# Patient Record
Sex: FEMALE | Race: WHITE | NOT HISPANIC OR LATINO | Employment: UNEMPLOYED | URBAN - METROPOLITAN AREA
[De-identification: names, ages, dates, MRNs, and addresses within clinical notes are randomized per-mention and may not be internally consistent; named-entity substitution may affect disease eponyms.]

---

## 2019-02-05 ENCOUNTER — HOSPITAL ENCOUNTER (OUTPATIENT)
Dept: OTHER | Facility: HOSPITAL | Age: 59
Discharge: HOME | End: 2019-02-05
Attending: NURSE PRACTITIONER

## 2019-02-05 LAB
CREAT BLD-MCNC: 0.9 MG/DL (ref 0.6–1.4)
GFR SERPLBLD BASED ON 1.73 SQ M-ARVRAT: >60 ML/MIN/{1.73_M2}

## 2019-11-14 ENCOUNTER — HOSPITAL ENCOUNTER (OUTPATIENT)
Dept: OTHER | Facility: HOSPITAL | Age: 59
Discharge: HOME | End: 2019-11-14
Attending: NURSE PRACTITIONER

## 2020-02-24 ENCOUNTER — HOSPITAL ENCOUNTER (INPATIENT)
Facility: HOSPITAL | Age: 60
LOS: 12 days | Discharge: NURSING, REHAB OR LONG-TERM CARE | End: 2020-03-07
Attending: INTERNAL MEDICINE | Admitting: INTERNAL MEDICINE

## 2020-02-24 DIAGNOSIS — I21.11 STEMI INVOLVING RIGHT CORONARY ARTERY (HCC): Primary | ICD-10-CM

## 2020-02-24 DIAGNOSIS — R10.9 LEFT SIDED ABDOMINAL PAIN: ICD-10-CM

## 2020-02-24 DIAGNOSIS — R10.13 DYSPEPSIA: ICD-10-CM

## 2020-02-24 DIAGNOSIS — R19.5 HEME POSITIVE STOOL: ICD-10-CM

## 2020-02-24 PROBLEM — I21.3 ACUTE ST ELEVATION MYOCARDIAL INFARCTION (STEMI) DUE TO OCCLUSION OF RIGHT CORONARY ARTERY (HCC): Status: ACTIVE | Noted: 2020-02-24

## 2020-02-24 LAB
ACT BLD: 257 SECONDS (ref 82–152)
ACT BLD: 279 SECONDS (ref 82–152)
CHOLEST SERPL-MCNC: 202 MG/DL (ref 0–200)
GLUCOSE BLDC GLUCOMTR-MCNC: 100 MG/DL (ref 70–130)
HCT VFR BLD AUTO: 34.9 % (ref 34–46.6)
HDLC SERPL-MCNC: 53 MG/DL (ref 40–60)
HGB BLD-MCNC: 11.8 G/DL (ref 12–15.9)
LDLC SERPL CALC-MCNC: 136 MG/DL (ref 0–100)
LDLC/HDLC SERPL: 2.57 {RATIO}
TRIGL SERPL-MCNC: 65 MG/DL (ref 0–150)
TROPONIN T SERPL-MCNC: 1.45 NG/ML (ref 0–0.03)
VLDLC SERPL-MCNC: 13 MG/DL (ref 5–40)

## 2020-02-24 PROCEDURE — 4A023N7 MEASUREMENT OF CARDIAC SAMPLING AND PRESSURE, LEFT HEART, PERCUTANEOUS APPROACH: ICD-10-PCS | Performed by: INTERNAL MEDICINE

## 2020-02-24 PROCEDURE — 85347 COAGULATION TIME ACTIVATED: CPT

## 2020-02-24 PROCEDURE — 25010000002 FENTANYL CITRATE (PF) 100 MCG/2ML SOLUTION: Performed by: INTERNAL MEDICINE

## 2020-02-24 PROCEDURE — 25010000002 MIDAZOLAM PER 1 MG: Performed by: INTERNAL MEDICINE

## 2020-02-24 PROCEDURE — 92941 PRQ TRLML REVSC TOT OCCL AMI: CPT | Performed by: INTERNAL MEDICINE

## 2020-02-24 PROCEDURE — 82962 GLUCOSE BLOOD TEST: CPT

## 2020-02-24 PROCEDURE — 85018 HEMOGLOBIN: CPT | Performed by: INTERNAL MEDICINE

## 2020-02-24 PROCEDURE — C1769 GUIDE WIRE: HCPCS | Performed by: INTERNAL MEDICINE

## 2020-02-24 PROCEDURE — 25010000002 HEPARIN (PORCINE) PER 1000 UNITS: Performed by: INTERNAL MEDICINE

## 2020-02-24 PROCEDURE — 84484 ASSAY OF TROPONIN QUANT: CPT | Performed by: INTERNAL MEDICINE

## 2020-02-24 PROCEDURE — 027034Z DILATION OF CORONARY ARTERY, ONE ARTERY WITH DRUG-ELUTING INTRALUMINAL DEVICE, PERCUTANEOUS APPROACH: ICD-10-PCS | Performed by: INTERNAL MEDICINE

## 2020-02-24 PROCEDURE — 93458 L HRT ARTERY/VENTRICLE ANGIO: CPT | Performed by: INTERNAL MEDICINE

## 2020-02-24 PROCEDURE — C9606 PERC D-E COR REVASC W AMI S: HCPCS | Performed by: INTERNAL MEDICINE

## 2020-02-24 PROCEDURE — 99153 MOD SED SAME PHYS/QHP EA: CPT | Performed by: INTERNAL MEDICINE

## 2020-02-24 PROCEDURE — 93010 ELECTROCARDIOGRAM REPORT: CPT | Performed by: INTERNAL MEDICINE

## 2020-02-24 PROCEDURE — 02C03ZZ EXTIRPATION OF MATTER FROM CORONARY ARTERY, ONE ARTERY, PERCUTANEOUS APPROACH: ICD-10-PCS | Performed by: INTERNAL MEDICINE

## 2020-02-24 PROCEDURE — C1894 INTRO/SHEATH, NON-LASER: HCPCS | Performed by: INTERNAL MEDICINE

## 2020-02-24 PROCEDURE — 25010000002 ONDANSETRON PER 1 MG: Performed by: INTERNAL MEDICINE

## 2020-02-24 PROCEDURE — B2111ZZ FLUOROSCOPY OF MULTIPLE CORONARY ARTERIES USING LOW OSMOLAR CONTRAST: ICD-10-PCS | Performed by: INTERNAL MEDICINE

## 2020-02-24 PROCEDURE — 0 IOPAMIDOL PER 1 ML: Performed by: INTERNAL MEDICINE

## 2020-02-24 PROCEDURE — 99223 1ST HOSP IP/OBS HIGH 75: CPT | Performed by: INTERNAL MEDICINE

## 2020-02-24 PROCEDURE — 25010000002 BH (CUPID ONLY) ADENOSINE 6 MG/100ML MIXTURE: Performed by: INTERNAL MEDICINE

## 2020-02-24 PROCEDURE — C1887 CATHETER, GUIDING: HCPCS | Performed by: INTERNAL MEDICINE

## 2020-02-24 PROCEDURE — 80061 LIPID PANEL: CPT | Performed by: INTERNAL MEDICINE

## 2020-02-24 PROCEDURE — 25010000002 EPTIFIBATIDE PER 5 MG: Performed by: INTERNAL MEDICINE

## 2020-02-24 PROCEDURE — 85014 HEMATOCRIT: CPT | Performed by: INTERNAL MEDICINE

## 2020-02-24 PROCEDURE — B2151ZZ FLUOROSCOPY OF LEFT HEART USING LOW OSMOLAR CONTRAST: ICD-10-PCS | Performed by: INTERNAL MEDICINE

## 2020-02-24 PROCEDURE — C1757 CATH, THROMBECTOMY/EMBOLECT: HCPCS | Performed by: INTERNAL MEDICINE

## 2020-02-24 PROCEDURE — 93005 ELECTROCARDIOGRAM TRACING: CPT | Performed by: INTERNAL MEDICINE

## 2020-02-24 PROCEDURE — 25010000003 MEPERIDINE PER 100 MG: Performed by: INTERNAL MEDICINE

## 2020-02-24 PROCEDURE — C1874 STENT, COATED/COV W/DEL SYS: HCPCS | Performed by: INTERNAL MEDICINE

## 2020-02-24 PROCEDURE — 99152 MOD SED SAME PHYS/QHP 5/>YRS: CPT | Performed by: INTERNAL MEDICINE

## 2020-02-24 PROCEDURE — C1725 CATH, TRANSLUMIN NON-LASER: HCPCS | Performed by: INTERNAL MEDICINE

## 2020-02-24 DEVICE — XIENCE SIERRA™ EVEROLIMUS ELUTING CORONARY STENT SYSTEM 3.50 MM X 23 MM / RAPID-EXCHANGE
Type: IMPLANTABLE DEVICE | Status: FUNCTIONAL
Brand: XIENCE SIERRA™

## 2020-02-24 RX ORDER — CARVEDILOL 3.12 MG/1
3.12 TABLET ORAL EVERY 12 HOURS SCHEDULED
Status: DISCONTINUED | OUTPATIENT
Start: 2020-02-24 | End: 2020-03-02

## 2020-02-24 RX ORDER — OMEPRAZOLE 40 MG/1
40 CAPSULE, DELAYED RELEASE ORAL DAILY PRN
COMMUNITY

## 2020-02-24 RX ORDER — CLONAZEPAM 1 MG/1
1 TABLET ORAL 2 TIMES DAILY PRN
COMMUNITY

## 2020-02-24 RX ORDER — BISACODYL 5 MG/1
5 TABLET, DELAYED RELEASE ORAL DAILY PRN
Status: DISCONTINUED | OUTPATIENT
Start: 2020-02-24 | End: 2020-03-02

## 2020-02-24 RX ORDER — ASPIRIN 81 MG/1
81 TABLET ORAL DAILY
Status: DISCONTINUED | OUTPATIENT
Start: 2020-02-24 | End: 2020-03-07 | Stop reason: HOSPADM

## 2020-02-24 RX ORDER — ACETAMINOPHEN 325 MG/1
650 TABLET ORAL EVERY 4 HOURS PRN
Status: DISCONTINUED | OUTPATIENT
Start: 2020-02-24 | End: 2020-03-07 | Stop reason: HOSPADM

## 2020-02-24 RX ORDER — MODAFINIL 200 MG/1
200 TABLET ORAL 2 TIMES DAILY
COMMUNITY
End: 2022-08-14

## 2020-02-24 RX ORDER — FENTANYL CITRATE 50 UG/ML
INJECTION, SOLUTION INTRAMUSCULAR; INTRAVENOUS AS NEEDED
Status: DISCONTINUED | OUTPATIENT
Start: 2020-02-24 | End: 2020-02-24 | Stop reason: HOSPADM

## 2020-02-24 RX ORDER — MEPERIDINE HYDROCHLORIDE 25 MG/ML
25 INJECTION INTRAMUSCULAR; INTRAVENOUS; SUBCUTANEOUS ONCE
Status: COMPLETED | OUTPATIENT
Start: 2020-02-24 | End: 2020-02-24

## 2020-02-24 RX ORDER — ONDANSETRON 2 MG/ML
4 INJECTION INTRAMUSCULAR; INTRAVENOUS EVERY 6 HOURS PRN
Status: DISCONTINUED | OUTPATIENT
Start: 2020-02-24 | End: 2020-03-07 | Stop reason: HOSPADM

## 2020-02-24 RX ORDER — SODIUM CHLORIDE 9 MG/ML
INJECTION, SOLUTION INTRAVENOUS CONTINUOUS PRN
Status: COMPLETED | OUTPATIENT
Start: 2020-02-24 | End: 2020-02-24

## 2020-02-24 RX ORDER — ZOLPIDEM TARTRATE 5 MG/1
2.5 TABLET ORAL NIGHTLY PRN
Status: DISPENSED | OUTPATIENT
Start: 2020-02-24 | End: 2020-03-05

## 2020-02-24 RX ORDER — LISINOPRIL 5 MG/1
5 TABLET ORAL DAILY
Status: DISCONTINUED | OUTPATIENT
Start: 2020-02-24 | End: 2020-02-26

## 2020-02-24 RX ORDER — ONDANSETRON 4 MG/1
4 TABLET, FILM COATED ORAL EVERY 6 HOURS PRN
COMMUNITY
End: 2022-08-14

## 2020-02-24 RX ORDER — SENNA AND DOCUSATE SODIUM 50; 8.6 MG/1; MG/1
2 TABLET, FILM COATED ORAL 2 TIMES DAILY
Status: DISCONTINUED | OUTPATIENT
Start: 2020-02-24 | End: 2020-03-02

## 2020-02-24 RX ORDER — CLOPIDOGREL BISULFATE 75 MG/1
600 TABLET ORAL ONCE
Status: DISCONTINUED | OUTPATIENT
Start: 2020-02-24 | End: 2020-02-24 | Stop reason: SDUPTHER

## 2020-02-24 RX ORDER — EPTIFIBATIDE 20 MG/10ML
180 INJECTION INTRAVENOUS ONCE
Status: DISCONTINUED | OUTPATIENT
Start: 2020-02-24 | End: 2020-02-24 | Stop reason: SDUPTHER

## 2020-02-24 RX ORDER — MIDAZOLAM HYDROCHLORIDE 1 MG/ML
INJECTION INTRAMUSCULAR; INTRAVENOUS AS NEEDED
Status: DISCONTINUED | OUTPATIENT
Start: 2020-02-24 | End: 2020-02-24 | Stop reason: HOSPADM

## 2020-02-24 RX ORDER — FINGOLIMOD HYDROCHLORIDE 0.5 MG/1
0.5 CAPSULE ORAL DAILY
COMMUNITY
End: 2020-03-19

## 2020-02-24 RX ORDER — CLOPIDOGREL BISULFATE 75 MG/1
TABLET ORAL AS NEEDED
Status: DISCONTINUED | OUTPATIENT
Start: 2020-02-24 | End: 2020-02-24 | Stop reason: HOSPADM

## 2020-02-24 RX ORDER — CLOPIDOGREL BISULFATE 75 MG/1
75 TABLET ORAL DAILY
Status: DISCONTINUED | OUTPATIENT
Start: 2020-02-25 | End: 2020-03-07 | Stop reason: HOSPADM

## 2020-02-24 RX ORDER — ERGOCALCIFEROL 1.25 MG/1
50000 CAPSULE ORAL
COMMUNITY
End: 2020-07-29

## 2020-02-24 RX ORDER — GABAPENTIN 300 MG/1
300 CAPSULE ORAL 4 TIMES DAILY
COMMUNITY

## 2020-02-24 RX ORDER — HEPARIN SODIUM 1000 [USP'U]/ML
INJECTION, SOLUTION INTRAVENOUS; SUBCUTANEOUS AS NEEDED
Status: DISCONTINUED | OUTPATIENT
Start: 2020-02-24 | End: 2020-02-24 | Stop reason: HOSPADM

## 2020-02-24 RX ORDER — FLUOXETINE HYDROCHLORIDE 20 MG/1
40 CAPSULE ORAL 3 TIMES DAILY
COMMUNITY
End: 2022-08-14

## 2020-02-24 RX ORDER — SODIUM CHLORIDE 9 MG/ML
100 INJECTION, SOLUTION INTRAVENOUS CONTINUOUS
Status: ACTIVE | OUTPATIENT
Start: 2020-02-24 | End: 2020-02-24

## 2020-02-24 RX ORDER — BISACODYL 10 MG
10 SUPPOSITORY, RECTAL RECTAL DAILY PRN
Status: DISCONTINUED | OUTPATIENT
Start: 2020-02-24 | End: 2020-03-02

## 2020-02-24 RX ORDER — EPTIFIBATIDE 0.75 MG/ML
INJECTION, SOLUTION INTRAVENOUS CONTINUOUS PRN
Status: COMPLETED | OUTPATIENT
Start: 2020-02-24 | End: 2020-02-24

## 2020-02-24 RX ORDER — PROPRANOLOL HYDROCHLORIDE 40 MG/1
40 TABLET ORAL 2 TIMES DAILY
COMMUNITY
End: 2020-03-07 | Stop reason: HOSPADM

## 2020-02-24 RX ORDER — LIDOCAINE HYDROCHLORIDE 20 MG/ML
INJECTION, SOLUTION INFILTRATION; PERINEURAL AS NEEDED
Status: DISCONTINUED | OUTPATIENT
Start: 2020-02-24 | End: 2020-02-24 | Stop reason: HOSPADM

## 2020-02-24 RX ORDER — EPTIFIBATIDE 0.75 MG/ML
2 INJECTION, SOLUTION INTRAVENOUS CONTINUOUS
Status: DISPENSED | OUTPATIENT
Start: 2020-02-24 | End: 2020-02-25

## 2020-02-24 RX ADMIN — EPTIFIBATIDE 2 MCG/KG/MIN: 0.75 INJECTION INTRAVENOUS at 16:59

## 2020-02-24 RX ADMIN — SODIUM CHLORIDE 100 ML/HR: 9 INJECTION, SOLUTION INTRAVENOUS at 18:39

## 2020-02-24 RX ADMIN — ZOLPIDEM TARTRATE 2.5 MG: 5 TABLET ORAL at 23:50

## 2020-02-24 RX ADMIN — ONDANSETRON 4 MG: 2 INJECTION INTRAMUSCULAR; INTRAVENOUS at 20:01

## 2020-02-24 RX ADMIN — ASPIRIN 81 MG: 81 TABLET, COATED ORAL at 17:49

## 2020-02-24 RX ADMIN — MEPERIDINE HYDROCHLORIDE 25 MG: 25 INJECTION INTRAMUSCULAR; INTRAVENOUS; SUBCUTANEOUS at 20:57

## 2020-02-24 RX ADMIN — ACETAMINOPHEN 650 MG: 325 TABLET, FILM COATED ORAL at 18:39

## 2020-02-24 RX ADMIN — LISINOPRIL 5 MG: 5 TABLET ORAL at 17:49

## 2020-02-24 RX ADMIN — CARVEDILOL 3.12 MG: 3.12 TABLET, FILM COATED ORAL at 20:57

## 2020-02-24 RX ADMIN — EPTIFIBATIDE 2 MCG/KG/MIN: 0.75 INJECTION INTRAVENOUS at 22:53

## 2020-02-25 ENCOUNTER — APPOINTMENT (OUTPATIENT)
Dept: CARDIOLOGY | Facility: HOSPITAL | Age: 60
End: 2020-02-25

## 2020-02-25 ENCOUNTER — APPOINTMENT (OUTPATIENT)
Dept: CT IMAGING | Facility: HOSPITAL | Age: 60
End: 2020-02-25

## 2020-02-25 PROBLEM — G35 MULTIPLE SCLEROSIS: Status: ACTIVE | Noted: 2020-02-25

## 2020-02-25 LAB
ANION GAP SERPL CALCULATED.3IONS-SCNC: 12.1 MMOL/L (ref 5–15)
AORTIC DIMENSIONLESS INDEX: 0.9 (DI)
BH CV ECHO MEAS - ACS: 2 CM
BH CV ECHO MEAS - AO MAX PG (FULL): 1.4 MMHG
BH CV ECHO MEAS - AO MAX PG: 6.1 MMHG
BH CV ECHO MEAS - AO MEAN PG (FULL): 1 MMHG
BH CV ECHO MEAS - AO MEAN PG: 3 MMHG
BH CV ECHO MEAS - AO ROOT AREA (BSA CORRECTED): 1.9
BH CV ECHO MEAS - AO ROOT AREA: 8 CM^2
BH CV ECHO MEAS - AO ROOT DIAM: 3.2 CM
BH CV ECHO MEAS - AO V2 MAX: 123 CM/SEC
BH CV ECHO MEAS - AO V2 MEAN: 74.3 CM/SEC
BH CV ECHO MEAS - AO V2 VTI: 23 CM
BH CV ECHO MEAS - ASC AORTA: 3.3 CM
BH CV ECHO MEAS - AVA(I,A): 2.6 CM^2
BH CV ECHO MEAS - AVA(I,D): 2.6 CM^2
BH CV ECHO MEAS - AVA(V,A): 2.5 CM^2
BH CV ECHO MEAS - AVA(V,D): 2.5 CM^2
BH CV ECHO MEAS - BSA(HAYCOCK): 1.8 M^2
BH CV ECHO MEAS - BSA: 1.7 M^2
BH CV ECHO MEAS - BZI_BMI: 31.4 KILOGRAMS/M^2
BH CV ECHO MEAS - BZI_METRIC_HEIGHT: 152.4 CM
BH CV ECHO MEAS - BZI_METRIC_WEIGHT: 73 KG
BH CV ECHO MEAS - EDV(CUBED): 59.3 ML
BH CV ECHO MEAS - EDV(MOD-SP2): 117 ML
BH CV ECHO MEAS - EDV(MOD-SP4): 106 ML
BH CV ECHO MEAS - EDV(TEICH): 65.9 ML
BH CV ECHO MEAS - EF(CUBED): 39.4 %
BH CV ECHO MEAS - EF(MOD-BP): 50 %
BH CV ECHO MEAS - EF(MOD-SP2): 47.9 %
BH CV ECHO MEAS - EF(MOD-SP4): 49.1 %
BH CV ECHO MEAS - EF(TEICH): 33 %
BH CV ECHO MEAS - ESV(CUBED): 35.9 ML
BH CV ECHO MEAS - ESV(MOD-SP2): 61 ML
BH CV ECHO MEAS - ESV(MOD-SP4): 54 ML
BH CV ECHO MEAS - ESV(TEICH): 44.1 ML
BH CV ECHO MEAS - FS: 15.4 %
BH CV ECHO MEAS - IVS/LVPW: 1
BH CV ECHO MEAS - IVSD: 1 CM
BH CV ECHO MEAS - LAT PEAK E' VEL: 7 CM/SEC
BH CV ECHO MEAS - LV DIASTOLIC VOL/BSA (35-75): 62.3 ML/M^2
BH CV ECHO MEAS - LV MASS(C)D: 122.1 GRAMS
BH CV ECHO MEAS - LV MASS(C)DI: 71.7 GRAMS/M^2
BH CV ECHO MEAS - LV MAX PG: 4.7 MMHG
BH CV ECHO MEAS - LV MEAN PG: 2 MMHG
BH CV ECHO MEAS - LV SYSTOLIC VOL/BSA (12-30): 31.7 ML/M^2
BH CV ECHO MEAS - LV V1 MAX: 108 CM/SEC
BH CV ECHO MEAS - LV V1 MEAN: 68 CM/SEC
BH CV ECHO MEAS - LV V1 VTI: 21.4 CM
BH CV ECHO MEAS - LVIDD: 3.9 CM
BH CV ECHO MEAS - LVIDS: 3.3 CM
BH CV ECHO MEAS - LVLD AP2: 7.8 CM
BH CV ECHO MEAS - LVLD AP4: 8.3 CM
BH CV ECHO MEAS - LVLS AP2: 6.9 CM
BH CV ECHO MEAS - LVLS AP4: 7 CM
BH CV ECHO MEAS - LVOT AREA (M): 2.8 CM^2
BH CV ECHO MEAS - LVOT AREA: 2.8 CM^2
BH CV ECHO MEAS - LVOT DIAM: 1.9 CM
BH CV ECHO MEAS - LVPWD: 1 CM
BH CV ECHO MEAS - MED PEAK E' VEL: 7 CM/SEC
BH CV ECHO MEAS - MV A DUR: 0.11 SEC
BH CV ECHO MEAS - MV A MAX VEL: 65.5 CM/SEC
BH CV ECHO MEAS - MV DEC SLOPE: 314 CM/SEC^2
BH CV ECHO MEAS - MV DEC TIME: 201 SEC
BH CV ECHO MEAS - MV E MAX VEL: 90.7 CM/SEC
BH CV ECHO MEAS - MV E/A: 1.4
BH CV ECHO MEAS - MV MAX PG: 2.6 MMHG
BH CV ECHO MEAS - MV MEAN PG: 1 MMHG
BH CV ECHO MEAS - MV P1/2T MAX VEL: 86.9 CM/SEC
BH CV ECHO MEAS - MV P1/2T: 81.1 MSEC
BH CV ECHO MEAS - MV V2 MAX: 80.1 CM/SEC
BH CV ECHO MEAS - MV V2 MEAN: 42.3 CM/SEC
BH CV ECHO MEAS - MV V2 VTI: 22.9 CM
BH CV ECHO MEAS - MVA P1/2T LCG: 2.5 CM^2
BH CV ECHO MEAS - MVA(P1/2T): 2.7 CM^2
BH CV ECHO MEAS - MVA(VTI): 2.6 CM^2
BH CV ECHO MEAS - PA ACC TIME: 0.14 SEC
BH CV ECHO MEAS - PA MAX PG (FULL): 1.3 MMHG
BH CV ECHO MEAS - PA MAX PG: 2.6 MMHG
BH CV ECHO MEAS - PA PR(ACCEL): 18.3 MMHG
BH CV ECHO MEAS - PA V2 MAX: 80.8 CM/SEC
BH CV ECHO MEAS - PULM A REVS DUR: 0.12 SEC
BH CV ECHO MEAS - PULM A REVS VEL: 30.5 CM/SEC
BH CV ECHO MEAS - PULM DIAS VEL: 52.7 CM/SEC
BH CV ECHO MEAS - PVA(V,A): 1.8 CM^2
BH CV ECHO MEAS - PVA(V,D): 1.8 CM^2
BH CV ECHO MEAS - QP/QS: 0.41
BH CV ECHO MEAS - RAP SYSTOLE: 8 MMHG
BH CV ECHO MEAS - RV MAX PG: 1.3 MMHG
BH CV ECHO MEAS - RV MEAN PG: 1 MMHG
BH CV ECHO MEAS - RV V1 MAX: 56.2 CM/SEC
BH CV ECHO MEAS - RV V1 MEAN: 37.4 CM/SEC
BH CV ECHO MEAS - RV V1 VTI: 9.9 CM
BH CV ECHO MEAS - RVOT AREA: 2.5 CM^2
BH CV ECHO MEAS - RVOT DIAM: 1.8 CM
BH CV ECHO MEAS - RVSP: 34.6 MMHG
BH CV ECHO MEAS - SI(AO): 108.7 ML/M^2
BH CV ECHO MEAS - SI(CUBED): 13.7 ML/M^2
BH CV ECHO MEAS - SI(LVOT): 35.6 ML/M^2
BH CV ECHO MEAS - SI(MOD-SP2): 32.9 ML/M^2
BH CV ECHO MEAS - SI(MOD-SP4): 30.5 ML/M^2
BH CV ECHO MEAS - SI(TEICH): 12.8 ML/M^2
BH CV ECHO MEAS - SV(AO): 185 ML
BH CV ECHO MEAS - SV(CUBED): 23.4 ML
BH CV ECHO MEAS - SV(LVOT): 60.7 ML
BH CV ECHO MEAS - SV(MOD-SP2): 56 ML
BH CV ECHO MEAS - SV(MOD-SP4): 52 ML
BH CV ECHO MEAS - SV(RVOT): 25.1 ML
BH CV ECHO MEAS - SV(TEICH): 21.8 ML
BH CV ECHO MEAS - TAPSE (>1.6): 2.1 CM2
BH CV ECHO MEAS - TR MAX VEL: 258 CM/SEC
BH CV ECHO MEASUREMENTS AVERAGE E/E' RATIO: 12.96
BH CV VAS BP LEFT ARM: NORMAL MMHG
BH CV XLRA - RV BASE: 2.8 CM
BH CV XLRA - TDI S': 10 CM/SEC
BUN BLD-MCNC: 12 MG/DL (ref 8–23)
BUN/CREAT SERPL: 17.4 (ref 7–25)
CALCIUM SPEC-SCNC: 9.2 MG/DL (ref 8.6–10.5)
CHLORIDE SERPL-SCNC: 109 MMOL/L (ref 98–107)
CO2 SERPL-SCNC: 16.9 MMOL/L (ref 22–29)
CREAT BLD-MCNC: 0.69 MG/DL (ref 0.57–1)
GFR SERPL CREATININE-BSD FRML MDRD: 86 ML/MIN/1.73
GLUCOSE BLD-MCNC: 102 MG/DL (ref 65–99)
LEFT ATRIUM VOLUME INDEX: 26 ML/M2
LV EF 2D ECHO EST: 50 %
MAXIMAL PREDICTED HEART RATE: 159 BPM
POTASSIUM BLD-SCNC: 3.7 MMOL/L (ref 3.5–5.2)
SODIUM BLD-SCNC: 138 MMOL/L (ref 136–145)
STRESS TARGET HR: 135 BPM
TROPONIN T SERPL-MCNC: 7.71 NG/ML (ref 0–0.03)

## 2020-02-25 PROCEDURE — 99233 SBSQ HOSP IP/OBS HIGH 50: CPT | Performed by: INTERNAL MEDICINE

## 2020-02-25 PROCEDURE — 93306 TTE W/DOPPLER COMPLETE: CPT | Performed by: INTERNAL MEDICINE

## 2020-02-25 PROCEDURE — 93005 ELECTROCARDIOGRAM TRACING: CPT | Performed by: INTERNAL MEDICINE

## 2020-02-25 PROCEDURE — 25010000002 ONDANSETRON PER 1 MG: Performed by: INTERNAL MEDICINE

## 2020-02-25 PROCEDURE — 99254 IP/OBS CNSLTJ NEW/EST MOD 60: CPT | Performed by: INTERNAL MEDICINE

## 2020-02-25 PROCEDURE — 84484 ASSAY OF TROPONIN QUANT: CPT | Performed by: INTERNAL MEDICINE

## 2020-02-25 PROCEDURE — 93306 TTE W/DOPPLER COMPLETE: CPT

## 2020-02-25 PROCEDURE — 25010000002 EPTIFIBATIDE PER 5 MG: Performed by: INTERNAL MEDICINE

## 2020-02-25 PROCEDURE — 25010000003 MEPERIDINE PER 100 MG: Performed by: INTERNAL MEDICINE

## 2020-02-25 PROCEDURE — 80048 BASIC METABOLIC PNL TOTAL CA: CPT | Performed by: INTERNAL MEDICINE

## 2020-02-25 PROCEDURE — 0 DIATRIZOATE MEGLUMINE & SODIUM PER 1 ML: Performed by: INTERNAL MEDICINE

## 2020-02-25 PROCEDURE — 25010000002 IOPAMIDOL 61 % SOLUTION: Performed by: INTERNAL MEDICINE

## 2020-02-25 PROCEDURE — 74177 CT ABD & PELVIS W/CONTRAST: CPT

## 2020-02-25 PROCEDURE — 93010 ELECTROCARDIOGRAM REPORT: CPT | Performed by: INTERNAL MEDICINE

## 2020-02-25 PROCEDURE — 25010000002 PERFLUTREN (DEFINITY) 8.476 MG IN SODIUM CHLORIDE 0.9 % 10 ML INJECTION: Performed by: INTERNAL MEDICINE

## 2020-02-25 RX ORDER — MODAFINIL 100 MG/1
200 TABLET ORAL 2 TIMES DAILY
Status: DISCONTINUED | OUTPATIENT
Start: 2020-02-25 | End: 2020-03-07 | Stop reason: HOSPADM

## 2020-02-25 RX ORDER — CLONAZEPAM 0.5 MG/1
1 TABLET ORAL 2 TIMES DAILY PRN
Status: DISCONTINUED | OUTPATIENT
Start: 2020-02-25 | End: 2020-02-28

## 2020-02-25 RX ORDER — PANTOPRAZOLE SODIUM 40 MG/1
40 TABLET, DELAYED RELEASE ORAL
Status: DISCONTINUED | OUTPATIENT
Start: 2020-02-25 | End: 2020-03-07 | Stop reason: HOSPADM

## 2020-02-25 RX ORDER — ALUMINA, MAGNESIA, AND SIMETHICONE 2400; 2400; 240 MG/30ML; MG/30ML; MG/30ML
15 SUSPENSION ORAL 4 TIMES DAILY PRN
Status: DISCONTINUED | OUTPATIENT
Start: 2020-02-25 | End: 2020-03-07 | Stop reason: HOSPADM

## 2020-02-25 RX ORDER — ONDANSETRON 4 MG/1
4 TABLET, FILM COATED ORAL EVERY 6 HOURS PRN
Status: DISCONTINUED | OUTPATIENT
Start: 2020-02-25 | End: 2020-03-07 | Stop reason: HOSPADM

## 2020-02-25 RX ORDER — GABAPENTIN 300 MG/1
300 CAPSULE ORAL 4 TIMES DAILY
Status: DISCONTINUED | OUTPATIENT
Start: 2020-02-25 | End: 2020-03-07 | Stop reason: HOSPADM

## 2020-02-25 RX ORDER — MEPERIDINE HYDROCHLORIDE 25 MG/ML
25 INJECTION INTRAMUSCULAR; INTRAVENOUS; SUBCUTANEOUS EVERY 4 HOURS PRN
Status: DISCONTINUED | OUTPATIENT
Start: 2020-02-25 | End: 2020-02-25

## 2020-02-25 RX ORDER — FLUOXETINE HYDROCHLORIDE 20 MG/1
20 CAPSULE ORAL 3 TIMES DAILY
Status: DISCONTINUED | OUTPATIENT
Start: 2020-02-25 | End: 2020-03-07 | Stop reason: HOSPADM

## 2020-02-25 RX ORDER — ALUMINA, MAGNESIA, AND SIMETHICONE 2400; 2400; 240 MG/30ML; MG/30ML; MG/30ML
15 SUSPENSION ORAL ONCE
Status: DISCONTINUED | OUTPATIENT
Start: 2020-02-25 | End: 2020-02-25

## 2020-02-25 RX ORDER — MEPERIDINE HYDROCHLORIDE 25 MG/ML
50 INJECTION INTRAMUSCULAR; INTRAVENOUS; SUBCUTANEOUS EVERY 4 HOURS PRN
Status: DISCONTINUED | OUTPATIENT
Start: 2020-02-25 | End: 2020-02-27

## 2020-02-25 RX ORDER — ATORVASTATIN CALCIUM 20 MG/1
40 TABLET, FILM COATED ORAL NIGHTLY
Status: DISCONTINUED | OUTPATIENT
Start: 2020-02-25 | End: 2020-03-07 | Stop reason: HOSPADM

## 2020-02-25 RX ORDER — LIDOCAINE HYDROCHLORIDE 20 MG/ML
15 SOLUTION OROPHARYNGEAL 4 TIMES DAILY PRN
Status: DISCONTINUED | OUTPATIENT
Start: 2020-02-25 | End: 2020-03-07 | Stop reason: HOSPADM

## 2020-02-25 RX ORDER — NITROGLYCERIN 0.4 MG/1
TABLET SUBLINGUAL
Status: COMPLETED
Start: 2020-02-25 | End: 2020-02-25

## 2020-02-25 RX ORDER — NITROGLYCERIN 0.4 MG/1
0.4 TABLET SUBLINGUAL
Status: DISCONTINUED | OUTPATIENT
Start: 2020-02-25 | End: 2020-03-07 | Stop reason: HOSPADM

## 2020-02-25 RX ADMIN — NITROGLYCERIN 0.4 MG: 0.4 TABLET, ORALLY DISINTEGRATING SUBLINGUAL at 05:46

## 2020-02-25 RX ADMIN — LIDOCAINE HYDROCHLORIDE 15 ML: 20 SOLUTION ORAL; TOPICAL at 02:55

## 2020-02-25 RX ADMIN — GABAPENTIN 300 MG: 300 CAPSULE ORAL at 20:51

## 2020-02-25 RX ADMIN — ATORVASTATIN CALCIUM 40 MG: 20 TABLET, FILM COATED ORAL at 20:51

## 2020-02-25 RX ADMIN — MEPERIDINE HYDROCHLORIDE 25 MG: 25 INJECTION INTRAMUSCULAR; INTRAVENOUS; SUBCUTANEOUS at 16:11

## 2020-02-25 RX ADMIN — PERFLUTREN 1 ML: 6.52 INJECTION, SUSPENSION INTRAVENOUS at 11:10

## 2020-02-25 RX ADMIN — ONDANSETRON 4 MG: 2 INJECTION INTRAMUSCULAR; INTRAVENOUS at 03:01

## 2020-02-25 RX ADMIN — ACETAMINOPHEN 650 MG: 325 TABLET, FILM COATED ORAL at 16:11

## 2020-02-25 RX ADMIN — MEPERIDINE HYDROCHLORIDE 25 MG: 25 INJECTION INTRAMUSCULAR; INTRAVENOUS; SUBCUTANEOUS at 12:13

## 2020-02-25 RX ADMIN — CARVEDILOL 3.12 MG: 3.12 TABLET, FILM COATED ORAL at 09:07

## 2020-02-25 RX ADMIN — MEPERIDINE HYDROCHLORIDE 50 MG: 25 INJECTION INTRAMUSCULAR; INTRAVENOUS; SUBCUTANEOUS at 20:51

## 2020-02-25 RX ADMIN — NITROGLYCERIN 0.4 MG: 0.4 TABLET SUBLINGUAL at 05:26

## 2020-02-25 RX ADMIN — FLUOXETINE HYDROCHLORIDE 20 MG: 20 CAPSULE ORAL at 09:07

## 2020-02-25 RX ADMIN — ASPIRIN 81 MG: 81 TABLET, COATED ORAL at 09:07

## 2020-02-25 RX ADMIN — CLOPIDOGREL 75 MG: 75 TABLET, FILM COATED ORAL at 09:07

## 2020-02-25 RX ADMIN — GABAPENTIN 300 MG: 300 CAPSULE ORAL at 12:40

## 2020-02-25 RX ADMIN — DIATRIZOATE MEGLUMINE AND DIATRIZOATE SODIUM 30 ML: 600; 100 SOLUTION ORAL; RECTAL at 14:00

## 2020-02-25 RX ADMIN — NITROGLYCERIN 0.4 MG: 0.4 TABLET, ORALLY DISINTEGRATING SUBLINGUAL at 05:37

## 2020-02-25 RX ADMIN — ACETAMINOPHEN 650 MG: 325 TABLET, FILM COATED ORAL at 01:48

## 2020-02-25 RX ADMIN — MODAFINIL 200 MG: 100 TABLET ORAL at 20:51

## 2020-02-25 RX ADMIN — LISINOPRIL 5 MG: 5 TABLET ORAL at 09:07

## 2020-02-25 RX ADMIN — FLUOXETINE HYDROCHLORIDE 20 MG: 20 CAPSULE ORAL at 20:51

## 2020-02-25 RX ADMIN — EPTIFIBATIDE 2 MCG/KG/MIN: 0.75 INJECTION INTRAVENOUS at 05:25

## 2020-02-25 RX ADMIN — MODAFINIL 200 MG: 100 TABLET ORAL at 09:07

## 2020-02-25 RX ADMIN — ONDANSETRON 4 MG: 2 INJECTION INTRAMUSCULAR; INTRAVENOUS at 21:00

## 2020-02-25 RX ADMIN — PANTOPRAZOLE SODIUM 40 MG: 40 TABLET, DELAYED RELEASE ORAL at 09:07

## 2020-02-25 RX ADMIN — SENNOSIDES AND DOCUSATE SODIUM 2 TABLET: 8.6; 5 TABLET ORAL at 09:07

## 2020-02-25 RX ADMIN — FLUOXETINE HYDROCHLORIDE 20 MG: 20 CAPSULE ORAL at 16:11

## 2020-02-25 RX ADMIN — GABAPENTIN 300 MG: 300 CAPSULE ORAL at 09:07

## 2020-02-25 RX ADMIN — ALUMINUM HYDROXIDE, MAGNESIUM HYDROXIDE, AND DIMETHICONE 15 ML: 400; 400; 40 SUSPENSION ORAL at 02:52

## 2020-02-25 RX ADMIN — NITROGLYCERIN 0.4 MG: 0.4 TABLET, ORALLY DISINTEGRATING SUBLINGUAL at 05:26

## 2020-02-25 RX ADMIN — CLONAZEPAM 1 MG: 1 TABLET ORAL at 09:07

## 2020-02-25 RX ADMIN — ONDANSETRON 4 MG: 2 INJECTION INTRAMUSCULAR; INTRAVENOUS at 09:04

## 2020-02-25 RX ADMIN — ALUMINUM HYDROXIDE, MAGNESIUM HYDROXIDE, AND DIMETHICONE 15 ML: 400; 400; 40 SUSPENSION ORAL at 07:44

## 2020-02-25 RX ADMIN — LIDOCAINE HYDROCHLORIDE 15 ML: 20 SOLUTION ORAL; TOPICAL at 07:44

## 2020-02-25 RX ADMIN — IOPAMIDOL 85 ML: 612 INJECTION, SOLUTION INTRAVENOUS at 15:49

## 2020-02-25 RX ADMIN — GABAPENTIN 300 MG: 300 CAPSULE ORAL at 18:03

## 2020-02-26 ENCOUNTER — APPOINTMENT (OUTPATIENT)
Dept: GENERAL RADIOLOGY | Facility: HOSPITAL | Age: 60
End: 2020-02-26

## 2020-02-26 LAB
ALBUMIN SERPL-MCNC: 3.3 G/DL (ref 3.5–5.2)
ALP SERPL-CCNC: 72 U/L (ref 39–117)
ALT SERPL W P-5'-P-CCNC: 48 U/L (ref 1–33)
AST SERPL-CCNC: 192 U/L (ref 1–32)
BASOPHILS # BLD AUTO: 0.01 10*3/MM3 (ref 0–0.2)
BASOPHILS NFR BLD AUTO: 0.1 % (ref 0–1.5)
BILIRUB CONJ SERPL-MCNC: 0.2 MG/DL (ref 0.2–0.3)
BILIRUB INDIRECT SERPL-MCNC: 0.2 MG/DL
BILIRUB SERPL-MCNC: 0.4 MG/DL (ref 0.2–1.2)
BILIRUB UR QL STRIP: NEGATIVE
CLARITY UR: CLEAR
COLOR UR: YELLOW
D-LACTATE SERPL-SCNC: 0.8 MMOL/L (ref 0.5–2)
DEPRECATED RDW RBC AUTO: 40.9 FL (ref 37–54)
EOSINOPHIL # BLD AUTO: 0.01 10*3/MM3 (ref 0–0.4)
EOSINOPHIL NFR BLD AUTO: 0.1 % (ref 0.3–6.2)
ERYTHROCYTE [DISTWIDTH] IN BLOOD BY AUTOMATED COUNT: 12.6 % (ref 12.3–15.4)
FATTY ACIDS: NORMAL
GLUCOSE BLDC GLUCOMTR-MCNC: 106 MG/DL (ref 70–130)
GLUCOSE BLDC GLUCOMTR-MCNC: 109 MG/DL (ref 70–130)
GLUCOSE BLDC GLUCOMTR-MCNC: 94 MG/DL (ref 70–130)
GLUCOSE UR STRIP-MCNC: NEGATIVE MG/DL
HCT VFR BLD AUTO: 31.1 % (ref 34–46.6)
HEMOCCULT STL QL: POSITIVE
HGB BLD-MCNC: 10.8 G/DL (ref 12–15.9)
HGB UR QL STRIP.AUTO: NEGATIVE
IMM GRANULOCYTES # BLD AUTO: 0.05 10*3/MM3 (ref 0–0.05)
IMM GRANULOCYTES NFR BLD AUTO: 0.7 % (ref 0–0.5)
KETONES UR QL STRIP: NEGATIVE
LEUKOCYTE ESTERASE UR QL STRIP.AUTO: NEGATIVE
LIPASE SERPL-CCNC: 108 U/L (ref 13–60)
LYMPHOCYTES # BLD AUTO: 0.71 10*3/MM3 (ref 0.7–3.1)
LYMPHOCYTES NFR BLD AUTO: 9.5 % (ref 19.6–45.3)
MCH RBC QN AUTO: 30.9 PG (ref 26.6–33)
MCHC RBC AUTO-ENTMCNC: 34.7 G/DL (ref 31.5–35.7)
MCV RBC AUTO: 89.1 FL (ref 79–97)
MONOCYTES # BLD AUTO: 0.98 10*3/MM3 (ref 0.1–0.9)
MONOCYTES NFR BLD AUTO: 13.1 % (ref 5–12)
MRSA DNA SPEC QL NAA+PROBE: NORMAL
NEUTRAL FATS: NORMAL
NEUTROPHILS # BLD AUTO: 5.72 10*3/MM3 (ref 1.7–7)
NEUTROPHILS NFR BLD AUTO: 76.5 % (ref 42.7–76)
NITRITE UR QL STRIP: NEGATIVE
NRBC BLD AUTO-RTO: 0 /100 WBC (ref 0–0.2)
PH UR STRIP.AUTO: <=5 [PH] (ref 5–8)
PLATELET # BLD AUTO: 226 10*3/MM3 (ref 140–450)
PMV BLD AUTO: 11.3 FL (ref 6–12)
PROCALCITONIN SERPL-MCNC: 0.08 NG/ML (ref 0.1–0.25)
PROT SERPL-MCNC: 5.6 G/DL (ref 6–8.5)
PROT UR QL STRIP: NEGATIVE
RBC # BLD AUTO: 3.49 10*6/MM3 (ref 3.77–5.28)
SP GR UR STRIP: >=1.03 (ref 1–1.03)
UROBILINOGEN UR QL STRIP: NORMAL
WBC NRBC COR # BLD: 7.48 10*3/MM3 (ref 3.4–10.8)

## 2020-02-26 PROCEDURE — 80076 HEPATIC FUNCTION PANEL: CPT | Performed by: INTERNAL MEDICINE

## 2020-02-26 PROCEDURE — 25010000002 ENOXAPARIN PER 10 MG: Performed by: INTERNAL MEDICINE

## 2020-02-26 PROCEDURE — 82962 GLUCOSE BLOOD TEST: CPT

## 2020-02-26 PROCEDURE — 84145 PROCALCITONIN (PCT): CPT | Performed by: INTERNAL MEDICINE

## 2020-02-26 PROCEDURE — 25010000002 PIPERACILLIN SOD-TAZOBACTAM PER 1 G: Performed by: INTERNAL MEDICINE

## 2020-02-26 PROCEDURE — 99233 SBSQ HOSP IP/OBS HIGH 50: CPT | Performed by: INTERNAL MEDICINE

## 2020-02-26 PROCEDURE — 87040 BLOOD CULTURE FOR BACTERIA: CPT | Performed by: INTERNAL MEDICINE

## 2020-02-26 PROCEDURE — 0097U HC BIOFIRE FILMARRAY GI PANEL: CPT | Performed by: INTERNAL MEDICINE

## 2020-02-26 PROCEDURE — 89125 SPECIMEN FAT STAIN: CPT | Performed by: INTERNAL MEDICINE

## 2020-02-26 PROCEDURE — 87641 MR-STAPH DNA AMP PROBE: CPT | Performed by: INTERNAL MEDICINE

## 2020-02-26 PROCEDURE — 82272 OCCULT BLD FECES 1-3 TESTS: CPT | Performed by: INTERNAL MEDICINE

## 2020-02-26 PROCEDURE — 99232 SBSQ HOSP IP/OBS MODERATE 35: CPT | Performed by: INTERNAL MEDICINE

## 2020-02-26 PROCEDURE — 83605 ASSAY OF LACTIC ACID: CPT | Performed by: INTERNAL MEDICINE

## 2020-02-26 PROCEDURE — 71045 X-RAY EXAM CHEST 1 VIEW: CPT

## 2020-02-26 PROCEDURE — 83690 ASSAY OF LIPASE: CPT | Performed by: INTERNAL MEDICINE

## 2020-02-26 PROCEDURE — 25010000003 MEPERIDINE PER 100 MG: Performed by: INTERNAL MEDICINE

## 2020-02-26 PROCEDURE — 81003 URINALYSIS AUTO W/O SCOPE: CPT | Performed by: INTERNAL MEDICINE

## 2020-02-26 PROCEDURE — 85025 COMPLETE CBC W/AUTO DIFF WBC: CPT | Performed by: INTERNAL MEDICINE

## 2020-02-26 PROCEDURE — 83630 LACTOFERRIN FECAL (QUAL): CPT | Performed by: INTERNAL MEDICINE

## 2020-02-26 RX ORDER — SODIUM CHLORIDE, SODIUM LACTATE, POTASSIUM CHLORIDE, CALCIUM CHLORIDE 600; 310; 30; 20 MG/100ML; MG/100ML; MG/100ML; MG/100ML
100 INJECTION, SOLUTION INTRAVENOUS CONTINUOUS
Status: DISCONTINUED | OUTPATIENT
Start: 2020-02-26 | End: 2020-03-01

## 2020-02-26 RX ADMIN — ASPIRIN 81 MG: 81 TABLET, COATED ORAL at 08:41

## 2020-02-26 RX ADMIN — MEPERIDINE HYDROCHLORIDE 50 MG: 25 INJECTION INTRAMUSCULAR; INTRAVENOUS; SUBCUTANEOUS at 01:14

## 2020-02-26 RX ADMIN — FLUOXETINE HYDROCHLORIDE 20 MG: 20 CAPSULE ORAL at 17:06

## 2020-02-26 RX ADMIN — TAZOBACTAM SODIUM AND PIPERACILLIN SODIUM 3.38 G: 375; 3 INJECTION, SOLUTION INTRAVENOUS at 19:27

## 2020-02-26 RX ADMIN — FLUOXETINE HYDROCHLORIDE 20 MG: 20 CAPSULE ORAL at 20:58

## 2020-02-26 RX ADMIN — GABAPENTIN 300 MG: 300 CAPSULE ORAL at 08:41

## 2020-02-26 RX ADMIN — MEPERIDINE HYDROCHLORIDE 50 MG: 25 INJECTION INTRAMUSCULAR; INTRAVENOUS; SUBCUTANEOUS at 14:50

## 2020-02-26 RX ADMIN — LIDOCAINE HYDROCHLORIDE 15 ML: 20 SOLUTION ORAL; TOPICAL at 14:50

## 2020-02-26 RX ADMIN — MEPERIDINE HYDROCHLORIDE 50 MG: 25 INJECTION INTRAMUSCULAR; INTRAVENOUS; SUBCUTANEOUS at 21:18

## 2020-02-26 RX ADMIN — MEPERIDINE HYDROCHLORIDE 50 MG: 25 INJECTION INTRAMUSCULAR; INTRAVENOUS; SUBCUTANEOUS at 06:35

## 2020-02-26 RX ADMIN — PANTOPRAZOLE SODIUM 40 MG: 40 TABLET, DELAYED RELEASE ORAL at 06:35

## 2020-02-26 RX ADMIN — LIDOCAINE HYDROCHLORIDE 15 ML: 20 SOLUTION ORAL; TOPICAL at 01:25

## 2020-02-26 RX ADMIN — GABAPENTIN 300 MG: 300 CAPSULE ORAL at 20:58

## 2020-02-26 RX ADMIN — TAZOBACTAM SODIUM AND PIPERACILLIN SODIUM 3.38 G: 375; 3 INJECTION, SOLUTION INTRAVENOUS at 04:34

## 2020-02-26 RX ADMIN — SODIUM CHLORIDE 1000 ML: 9 INJECTION, SOLUTION INTRAVENOUS at 04:29

## 2020-02-26 RX ADMIN — MODAFINIL 200 MG: 100 TABLET ORAL at 20:58

## 2020-02-26 RX ADMIN — SODIUM CHLORIDE 500 ML: 9 INJECTION, SOLUTION INTRAVENOUS at 08:41

## 2020-02-26 RX ADMIN — ACETAMINOPHEN 650 MG: 325 TABLET, FILM COATED ORAL at 17:06

## 2020-02-26 RX ADMIN — CLOPIDOGREL 75 MG: 75 TABLET, FILM COATED ORAL at 08:41

## 2020-02-26 RX ADMIN — MODAFINIL 200 MG: 100 TABLET ORAL at 08:41

## 2020-02-26 RX ADMIN — ATORVASTATIN CALCIUM 40 MG: 20 TABLET, FILM COATED ORAL at 20:58

## 2020-02-26 RX ADMIN — MEPERIDINE HYDROCHLORIDE 50 MG: 25 INJECTION INTRAMUSCULAR; INTRAVENOUS; SUBCUTANEOUS at 10:30

## 2020-02-26 RX ADMIN — FLUOXETINE HYDROCHLORIDE 20 MG: 20 CAPSULE ORAL at 08:41

## 2020-02-26 RX ADMIN — SODIUM CHLORIDE, POTASSIUM CHLORIDE, SODIUM LACTATE AND CALCIUM CHLORIDE 100 ML/HR: 600; 310; 30; 20 INJECTION, SOLUTION INTRAVENOUS at 15:22

## 2020-02-26 RX ADMIN — ALUMINUM HYDROXIDE, MAGNESIUM HYDROXIDE, AND DIMETHICONE 15 ML: 400; 400; 40 SUSPENSION ORAL at 01:25

## 2020-02-26 RX ADMIN — ACETAMINOPHEN 650 MG: 325 TABLET, FILM COATED ORAL at 01:35

## 2020-02-26 RX ADMIN — ENOXAPARIN SODIUM 40 MG: 40 INJECTION SUBCUTANEOUS at 20:58

## 2020-02-26 RX ADMIN — GABAPENTIN 300 MG: 300 CAPSULE ORAL at 12:45

## 2020-02-26 RX ADMIN — CLONAZEPAM 1 MG: 1 TABLET ORAL at 01:24

## 2020-02-26 RX ADMIN — SODIUM CHLORIDE, POTASSIUM CHLORIDE, SODIUM LACTATE AND CALCIUM CHLORIDE 100 ML/HR: 600; 310; 30; 20 INJECTION, SOLUTION INTRAVENOUS at 04:34

## 2020-02-26 RX ADMIN — ALUMINUM HYDROXIDE, MAGNESIUM HYDROXIDE, AND DIMETHICONE 15 ML: 400; 400; 40 SUSPENSION ORAL at 14:50

## 2020-02-26 RX ADMIN — TAZOBACTAM SODIUM AND PIPERACILLIN SODIUM 3.38 G: 375; 3 INJECTION, SOLUTION INTRAVENOUS at 12:45

## 2020-02-26 RX ADMIN — GABAPENTIN 300 MG: 300 CAPSULE ORAL at 17:06

## 2020-02-27 ENCOUNTER — APPOINTMENT (OUTPATIENT)
Dept: CARDIOLOGY | Facility: HOSPITAL | Age: 60
End: 2020-02-27

## 2020-02-27 LAB
ADV 40+41 DNA STL QL NAA+NON-PROBE: NOT DETECTED
ALBUMIN SERPL-MCNC: 3 G/DL (ref 3.5–5.2)
ALBUMIN/GLOB SERPL: 1.2 G/DL
ALP SERPL-CCNC: 61 U/L (ref 39–117)
ALT SERPL W P-5'-P-CCNC: 31 U/L (ref 1–33)
ANION GAP SERPL CALCULATED.3IONS-SCNC: 11.2 MMOL/L (ref 5–15)
AST SERPL-CCNC: 92 U/L (ref 1–32)
ASTRO TYP 1-8 RNA STL QL NAA+NON-PROBE: NOT DETECTED
BH CV LOWER VASCULAR LEFT COMMON FEMORAL AUGMENT: NORMAL
BH CV LOWER VASCULAR LEFT COMMON FEMORAL COMPETENT: NORMAL
BH CV LOWER VASCULAR LEFT COMMON FEMORAL COMPRESS: NORMAL
BH CV LOWER VASCULAR LEFT COMMON FEMORAL PHASIC: NORMAL
BH CV LOWER VASCULAR LEFT COMMON FEMORAL SPONT: NORMAL
BH CV LOWER VASCULAR LEFT DISTAL FEMORAL COMPRESS: NORMAL
BH CV LOWER VASCULAR LEFT GASTRONEMIUS COMPRESS: NORMAL
BH CV LOWER VASCULAR LEFT GREATER SAPH AK COMPRESS: NORMAL
BH CV LOWER VASCULAR LEFT GREATER SAPH BK COMPRESS: NORMAL
BH CV LOWER VASCULAR LEFT MID FEMORAL AUGMENT: NORMAL
BH CV LOWER VASCULAR LEFT MID FEMORAL COMPETENT: NORMAL
BH CV LOWER VASCULAR LEFT MID FEMORAL COMPRESS: NORMAL
BH CV LOWER VASCULAR LEFT MID FEMORAL PHASIC: NORMAL
BH CV LOWER VASCULAR LEFT MID FEMORAL SPONT: NORMAL
BH CV LOWER VASCULAR LEFT PERONEAL COMPRESS: NORMAL
BH CV LOWER VASCULAR LEFT POPLITEAL AUGMENT: NORMAL
BH CV LOWER VASCULAR LEFT POPLITEAL COMPETENT: NORMAL
BH CV LOWER VASCULAR LEFT POPLITEAL COMPRESS: NORMAL
BH CV LOWER VASCULAR LEFT POPLITEAL PHASIC: NORMAL
BH CV LOWER VASCULAR LEFT POPLITEAL SPONT: NORMAL
BH CV LOWER VASCULAR LEFT POSTERIOR TIBIAL COMPRESS: NORMAL
BH CV LOWER VASCULAR LEFT PROFUNDA FEMORAL COMPRESS: NORMAL
BH CV LOWER VASCULAR LEFT PROXIMAL FEMORAL COMPRESS: NORMAL
BH CV LOWER VASCULAR LEFT SAPHENOFEMORAL JUNCTION COMPRESS: NORMAL
BH CV LOWER VASCULAR RIGHT COMMON FEMORAL AUGMENT: NORMAL
BH CV LOWER VASCULAR RIGHT COMMON FEMORAL COMPETENT: NORMAL
BH CV LOWER VASCULAR RIGHT COMMON FEMORAL COMPRESS: NORMAL
BH CV LOWER VASCULAR RIGHT COMMON FEMORAL PHASIC: NORMAL
BH CV LOWER VASCULAR RIGHT COMMON FEMORAL SPONT: NORMAL
BH CV LOWER VASCULAR RIGHT DISTAL FEMORAL COMPRESS: NORMAL
BH CV LOWER VASCULAR RIGHT GASTRONEMIUS COMPRESS: NORMAL
BH CV LOWER VASCULAR RIGHT GREATER SAPH AK COMPRESS: NORMAL
BH CV LOWER VASCULAR RIGHT GREATER SAPH BK COMPRESS: NORMAL
BH CV LOWER VASCULAR RIGHT MID FEMORAL AUGMENT: NORMAL
BH CV LOWER VASCULAR RIGHT MID FEMORAL COMPETENT: NORMAL
BH CV LOWER VASCULAR RIGHT MID FEMORAL COMPRESS: NORMAL
BH CV LOWER VASCULAR RIGHT MID FEMORAL PHASIC: NORMAL
BH CV LOWER VASCULAR RIGHT MID FEMORAL SPONT: NORMAL
BH CV LOWER VASCULAR RIGHT PERONEAL COMPRESS: NORMAL
BH CV LOWER VASCULAR RIGHT POPLITEAL AUGMENT: NORMAL
BH CV LOWER VASCULAR RIGHT POPLITEAL COMPETENT: NORMAL
BH CV LOWER VASCULAR RIGHT POPLITEAL COMPRESS: NORMAL
BH CV LOWER VASCULAR RIGHT POPLITEAL PHASIC: NORMAL
BH CV LOWER VASCULAR RIGHT POPLITEAL SPONT: NORMAL
BH CV LOWER VASCULAR RIGHT POSTERIOR TIBIAL COMPRESS: NORMAL
BH CV LOWER VASCULAR RIGHT PROFUNDA FEMORAL COMPRESS: NORMAL
BH CV LOWER VASCULAR RIGHT PROXIMAL FEMORAL COMPRESS: NORMAL
BH CV LOWER VASCULAR RIGHT SAPHENOFEMORAL JUNCTION COMPRESS: NORMAL
BILIRUB SERPL-MCNC: 0.5 MG/DL (ref 0.2–1.2)
BUN BLD-MCNC: 10 MG/DL (ref 8–23)
BUN/CREAT SERPL: 14.9 (ref 7–25)
C CAYETANENSIS DNA STL QL NAA+NON-PROBE: NOT DETECTED
CALCIUM SPEC-SCNC: 9.5 MG/DL (ref 8.6–10.5)
CAMPY SP DNA.DIARRHEA STL QL NAA+PROBE: NOT DETECTED
CHLORIDE SERPL-SCNC: 104 MMOL/L (ref 98–107)
CO2 SERPL-SCNC: 21.8 MMOL/L (ref 22–29)
CREAT BLD-MCNC: 0.67 MG/DL (ref 0.57–1)
CRYPTOSP STL CULT: NOT DETECTED
E COLI DNA SPEC QL NAA+PROBE: NOT DETECTED
E HISTOLYT AG STL-ACNC: NOT DETECTED
EAEC PAA PLAS AGGR+AATA ST NAA+NON-PRB: NOT DETECTED
EC STX1 + STX2 GENES STL NAA+PROBE: NOT DETECTED
EPEC EAE GENE STL QL NAA+NON-PROBE: NOT DETECTED
ETEC LTA+ST1A+ST1B TOX ST NAA+NON-PROBE: NOT DETECTED
G LAMBLIA DNA SPEC QL NAA+PROBE: NOT DETECTED
GFR SERPL CREATININE-BSD FRML MDRD: 89 ML/MIN/1.73
GLOBULIN UR ELPH-MCNC: 2.6 GM/DL
GLUCOSE BLD-MCNC: 88 MG/DL (ref 65–99)
LACTOFERRIN STL QL LA: POSITIVE
NOROVIRUS GI+II RNA STL QL NAA+NON-PROBE: NOT DETECTED
P SHIGELLOIDES DNA STL QL NAA+PROBE: NOT DETECTED
POTASSIUM BLD-SCNC: 3.7 MMOL/L (ref 3.5–5.2)
PROT SERPL-MCNC: 5.6 G/DL (ref 6–8.5)
RV RNA STL NAA+PROBE: NOT DETECTED
SALMONELLA DNA SPEC QL NAA+PROBE: NOT DETECTED
SAPO I+II+IV+V RNA STL QL NAA+NON-PROBE: NOT DETECTED
SHIGELLA SP+EIEC IPAH STL QL NAA+PROBE: NOT DETECTED
SODIUM BLD-SCNC: 137 MMOL/L (ref 136–145)
V CHOLERAE DNA SPEC QL NAA+PROBE: NOT DETECTED
VIBRIO DNA SPEC NAA+PROBE: NOT DETECTED
YERSINIA STL CULT: NOT DETECTED

## 2020-02-27 PROCEDURE — 80053 COMPREHEN METABOLIC PANEL: CPT | Performed by: INTERNAL MEDICINE

## 2020-02-27 PROCEDURE — 25010000003 MEPERIDINE PER 100 MG: Performed by: INTERNAL MEDICINE

## 2020-02-27 PROCEDURE — 25010000002 PIPERACILLIN SOD-TAZOBACTAM PER 1 G: Performed by: INTERNAL MEDICINE

## 2020-02-27 PROCEDURE — 99233 SBSQ HOSP IP/OBS HIGH 50: CPT | Performed by: INTERNAL MEDICINE

## 2020-02-27 PROCEDURE — 25010000002 ONDANSETRON PER 1 MG: Performed by: INTERNAL MEDICINE

## 2020-02-27 PROCEDURE — 93970 EXTREMITY STUDY: CPT

## 2020-02-27 PROCEDURE — 25010000002 ENOXAPARIN PER 10 MG: Performed by: INTERNAL MEDICINE

## 2020-02-27 PROCEDURE — 99232 SBSQ HOSP IP/OBS MODERATE 35: CPT | Performed by: INTERNAL MEDICINE

## 2020-02-27 RX ORDER — MEPERIDINE HYDROCHLORIDE 25 MG/ML
50 INJECTION INTRAMUSCULAR; INTRAVENOUS; SUBCUTANEOUS EVERY 4 HOURS PRN
Status: DISPENSED | OUTPATIENT
Start: 2020-02-27 | End: 2020-03-01

## 2020-02-27 RX ADMIN — LIDOCAINE HYDROCHLORIDE 15 ML: 20 SOLUTION ORAL; TOPICAL at 01:08

## 2020-02-27 RX ADMIN — SODIUM CHLORIDE, POTASSIUM CHLORIDE, SODIUM LACTATE AND CALCIUM CHLORIDE 100 ML/HR: 600; 310; 30; 20 INJECTION, SOLUTION INTRAVENOUS at 11:40

## 2020-02-27 RX ADMIN — ALUMINUM HYDROXIDE, MAGNESIUM HYDROXIDE, AND DIMETHICONE 15 ML: 400; 400; 40 SUSPENSION ORAL at 01:08

## 2020-02-27 RX ADMIN — MODAFINIL 200 MG: 100 TABLET ORAL at 21:42

## 2020-02-27 RX ADMIN — ATORVASTATIN CALCIUM 40 MG: 20 TABLET, FILM COATED ORAL at 21:43

## 2020-02-27 RX ADMIN — MEPERIDINE HYDROCHLORIDE 50 MG: 25 INJECTION INTRAMUSCULAR; INTRAVENOUS; SUBCUTANEOUS at 01:22

## 2020-02-27 RX ADMIN — GABAPENTIN 300 MG: 300 CAPSULE ORAL at 08:52

## 2020-02-27 RX ADMIN — MEPERIDINE HYDROCHLORIDE 50 MG: 25 INJECTION INTRAMUSCULAR; INTRAVENOUS; SUBCUTANEOUS at 09:32

## 2020-02-27 RX ADMIN — CLONAZEPAM 1 MG: 1 TABLET ORAL at 20:10

## 2020-02-27 RX ADMIN — SODIUM CHLORIDE, POTASSIUM CHLORIDE, SODIUM LACTATE AND CALCIUM CHLORIDE 100 ML/HR: 600; 310; 30; 20 INJECTION, SOLUTION INTRAVENOUS at 01:22

## 2020-02-27 RX ADMIN — MODAFINIL 200 MG: 100 TABLET ORAL at 08:52

## 2020-02-27 RX ADMIN — FLUOXETINE HYDROCHLORIDE 20 MG: 20 CAPSULE ORAL at 16:59

## 2020-02-27 RX ADMIN — MEPERIDINE HYDROCHLORIDE 50 MG: 25 INJECTION INTRAMUSCULAR; INTRAVENOUS; SUBCUTANEOUS at 20:14

## 2020-02-27 RX ADMIN — TAZOBACTAM SODIUM AND PIPERACILLIN SODIUM 3.38 G: 375; 3 INJECTION, SOLUTION INTRAVENOUS at 04:08

## 2020-02-27 RX ADMIN — ENOXAPARIN SODIUM 40 MG: 40 INJECTION SUBCUTANEOUS at 21:43

## 2020-02-27 RX ADMIN — GABAPENTIN 300 MG: 300 CAPSULE ORAL at 12:57

## 2020-02-27 RX ADMIN — FLUOXETINE HYDROCHLORIDE 20 MG: 20 CAPSULE ORAL at 08:52

## 2020-02-27 RX ADMIN — FLUOXETINE HYDROCHLORIDE 20 MG: 20 CAPSULE ORAL at 21:43

## 2020-02-27 RX ADMIN — ONDANSETRON 4 MG: 2 INJECTION INTRAMUSCULAR; INTRAVENOUS at 19:14

## 2020-02-27 RX ADMIN — ASPIRIN 81 MG: 81 TABLET, COATED ORAL at 08:52

## 2020-02-27 RX ADMIN — GABAPENTIN 300 MG: 300 CAPSULE ORAL at 21:42

## 2020-02-27 RX ADMIN — PANTOPRAZOLE SODIUM 40 MG: 40 TABLET, DELAYED RELEASE ORAL at 05:37

## 2020-02-27 RX ADMIN — TAZOBACTAM SODIUM AND PIPERACILLIN SODIUM 3.38 G: 375; 3 INJECTION, SOLUTION INTRAVENOUS at 11:40

## 2020-02-27 RX ADMIN — CARVEDILOL 3.12 MG: 3.12 TABLET, FILM COATED ORAL at 21:42

## 2020-02-27 RX ADMIN — MEPERIDINE HYDROCHLORIDE 50 MG: 25 INJECTION INTRAMUSCULAR; INTRAVENOUS; SUBCUTANEOUS at 14:12

## 2020-02-27 RX ADMIN — CARVEDILOL 3.12 MG: 3.12 TABLET, FILM COATED ORAL at 08:52

## 2020-02-27 RX ADMIN — MEPERIDINE HYDROCHLORIDE 50 MG: 25 INJECTION INTRAMUSCULAR; INTRAVENOUS; SUBCUTANEOUS at 05:37

## 2020-02-27 RX ADMIN — GABAPENTIN 300 MG: 300 CAPSULE ORAL at 18:49

## 2020-02-27 RX ADMIN — CLOPIDOGREL 75 MG: 75 TABLET, FILM COATED ORAL at 08:52

## 2020-02-28 PROBLEM — R19.5 HEME POSITIVE STOOL: Status: ACTIVE | Noted: 2020-02-28

## 2020-02-28 PROBLEM — R10.9 LEFT SIDED ABDOMINAL PAIN: Status: ACTIVE | Noted: 2020-02-28

## 2020-02-28 PROBLEM — R10.13 DYSPEPSIA: Status: ACTIVE | Noted: 2020-02-28

## 2020-02-28 LAB
BASOPHILS # BLD AUTO: 0.01 10*3/MM3 (ref 0–0.2)
BASOPHILS NFR BLD AUTO: 0.2 % (ref 0–1.5)
DEPRECATED RDW RBC AUTO: 39.9 FL (ref 37–54)
EOSINOPHIL # BLD AUTO: 0.02 10*3/MM3 (ref 0–0.4)
EOSINOPHIL NFR BLD AUTO: 0.3 % (ref 0.3–6.2)
ERYTHROCYTE [DISTWIDTH] IN BLOOD BY AUTOMATED COUNT: 12.3 % (ref 12.3–15.4)
HCT VFR BLD AUTO: 27.8 % (ref 34–46.6)
HGB BLD-MCNC: 9.6 G/DL (ref 12–15.9)
IMM GRANULOCYTES # BLD AUTO: 0.07 10*3/MM3 (ref 0–0.05)
IMM GRANULOCYTES NFR BLD AUTO: 1.1 % (ref 0–0.5)
LYMPHOCYTES # BLD AUTO: 0.54 10*3/MM3 (ref 0.7–3.1)
LYMPHOCYTES NFR BLD AUTO: 8.1 % (ref 19.6–45.3)
MCH RBC QN AUTO: 31.2 PG (ref 26.6–33)
MCHC RBC AUTO-ENTMCNC: 34.5 G/DL (ref 31.5–35.7)
MCV RBC AUTO: 90.3 FL (ref 79–97)
MONOCYTES # BLD AUTO: 0.8 10*3/MM3 (ref 0.1–0.9)
MONOCYTES NFR BLD AUTO: 12.1 % (ref 5–12)
NEUTROPHILS # BLD AUTO: 5.19 10*3/MM3 (ref 1.7–7)
NEUTROPHILS NFR BLD AUTO: 78.2 % (ref 42.7–76)
NRBC BLD AUTO-RTO: 0 /100 WBC (ref 0–0.2)
PLATELET # BLD AUTO: 175 10*3/MM3 (ref 140–450)
PMV BLD AUTO: 11.2 FL (ref 6–12)
PROCALCITONIN SERPL-MCNC: 0.05 NG/ML (ref 0.1–0.25)
RBC # BLD AUTO: 3.08 10*6/MM3 (ref 3.77–5.28)
WBC NRBC COR # BLD: 6.63 10*3/MM3 (ref 3.4–10.8)

## 2020-02-28 PROCEDURE — 85025 COMPLETE CBC W/AUTO DIFF WBC: CPT | Performed by: INTERNAL MEDICINE

## 2020-02-28 PROCEDURE — 84145 PROCALCITONIN (PCT): CPT | Performed by: INTERNAL MEDICINE

## 2020-02-28 PROCEDURE — 97163 PT EVAL HIGH COMPLEX 45 MIN: CPT

## 2020-02-28 PROCEDURE — 97110 THERAPEUTIC EXERCISES: CPT

## 2020-02-28 PROCEDURE — 93005 ELECTROCARDIOGRAM TRACING: CPT | Performed by: INTERNAL MEDICINE

## 2020-02-28 PROCEDURE — 99232 SBSQ HOSP IP/OBS MODERATE 35: CPT | Performed by: NURSE PRACTITIONER

## 2020-02-28 PROCEDURE — 25010000003 MEPERIDINE PER 100 MG: Performed by: INTERNAL MEDICINE

## 2020-02-28 PROCEDURE — 25010000002 ONDANSETRON PER 1 MG: Performed by: INTERNAL MEDICINE

## 2020-02-28 PROCEDURE — 99232 SBSQ HOSP IP/OBS MODERATE 35: CPT | Performed by: INTERNAL MEDICINE

## 2020-02-28 RX ORDER — SUCRALFATE ORAL 1 G/10ML
1 SUSPENSION ORAL
Status: DISCONTINUED | OUTPATIENT
Start: 2020-02-28 | End: 2020-03-07 | Stop reason: HOSPADM

## 2020-02-28 RX ORDER — PRAMIPEXOLE DIHYDROCHLORIDE 0.25 MG/1
0.12 TABLET ORAL NIGHTLY
Status: DISCONTINUED | OUTPATIENT
Start: 2020-02-28 | End: 2020-03-07 | Stop reason: HOSPADM

## 2020-02-28 RX ORDER — CLONAZEPAM 0.5 MG/1
1 TABLET ORAL EVERY 12 HOURS SCHEDULED
Status: DISCONTINUED | OUTPATIENT
Start: 2020-02-28 | End: 2020-03-07 | Stop reason: HOSPADM

## 2020-02-28 RX ORDER — CALCIUM CARBONATE 200(500)MG
1 TABLET,CHEWABLE ORAL 4 TIMES DAILY PRN
Status: DISCONTINUED | OUTPATIENT
Start: 2020-02-28 | End: 2020-03-07 | Stop reason: HOSPADM

## 2020-02-28 RX ORDER — TRAMADOL HYDROCHLORIDE 50 MG/1
50 TABLET ORAL EVERY 6 HOURS PRN
Status: DISCONTINUED | OUTPATIENT
Start: 2020-02-28 | End: 2020-03-07 | Stop reason: HOSPADM

## 2020-02-28 RX ORDER — POLYETHYLENE GLYCOL 3350, SODIUM CHLORIDE, POTASSIUM CHLORIDE, SODIUM BICARBONATE, AND SODIUM SULFATE 240; 5.84; 2.98; 6.72; 22.72 G/4L; G/4L; G/4L; G/4L; G/4L
2000 POWDER, FOR SOLUTION ORAL 2 TIMES DAILY
Status: DISPENSED | OUTPATIENT
Start: 2020-02-28 | End: 2020-02-29

## 2020-02-28 RX ADMIN — ATORVASTATIN CALCIUM 40 MG: 20 TABLET, FILM COATED ORAL at 21:33

## 2020-02-28 RX ADMIN — SODIUM CHLORIDE, POTASSIUM CHLORIDE, SODIUM LACTATE AND CALCIUM CHLORIDE 100 ML/HR: 600; 310; 30; 20 INJECTION, SOLUTION INTRAVENOUS at 08:47

## 2020-02-28 RX ADMIN — NITROGLYCERIN 0.4 MG: 0.4 TABLET, ORALLY DISINTEGRATING SUBLINGUAL at 12:08

## 2020-02-28 RX ADMIN — ASPIRIN 81 MG: 81 TABLET, COATED ORAL at 08:16

## 2020-02-28 RX ADMIN — MODAFINIL 200 MG: 100 TABLET ORAL at 08:15

## 2020-02-28 RX ADMIN — CARVEDILOL 3.12 MG: 3.12 TABLET, FILM COATED ORAL at 08:16

## 2020-02-28 RX ADMIN — MEPERIDINE HYDROCHLORIDE 50 MG: 25 INJECTION INTRAMUSCULAR; INTRAVENOUS; SUBCUTANEOUS at 03:29

## 2020-02-28 RX ADMIN — ANTACID TABLETS 1 TABLET: 500 TABLET, CHEWABLE ORAL at 12:28

## 2020-02-28 RX ADMIN — CARVEDILOL 3.12 MG: 3.12 TABLET, FILM COATED ORAL at 21:34

## 2020-02-28 RX ADMIN — FLUOXETINE HYDROCHLORIDE 20 MG: 20 CAPSULE ORAL at 21:34

## 2020-02-28 RX ADMIN — FLUOXETINE HYDROCHLORIDE 20 MG: 20 CAPSULE ORAL at 08:15

## 2020-02-28 RX ADMIN — GABAPENTIN 300 MG: 300 CAPSULE ORAL at 21:34

## 2020-02-28 RX ADMIN — PANTOPRAZOLE SODIUM 40 MG: 40 TABLET, DELAYED RELEASE ORAL at 06:29

## 2020-02-28 RX ADMIN — GABAPENTIN 300 MG: 300 CAPSULE ORAL at 12:01

## 2020-02-28 RX ADMIN — FLUOXETINE HYDROCHLORIDE 20 MG: 20 CAPSULE ORAL at 17:28

## 2020-02-28 RX ADMIN — MEPERIDINE HYDROCHLORIDE 50 MG: 25 INJECTION INTRAMUSCULAR; INTRAVENOUS; SUBCUTANEOUS at 17:28

## 2020-02-28 RX ADMIN — SUCRALFATE 1 G: 1 SUSPENSION ORAL at 17:28

## 2020-02-28 RX ADMIN — SUCRALFATE 1 G: 1 SUSPENSION ORAL at 21:34

## 2020-02-28 RX ADMIN — MEPERIDINE HYDROCHLORIDE 50 MG: 25 INJECTION INTRAMUSCULAR; INTRAVENOUS; SUBCUTANEOUS at 21:34

## 2020-02-28 RX ADMIN — MEPERIDINE HYDROCHLORIDE 50 MG: 25 INJECTION INTRAMUSCULAR; INTRAVENOUS; SUBCUTANEOUS at 08:13

## 2020-02-28 RX ADMIN — PRAMIPEXOLE DIHYDROCHLORIDE 0.12 MG: 0.25 TABLET ORAL at 17:26

## 2020-02-28 RX ADMIN — MODAFINIL 200 MG: 100 TABLET ORAL at 21:33

## 2020-02-28 RX ADMIN — GABAPENTIN 300 MG: 300 CAPSULE ORAL at 17:30

## 2020-02-28 RX ADMIN — ONDANSETRON 4 MG: 2 INJECTION INTRAMUSCULAR; INTRAVENOUS at 17:28

## 2020-02-28 RX ADMIN — SENNOSIDES AND DOCUSATE SODIUM 2 TABLET: 8.6; 5 TABLET ORAL at 21:33

## 2020-02-28 RX ADMIN — SODIUM CHLORIDE, POTASSIUM CHLORIDE, SODIUM LACTATE AND CALCIUM CHLORIDE 100 ML/HR: 600; 310; 30; 20 INJECTION, SOLUTION INTRAVENOUS at 18:41

## 2020-02-28 RX ADMIN — GABAPENTIN 300 MG: 300 CAPSULE ORAL at 08:15

## 2020-02-28 RX ADMIN — CLONAZEPAM 1 MG: 0.5 TABLET ORAL at 21:33

## 2020-02-28 RX ADMIN — CLOPIDOGREL 75 MG: 75 TABLET, FILM COATED ORAL at 08:15

## 2020-02-28 RX ADMIN — TRAMADOL HYDROCHLORIDE 50 MG: 50 TABLET, FILM COATED ORAL at 12:28

## 2020-02-29 ENCOUNTER — ANESTHESIA EVENT (OUTPATIENT)
Dept: GASTROENTEROLOGY | Facility: HOSPITAL | Age: 60
End: 2020-02-29

## 2020-02-29 ENCOUNTER — ANESTHESIA (OUTPATIENT)
Dept: GASTROENTEROLOGY | Facility: HOSPITAL | Age: 60
End: 2020-02-29

## 2020-02-29 LAB
ALBUMIN SERPL-MCNC: 3.1 G/DL (ref 3.5–5.2)
ALBUMIN/GLOB SERPL: 1.3 G/DL
ALP SERPL-CCNC: 60 U/L (ref 39–117)
ALT SERPL W P-5'-P-CCNC: 20 U/L (ref 1–33)
ANION GAP SERPL CALCULATED.3IONS-SCNC: 15.7 MMOL/L (ref 5–15)
AST SERPL-CCNC: 33 U/L (ref 1–32)
BASOPHILS # BLD AUTO: 0.01 10*3/MM3 (ref 0–0.2)
BASOPHILS NFR BLD AUTO: 0.1 % (ref 0–1.5)
BILIRUB SERPL-MCNC: 0.6 MG/DL (ref 0.2–1.2)
BUN BLD-MCNC: 7 MG/DL (ref 8–23)
BUN/CREAT SERPL: 13 (ref 7–25)
CALCIUM SPEC-SCNC: 9.1 MG/DL (ref 8.6–10.5)
CHLORIDE SERPL-SCNC: 100 MMOL/L (ref 98–107)
CO2 SERPL-SCNC: 22.3 MMOL/L (ref 22–29)
CREAT BLD-MCNC: 0.54 MG/DL (ref 0.57–1)
DEPRECATED RDW RBC AUTO: 40.2 FL (ref 37–54)
EOSINOPHIL # BLD AUTO: 0.03 10*3/MM3 (ref 0–0.4)
EOSINOPHIL NFR BLD AUTO: 0.4 % (ref 0.3–6.2)
ERYTHROCYTE [DISTWIDTH] IN BLOOD BY AUTOMATED COUNT: 11.9 % (ref 12.3–15.4)
GFR SERPL CREATININE-BSD FRML MDRD: 115 ML/MIN/1.73
GLOBULIN UR ELPH-MCNC: 2.4 GM/DL
GLUCOSE BLD-MCNC: 100 MG/DL (ref 65–99)
HCT VFR BLD AUTO: 28.4 % (ref 34–46.6)
HGB BLD-MCNC: 9.6 G/DL (ref 12–15.9)
IMM GRANULOCYTES # BLD AUTO: 0.05 10*3/MM3 (ref 0–0.05)
IMM GRANULOCYTES NFR BLD AUTO: 0.7 % (ref 0–0.5)
LIPASE SERPL-CCNC: 44 U/L (ref 13–60)
LYMPHOCYTES # BLD AUTO: 0.54 10*3/MM3 (ref 0.7–3.1)
LYMPHOCYTES NFR BLD AUTO: 7.6 % (ref 19.6–45.3)
MCH RBC QN AUTO: 31 PG (ref 26.6–33)
MCHC RBC AUTO-ENTMCNC: 33.8 G/DL (ref 31.5–35.7)
MCV RBC AUTO: 91.6 FL (ref 79–97)
MONOCYTES # BLD AUTO: 0.99 10*3/MM3 (ref 0.1–0.9)
MONOCYTES NFR BLD AUTO: 13.9 % (ref 5–12)
NEUTROPHILS # BLD AUTO: 5.5 10*3/MM3 (ref 1.7–7)
NEUTROPHILS NFR BLD AUTO: 77.3 % (ref 42.7–76)
NRBC BLD AUTO-RTO: 0.1 /100 WBC (ref 0–0.2)
PLATELET # BLD AUTO: 199 10*3/MM3 (ref 140–450)
PMV BLD AUTO: 11.3 FL (ref 6–12)
POTASSIUM BLD-SCNC: 3.7 MMOL/L (ref 3.5–5.2)
PROT SERPL-MCNC: 5.5 G/DL (ref 6–8.5)
RBC # BLD AUTO: 3.1 10*6/MM3 (ref 3.77–5.28)
SODIUM BLD-SCNC: 138 MMOL/L (ref 136–145)
TROPONIN T SERPL-MCNC: 4.68 NG/ML (ref 0–0.03)
WBC NRBC COR # BLD: 7.12 10*3/MM3 (ref 3.4–10.8)

## 2020-02-29 PROCEDURE — 25010000003 MEPERIDINE PER 100 MG: Performed by: INTERNAL MEDICINE

## 2020-02-29 PROCEDURE — 83690 ASSAY OF LIPASE: CPT | Performed by: INTERNAL MEDICINE

## 2020-02-29 PROCEDURE — 80053 COMPREHEN METABOLIC PANEL: CPT | Performed by: INTERNAL MEDICINE

## 2020-02-29 PROCEDURE — 0DB78ZX EXCISION OF STOMACH, PYLORUS, VIA NATURAL OR ARTIFICIAL OPENING ENDOSCOPIC, DIAGNOSTIC: ICD-10-PCS | Performed by: INTERNAL MEDICINE

## 2020-02-29 PROCEDURE — 0DJD8ZZ INSPECTION OF LOWER INTESTINAL TRACT, VIA NATURAL OR ARTIFICIAL OPENING ENDOSCOPIC: ICD-10-PCS | Performed by: INTERNAL MEDICINE

## 2020-02-29 PROCEDURE — 45378 DIAGNOSTIC COLONOSCOPY: CPT | Performed by: INTERNAL MEDICINE

## 2020-02-29 PROCEDURE — 84484 ASSAY OF TROPONIN QUANT: CPT | Performed by: INTERNAL MEDICINE

## 2020-02-29 PROCEDURE — 88305 TISSUE EXAM BY PATHOLOGIST: CPT | Performed by: INTERNAL MEDICINE

## 2020-02-29 PROCEDURE — 43239 EGD BIOPSY SINGLE/MULTIPLE: CPT | Performed by: INTERNAL MEDICINE

## 2020-02-29 PROCEDURE — 25010000002 ENOXAPARIN PER 10 MG: Performed by: INTERNAL MEDICINE

## 2020-02-29 PROCEDURE — 25010000002 PROPOFOL 10 MG/ML EMULSION: Performed by: ANESTHESIOLOGY

## 2020-02-29 PROCEDURE — 85025 COMPLETE CBC W/AUTO DIFF WBC: CPT | Performed by: INTERNAL MEDICINE

## 2020-02-29 PROCEDURE — 99251 PR INITL INPATIENT CONSULT NEW/ESTAB PT 20 MIN: CPT | Performed by: OBSTETRICS & GYNECOLOGY

## 2020-02-29 PROCEDURE — 99233 SBSQ HOSP IP/OBS HIGH 50: CPT | Performed by: INTERNAL MEDICINE

## 2020-02-29 PROCEDURE — 0DB98ZX EXCISION OF DUODENUM, VIA NATURAL OR ARTIFICIAL OPENING ENDOSCOPIC, DIAGNOSTIC: ICD-10-PCS | Performed by: INTERNAL MEDICINE

## 2020-02-29 RX ORDER — SODIUM CHLORIDE 9 MG/ML
30 INJECTION, SOLUTION INTRAVENOUS CONTINUOUS
Status: DISCONTINUED | OUTPATIENT
Start: 2020-02-29 | End: 2020-03-01

## 2020-02-29 RX ORDER — PROPOFOL 10 MG/ML
VIAL (ML) INTRAVENOUS CONTINUOUS PRN
Status: DISCONTINUED | OUTPATIENT
Start: 2020-02-29 | End: 2020-02-29 | Stop reason: SURG

## 2020-02-29 RX ORDER — LIDOCAINE HYDROCHLORIDE 20 MG/ML
INJECTION, SOLUTION INFILTRATION; PERINEURAL AS NEEDED
Status: DISCONTINUED | OUTPATIENT
Start: 2020-02-29 | End: 2020-02-29 | Stop reason: SURG

## 2020-02-29 RX ORDER — PROPOFOL 10 MG/ML
VIAL (ML) INTRAVENOUS AS NEEDED
Status: DISCONTINUED | OUTPATIENT
Start: 2020-02-29 | End: 2020-02-29 | Stop reason: SURG

## 2020-02-29 RX ORDER — LOSARTAN POTASSIUM 25 MG/1
25 TABLET ORAL
Status: DISCONTINUED | OUTPATIENT
Start: 2020-02-29 | End: 2020-03-07 | Stop reason: HOSPADM

## 2020-02-29 RX ADMIN — ATORVASTATIN CALCIUM 40 MG: 20 TABLET, FILM COATED ORAL at 20:28

## 2020-02-29 RX ADMIN — MEPERIDINE HYDROCHLORIDE 50 MG: 25 INJECTION INTRAMUSCULAR; INTRAVENOUS; SUBCUTANEOUS at 02:38

## 2020-02-29 RX ADMIN — SENNOSIDES AND DOCUSATE SODIUM 2 TABLET: 8.6; 5 TABLET ORAL at 20:28

## 2020-02-29 RX ADMIN — GABAPENTIN 300 MG: 300 CAPSULE ORAL at 20:27

## 2020-02-29 RX ADMIN — CLONAZEPAM 1 MG: 0.5 TABLET ORAL at 09:11

## 2020-02-29 RX ADMIN — ENOXAPARIN SODIUM 40 MG: 40 INJECTION SUBCUTANEOUS at 20:34

## 2020-02-29 RX ADMIN — MEPERIDINE HYDROCHLORIDE 50 MG: 25 INJECTION INTRAMUSCULAR; INTRAVENOUS; SUBCUTANEOUS at 15:26

## 2020-02-29 RX ADMIN — ASPIRIN 81 MG: 81 TABLET, COATED ORAL at 09:11

## 2020-02-29 RX ADMIN — MODAFINIL 200 MG: 100 TABLET ORAL at 09:11

## 2020-02-29 RX ADMIN — FLUOXETINE HYDROCHLORIDE 20 MG: 20 CAPSULE ORAL at 20:28

## 2020-02-29 RX ADMIN — SODIUM CHLORIDE, POTASSIUM CHLORIDE, SODIUM LACTATE AND CALCIUM CHLORIDE 100 ML/HR: 600; 310; 30; 20 INJECTION, SOLUTION INTRAVENOUS at 04:50

## 2020-02-29 RX ADMIN — FLUOXETINE HYDROCHLORIDE 20 MG: 20 CAPSULE ORAL at 09:11

## 2020-02-29 RX ADMIN — CARVEDILOL 3.12 MG: 3.12 TABLET, FILM COATED ORAL at 20:28

## 2020-02-29 RX ADMIN — PROPOFOL 80 MG: 10 INJECTION, EMULSION INTRAVENOUS at 18:40

## 2020-02-29 RX ADMIN — GABAPENTIN 300 MG: 300 CAPSULE ORAL at 09:11

## 2020-02-29 RX ADMIN — LIDOCAINE HYDROCHLORIDE 20 MG: 20 INJECTION, SOLUTION INFILTRATION; PERINEURAL at 18:40

## 2020-02-29 RX ADMIN — PROPOFOL 80 MCG/KG/MIN: 10 INJECTION, EMULSION INTRAVENOUS at 18:41

## 2020-02-29 RX ADMIN — SUCRALFATE 1 G: 1 SUSPENSION ORAL at 09:10

## 2020-02-29 RX ADMIN — CLONAZEPAM 1 MG: 0.5 TABLET ORAL at 20:27

## 2020-02-29 RX ADMIN — PANTOPRAZOLE SODIUM 40 MG: 40 TABLET, DELAYED RELEASE ORAL at 09:11

## 2020-02-29 RX ADMIN — SUCRALFATE 1 G: 1 SUSPENSION ORAL at 12:23

## 2020-02-29 RX ADMIN — GABAPENTIN 300 MG: 300 CAPSULE ORAL at 12:23

## 2020-02-29 RX ADMIN — MODAFINIL 200 MG: 100 TABLET ORAL at 20:27

## 2020-02-29 RX ADMIN — MEPERIDINE HYDROCHLORIDE 50 MG: 25 INJECTION INTRAMUSCULAR; INTRAVENOUS; SUBCUTANEOUS at 05:57

## 2020-02-29 RX ADMIN — SODIUM CHLORIDE, POTASSIUM CHLORIDE, SODIUM LACTATE AND CALCIUM CHLORIDE 100 ML/HR: 600; 310; 30; 20 INJECTION, SOLUTION INTRAVENOUS at 14:30

## 2020-02-29 RX ADMIN — SUCRALFATE 1 G: 1 SUSPENSION ORAL at 20:28

## 2020-02-29 RX ADMIN — MEPERIDINE HYDROCHLORIDE 50 MG: 25 INJECTION INTRAMUSCULAR; INTRAVENOUS; SUBCUTANEOUS at 11:09

## 2020-02-29 RX ADMIN — POLYETHYLENE GLYCOL 3350, SODIUM CHLORIDE, POTASSIUM CHLORIDE, SODIUM BICARBONATE, AND SODIUM SULFATE 2000 ML: 240; 5.84; 2.98; 6.72; 22.72 POWDER, FOR SOLUTION ORAL at 06:07

## 2020-02-29 RX ADMIN — MEPERIDINE HYDROCHLORIDE 50 MG: 25 INJECTION INTRAMUSCULAR; INTRAVENOUS; SUBCUTANEOUS at 20:28

## 2020-02-29 RX ADMIN — CLOPIDOGREL 75 MG: 75 TABLET, FILM COATED ORAL at 09:15

## 2020-02-29 RX ADMIN — CARVEDILOL 3.12 MG: 3.12 TABLET, FILM COATED ORAL at 09:11

## 2020-02-29 NOTE — ANESTHESIA PREPROCEDURE EVALUATION
Anesthesia Evaluation     no history of anesthetic complications:  NPO Solid Status: > 8 hours  NPO Liquid Status: > 8 hours           Airway   Dental      Pulmonary    Cardiovascular     (+) past MI  1-7 days, CAD, cardiac stents       Neuro/Psych  (+) seizures well controlled, neuromuscular disease,       ROS Comment: MS  GI/Hepatic/Renal/Endo      Musculoskeletal     Abdominal    Substance History      OB/GYN          Other                        Anesthesia Plan    ASA 4     MAC       Anesthetic plan, all risks, benefits, and alternatives have been provided, discussed and informed consent has been obtained with: patient.      
no

## 2020-03-01 ENCOUNTER — APPOINTMENT (OUTPATIENT)
Dept: GENERAL RADIOLOGY | Facility: HOSPITAL | Age: 60
End: 2020-03-01

## 2020-03-01 ENCOUNTER — APPOINTMENT (OUTPATIENT)
Dept: CT IMAGING | Facility: HOSPITAL | Age: 60
End: 2020-03-01

## 2020-03-01 LAB
ALBUMIN SERPL-MCNC: 3 G/DL (ref 3.5–5.2)
ALBUMIN/GLOB SERPL: 1 G/DL
ALP SERPL-CCNC: 62 U/L (ref 39–117)
ALT SERPL W P-5'-P-CCNC: 17 U/L (ref 1–33)
ANION GAP SERPL CALCULATED.3IONS-SCNC: 19.8 MMOL/L (ref 5–15)
AST SERPL-CCNC: 25 U/L (ref 1–32)
BASOPHILS # BLD AUTO: 0.02 10*3/MM3 (ref 0–0.2)
BASOPHILS NFR BLD AUTO: 0.2 % (ref 0–1.5)
BILIRUB SERPL-MCNC: 0.7 MG/DL (ref 0.2–1.2)
BUN BLD-MCNC: 8 MG/DL (ref 8–23)
BUN/CREAT SERPL: 14.5 (ref 7–25)
CALCIUM SPEC-SCNC: 9.5 MG/DL (ref 8.6–10.5)
CHLORIDE SERPL-SCNC: 97 MMOL/L (ref 98–107)
CO2 SERPL-SCNC: 18.2 MMOL/L (ref 22–29)
CREAT BLD-MCNC: 0.55 MG/DL (ref 0.57–1)
DEPRECATED RDW RBC AUTO: 39.8 FL (ref 37–54)
EOSINOPHIL # BLD AUTO: 0.01 10*3/MM3 (ref 0–0.4)
EOSINOPHIL NFR BLD AUTO: 0.1 % (ref 0.3–6.2)
ERYTHROCYTE [DISTWIDTH] IN BLOOD BY AUTOMATED COUNT: 12.1 % (ref 12.3–15.4)
GFR SERPL CREATININE-BSD FRML MDRD: 112 ML/MIN/1.73
GLOBULIN UR ELPH-MCNC: 3 GM/DL
GLUCOSE BLD-MCNC: 122 MG/DL (ref 65–99)
HCT VFR BLD AUTO: 29.2 % (ref 34–46.6)
HGB BLD-MCNC: 9.6 G/DL (ref 12–15.9)
IMM GRANULOCYTES # BLD AUTO: 0.06 10*3/MM3 (ref 0–0.05)
IMM GRANULOCYTES NFR BLD AUTO: 0.6 % (ref 0–0.5)
LYMPHOCYTES # BLD AUTO: 0.51 10*3/MM3 (ref 0.7–3.1)
LYMPHOCYTES NFR BLD AUTO: 5 % (ref 19.6–45.3)
MCH RBC QN AUTO: 29.9 PG (ref 26.6–33)
MCHC RBC AUTO-ENTMCNC: 32.9 G/DL (ref 31.5–35.7)
MCV RBC AUTO: 91 FL (ref 79–97)
MONOCYTES # BLD AUTO: 1 10*3/MM3 (ref 0.1–0.9)
MONOCYTES NFR BLD AUTO: 9.7 % (ref 5–12)
NEUTROPHILS # BLD AUTO: 8.69 10*3/MM3 (ref 1.7–7)
NEUTROPHILS NFR BLD AUTO: 84.4 % (ref 42.7–76)
NRBC BLD AUTO-RTO: 0 /100 WBC (ref 0–0.2)
PLATELET # BLD AUTO: 285 10*3/MM3 (ref 140–450)
PMV BLD AUTO: 10.8 FL (ref 6–12)
POTASSIUM BLD-SCNC: 3.7 MMOL/L (ref 3.5–5.2)
PROT SERPL-MCNC: 6 G/DL (ref 6–8.5)
RBC # BLD AUTO: 3.21 10*6/MM3 (ref 3.77–5.28)
SODIUM BLD-SCNC: 135 MMOL/L (ref 136–145)
TROPONIN T SERPL-MCNC: 1.84 NG/ML (ref 0–0.03)
WBC NRBC COR # BLD: 10.29 10*3/MM3 (ref 3.4–10.8)

## 2020-03-01 PROCEDURE — 71045 X-RAY EXAM CHEST 1 VIEW: CPT

## 2020-03-01 PROCEDURE — 99232 SBSQ HOSP IP/OBS MODERATE 35: CPT | Performed by: INTERNAL MEDICINE

## 2020-03-01 PROCEDURE — 0 DIATRIZOATE MEGLUMINE & SODIUM PER 1 ML: Performed by: INTERNAL MEDICINE

## 2020-03-01 PROCEDURE — 25010000002 FUROSEMIDE PER 20 MG: Performed by: INTERNAL MEDICINE

## 2020-03-01 PROCEDURE — 99233 SBSQ HOSP IP/OBS HIGH 50: CPT | Performed by: NURSE PRACTITIONER

## 2020-03-01 PROCEDURE — 84484 ASSAY OF TROPONIN QUANT: CPT | Performed by: NURSE PRACTITIONER

## 2020-03-01 PROCEDURE — 93010 ELECTROCARDIOGRAM REPORT: CPT | Performed by: INTERNAL MEDICINE

## 2020-03-01 PROCEDURE — 25010000002 ONDANSETRON PER 1 MG: Performed by: INTERNAL MEDICINE

## 2020-03-01 PROCEDURE — 93005 ELECTROCARDIOGRAM TRACING: CPT | Performed by: NURSE PRACTITIONER

## 2020-03-01 PROCEDURE — 25010000003 MEPERIDINE PER 100 MG: Performed by: INTERNAL MEDICINE

## 2020-03-01 PROCEDURE — 71275 CT ANGIOGRAPHY CHEST: CPT

## 2020-03-01 PROCEDURE — 85025 COMPLETE CBC W/AUTO DIFF WBC: CPT | Performed by: INTERNAL MEDICINE

## 2020-03-01 PROCEDURE — 0 IOPAMIDOL PER 1 ML: Performed by: INTERNAL MEDICINE

## 2020-03-01 PROCEDURE — 74177 CT ABD & PELVIS W/CONTRAST: CPT

## 2020-03-01 PROCEDURE — 25010000002 FUROSEMIDE PER 20 MG: Performed by: NURSE PRACTITIONER

## 2020-03-01 PROCEDURE — 80053 COMPREHEN METABOLIC PANEL: CPT | Performed by: INTERNAL MEDICINE

## 2020-03-01 PROCEDURE — 25010000002 ENOXAPARIN PER 10 MG: Performed by: INTERNAL MEDICINE

## 2020-03-01 RX ORDER — FUROSEMIDE 10 MG/ML
40 INJECTION INTRAMUSCULAR; INTRAVENOUS ONCE
Status: DISCONTINUED | OUTPATIENT
Start: 2020-03-01 | End: 2020-03-01

## 2020-03-01 RX ORDER — SIMETHICONE 80 MG
80 TABLET,CHEWABLE ORAL 3 TIMES DAILY
Status: DISCONTINUED | OUTPATIENT
Start: 2020-03-01 | End: 2020-03-07 | Stop reason: HOSPADM

## 2020-03-01 RX ORDER — FUROSEMIDE 10 MG/ML
40 INJECTION INTRAMUSCULAR; INTRAVENOUS ONCE
Status: COMPLETED | OUTPATIENT
Start: 2020-03-01 | End: 2020-03-01

## 2020-03-01 RX ORDER — FUROSEMIDE 10 MG/ML
40 INJECTION INTRAMUSCULAR; INTRAVENOUS EVERY 8 HOURS SCHEDULED
Status: COMPLETED | OUTPATIENT
Start: 2020-03-01 | End: 2020-03-01

## 2020-03-01 RX ADMIN — FLUOXETINE HYDROCHLORIDE 20 MG: 20 CAPSULE ORAL at 21:43

## 2020-03-01 RX ADMIN — SODIUM CHLORIDE, POTASSIUM CHLORIDE, SODIUM LACTATE AND CALCIUM CHLORIDE 100 ML/HR: 600; 310; 30; 20 INJECTION, SOLUTION INTRAVENOUS at 06:01

## 2020-03-01 RX ADMIN — MODAFINIL 200 MG: 100 TABLET ORAL at 21:43

## 2020-03-01 RX ADMIN — CLOPIDOGREL 75 MG: 75 TABLET, FILM COATED ORAL at 08:37

## 2020-03-01 RX ADMIN — ASPIRIN 81 MG: 81 TABLET, COATED ORAL at 08:37

## 2020-03-01 RX ADMIN — GABAPENTIN 300 MG: 300 CAPSULE ORAL at 21:51

## 2020-03-01 RX ADMIN — SUCRALFATE 1 G: 1 SUSPENSION ORAL at 06:31

## 2020-03-01 RX ADMIN — PRAMIPEXOLE DIHYDROCHLORIDE 0.12 MG: 0.25 TABLET ORAL at 21:44

## 2020-03-01 RX ADMIN — MEPERIDINE HYDROCHLORIDE 50 MG: 25 INJECTION INTRAMUSCULAR; INTRAVENOUS; SUBCUTANEOUS at 08:50

## 2020-03-01 RX ADMIN — CLONAZEPAM 1 MG: 0.5 TABLET ORAL at 08:37

## 2020-03-01 RX ADMIN — ONDANSETRON 4 MG: 2 INJECTION INTRAMUSCULAR; INTRAVENOUS at 04:36

## 2020-03-01 RX ADMIN — GABAPENTIN 300 MG: 300 CAPSULE ORAL at 08:37

## 2020-03-01 RX ADMIN — ENOXAPARIN SODIUM 40 MG: 40 INJECTION SUBCUTANEOUS at 21:49

## 2020-03-01 RX ADMIN — SUCRALFATE 1 G: 1 SUSPENSION ORAL at 12:33

## 2020-03-01 RX ADMIN — FUROSEMIDE 40 MG: 10 INJECTION, SOLUTION INTRAMUSCULAR; INTRAVENOUS at 09:34

## 2020-03-01 RX ADMIN — CARVEDILOL 3.12 MG: 3.12 TABLET, FILM COATED ORAL at 21:43

## 2020-03-01 RX ADMIN — SIMETHICONE CHEW TAB 80 MG 80 MG: 80 TABLET ORAL at 15:48

## 2020-03-01 RX ADMIN — PANTOPRAZOLE SODIUM 40 MG: 40 TABLET, DELAYED RELEASE ORAL at 06:31

## 2020-03-01 RX ADMIN — MEPERIDINE HYDROCHLORIDE 50 MG: 25 INJECTION INTRAMUSCULAR; INTRAVENOUS; SUBCUTANEOUS at 00:15

## 2020-03-01 RX ADMIN — GABAPENTIN 300 MG: 300 CAPSULE ORAL at 12:33

## 2020-03-01 RX ADMIN — SUCRALFATE 1 G: 1 SUSPENSION ORAL at 17:03

## 2020-03-01 RX ADMIN — MEPERIDINE HYDROCHLORIDE 50 MG: 25 INJECTION INTRAMUSCULAR; INTRAVENOUS; SUBCUTANEOUS at 04:36

## 2020-03-01 RX ADMIN — MODAFINIL 200 MG: 100 TABLET ORAL at 08:37

## 2020-03-01 RX ADMIN — FUROSEMIDE 40 MG: 10 INJECTION, SOLUTION INTRAMUSCULAR; INTRAVENOUS at 21:45

## 2020-03-01 RX ADMIN — CLONAZEPAM 1 MG: 0.5 TABLET ORAL at 21:43

## 2020-03-01 RX ADMIN — TRAMADOL HYDROCHLORIDE 50 MG: 50 TABLET, FILM COATED ORAL at 02:00

## 2020-03-01 RX ADMIN — TRAMADOL HYDROCHLORIDE 50 MG: 50 TABLET, FILM COATED ORAL at 08:45

## 2020-03-01 RX ADMIN — ALUMINUM HYDROXIDE, MAGNESIUM HYDROXIDE, AND DIMETHICONE 15 ML: 400; 400; 40 SUSPENSION ORAL at 03:47

## 2020-03-01 RX ADMIN — SIMETHICONE CHEW TAB 80 MG 80 MG: 80 TABLET ORAL at 21:44

## 2020-03-01 RX ADMIN — FUROSEMIDE 40 MG: 10 INJECTION, SOLUTION INTRAMUSCULAR; INTRAVENOUS at 15:47

## 2020-03-01 RX ADMIN — GABAPENTIN 300 MG: 300 CAPSULE ORAL at 17:03

## 2020-03-01 RX ADMIN — IOPAMIDOL 95 ML: 755 INJECTION, SOLUTION INTRAVENOUS at 20:15

## 2020-03-01 RX ADMIN — MEPERIDINE HYDROCHLORIDE 50 MG: 25 INJECTION INTRAMUSCULAR; INTRAVENOUS; SUBCUTANEOUS at 17:03

## 2020-03-01 RX ADMIN — SENNOSIDES AND DOCUSATE SODIUM 2 TABLET: 8.6; 5 TABLET ORAL at 21:44

## 2020-03-01 RX ADMIN — FLUOXETINE HYDROCHLORIDE 20 MG: 20 CAPSULE ORAL at 15:47

## 2020-03-01 RX ADMIN — ATORVASTATIN CALCIUM 40 MG: 20 TABLET, FILM COATED ORAL at 21:43

## 2020-03-01 RX ADMIN — TRAMADOL HYDROCHLORIDE 50 MG: 50 TABLET, FILM COATED ORAL at 21:48

## 2020-03-01 RX ADMIN — FLUOXETINE HYDROCHLORIDE 20 MG: 20 CAPSULE ORAL at 08:37

## 2020-03-01 RX ADMIN — CARVEDILOL 3.12 MG: 3.12 TABLET, FILM COATED ORAL at 08:37

## 2020-03-01 RX ADMIN — SENNOSIDES AND DOCUSATE SODIUM 2 TABLET: 8.6; 5 TABLET ORAL at 08:37

## 2020-03-01 RX ADMIN — MEPERIDINE HYDROCHLORIDE 50 MG: 25 INJECTION INTRAMUSCULAR; INTRAVENOUS; SUBCUTANEOUS at 12:33

## 2020-03-01 RX ADMIN — SIMETHICONE CHEW TAB 80 MG 80 MG: 80 TABLET ORAL at 12:34

## 2020-03-01 RX ADMIN — LOSARTAN POTASSIUM 25 MG: 25 TABLET, FILM COATED ORAL at 08:37

## 2020-03-01 RX ADMIN — DIATRIZOATE MEGLUMINE AND DIATRIZOATE SODIUM 30 ML: 600; 100 SOLUTION ORAL; RECTAL at 16:45

## 2020-03-01 RX ADMIN — PRAMIPEXOLE DIHYDROCHLORIDE 0.12 MG: 0.25 TABLET ORAL at 01:59

## 2020-03-01 NOTE — PLAN OF CARE
Problem: Patient Care Overview  Goal: Plan of Care Review  Outcome: Ongoing (interventions implemented as appropriate)  Flowsheets (Taken 3/1/2020 9160)  Progress: no change  Plan of Care Reviewed With: patient  Outcome Summary: Patient returned from endoscopy last night, with complaints of abdominal pain, treated with medication untill aprox 0500 when patient finaly relaxed and apeared to be asleep.

## 2020-03-01 NOTE — NURSING NOTE
Notified and spoke with Dr. Sarabia and made her aware the pt is having uncontrolled abdomen pain that is epigastric to LLQ that is being unrelieved from prn medication. MD stated she will be at the bedside to assess pt shortly.

## 2020-03-01 NOTE — PLAN OF CARE
Problem: Patient Care Overview  Goal: Plan of Care Review  Outcome: Ongoing (interventions implemented as appropriate)  Flowsheets (Taken 3/1/2020 1817)  Progress: improving  Outcome Summary: This am pt had unrelieved abdominal pain, prn pain medication given, chest xray completed, iv lasix started with diuresis, gas x started, has had multiple large loose and liquid bms, transport in route for pt to have ct scan of abdomen/pelvis and chest, vss will continue to monitor

## 2020-03-01 NOTE — PROGRESS NOTES
"    Patient Name: Falguni Minaya  :1960  60 y.o.      Patient Care Team:  Provider, No Known as PCP - General    Chief Complaint: f/u CAD s/p STEMI    Interval History:  No high fevers. Abdominal pain somewhat better controlled today per RN and patient. Mildly elevated HR and elevated diastolic blood pressures. Got IV lasix earlier and diuresed about 1500 cc perRN. Labored breathing and audibly wheezing with crackles. Still appears volume overloaded. Reported she was having radiating pain behind her shoulder blades earlier that is not currently present.       Objective   Vital Signs  Temp:  [97.8 °F (36.6 °C)-99.7 °F (37.6 °C)] 97.8 °F (36.6 °C)  Heart Rate:  [] 89  Resp:  [20-32] 28  BP: (115-169)/() 115/94    Intake/Output Summary (Last 24 hours) at 3/1/2020 1400  Last data filed at 3/1/2020 1100  Gross per 24 hour   Intake 3274 ml   Output 900 ml   Net 2374 ml     Flowsheet Rows      First Filed Value   Admission Height  152.4 cm (60\") Documented at 2020 1442   Admission Weight  81.6 kg (180 lb) Documented at 2020 1442          Physical Exam:   General Appearance:    Alert, cooperative, in no acute distress   Lungs:     Upper airway wheezing, crackles bilaterally bases. Labored and mildly tachypneic. O2 sats ok on 3 L n/c.      Heart:    Regular rhythm and normal to tachycardic rate rate, normal S1 and S2, no murmurs, gallops or rubs.     Chest Wall:    No abnormalities observed   Abdomen:     Soft, tenderness to palpation generalized, positive bowel sounds.     Extremities:  minimal  BLE edema.  Moves all extremities palpable radial/DP pulses bilaterally. Right arm ecchymosis and mild tenderness/edema (Reportedly improved per patient)     Results Review:    Results from last 7 days   Lab Units 20  0745   SODIUM mmol/L 135*   POTASSIUM mmol/L 3.7   CHLORIDE mmol/L 97*   CO2 mmol/L 18.2*   BUN mg/dL 8   CREATININE mg/dL 0.55*   GLUCOSE mg/dL 122*   CALCIUM mg/dL 9.5     Results " from last 7 days   Lab Units 02/29/20  0630 02/25/20  0313 02/24/20  1749   TROPONIN T ng/mL 4.680* 7.710* 1.450*     Results from last 7 days   Lab Units 03/01/20  0745   WBC 10*3/mm3 10.29   HEMOGLOBIN g/dL 9.6*   HEMATOCRIT % 29.2*   PLATELETS 10*3/mm3 285             Results from last 7 days   Lab Units 02/24/20  1749   CHOLESTEROL mg/dL 202*   TRIGLYCERIDES mg/dL 65   HDL CHOL mg/dL 53   LDL CHOL mg/dL 136*               Medication Review:     aspirin 81 mg Oral Daily   atorvastatin 40 mg Oral Nightly   carvedilol 3.125 mg Oral Q12H   clonazePAM 1 mg Oral Q12H   clopidogrel 75 mg Oral Daily   enoxaparin 40 mg Subcutaneous Nightly   FLUoxetine 20 mg Oral TID   gabapentin 300 mg Oral 4x Daily   losartan 25 mg Oral Q24H   modafinil 200 mg Oral BID   pantoprazole 40 mg Oral Q AM   pramipexole 0.125 mg Oral Nightly   senna-docusate sodium 2 tablet Oral BID   simethicone 80 mg Oral TID   sucralfate 1 g Oral 4x Daily AC & at Bedtime             Assessment/Plan     1. Acute inferior ST elevation MI s/p PTCA/LATRICIA to right coronary artery 2/24/2020. Continue ASA/plavix/statin. She is tolerating low dose beta blocker . Low normal LV systolic function EF 50% with mild TR. Lisinopril was initially stopped stopped due to hypotension but losartan now added.No current chest pain or anterior chest pain but intermittent radiating pain between shoulder blades in addition to abdominal pain.  2. Abdominal discomfort: Initial CT scan pretty unrevealing but had repeat ordered today per GI.  Had EGD and colonoscopy 2/29/2020 on ASA/Plavix.  GI has recommended a gynecology evaluation as well which was performed yesterday and reportedly gyn exam was unremarkable.  3. Fever :no infectious process identified, procal not elevated  4. MS  5. Heme positive stool: Hgb/Hct stable again today. No bleeding on scopes 2/29/2020 mild   6. HTN: Adjust meds, losartan added    She reportedly improved from earlier but still does not look great.  IV  lasix 40 mg q 8hrs x 2 doses for volume overal. Discussed with Dr. Lane and patient, pts. Family and RN. Will add CT of chest with contrast in addition to CT abd/pelvis with contrast today as she has increase in dyspnea/o2 demands earlier and intermittent radiating intrascapular pain. Repeat EKG.    Cardiology MD to see tomorrow.     Thank you for allowing me to participate in this patient's care. Please call me with any questions/concerns.        WESTLEY Mclean  [facility]  03/01/20  2:00 PM

## 2020-03-01 NOTE — PROGRESS NOTES
[facility]  Inpatient Progress Note    Reason for Follow Up:  Anemia, abdominal pain, heme positive stool    Subjective     Interval History:   EGD yesterday with mild gastritis, biopsied.  Colon with fair prep, no significant abnormalities.  Today complains of worsening pain.  Previously was left lower quadrant now is right-sided.  Feels gas moving through but no BMs, no flatus.  Nurse feels symptoms are worse compared to yesterday.    Current Facility-Administered Medications:   •  acetaminophen (TYLENOL) tablet 650 mg, 650 mg, Oral, Q4H PRN, Krystal Sarabia MD, 650 mg at 02/26/20 1706  •  aluminum-magnesium hydroxide-simethicone (MAALOX MAX) 400-400-40 MG/5ML suspension 15 mL, 15 mL, Oral, 4x Daily PRN, Krystal Sarabia MD, 15 mL at 03/01/20 0347  •  aspirin EC tablet 81 mg, 81 mg, Oral, Daily, Krystal Sarabia MD, 81 mg at 02/29/20 0911  •  atorvastatin (LIPITOR) tablet 40 mg, 40 mg, Oral, Nightly, Krystal Sarabia MD, 40 mg at 02/29/20 2028  •  senna-docusate sodium (SENOKOT-S) 8.6-50 MG tablet 2 tablet, 2 tablet, Oral, BID, 2 tablet at 02/29/20 2028 **AND** polyethylene glycol 3350 powder (packet), 17 g, Oral, Daily PRN **AND** bisacodyl (DULCOLAX) EC tablet 5 mg, 5 mg, Oral, Daily PRN **AND** bisacodyl (DULCOLAX) suppository 10 mg, 10 mg, Rectal, Daily PRN, Krystal Sraabia MD  •  calcium carbonate (TUMS) chewable tablet 500 mg (200 mg elemental), 1 tablet, Oral, 4x Daily PRN, Krystal Sarabia MD, 1 tablet at 02/28/20 1228  •  carvedilol (COREG) tablet 3.125 mg, 3.125 mg, Oral, Q12H, Krystal Sarabia MD, 3.125 mg at 02/29/20 2028  •  clonazePAM (KlonoPIN) tablet 1 mg, 1 mg, Oral, Q12H, Krystal Sarabia MD, 1 mg at 02/29/20 2027  •  [DISCONTINUED] clopidogrel (PLAVIX) tablet 600 mg, 600 mg, Oral, Once **AND** clopidogrel (PLAVIX) tablet 75 mg, 75 mg, Oral, Daily, Krystal Sarabia MD, 75 mg at 02/29/20 0915  •  enoxaparin (LOVENOX) syringe 40 mg, 40 mg, Subcutaneous, Nightly, Cornell  Krystal TRINH MD, 40 mg at 02/29/20 2034  •  FLUoxetine (PROzac) capsule 20 mg, 20 mg, Oral, TID, Krystal Sarabia MD, 20 mg at 02/29/20 2028  •  gabapentin (NEURONTIN) capsule 300 mg, 300 mg, Oral, 4x Daily, Krystal Sarabia MD, 300 mg at 02/29/20 2027  •  lactated ringers infusion, 100 mL/hr, Intravenous, Continuous, Krystal Sarabia MD, Last Rate: 100 mL/hr at 03/01/20 0601, 100 mL/hr at 03/01/20 0601  •  Lidocaine Viscous HCl (XYLOCAINE) 2 % mouth solution 15 mL, 15 mL, Mouth/Throat, 4x Daily PRN, Krystal Sarabia MD, 15 mL at 02/27/20 0108  •  losartan (COZAAR) tablet 25 mg, 25 mg, Oral, Q24H, Krystal Sarabia MD  •  meperidine (DEMEROL) injection 50 mg, 50 mg, Intravenous, Q4H PRN, Katy Armando MD, 50 mg at 03/01/20 0436  •  modafinil (PROVIGIL) tablet 200 mg, 200 mg, Oral, BID, Krystal Sarabia MD, 200 mg at 02/29/20 2027  •  nitroglycerin (NITROSTAT) SL tablet 0.4 mg, 0.4 mg, Sublingual, Q5 Min PRN, Krystal Sarabia MD, 0.4 mg at 02/28/20 1208  •  ondansetron (ZOFRAN) injection 4 mg, 4 mg, Intravenous, Q6H PRN, Krystal Sarabia MD, 4 mg at 03/01/20 0436  •  ondansetron (ZOFRAN) tablet 4 mg, 4 mg, Oral, Q6H PRN, Krystal Sarabia MD  •  pantoprazole (PROTONIX) EC tablet 40 mg, 40 mg, Oral, Q AM, Krystal Sarabia MD, 40 mg at 03/01/20 0631  •  pramipexole (MIRAPEX) tablet 0.125 mg, 0.125 mg, Oral, Nightly, Krystal Sarabia MD, 0.125 mg at 03/01/20 0159  •  sodium chloride 0.9 % infusion, 30 mL/hr, Intravenous, Continuous, Krystal Sarabia MD  •  sucralfate (CARAFATE) 1 GM/10ML suspension 1 g, 1 g, Oral, 4x Daily AC & at Bedtime, Krystal Sarabia MD, 1 g at 03/01/20 0631  •  traMADol (ULTRAM) tablet 50 mg, 50 mg, Oral, Q6H PRN, Krystal Sarabia MD, 50 mg at 03/01/20 0200  •  zolpidem (AMBIEN) tablet 2.5 mg, 2.5 mg, Oral, Nightly PRN, Krystal Sarabia MD, 2.5 mg at 02/24/20 2350  Review of Systems:   All systems reviewed and negative except for: GI: Abdominal pain    Objective     Vital Signs  Temp:  [97.9 °F (36.6  °C)-99.7 °F (37.6 °C)] 99.7 °F (37.6 °C)  Heart Rate:  [] 102  Resp:  [18-32] 32  BP: (136-153)/() 136/99  Body mass index is 34.7 kg/m².    Intake/Output Summary (Last 24 hours) at 3/1/2020 0757  Last data filed at 3/1/2020 0601  Gross per 24 hour   Intake 2624 ml   Output --   Net 2624 ml     No intake/output data recorded.     Physical Exam:   General: patient awake, alert and cooperative, comfortable, appears older than stated age   Eyes: Normal lids and lashes, no scleral icterus   Neck: supple, normal ROM   Skin: warm and dry, not jaundiced   Cardiovascular: regular rhythm and rate, no murmurs auscultated   Pulm: clear to auscultation bilaterally, regular and unlabored   Abdomen: soft, tender, nondistended; normal bowel sounds   Rectal: deferred   Extremities: no rash or edema   Psychiatric: Normal mood and behavior; memory intact     Results Review:     I reviewed the patient's new clinical results.    Results from last 7 days   Lab Units 02/29/20  0630 02/28/20  0508 02/26/20  0357   WBC 10*3/mm3 7.12 6.63 7.48   HEMOGLOBIN g/dL 9.6* 9.6* 10.8*   HEMATOCRIT % 28.4* 27.8* 31.1*   PLATELETS 10*3/mm3 199 175 226     Results from last 7 days   Lab Units 02/29/20  0630 02/27/20  0412 02/26/20  0357 02/25/20  0313   SODIUM mmol/L 138 137  --  138   POTASSIUM mmol/L 3.7 3.7  --  3.7   CHLORIDE mmol/L 100 104  --  109*   CO2 mmol/L 22.3 21.8*  --  16.9*   BUN mg/dL 7* 10  --  12   CREATININE mg/dL 0.54* 0.67  --  0.69   CALCIUM mg/dL 9.1 9.5  --  9.2   BILIRUBIN mg/dL 0.6 0.5 0.4  --    ALK PHOS U/L 60 61 72  --    ALT (SGPT) U/L 20 31 48*  --    AST (SGOT) U/L 33* 92* 192*  --    GLUCOSE mg/dL 100* 88  --  102*         Lab Results   Lab Value Date/Time    LIPASE 44 02/29/2020 0630    LIPASE 108 (H) 02/26/2020 0357       Radiology:  XR Chest 1 View   Final Result   Under aeration with very mild left lung base atelectasis and   small effusions.           This report was finalized on 2/26/2020 5:52 AM by  Soham Schmidt M.D.          CT Abdomen Pelvis With Contrast   Final Result           1. No acute inflammatory process of bowel is identified, follow up as   indications persist.   2. No obstructive uropathy. Indeterminate periurethral density, as   described above, follow-up evaluation suggested.    3. Minimal pleural effusions. Septal thickening at the lung bases   suggest mild edema, although nonspecific, follow-up recommended. Trace   perisplenic ascites.       This report was finalized on 2/25/2020 5:55 PM by Dr. Veto Khoury M.D.          XR Chest 1 View    (Results Pending)       Assessment/Plan     Patient Active Problem List   Diagnosis   • STEMI involving right coronary artery (CMS/HCC)   • Acute ST elevation myocardial infarction (STEMI) due to occlusion of right coronary artery (CMS/HCC)   • Multiple sclerosis (CMS/HCC)   • Left sided abdominal pain   • Dyspepsia   • Heme positive stool       Impression  1.  Abdominal pain: Was previously left lower quadrant and epigastric with burning/heartburn, now right-sided.  Nurse feels that is worsened compared to her complaints yesterday    2.  Anemia with heme positive stool: Unrevealing EGD and colonoscopy with no bleeding source identified    3.  STEMI: That is post PCI this admission, on aspirin and Plavix    4.  Heartburn: Mild gastritis on EGD yesterday    Plan  CT of the abdomen and pelvis today given changed and worsened pain complaints following endoscopy  Add in Gas-X  Diet as tolerated  Continue Carafate and PPI  Follow-up EGD biopsies    I discussed the patients findings and my recommendations with patient and nursing staff.    Krystal Sarabia MD

## 2020-03-01 NOTE — PROGRESS NOTES
Patient in endoscopy unable for my exam.  Please call my team if needed at any time we are in hospital available.  Will attempt to see patient again tomorrow.    Mark Lane MD  [facility]  02/29/20  7:56 PM

## 2020-03-02 LAB
BACTERIA SPEC AEROBE CULT: NORMAL
BACTERIA SPEC AEROBE CULT: NORMAL

## 2020-03-02 PROCEDURE — 99233 SBSQ HOSP IP/OBS HIGH 50: CPT | Performed by: INTERNAL MEDICINE

## 2020-03-02 PROCEDURE — 99232 SBSQ HOSP IP/OBS MODERATE 35: CPT | Performed by: INTERNAL MEDICINE

## 2020-03-02 PROCEDURE — 97110 THERAPEUTIC EXERCISES: CPT

## 2020-03-02 PROCEDURE — 25010000002 ENOXAPARIN PER 10 MG: Performed by: INTERNAL MEDICINE

## 2020-03-02 RX ORDER — IBUPROFEN 800 MG/1
800 TABLET ORAL 3 TIMES DAILY
Status: DISCONTINUED | OUTPATIENT
Start: 2020-03-02 | End: 2020-03-07 | Stop reason: HOSPADM

## 2020-03-02 RX ORDER — BISACODYL 10 MG
10 SUPPOSITORY, RECTAL RECTAL DAILY PRN
Status: DISCONTINUED | OUTPATIENT
Start: 2020-03-02 | End: 2020-03-07 | Stop reason: HOSPADM

## 2020-03-02 RX ORDER — FUROSEMIDE 40 MG/1
40 TABLET ORAL DAILY
Status: DISCONTINUED | OUTPATIENT
Start: 2020-03-02 | End: 2020-03-06

## 2020-03-02 RX ORDER — BISACODYL 5 MG/1
5 TABLET, DELAYED RELEASE ORAL DAILY PRN
Status: DISCONTINUED | OUTPATIENT
Start: 2020-03-02 | End: 2020-03-07 | Stop reason: HOSPADM

## 2020-03-02 RX ORDER — SENNA AND DOCUSATE SODIUM 50; 8.6 MG/1; MG/1
2 TABLET, FILM COATED ORAL 2 TIMES DAILY PRN
Status: DISCONTINUED | OUTPATIENT
Start: 2020-03-02 | End: 2020-03-07 | Stop reason: HOSPADM

## 2020-03-02 RX ORDER — CARVEDILOL 6.25 MG/1
6.25 TABLET ORAL EVERY 12 HOURS SCHEDULED
Status: DISCONTINUED | OUTPATIENT
Start: 2020-03-02 | End: 2020-03-04

## 2020-03-02 RX ORDER — AMOXICILLIN AND CLAVULANATE POTASSIUM 875; 125 MG/1; MG/1
1 TABLET, FILM COATED ORAL EVERY 12 HOURS SCHEDULED
Status: DISCONTINUED | OUTPATIENT
Start: 2020-03-02 | End: 2020-03-07 | Stop reason: HOSPADM

## 2020-03-02 RX ADMIN — PANTOPRAZOLE SODIUM 40 MG: 40 TABLET, DELAYED RELEASE ORAL at 06:34

## 2020-03-02 RX ADMIN — CARVEDILOL 6.25 MG: 6.25 TABLET, FILM COATED ORAL at 20:20

## 2020-03-02 RX ADMIN — LOSARTAN POTASSIUM 25 MG: 25 TABLET, FILM COATED ORAL at 09:06

## 2020-03-02 RX ADMIN — SIMETHICONE CHEW TAB 80 MG 80 MG: 80 TABLET ORAL at 16:55

## 2020-03-02 RX ADMIN — FLUOXETINE HYDROCHLORIDE 20 MG: 20 CAPSULE ORAL at 09:06

## 2020-03-02 RX ADMIN — ATORVASTATIN CALCIUM 40 MG: 20 TABLET, FILM COATED ORAL at 20:20

## 2020-03-02 RX ADMIN — AMOXICILLIN AND CLAVULANATE POTASSIUM 1 TABLET: 875; 125 TABLET, FILM COATED ORAL at 20:19

## 2020-03-02 RX ADMIN — ASPIRIN 81 MG: 81 TABLET, COATED ORAL at 09:06

## 2020-03-02 RX ADMIN — MODAFINIL 200 MG: 100 TABLET ORAL at 20:20

## 2020-03-02 RX ADMIN — CLONAZEPAM 1 MG: 0.5 TABLET ORAL at 20:19

## 2020-03-02 RX ADMIN — CLOPIDOGREL 75 MG: 75 TABLET, FILM COATED ORAL at 09:06

## 2020-03-02 RX ADMIN — CARVEDILOL 3.12 MG: 3.12 TABLET, FILM COATED ORAL at 09:06

## 2020-03-02 RX ADMIN — FUROSEMIDE 40 MG: 40 TABLET ORAL at 12:55

## 2020-03-02 RX ADMIN — FLUOXETINE HYDROCHLORIDE 20 MG: 20 CAPSULE ORAL at 20:20

## 2020-03-02 RX ADMIN — SUCRALFATE 1 G: 1 SUSPENSION ORAL at 19:00

## 2020-03-02 RX ADMIN — SIMETHICONE CHEW TAB 80 MG 80 MG: 80 TABLET ORAL at 20:19

## 2020-03-02 RX ADMIN — GABAPENTIN 300 MG: 300 CAPSULE ORAL at 19:00

## 2020-03-02 RX ADMIN — ACETAMINOPHEN 650 MG: 325 TABLET, FILM COATED ORAL at 16:58

## 2020-03-02 RX ADMIN — SUCRALFATE 1 G: 1 SUSPENSION ORAL at 20:20

## 2020-03-02 RX ADMIN — ENOXAPARIN SODIUM 40 MG: 40 INJECTION SUBCUTANEOUS at 20:20

## 2020-03-02 RX ADMIN — SUCRALFATE 1 G: 1 SUSPENSION ORAL at 00:21

## 2020-03-02 RX ADMIN — TRAMADOL HYDROCHLORIDE 50 MG: 50 TABLET, FILM COATED ORAL at 15:15

## 2020-03-02 RX ADMIN — GABAPENTIN 300 MG: 300 CAPSULE ORAL at 12:55

## 2020-03-02 RX ADMIN — MODAFINIL 200 MG: 100 TABLET ORAL at 09:05

## 2020-03-02 RX ADMIN — SUCRALFATE 1 G: 1 SUSPENSION ORAL at 06:34

## 2020-03-02 RX ADMIN — CLONAZEPAM 1 MG: 0.5 TABLET ORAL at 09:05

## 2020-03-02 RX ADMIN — TRAMADOL HYDROCHLORIDE 50 MG: 50 TABLET, FILM COATED ORAL at 03:54

## 2020-03-02 RX ADMIN — IBUPROFEN 800 MG: 800 TABLET, FILM COATED ORAL at 20:19

## 2020-03-02 RX ADMIN — SUCRALFATE 1 G: 1 SUSPENSION ORAL at 12:55

## 2020-03-02 RX ADMIN — GABAPENTIN 300 MG: 300 CAPSULE ORAL at 09:06

## 2020-03-02 RX ADMIN — PRAMIPEXOLE DIHYDROCHLORIDE 0.12 MG: 0.25 TABLET ORAL at 20:19

## 2020-03-02 RX ADMIN — FLUOXETINE HYDROCHLORIDE 20 MG: 20 CAPSULE ORAL at 16:55

## 2020-03-02 RX ADMIN — GABAPENTIN 300 MG: 300 CAPSULE ORAL at 20:19

## 2020-03-02 NOTE — PROGRESS NOTES
[facility]  Inpatient Progress Note    Reason for Follow Up: Anemia, abdominal pain, heme positive stool    Subjective     Interval History:   Reviewed CT findings with patient and family at bedside.  CT of the abdomen negative for any obvious source to explain her abdominal pain.  She reports the pain has improved.  CT angios did not show any pulmonary emboli but did show pleural effusions and possible pneumonic process.  Would defer that to the hospital service.    Current Facility-Administered Medications:   •  acetaminophen (TYLENOL) tablet 650 mg, 650 mg, Oral, Q4H PRN, Krystal Sarabia MD, 650 mg at 02/26/20 1706  •  aluminum-magnesium hydroxide-simethicone (MAALOX MAX) 400-400-40 MG/5ML suspension 15 mL, 15 mL, Oral, 4x Daily PRN, Krystal Sarabia MD, 15 mL at 03/01/20 0347  •  aspirin EC tablet 81 mg, 81 mg, Oral, Daily, Krystal Sarabia MD, 81 mg at 03/02/20 0906  •  atorvastatin (LIPITOR) tablet 40 mg, 40 mg, Oral, Nightly, Krystal Sarabia MD, 40 mg at 03/01/20 2143  •  senna-docusate sodium (SENOKOT-S) 8.6-50 MG tablet 2 tablet, 2 tablet, Oral, BID PRN **AND** polyethylene glycol 3350 powder (packet), 17 g, Oral, Daily PRN **AND** bisacodyl (DULCOLAX) EC tablet 5 mg, 5 mg, Oral, Daily PRN **AND** bisacodyl (DULCOLAX) suppository 10 mg, 10 mg, Rectal, Daily PRN, Marlon Zamudio MD  •  calcium carbonate (TUMS) chewable tablet 500 mg (200 mg elemental), 1 tablet, Oral, 4x Daily PRN, Krystal Sarabia MD, 1 tablet at 02/28/20 1228  •  carvedilol (COREG) tablet 6.25 mg, 6.25 mg, Oral, Q12H, Marlon Zamudio MD  •  clonazePAM (KlonoPIN) tablet 1 mg, 1 mg, Oral, Q12H, Krystal Sarabia MD, 1 mg at 03/02/20 0905  •  [DISCONTINUED] clopidogrel (PLAVIX) tablet 600 mg, 600 mg, Oral, Once **AND** clopidogrel (PLAVIX) tablet 75 mg, 75 mg, Oral, Daily, Krystal Sarabia MD, 75 mg at 03/02/20 0906  •  enoxaparin (LOVENOX) syringe 40 mg, 40 mg, Subcutaneous, Nightly, Krystal Sarabia MD,  40 mg at 03/01/20 2149  •  FLUoxetine (PROzac) capsule 20 mg, 20 mg, Oral, TID, Krystal Sarabia MD, 20 mg at 03/02/20 0906  •  furosemide (LASIX) tablet 40 mg, 40 mg, Oral, Daily, Marlon Zamudio MD, 40 mg at 03/02/20 1255  •  gabapentin (NEURONTIN) capsule 300 mg, 300 mg, Oral, 4x Daily, Krystal Sarabia MD, 300 mg at 03/02/20 1255  •  Lidocaine Viscous HCl (XYLOCAINE) 2 % mouth solution 15 mL, 15 mL, Mouth/Throat, 4x Daily PRN, Krystal Sarabia MD, 15 mL at 02/27/20 0108  •  losartan (COZAAR) tablet 25 mg, 25 mg, Oral, Q24H, Krystal Sarabia MD, 25 mg at 03/02/20 0906  •  modafinil (PROVIGIL) tablet 200 mg, 200 mg, Oral, BID, Krystal Sarabia MD, 200 mg at 03/02/20 0905  •  nitroglycerin (NITROSTAT) SL tablet 0.4 mg, 0.4 mg, Sublingual, Q5 Min PRN, Krystal Sarabia MD, 0.4 mg at 02/28/20 1208  •  ondansetron (ZOFRAN) injection 4 mg, 4 mg, Intravenous, Q6H PRN, Krystal Sarabia MD, 4 mg at 03/01/20 0436  •  ondansetron (ZOFRAN) tablet 4 mg, 4 mg, Oral, Q6H PRN, Krystal Sarabia MD  •  pantoprazole (PROTONIX) EC tablet 40 mg, 40 mg, Oral, Q AM, Krystal Sarabia MD, 40 mg at 03/02/20 0634  •  pramipexole (MIRAPEX) tablet 0.125 mg, 0.125 mg, Oral, Nightly, Krystal Sarabia MD, 0.125 mg at 03/01/20 2144  •  simethicone (MYLICON) chewable tablet 80 mg, 80 mg, Oral, TID, Krystal Sarabia MD, 80 mg at 03/01/20 2144  •  sucralfate (CARAFATE) 1 GM/10ML suspension 1 g, 1 g, Oral, 4x Daily AC & at Bedtime, Krystal Sarabia MD, 1 g at 03/02/20 1255  •  traMADol (ULTRAM) tablet 50 mg, 50 mg, Oral, Q6H PRN, Krystal Sarabia MD, 50 mg at 03/02/20 0354  •  zolpidem (AMBIEN) tablet 2.5 mg, 2.5 mg, Oral, Nightly PRN, Krystal Sarabia MD, 2.5 mg at 02/24/20 0060  Review of Systems:    All systems were reviewed and negative except for:  Gastrointestinal: positive for  See HPI    Objective     Vital Signs  Temp:  [99 °F (37.2 °C)-99.5 °F (37.5 °C)] 99 °F (37.2 °C)  Heart Rate:  [] 89  Resp:  [20-22] 21  BP:  (108-142)/(67-98) 136/98  Body mass index is 35.15 kg/m².    Intake/Output Summary (Last 24 hours) at 3/2/2020 1353  Last data filed at 3/1/2020 1645  Gross per 24 hour   Intake 960 ml   Output 350 ml   Net 610 ml     No intake/output data recorded.     Physical Exam:   General: patient awake, alert and cooperative   Eyes: Normal lids and lashes, no scleral icterus   Neck: supple, normal ROM   Skin: warm and dry, not jaundiced   Cardiovascular: regular rhythm and rate, no murmurs auscultated   Pulm: clear to auscultation bilaterally, regular and unlabored   Abdomen: soft, nontender, nondistended; normal bowel sounds   Extremities: no rash or edema   Psychiatric: Normal mood and behavior; memory intact     Results Review:     I reviewed the patient's new clinical results.    Results from last 7 days   Lab Units 03/01/20  0745 02/29/20  0630 02/28/20  0508   WBC 10*3/mm3 10.29 7.12 6.63   HEMOGLOBIN g/dL 9.6* 9.6* 9.6*   HEMATOCRIT % 29.2* 28.4* 27.8*   PLATELETS 10*3/mm3 285 199 175     Results from last 7 days   Lab Units 03/01/20  0745 02/29/20  0630 02/27/20  0412   SODIUM mmol/L 135* 138 137   POTASSIUM mmol/L 3.7 3.7 3.7   CHLORIDE mmol/L 97* 100 104   CO2 mmol/L 18.2* 22.3 21.8*   BUN mg/dL 8 7* 10   CREATININE mg/dL 0.55* 0.54* 0.67   CALCIUM mg/dL 9.5 9.1 9.5   BILIRUBIN mg/dL 0.7 0.6 0.5   ALK PHOS U/L 62 60 61   ALT (SGPT) U/L 17 20 31   AST (SGOT) U/L 25 33* 92*   GLUCOSE mg/dL 122* 100* 88         Lab Results   Lab Value Date/Time    LIPASE 44 02/29/2020 0630    LIPASE 108 (H) 02/26/2020 0357       Radiology:  CT Abdomen Pelvis With Contrast   Final Result   Small amount of nonspecific pelvic free fluid without   loculated collection or acute inflammatory process identified.                           This report was finalized on 3/1/2020 11:33 PM by Soham Schmidt M.D.          CT Angiogram Chest With & Without Contrast   Final Result   1. Upper lobe predominant interstitial and ground glass opacities    concerning for pneumonia, no pulmonary embolism.   2. Large bilateral pleural effusions, small pericardial effusion.       This report was finalized on 3/1/2020 11:33 PM by Soham Schmidt M.D.          XR Chest 1 View   Final Result   Adverse interval change. Diffuse bilateral infiltrates,   follow-up recommended. Borderline heart size with pulmonary vascular   congestion.       This report was finalized on 3/1/2020 8:13 AM by Dr. Veto Khoury M.D.          XR Chest 1 View   Final Result   Under aeration with very mild left lung base atelectasis and   small effusions.           This report was finalized on 2/26/2020 5:52 AM by Soham Schmidt M.D.          CT Abdomen Pelvis With Contrast   Final Result           1. No acute inflammatory process of bowel is identified, follow up as   indications persist.   2. No obstructive uropathy. Indeterminate periurethral density, as   described above, follow-up evaluation suggested.    3. Minimal pleural effusions. Septal thickening at the lung bases   suggest mild edema, although nonspecific, follow-up recommended. Trace   perisplenic ascites.       This report was finalized on 2/25/2020 5:55 PM by Dr. Veto Khoury M.D.              Assessment/Plan     Patient Active Problem List   Diagnosis   • STEMI involving right coronary artery (CMS/HCC)   • Acute ST elevation myocardial infarction (STEMI) due to occlusion of right coronary artery (CMS/HCC)   • Multiple sclerosis (CMS/HCC)   • Left sided abdominal pain   • Dyspepsia   • Heme positive stool       Assessment:  1. Abdominal pain: Improving  2. Anemia with heme positive stool: Negative endoscopic findings  3. ST JOSE  4. GERD      Plan:  · Continue current medical regimen  · Await biopsy results  I discussed the patients findings and my recommendations with patient and family.    Drew Pratt MD

## 2020-03-02 NOTE — PROGRESS NOTES
"  Daily Progress Note.   [address]  3/2/2020    Patient:  Name:  Falguni Minaya  MRN:  [id number]  1960  60 y.o.  female         Chief Complaint: Sore throat, restlessness, chest discomfort     Hospital course: Came in with lower lobe infiltrate, questionable sepsis, with hypoxemia had a STEMI status post mid RCA stent on 2/24/2020.  Has some epigastric pain with nausea and vomiting was started on Zosyn but her white count has been normal and her fever is only low-grade, and Zosyn was discontinued and she seems to be holding on without the need for the antibiotic.  She is restless, she has restless leg syndrome on gabapentin, she used to be on clonazepam which has been on hold.  She will be further evaluated by GI, she has some medication adjustment with Carafate and should be scoped soon.     Interval History:   Doing much better since yesterday.  She is awake alert.  No respiratory distress breathing is calm.  Oxygen requirements have come down.  No high fevers overnight.  No chills.  She is awake alert    ROS: No fever, no diarrhea, no chest pain  PMFSSH: no change    Physical Exam:  /97 (BP Location: Left arm, Patient Position: Lying)   Pulse 93   Temp 99 °F (37.2 °C) (Oral)   Resp 20   Ht 152.4 cm (60\")   Wt 81.6 kg (180 lb)   SpO2 90%   BMI 35.15 kg/m²   Body mass index is 35.15 kg/m².  No intake or output data in the 24 hours ending 03/02/20 64757 L nasal cannula  General appearance:non Toxic , conversant   Eyes: anicteric sclerae, moist conjunctivae; no lid-lag; PERRLA  HENT: Atraumatic; oropharynx clear with moist mucous membranes and no mucosal ulcerations; normal hard and soft palate  Neck: Trachea midline; FROM, supple, no thyromegaly or lymphadenopathy  Lungs: Crackles at bases, with normal respiratory effort and no intercostal retractions  CV: RRR, no MRGs   Abdomen: Soft, non-tender; no masses or HSM  Extremities: No peripheral edema or extremity " lymphadenopathy  Skin: Normal temperature, turgor and texture; no rash, ulcers or subcutaneous nodules  Psych: Calm affect, alert and oriented to person, place and time    Data Review:  Notable Labs:  Results from last 7 days   Lab Units 03/01/20  0745 02/29/20  0630 02/28/20  0508 02/26/20  0357 02/24/20  2256   WBC 10*3/mm3 10.29 7.12 6.63 7.48  --    HEMOGLOBIN g/dL 9.6* 9.6* 9.6* 10.8* 11.8*   PLATELETS 10*3/mm3 285 199 175 226  --      Results from last 7 days   Lab Units 03/01/20  0745 02/29/20  0630 02/27/20  0412 02/25/20  0313   SODIUM mmol/L 135* 138 137 138   POTASSIUM mmol/L 3.7 3.7 3.7 3.7   CHLORIDE mmol/L 97* 100 104 109*   CO2 mmol/L 18.2* 22.3 21.8* 16.9*   BUN mg/dL 8 7* 10 12   CREATININE mg/dL 0.55* 0.54* 0.67 0.69   GLUCOSE mg/dL 122* 100* 88 102*   CALCIUM mg/dL 9.5 9.1 9.5 9.2   Estimated Creatinine Clearance: 101.6 mL/min (A) (by C-G formula based on SCr of 0.55 mg/dL (L)).    Imaging:  Reviewed chest images personally from past 3 days    Scheduled meds:      aspirin 81 mg Oral Daily   atorvastatin 40 mg Oral Nightly   carvedilol 6.25 mg Oral Q12H   clonazePAM 1 mg Oral Q12H   clopidogrel 75 mg Oral Daily   enoxaparin 40 mg Subcutaneous Nightly   FLUoxetine 20 mg Oral TID   furosemide 40 mg Oral Daily   gabapentin 300 mg Oral 4x Daily   losartan 25 mg Oral Q24H   modafinil 200 mg Oral BID   pantoprazole 40 mg Oral Q AM   pramipexole 0.125 mg Oral Nightly   simethicone 80 mg Oral TID   sucralfate 1 g Oral 4x Daily AC & at Bedtime       ASSESSMENT  /  PLAN:  1. Suspected sepsis, the fever is low-grade however, patient is on Zosyn at this point  2. Bilateral lower lobe consolidation with atelectasis unsure about pneumonia but is being covered for such  3. Acute hypoxemia, doing better currently down to room air  4. Trace bilateral pleural effusion  5. Anion gap metabolic acidosis with diarrhea  6. ST elevation MI status post mid RCA stent on 2/24/2020  7. Epigastric pain, chronic  8. Nausea and  vomiting, managed by GI     Bilateral pulm edema improving wth diuretics  Cont abx  Reviewed ct chest    Continue to monitor closely.  Suspect she got fluid with anesthesia yesterday.  May need another dose of Lasix this afternoon       Mark Lane MD  [facility]  03/02/20  929am

## 2020-03-02 NOTE — PLAN OF CARE
Problem: Patient Care Overview  Goal: Plan of Care Review  Outcome: Ongoing (interventions implemented as appropriate)  Flowsheets (Taken 3/2/2020 9776)  Progress: improving  Plan of Care Reviewed With: patient  Outcome Summary: Pt tolerated treatment with no complaints. Pt is progressing well with mobility. Pt is MinAX2 for bed mobility with verbal cueing and Monica for STS transfers. Pt ambulated 20ft with rwx, MinAX2. Pt demos tremors with increased mobility and pt has decreased stride length / gait speed. Pt's ambulation distance limited due to dizziness. Pt will continue to benefit from skilled PT to improve strength/endurance and overall functional mobility. Will continue to progress pt as able.

## 2020-03-02 NOTE — PLAN OF CARE
Problem: Patient Care Overview  Goal: Plan of Care Review  Outcome: Ongoing (interventions implemented as appropriate)   Patient seems very sleepy she is on several medications that cause this though. Although she is easily awakened. She has slept most of the shift.     Pain control seems to be okay with tramadol. No n/v although po intake is less than adequate.     Plan for d/c to rehab possibly [facility] in a day or so.

## 2020-03-02 NOTE — PLAN OF CARE
Problem: Patient Care Overview  Goal: Plan of Care Review  Outcome: Ongoing (interventions implemented as appropriate)  Flowsheets (Taken 3/2/2020 0526)  Progress: no change  Plan of Care Reviewed With: patient  Outcome Summary: Pt went for CT abd/pelvis and chest at start of shift. Able to take all nighttime meds. Tremors noted. c/o pain and Tramadol given per prn order with good results. Purewick in place. VSS. Will continue to monitor.

## 2020-03-02 NOTE — PROGRESS NOTES
Patient Name: Falguni Minaya  Patient : 1960        Date of Service:20  Provider of Service: Marlon Zamudio MD  Place of Service: [facility]  Referral Provider: Marlon Zamudio MD          Follow Up: Acute MI    Interval Hx: Patient resting today.  CT scans reviewed.        OBJECTIVE  Temp:  [97.8 °F (36.6 °C)-99.5 °F (37.5 °C)] 99 °F (37.2 °C)  Heart Rate:  [] 89  Resp:  [20-28] 21  BP: (108-142)/(67-98) 136/98     Intake/Output Summary (Last 24 hours) at 3/2/2020 1058  Last data filed at 3/1/2020 1645  Gross per 24 hour   Intake 960 ml   Output 1750 ml   Net -790 ml     Body mass index is 35.15 kg/m².      20  0500 20  0656 20  0500   Weight: 80.2 kg (176 lb 12.9 oz) 80.6 kg (177 lb 11.1 oz) 81.6 kg (180 lb)         Physical Exam:   Physical Exam   Constitutional: She is oriented to person, place, and time. She appears well-developed and well-nourished.   HENT:   Head: Normocephalic.   Eyes: Pupils are equal, round, and reactive to light.   Neck: Normal range of motion. No JVD present. Carotid bruit is not present. No thyromegaly present.   Cardiovascular: Normal rate, regular rhythm, S1 normal, S2 normal, normal heart sounds and intact distal pulses. Exam reveals no gallop and no friction rub.   No murmur heard.  Pulmonary/Chest: Effort normal. She has wheezes (Minimal).   Abdominal: Soft. Bowel sounds are normal.   Musculoskeletal: She exhibits no edema.   Neurological: She is alert and oriented to person, place, and time.   Skin: Skin is warm, dry and intact. No erythema.   Psychiatric: She has a normal mood and affect.   Vitals reviewed.        CURRENT MEDS    Scheduled Meds:  aspirin 81 mg Oral Daily   atorvastatin 40 mg Oral Nightly   carvedilol 3.125 mg Oral Q12H   clonazePAM 1 mg Oral Q12H   clopidogrel 75 mg Oral Daily   enoxaparin 40 mg Subcutaneous Nightly   FLUoxetine 20 mg Oral TID   gabapentin 300 mg Oral 4x Daily   losartan 25 mg  Oral Q24H   modafinil 200 mg Oral BID   pantoprazole 40 mg Oral Q AM   pramipexole 0.125 mg Oral Nightly   simethicone 80 mg Oral TID   sucralfate 1 g Oral 4x Daily AC & at Bedtime     Continuous Infusions:       Lab Review:   Results from last 7 days   Lab Units 03/01/20  0745 02/29/20  0630   SODIUM mmol/L 135* 138   POTASSIUM mmol/L 3.7 3.7   CHLORIDE mmol/L 97* 100   CO2 mmol/L 18.2* 22.3   BUN mg/dL 8 7*   CREATININE mg/dL 0.55* 0.54*   GLUCOSE mg/dL 122* 100*   CALCIUM mg/dL 9.5 9.1   AST (SGOT) U/L 25 33*   ALT (SGPT) U/L 17 20     Results from last 7 days   Lab Units 03/01/20  1619 02/29/20  0630 02/25/20  0313 02/24/20  1749   TROPONIN T ng/mL 1.840* 4.680* 7.710* 1.450*     Results from last 7 days   Lab Units 03/01/20  0745 02/29/20  0630   WBC 10*3/mm3 10.29 7.12   HEMOGLOBIN g/dL 9.6* 9.6*   HEMATOCRIT % 29.2* 28.4*   PLATELETS 10*3/mm3 285 199             Results from last 7 days   Lab Units 02/24/20  1749   CHOLESTEROL mg/dL 202*   TRIGLYCERIDES mg/dL 65   HDL CHOL mg/dL 53   LDL CHOL mg/dL 136*               I personally reviewed the patient's ECG and telemetry data    ASSESSMENT & PLAN    Acute ST elevation myocardial infarction (STEMI) due to occlusion of right coronary artery (CMS/HCC)    Multiple sclerosis (CMS/HCC)    Left sided abdominal pain    Dyspepsia    Heme positive stool      1.  Acute ST JOSE: Status post RCA intervention.  Normal left ventricular systolic function  2.  Abdominal discomfort: Improved  3.  Congestive heart failure: Acute diastolic.  Continue diuretic therapy  4.  Anemia: Hemoglobin stable  5.  Hypertension: Continue to adjust medication.    Marlon Zamudio MD  03/02/20

## 2020-03-02 NOTE — THERAPY TREATMENT NOTE
Patient Name: Falguni Minaya  : 1960    MRN: [id number]                              Today's Date: 3/2/2020       Admit Date: 2020    Visit Dx:     ICD-10-CM ICD-9-CM   1. STEMI involving right coronary artery (CMS/HCC) I21.11 410.31   2. Left sided abdominal pain R10.9 789.09   3. Dyspepsia R10.13 536.8   4. Heme positive stool R19.5 792.1     Patient Active Problem List   Diagnosis   • STEMI involving right coronary artery (CMS/HCC)   • Acute ST elevation myocardial infarction (STEMI) due to occlusion of right coronary artery (CMS/HCC)   • Multiple sclerosis (CMS/HCC)   • Left sided abdominal pain   • Dyspepsia   • Heme positive stool     Past Medical History:   Diagnosis Date   • Abdominal hernia    • Coronary artery disease    • GERD (gastroesophageal reflux disease)    • Multiple sclerosis (CMS/HCC)    • Seizures (CMS/HCC)     last seizure      Past Surgical History:   Procedure Laterality Date   • APPENDECTOMY     • CARDIAC CATHETERIZATION     • CARDIAC CATHETERIZATION N/A 2020    Procedure: Left Heart Cath;  Surgeon: Marlon Zamudio MD;  Location: [facility] CATH INVASIVE LOCATION;  Service: Cardiovascular;  Laterality: N/A;   • CARDIAC CATHETERIZATION N/A 2020    Procedure: Stent LATRICIA coronary;  Surgeon: Marlon Zamudio MD;  Location: [facility] CATH INVASIVE LOCATION;  Service: Cardiovascular;  Laterality: N/A;   • CARDIAC CATHETERIZATION  2020    Procedure: Percutaneous Manual Thrombectomy;  Surgeon: Marlon Zamudio MD;  Location: [facility] CATH INVASIVE LOCATION;  Service: Cardiovascular;;   • CARDIAC CATHETERIZATION N/A 2020    Procedure: Coronary angiography;  Surgeon: Marlon Zamudio MD;  Location: [facility] CATH INVASIVE LOCATION;  Service: Cardiovascular;  Laterality: N/A;   • CARDIAC CATHETERIZATION N/A 2020    Procedure: Left ventriculography;  Surgeon: Marlon Zamudio MD;  Location: [facility] CATH INVASIVE LOCATION;  Service: Cardiovascular;   Laterality: N/A;   • CHOLECYSTECTOMY     • COLONOSCOPY N/A 2/29/2020    Procedure: COLONOSCOPY to  cecum:;  Surgeon: Krystal Sarabia MD;  Location: [facility] ENDOSCOPY;  Service: Gastroenterology;  Laterality: N/A;  pre:  anemia and abd pain  post:  hemorrhoids, tortuous colon   • ENDOSCOPY N/A 2/29/2020    Procedure: ESOPHAGOGASTRODUODENOSCOPY  with biopsies;  Surgeon: Krystal Sarabia MD;  Location: [facility] ENDOSCOPY;  Service: Gastroenterology;  Laterality: N/A;  pre:  anemia and abd pain  post:  mild gastritis,    • HYSTERECTOMY     • TONSILLECTOMY       General Information     Row Name 03/02/20 1451          PT Evaluation Time/Intention    Document Type  therapy note (daily note)  -     Mode of Treatment  physical therapy;individual therapy  -[facility] Name 03/02/20 1451          General Information    Existing Precautions/Restrictions  fall  -[facility] Name 03/02/20 1451          Cognitive Assessment/Intervention- PT/OT    Orientation Status (Cognition)  oriented x 4  -[facility] Name 03/02/20 1451          Safety Issues, Functional Mobility    Impairments Affecting Function (Mobility)  balance;coordination;endurance/activity tolerance;strength  -       User Key  (r) = Recorded By, (t) = Taken By, (c) = Cosigned By    Initials Name Provider Type     Jeanna Lara PTA Physical Therapy Assistant        Mobility     Row Name 03/02/20 1451          Bed Mobility Assessment/Treatment    Supine-Sit [place] (Bed Mobility)  minimum assist (75% patient effort);2 person assist;verbal cues  -     Sit-Supine [place] (Bed Mobility)  minimum assist (75% patient effort);2 person assist;verbal cues  -     Assistive Device (Bed Mobility)  bed rails;head of bed elevated  -[facility] Name 03/02/20 1451          Sit-Stand Transfer    Sit-Stand [place] (Transfers)  minimum assist (75% patient effort)  -     Assistive Device (Sit-Stand Transfers)  walker, front-wheeled  -[facility] Name 03/02/20 1451           Gait/Stairs Assessment/Training    [place] Level (Gait)  minimum assist (75% patient effort);2 person assist  -     Assistive Device (Gait)  walker, front-wheeled  -     Distance in Feet (Gait)  20  -EH     Pattern (Gait)  step-to  -     Deviations/Abnormal Patterns (Gait)  stride length decreased;gait speed decreased  -     Bilateral Gait Deviations  forward flexed posture;heel strike decreased  -     Comment (Gait/Stairs)  pt with tremoring with increased mobility. Limited ambulation distance due to dizziness  -       User Key  (r) = Recorded By, (t) = Taken By, (c) = Cosigned By    Initials Name Provider Type     Jeanna Lara PTA Physical Therapy Assistant        Obj/Interventions     Row Name 03/02/20 1453          Therapeutic Exercise    Lower Extremity (Therapeutic Exercise)  LAQ (long arc quad), bilateral;marching while seated  -     Lower Extremity Range of Motion (Therapeutic Exercise)  ankle dorsiflexion/plantar flexion, bilateral  -     Exercise Type (Therapeutic Exercise)  AROM (active range of motion)  -     Position (Therapeutic Exercise)  seated  -     Sets/Reps (Therapeutic Exercise)  X10  -EH     Row Name 03/02/20 1453          Static Sitting Balance    Level of [place] (Unsupported Sitting, Static Balance)  contact guard assist  -     Sitting Position (Unsupported Sitting, Static Balance)  sitting on edge of bed  -     Time Able to Maintain Position (Unsupported Sitting, Static Balance)  2 to 3 minutes  -       User Key  (r) = Recorded By, (t) = Taken By, (c) = Cosigned By    Initials Name Provider Type     Jeanna Lara PTA Physical Therapy Assistant        Goals/Plan    No documentation.       Clinical Impression     Row Name 03/02/20 1453          Plan of Care Review    Plan of Care Reviewed With  patient  -     Progress  improving  -     Outcome Summary  Pt tolerated treatment with no complaints. Pt is progressing well with mobility. Pt is  MinAX2 for bed mobility with verbal cueing and Monica for STS transfers. Pt ambulated 20ft with rwx, MinAX2. Pt demos tremors with increased mobility and pt has decreased stride length / gait speed. Pt's ambulation distance limited due to dizziness. Pt will continue to benefit from skilled PT to improve strength/endurance and overall functional mobility. Will continue to progress pt as able.   -     Row Name 03/02/20 145          Positioning and Restraints    Pre-Treatment Position  in bed  -     Post Treatment Position  bed  -EH     In Bed  supine;call light within reach;encouraged to call for assist;exit alarm on  -       User Key  (r) = Recorded By, (t) = Taken By, (c) = Cosigned By    Initials Name Provider Type    Jeanna Haq PTA Physical Therapy Assistant        Outcome Measures     Row Name 03/02/20 8283          How much help from another person do you currently need...    Turning from your back to your side while in flat bed without using bedrails?  3  -EH     Moving from lying on back to sitting on the side of a flat bed without bedrails?  2  -EH     Moving to and from a bed to a chair (including a wheelchair)?  3  -EH     Standing up from a chair using your arms (e.g., wheelchair, bedside chair)?  3  -EH     Climbing 3-5 steps with a railing?  1  -EH     To walk in hospital room?  2  -EH     AM-PAC 6 Clicks Score (PT)  14  -       User Key  (r) = Recorded By, (t) = Taken By, (c) = Cosigned By    Initials Name Provider Type    Jeanna Haq PTA Physical Therapy Assistant          PT Recommendation and Plan     Plan of Care Reviewed With: patient  Progress: improving  Outcome Summary: Pt tolerated treatment with no complaints. Pt is progressing well with mobility. Pt is MinAX2 for bed mobility with verbal cueing and Monica for STS transfers. Pt ambulated 20ft with rwx, MinAX2. Pt demos tremors with increased mobility and pt has decreased stride length / gait speed. Pt's ambulation distance  limited due to dizziness. Pt will continue to benefit from skilled PT to improve strength/endurance and overall functional mobility. Will continue to progress pt as able.      Time Calculation:   PT Charges     Row Name 03/02/20 1451             Time Calculation    Start Time  1422  -      Stop Time  1440  -      Time Calculation (min)  18 min  -      PT Received On  03/02/20  -      PT - Next Appointment  03/03/20  -         Time Calculation- PT    Total Timed Code Minutes- PT  18 minute(s)  -        User Key  (r) = Recorded By, (t) = Taken By, (c) = Cosigned By    Initials Name Provider Type     Jeanna Lara PTA Physical Therapy Assistant        Therapy Charges for Today     Code Description Service Date Service Provider Modifiers Qty    69057678101 HC PT THER PROC EA 15 MIN 3/2/2020 Jeanna Lara PTA GP 1    65153611625 HC PT THER SUPP EA 15 MIN 3/2/2020 Jeanna Lara PTA GP 1          PT G-Codes  Outcome Measure Options: AM-PAC 6 Clicks Basic Mobility (PT)  AM-PAC 6 Clicks Score (PT): 14    Jeanna Lara PTA  3/2/2020

## 2020-03-03 ENCOUNTER — APPOINTMENT (OUTPATIENT)
Dept: GENERAL RADIOLOGY | Facility: HOSPITAL | Age: 60
End: 2020-03-03

## 2020-03-03 LAB
ANION GAP SERPL CALCULATED.3IONS-SCNC: 13.9 MMOL/L (ref 5–15)
BUN BLD-MCNC: 6 MG/DL (ref 8–23)
BUN/CREAT SERPL: 11.8 (ref 7–25)
CALCIUM SPEC-SCNC: 9.2 MG/DL (ref 8.6–10.5)
CHLORIDE SERPL-SCNC: 97 MMOL/L (ref 98–107)
CO2 SERPL-SCNC: 28.1 MMOL/L (ref 22–29)
CREAT BLD-MCNC: 0.51 MG/DL (ref 0.57–1)
CYTO UR: NORMAL
DEPRECATED RDW RBC AUTO: 39.5 FL (ref 37–54)
ERYTHROCYTE [DISTWIDTH] IN BLOOD BY AUTOMATED COUNT: 12.1 % (ref 12.3–15.4)
GFR SERPL CREATININE-BSD FRML MDRD: 123 ML/MIN/1.73
GLUCOSE BLD-MCNC: 106 MG/DL (ref 65–99)
HCT VFR BLD AUTO: 28.3 % (ref 34–46.6)
HGB BLD-MCNC: 9.6 G/DL (ref 12–15.9)
LAB AP CASE REPORT: NORMAL
MCH RBC QN AUTO: 30.7 PG (ref 26.6–33)
MCHC RBC AUTO-ENTMCNC: 33.9 G/DL (ref 31.5–35.7)
MCV RBC AUTO: 90.4 FL (ref 79–97)
PATH REPORT.FINAL DX SPEC: NORMAL
PATH REPORT.GROSS SPEC: NORMAL
PLATELET # BLD AUTO: 340 10*3/MM3 (ref 140–450)
PMV BLD AUTO: 10.6 FL (ref 6–12)
POTASSIUM BLD-SCNC: 2.7 MMOL/L (ref 3.5–5.2)
RBC # BLD AUTO: 3.13 10*6/MM3 (ref 3.77–5.28)
SODIUM BLD-SCNC: 139 MMOL/L (ref 136–145)
WBC NRBC COR # BLD: 7.84 10*3/MM3 (ref 3.4–10.8)

## 2020-03-03 PROCEDURE — 71046 X-RAY EXAM CHEST 2 VIEWS: CPT

## 2020-03-03 PROCEDURE — 80048 BASIC METABOLIC PNL TOTAL CA: CPT | Performed by: INTERNAL MEDICINE

## 2020-03-03 PROCEDURE — 97166 OT EVAL MOD COMPLEX 45 MIN: CPT

## 2020-03-03 PROCEDURE — 97535 SELF CARE MNGMENT TRAINING: CPT

## 2020-03-03 PROCEDURE — 25010000002 ENOXAPARIN PER 10 MG: Performed by: INTERNAL MEDICINE

## 2020-03-03 PROCEDURE — 97530 THERAPEUTIC ACTIVITIES: CPT

## 2020-03-03 PROCEDURE — 97116 GAIT TRAINING THERAPY: CPT

## 2020-03-03 PROCEDURE — 99232 SBSQ HOSP IP/OBS MODERATE 35: CPT | Performed by: INTERNAL MEDICINE

## 2020-03-03 PROCEDURE — 85027 COMPLETE CBC AUTOMATED: CPT | Performed by: INTERNAL MEDICINE

## 2020-03-03 PROCEDURE — 99233 SBSQ HOSP IP/OBS HIGH 50: CPT | Performed by: INTERNAL MEDICINE

## 2020-03-03 RX ORDER — POTASSIUM CHLORIDE 750 MG/1
40 CAPSULE, EXTENDED RELEASE ORAL AS NEEDED
Status: DISCONTINUED | OUTPATIENT
Start: 2020-03-03 | End: 2020-03-07 | Stop reason: HOSPADM

## 2020-03-03 RX ORDER — POTASSIUM CHLORIDE 1.5 G/1.77G
40 POWDER, FOR SOLUTION ORAL AS NEEDED
Status: DISCONTINUED | OUTPATIENT
Start: 2020-03-03 | End: 2020-03-07 | Stop reason: HOSPADM

## 2020-03-03 RX ADMIN — LOSARTAN POTASSIUM 25 MG: 25 TABLET, FILM COATED ORAL at 08:18

## 2020-03-03 RX ADMIN — IBUPROFEN 800 MG: 800 TABLET, FILM COATED ORAL at 21:51

## 2020-03-03 RX ADMIN — CLOPIDOGREL 75 MG: 75 TABLET, FILM COATED ORAL at 08:18

## 2020-03-03 RX ADMIN — ATORVASTATIN CALCIUM 40 MG: 20 TABLET, FILM COATED ORAL at 21:50

## 2020-03-03 RX ADMIN — GABAPENTIN 300 MG: 300 CAPSULE ORAL at 11:09

## 2020-03-03 RX ADMIN — FLUOXETINE HYDROCHLORIDE 20 MG: 20 CAPSULE ORAL at 21:51

## 2020-03-03 RX ADMIN — MODAFINIL 200 MG: 100 TABLET ORAL at 08:17

## 2020-03-03 RX ADMIN — GABAPENTIN 300 MG: 300 CAPSULE ORAL at 18:06

## 2020-03-03 RX ADMIN — FLUOXETINE HYDROCHLORIDE 20 MG: 20 CAPSULE ORAL at 08:18

## 2020-03-03 RX ADMIN — AMOXICILLIN AND CLAVULANATE POTASSIUM 1 TABLET: 875; 125 TABLET, FILM COATED ORAL at 08:18

## 2020-03-03 RX ADMIN — SIMETHICONE CHEW TAB 80 MG 80 MG: 80 TABLET ORAL at 21:51

## 2020-03-03 RX ADMIN — CLONAZEPAM 1 MG: 0.5 TABLET ORAL at 21:51

## 2020-03-03 RX ADMIN — ACETAMINOPHEN 650 MG: 325 TABLET, FILM COATED ORAL at 15:25

## 2020-03-03 RX ADMIN — IBUPROFEN 800 MG: 800 TABLET, FILM COATED ORAL at 16:20

## 2020-03-03 RX ADMIN — GABAPENTIN 300 MG: 300 CAPSULE ORAL at 21:49

## 2020-03-03 RX ADMIN — SUCRALFATE 1 G: 1 SUSPENSION ORAL at 21:50

## 2020-03-03 RX ADMIN — SUCRALFATE 1 G: 1 SUSPENSION ORAL at 18:06

## 2020-03-03 RX ADMIN — SIMETHICONE CHEW TAB 80 MG 80 MG: 80 TABLET ORAL at 16:20

## 2020-03-03 RX ADMIN — IBUPROFEN 800 MG: 800 TABLET, FILM COATED ORAL at 08:18

## 2020-03-03 RX ADMIN — ALUMINUM HYDROXIDE, MAGNESIUM HYDROXIDE, AND DIMETHICONE 15 ML: 400; 400; 40 SUSPENSION ORAL at 11:13

## 2020-03-03 RX ADMIN — PRAMIPEXOLE DIHYDROCHLORIDE 0.12 MG: 0.25 TABLET ORAL at 21:50

## 2020-03-03 RX ADMIN — PANTOPRAZOLE SODIUM 40 MG: 40 TABLET, DELAYED RELEASE ORAL at 06:39

## 2020-03-03 RX ADMIN — CLONAZEPAM 1 MG: 0.5 TABLET ORAL at 08:18

## 2020-03-03 RX ADMIN — CARVEDILOL 6.25 MG: 6.25 TABLET, FILM COATED ORAL at 21:51

## 2020-03-03 RX ADMIN — FUROSEMIDE 40 MG: 40 TABLET ORAL at 08:18

## 2020-03-03 RX ADMIN — SIMETHICONE CHEW TAB 80 MG 80 MG: 80 TABLET ORAL at 08:18

## 2020-03-03 RX ADMIN — ENOXAPARIN SODIUM 40 MG: 40 INJECTION SUBCUTANEOUS at 21:49

## 2020-03-03 RX ADMIN — FLUOXETINE HYDROCHLORIDE 20 MG: 20 CAPSULE ORAL at 16:20

## 2020-03-03 RX ADMIN — POTASSIUM CHLORIDE 40 MEQ: 750 CAPSULE, EXTENDED RELEASE ORAL at 16:20

## 2020-03-03 RX ADMIN — SUCRALFATE 1 G: 1 SUSPENSION ORAL at 06:39

## 2020-03-03 RX ADMIN — MODAFINIL 200 MG: 100 TABLET ORAL at 21:53

## 2020-03-03 RX ADMIN — AMOXICILLIN AND CLAVULANATE POTASSIUM 1 TABLET: 875; 125 TABLET, FILM COATED ORAL at 21:50

## 2020-03-03 RX ADMIN — SUCRALFATE 1 G: 1 SUSPENSION ORAL at 11:09

## 2020-03-03 RX ADMIN — CARVEDILOL 6.25 MG: 6.25 TABLET, FILM COATED ORAL at 08:18

## 2020-03-03 RX ADMIN — ASPIRIN 81 MG: 81 TABLET, COATED ORAL at 08:17

## 2020-03-03 RX ADMIN — GABAPENTIN 300 MG: 300 CAPSULE ORAL at 08:17

## 2020-03-03 NOTE — PROGRESS NOTES
"  Daily Progress Note.   [address]  3/3/2020    Patient:  Name:  Falguni Minaya  MRN:  [id number]  1960  60 y.o.  female         Chief Complaint: Sore throat, restlessness, chest discomfort     Hospital course: Came in with lower lobe infiltrate, questionable sepsis, with hypoxemia had a STEMI status post mid RCA stent on 2/24/2020.  Has some epigastric pain with nausea and vomiting was started on Zosyn but her white count has been normal and her fever is only low-grade, and Zosyn was discontinued and she seems to be holding on without the need for the antibiotic.  She is restless, she has restless leg syndrome on gabapentin, she used to be on clonazepam which has been on hold.  She will be further evaluated by GI, she has some medication adjustment with Carafate and should be scoped soon.     Interval History:   No fever  Some cough  No hemoptysis  No chest pain  Patient is awake alert responsive feels much better than prior days.  ROS: No fever, no diarrhea, no chest pain  PMFSSH: no change    Physical Exam:  /77 (BP Location: Right arm, Patient Position: Lying)   Pulse 92   Temp 97.6 °F (36.4 °C) (Oral)   Resp 20   Ht 152.4 cm (60\")   Wt 78.3 kg (172 lb 9.9 oz)   SpO2 97%   BMI 33.71 kg/m²   Body mass index is 33.71 kg/m².    Intake/Output Summary (Last 24 hours) at 3/3/2020 1650  Last data filed at 3/3/2020 1307  Gross per 24 hour   Intake 240 ml   Output 450 ml   Net -210 ml   3 L L nasal cannula  General appearance:non Toxic , conversant   Eyes: anicteric sclerae, moist conjunctivae; no lid-lag; PERRLA  HENT: Atraumatic; oropharynx clear with moist mucous membranes and no mucosal ulcerations; normal hard and soft palate  Neck: Trachea midline; FROM, supple, no thyromegaly or lymphadenopathy  Lungs: Faint crackles at bases, with normal respiratory effort and no intercostal retractions  CV: RRR, no MRGs   Abdomen: Soft, non-tender; no masses or HSM  Extremities: No " peripheral edema or extremity lymphadenopathy  Skin: Normal temperature, turgor and texture; no rash, ulcers or subcutaneous nodules  Psych: Calm affect, alert and oriented to person, place and time    Data Review:  Notable Labs:  Results from last 7 days   Lab Units 03/03/20  0802 03/01/20  0745 02/29/20  0630 02/28/20  0508 02/26/20  0357   WBC 10*3/mm3 7.84 10.29 7.12 6.63 7.48   HEMOGLOBIN g/dL 9.6* 9.6* 9.6* 9.6* 10.8*   PLATELETS 10*3/mm3 340 285 199 175 226     Results from last 7 days   Lab Units 03/03/20  0802 03/01/20  0745 02/29/20  0630 02/27/20  0412   SODIUM mmol/L 139 135* 138 137   POTASSIUM mmol/L 2.7* 3.7 3.7 3.7   CHLORIDE mmol/L 97* 97* 100 104   CO2 mmol/L 28.1 18.2* 22.3 21.8*   BUN mg/dL 6* 8 7* 10   CREATININE mg/dL 0.51* 0.55* 0.54* 0.67   GLUCOSE mg/dL 106* 122* 100* 88   CALCIUM mg/dL 9.2 9.5 9.1 9.5   Estimated Creatinine Clearance: 107.2 mL/min (A) (by C-G formula based on SCr of 0.51 mg/dL (L)).    Imaging:  Reviewed chest images personally from past 3 days    Scheduled meds:      amoxicillin-clavulanate 1 tablet Oral Q12H   aspirin 81 mg Oral Daily   atorvastatin 40 mg Oral Nightly   carvedilol 6.25 mg Oral Q12H   clonazePAM 1 mg Oral Q12H   clopidogrel 75 mg Oral Daily   enoxaparin 40 mg Subcutaneous Nightly   FLUoxetine 20 mg Oral TID   furosemide 40 mg Oral Daily   gabapentin 300 mg Oral 4x Daily   ibuprofen 800 mg Oral TID   losartan 25 mg Oral Q24H   modafinil 200 mg Oral BID   pantoprazole 40 mg Oral Q AM   pramipexole 0.125 mg Oral Nightly   simethicone 80 mg Oral TID   sucralfate 1 g Oral 4x Daily AC & at Bedtime       ASSESSMENT  /  PLAN:  1. Suspected sepsis improved  2. Bilateral pulmonary edema improved  3. Acute hypoxemia, doing better currently down to room air  4. Trace bilateral pleural effusion  5. Anion gap metabolic acidosis with diarrhea  6. ST elevation MI status post mid RCA stent on 2/24/2020  7. Epigastric pain, chronic  8. Nausea and vomiting, managed by  GI  9. Aspiration pneumonia on Augmentin     Bilateral pulm edema improving wth diuretics  Cont abx augmentin 10 days  Suggest follow up imaging by pcp in 6-8 weeks    Mark Lane MD  [facility]  03/03/20  450

## 2020-03-03 NOTE — PROGRESS NOTES
Patient Name: Falguni Minaya  Patient : 1960        Date of Service:20  Provider of Service: Marlon Zamudio MD  Place of Service: [facility]  Referral Provider: Marlon Zamudio MD          Follow Up: Acute MI.  Interval Hx: Consultants notes reviewed.  The patient remains afebrile.  Blood pressure and heart rate stable.    The patient states she is feeling much better.  No major complaints of shortness of breath or abdominal discomfort.  Weight is down from yesterday.      OBJECTIVE  Temp:  [97.8 °F (36.6 °C)-99 °F (37.2 °C)] 97.8 °F (36.6 °C)  Heart Rate:  [82-93] 82  Resp:  [20-22] 22  BP: (122-141)/(89-98) 122/89   No intake or output data in the 24 hours ending 20 0707  Body mass index is 33.71 kg/m².      20  0656 20  0500 20  0500   Weight: 80.6 kg (177 lb 11.1 oz) 81.6 kg (180 lb) 78.3 kg (172 lb 9.9 oz)         Physical Exam:   Physical Exam   Constitutional: She is oriented to person, place, and time. She appears well-developed and well-nourished.   HENT:   Head: Normocephalic.   Eyes: Pupils are equal, round, and reactive to light.   Neck: Normal range of motion. No JVD present. Carotid bruit is not present. No thyromegaly present.   Cardiovascular: Normal rate, regular rhythm, S1 normal, S2 normal, normal heart sounds and intact distal pulses. Exam reveals no gallop and no friction rub.   No murmur heard.  Pulmonary/Chest: Effort normal and breath sounds normal.   Abdominal: Soft. Bowel sounds are normal.   Musculoskeletal: She exhibits no edema.   Neurological: She is alert and oriented to person, place, and time.   Skin: Skin is warm, dry and intact. No erythema.   Psychiatric: She has a normal mood and affect.   Vitals reviewed.        CURRENT MEDS    Scheduled Meds:  amoxicillin-clavulanate 1 tablet Oral Q12H   aspirin 81 mg Oral Daily   atorvastatin 40 mg Oral Nightly   carvedilol 6.25 mg Oral Q12H   clonazePAM 1 mg Oral Q12H    clopidogrel 75 mg Oral Daily   enoxaparin 40 mg Subcutaneous Nightly   FLUoxetine 20 mg Oral TID   furosemide 40 mg Oral Daily   gabapentin 300 mg Oral 4x Daily   ibuprofen 800 mg Oral TID   losartan 25 mg Oral Q24H   modafinil 200 mg Oral BID   pantoprazole 40 mg Oral Q AM   pramipexole 0.125 mg Oral Nightly   simethicone 80 mg Oral TID   sucralfate 1 g Oral 4x Daily AC & at Bedtime     Continuous Infusions:       Lab Review:   Results from last 7 days   Lab Units 03/01/20  0745 02/29/20  0630   SODIUM mmol/L 135* 138   POTASSIUM mmol/L 3.7 3.7   CHLORIDE mmol/L 97* 100   CO2 mmol/L 18.2* 22.3   BUN mg/dL 8 7*   CREATININE mg/dL 0.55* 0.54*   GLUCOSE mg/dL 122* 100*   CALCIUM mg/dL 9.5 9.1   AST (SGOT) U/L 25 33*   ALT (SGPT) U/L 17 20     Results from last 7 days   Lab Units 03/01/20  1619 02/29/20  0630   TROPONIN T ng/mL 1.840* 4.680*     Results from last 7 days   Lab Units 03/01/20  0745 02/29/20  0630   WBC 10*3/mm3 10.29 7.12   HEMOGLOBIN g/dL 9.6* 9.6*   HEMATOCRIT % 29.2* 28.4*   PLATELETS 10*3/mm3 285 199                           I personally reviewed the patient's ECG and telemetry data    ASSESSMENT & PLAN    Acute ST elevation myocardial infarction (STEMI) due to occlusion of right coronary artery (CMS/HCC)    Multiple sclerosis (CMS/HCC)    Left sided abdominal pain    Dyspepsia    Heme positive stool    1.  Acute inferior ST JOSE: Status post PTCA LATRICIA RCA.  She continues on aspirin and Plavix.  No further angina pectoris.  Discrepancy between cath and echo left ventricular ejection fraction.  Inferior wall is hypokinetic.  No angina pectoris.  2.  Congestive heart failure: Acute systolic.  Elevated proBNP.  Bilateral pleural effusions.  Now on oral diuretics.  3.  Anemia: Heme positive stool.  Negative scope.  Biopsy results pending.  Abdominal discomfort improving per GI  4.  Hypertension: Stable  5.  Temperature elevation: Patient remains afebrile.  She is on oral antibiotics.  No definitive sign  of pneumonia but being covered for such  6.  Hypoxemia: Per pulmonary.  Improved  7.  Multiple sclerosis    We will have discharge services assessed the patient's needs today.  We will plan on discharging tomorrow.        Marlon Zamudio MD  03/03/20

## 2020-03-03 NOTE — PROGRESS NOTES
[facility]  Inpatient Progress Note    Reason for Follow Up: Anemia, abdominal pain, heme positive stool    Subjective     Interval History: Biopsy results from previous upper endoscopy revealed mild gastritis with no evidence of Helicobacter pylori.  H&H stable.  Discussed with patient and family.  Tolerating p.o. and no current GI complaints.  Advise she can follow-up in outpatient setting for further assessment to establish source of bleeding with a capsule enteroscopy.      Current Facility-Administered Medications:   •  acetaminophen (TYLENOL) tablet 650 mg, 650 mg, Oral, Q4H PRN, Krystal Sarabia MD, 650 mg at 03/02/20 1658  •  aluminum-magnesium hydroxide-simethicone (MAALOX MAX) 400-400-40 MG/5ML suspension 15 mL, 15 mL, Oral, 4x Daily PRN, Krystal Sarabia MD, 15 mL at 03/03/20 1113  •  amoxicillin-clavulanate (AUGMENTIN) 875-125 MG per tablet 1 tablet, 1 tablet, Oral, Q12H, Mark Lane MD, 1 tablet at 03/03/20 0818  •  aspirin EC tablet 81 mg, 81 mg, Oral, Daily, Krystal Sarabia MD, 81 mg at 03/03/20 0817  •  atorvastatin (LIPITOR) tablet 40 mg, 40 mg, Oral, Nightly, Krystal Sarabia MD, 40 mg at 03/02/20 2020  •  senna-docusate sodium (SENOKOT-S) 8.6-50 MG tablet 2 tablet, 2 tablet, Oral, BID PRN **AND** polyethylene glycol 3350 powder (packet), 17 g, Oral, Daily PRN **AND** bisacodyl (DULCOLAX) EC tablet 5 mg, 5 mg, Oral, Daily PRN **AND** bisacodyl (DULCOLAX) suppository 10 mg, 10 mg, Rectal, Daily PRN, Marlon Zamudio MD  •  calcium carbonate (TUMS) chewable tablet 500 mg (200 mg elemental), 1 tablet, Oral, 4x Daily PRN, Krystal Sarabia MD, 1 tablet at 02/28/20 1228  •  carvedilol (COREG) tablet 6.25 mg, 6.25 mg, Oral, Q12H, [place], [place] F, MD, 6.25 mg at 03/03/20 0818  •  clonazePAM (KlonoPIN) tablet 1 mg, 1 mg, Oral, Q12H, Krystal Sarabia MD, 1 mg at 03/03/20 0818  •  [DISCONTINUED] clopidogrel (PLAVIX) tablet 600 mg, 600 mg, Oral, Once **AND**  clopidogrel (PLAVIX) tablet 75 mg, 75 mg, Oral, Daily, Krystal Sarabia MD, 75 mg at 03/03/20 0818  •  enoxaparin (LOVENOX) syringe 40 mg, 40 mg, Subcutaneous, Nightly, Krystal Sarabia MD, 40 mg at 03/02/20 2020  •  FLUoxetine (PROzac) capsule 20 mg, 20 mg, Oral, TID, Krystal Sarabia MD, 20 mg at 03/03/20 0818  •  furosemide (LASIX) tablet 40 mg, 40 mg, Oral, Daily, Marlon Zamudio MD, 40 mg at 03/03/20 0818  •  gabapentin (NEURONTIN) capsule 300 mg, 300 mg, Oral, 4x Daily, Krystal Sarabia MD, 300 mg at 03/03/20 1109  •  ibuprofen (ADVIL,MOTRIN) tablet 800 mg, 800 mg, Oral, TID, Katherine Solis, APRN, 800 mg at 03/03/20 0818  •  Lidocaine Viscous HCl (XYLOCAINE) 2 % mouth solution 15 mL, 15 mL, Mouth/Throat, 4x Daily PRN, Krystal Sarabia MD, 15 mL at 02/27/20 0108  •  losartan (COZAAR) tablet 25 mg, 25 mg, Oral, Q24H, Krystal Sarabia MD, 25 mg at 03/03/20 0818  •  modafinil (PROVIGIL) tablet 200 mg, 200 mg, Oral, BID, Krystal Sarabia MD, 200 mg at 03/03/20 0817  •  nitroglycerin (NITROSTAT) SL tablet 0.4 mg, 0.4 mg, Sublingual, Q5 Min PRN, Krystal Sarabia MD, 0.4 mg at 02/28/20 1208  •  ondansetron (ZOFRAN) injection 4 mg, 4 mg, Intravenous, Q6H PRN, Krystal Sarabia MD, 4 mg at 03/01/20 0436  •  ondansetron (ZOFRAN) tablet 4 mg, 4 mg, Oral, Q6H PRN, Krystal Sarabia MD  •  pantoprazole (PROTONIX) EC tablet 40 mg, 40 mg, Oral, Q AM, Krystal Sarabia MD, 40 mg at 03/03/20 0639  •  pramipexole (MIRAPEX) tablet 0.125 mg, 0.125 mg, Oral, Nightly, Krystal Sarabia MD, 0.125 mg at 03/02/20 2019  •  simethicone (MYLICON) chewable tablet 80 mg, 80 mg, Oral, TID, Krystal Sarabia MD, 80 mg at 03/03/20 0818  •  sucralfate (CARAFATE) 1 GM/10ML suspension 1 g, 1 g, Oral, 4x Daily AC & at Bedtime, Krystal Sarabia MD, 1 g at 03/03/20 1109  •  traMADol (ULTRAM) tablet 50 mg, 50 mg, Oral, Q6H PRN, Krystal Sarabia MD, 50 mg at 03/02/20 1515  •  zolpidem (AMBIEN) tablet 2.5 mg, 2.5 mg, Oral, Nightly PRN, Krystal Sarabia  MD, 2.5 mg at 02/24/20 1727  Review of Systems:    All systems were reviewed and negative except for:  Gastrointestinal: positive for  See HPI    Objective     Vital Signs  Temp:  [97.8 °F (36.6 °C)-99 °F (37.2 °C)] 97.8 °F (36.6 °C)  Heart Rate:  [82-93] 92  Resp:  [20-22] 20  BP: (122-146)/() 122/77  Body mass index is 33.71 kg/m².    Intake/Output Summary (Last 24 hours) at 3/3/2020 1233  Last data filed at 3/3/2020 0746  Gross per 24 hour   Intake --   Output 450 ml   Net -450 ml     I/O this shift:  In: -   Out: 450 [Urine:450]     Physical Exam:   General: patient awake, alert and cooperative   Eyes: Normal lids and lashes, no scleral icterus   Neck: supple, normal ROM   Skin: warm and dry, not jaundiced   Cardiovascular: regular rhythm and rate, no murmurs auscultated   Pulm: clear to auscultation bilaterally, regular and unlabored   Abdomen: soft, nontender, nondistended; normal bowel sounds   Extremities: no rash or edema   Psychiatric: Normal mood and behavior; memory intact     Results Review:     I reviewed the patient's new clinical results.    Results from last 7 days   Lab Units 03/03/20  0802 03/01/20  0745 02/29/20  0630   WBC 10*3/mm3 7.84 10.29 7.12   HEMOGLOBIN g/dL 9.6* 9.6* 9.6*   HEMATOCRIT % 28.3* 29.2* 28.4*   PLATELETS 10*3/mm3 340 285 199     Results from last 7 days   Lab Units 03/03/20  0802 03/01/20  0745 02/29/20  0630 02/27/20  0412   SODIUM mmol/L 139 135* 138 137   POTASSIUM mmol/L 2.7* 3.7 3.7 3.7   CHLORIDE mmol/L 97* 97* 100 104   CO2 mmol/L 28.1 18.2* 22.3 21.8*   BUN mg/dL 6* 8 7* 10   CREATININE mg/dL 0.51* 0.55* 0.54* 0.67   CALCIUM mg/dL 9.2 9.5 9.1 9.5   BILIRUBIN mg/dL  --  0.7 0.6 0.5   ALK PHOS U/L  --  62 60 61   ALT (SGPT) U/L  --  17 20 31   AST (SGOT) U/L  --  25 33* 92*   GLUCOSE mg/dL 106* 122* 100* 88         Lab Results   Lab Value Date/Time    LIPASE 44 02/29/2020 0630    LIPASE 108 (H) 02/26/2020 0357       Radiology:  XR Chest PA & Lateral   Final Result       CT Abdomen Pelvis With Contrast   Final Result   Small amount of nonspecific pelvic free fluid without   loculated collection or acute inflammatory process identified.                           This report was finalized on 3/1/2020 11:33 PM by Soham Schmidt M.D.          CT Angiogram Chest With & Without Contrast   Final Result   1. Upper lobe predominant interstitial and ground glass opacities   concerning for pneumonia, no pulmonary embolism.   2. Large bilateral pleural effusions, small pericardial effusion.       This report was finalized on 3/1/2020 11:33 PM by Soham Schmidt M.D.          XR Chest 1 View   Final Result   Adverse interval change. Diffuse bilateral infiltrates,   follow-up recommended. Borderline heart size with pulmonary vascular   congestion.       This report was finalized on 3/1/2020 8:13 AM by Dr. Veto Khoury M.D.          XR Chest 1 View   Final Result   Under aeration with very mild left lung base atelectasis and   small effusions.           This report was finalized on 2/26/2020 5:52 AM by Soham Schmidt M.D.          CT Abdomen Pelvis With Contrast   Final Result           1. No acute inflammatory process of bowel is identified, follow up as   indications persist.   2. No obstructive uropathy. Indeterminate periurethral density, as   described above, follow-up evaluation suggested.    3. Minimal pleural effusions. Septal thickening at the lung bases   suggest mild edema, although nonspecific, follow-up recommended. Trace   perisplenic ascites.       This report was finalized on 2/25/2020 5:55 PM by Dr. Veto Khoury M.D.              Assessment/Plan     Patient Active Problem List   Diagnosis   • STEMI involving right coronary artery (CMS/HCC)   • Acute ST elevation myocardial infarction (STEMI) due to occlusion of right coronary artery (CMS/HCC)   • Multiple sclerosis (CMS/HCC)   • Left sided abdominal pain   • Dyspepsia   • Heme positive stool        Assessment:  1. Abdominal pain: Resolving  2. Anemia with heme positive stool: Work-up negative to date  3. STEMI  4. GERD      Plan:  · Continue current medical regimen  · Anticipate discharge in the future  I discussed the patients findings and my recommendations with patient and family.    Drew Pratt MD

## 2020-03-03 NOTE — PLAN OF CARE
Problem: Patient Care Overview  Goal: Plan of Care Review  Flowsheets (Taken 3/3/2020 1523)  Plan of Care Reviewed With: patient  Outcome Summary: Pt presents to OT with decreased UE coordination due to tremors, decreased sensation hands and decreased endurance which limits independence and safety for adls. Pt will continue to benefit from OT for increasing safety and independence for adls

## 2020-03-03 NOTE — THERAPY EVALUATION
Acute Care - Occupational Therapy Initial Evaluation  [facility]     Patient Name: Falguni Minaya  : 1960  MRN: [id number]  Today's Date: 3/3/2020             Admit Date: 2020       ICD-10-CM ICD-9-CM   1. STEMI involving right coronary artery (CMS/HCC) I21.11 410.31   2. Left sided abdominal pain R10.9 789.09   3. Dyspepsia R10.13 536.8   4. Heme positive stool R19.5 792.1     Patient Active Problem List   Diagnosis   • STEMI involving right coronary artery (CMS/HCC)   • Acute ST elevation myocardial infarction (STEMI) due to occlusion of right coronary artery (CMS/HCC)   • Multiple sclerosis (CMS/HCC)   • Left sided abdominal pain   • Dyspepsia   • Heme positive stool     Past Medical History:   Diagnosis Date   • Abdominal hernia    • Coronary artery disease    • GERD (gastroesophageal reflux disease)    • Multiple sclerosis (CMS/HCC)    • Seizures (CMS/HCC)     last seizure      Past Surgical History:   Procedure Laterality Date   • APPENDECTOMY     • CARDIAC CATHETERIZATION     • CARDIAC CATHETERIZATION N/A 2020    Procedure: Left Heart Cath;  Surgeon: Marlon Zamudio MD;  Location: [facility] CATH INVASIVE LOCATION;  Service: Cardiovascular;  Laterality: N/A;   • CARDIAC CATHETERIZATION N/A 2020    Procedure: Stent LATRICIA coronary;  Surgeon: Marlon Zamudio MD;  Location[facility] CATH [facility]VASIVE LOC[facility];  Service: Cardiovascular;  Laterality: N/A;   • CARDIAC CATHETERIZATION  2020    Procedure: Percutaneous Manual Thrombectomy;  Surgeon: Marlon Zamudio MD;  Location: [facility] CATH INVASIVE LOCATION;  Service: Cardiovascular;;   • CARDIAC CATHETERIZATION N/A 2020    Procedure: Coronary angiography;  Surgeon: Marlon Zamudio MD;  [facility];  Service: Cardiovascular;  Laterality: N/A;   • CARDIAC CATHETERIZATION N/A 2020    Procedure: Left ventriculography;  Surgeon: Marlon Zamudio MD;  Location: [facility] CATH INVASIVE  LOCATION;  Service: Cardiovascular;  Laterality: N/A;   • CHOLECYSTECTOMY     • COLONOSCOPY N/A 2/29/2020    Procedure: COLONOSCOPY to  cecum:;  Surgeon: Krystal Sarabia MD;  Location: [facility] ENDOSCOPY;  Service: Gastroenterology;  Laterality: N/A;  pre:  anemia and abd pain  post:  hemorrhoids, tortuous colon   • ENDOSCOPY N/A 2/29/2020    Procedure: ESOPHAGOGASTRODUODENOSCOPY  with biopsies;  Surgeon: Krystal Sarabia MD;  Location: [facility] ENDOSCOPY;  Service: Gastroenterology;  Laterality: N/A;  pre:  anemia and abd pain  post:  mild gastritis,    • HYSTERECTOMY     • TONSILLECTOMY            OT ASSESSMENT FLOWSHEET (last 12 hours)      Occupational Therapy Evaluation     Row Name 03/03/20 1512                   OT Evaluation Time/Intention    Document Type  evaluation  -SG        Mode of Treatment  individual therapy;occupational therapy  -SG        Patient Effort  good  -SG           General Information    Patient Profile Reviewed?  yes  -SG        Patient Observations  alert;cooperative;agree to therapy  -SG        Prior Level of Function  independent:  -SG        Existing Precautions/Restrictions  fall  -SG           Cognitive Assessment/Intervention- PT/OT    Orientation Status (Cognition)  oriented x 4  -SG        Follows Commands (Cognition)  WFL  -SG           ADL Assessment/Intervention    BADL Assessment/Intervention  upper body dressing;lower body dressing;grooming;feeding  -           Upper Body Dressing Assessment/Training    Upper Body Dressing [place] Level  upper body dressing skills;supervision  -        Upper Body Dressing Position  sitting up in bed  -           Lower Body Dressing Assessment/Training    Lower Body Dressing [place] Level  lower body dressing skills;doff;don;dependent (less than 25% patient effort)  -        Lower Body Dressing Position  sitting up in bed  -           Grooming Assessment/Training    [place] Level (Grooming)  grooming skills;minimum  assist (75% patient effort)  -SG        Grooming Position  sitting up in bed  -SG        Comment (Grooming)  tremors BUE's and decreased sensation noted   -SG           Self-Feeding Assessment/Training    [place] Level (Feeding)  feeding skills;minimum assist (75% patient effort);scoop food and bring to mouth simulated task  -SG        Assistive Devices (Feeding)  built-up handle utensils  -SG        Position (Self-Feeding)  sitting up in bed  -SG        Comment (Feeding)  Pt issued built up foam for utensils to assist with hand to mouth task   -SG           BADL Safety/Performance    Impairments, BADL Safety/Performance  endurance/activity tolerance;coordination  -SG           General ROM    GENERAL ROM COMMENTS  BUE's WFL AROM  -SG           Positioning and Restraints    Pre-Treatment Position  in bed  -SG        Post Treatment Position  bed  -SG        In Bed  call light within reach;encouraged to call for assist;exit alarm on  -SG           Pain Assessment    Additional Documentation  Pain Scale: Word Pre/Post-Treatment (Group)  -SG           Pain Scale: Word Pre/Post-Treatment    Pain: Word Scale, Pretreatment  2 - mild pain  -SG        Pain: Word Scale, Post-Treatment  2 - mild pain  -SG           Plan of Care Review    Plan of Care Reviewed With  patient  -SG           Clinical Impression (OT)    OT Diagnosis  need for assist with personal care  -SG        Criteria for Skilled Therapeutic Interventions Met (OT Eval)  yes;treatment indicated  -SG        Therapy Frequency (OT Eval)  5 times/wk  -SG        Care Plan Review (OT)  evaluation/treatment results reviewed  -SG           Planned OT Interventions    Planned Therapy Interventions (OT Eval)  activity tolerance training;BADL retraining;transfer/mobility retraining  -SG           OT Goals    Transfer Goal Selection (OT)  transfer, OT goal 1  -SG        Dressing Goal Selection (OT)  dressing, OT goal 1  -SG        Toileting Goal Selection (OT)   toileting, OT goal 1  -SG        Additional Documentation  Self-Feeding Goal Selection (OT) (Row);Coordination Goal Selection (OT) (Row)  -SG           Transfer Goal 1 (OT)    Activity/Assistive Device (Transfer Goal 1, OT)  toilet  -SG        [place] Level/Cues Needed (Transfer Goal 1, OT)  minimum assist (75% or more patient effort)  -SG        Time Frame (Transfer Goal 1, OT)  2 weeks  -SG        Progress/Outcome (Transfer Goal 1, OT)  goal ongoing  -SG           Dressing Goal 1 (OT)    Activity/Assistive Device (Dressing Goal 1, OT)  dressing skills, all  -SG        [place]/Cues Needed (Dressing Goal 1, OT)  minimum assist (75% or more patient effort)  -SG        Time Frame (Dressing Goal 1, OT)  2 weeks  -SG        Progress/Outcome (Dressing Goal 1, OT)  goal ongoing  -SG           Toileting Goal 1 (OT)    Activity/Device (Toileting Goal 1, OT)  toileting skills, all  -SG        [place] Level/Cues Needed (Toileting Goal 1, OT)  minimum assist (75% or more patient effort)  -SG        Time Frame (Toileting Goal 1, OT)  2 weeks  -SG        Progress/Outcome (Toileting Goal 1, OT)  goal ongoing  -SG          User Key  (r) = Recorded By, (t) = Taken By, (c) = Cosigned By    Initials Name Effective Dates    Ally Steven, OTR 06/08/18 -                OT Recommendation and Plan  Planned Therapy Interventions (OT Eval): activity tolerance training, BADL retraining, transfer/mobility retraining  Therapy Frequency (OT Eval): 5 times/wk  Plan of Care Review  Plan of Care Reviewed With: patient  Plan of Care Reviewed With: patient  Outcome Summary: Pt presents to OT with decreased UE coordination due to tremors, decreased sensation hands and decreased endurance which limits independence and safety for adls. Pt will continue to benefit from OT for increasing safety and independence for adls    Outcome Measures     Row Name 03/03/20 0268             How much help from another is currently needed...     Putting on and taking off regular lower body clothing?  2  -SG      Bathing (including washing, rinsing, and drying)  2  -SG      Toileting (which includes using toilet bed pan or urinal)  2  -SG      Putting on and taking off regular upper body clothing  3  -SG      Taking care of personal grooming (such as brushing teeth)  3  -SG      Eating meals  3  -SG      AM-PAC 6 Clicks Score (OT)  15  -SG         Functional Assessment    Outcome Measure Options  AM-PAC 6 Clicks Daily Activity (OT)  -SG        User Key  (r) = Recorded By, (t) = Taken By, (c) = Cosigned By    Initials Name Provider Type     Ally Pratt OTR Occupational Therapist          Time Calculation:   Time Calculation- OT     Row Name 03/03/20 1525             Time Calculation- OT    OT Start Time  1344  -      OT Stop Time  1413  -      OT Time Calculation (min)  29 min  -      Total Timed Code Minutes- OT  15 minute(s)  -      OT Received On  03/03/20  -      OT Goal Re-Cert Due Date  03/17/20  -        User Key  (r) = Recorded By, (t) = Taken By, (c) = Cosigned By    Initials Name Provider Type     Ally Pratt OTR Occupational Therapist        Therapy Charges for Today     Code Description Service Date Service Provider Modifiers Qty    [id number]  OT SELF CARE/MGMT/TRAIN EA 15 MIN 3/3/2020 Ally Pratt OTR GO 1    [id number]  OT EVAL MOD COMPLEXITY 2 3/3/2020 Ally Pratt OTR GO 1               GYPSY Leos  3/3/2020

## 2020-03-03 NOTE — PLAN OF CARE
Problem: Patient Care Overview  Goal: Plan of Care Review  Outcome: Ongoing (interventions implemented as appropriate)  Note:   Pt complained of abdomen pain 7-8/10 pain with all mobility and rest. Pt is CGA with bed mobility with cueing for sequencing and use of bed rails. Pt is Jodie for transfers with FWW. Pt ambulated 40 ft with min/modA and FWW. Pt appears to have moments of instability with R ankle, has poor quad control evident by occasional buckling and/or hyyperextension of B knees during gait. Last 10 ft of gait pt required modA due to fatigue and worsening action/pain tremors. Skilled PT needed to address above impairments, DC recs include [facility].

## 2020-03-03 NOTE — PROGRESS NOTES
Continued Stay Note  [facility]     Patient Name: Falguni Minaya  MRN: [id number]  Today's Date: 3/3/2020    Admit Date: 2/24/2020    Discharge Plan     Row Name 03/03/20 1714       Plan    Plan  Referral has been made to [facility] acute rehab, waiting for decision if pt will be appropriate for acute    Plan Comments  CCP spoke with [facility] acute in [facility] on 3/2, they were waiting on OT to eval.  CCP attempting to get answer on referral status.  Rosa Landin RN        Discharge Codes    No documentation.             Rosa Landin RN

## 2020-03-03 NOTE — THERAPY TREATMENT NOTE
Patient Name: Falguni Minaya  : 1960    MRN: [id number]                              Today's Date: 3/3/2020       Admit Date: 2020    Visit Dx:     ICD-10-CM ICD-9-CM   1. STEMI involving right coronary artery (CMS/HCC) I21.11 410.31   2. Left sided abdominal pain R10.9 789.09   3. Dyspepsia R10.13 536.8   4. Heme positive stool R19.5 792.1     Patient Active Problem List   Diagnosis   • STEMI involving right coronary artery (CMS/HCC)   • Acute ST elevation myocardial infarction (STEMI) due to occlusion of right coronary artery (CMS/HCC)   • Multiple sclerosis (CMS/HCC)   • Left sided abdominal pain   • Dyspepsia   • Heme positive stool     Past Medical History:   Diagnosis Date   • Abdominal hernia    • Coronary artery disease    • GERD (gastroesophageal reflux disease)    • Multiple sclerosis (CMS/HCC)    • Seizures (CMS/HCC)     last seizure      Past Surgical History:   Procedure Laterality Date   • APPENDECTOMY     • CARDIAC CATHETERIZATION     • CARDIAC CATHETERIZATION N/A 2020    Procedure: Left Heart Cath;  Surgeon: Marlon Zamudio MD;  Location: [facility] CATH INVASIVE LOCATION;  Service: Cardiovascular;  Laterality: N/A;   • CARDIAC CATHETERIZATION N/A 2020    Procedure: Stent LATRICIA coronary;  Surgeon: Marlon Zamudio MD;  Location: [facility] CATH INVASIVE LOCATION;  Service: Cardiovascular;  Laterality: N/A;   • CARDIAC CATHETERIZATION  2020    Procedure: Percutaneous Manual Thrombectomy;  Surgeon: Marlon Zamudio MD;  Location: [facility] CATH INVASIVE LOCATION;  Service: Cardiovascular;;   • CARDIAC CATHETERIZATION N/A 2020    Procedure: Coronary angiography;  Surgeon: Marlon Zamudio MD;  Location: [facility] CATH INVASIVE LOCATION;  Service: Cardiovascular;  Laterality: N/A;   • CARDIAC CATHETERIZATION N/A 2020    Procedure: Left ventriculography;  Surgeon: Marlon Zamudio MD;  Location: [facility] CATH INVASIVE LOCATION;  Service: Cardiovascular;   Laterality: N/A;   • CHOLECYSTECTOMY     • COLONOSCOPY N/A 2/29/2020    Procedure: COLONOSCOPY to  cecum:;  Surgeon: Krystal Sarabia MD;  Location: [facility] ENDOSCOPY;  Service: Gastroenterology;  Laterality: N/A;  pre:  anemia and abd pain  post:  hemorrhoids, tortuous colon   • ENDOSCOPY N/A 2/29/2020    Procedure: ESOPHAGOGASTRODUODENOSCOPY  with biopsies;  Surgeon: Krystal Sarabia MD;  Location: [facility] ENDOSCOPY;  Service: Gastroenterology;  Laterality: N/A;  pre:  anemia and abd pain  post:  mild gastritis,    • HYSTERECTOMY     • TONSILLECTOMY       General Information     Row Name 03/03/20 1641          PT Evaluation Time/Intention    Document Type  therapy note (daily note)  -AE     Mode of Treatment  physical therapy  -AE     Row Name 03/03/20 1641          General Information    Patient Profile Reviewed?  yes  -AE       User Key  (r) = Recorded By, (t) = Taken By, (c) = Cosigned By    Initials Name Provider Type    AE Ute Torre, PT Physical Therapist        Mobility     Row Name 03/03/20 1641          Bed Mobility Assessment/Treatment    Bed Mobility Assessment/Treatment  bed mobility (all) activities  -AE     Supine-Sit Justice (Bed Mobility)  contact guard  -AE     Assistive Device (Bed Mobility)  bed rails  -AE     Row Name 03/03/20 1641          Transfer Assessment/Treatment    Comment (Transfers)  Jodie for transfers with FWW  -AE     Row Name 03/03/20 1641          Bed-Chair Transfer    Bed-Chair Justice (Transfers)  minimum assist (75% patient effort);1 person assist  -AE     Assistive Device (Bed-Chair Transfers)  walker, front-wheeled  -AE     Row Name 03/03/20 1641          Sit-Stand Transfer    Sit-Stand Justice (Transfers)  minimum assist (75% patient effort);1 person assist  -AE     Assistive Device (Sit-Stand Transfers)  walker, front-wheeled  -AE     Row Name 03/03/20 1641          Gait/Stairs Assessment/Training    Gait/Stairs Assessment/Training  gait/ambulation  independence;gait/ambulation assistive device  -AE     [place] Level (Gait)  minimum assist (75% patient effort);moderate assist (50% patient effort)  -AE     Assistive Device (Gait)  walker, front-wheeled  -AE     Distance in Feet (Gait)  40 ft (modA last 10 ft)  -AE       User Key  (r) = Recorded By, (t) = Taken By, (c) = Cosigned By    Initials Name Provider Type    AE Ute Torre, GEOVANY Physical Therapist        Obj/Interventions     Row Name 03/03/20 1642          Therapeutic Exercise    Sets/Reps (Therapeutic Exercise)  sitting marches and LAQs x 10  -AE       User Key  (r) = Recorded By, (t) = Taken By, (c) = Cosigned By    Initials Name Provider Type    AE Ute Torre, GEOVANY Physical Therapist        Goals/Plan    No documentation.       Clinical Impression     Row Name 03/03/20 1643          Pain Assessment    Additional Documentation  Pain Scale: Numbers Pre/Post-Treatment (Group)  -AE     Row Name 03/03/20 1643          Pain Scale: Numbers Pre/Post-Treatment    Pain Scale: Numbers, Pretreatment  7/10  -AE     Pain Scale: Numbers, Post-Treatment  8/10  -AE     Pain Location  abdomen  -AE     Row Name 03/03/20 1643          Vital Signs    Pre SpO2 (%)  95  -AE     O2 Delivery Pre Treatment  supplemental O2  -AE     Intra SpO2 (%)  85  -AE     O2 Delivery Intra Treatment  room air  -AE     Post SpO2 (%)  90  -AE     O2 Delivery Post Treatment  room air  -AE     Row Name 03/03/20 1643          Positioning and Restraints    Pre-Treatment Position  in bed  -AE     Post Treatment Position  chair  -AE     In Chair  reclined;call light within reach;encouraged to call for assist  -AE       User Key  (r) = Recorded By, (t) = Taken By, (c) = Cosigned By    Initials Name Provider Type    AE Ute Torre, GEOVANY Physical Therapist        Outcome Measures    No documentation.         PT Recommendation and Plan           Time Calculation:   PT Charges     Row Name 03/03/20 1641             Time Calculation     Start Time  1620  -AE      Stop Time  1646  -AE      Time Calculation (min)  26 min  -AE      PT Received On  03/03/20  -AE      PT - Next Appointment  03/04/20  -AE         Time Calculation- PT    Total Timed Code Minutes- PT  26 minute(s)  -AE        User Key  (r) = Recorded By, (t) = Taken By, (c) = Cosigned By    Initials Name Provider Type    AE Ute Torre, GEOVANY Physical Therapist        Therapy Charges for Today     Code Description Service Date Service Provider Modifiers Qty    20377958139 HC GAIT TRAINING EA 15 MIN 3/3/2020 Ute Torre, PT GP 1    33852122025 HC PT THERAPEUTIC ACT EA 15 MIN 3/3/2020 Ute Torre, PT GP 1    68606251188 HC PT THER SUPP EA 15 MIN 3/3/2020 Ute Torre PT GP 2          PT G-Codes  Outcome Measure Options: AM-PAC 6 Clicks Daily Activity (OT)  AM-PAC 6 Clicks Score (PT): 14  AM-PAC 6 Clicks Score (OT): 15    Ute Torre PT  3/3/2020

## 2020-03-03 NOTE — PLAN OF CARE
Problem: Patient Care Overview  Goal: Plan of Care Review  Outcome: Ongoing (interventions implemented as appropriate)  Flowsheets (Taken 3/3/2020 9427)  Progress: no change  Plan of Care Reviewed With: patient  Outcome Summary: Pt given Motrin 800mg for pain with good results. O2Sat decreased to 88% on 1.5LNC, so, O2 increased to 4LNC with O2Sat remaining above 90%.  NAD. VSS. Will continue to monitor.

## 2020-03-04 LAB — POTASSIUM BLD-SCNC: 2.7 MMOL/L (ref 3.5–5.2)

## 2020-03-04 PROCEDURE — 99233 SBSQ HOSP IP/OBS HIGH 50: CPT | Performed by: INTERNAL MEDICINE

## 2020-03-04 PROCEDURE — 84132 ASSAY OF SERUM POTASSIUM: CPT | Performed by: INTERNAL MEDICINE

## 2020-03-04 PROCEDURE — 99231 SBSQ HOSP IP/OBS SF/LOW 25: CPT | Performed by: INTERNAL MEDICINE

## 2020-03-04 PROCEDURE — 25010000002 ENOXAPARIN PER 10 MG: Performed by: INTERNAL MEDICINE

## 2020-03-04 PROCEDURE — 97110 THERAPEUTIC EXERCISES: CPT

## 2020-03-04 RX ORDER — POTASSIUM CHLORIDE 750 MG/1
20 CAPSULE, EXTENDED RELEASE ORAL DAILY
Status: DISCONTINUED | OUTPATIENT
Start: 2020-03-04 | End: 2020-03-06

## 2020-03-04 RX ORDER — CARVEDILOL 12.5 MG/1
12.5 TABLET ORAL EVERY 12 HOURS SCHEDULED
Status: DISCONTINUED | OUTPATIENT
Start: 2020-03-04 | End: 2020-03-07 | Stop reason: HOSPADM

## 2020-03-04 RX ADMIN — CARVEDILOL 12.5 MG: 12.5 TABLET, FILM COATED ORAL at 20:32

## 2020-03-04 RX ADMIN — GABAPENTIN 300 MG: 300 CAPSULE ORAL at 13:52

## 2020-03-04 RX ADMIN — GABAPENTIN 300 MG: 300 CAPSULE ORAL at 20:32

## 2020-03-04 RX ADMIN — SIMETHICONE CHEW TAB 80 MG 80 MG: 80 TABLET ORAL at 17:07

## 2020-03-04 RX ADMIN — SUCRALFATE 1 G: 1 SUSPENSION ORAL at 13:54

## 2020-03-04 RX ADMIN — ENOXAPARIN SODIUM 40 MG: 40 INJECTION SUBCUTANEOUS at 20:31

## 2020-03-04 RX ADMIN — CLOPIDOGREL 75 MG: 75 TABLET, FILM COATED ORAL at 08:17

## 2020-03-04 RX ADMIN — SUCRALFATE 1 G: 1 SUSPENSION ORAL at 06:39

## 2020-03-04 RX ADMIN — MODAFINIL 200 MG: 100 TABLET ORAL at 20:31

## 2020-03-04 RX ADMIN — GABAPENTIN 300 MG: 300 CAPSULE ORAL at 08:17

## 2020-03-04 RX ADMIN — POTASSIUM CHLORIDE 40 MEQ: 750 CAPSULE, EXTENDED RELEASE ORAL at 17:34

## 2020-03-04 RX ADMIN — FUROSEMIDE 40 MG: 40 TABLET ORAL at 08:17

## 2020-03-04 RX ADMIN — PRAMIPEXOLE DIHYDROCHLORIDE 0.12 MG: 0.25 TABLET ORAL at 20:32

## 2020-03-04 RX ADMIN — IBUPROFEN 800 MG: 800 TABLET, FILM COATED ORAL at 17:08

## 2020-03-04 RX ADMIN — FLUOXETINE HYDROCHLORIDE 20 MG: 20 CAPSULE ORAL at 20:31

## 2020-03-04 RX ADMIN — ATORVASTATIN CALCIUM 40 MG: 20 TABLET, FILM COATED ORAL at 20:31

## 2020-03-04 RX ADMIN — IBUPROFEN 800 MG: 800 TABLET, FILM COATED ORAL at 08:16

## 2020-03-04 RX ADMIN — FLUOXETINE HYDROCHLORIDE 20 MG: 20 CAPSULE ORAL at 17:08

## 2020-03-04 RX ADMIN — AMOXICILLIN AND CLAVULANATE POTASSIUM 1 TABLET: 875; 125 TABLET, FILM COATED ORAL at 20:31

## 2020-03-04 RX ADMIN — CLONAZEPAM 1 MG: 0.5 TABLET ORAL at 20:31

## 2020-03-04 RX ADMIN — ALUMINUM HYDROXIDE, MAGNESIUM HYDROXIDE, AND DIMETHICONE 15 ML: 400; 400; 40 SUSPENSION ORAL at 03:01

## 2020-03-04 RX ADMIN — FLUOXETINE HYDROCHLORIDE 20 MG: 20 CAPSULE ORAL at 08:17

## 2020-03-04 RX ADMIN — POTASSIUM CHLORIDE 40 MEQ: 750 CAPSULE, EXTENDED RELEASE ORAL at 20:38

## 2020-03-04 RX ADMIN — SUCRALFATE 1 G: 1 SUSPENSION ORAL at 20:32

## 2020-03-04 RX ADMIN — ASPIRIN 81 MG: 81 TABLET, COATED ORAL at 08:16

## 2020-03-04 RX ADMIN — CARVEDILOL 12.5 MG: 12.5 TABLET, FILM COATED ORAL at 09:26

## 2020-03-04 RX ADMIN — POTASSIUM CHLORIDE 40 MEQ: 750 CAPSULE, EXTENDED RELEASE ORAL at 13:53

## 2020-03-04 RX ADMIN — SIMETHICONE CHEW TAB 80 MG 80 MG: 80 TABLET ORAL at 08:15

## 2020-03-04 RX ADMIN — MODAFINIL 200 MG: 100 TABLET ORAL at 13:52

## 2020-03-04 RX ADMIN — AMOXICILLIN AND CLAVULANATE POTASSIUM 1 TABLET: 875; 125 TABLET, FILM COATED ORAL at 08:15

## 2020-03-04 RX ADMIN — SUCRALFATE 1 G: 1 SUSPENSION ORAL at 17:07

## 2020-03-04 RX ADMIN — GABAPENTIN 300 MG: 300 CAPSULE ORAL at 17:08

## 2020-03-04 RX ADMIN — LOSARTAN POTASSIUM 25 MG: 25 TABLET, FILM COATED ORAL at 08:16

## 2020-03-04 RX ADMIN — TRAMADOL HYDROCHLORIDE 50 MG: 50 TABLET, FILM COATED ORAL at 09:27

## 2020-03-04 RX ADMIN — CLONAZEPAM 1 MG: 0.5 TABLET ORAL at 08:15

## 2020-03-04 RX ADMIN — PANTOPRAZOLE SODIUM 40 MG: 40 TABLET, DELAYED RELEASE ORAL at 06:38

## 2020-03-04 RX ADMIN — IBUPROFEN 800 MG: 800 TABLET, FILM COATED ORAL at 20:31

## 2020-03-04 RX ADMIN — POTASSIUM CHLORIDE 20 MEQ: 10 CAPSULE, COATED, EXTENDED RELEASE ORAL at 08:24

## 2020-03-04 NOTE — PROGRESS NOTES
[facility]  Inpatient Progress Note    Reason for Follow Up: Anemia, abdominal pain, heme-positive stool    Subjective     Interval History: Patient had episode of dizziness: Currently in bed and improved.  No GI related complaints.  H&H is stable.  Advised to follow-up in outpatient setting to obtain capsule enteroscopy.    Current Facility-Administered Medications:   •  acetaminophen (TYLENOL) tablet 650 mg, 650 mg, Oral, Q4H PRN, Krystal Sarabia MD, 650 mg at 03/03/20 1525  •  aluminum-magnesium hydroxide-simethicone (MAALOX MAX) 400-400-40 MG/5ML suspension 15 mL, 15 mL, Oral, 4x Daily PRN, Krystal Sarabia MD, 15 mL at 03/04/20 0301  •  amoxicillin-clavulanate (AUGMENTIN) 875-125 MG per tablet 1 tablet, 1 tablet, Oral, Q12H, Mark Lane MD, 1 tablet at 03/04/20 0815  •  aspirin EC tablet 81 mg, 81 mg, Oral, Daily, Krystal Sarabia MD, 81 mg at 03/04/20 0816  •  atorvastatin (LIPITOR) tablet 40 mg, 40 mg, Oral, Nightly, Krystal Sarabia MD, 40 mg at 03/03/20 2150  •  senna-docusate sodium (SENOKOT-S) 8.6-50 MG tablet 2 tablet, 2 tablet, Oral, BID PRN **AND** polyethylene glycol 3350 powder (packet), 17 g, Oral, Daily PRN **AND** bisacodyl (DULCOLAX) EC tablet 5 mg, 5 mg, Oral, Daily PRN **AND** bisacodyl (DULCOLAX) suppository 10 mg, 10 mg, Rectal, Daily PRN, Marlon Zamudio MD  •  calcium carbonate (TUMS) chewable tablet 500 mg (200 mg elemental), 1 tablet, Oral, 4x Daily PRN, Krystal Sarabia MD, 1 tablet at 02/28/20 1228  •  carvedilol (COREG) tablet 12.5 mg, 12.5 mg, Oral, Q12H, Marlon Zamudio MD, 12.5 mg at 03/04/20 0926  •  clonazePAM (KlonoPIN) tablet 1 mg, 1 mg, Oral, Q12H, Krystal Sarabia MD, 1 mg at 03/04/20 0815  •  [DISCONTINUED] clopidogrel (PLAVIX) tablet 600 mg, 600 mg, Oral, Once **AND** clopidogrel (PLAVIX) tablet 75 mg, 75 mg, Oral, Daily, Krystal Sarabia MD, 75 mg at 03/04/20 0817  •  enoxaparin (LOVENOX) syringe 40 mg, 40 mg, Subcutaneous,  Nightly, Krystal Sarabia MD, 40 mg at 03/03/20 2149  •  FLUoxetine (PROzac) capsule 20 mg, 20 mg, Oral, TID, Krystal Sarabia MD, 20 mg at 03/04/20 0817  •  furosemide (LASIX) tablet 40 mg, 40 mg, Oral, Daily, Marlon Zamudio MD, 40 mg at 03/04/20 0817  •  gabapentin (NEURONTIN) capsule 300 mg, 300 mg, Oral, 4x Daily, Krystal Sarabia MD, 300 mg at 03/04/20 0817  •  ibuprofen (ADVIL,MOTRIN) tablet 800 mg, 800 mg, Oral, TID, Katherine Solis APRN, 800 mg at 03/04/20 0816  •  Lidocaine Viscous HCl (XYLOCAINE) 2 % mouth solution 15 mL, 15 mL, Mouth/Throat, 4x Daily PRN, Krystal Sarabia MD, 15 mL at 02/27/20 0108  •  losartan (COZAAR) tablet 25 mg, 25 mg, Oral, Q24H, Krystal Sarabia MD, 25 mg at 03/04/20 0816  •  modafinil (PROVIGIL) tablet 200 mg, 200 mg, Oral, BID, Krystal Sarabia MD, 200 mg at 03/03/20 2153  •  nitroglycerin (NITROSTAT) SL tablet 0.4 mg, 0.4 mg, Sublingual, Q5 Min PRN, Krystal Sarabia MD, 0.4 mg at 02/28/20 1208  •  ondansetron (ZOFRAN) injection 4 mg, 4 mg, Intravenous, Q6H PRN, Krystal Sarabia MD, 4 mg at 03/01/20 0436  •  ondansetron (ZOFRAN) tablet 4 mg, 4 mg, Oral, Q6H PRN, Krystal Sarabia MD  •  pantoprazole (PROTONIX) EC tablet 40 mg, 40 mg, Oral, Q AM, Krystal Sarabia MD, 40 mg at 03/04/20 0638  •  potassium chloride (KLOR-CON) packet 40 mEq, 40 mEq, Oral, PRN, Marlon Zamudio MD  •  potassium chloride (MICRO-K) CR capsule 20 mEq, 20 mEq, Oral, Daily, Marlon Zamudio MD, 20 mEq at 03/04/20 0824  •  potassium chloride (MICRO-K) CR capsule 40 mEq, 40 mEq, Oral, PRN, Marlon Zamudio MD, 40 mEq at 03/03/20 1620  •  pramipexole (MIRAPEX) tablet 0.125 mg, 0.125 mg, Oral, Nightly, Krystal Sarabia MD, 0.125 mg at 03/03/20 2150  •  simethicone (MYLICON) chewable tablet 80 mg, 80 mg, Oral, TID, Krystal Sarabia MD, 80 mg at 03/04/20 0815  •  sucralfate (CARAFATE) 1 GM/10ML suspension 1 g, 1 g, Oral, 4x Daily AC & at Bedtime, Krystal Sarabia MD, 1 g at 03/04/20 0639  •   traMADol (ULTRAM) tablet 50 mg, 50 mg, Oral, Q6H PRN, Krystal Sarabia MD, 50 mg at 03/04/20 0927  •  zolpidem (AMBIEN) tablet 2.5 mg, 2.5 mg, Oral, Nightly PRN, Krystal Sarabia MD, 2.5 mg at 02/24/20 2350  Review of Systems:    All systems were reviewed and negative except for:  Gastrointestinal: positive for  See HPI    Objective     Vital Signs  Temp:  [97.6 °F (36.4 °C)-98.2 °F (36.8 °C)] 98.2 °F (36.8 °C)  Heart Rate:  [85-97] 97  Resp:  [20] 20  BP: (119-150)/() 150/98  Body mass index is 33.2 kg/m².    Intake/Output Summary (Last 24 hours) at 3/4/2020 1007  Last data filed at 3/3/2020 1807  Gross per 24 hour   Intake 480 ml   Output --   Net 480 ml     No intake/output data recorded.     Physical Exam:   General: patient awake, alert and cooperative   Eyes: Normal lids and lashes, no scleral icterus   Neck: supple, normal ROM   Skin: warm and dry, not jaundiced   Cardiovascular: regular rhythm and rate, no murmurs auscultated   Pulm: clear to auscultation bilaterally, regular and unlabored   Abdomen: soft, nontender, nondistended; normal bowel sounds   Extremities: no rash or edema   Psychiatric: Normal mood and behavior; memory intact     Results Review:     I reviewed the patient's new clinical results.    Results from last 7 days   Lab Units 03/03/20  0802 03/01/20  0745 02/29/20  0630   WBC 10*3/mm3 7.84 10.29 7.12   HEMOGLOBIN g/dL 9.6* 9.6* 9.6*   HEMATOCRIT % 28.3* 29.2* 28.4*   PLATELETS 10*3/mm3 340 285 199     Results from last 7 days   Lab Units 03/03/20  0802 03/01/20  0745 02/29/20  0630 02/27/20  0412   SODIUM mmol/L 139 135* 138 137   POTASSIUM mmol/L 2.7* 3.7 3.7 3.7   CHLORIDE mmol/L 97* 97* 100 104   CO2 mmol/L 28.1 18.2* 22.3 21.8*   BUN mg/dL 6* 8 7* 10   CREATININE mg/dL 0.51* 0.55* 0.54* 0.67   CALCIUM mg/dL 9.2 9.5 9.1 9.5   BILIRUBIN mg/dL  --  0.7 0.6 0.5   ALK PHOS U/L  --  62 60 61   ALT (SGPT) U/L  --  17 20 31   AST (SGOT) U/L  --  25 33* 92*   GLUCOSE mg/dL 106* 122* 100*  88         Lab Results   Lab Value Date/Time    LIPASE 44 02/29/2020 0630    LIPASE 108 (H) 02/26/2020 0357       Radiology:  XR Chest PA & Lateral   Final Result      CT Abdomen Pelvis With Contrast   Final Result   Small amount of nonspecific pelvic free fluid without   loculated collection or acute inflammatory process identified.                           This report was finalized on 3/1/2020 11:33 PM by Soham Schmidt M.D.          CT Angiogram Chest With & Without Contrast   Final Result   1. Upper lobe predominant interstitial and ground glass opacities   concerning for pneumonia, no pulmonary embolism.   2. Large bilateral pleural effusions, small pericardial effusion.       This report was finalized on 3/1/2020 11:33 PM by Soham Schmidt M.D.          XR Chest 1 View   Final Result   Adverse interval change. Diffuse bilateral infiltrates,   follow-up recommended. Borderline heart size with pulmonary vascular   congestion.       This report was finalized on 3/1/2020 8:13 AM by Dr. Veto Khoury M.D.          XR Chest 1 View   Final Result   Under aeration with very mild left lung base atelectasis and   small effusions.           This report was finalized on 2/26/2020 5:52 AM by Soham Schmidt M.D.          CT Abdomen Pelvis With Contrast   Final Result           1. No acute inflammatory process of bowel is identified, follow up as   indications persist.   2. No obstructive uropathy. Indeterminate periurethral density, as   described above, follow-up evaluation suggested.    3. Minimal pleural effusions. Septal thickening at the lung bases   suggest mild edema, although nonspecific, follow-up recommended. Trace   perisplenic ascites.       This report was finalized on 2/25/2020 5:55 PM by Dr. Veto Khoury M.D.              Assessment/Plan     Patient Active Problem List   Diagnosis   • STEMI involving right coronary artery (CMS/HCC)   • Acute ST elevation myocardial infarction (STEMI) due to  occlusion of right coronary artery (CMS/HCC)   • Multiple sclerosis (CMS/HCC)   • Left sided abdominal pain   • Dyspepsia   • Heme positive stool       Assessment:  1. Abdominal pain: Resolving  2. Anemia: Negative upper and lower endoscopies  3. STEMI  4. GERD      Plan:  · Continue current medical regimen  · Capsule enteroscopy in outpatient setting  · Will sign off but available as needed  I discussed the patients findings and my recommendations with patient.    Drew Pratt MD

## 2020-03-04 NOTE — PROGRESS NOTES
"  Daily Progress Note.   [address]  3/4/2020    Patient:  Name:  Falguni Minaya  MRN:  [id number]  1960  60 y.o.  female         Chief Complaint: Sore throat, restlessness, chest discomfort     Hospital course: Came in with lower lobe infiltrate, questionable sepsis, with hypoxemia had a STEMI status post mid RCA stent on 2/24/2020.  Has some epigastric pain with nausea and vomiting was started on Zosyn but her white count has been normal and her fever is only low-grade, and Zosyn was discontinued and she seems to be holding on without the need for the antibiotic.  She is restless, she has restless leg syndrome on gabapentin, she used to be on clonazepam which has been on hold.  She will be further evaluated by GI, she has some medication adjustment with Carafate and should be scoped soon.     Interval History:   Doing much better less short of breath.  She is weaned down to 1 L.  No hemoptysis no chest pressure no chest pain.  No emesis.  Not big fan of food.  But eating.  And tolerating diet.  ROS: No fever, no diarrhea, no chest pain  PMFSSH: no change    Physical Exam:  /99 (BP Location: Left arm, Patient Position: Lying)   Pulse 93   Temp 98.2 °F (36.8 °C)   Resp 20   Ht 152.4 cm (60\")   Wt 77.1 kg (169 lb 15.6 oz)   SpO2 (!) 86%   BMI 33.20 kg/m²   Body mass index is 33.2 kg/m².    Intake/Output Summary (Last 24 hours) at 3/4/2020 1458  Last data filed at 3/3/2020 1807  Gross per 24 hour   Intake 240 ml   Output --   Net 240 ml   1 L L nasal cannula  General appearance:non Toxic , conversant   Eyes: anicteric sclerae, moist conjunctivae; no lid-lag; PERRLA  HENT: Atraumatic; oropharynx clear with moist mucous membranes and no mucosal ulcerations; normal hard and soft palate  Neck: Trachea midline; FROM, supple, no thyromegaly or lymphadenopathy  Lungs: no sig crackles at bases, with normal respiratory effort and no intercostal retractions  CV: RRR, no MRGs   Abdomen: " Soft, non-tender; no masses or HSM  Extremities: No peripheral edema or extremity lymphadenopathy  Skin: Normal temperature, turgor and texture; no rash, ulcers or subcutaneous nodules  Psych: Calm affect, alert and oriented to person, place and time    Data Review:  Notable Labs:  Results from last 7 days   Lab Units 03/03/20  0802 03/01/20  0745 02/29/20  0630 02/28/20  0508   WBC 10*3/mm3 7.84 10.29 7.12 6.63   HEMOGLOBIN g/dL 9.6* 9.6* 9.6* 9.6*   PLATELETS 10*3/mm3 340 285 199 175     Results from last 7 days   Lab Units 03/04/20  1102 03/03/20  0802 03/01/20  0745 02/29/20  0630 02/27/20  0412   SODIUM mmol/L  --  139 135* 138 137   POTASSIUM mmol/L 2.7* 2.7* 3.7 3.7 3.7   CHLORIDE mmol/L  --  97* 97* 100 104   CO2 mmol/L  --  28.1 18.2* 22.3 21.8*   BUN mg/dL  --  6* 8 7* 10   CREATININE mg/dL  --  0.51* 0.55* 0.54* 0.67   GLUCOSE mg/dL  --  106* 122* 100* 88   CALCIUM mg/dL  --  9.2 9.5 9.1 9.5   Estimated Creatinine Clearance: 106.2 mL/min (A) (by C-G formula based on SCr of 0.51 mg/dL (L)).    Imaging:  Reviewed chest images personally from past 3 days    Scheduled meds:      amoxicillin-clavulanate 1 tablet Oral Q12H   aspirin 81 mg Oral Daily   atorvastatin 40 mg Oral Nightly   carvedilol 12.5 mg Oral Q12H   clonazePAM 1 mg Oral Q12H   clopidogrel 75 mg Oral Daily   enoxaparin 40 mg Subcutaneous Nightly   FLUoxetine 20 mg Oral TID   furosemide 40 mg Oral Daily   gabapentin 300 mg Oral 4x Daily   ibuprofen 800 mg Oral TID   losartan 25 mg Oral Q24H   modafinil 200 mg Oral BID   pantoprazole 40 mg Oral Q AM   potassium chloride 20 mEq Oral Daily   pramipexole 0.125 mg Oral Nightly   simethicone 80 mg Oral TID   sucralfate 1 g Oral 4x Daily AC & at Bedtime       ASSESSMENT  /  PLAN:  1. Suspected sepsis improved  2. Bilateral pulmonary edema improved  3. Acute hypoxemia, doing better currently down to room air  4. Trace bilateral pleural effusion  5. Anion gap metabolic acidosis with diarrhea  6. ST  elevation MI status post mid RCA stent on 2/24/2020  7. Epigastric pain, chronic  8. Nausea and vomiting, managed by GI  9. Aspiration pneumonia on Augmentin     Bilateral pulm edema improving wth diuretics  Cont abx augmentin 10 days  Suggest follow up imaging by pcp in 6-8 weeks  Weaned oxygen to 1lnc    Mark Lane MD  [facility]  03/04/20  657p

## 2020-03-04 NOTE — PLAN OF CARE
Problem: Patient Care Overview  Goal: Plan of Care Review  Outcome: Ongoing (interventions implemented as appropriate)  Flowsheets (Taken 3/4/2020 1802)  Plan of Care Reviewed With: patient  Outcome Summary: pt agreeable to amb w/slight incr in dist, but c/o fatigues quickly-had to encourage to sit in chair, plans [place]

## 2020-03-04 NOTE — PLAN OF CARE
A&O x 4, 2L NC, up to chair today, ambulated tio PT, potassium replacement , probable discharge tomorrow.

## 2020-03-04 NOTE — THERAPY TREATMENT NOTE
Acute Care - Physical Therapy Treatment Note  [facility]     Patient Name: Falguni Minaya  : 1960  MRN: [id number]  Today's Date: 3/4/2020             Admit Date: 2020    Visit Dx:    ICD-10-CM ICD-9-CM   1. STEMI involving right coronary artery (CMS/HCC) I21.11 410.31   2. Left sided abdominal pain R10.9 789.09   3. Dyspepsia R10.13 536.8   4. Heme positive stool R19.5 792.1     Patient Active Problem List   Diagnosis   • STEMI involving right coronary artery (CMS/HCC)   • Acute ST elevation myocardial infarction (STEMI) due to occlusion of right coronary artery (CMS/HCC)   • Multiple sclerosis (CMS/HCC)   • Left sided abdominal pain   • Dyspepsia   • Heme positive stool       Therapy Treatment    Rehabilitation Treatment Summary     Row Name 20 629             Treatment Time/Intention    Discipline  physical therapy assistant  -      Document Type  therapy note (daily note)  -      Subjective Information  complains of;weakness;fatigue  -      Mode of Treatment  individual therapy;physical therapy  -      Care Plan Review  patient/other agree to care plan  -2      Comment  needs extra time  -2      Existing Precautions/Restrictions  fall;oxygen therapy device and L/min  -3      Treatment Considerations/Comments  still noted tremors, but less than yesterday  -2      Recorded by [] Goldie Arita, LINDA 20 1431  [JM2] Goldie Arita, LINDA 20 1802  [JM3] Goldie Arita, LINDA 20 1757      Row Name 20 133             Bed Mobility Assessment/Treatment    Supine-Sit [place] (Bed Mobility)  verbal cues;contact guard  -      Bed Mobility, Safety Issues  decreased use of arms for pushing/pulling  -      Assistive Device (Bed Mobility)  bed rails  -      Recorded by [] Goldie Arita PTA 20 1802      Row Name 20             Sit-Stand Transfer    Sit-Stand [place] (Transfers)  2 person assist;minimum assist (75% patient  effort);verbal cues  -      Assistive Device (Sit-Stand Transfers)  walker, front-wheeled  -      Recorded by [] Goldie Arita PTA 03/04/20 1802      Row Name 03/04/20 1339             Gait/Stairs Assessment/Training    [place] Level (Gait)  2 person assist;contact guard;verbal cues  -      Assistive Device (Gait)  walker, front-wheeled  -      Distance in Feet (Gait)  65 last 10 ft , A of 1-req min /mod assist as diff wt shift  -2      Deviations/Abnormal Patterns (Gait)  justin decreased;stride length decreased  -      Bilateral Gait Deviations  forward flexed posture;weight shift ability decreased  -      Comment (Gait/Stairs)  improved step length w/cues  -      Recorded by [] Goldie Arita PTA 03/04/20 1802  [2] Goldie Arita PTA 03/04/20 1804      Row Name 03/04/20 1339             Therapeutic Exercise    Lower Extremity (Therapeutic Exercise)  LAQ (long arc quad), bilateral;marching while seated  -      Lower Extremity Range of Motion (Therapeutic Exercise)  ankle dorsiflexion/plantar flexion, bilateral  -      Sets/Reps (Therapeutic Exercise)  10 reps  -      Recorded by [] Goldie Arita PTA 03/04/20 1802      Row Name 03/04/20 1339             Positioning and Restraints    Pre-Treatment Position  in bed  -      In Chair  reclined;call light within reach;encouraged to call for assist;notified nsg no alarm box, strip under pt-nsg aware 2nd day  -      Recorded by [] Goldie Arita PTA 03/04/20 1802      Row Name 03/04/20 1339             Pain Scale: Numbers Pre/Post-Treatment    Pain Scale: Numbers, Pretreatment  0/10 - no pain  -      Recorded by [] Goldie Arita PTA 03/04/20 1802        User Key  (r) = Recorded By, (t) = Taken By, (c) = Cosigned By    Initials Name Effective Dates Discipline    Goldie Arambula PTA 03/07/18 -  PT                       PT Recommendation and Plan     Plan of Care Reviewed With: patient  Outcome Summary:  pt agreeable to amb w/slight incr in dist, but c/o fatigues quickly-had to encourage to sit in chair, plans SN[facility] at [place]  Outcome Measures     Row Name 03/04/20 1600 03/03/20 1525          How much help from another person do you currently need...    Turning from your back to your side while in flat bed without using bedrails?  3  -JM  --     Moving from lying on back to sitting on the side of a flat bed without bedrails?  3  -JM  --     Moving to and from a bed to a chair (including a wheelchair)?  3  -JM  --     Standing up from a chair using your arms (e.g., wheelchair, bedside chair)?  3  -JM  --     Climbing 3-5 steps with a railing?  1  -JM  --     To walk in hospital room?  3  -JM  --     AM-PAC 6 Clicks Score (PT)  16  -JM  --        How much help from another is currently needed...    Putting on and taking off regular lower body clothing?  --  2  -SG     Bathing (including washing, rinsing, and drying)  --  2  -SG     Toileting (which includes using toilet bed pan or urinal)  --  2  -SG     Putting on and taking off regular upper body clothing  --  3  -SG     Taking care of personal grooming (such as brushing teeth)  --  3  -SG     Eating meals  --  3  -SG     AM-PAC 6 Clicks Score (OT)  --  15  -SG        Functional Assessment    Outcome Measure Options  --  AM-PAC 6 Clicks Daily Activity (OT)  -       User Key  (r) = Recorded By, (t) = Taken By, (c) = Cosigned By    Initials Name Provider Type    Ally Steven OTR Occupational Therapist    Goldie Arambula PTA Physical Therapy Assistant         Time Calculation:   PT Charges     Row Name 03/04/20 1431             Time Calculation    Start Time  1335  -      Stop Time  1401  -DORA      Time Calculation (min)  26 min  -DORA      PT Received On  03/04/20  -DORA      PT - Next Appointment  03/05/20  -DORA        User Key  (r) = Recorded By, (t) = Taken By, (c) = Cosigned By    Initials Name Provider Type    Goldie Arambula PTA Physical Therapy  Assistant        Therapy Charges for Today     Code Description Service Date Service Provider Modifiers Qty    97747411126 HC PT THER PROC EA 15 MIN 3/4/2020 Goldie Arita, LINDA GP 2    35706970858 HC PT THER SUPP EA 15 MIN 3/4/2020 Goldie Arita PTA GP 2          PT G-Codes  Outcome Measure Options: AM-PAC 6 Clicks Daily Activity (OT)  AM-PAC 6 Clicks Score (PT): 16  AM-PAC 6 Clicks Score (OT): 15    Goldie Arita PTA  3/4/2020

## 2020-03-04 NOTE — PROGRESS NOTES
Continued Stay Note  Psychiatric     Patient Name: Falguni Minaya  MRN: [id number]  Today's Date: 3/4/2020    Admit Date: 2/24/2020    Discharge Plan     Row Name 03/04/20 0820       Plan    Plan  Plan is [facility] PENDING [place] precert.    Plan Comments  CCP spoke with Goldie/ [facility] who states that precert has been initiated with [place] which is showing as primary in their system.  [facility] confirmed w/ [facility] cashiers office that Medicare A is primary.  Will discuss again with [facility].  Await acceptance at this point.         Discharge Codes    No documentation.             Ally Mckeon RN

## 2020-03-05 ENCOUNTER — APPOINTMENT (OUTPATIENT)
Dept: GENERAL RADIOLOGY | Facility: HOSPITAL | Age: 60
End: 2020-03-05

## 2020-03-05 LAB — POTASSIUM BLD-SCNC: 4 MMOL/L (ref 3.5–5.2)

## 2020-03-05 PROCEDURE — 84132 ASSAY OF SERUM POTASSIUM: CPT | Performed by: INTERNAL MEDICINE

## 2020-03-05 PROCEDURE — 97110 THERAPEUTIC EXERCISES: CPT

## 2020-03-05 PROCEDURE — 25010000002 FUROSEMIDE PER 20 MG: Performed by: INTERNAL MEDICINE

## 2020-03-05 PROCEDURE — 25010000002 ENOXAPARIN PER 10 MG: Performed by: INTERNAL MEDICINE

## 2020-03-05 PROCEDURE — 71045 X-RAY EXAM CHEST 1 VIEW: CPT

## 2020-03-05 PROCEDURE — 99233 SBSQ HOSP IP/OBS HIGH 50: CPT | Performed by: INTERNAL MEDICINE

## 2020-03-05 RX ORDER — FUROSEMIDE 10 MG/ML
40 INJECTION INTRAMUSCULAR; INTRAVENOUS EVERY 12 HOURS
Status: DISCONTINUED | OUTPATIENT
Start: 2020-03-05 | End: 2020-03-05

## 2020-03-05 RX ORDER — FUROSEMIDE 10 MG/ML
40 INJECTION INTRAMUSCULAR; INTRAVENOUS
Status: DISCONTINUED | OUTPATIENT
Start: 2020-03-05 | End: 2020-03-06

## 2020-03-05 RX ADMIN — IBUPROFEN 800 MG: 800 TABLET, FILM COATED ORAL at 08:13

## 2020-03-05 RX ADMIN — PANTOPRAZOLE SODIUM 40 MG: 40 TABLET, DELAYED RELEASE ORAL at 06:30

## 2020-03-05 RX ADMIN — IBUPROFEN 800 MG: 800 TABLET, FILM COATED ORAL at 17:06

## 2020-03-05 RX ADMIN — FUROSEMIDE 40 MG: 10 INJECTION, SOLUTION INTRAMUSCULAR; INTRAVENOUS at 11:42

## 2020-03-05 RX ADMIN — ENOXAPARIN SODIUM 40 MG: 40 INJECTION SUBCUTANEOUS at 20:15

## 2020-03-05 RX ADMIN — SUCRALFATE 1 G: 1 SUSPENSION ORAL at 06:30

## 2020-03-05 RX ADMIN — GABAPENTIN 300 MG: 300 CAPSULE ORAL at 17:07

## 2020-03-05 RX ADMIN — CARVEDILOL 12.5 MG: 12.5 TABLET, FILM COATED ORAL at 08:13

## 2020-03-05 RX ADMIN — FUROSEMIDE 40 MG: 40 TABLET ORAL at 08:13

## 2020-03-05 RX ADMIN — SUCRALFATE 1 G: 1 SUSPENSION ORAL at 17:06

## 2020-03-05 RX ADMIN — AMOXICILLIN AND CLAVULANATE POTASSIUM 1 TABLET: 875; 125 TABLET, FILM COATED ORAL at 11:41

## 2020-03-05 RX ADMIN — POTASSIUM CHLORIDE 20 MEQ: 10 CAPSULE, COATED, EXTENDED RELEASE ORAL at 08:12

## 2020-03-05 RX ADMIN — ATORVASTATIN CALCIUM 40 MG: 20 TABLET, FILM COATED ORAL at 20:17

## 2020-03-05 RX ADMIN — GABAPENTIN 300 MG: 300 CAPSULE ORAL at 11:42

## 2020-03-05 RX ADMIN — LOSARTAN POTASSIUM 25 MG: 25 TABLET, FILM COATED ORAL at 08:13

## 2020-03-05 RX ADMIN — FUROSEMIDE 40 MG: 10 INJECTION, SOLUTION INTRAMUSCULAR; INTRAVENOUS at 17:07

## 2020-03-05 RX ADMIN — SIMETHICONE CHEW TAB 80 MG 80 MG: 80 TABLET ORAL at 08:12

## 2020-03-05 RX ADMIN — CARVEDILOL 12.5 MG: 12.5 TABLET, FILM COATED ORAL at 20:18

## 2020-03-05 RX ADMIN — IBUPROFEN 800 MG: 800 TABLET, FILM COATED ORAL at 20:16

## 2020-03-05 RX ADMIN — SIMETHICONE CHEW TAB 80 MG 80 MG: 80 TABLET ORAL at 17:07

## 2020-03-05 RX ADMIN — CLONAZEPAM 1 MG: 0.5 TABLET ORAL at 20:16

## 2020-03-05 RX ADMIN — FLUOXETINE HYDROCHLORIDE 20 MG: 20 CAPSULE ORAL at 17:06

## 2020-03-05 RX ADMIN — FLUOXETINE HYDROCHLORIDE 20 MG: 20 CAPSULE ORAL at 20:16

## 2020-03-05 RX ADMIN — CLOPIDOGREL 75 MG: 75 TABLET, FILM COATED ORAL at 08:14

## 2020-03-05 RX ADMIN — SUCRALFATE 1 G: 1 SUSPENSION ORAL at 20:15

## 2020-03-05 RX ADMIN — CLONAZEPAM 1 MG: 0.5 TABLET ORAL at 08:12

## 2020-03-05 RX ADMIN — MODAFINIL 200 MG: 100 TABLET ORAL at 08:12

## 2020-03-05 RX ADMIN — GABAPENTIN 300 MG: 300 CAPSULE ORAL at 20:16

## 2020-03-05 RX ADMIN — TRAMADOL HYDROCHLORIDE 50 MG: 50 TABLET, FILM COATED ORAL at 08:26

## 2020-03-05 RX ADMIN — GABAPENTIN 300 MG: 300 CAPSULE ORAL at 08:12

## 2020-03-05 RX ADMIN — ASPIRIN 81 MG: 81 TABLET, COATED ORAL at 08:13

## 2020-03-05 RX ADMIN — PRAMIPEXOLE DIHYDROCHLORIDE 0.12 MG: 0.25 TABLET ORAL at 20:17

## 2020-03-05 RX ADMIN — AMOXICILLIN AND CLAVULANATE POTASSIUM 1 TABLET: 875; 125 TABLET, FILM COATED ORAL at 20:16

## 2020-03-05 RX ADMIN — MODAFINIL 200 MG: 100 TABLET ORAL at 20:15

## 2020-03-05 RX ADMIN — FLUOXETINE HYDROCHLORIDE 20 MG: 20 CAPSULE ORAL at 08:13

## 2020-03-05 RX ADMIN — SUCRALFATE 1 G: 1 SUSPENSION ORAL at 11:42

## 2020-03-05 NOTE — PROGRESS NOTES
"  Daily Progress Note.   [address]  3/5/2020    Patient:  Name:  Falguni Minaya  MRN:  [id number]  1960  60 y.o.  female         Chief Complaint: Sore throat, restlessness, chest discomfort     Hospital course: Came in with lower lobe infiltrate, questionable sepsis, with hypoxemia had a STEMI status post mid RCA stent on 2/24/2020.  Has some epigastric pain with nausea and vomiting was started on Zosyn but her white count has been normal and her fever is only low-grade, and Zosyn was discontinued and she seems to be holding on without the need for the antibiotic.  She is restless, she has restless leg syndrome on gabapentin, she used to be on clonazepam which has been on hold.  She will be further evaluated by GI, she has some medication adjustment with Carafate and should be scoped soon.     Interval History:   Patient is little more short of breath this morning.  She has increased work of breathing.  She is able to carry on conversation and does have conversational dyspnea.  Increase oxygen to 2 to 3 L to keep saturation above 89%.  This is an increase from last night.  She does says she has creased abdominal pain.        ROS: No fever, no diarrhea, no chest pain  PMFSSH: no change    Physical Exam:  /96 (BP Location: Left arm, Patient Position: Lying)   Pulse 99   Temp 97.8 °F (36.6 °C) (Oral)   Resp 18   Ht 152.4 cm (60\")   Wt 77.1 kg (169 lb 15.6 oz)   SpO2 91%   BMI 33.20 kg/m²   Body mass index is 33.2 kg/m².  No intake or output data in the 24 hours ending 03/05/20 34710 L L nasal cannula  General appearance:non Toxic , conversant   Eyes: anicteric sclerae, moist conjunctivae; no lid-lag; PERRLA  HENT: Atraumatic; oropharynx clear with moist mucous membranes and no mucosal ulcerations; normal hard and soft palate  Neck: Trachea midline; FROM, supple, no thyromegaly or lymphadenopathy  Lungs: no wheeze +crackles with increasedrespiratory effort and intercostal " retractions  CV: RRR, no MRGs   Abdomen: Soft, non-tender; no masses or HSM  Extremities: No peripheral edema or extremity lymphadenopathy  Skin: Normal temperature, turgor and texture; no rash, ulcers or subcutaneous nodules  Psych: Calm affect, alert and oriented to person, place and time    Data Review:  Notable Labs:  Results from last 7 days   Lab Units 03/03/20  0802 03/01/20  0745 02/29/20  0630 02/28/20  0508   WBC 10*3/mm3 7.84 10.29 7.12 6.63   HEMOGLOBIN g/dL 9.6* 9.6* 9.6* 9.6*   PLATELETS 10*3/mm3 340 285 199 175     Results from last 7 days   Lab Units 03/05/20  0457 03/04/20  1102 03/03/20  0802 03/01/20  0745 02/29/20  0630   SODIUM mmol/L  --   --  139 135* 138   POTASSIUM mmol/L 4.0 2.7* 2.7* 3.7 3.7   CHLORIDE mmol/L  --   --  97* 97* 100   CO2 mmol/L  --   --  28.1 18.2* 22.3   BUN mg/dL  --   --  6* 8 7*   CREATININE mg/dL  --   --  0.51* 0.55* 0.54*   GLUCOSE mg/dL  --   --  106* 122* 100*   CALCIUM mg/dL  --   --  9.2 9.5 9.1   Estimated Creatinine Clearance: 106.2 mL/min (A) (by C-G formula based on SCr of 0.51 mg/dL (L)).    Imaging:  Reviewed chest images personally from past 3 days    Scheduled meds:      amoxicillin-clavulanate 1 tablet Oral Q12H   aspirin 81 mg Oral Daily   atorvastatin 40 mg Oral Nightly   carvedilol 12.5 mg Oral Q12H   clonazePAM 1 mg Oral Q12H   clopidogrel 75 mg Oral Daily   enoxaparin 40 mg Subcutaneous Nightly   FLUoxetine 20 mg Oral TID   furosemide 40 mg Oral Daily   gabapentin 300 mg Oral 4x Daily   ibuprofen 800 mg Oral TID   losartan 25 mg Oral Q24H   modafinil 200 mg Oral BID   pantoprazole 40 mg Oral Q AM   potassium chloride 20 mEq Oral Daily   pramipexole 0.125 mg Oral Nightly   simethicone 80 mg Oral TID   sucralfate 1 g Oral 4x Daily AC & at Bedtime       ASSESSMENT  /  PLAN:  1. Suspected sepsis improved  2. Bilateral pulmonary edema improved  3. Acute hypoxemia, doing better currently down to room air  4. Trace bilateral pleural effusion  5. Anion gap  metabolic acidosis with diarrhea  6. ST elevation MI status post mid RCA stent on 2/24/2020  7. Epigastric pain, chronic  8. Nausea and vomiting, managed by GI  9. Aspiration pneumonia on Augmentin     She looks little worse today.  We can order a chest x-ray this morning.  She may need an additional dose of Lasix.  Her oxygen requirements have gone up since last night she will extend a little bit more respiratory distress this morning as well.  She is in some pain and and I have discussed with the nurse.  She does have pain medication that she can take as needed we will go ahead and give dose for her this morning.      Cont abx augmentin 10 days      Mark Lane MD  [facility]  03/05/20  831a      Reviewed cxr worsening bilateral infiltrate.  Lasix 40mg iv bid ordered      Mark Lane MD  [facility]  03/05/20  11:03 AM

## 2020-03-05 NOTE — THERAPY TREATMENT NOTE
Patient Name: Falguni Minaya  : 1960    MRN: [id number]                              Today's Date: 3/5/2020       Admit Date: 2020    Visit Dx:     ICD-10-CM ICD-9-CM   1. STEMI involving right coronary artery (CMS/HCC) I21.11 410.31   2. Left sided abdominal pain R10.9 789.09   3. Dyspepsia R10.13 536.8   4. Heme positive stool R19.5 792.1     Patient Active Problem List   Diagnosis   • STEMI involving right coronary artery (CMS/HCC)   • Acute ST elevation myocardial infarction (STEMI) due to occlusion of right coronary artery (CMS/HCC)   • Multiple sclerosis (CMS/HCC)   • Left sided abdominal pain   • Dyspepsia   • Heme positive stool     Past Medical History:   Diagnosis Date   • Abdominal hernia    • Coronary artery disease    • GERD (gastroesophageal reflux disease)    • Multiple sclerosis (CMS/HCC)    • Seizures (CMS/HCC)     last seizure      Past Surgical History:   Procedure Laterality Date   • APPENDECTOMY     • CARDIAC CATHETERIZATION     • CARDIAC CATHETERIZATION N/A 2020    Procedure: Left Heart Cath;  Surgeon: Marlon Zamudio MD;  Location: [facility] CATH INVASIVE LOCATION;  Service: Cardiovascular;  Laterality: N/A;   • CARDIAC CATHETERIZATION N/A 2020    Procedure: Stent LATRICIA coronary;  Surgeon: Marlon Zamudio MD;  Location: [facility] CATH INVASIVE LOCATION;  Service: Cardiovascular;  Laterality: N/A;   • CARDIAC CATHETERIZATION  2020    Procedure: Percutaneous Manual Thrombectomy;  Surgeon: Marlon Zamudio MD;  Location: [facility] CATH INVASIVE LOCATION;  Service: Cardiovascular;;   • CARDIAC CATHETERIZATION N/A 2020    Procedure: Coronary angiography;  Surgeon: Marlon Zamudio MD;  Location: [facility] CATH INVASIVE LOCATION;  Service: Cardiovascular;  Laterality: N/A;   • CARDIAC CATHETERIZATION N/A 2020    Procedure: Left ventriculography;  Surgeon: Marlon Zamudio MD;  Location: [facility] CATH INVASIVE LOCATION;  Service: Cardiovascular;   "Laterality: N/A;   • CHOLECYSTECTOMY     • COLONOSCOPY N/A 2/29/2020    Procedure: COLONOSCOPY to  cecum:;  Surgeon: Krystal Sarabia MD;  Location: [facility] ENDOSCOPY;  Service: Gastroenterology;  Laterality: N/A;  pre:  anemia and abd pain  post:  hemorrhoids, tortuous colon   • ENDOSCOPY N/A 2/29/2020    Procedure: ESOPHAGOGASTRODUODENOSCOPY  with biopsies;  Surgeon: Krystal Sarabia MD;  Location: [facility] ENDOSCOPY;  Service: Gastroenterology;  Laterality: N/A;  pre:  anemia and abd pain  post:  mild gastritis,    • HYSTERECTOMY     • TONSILLECTOMY       General Information     Row Name 03/05/20 1605          PT Evaluation Time/Intention    Document Type  therapy note (daily note) Pt. reports feeling \"fine\" this date and is agreeable to work with P.T.   -MS     Mode of Treatment  physical therapy;individual therapy  -MS     Row Name 03/05/20 1605          General Information    Existing Precautions/Restrictions  (S) fall Exit alarm   -MS     Row Name 03/05/20 1605          Cognitive Assessment/Intervention- PT/OT    Orientation Status (Cognition)  oriented to;person;place  -MS     Row Name 03/05/20 1605          Safety Issues, Functional Mobility    Comment, Safety Issues/Impairments (Mobility)  Gait belt used for safety.   -MS       User Key  (r) = Recorded By, (t) = Taken By, (c) = Cosigned By    Initials Name Provider Type    MS Soham Figueroa RENETTA, PT Physical Therapist        Mobility     Row Name 03/05/20 1606          Bed Mobility Assessment/Treatment    Supine-Sit [place] (Bed Mobility)  contact guard  -MS     Sit-Supine [place] (Bed Mobility)  contact guard  -MS     Assistive Device (Bed Mobility)  bed rails  -MS     Row Name 03/05/20 1606          Sit-Stand Transfer    Sit-Stand [place] (Transfers)  contact guard  -MS     Assistive Device (Sit-Stand Transfers)  walker, front-wheeled  -MS     Row Name 03/05/20 1606          Gait/Stairs Assessment/Training    [place] Level (Gait)  contact " guard  -MS     Assistive Device (Gait)  walker, front-wheeled  -MS     Distance in Feet (Gait)  100 feet  -MS     Pattern (Gait)  step-through  -MS     Deviations/Abnormal Patterns (Gait)  justin decreased  -MS     Bilateral Gait Deviations  forward flexed posture  -MS     Comment (Gait/Stairs)  Verbal/tactile cues for posture correction.   -MS       User Key  (r) = Recorded By, (t) = Taken By, (c) = Cosigned By    Initials Name Provider Type    Soham Rivers, PT Physical Therapist        Obj/Interventions     Row Name 03/05/20 1607          Therapeutic Exercise    Comment (Therapeutic Exercise)  BLE ther. ex. program x 10 reps completed (ankle pumps, hip flexion, LAQ's)  -MS       User Key  (r) = Recorded By, (t) = Taken By, (c) = Cosigned By    Initials Name Provider Type    Soham Rivers, PT Physical Therapist        Goals/Plan    No documentation.       Clinical Impression     Row Name 03/05/20 1607          Pain Scale: Numbers Pre/Post-Treatment    Pain Scale: Numbers, Pretreatment  0/10 - no pain  -MS     Pain Scale: Numbers, Post-Treatment  0/10 - no pain  -MS     Row Name 03/05/20 1607          Positioning and Restraints    Pre-Treatment Position  in bed  -MS     Post Treatment Position  bed  -MS     In Bed  notified nsg;supine;call light within reach;encouraged to call for assist;exit alarm on;with family/caregiver All lines intact.   -MS       User Key  (r) = Recorded By, (t) = Taken By, (c) = Cosigned By    Initials Name Provider Type    Soham Rivers, PT Physical Therapist        Outcome Measures     Row Name 03/05/20 1608          How much help from another person do you currently need...    Turning from your back to your side while in flat bed without using bedrails?  3  -MS     Moving from lying on back to sitting on the side of a flat bed without bedrails?  3  -MS     Moving to and from a bed to a chair (including a wheelchair)?  3  -MS     Standing up from a chair using your  arms (e.g., wheelchair, bedside chair)?  3  -MS     Climbing 3-5 steps with a railing?  3  -MS     To walk in hospital room?  3  -MS     AM-PAC 6 Clicks Score (PT)  18  -MS     Row Name 03/05/20 1608          Functional Assessment    Outcome Measure Options  AM-PAC 6 Clicks Basic Mobility (PT)  -MS       User Key  (r) = Recorded By, (t) = Taken By, (c) = Cosigned By    Initials Name Provider Type    Soham Rivers, PT Physical Therapist          PT Recommendation and Plan     Plan of Care Reviewed With: patient  Outcome Summary: Improved tolerance to functional activity this date with an increase in gait distance. Pt. able to ambulate 100 feet, CGA x 1, with use of Rwx.  Pt. requires CGA x 1 for bed mobility and CGA x 1 for sit <-> stand transfers.  BLE ther. ex. program x 10 reps completed for general strengthening and verbal/tactile cues given for posture correction throughout upright mobility.     Time Calculation:   PT Charges     Row Name 03/05/20 1610             Time Calculation    Start Time  1512  -MS      Stop Time  1525  -MS      Time Calculation (min)  13 min  -MS      PT Received On  03/05/20  -MS      PT - Next Appointment  03/06/20  -MS         Time Calculation- PT    Total Timed Code Minutes- PT  12 minute(s)  -MS        User Key  (r) = Recorded By, (t) = Taken By, (c) = Cosigned By    Initials Name Provider Type    Soham Rivers PT Physical Therapist        Therapy Charges for Today     Code Description Service Date Service Provider Modifiers Qty    53302203076 HC PT THER PROC EA 15 MIN 3/5/2020 Soham Figueroa, PT GP 1          PT G-Codes  Outcome Measure Options: AM-PAC 6 Clicks Basic Mobility (PT)  AM-PAC 6 Clicks Score (PT): 18  AM-PAC 6 Clicks Score (OT): 15    Soham Figueroa PT  3/5/2020

## 2020-03-05 NOTE — PROGRESS NOTES
Adult Nutrition  Assessment/PES    Patient Name:  Falguni Minaya  YOB: 1960  MRN: [id number]  Admit Date:  2/24/2020    Assessment Date:  3/5/2020    Comments:  Nutrition follow up.  Little more SOB this am with increased O2 requirements per chart review.  Edema improving with diuretics.  Possible d/c soon.  Eating 50-75% at meals per chart PO data.  RD to continue to follow.    Reason for Assessment     Row Name 03/05/20 1517          Reason for Assessment    Reason For Assessment  follow-up protocol         Anthropometrics     Row Name 03/05/20 1517          Admit Weight    Admit Weight  -- 169# 3/4        Body Mass Index (BMI)    BMI Assessment  BMI 30-34.9: obesity grade I         Labs/Tests/Procedures/Meds     Row Name 03/05/20 1517          Labs/Procedures/Meds    Lab Results Reviewed  reviewed, pertinent     Lab Results Comments  K, Gluc, Creat, BUN, Hgb, Hct        Diagnostic Tests/Procedures    Diagnostic Test/Procedure Reviewed  reviewed, pertinent        Medications    Pertinent Medications Reviewed  reviewed, pertinent     Pertinent Medications Comments  augmentin, lasix, protonix, KCl, carafate         Physical Findings     Row Name 03/05/20 1518          Physical Findings    Overall Physical Appearance  obese     Skin  edema B=20, bruised         Evaluation of Received Nutrient/Fluid Intake     Row Name 03/05/20 1519          PO Evaluation    Number of Meals  2     % PO Intake  50-75         Problem/Interventions:    Intervention Goal     Row Name 03/05/20 1519          Intervention Goal    General  Maintain nutrition;Reduce/improve symptoms;Disease management/therapy     PO  Maintain intake     Weight  Appropriate weight loss edema improving with diuretics         Nutrition Intervention     Row Name 03/05/20 1520          Nutrition Intervention    RD/Tech Action  Follow Tx progress;Care plan reviewd         Education/Evaluation     Row Name 03/05/20 1521          Education    Education   Will Instruct as appropriate        Monitor/Evaluation    Monitor  Per protocol;PO intake;Pertinent labs;Weight;Skin status;Symptoms           Electronically signed by:  Alma Lucero RD  03/05/20 3:22 PM

## 2020-03-05 NOTE — PLAN OF CARE
Lungs course with expiratory wheeze this am, looking distressed, up to chair with incentive spirometer, lasix iv given, lungs better this pm, potassium within normal limits, possibly discharging tomorrow, home meds locked in pharmacy.

## 2020-03-05 NOTE — PLAN OF CARE
Problem: Patient Care Overview  Goal: Plan of Care Review  Outcome: Ongoing (interventions implemented as appropriate)  Flowsheets  Taken 3/5/2020 0520  Progress: improving  Taken 3/5/2020 0037  Plan of Care Reviewed With: patient    Aox4, VSS w/ O2 via nasal cannula in place. Patient ambulated a few times overnight with standby assistance and walker. No c/o pain. Labs and possible discharge pending this AM. No s/s distress observed. Will cont to monitor.

## 2020-03-05 NOTE — PROGRESS NOTES
Patient Name: Falguni Minaya  Patient : 1960        Date of Service:20  Provider of Service: Marlon Zamudio MD  Place of Service: [facility]  Referral Provider: Marlon Zamudio MD          Follow Up: Coronary disease, congestive    Interval Hx: Patient did not look as well this morning according to other physicians.  She received IV diuretics.  This afternoon she is feeling much better.  She does not appear to be in acute distress.      OBJECTIVE  Temp:  [97.6 °F (36.4 °C)-98.5 °F (36.9 °C)] 97.6 °F (36.4 °C)  Heart Rate:  [85-99] 85  Resp:  [18-20] 18  BP: (133-147)/(92-96) 133/92   No intake or output data in the 24 hours ending 20 1512  Body mass index is 33.2 kg/m².      20  0500 20  0500 20  0500   Weight: 81.6 kg (180 lb) 78.3 kg (172 lb 9.9 oz) 77.1 kg (169 lb 15.6 oz)         Physical Exam:   Physical Exam   Constitutional: She is oriented to person, place, and time. She appears well-developed and well-nourished.   HENT:   Head: Normocephalic.   Eyes: Pupils are equal, round, and reactive to light.   Neck: Normal range of motion. No JVD present. Carotid bruit is not present. No thyromegaly present.   Cardiovascular: Normal rate, regular rhythm, S1 normal, S2 normal, normal heart sounds and intact distal pulses. Exam reveals no gallop and no friction rub.   No murmur heard.  Pulmonary/Chest: Effort normal and breath sounds normal.   Abdominal: Soft. Bowel sounds are normal.   Musculoskeletal: She exhibits no edema.   Neurological: She is alert and oriented to person, place, and time.   Skin: Skin is warm, dry and intact. No erythema.   Psychiatric: She has a normal mood and affect.   Vitals reviewed.        CURRENT MEDS    Scheduled Meds:  amoxicillin-clavulanate 1 tablet Oral Q12H   aspirin 81 mg Oral Daily   atorvastatin 40 mg Oral Nightly   carvedilol 12.5 mg Oral Q12H   clonazePAM 1 mg Oral Q12H   clopidogrel 75 mg Oral Daily    enoxaparin 40 mg Subcutaneous Nightly   FLUoxetine 20 mg Oral TID   furosemide 40 mg Intravenous BID   furosemide 40 mg Oral Daily   gabapentin 300 mg Oral 4x Daily   ibuprofen 800 mg Oral TID   losartan 25 mg Oral Q24H   modafinil 200 mg Oral BID   pantoprazole 40 mg Oral Q AM   potassium chloride 20 mEq Oral Daily   pramipexole 0.125 mg Oral Nightly   simethicone 80 mg Oral TID   sucralfate 1 g Oral 4x Daily AC & at Bedtime     Continuous Infusions:       Lab Review:   Results from last 7 days   Lab Units 03/05/20  0457 03/04/20  1102 03/03/20  0802 03/01/20  0745 02/29/20  0630   SODIUM mmol/L  --   --  139 135* 138   POTASSIUM mmol/L 4.0 2.7* 2.7* 3.7 3.7   CHLORIDE mmol/L  --   --  97* 97* 100   CO2 mmol/L  --   --  28.1 18.2* 22.3   BUN mg/dL  --   --  6* 8 7*   CREATININE mg/dL  --   --  0.51* 0.55* 0.54*   GLUCOSE mg/dL  --   --  106* 122* 100*   CALCIUM mg/dL  --   --  9.2 9.5 9.1   AST (SGOT) U/L  --   --   --  25 33*   ALT (SGPT) U/L  --   --   --  17 20     Results from last 7 days   Lab Units 03/01/20  1619 02/29/20  0630   TROPONIN T ng/mL 1.840* 4.680*     Results from last 7 days   Lab Units 03/03/20  0802 03/01/20  0745   WBC 10*3/mm3 7.84 10.29   HEMOGLOBIN g/dL 9.6* 9.6*   HEMATOCRIT % 28.3* 29.2*   PLATELETS 10*3/mm3 340 285                           I personally reviewed the patient's ECG and telemetry data    ASSESSMENT & PLAN    Acute ST elevation myocardial infarction (STEMI) due to occlusion of right coronary artery (CMS/HCC)    Multiple sclerosis (CMS/HCC)    Left sided abdominal pain    Dyspepsia    Heme positive stool    1.  Acute inferior ST JOSE: Status post PTCA LATRICIA RCA.  She continues on aspirin and Plavix.  No further angina pectoris.  Discrepancy between cath and echo left ventricular ejection fraction.  Inferior wall is hypokinetic.  No angina pectoris.  2.  Co ngestive heart failure: Acute systolic.  Elevated proBNP.  Bilateral pleural effusions.  Patient also receiving some  intravenous diuretics.  We will hope that we can transition back to oral.  3.  Anemia: Heme positive stool.  Negative scope.  Biopsy results pending.  Abdominal discomfort improving per GI  4.  Hypertension: Stable  5.  Temperature elevation: Patient remains afebrile.  She is on oral antibiotics.  No definitive sign of pneumonia but being covered for such  6.  Hypoxemia: Per pulmonary.  Improved  7.  Multiple sclerosis        Marlon Zamudio MD  03/05/20

## 2020-03-05 NOTE — PROGRESS NOTES
Continued Stay Note  [facility]     Patient Name: Falguni Minaya  MRN: [id number]  Today's Date: 3/5/2020    Admit Date: 2/24/2020    Discharge Plan     Row Name 03/05/20 1025       Plan    Plan  Plan is [facility] upon DC.  Precert received today.      Plan Comments  Spoke with Goldie/ [facility] who confirms precert has been received.  Pt, her  Jarett, and MD notified.          Discharge Codes    No documentation.             Ally Mckeon RN

## 2020-03-05 NOTE — PROGRESS NOTES
Patient Name: Falguni Minaya  Patient : 1960        Date of Service:20  Provider of Service: Marlon Zamudio MD  Place of Service: [facility]  Referral Provider: Marlon Zamudio MD          Follow Up: Coronary disease, congestive failure    Interval Hx: Patient breathing much better.  She is being switched over to oral diuretics.  Awaiting bed nursing facility        OBJECTIVE  Temp:  [97.6 °F (36.4 °C)-98.5 °F (36.9 °C)] 97.6 °F (36.4 °C)  Heart Rate:  [85-99] 85  Resp:  [18-20] 18  BP: (133-147)/(92-96) 133/92   No intake or output data in the 24 hours ending 20 1515  Body mass index is 33.2 kg/m².      20  0500 20  0500 20  0500   Weight: 81.6 kg (180 lb) 78.3 kg (172 lb 9.9 oz) 77.1 kg (169 lb 15.6 oz)         Physical Exam:   Physical Exam   Constitutional: She is oriented to person, place, and time. She appears well-developed and well-nourished.   HENT:   Head: Normocephalic.   Eyes: Pupils are equal, round, and reactive to light.   Neck: Normal range of motion. No JVD present. Carotid bruit is not present. No thyromegaly present.   Cardiovascular: Normal rate, regular rhythm, S1 normal, S2 normal, normal heart sounds and intact distal pulses. Exam reveals no gallop and no friction rub.   No murmur heard.  Pulmonary/Chest: Effort normal and breath sounds normal.   Abdominal: Soft. Bowel sounds are normal.   Musculoskeletal: She exhibits no edema.   Neurological: She is alert and oriented to person, place, and time.   Skin: Skin is warm, dry and intact. No erythema.   Psychiatric: She has a normal mood and affect.   Vitals reviewed.        CURRENT MEDS    Scheduled Meds:  amoxicillin-clavulanate 1 tablet Oral Q12H   aspirin 81 mg Oral Daily   atorvastatin 40 mg Oral Nightly   carvedilol 12.5 mg Oral Q12H   clonazePAM 1 mg Oral Q12H   clopidogrel 75 mg Oral Daily   enoxaparin 40 mg Subcutaneous Nightly   FLUoxetine 20 mg Oral TID   furosemide  40 mg Intravenous BID   furosemide 40 mg Oral Daily   gabapentin 300 mg Oral 4x Daily   ibuprofen 800 mg Oral TID   losartan 25 mg Oral Q24H   modafinil 200 mg Oral BID   pantoprazole 40 mg Oral Q AM   potassium chloride 20 mEq Oral Daily   pramipexole 0.125 mg Oral Nightly   simethicone 80 mg Oral TID   sucralfate 1 g Oral 4x Daily AC & at Bedtime     Continuous Infusions:       Lab Review:   Results from last 7 days   Lab Units 03/05/20  0457 03/04/20  1102 03/03/20  0802 03/01/20  0745 02/29/20  0630   SODIUM mmol/L  --   --  139 135* 138   POTASSIUM mmol/L 4.0 2.7* 2.7* 3.7 3.7   CHLORIDE mmol/L  --   --  97* 97* 100   CO2 mmol/L  --   --  28.1 18.2* 22.3   BUN mg/dL  --   --  6* 8 7*   CREATININE mg/dL  --   --  0.51* 0.55* 0.54*   GLUCOSE mg/dL  --   --  106* 122* 100*   CALCIUM mg/dL  --   --  9.2 9.5 9.1   AST (SGOT) U/L  --   --   --  25 33*   ALT (SGPT) U/L  --   --   --  17 20     Results from last 7 days   Lab Units 03/01/20  1619 02/29/20  0630   TROPONIN T ng/mL 1.840* 4.680*     Results from last 7 days   Lab Units 03/03/20  0802 03/01/20  0745   WBC 10*3/mm3 7.84 10.29   HEMOGLOBIN g/dL 9.6* 9.6*   HEMATOCRIT % 28.3* 29.2*   PLATELETS 10*3/mm3 340 285                           I personally reviewed the patient's ECG and telemetry data    ASSESSMENT & PLAN    Acute ST elevation myocardial infarction (STEMI) due to occlusion of right coronary artery (CMS/HCC)    Multiple sclerosis (CMS/HCC)    Left sided abdominal pain    Dyspepsia    Heme positive stool  1.  Acute inferior ST JOSE: Status post PTCA LATRICIA RCA.  She continues on aspirin and Plavix.  No further angina pectoris.  Discrepancy between cath and echo left ventricular ejection fraction.  Inferior wall is hypokinetic.  No angina pectoris.  2.  Congestive heart failure: Acute systolic.  Elevated proBNP.  Bilateral pleural effusions.  Now on oral diuretics.  3.  Anemia: Heme positive stool.  Negative scope.  Biopsy results pending.  Abdominal  discomfort improving per GI  4.  Hypertension: Stable  5.  Temperature elevation: Patient remains afebrile.  She is on oral antibiotics.  No definitive sign of pneumonia but being covered for such  6.  Hypoxemia: Per pulmonary.  Improved  7.  Multiple sclerosis    Note: This note is entered on 3/5/2020.  I did see her on 3/4/2020 but delayed doing a note until we are sure she was going to be transferred.  That did not happen.  This note note entered  reflects my hospital visit that was personally performed by me on 3/4/2020.      Marlon Zamudio MD  03/05/20

## 2020-03-05 NOTE — PLAN OF CARE
Problem: Patient Care Overview  Goal: Plan of Care Review  Flowsheets (Taken 3/5/2020 5977)  Plan of Care Reviewed With: patient  Outcome Summary: Improved tolerance to functional activity this date with an increase in gait distance. Pt. able to ambulate 100 feet, CGA x 1, with use of Rwx.  Pt. requires CGA x 1 for bed mobility and CGA x 1 for sit <-> stand transfers.  BLE ther. ex. program x 10 reps completed for general strengthening and verbal/tactile cues given for posture correction throughout upright mobility.

## 2020-03-06 LAB — PROCALCITONIN SERPL-MCNC: 0.08 NG/ML (ref 0.1–0.25)

## 2020-03-06 PROCEDURE — 97110 THERAPEUTIC EXERCISES: CPT

## 2020-03-06 PROCEDURE — 94799 UNLISTED PULMONARY SVC/PX: CPT

## 2020-03-06 PROCEDURE — 25010000002 ENOXAPARIN PER 10 MG: Performed by: INTERNAL MEDICINE

## 2020-03-06 PROCEDURE — 84145 PROCALCITONIN (PCT): CPT | Performed by: INTERNAL MEDICINE

## 2020-03-06 PROCEDURE — 99232 SBSQ HOSP IP/OBS MODERATE 35: CPT | Performed by: INTERNAL MEDICINE

## 2020-03-06 PROCEDURE — 97116 GAIT TRAINING THERAPY: CPT

## 2020-03-06 RX ORDER — POTASSIUM CHLORIDE 750 MG/1
20 CAPSULE, EXTENDED RELEASE ORAL 2 TIMES DAILY WITH MEALS
Status: DISCONTINUED | OUTPATIENT
Start: 2020-03-06 | End: 2020-03-07 | Stop reason: HOSPADM

## 2020-03-06 RX ORDER — POTASSIUM CHLORIDE 750 MG/1
20 CAPSULE, EXTENDED RELEASE ORAL 2 TIMES DAILY WITH MEALS
Qty: 60 CAPSULE | Refills: 6 | Status: CANCELLED | OUTPATIENT
Start: 2020-03-06

## 2020-03-06 RX ORDER — FUROSEMIDE 40 MG/1
40 TABLET ORAL
Status: DISCONTINUED | OUTPATIENT
Start: 2020-03-06 | End: 2020-03-07 | Stop reason: HOSPADM

## 2020-03-06 RX ORDER — FUROSEMIDE 40 MG/1
40 TABLET ORAL 2 TIMES DAILY
Qty: 60 TABLET | Refills: 6 | Status: CANCELLED | OUTPATIENT
Start: 2020-03-06

## 2020-03-06 RX ADMIN — FLUOXETINE HYDROCHLORIDE 20 MG: 20 CAPSULE ORAL at 08:48

## 2020-03-06 RX ADMIN — CARVEDILOL 12.5 MG: 12.5 TABLET, FILM COATED ORAL at 08:48

## 2020-03-06 RX ADMIN — ASPIRIN 81 MG: 81 TABLET, COATED ORAL at 08:48

## 2020-03-06 RX ADMIN — GABAPENTIN 300 MG: 300 CAPSULE ORAL at 21:36

## 2020-03-06 RX ADMIN — POTASSIUM CHLORIDE 20 MEQ: 10 CAPSULE, COATED, EXTENDED RELEASE ORAL at 08:48

## 2020-03-06 RX ADMIN — AMOXICILLIN AND CLAVULANATE POTASSIUM 1 TABLET: 875; 125 TABLET, FILM COATED ORAL at 08:47

## 2020-03-06 RX ADMIN — CLONAZEPAM 1 MG: 0.5 TABLET ORAL at 21:36

## 2020-03-06 RX ADMIN — SUCRALFATE 1 G: 1 SUSPENSION ORAL at 12:38

## 2020-03-06 RX ADMIN — PANTOPRAZOLE SODIUM 40 MG: 40 TABLET, DELAYED RELEASE ORAL at 06:39

## 2020-03-06 RX ADMIN — SUCRALFATE 1 G: 1 SUSPENSION ORAL at 06:39

## 2020-03-06 RX ADMIN — GABAPENTIN 300 MG: 300 CAPSULE ORAL at 12:38

## 2020-03-06 RX ADMIN — PRAMIPEXOLE DIHYDROCHLORIDE 0.12 MG: 0.25 TABLET ORAL at 21:36

## 2020-03-06 RX ADMIN — FUROSEMIDE 40 MG: 40 TABLET ORAL at 08:48

## 2020-03-06 RX ADMIN — ACETAMINOPHEN 650 MG: 325 TABLET, FILM COATED ORAL at 06:46

## 2020-03-06 RX ADMIN — GABAPENTIN 300 MG: 300 CAPSULE ORAL at 18:41

## 2020-03-06 RX ADMIN — IBUPROFEN 800 MG: 800 TABLET, FILM COATED ORAL at 08:48

## 2020-03-06 RX ADMIN — FLUOXETINE HYDROCHLORIDE 20 MG: 20 CAPSULE ORAL at 21:37

## 2020-03-06 RX ADMIN — SIMETHICONE CHEW TAB 80 MG 80 MG: 80 TABLET ORAL at 21:37

## 2020-03-06 RX ADMIN — ATORVASTATIN CALCIUM 40 MG: 20 TABLET, FILM COATED ORAL at 21:37

## 2020-03-06 RX ADMIN — FLUOXETINE HYDROCHLORIDE 20 MG: 20 CAPSULE ORAL at 15:36

## 2020-03-06 RX ADMIN — CARVEDILOL 12.5 MG: 12.5 TABLET, FILM COATED ORAL at 21:36

## 2020-03-06 RX ADMIN — FUROSEMIDE 40 MG: 40 TABLET ORAL at 18:41

## 2020-03-06 RX ADMIN — IBUPROFEN 800 MG: 800 TABLET, FILM COATED ORAL at 21:36

## 2020-03-06 RX ADMIN — CLONAZEPAM 1 MG: 0.5 TABLET ORAL at 08:47

## 2020-03-06 RX ADMIN — MODAFINIL 200 MG: 100 TABLET ORAL at 08:47

## 2020-03-06 RX ADMIN — CLOPIDOGREL 75 MG: 75 TABLET, FILM COATED ORAL at 08:48

## 2020-03-06 RX ADMIN — ENOXAPARIN SODIUM 40 MG: 40 INJECTION SUBCUTANEOUS at 21:36

## 2020-03-06 RX ADMIN — AMOXICILLIN AND CLAVULANATE POTASSIUM 1 TABLET: 875; 125 TABLET, FILM COATED ORAL at 21:36

## 2020-03-06 RX ADMIN — LOSARTAN POTASSIUM 25 MG: 25 TABLET, FILM COATED ORAL at 08:47

## 2020-03-06 RX ADMIN — SIMETHICONE CHEW TAB 80 MG 80 MG: 80 TABLET ORAL at 08:47

## 2020-03-06 RX ADMIN — IBUPROFEN 800 MG: 800 TABLET, FILM COATED ORAL at 15:35

## 2020-03-06 RX ADMIN — SUCRALFATE 1 G: 1 SUSPENSION ORAL at 18:42

## 2020-03-06 RX ADMIN — GABAPENTIN 300 MG: 300 CAPSULE ORAL at 08:47

## 2020-03-06 RX ADMIN — SUCRALFATE 1 G: 1 SUSPENSION ORAL at 21:37

## 2020-03-06 RX ADMIN — SIMETHICONE CHEW TAB 80 MG 80 MG: 80 TABLET ORAL at 15:36

## 2020-03-06 NOTE — PROGRESS NOTES
Continued Stay Note  [facility]     Patient Name: Falguni Minaya  MRN: [id number]  Today's Date: 3/6/2020    Admit Date: 2/24/2020    Discharge Plan     Row Name 03/06/20 1734       Plan    Plan  Plan is acute rehab at [facility] in [place] tomorrow.  [facility] arranged for tomorrow 3/7/2020 @ 1300.    Plan Comments  Spoke with Goldie/ [facility] who states bed is not available today, but will have a bed tomorrow.  Regla/ Cardiology notified and will notify MD.  CCP spoke with pt and her dtr Krystal at bedside.  Pt might still need O2.  [facility] w/c transport arranged for tomorrow at 1300.        Discharge Codes    No documentation.       Expected Discharge Date and Time     Expected Discharge Date Expected Discharge Time    Mar 6, 2020             Ally Mckeon RN

## 2020-03-06 NOTE — PROGRESS NOTES
"  Daily Progress Note.   [address]  3/6/2020    Patient:  Name:  Falguni Minaya  MRN:  [id number]  1960  60 y.o.  female         Chief Complaint: Sore throat, restlessness, chest discomfort     Hospital course: Came in with lower lobe infiltrate, questionable sepsis, with hypoxemia had a STEMI status post mid RCA stent on 2/24/2020.  Has some epigastric pain with nausea and vomiting was started on Zosyn but her white count has been normal and her fever is only low-grade, and Zosyn was discontinued and she seems to be holding on without the need for the antibiotic.  She is restless, she has restless leg syndrome on gabapentin, she used to be on clonazepam which has been on hold.  She will be further evaluated by GI, she has some medication adjustment with Carafate and should be scoped soon.     Interval History:   Patient is again less short of breath after day of IV diuretics  On room air however ox saturation monitors between 85 and 92% she says she feels better after diuretics yesterday.  No chest pain no hemoptysis no productive cough      ROS: No fever, no diarrhea, no chest pain  PMFSSH: no change    Physical Exam:  /74 (BP Location: Right arm, Patient Position: Sitting)   Pulse 80   Temp 98 °F (36.7 °C) (Oral)   Resp 18   Ht 152.4 cm (60\")   Wt 79.1 kg (174 lb 6.1 oz)   SpO2 93%   BMI 34.06 kg/m²   Body mass index is 34.06 kg/m².    Intake/Output Summary (Last 24 hours) at 3/6/2020 1632  Last data filed at 3/6/2020 1315  Gross per 24 hour   Intake 930 ml   Output --   Net 930 ml     General appearance:non Toxic , conversant   Eyes: anicteric sclerae, moist conjunctivae; no lid-lag; PERRLA  HENT: Atraumatic; oropharynx clear with moist mucous membranes and no mucosal ulcerations; normal hard and soft palate  Neck: Trachea midline; FROM, supple, no thyromegaly or lymphadenopathy  Lungs: no wheeze much less crackles with increasedrespiratory effort and intercostal " retractions  CV: RRR, no MRGs   Abdomen: Soft, non-tender; no masses or HSM  Extremities: No peripheral edema or extremity lymphadenopathy  Skin: Normal temperature, turgor and texture; no rash, ulcers or subcutaneous nodules  Psych: Calm affect, alert and oriented to person, place and time    Data Review:  Notable Labs:  Results from last 7 days   Lab Units 03/03/20  0802 03/01/20  0745 02/29/20  0630   WBC 10*3/mm3 7.84 10.29 7.12   HEMOGLOBIN g/dL 9.6* 9.6* 9.6*   PLATELETS 10*3/mm3 340 285 199     Results from last 7 days   Lab Units 03/05/20  0457 03/04/20  1102 03/03/20  0802 03/01/20  0745 02/29/20  0630   SODIUM mmol/L  --   --  139 135* 138   POTASSIUM mmol/L 4.0 2.7* 2.7* 3.7 3.7   CHLORIDE mmol/L  --   --  97* 97* 100   CO2 mmol/L  --   --  28.1 18.2* 22.3   BUN mg/dL  --   --  6* 8 7*   CREATININE mg/dL  --   --  0.51* 0.55* 0.54*   GLUCOSE mg/dL  --   --  106* 122* 100*   CALCIUM mg/dL  --   --  9.2 9.5 9.1   Estimated Creatinine Clearance: 107.7 mL/min (A) (by C-G formula based on SCr of 0.51 mg/dL (L)).    Imaging:  Reviewed chest images personally from past 3 days    Scheduled meds:      amoxicillin-clavulanate 1 tablet Oral Q12H   aspirin 81 mg Oral Daily   atorvastatin 40 mg Oral Nightly   carvedilol 12.5 mg Oral Q12H   clonazePAM 1 mg Oral Q12H   clopidogrel 75 mg Oral Daily   enoxaparin 40 mg Subcutaneous Nightly   FLUoxetine 20 mg Oral TID   furosemide 40 mg Oral BID   gabapentin 300 mg Oral 4x Daily   ibuprofen 800 mg Oral TID   losartan 25 mg Oral Q24H   modafinil 200 mg Oral BID   pantoprazole 40 mg Oral Q AM   potassium chloride 20 mEq Oral BID With Meals   pramipexole 0.125 mg Oral Nightly   simethicone 80 mg Oral TID   sucralfate 1 g Oral 4x Daily AC & at Bedtime       ASSESSMENT  /  PLAN:  1. Suspected sepsis improved  2. Bilateral pulmonary edema improved  3. Acute hypoxemia, doing better currently down to room air  4. Trace bilateral pleural effusion  5. Anion gap metabolic acidosis  with diarrhea  6. ST elevation MI status post mid RCA stent on 2/24/2020  7. Epigastric pain, chronic  8. Nausea and vomiting, managed by GI  9. Aspiration pneumonia on Augmentin     Looks better after IV diuretics yesterday  Seems she has a very fragile fluid state  Lasix adjusted by cardiology will defer continued to need to them.  Has required IV diuretics couple times with rapid improvement in her breathing      Cont abx augmentin 10 days      Mark Lane MD  [facility]  03/06/20  433 p.m.

## 2020-03-06 NOTE — PLAN OF CARE
Problem: Patient Care Overview  Goal: Plan of Care Review  Outcome: Ongoing (interventions implemented as appropriate)  Flowsheets (Taken 3/6/2020 3627)  Progress: improving  Plan of Care Reviewed With: patient  Note:   Alert and oriented times four. Vitals stable this shift. Denies pain. Lung sounds diminished. Denies shortness of breath. Has had no complaints throughout night. Does need encouragement to use incentive spirometry and to reposition. Has been in NSR all shift. Refused SCDs all shift. Continuing to monitor.

## 2020-03-06 NOTE — THERAPY TREATMENT NOTE
Patient Name: Falguni Minaya  : 1960    MRN: [id number]                              Today's Date: 3/6/2020       Admit Date: 2020    Visit Dx:     ICD-10-CM ICD-9-CM   1. STEMI involving right coronary artery (CMS/HCC) I21.11 410.31   2. Left sided abdominal pain R10.9 789.09   3. Dyspepsia R10.13 536.8   4. Heme positive stool R19.5 792.1     Patient Active Problem List   Diagnosis   • STEMI involving right coronary artery (CMS/HCC)   • Acute ST elevation myocardial infarction (STEMI) due to occlusion of right coronary artery (CMS/HCC)   • Multiple sclerosis (CMS/HCC)   • Left sided abdominal pain   • Dyspepsia   • Heme positive stool     Past Medical History:   Diagnosis Date   • Abdominal hernia    • Coronary artery disease    • GERD (gastroesophageal reflux disease)    • Multiple sclerosis (CMS/HCC)    • Seizures (CMS/HCC)     last seizure      Past Surgical History:   Procedure Laterality Date   • APPENDECTOMY     • CARDIAC CATHETERIZATION     • CARDIAC CATHETERIZATION N/A 2020    Procedure: Left Heart Cath;  Surgeon: Marlon Zamudio MD;  Location: [facility] CATH INVASIVE LOCATION;  Service: Cardiovascular;  Laterality: N/A;   • CARDIAC CATHETERIZATION N/A 2020    Procedure: Stent LATRICIA coronary;  Surgeon: Marlon Zamudio MD;  Location: [facility] CATH INVASIVE LOCATION;  Service: Cardiovascular;  Laterality: N/A;   • CARDIAC CATHETERIZATION  2020    Procedure: Percutaneous Manual Thrombectomy;  Surgeon: Marlon Zamudio MD;  Location: [facility] CATH INVASIVE LOCATION;  Service: Cardiovascular;;   • CARDIAC CATHETERIZATION N/A 2020    Procedure: Coronary angiography;  Surgeon: Marlon Zamudio MD;  Location: [facility] CATH INVASIVE LOCATION;  Service: Cardiovascular;  Laterality: N/A;   • CARDIAC CATHETERIZATION N/A 2020    Procedure: Left ventriculography;  Surgeon: Marlon Zamudio MD;  Location: [facility] CATH INVASIVE LOCATION;  Service: Cardiovascular;   Laterality: N/A;   • CHOLECYSTECTOMY     • COLONOSCOPY N/A 2/29/2020    Procedure: COLONOSCOPY to  cecum:;  Surgeon: Krystal Sarabia MD;  Location: [facility] ENDOSCOPY;  Service: Gastroenterology;  Laterality: N/A;  pre:  anemia and abd pain  post:  hemorrhoids, tortuous colon   • ENDOSCOPY N/A 2/29/2020    Procedure: ESOPHAGOGASTRODUODENOSCOPY  with biopsies;  Surgeon: Krystal Sarabia MD;  Location: [facility] ENDOSCOPY;  Service: Gastroenterology;  Laterality: N/A;  pre:  anemia and abd pain  post:  mild gastritis,    • HYSTERECTOMY     • TONSILLECTOMY       General Information     Row Name 03/06/20 1046          PT Evaluation Time/Intention    Document Type  therapy note (daily note)  -     Mode of Treatment  physical therapy  -     Row Name 03/06/20 1046          General Information    Patient Profile Reviewed?  yes  -     Existing Precautions/Restrictions  fall  -     Row Name 03/06/20 1046          Cognitive Assessment/Intervention- PT/OT    Orientation Status (Cognition)  oriented x 3  -     Cognitive Assessment/Intervention Comment  Pt agreeable to PT. Daughter present  -       User Key  (r) = Recorded By, (t) = Taken By, (c) = Cosigned By    Initials Name Provider Type     Halley Mason, PT Physical Therapist        Mobility     Row Name 03/06/20 1047          Bed Mobility Assessment/Treatment    Bed Mobility Assessment/Treatment  bed mobility (all) activities  -     [place] Level (Bed Mobility)  standby assist  -     Assistive Device (Bed Mobility)  bed rails;head of bed elevated  -     Row Name 03/06/20 1047          Transfer Assessment/Treatment    Comment (Transfers)  CGA for transfers with FWW  -     Row Name 03/06/20 1047          Sit-Stand Transfer    Sit-Stand [place] (Transfers)  contact guard  -     Assistive Device (Sit-Stand Transfers)  walker, front-wheeled  -     Row Name 03/06/20 1047          Gait/Stairs Assessment/Training    Gait/Stairs Assessment/Training   gait/ambulation independence;gait/ambulation assistive device  -     [place] Level (Gait)  contact guard  -     Assistive Device (Gait)  walker, front-wheeled  -     Distance in Feet (Gait)  100'  -LC     Pattern (Gait)  step-through  -LC     Deviations/Abnormal Patterns (Gait)  justin decreased  -LC     Bilateral Gait Deviations  forward flexed posture  -     Comment (Gait/Stairs)  verbal cues for posture. Two people present due to occasional knee buckling and tremors during gait, although much better today.  -       User Key  (r) = Recorded By, (t) = Taken By, (c) = Cosigned By    Initials Name Provider Type    Halley Duncan, PT Physical Therapist        Obj/Interventions     Row Name 03/06/20 1049          Therapeutic Exercise    Position (Therapeutic Exercise)  standing  -     Comment (Therapeutic Exercise)  BLE ther ex x 10 reps each: heel raises, hip flexion (march), hip abduction, sit<>stand  -       User Key  (r) = Recorded By, (t) = Taken By, (c) = Cosigned By    Initials Name Provider Type    Halley Duncan, PT Physical Therapist        Goals/Plan     Row Name 03/06/20 1052          Bed Mobility Goal 1 (PT)    Progress/Outcomes (Bed Mobility Goal 1, PT)  goal met  -     Row Name 03/06/20 1052          Transfer Goal 1 (PT)    Progress/Outcome (Transfer Goal 1, PT)  goal met  -     Row Name 03/06/20 1052          Gait Training Goal 1 (PT)    Activity/Assistive Device (Gait Training Goal 1, PT)  walker, rolling  -     [place] Level (Gait Training Goal 1, PT)  standby assist  -     Distance (Gait Goal 1, PT)  100'  -LC     Time Frame (Gait Training Goal 1, PT)  by discharge  -     Progress/Outcome (Gait Training Goal 1, PT)  goal revised this date  -     Row Name 03/06/20 1052          Patient Education Goal (PT)    Activity (Patient Education Goal, PT)  HEP  -     [place]/Cues/Accuracy (Memory Goal 2, PT)  demonstrates adequately  -     Time Frame (Patient  Education Goal, PT)  by discharge  -     Progress/Outcome (Patient Education Goal, PT)  continuing progress toward goal  -       User Key  (r) = Recorded By, (t) = Taken By, (c) = Cosigned By    Initials Name Provider Type    Halley Duncan, PT Physical Therapist        Clinical Impression     Row Name 03/06/20 1050          Pain Assessment    Additional Documentation  Pain Scale: Numbers Pre/Post-Treatment (Group)  -     Row Name 03/06/20 1050          Pain Scale: Numbers Pre/Post-Treatment    Pain Scale: Numbers, Pretreatment  0/10 - no pain  -     Pain Scale: Numbers, Post-Treatment  0/10 - no pain  -     Row Name 03/06/20 1050          Plan of Care Review    Plan of Care Reviewed With  patient;daughter  -     Progress  improving  -     Outcome Summary  Pt ambulated the same distance as yesterday, but had less tremors and less knee buckling. She also was able to perform standin ther ex today until she needed to have a BM and was taken to the commode. Pt left with RN aide aware of pt status. She will continue to benefit from PT.  -     Row Name 03/06/20 1050          Physical Therapy Clinical Impression    Criteria for Skilled Interventions Met (PT Clinical Impression)  yes;treatment indicated  -     Rehab Potential (PT Clinical Summary)  good, to achieve stated therapy goals  -     Row Name 03/06/20 1050          Vital Signs    O2 Delivery Pre Treatment  supplemental O2  -     O2 Delivery Intra Treatment  supplemental O2  -     O2 Delivery Post Treatment  supplemental O2  -     Row Name 03/06/20 1050          Positioning and Restraints    Pre-Treatment Position  in bed  -     Post Treatment Position  bathroom  -     Bathroom  notified nsg;sitting;call light within reach  -       User Key  (r) = Recorded By, (t) = Taken By, (c) = Cosigned By    Initials Name Provider Type    Halley Duncan, PT Physical Therapist        Outcome Measures     Row Name 03/06/20 1054          How much  help from another person do you currently need...    Turning from your back to your side while in flat bed without using bedrails?  3  -LC     Moving from lying on back to sitting on the side of a flat bed without bedrails?  3  -LC     Moving to and from a bed to a chair (including a wheelchair)?  3  -LC     Standing up from a chair using your arms (e.g., wheelchair, bedside chair)?  3  -LC     Climbing 3-5 steps with a railing?  3  -LC     To walk in hospital room?  3  -LC     AM-PAC 6 Clicks Score (PT)  18  -LC       User Key  (r) = Recorded By, (t) = Taken By, (c) = Cosigned By    Initials Name Provider Type    Halley Duncan PT Physical Therapist          PT Recommendation and Plan     Outcome Summary/Treatment Plan (PT)  Anticipated Discharge Disposition (PT): skilled nursing facility  Plan of Care Reviewed With: patient, daughter  Progress: improving  Outcome Summary: Pt ambulated the same distance as yesterday, but had less tremors and less knee buckling. She also was able to perform standin ther ex today until she needed to have a BM and was taken to the commode. Pt left with RN aide aware of pt status. She will continue to benefit from PT.     Time Calculation:   PT Charges     Row Name 03/06/20 1055             Time Calculation    Start Time  1018  -      Stop Time  1041  -      Time Calculation (min)  23 min  -      PT Received On  03/06/20  -      PT - Next Appointment  03/07/20  -        User Key  (r) = Recorded By, (t) = Taken By, (c) = Cosigned By    Initials Name Provider Type    Halley Duncan PT Physical Therapist        Therapy Charges for Today     Code Description Service Date Service Provider Modifiers Qty    40731440481 HC PT THER PROC EA 15 MIN 3/6/2020 Halley Mason, PT GP 1    32272403320 HC GAIT TRAINING EA 15 MIN 3/6/2020 Halley Mason, PT GP 1          PT G-Codes  Outcome Measure Options: AM-PAC 6 Clicks Basic Mobility (PT)  AM-PAC 6 Clicks Score (PT): 18  AM-PAC 6 Clicks Score  (OT): 15    Halley Mason, PT  3/6/2020

## 2020-03-06 NOTE — PROGRESS NOTES
Patient Name: Falguni Minaya  Patient : 1960        Date of Service:20  Provider of Service: Marlon Zamudio MD  Place of Service: [facility]  Referral Provider: Marlon Zamudio MD          Follow Up: Acute MI, congestive heart failure    Interval Hx: Patient feeling much better.  Breathing easier.  Ambulating with physical therapy        OBJECTIVE  Temp:  [97 °F (36.1 °C)-98 °F (36.7 °C)] 98 °F (36.7 °C)  Heart Rate:  [80-93] 80  Resp:  [18] 18  BP: (109-154)/() 118/74     Intake/Output Summary (Last 24 hours) at 3/6/2020 1332  Last data filed at 3/6/2020 0903  Gross per 24 hour   Intake 360 ml   Output --   Net 360 ml     Body mass index is 34.06 kg/m².      20  0500 20  0500 20  0500   Weight: 78.3 kg (172 lb 9.9 oz) 77.1 kg (169 lb 15.6 oz) 79.1 kg (174 lb 6.1 oz)         Physical Exam:   Physical Exam   Constitutional: She is oriented to person, place, and time. She appears well-developed and well-nourished.   HENT:   Head: Normocephalic.   Eyes: Pupils are equal, round, and reactive to light.   Neck: Normal range of motion. No JVD present. Carotid bruit is not present. No thyromegaly present.   Cardiovascular: Normal rate, regular rhythm, S1 normal, S2 normal, normal heart sounds and intact distal pulses. Exam reveals no gallop and no friction rub.   No murmur heard.  Pulmonary/Chest: Effort normal and breath sounds normal.   Abdominal: Soft. Bowel sounds are normal.   Musculoskeletal: She exhibits no edema.   Neurological: She is alert and oriented to person, place, and time.   Skin: Skin is warm, dry and intact. No erythema.   Psychiatric: She has a normal mood and affect.   Vitals reviewed.        CURRENT MEDS    Scheduled Meds:  amoxicillin-clavulanate 1 tablet Oral Q12H   aspirin 81 mg Oral Daily   atorvastatin 40 mg Oral Nightly   carvedilol 12.5 mg Oral Q12H   clonazePAM 1 mg Oral Q12H   clopidogrel 75 mg Oral Daily   enoxaparin 40 mg  Subcutaneous Nightly   FLUoxetine 20 mg Oral TID   furosemide 40 mg Oral BID   gabapentin 300 mg Oral 4x Daily   ibuprofen 800 mg Oral TID   losartan 25 mg Oral Q24H   modafinil 200 mg Oral BID   pantoprazole 40 mg Oral Q AM   potassium chloride 20 mEq Oral BID With Meals   pramipexole 0.125 mg Oral Nightly   simethicone 80 mg Oral TID   sucralfate 1 g Oral 4x Daily AC & at Bedtime     Continuous Infusions:       Lab Review:   Results from last 7 days   Lab Units 03/05/20  0457 03/04/20  1102 03/03/20  0802 03/01/20  0745 02/29/20  0630   SODIUM mmol/L  --   --  139 135* 138   POTASSIUM mmol/L 4.0 2.7* 2.7* 3.7 3.7   CHLORIDE mmol/L  --   --  97* 97* 100   CO2 mmol/L  --   --  28.1 18.2* 22.3   BUN mg/dL  --   --  6* 8 7*   CREATININE mg/dL  --   --  0.51* 0.55* 0.54*   GLUCOSE mg/dL  --   --  106* 122* 100*   CALCIUM mg/dL  --   --  9.2 9.5 9.1   AST (SGOT) U/L  --   --   --  25 33*   ALT (SGPT) U/L  --   --   --  17 20     Results from last 7 days   Lab Units 03/01/20  1619 02/29/20  0630   TROPONIN T ng/mL 1.840* 4.680*     Results from last 7 days   Lab Units 03/03/20  0802 03/01/20  0745   WBC 10*3/mm3 7.84 10.29   HEMOGLOBIN g/dL 9.6* 9.6*   HEMATOCRIT % 28.3* 29.2*   PLATELETS 10*3/mm3 340 285                           I personally reviewed the patient's ECG and telemetry data    ASSESSMENT & PLAN    Acute ST elevation myocardial infarction (STEMI) due to occlusion of right coronary artery (CMS/HCC)    Multiple sclerosis (CMS/HCC)    Left sided abdominal pain    Dyspepsia    Heme positive stool    1.  Acute inferior ST JOSE: Status post PTCA LATRICIA RCA.  She continues on aspirin and Plavix.  No further angina pectoris.  Discrepancy between cath and echo left ventricular ejection fraction.  Inferior wall is hypokinetic.  No angina pectoris.  2.  Congestive heart failure: Acute systolic.  Elevated proBNP.  Bilateral pleural effusions.  Now on oral diuretics.  3.  Anemia: Heme positive stool.  Negative scope.   Biopsy results pending.  Abdominal discomfort improving per GI  4.  Hypertension: Stable  5.  Temperature elevation: Patient remains afebrile.  She is on oral antibiotics.  No definitive sign of pneumonia but being covered for such  6.  Hypoxemia: Per pulmonary.  Improved  7.  Multiple sclerosis    Patient overall improved.  Planning on discharging tomorrow.  Bed will be available then.    Marlon Zamudio MD  03/06/20

## 2020-03-06 NOTE — PLAN OF CARE
Problem: Patient Care Overview  Goal: Plan of Care Review  Outcome: Ongoing (interventions implemented as appropriate)  Flowsheets (Taken 3/6/2020 1050)  Progress: improving  Plan of Care Reviewed With: patient;daughter  Outcome Summary: Pt ambulated the same distance as yesterday, but had less tremors and less knee buckling. She also was able to perform standin ther ex today until she needed to have a BM and was taken to the commode. Pt left with RN aide aware of pt status. She will continue to benefit from PT and SNF upon DC.

## 2020-03-06 NOTE — DISCHARGE SUMMARY
Date of Discharge:  3/7/2020  Date of Admit: 2/24/2020    Discharge Diagnosis:  Acute ST elevation myocardial infarction (STEMI) due to occlusion of right coronary artery (CMS/HCC)    Multiple sclerosis (CMS/HCC)    Left sided abdominal pain    Dyspepsia    Heme positive stool      Hospital Course:     Ms Minaya was admitted on 02/24/2020 after her  found her lethargic at home and EMS was called.  She was taken to [facility] where she was found to have ST elevation in the inferior leads.  She was transferred here and underwent urgent cardiac catheterization.  Her RCA was occluded and LATRICIA was placed to RCA.  Her troponin peak was > 7. ECHO was done post infarct which showed EF 50%.     Following the procedure she continued to complain of abdominal discomfort in LLQ, loose stools, emesis,and was mildly hypotensive. She was started on antibiotics for suspected sepsis and fevers of 101. GI was consulted. CT of abdomen showed some concern with the vaginal area.  GYN was consulted but they felt it was not suspicious for GYN etiology.          She was anemic and her stool was positive for occult blood.  Due to her requiring dual anti-platelet therapy both EGD and colonoscopy were done. EGD showed gastritis with normal duodenum. Colonoscopy showed non-bleeding internal hemorrhoids. Her hemoglobin remained stable.  Her abdominal pain persisted through much of the stay. She was stated on Carafate in addition to her Prilosec.       On 03/01 she was more dyspneic with pain in her shoulder blades and CT of chest was performed. This showed large bilateral pleural effusions with possible pneumonia.  She was treated with IV diuretics and oral antibiotics and improved.     She is felt to be stable for discharge to rehab.    She will continue her Augmentin for total of 10 days .  Pulmonary recommends repeat imaging in 6-8 weeks with her PCP.     She will be discharged on oral lasix 40 mg BID with KCl.      GI  recommends capsule enteroscopy as an outpatient.    She will follow up in our office with Saniya Caputo in 7-10 days and myself in 6 weeks.       Procedures Performed  Procedure(s):  ESOPHAGOGASTRODUODENOSCOPY  with biopsies  COLONOSCOPY to  Cecum:    EGD results    Colonoscopy results      Cardiac Catheterization 02/24/2020  Hemodynamics:  1.  Left ventricle: 105/20  2.  Aorta: 105/60, mean 73     Cineangiography:  1.  RCA: The right coronary artery is completely occluded in the distal segment.  After reperfusion the posterior descending branch was large and normal.  The posterior lateral branches were medium to large caliber and normal.  2.  Left main: The left main coronary artery appeared to be normal.  3.  LAD: The left anterior descending coronary artery has mild plaquing in the proximal segment but no measurable stenosis.  The artery is narrowed 20 to 30% in the midsegment.  The distal segment is normal.  The first diagonal branch is small the second diagonal branch is medium caliber with a 20 to 30% proximal stenosis.  4.  Ramus intermediate disease: The ramus branch is narrowed approximately 80 to 90% in its proximal portion.  The distal portion is a medium to small caliber.  5.  LCx: The left circumflex coronary artery is normal in the proximal segment.  The artery is narrowed 90% in the midsegment after the origin of the large obtuse marginal branch.  The artery terminates as a small obtuse marginal branch which is normal.  The major obtuse marginal branch arising from the proximal to mid segment is branching and normal.     Left ventriculography: The overall size of the ventricle is normal.  The global contractility of the ventricle is abnormal with inferior hypokinesia.  The estimated ejection fraction is 30 to 35%.     Intervention:  1.  Distal RCA: Initial DARYN flow 0.  Residual flow 3.  Initial stenosis 100%.  Residual narrowing 0%.  Lesion type C.     Assessment:  1.  Ischemic heart  disease:  Etiology: Coronary atherosclerosis  Anatomy: Three-vessel coronary disease, critical distal RCA occlusion  Physiology: Acute inferior ST JOSE  Functional status: Moderately compromised  Recommendations: Dual antiplatelet therapy, standard post infarct care, reevaluation of residual narrowings after recovery.         Consults     Date and Time Order Name Status Description    2/29/2020 1053 Inpatient Gynecology Consult Completed     2/26/2020 0310 Inpatient Pulmonology Consult Completed     2/25/2020 1050 Inpatient Gastroenterology Consult Completed             .      Discharge Physical Exam:    General Appearance No acute distress   Neck No adenopathy, supple, trachea midline, no thyromegaly, no carotid bruit, no JVD   Lungs Clear to auscultation,respirations regular, even and unlabored   Heart Regular rhythm and normal rate, normal S1 and S2, no murmur, no gallop, no rub, no click   Chest wall No abnormalities observed   Abdomen Normal bowel sounds, no masses, no hepatomegaly, soft   Extremities Moves all extremities well, no edema, no cyanosis, no redness   Neurological Alert and oriented x 3     Discharge Medications     Discharge Medications      New Medications      Instructions Start Date   amoxicillin-clavulanate 875-125 MG per tablet  Commonly known as:  AUGMENTIN   1 tablet, Oral, Every 12 Hours Scheduled      aspirin 81 MG EC tablet   81 mg, Oral, Daily      atorvastatin 40 MG tablet  Commonly known as:  LIPITOR   40 mg, Oral, Nightly      calcium carbonate 500 MG chewable tablet  Commonly known as:  TUMS   1 tablet, Oral, 4 Times Daily PRN      carvedilol 12.5 MG tablet  Commonly known as:  COREG   12.5 mg, Oral, Every 12 Hours Scheduled      clopidogrel 75 MG tablet  Commonly known as:  PLAVIX   75 mg, Oral, Daily      ibuprofen 800 MG tablet  Commonly known as:  ADVIL,MOTRIN   800 mg, Oral, 3 Times Daily      losartan 25 MG tablet  Commonly known as:  COZAAR   25 mg, Oral, Every 24 Hours  Scheduled      pramipexole 0.125 MG tablet  Commonly known as:  MIRAPEX   0.125 mg, Oral, Nightly      simethicone 80 MG chewable tablet  Commonly known as:  MYLICON   80 mg, Oral, 3 Times Daily      sucralfate 1 GM/10ML suspension  Commonly known as:  CARAFATE   1 g, Oral, 4 Times Daily Before Meals & Nightly         Continue These Medications      Instructions Start Date   clonazePAM 1 MG tablet  Commonly known as:  KlonoPIN   1 mg, Oral, 2 Times Daily PRN      fingolimod 0.5 MG capsule  Commonly known as:  GILENYA   0.5 mg, Oral, Daily      FLUoxetine 20 MG capsule  Commonly known as:  PROzac   20 mg, Oral, 3 Times Daily      gabapentin 300 MG capsule  Commonly known as:  NEURONTIN   300 mg, Oral, 4 Times Daily      modafinil 200 MG tablet  Commonly known as:  PROVIGIL   200 mg, Oral, 2 Times Daily      omeprazole 40 MG capsule  Commonly known as:  priLOSEC   40 mg, Oral, Daily      ondansetron 4 MG tablet  Commonly known as:  ZOFRAN   4 mg, Oral, Every 6 Hours PRN      vitamin D 1.25 MG (83117 UT) capsule capsule  Commonly known as:  ERGOCALCIFEROL   50,000 Units, Oral, Every 7 Days, On Thursdays         Stop These Medications    propranolol 40 MG tablet  Commonly known as:  INDERAL            Discharge Diet: Healthy Heart     Activity at Discharge: As toelrated    Discharge disposition: [facility]      Condition on Discharge: Stable    Follow-up Appointments  No future appointments.  Additional Instructions for the Follow-ups that You Need to Schedule     Ambulatory Referral to Cardiac Rehab   As directed      Discharge Follow-up with Specified Provider: Saniya CHRISTY; 2 Weeks   As directed      To:  Saniya CHRISTY    Follow Up:  2 Weeks         Discharge Follow-up with Specified Provider: Dr. Zamudio; 6 Weeks   As directed      To:  Dr. Zamudio    Follow Up:  6 Weeks             Summary entered by Regla Guan RN has been reviewed and validated by myself.     Marlon Zamudio,  MD  03/07/20  11:17 AM

## 2020-03-06 NOTE — PLAN OF CARE
Pt stable today; pt has been up with PT and sitting in chair today.   PT off O2 without problems while awake 94-96 but while asleep pt drops to 87-90.  Pt will be going to acute care rehab in [facility] tomorrow. Will continue to monitor  Problem: Patient Care Overview  Goal: Individualization and Mutuality  Outcome: Ongoing (interventions implemented as appropriate)  Flowsheets (Taken 3/6/2020 1747)  Patient Specific Goals (Include Timeframe): Ready for discharge tomorrow with family taking her  Goal: Discharge Needs Assessment  Outcome: Ongoing (interventions implemented as appropriate)  Flowsheets  Taken 2/26/2020 0952 by Samantha Morgan, RN  Equipment Currently Used at Home: cane, straight;walker, rolling  Transportation Anticipated: family or friend will provide  Taken 3/6/2020 1747 by Anali Pagan, RN  Transportation Concerns: car, none  Concerns to be Addressed: discharge planning;adjustment to diagnosis/illness  Readmission Within the Last 30 Days: no previous admission in last 30 days  Patient/Family Anticipated Services at Transition: rehabilitation services  Patient/Family Anticipates Transition to: inpatient rehabilitation facility  Goal: Interprofessional Rounds/Family Conf  Outcome: Ongoing (interventions implemented as appropriate)  Flowsheets (Taken 3/6/2020 1747)  Participants: ; nursing; physician     Problem: Fall Risk (Adult)  Goal: Absence of Fall  Outcome: Ongoing (interventions implemented as appropriate)  Flowsheets (Taken 3/6/2020 1747)  Absence of Fall: making progress toward outcome     Problem: Skin Injury Risk (Adult)  Goal: Skin Health and Integrity  Outcome: Ongoing (interventions implemented as appropriate)  Flowsheets (Taken 3/6/2020 1747)  Skin Health and Integrity: making progress toward outcome     Problem: Pain, Chronic (Adult)  Goal: Acceptable Pain/Comfort Level and Functional Ability  Outcome: Ongoing (interventions implemented as appropriate)  Flowsheets (Taken  3/6/2020 1747)  Acceptable Pain/Comfort Level and Functional Ability: making progress toward outcome     Problem: Infection, Risk/Actual (Adult)  Goal: Infection Prevention/Resolution  Outcome: Ongoing (interventions implemented as appropriate)  Flowsheets (Taken 3/6/2020 1747)  Infection Prevention/Resolution: making progress toward outcome     Problem: Pain, Acute (Adult)  Goal: Acceptable Pain Control/Comfort Level  Outcome: Ongoing (interventions implemented as appropriate)  Flowsheets (Taken 3/6/2020 1747)  Acceptable Pain Control/Comfort Level: making progress toward outcome     Problem: Diarrhea (Adult)  Goal: Improved/Reduced Symptoms  Outcome: Ongoing (interventions implemented as appropriate)  Flowsheets (Taken 3/6/2020 1747)  Improved/Reduced Symptoms: making progress toward outcome     Problem: Cardiac: ACS (Acute Coronary Syndrome) (Adult)  Goal: Signs and Symptoms of Listed Potential Problems Will be Absent, Minimized or Managed (Cardiac: ACS)  Outcome: Ongoing (interventions implemented as appropriate)  Flowsheets (Taken 3/6/2020 1747)  Problems Assessed (Acute Coronary Syndrome): all  Problems Present (Acute Coronary Syn): situational response     Problem: Multiple Sclerosis (Adult,Obstetrics,Pediatric)  Goal: Signs and Symptoms of Listed Potential Problems Will be Absent, Minimized or Managed (Multiple Sclerosis)  Outcome: Ongoing (interventions implemented as appropriate)  Flowsheets (Taken 3/5/2020 1616 by Rosa Hollins RN)  Problems Assessed (Multiple Sclerosis): chronic pain;fatigue;functional decline/self-care deficit;tremor/ataxia  Problems Present (Multiple Sclerosis): fatigue;tremor/ataxia

## 2020-03-07 VITALS
OXYGEN SATURATION: 94 % | HEART RATE: 88 BPM | WEIGHT: 168.21 LBS | DIASTOLIC BLOOD PRESSURE: 87 MMHG | RESPIRATION RATE: 16 BRPM | TEMPERATURE: 97.8 F | BODY MASS INDEX: 33.02 KG/M2 | HEIGHT: 60 IN | SYSTOLIC BLOOD PRESSURE: 153 MMHG

## 2020-03-07 PROCEDURE — 97110 THERAPEUTIC EXERCISES: CPT

## 2020-03-07 PROCEDURE — 99238 HOSP IP/OBS DSCHRG MGMT 30/<: CPT | Performed by: INTERNAL MEDICINE

## 2020-03-07 RX ORDER — PRAMIPEXOLE DIHYDROCHLORIDE 0.12 MG/1
0.12 TABLET ORAL NIGHTLY
Qty: 30 TABLET | Refills: 1 | Status: SHIPPED | OUTPATIENT
Start: 2020-03-07 | End: 2020-05-29

## 2020-03-07 RX ORDER — FUROSEMIDE 40 MG/1
40 TABLET ORAL DAILY
Qty: 30 TABLET | Refills: 11 | Status: SHIPPED | OUTPATIENT
Start: 2020-03-07 | End: 2020-03-20 | Stop reason: SDUPTHER

## 2020-03-07 RX ORDER — CALCIUM CARBONATE 200(500)MG
1 TABLET,CHEWABLE ORAL 4 TIMES DAILY PRN
Qty: 30 TABLET | Refills: 0 | Status: SHIPPED | OUTPATIENT
Start: 2020-03-07 | End: 2020-07-29

## 2020-03-07 RX ORDER — SUCRALFATE ORAL 1 G/10ML
1 SUSPENSION ORAL
Qty: 120 EACH | Refills: 1 | Status: SHIPPED | OUTPATIENT
Start: 2020-03-07 | End: 2020-03-19 | Stop reason: ALTCHOICE

## 2020-03-07 RX ORDER — IBUPROFEN 800 MG/1
800 TABLET ORAL 3 TIMES DAILY
Qty: 90 TABLET | Refills: 1 | Status: SHIPPED | OUTPATIENT
Start: 2020-03-07 | End: 2020-03-19 | Stop reason: SINTOL

## 2020-03-07 RX ORDER — LOSARTAN POTASSIUM 25 MG/1
25 TABLET ORAL
Qty: 30 TABLET | Refills: 6 | Status: SHIPPED | OUTPATIENT
Start: 2020-03-07 | End: 2020-03-19

## 2020-03-07 RX ORDER — AMOXICILLIN AND CLAVULANATE POTASSIUM 875; 125 MG/1; MG/1
1 TABLET, FILM COATED ORAL EVERY 12 HOURS SCHEDULED
Qty: 6 TABLET | Refills: 0 | Status: SHIPPED | OUTPATIENT
Start: 2020-03-07 | End: 2020-03-10

## 2020-03-07 RX ORDER — CARVEDILOL 12.5 MG/1
12.5 TABLET ORAL EVERY 12 HOURS SCHEDULED
Qty: 60 TABLET | Refills: 6 | Status: SHIPPED | OUTPATIENT
Start: 2020-03-07 | End: 2020-03-19

## 2020-03-07 RX ORDER — ATORVASTATIN CALCIUM 40 MG/1
40 TABLET, FILM COATED ORAL NIGHTLY
Qty: 30 TABLET | Refills: 11 | Status: SHIPPED | OUTPATIENT
Start: 2020-03-07 | End: 2021-03-16

## 2020-03-07 RX ORDER — ASPIRIN 81 MG/1
81 TABLET ORAL DAILY
Qty: 30 TABLET | Refills: 11
Start: 2020-03-07

## 2020-03-07 RX ORDER — CLOPIDOGREL BISULFATE 75 MG/1
75 TABLET ORAL DAILY
Qty: 30 TABLET | Refills: 11 | Status: SHIPPED | OUTPATIENT
Start: 2020-03-07 | End: 2021-03-16

## 2020-03-07 RX ORDER — POTASSIUM CHLORIDE 750 MG/1
20 CAPSULE, EXTENDED RELEASE ORAL 2 TIMES DAILY WITH MEALS
Qty: 30 CAPSULE | Refills: 11 | Status: SHIPPED | OUTPATIENT
Start: 2020-03-07 | End: 2020-07-29

## 2020-03-07 RX ORDER — SIMETHICONE 80 MG
80 TABLET,CHEWABLE ORAL 3 TIMES DAILY
Qty: 90 TABLET | Refills: 0 | Status: SHIPPED | OUTPATIENT
Start: 2020-03-07 | End: 2020-07-29

## 2020-03-07 RX ADMIN — SUCRALFATE 1 G: 1 SUSPENSION ORAL at 12:39

## 2020-03-07 RX ADMIN — SUCRALFATE 1 G: 1 SUSPENSION ORAL at 09:15

## 2020-03-07 RX ADMIN — PANTOPRAZOLE SODIUM 40 MG: 40 TABLET, DELAYED RELEASE ORAL at 09:16

## 2020-03-07 RX ADMIN — FUROSEMIDE 40 MG: 40 TABLET ORAL at 09:17

## 2020-03-07 RX ADMIN — GABAPENTIN 300 MG: 300 CAPSULE ORAL at 09:16

## 2020-03-07 RX ADMIN — LOSARTAN POTASSIUM 25 MG: 25 TABLET, FILM COATED ORAL at 09:17

## 2020-03-07 RX ADMIN — MODAFINIL 200 MG: 100 TABLET ORAL at 09:16

## 2020-03-07 RX ADMIN — IBUPROFEN 800 MG: 800 TABLET, FILM COATED ORAL at 09:17

## 2020-03-07 RX ADMIN — SIMETHICONE CHEW TAB 80 MG 80 MG: 80 TABLET ORAL at 09:15

## 2020-03-07 RX ADMIN — CLOPIDOGREL 75 MG: 75 TABLET, FILM COATED ORAL at 09:17

## 2020-03-07 RX ADMIN — TRAMADOL HYDROCHLORIDE 50 MG: 50 TABLET, FILM COATED ORAL at 09:16

## 2020-03-07 RX ADMIN — CLONAZEPAM 1 MG: 0.5 TABLET ORAL at 09:16

## 2020-03-07 RX ADMIN — AMOXICILLIN AND CLAVULANATE POTASSIUM 1 TABLET: 875; 125 TABLET, FILM COATED ORAL at 09:17

## 2020-03-07 RX ADMIN — FLUOXETINE HYDROCHLORIDE 20 MG: 20 CAPSULE ORAL at 09:17

## 2020-03-07 RX ADMIN — CARVEDILOL 12.5 MG: 12.5 TABLET, FILM COATED ORAL at 09:17

## 2020-03-07 RX ADMIN — POTASSIUM CHLORIDE 20 MEQ: 750 CAPSULE, EXTENDED RELEASE ORAL at 09:17

## 2020-03-07 RX ADMIN — GABAPENTIN 300 MG: 300 CAPSULE ORAL at 12:39

## 2020-03-07 RX ADMIN — ASPIRIN 81 MG: 81 TABLET, COATED ORAL at 09:17

## 2020-03-07 NOTE — PLAN OF CARE
Problem: Patient Care Overview  Goal: Plan of Care Review  Outcome: Ongoing (interventions implemented as appropriate)  Flowsheets (Taken 3/7/2020 1031)  Progress: improving  Plan of Care Reviewed With: patient  Outcome Summary: Pt performed all functional mobility with CGA x2 and ambulated out in buckley with rwx. She was able to ambulate with no evidence of knee buckling while up on feet. PT concluded with pt transferring to chair to remain up and sitting until after lunch. She will continue to benefit from skilled PT to address functional mobility deficits and reach functional goals.

## 2020-03-07 NOTE — THERAPY TREATMENT NOTE
Patient Name: Falguni Minaya  : 1960    MRN: [id number]                              Today's Date: 3/7/2020       Admit Date: 2020    Visit Dx:     ICD-10-CM ICD-9-CM   1. STEMI involving right coronary artery (CMS/HCC) I21.11 410.31   2. Left sided abdominal pain R10.9 789.09   3. Dyspepsia R10.13 536.8   4. Heme positive stool R19.5 792.1     Patient Active Problem List   Diagnosis   • STEMI involving right coronary artery (CMS/HCC)   • Acute ST elevation myocardial infarction (STEMI) due to occlusion of right coronary artery (CMS/HCC)   • Multiple sclerosis (CMS/HCC)   • Left sided abdominal pain   • Dyspepsia   • Heme positive stool     Past Medical History:   Diagnosis Date   • Abdominal hernia    • Coronary artery disease    • GERD (gastroesophageal reflux disease)    • Multiple sclerosis (CMS/HCC)    • Seizures (CMS/HCC)     last seizure      Past Surgical History:   Procedure Laterality Date   • APPENDECTOMY     • CARDIAC CATHETERIZATION     • CARDIAC CATHETERIZATION N/A 2020    Procedure: Left Heart Cath;  Surgeon: Marlon Zamudio MD;  Location: [facility] CATH INVASIVE LOCATION;  Service: Cardiovascular;  Laterality: N/A;   • CARDIAC CATHETERIZATION N/A 2020    Procedure: Stent LATRICIA coronary;  Surgeon: Marlon Zamudio MD;  Location: [facility] CATH INVASIVE LOCATION;  Service: Cardiovascular;  Laterality: N/A;   • CARDIAC CATHETERIZATION  2020    Procedure: Percutaneous Manual Thrombectomy;  Surgeon: Marlon Zamudio MD;  Location: [facility] CATH INVASIVE LOCATION;  Service: Cardiovascular;;   • CARDIAC CATHETERIZATION N/A 2020    Procedure: Coronary angiography;  Surgeon: Marlon Zamudio MD;  Location: [facility] CATH INVASIVE LOCATION;  Service: Cardiovascular;  Laterality: N/A;   • CARDIAC CATHETERIZATION N/A 2020    Procedure: Left ventriculography;  Surgeon: Marlon Zamudio MD;  Location: [facility] CATH INVASIVE LOCATION;  Service: Cardiovascular;   Laterality: N/A;   • CHOLECYSTECTOMY     • COLONOSCOPY N/A 2/29/2020    Procedure: COLONOSCOPY to  cecum:;  Surgeon: Krystal Sarabia MD;  Location: [facility] ENDOSCOPY;  Service: Gastroenterology;  Laterality: N/A;  pre:  anemia and abd pain  post:  hemorrhoids, tortuous colon   • ENDOSCOPY N/A 2/29/2020    Procedure: ESOPHAGOGASTRODUODENOSCOPY  with biopsies;  Surgeon: Krystal Sarabia MD;  Location: [facility] ENDOSCOPY;  Service: Gastroenterology;  Laterality: N/A;  pre:  anemia and abd pain  post:  mild gastritis,    • HYSTERECTOMY     • TONSILLECTOMY       General Information     Row Name 03/07/20 1029          PT Evaluation Time/Intention    Document Type  therapy note (daily note)  -     Mode of Treatment  physical therapy  -     Row Name 03/07/20 1029          General Information    Patient Profile Reviewed?  yes  -     Existing Precautions/Restrictions  fall  -     Row Name 03/07/20 1029          Cognitive Assessment/Intervention- PT/OT    Orientation Status (Cognition)  oriented x 3  -     Row Name 03/07/20 1029          Safety Issues, Functional Mobility    Impairments Affecting Function (Mobility)  balance;coordination;endurance/activity tolerance;strength  -       User Key  (r) = Recorded By, (t) = Taken By, (c) = Cosigned By    Initials Name Provider Type     Kristopher Onofre, PT Physical Therapist        Mobility     Row Name 03/07/20 1030          Bed Mobility Assessment/Treatment    Bed Mobility Assessment/Treatment  bed mobility (all) activities  -     Supine-Sit [place] (Bed Mobility)  contact guard  -     Sit-Supine [place] (Bed Mobility)  contact guard  -     Assistive Device (Bed Mobility)  bed rails;head of bed elevated  -     Row Name 03/07/20 1030          Bed-Chair Transfer    Bed-Chair [place] (Transfers)  contact guard;2 person assist  -     Assistive Device (Bed-Chair Transfers)  walker, front-wheeled  -     Row Name 03/07/20 1030          Sit-Stand  Transfer    Sit-Stand [place] (Transfers)  contact guard  -     Assistive Device (Sit-Stand Transfers)  walker, front-wheeled  -     Row Name 03/07/20 1030          Gait/Stairs Assessment/Training    Gait/Stairs Assessment/Training  gait/ambulation independence;gait/ambulation assistive device  -     [place] Level (Gait)  contact guard  -     Assistive Device (Gait)  walker, front-wheeled  -     Distance in Feet (Gait)  100  -CH     Pattern (Gait)  step-through  -     Deviations/Abnormal Patterns (Gait)  justin decreased  -     Bilateral Gait Deviations  forward flexed posture  -       User Key  (r) = Recorded By, (t) = Taken By, (c) = Cosigned By    Initials Name Provider Type     Kristopher Onofre, GEOVANY Physical Therapist        Obj/Interventions     Row Name 03/07/20 1031          Therapeutic Exercise    Lower Extremity (Therapeutic Exercise)  LAQ (long arc quad), bilateral;marching while seated  -     Lower Extremity Range of Motion (Therapeutic Exercise)  ankle dorsiflexion/plantar flexion, bilateral  -     Exercise Type (Therapeutic Exercise)  AROM (active range of motion)  -     Position (Therapeutic Exercise)  seated  -     Sets/Reps (Therapeutic Exercise)  1/10  -       User Key  (r) = Recorded By, (t) = Taken By, (c) = Cosigned By    Initials Name Provider Type     Kristopher Onofre, GEOVANY Physical Therapist        Goals/Plan    No documentation.       Clinical Impression     Row Name 03/07/20 1031          Pain Scale: Numbers Pre/Post-Treatment    Pain Scale: Numbers, Pretreatment  0/10 - no pain  -     Pain Scale: Numbers, Post-Treatment  0/10 - no pain  -     Row Name 03/07/20 1031          Plan of Care Review    Plan of Care Reviewed With  patient  -     Progress  improving  -     Outcome Summary  Pt performed all functional mobility with CGA x2 and ambulated out in buckley with rwx. She was able to ambulate with no evidence of knee buckling while up on feet. PT  concluded with pt transferring to chair to remain up and sitting until after lunch. She will continue to benefit from skilled PT to address functional mobility deficits and reach functional goals.  -     Row Name 03/07/20 1031          Positioning and Restraints    Pre-Treatment Position  in bed  -     Post Treatment Position  chair  -CH     In Chair  reclined;with family/caregiver;call light within reach;encouraged to call for assist;exit alarm on  -       User Key  (r) = Recorded By, (t) = Taken By, (c) = Cosigned By    Initials Name Provider Type    Kristopher Paredes PT Physical Therapist        Outcome Measures     Row Name 03/07/20 1035          How much help from another person do you currently need...    Turning from your back to your side while in flat bed without using bedrails?  4  -CH     Moving from lying on back to sitting on the side of a flat bed without bedrails?  3  -CH     Moving to and from a bed to a chair (including a wheelchair)?  3  -CH     Standing up from a chair using your arms (e.g., wheelchair, bedside chair)?  3  -CH     Climbing 3-5 steps with a railing?  2  -CH     To walk in hospital room?  3  -CH     AM-PAC 6 Clicks Score (PT)  18  -     Row Name 03/07/20 1035          Functional Assessment    Outcome Measure Options  AM-PAC 6 Clicks Basic Mobility (PT)  -       User Key  (r) = Recorded By, (t) = Taken By, (c) = Cosigned By    Initials Name Provider Type    Kristopher Paredes PT Physical Therapist          PT Recommendation and Plan     Plan of Care Reviewed With: patient  Progress: improving  Outcome Summary: Pt performed all functional mobility with CGA x2 and ambulated out in buckley with rwx. She was able to ambulate with no evidence of knee buckling while up on feet. PT concluded with pt transferring to chair to remain up and sitting until after lunch. She will continue to benefit from skilled PT to address functional mobility deficits and reach functional goals.      Time Calculation:   PT Charges     Row Name 03/07/20 1036             Time Calculation    Start Time  1016  -      Stop Time  1027  -      Time Calculation (min)  11 min  -      PT Received On  03/07/20  -      PT - Next Appointment  03/08/20  -        User Key  (r) = Recorded By, (t) = Taken By, (c) = Cosigned By    Initials Name Provider Type     Kristopher Onofre, PT Physical Therapist        Therapy Charges for Today     Code Description Service Date Service Provider Modifiers Qty    98104779951  PT THER PROC EA 15 MIN 3/7/2020 Kristopher Onofre, PT GP 1    [id number] HC PT THER SUPP EA 15 MIN 3/7/2020 Kristopher Onofre, PT GP 1          PT G-Codes  Outcome Measure Options: AM-PAC 6 Clicks Basic Mobility (PT)  AM-PAC 6 Clicks Score (PT): 18  AM-PAC 6 Clicks Score (OT): 15    Kristopherpadmini Onofre PT  3/7/2020

## 2020-03-08 ENCOUNTER — READMISSION MANAGEMENT (OUTPATIENT)
Dept: CALL CENTER | Facility: HOSPITAL | Age: 60
End: 2020-03-08

## 2020-03-08 ENCOUNTER — HOSPITAL ENCOUNTER (OUTPATIENT)
Dept: OTHER | Facility: HOSPITAL | Age: 60
Discharge: HOME | End: 2020-03-08

## 2020-03-08 NOTE — OUTREACH NOTE
Prep Survey      Responses   Restoration facility patient discharged from?  [facility]   Is LACE score < 7 ?  No   Eligibility  Not Eligible   What are the reasons patient is not eligible?  [facility] [[facility] rehab]   Does the patient have one of the following disease processes/diagnoses(primary or secondary)?  Acute MI (STEMI,NSTEMI)   Prep survey completed?  Yes          Emy Rome RN

## 2020-03-09 NOTE — PROGRESS NOTES
Case Management Discharge Note      Final Note: Confirmed with Rosa/ Mrae that pt was DC'd to acute rehab at [facility]    Provided Post Acute Provider List?: Yes  Post Acute Provider List: Dialysis  Provided Post Acute Provider Quality & Resource List?: Yes  Post Acute Provider Quality and Resource List: Home Health  Delivered To: Support Person  Support Person: spouse, Jarett   Method of Delivery: In person    Destination - Selection Complete      Service Provider Request Status Selected Services Address Phone Number Fax Number    [facility] RE[facility] - [facility] Selected Inpatient Rehabilitation [address] [place] [address] [phone]      Durable Medical Equipment      No service has been selected for the patient.      Dialysis/Infusion      No service has been selected for the patient.      Home Medical Care      No service has been selected for the patient.      Therapy      No service has been selected for the patient.      Community Resources      No service has been selected for the patient.             Final Discharge Disposition Code: 62 - inpatient rehab facility

## 2020-03-10 ENCOUNTER — HOSPITAL ENCOUNTER (OUTPATIENT)
Dept: OTHER | Facility: HOSPITAL | Age: 60
Discharge: HOME | End: 2020-03-10

## 2020-03-10 LAB
ANION GAP SERPL CALC-SCNC: 19 MMOL/L (ref 8–19)
BUN SERPL-MCNC: 10 MG/DL (ref 5–25)
BUN/CREAT SERPL: 11 {RATIO} (ref 6–20)
CALCIUM SERPL-MCNC: 9.8 MG/DL (ref 8.7–10.4)
CHLORIDE SERPL-SCNC: 107 MMOL/L (ref 99–111)
CONV CO2: 22 MMOL/L (ref 22–32)
CREAT UR-MCNC: 0.89 MG/DL (ref 0.5–0.9)
GFR SERPLBLD BASED ON 1.73 SQ M-ARVRAT: >60 ML/MIN/{1.73_M2}
GLUCOSE SERPL-MCNC: 106 MG/DL (ref 65–99)
MAGNESIUM SERPL-MCNC: 1.44 MG/DL (ref 1.6–2.3)
OSMOLALITY SERPL CALC.SUM OF ELEC: 295 MOSM/KG (ref 273–304)
POTASSIUM SERPL-SCNC: 4.9 MMOL/L (ref 3.5–5.3)
SODIUM SERPL-SCNC: 143 MMOL/L (ref 135–147)

## 2020-03-15 ENCOUNTER — HOSPITAL ENCOUNTER (OUTPATIENT)
Dept: OTHER | Facility: HOSPITAL | Age: 60
Discharge: HOME | End: 2020-03-15

## 2020-03-15 LAB
25(OH)D3 SERPL-MCNC: 23.7 NG/ML (ref 30–100)
ALBUMIN SERPL-MCNC: 3.5 G/DL (ref 3.5–5)
ALBUMIN/GLOB SERPL: 0.9 {RATIO} (ref 1.4–2.6)
ALP SERPL-CCNC: 91 U/L (ref 43–160)
ALT SERPL-CCNC: 9 U/L (ref 10–40)
ANION GAP SERPL CALC-SCNC: 17 MMOL/L (ref 8–19)
AST SERPL-CCNC: 22 U/L (ref 15–50)
BASOPHILS # BLD AUTO: 0.07 10*3/UL (ref 0–0.2)
BASOPHILS NFR BLD AUTO: 1 % (ref 0–3)
BILIRUB SERPL-MCNC: 0.34 MG/DL (ref 0.2–1.3)
BUN SERPL-MCNC: 9 MG/DL (ref 5–25)
BUN/CREAT SERPL: 12 {RATIO} (ref 6–20)
CALCIUM SERPL-MCNC: 10.7 MG/DL (ref 8.7–10.4)
CHLORIDE SERPL-SCNC: 98 MMOL/L (ref 99–111)
CONV ABS IMM GRAN: 0.09 10*3/UL (ref 0–0.2)
CONV CO2: 24 MMOL/L (ref 22–32)
CONV IMMATURE GRAN: 1.3 % (ref 0–1.8)
CONV TOTAL PROTEIN: 7.2 G/DL (ref 6.3–8.2)
CREAT UR-MCNC: 0.75 MG/DL (ref 0.5–0.9)
DEPRECATED RDW RBC AUTO: 44.5 FL (ref 36.4–46.3)
EOSINOPHIL # BLD AUTO: 0.12 10*3/UL (ref 0–0.7)
EOSINOPHIL # BLD AUTO: 1.8 % (ref 0–7)
ERYTHROCYTE [DISTWIDTH] IN BLOOD BY AUTOMATED COUNT: 12.7 % (ref 11.7–14.4)
GFR SERPLBLD BASED ON 1.73 SQ M-ARVRAT: >60 ML/MIN/{1.73_M2}
GLOBULIN UR ELPH-MCNC: 3.7 G/DL (ref 2–3.5)
GLUCOSE SERPL-MCNC: 91 MG/DL (ref 65–99)
HCT VFR BLD AUTO: 34.3 % (ref 37–47)
HGB BLD-MCNC: 10.5 G/DL (ref 12–16)
LYMPHOCYTES # BLD AUTO: 1.87 10*3/UL (ref 1–5)
LYMPHOCYTES NFR BLD AUTO: 27.7 % (ref 20–45)
MCH RBC QN AUTO: 29.2 PG (ref 27–31)
MCHC RBC AUTO-ENTMCNC: 30.6 G/DL (ref 33–37)
MCV RBC AUTO: 95.3 FL (ref 81–99)
MONOCYTES # BLD AUTO: 0.72 10*3/UL (ref 0.2–1.2)
MONOCYTES NFR BLD AUTO: 10.7 % (ref 3–10)
NEUTROPHILS # BLD AUTO: 3.87 10*3/UL (ref 2–8)
NEUTROPHILS NFR BLD AUTO: 57.5 % (ref 30–85)
NRBC CBCN: 0 % (ref 0–0.7)
OSMOLALITY SERPL CALC.SUM OF ELEC: 276 MOSM/KG (ref 273–304)
PLATELET # BLD AUTO: 528 10*3/UL (ref 130–400)
PMV BLD AUTO: 10.7 FL (ref 9.4–12.3)
POTASSIUM SERPL-SCNC: 4.6 MMOL/L (ref 3.5–5.3)
RBC # BLD AUTO: 3.6 10*6/UL (ref 4.2–5.4)
SODIUM SERPL-SCNC: 134 MMOL/L (ref 135–147)
WBC # BLD AUTO: 6.74 10*3/UL (ref 4.8–10.8)

## 2020-03-19 ENCOUNTER — TELEPHONE (OUTPATIENT)
Dept: CARDIOLOGY | Facility: CLINIC | Age: 60
End: 2020-03-19

## 2020-03-19 RX ORDER — LOSARTAN POTASSIUM 50 MG/1
50 TABLET ORAL
Start: 2020-03-19 | End: 2020-10-13

## 2020-03-19 RX ORDER — SPIRONOLACTONE 25 MG/1
25 TABLET ORAL DAILY
COMMUNITY
End: 2020-07-29

## 2020-03-19 RX ORDER — SUCRALFATE 1 G/1
1 TABLET ORAL 4 TIMES DAILY
COMMUNITY
End: 2020-07-29

## 2020-03-19 RX ORDER — CARVEDILOL 12.5 MG/1
12.5 TABLET ORAL EVERY 12 HOURS SCHEDULED
Qty: 60 TABLET | Refills: 6 | Status: SHIPPED | OUTPATIENT
Start: 2020-03-19 | End: 2021-05-18 | Stop reason: SDUPTHER

## 2020-03-19 NOTE — TELEPHONE ENCOUNTER
I called and spoke with the patient's  regarding postponing her hospital follow-up.  She had a recent prolonged hospital stay and just came home from rehab Tuesday.  I spoke with the patient's  and I updated her med list as some med changes were made and I will obtain the records and labs that she had while she was at rehab as well as with her discharge medication list.  He reported that she is doing better and just has some fatigue.  He denies that she has had any chest pain or significant dyspnea, dizziness, syncope, weight gain or swelling.  It appears that may be her Spironolactone was switched from Lasix.  Also unfortunately she has been out of Plavix for 2 days as the pharmacy would not fill until today d/t insurance.  I educated the patient's  that this was the medication that she should not run out of her missed any doses and to call the pharmacy immediately today to pick that up and give it to her.  I did personally speak with the pharmacy and they said it was not filled as her insurance would not cover it until today but they are going to work on the prescription.  I asked that the patient's  call the insurance company or office if they have any difficulty with picking it up today as this is essential for her to be taking.  The patient's  also prefers that her appointment with me tomorrow be rescheduled due to COVID19.  I advised him of signs and symptoms that he will call us sooner to address He is going to get  a way to measure blood pressure and HR at home between now and her next appointment 4/16/2020 scheduled appointment with Dr. Zamudio.  I did inform him that based on the COVID 19 situation this could be postponed also and he verbalized understanding.        Mara please call and get patient's labs, records  and discharge med list from [facility] in [place] TODAY. Thanks!

## 2020-03-20 RX ORDER — FUROSEMIDE 40 MG/1
40 TABLET ORAL DAILY
Qty: 30 TABLET | Refills: 2 | Status: SHIPPED | OUTPATIENT
Start: 2020-03-20 | End: 2021-09-29

## 2020-03-20 NOTE — TELEPHONE ENCOUNTER
I spoke with the patient's  after I got her lab results.  Her labs are stable.  Her discharge med rec she should be taking furosemide.  He states he did she did not get any so I sent some in for her.  He was educated on the possibility of a telephone or video visit with me between now and her appointment with Dr. Zamudio.  He does not think it is necessary at this time but will call our office to have this arranged if he needs our services between now and her April appointment with Dr. Zamudio.

## 2020-03-23 ENCOUNTER — TELEPHONE (OUTPATIENT)
Dept: CARDIOLOGY | Facility: CLINIC | Age: 60
End: 2020-03-23

## 2020-03-23 NOTE — TELEPHONE ENCOUNTER
[phone]  3:28 pm    Pt called asking about what she can take for a headache with her current meds as listed?  Pt does not currently have a PCP as she has retired.  Can you advise?    [facility] ALEX[facility]ER MAY is out on vacation this week.  JOSELYN is out for the rest of the day.  I did notice you saw this pt at the hospital.  Can you help?

## 2020-04-01 ENCOUNTER — TELEPHONE (OUTPATIENT)
Dept: CARDIOLOGY | Facility: HOSPITAL | Age: 60
End: 2020-04-01

## 2020-04-01 NOTE — TELEPHONE ENCOUNTER
Her discharge summary from [facility] indicates she is on Aldactone.  Have her stop it

## 2020-04-01 NOTE — TELEPHONE ENCOUNTER
8738 I contacted Conchis with [facility] and asked her to have patient to stop her Aldactone.  She verbalized understanding.  AGUSTINA Sams RN

## 2020-04-06 ENCOUNTER — TELEPHONE (OUTPATIENT)
Dept: CARDIOLOGY | Facility: CLINIC | Age: 60
End: 2020-04-06

## 2020-04-06 NOTE — TELEPHONE ENCOUNTER
Ok to draw CBC and BMP tomorrow ( I do not have a way to fax order to her). I recommend she also hold losartan right now for her dizziness and low BP since she is still having dizziness with stopping the aldactone. We will see if this helps.  Please also have [facility] record her heart rates as well since she is on coreg.

## 2020-04-06 NOTE — TELEPHONE ENCOUNTER
Conchis KIM called again stating the pt I still c/o dizziness accompanied with low bp.  Conchis would like to know if she can draw a CBC at tomorrows visit?  Please advise.  The order will then need to be faxed to [phone]    Thanks KYAW MUNOZ

## 2020-04-07 ENCOUNTER — HOSPITAL ENCOUNTER (OUTPATIENT)
Dept: OTHER | Facility: HOSPITAL | Age: 60
Discharge: HOME | End: 2020-04-07
Attending: INTERNAL MEDICINE

## 2020-04-07 ENCOUNTER — TELEPHONE (OUTPATIENT)
Dept: CARDIOLOGY | Facility: CLINIC | Age: 60
End: 2020-04-07

## 2020-04-07 DIAGNOSIS — R42 DIZZINESS: Primary | ICD-10-CM

## 2020-04-07 DIAGNOSIS — D64.9 ANEMIA, UNSPECIFIED TYPE: ICD-10-CM

## 2020-04-07 LAB
ANION GAP SERPL CALC-SCNC: 17 MMOL/L (ref 8–19)
BASOPHILS # BLD AUTO: 0.07 10*3/UL (ref 0–0.2)
BASOPHILS NFR BLD AUTO: 0.8 % (ref 0–3)
BUN SERPL-MCNC: 24 MG/DL (ref 5–25)
BUN/CREAT SERPL: 18 {RATIO} (ref 6–20)
CALCIUM SERPL-MCNC: 10.3 MG/DL (ref 8.7–10.4)
CHLORIDE SERPL-SCNC: 103 MMOL/L (ref 99–111)
CONV ABS IMM GRAN: 0.03 10*3/UL (ref 0–0.2)
CONV CO2: 22 MMOL/L (ref 22–32)
CONV IMMATURE GRAN: 0.4 % (ref 0–1.8)
CREAT UR-MCNC: 1.31 MG/DL (ref 0.5–0.9)
DEPRECATED RDW RBC AUTO: 46.5 FL (ref 36.4–46.3)
EOSINOPHIL # BLD AUTO: 0.15 10*3/UL (ref 0–0.7)
EOSINOPHIL # BLD AUTO: 1.8 % (ref 0–7)
ERYTHROCYTE [DISTWIDTH] IN BLOOD BY AUTOMATED COUNT: 13 % (ref 11.7–14.4)
GFR SERPLBLD BASED ON 1.73 SQ M-ARVRAT: 44 ML/MIN/{1.73_M2}
GLUCOSE SERPL-MCNC: 132 MG/DL (ref 65–99)
HCT VFR BLD AUTO: 40.6 % (ref 37–47)
HGB BLD-MCNC: 12.2 G/DL (ref 12–16)
LYMPHOCYTES # BLD AUTO: 2.09 10*3/UL (ref 1–5)
LYMPHOCYTES NFR BLD AUTO: 24.5 % (ref 20–45)
MCH RBC QN AUTO: 29.2 PG (ref 27–31)
MCHC RBC AUTO-ENTMCNC: 30 G/DL (ref 33–37)
MCV RBC AUTO: 97.1 FL (ref 81–99)
MONOCYTES # BLD AUTO: 0.52 10*3/UL (ref 0.2–1.2)
MONOCYTES NFR BLD AUTO: 6.1 % (ref 3–10)
NEUTROPHILS # BLD AUTO: 5.66 10*3/UL (ref 2–8)
NEUTROPHILS NFR BLD AUTO: 66.4 % (ref 30–85)
NRBC CBCN: 0 % (ref 0–0.7)
OSMOLALITY SERPL CALC.SUM OF ELEC: 292 MOSM/KG (ref 273–304)
PLATELET # BLD AUTO: 371 10*3/UL (ref 130–400)
PMV BLD AUTO: 11.5 FL (ref 9.4–12.3)
POTASSIUM SERPL-SCNC: 4 MMOL/L (ref 3.5–5.3)
RBC # BLD AUTO: 4.18 10*6/UL (ref 4.2–5.4)
SODIUM SERPL-SCNC: 138 MMOL/L (ref 135–147)
WBC # BLD AUTO: 8.52 10*3/UL (ref 4.8–10.8)

## 2020-04-07 NOTE — TELEPHONE ENCOUNTER
----- Message from Johana Osullivan MA sent at 4/7/2020  8:24 AM EDT -----  Good Morning Saniya. I'm in house this week. Will you please place your CBC and BMP orders in Epic so I may get them faxed to the [facility]??    ALEXANDER Vaughn

## 2020-04-07 NOTE — TELEPHONE ENCOUNTER
Johana can you please fax these labs to the fax number [phone]    Thanks [facility] ALEXANDER HOOVER for Conchis with [facility] with info below.

## 2020-04-07 NOTE — TELEPHONE ENCOUNTER
Done. Lab Orders w/ Demographics faxed to JULIO @ [phone]. Confirmation Received.    ALEXANDER Vaughn

## 2020-04-09 ENCOUNTER — TELEPHONE (OUTPATIENT)
Dept: CARDIOLOGY | Facility: CLINIC | Age: 60
End: 2020-04-09

## 2020-04-09 PROBLEM — N17.9 AKI (ACUTE KIDNEY INJURY) (HCC): Status: ACTIVE | Noted: 2020-04-09

## 2020-04-09 PROBLEM — I95.9 HYPOTENSION: Status: ACTIVE | Noted: 2020-04-09

## 2020-04-09 NOTE — TELEPHONE ENCOUNTER
Reviewed labs from 4/7/2020 drawn by Car[facility] Beauty. CBC unremarkable she is not anemic and white blood cell and platelet counts are normal.  BMP showed a bump in her creatinine to 1.31 (0.90, electrolytes were normal, glucose mildly elevated 132), Her last creatinine was 0.75 on 3/15/2020 at [facility].  I called and left a message for Conchis to call me back to see if her dizziness and hypotension was improved.  I was later able to get a hold of Conchis who was going to call me back after her follow-up visit with her.  She will let me know if she has had weight gain edema or significant shortness of breath and if her blood pressure and dizziness have improved with med changes.  If her heart failure symptoms are stable with her MACY I anticipate stopping furosemide for at least a couple days if able which will be determined when Conchis calls me back.

## 2020-04-10 PROBLEM — M54.50 PAIN IN LOWER BACK: Status: ACTIVE | Noted: 2017-03-21

## 2020-04-10 PROBLEM — R29.898 WEAKNESS OF RIGHT LEG: Status: ACTIVE | Noted: 2017-03-21

## 2020-04-10 NOTE — TELEPHONE ENCOUNTER
Conchis with [facility] had called back and left a message I did not yet receive.  I contacted Conchis as reminder who reported her BP was still running low 80s/50s.  Heart rate in the 60s to 70s.  Her dizziness was somewhat better but still present.  She was not taking spironolactone or losartan as directed.  She is still taking the carvedilol and furosemide.  She denied that she was having edema or felt like she was gaining weight though her scale was broken she was getting a new scale that day.  She denied worsening shortness of breath.  For now we will have her hold the furosemide over the weekend and call with an update on blood pressures Monday.  I encouraged Conchis to have the patient call me if she had any further questions.  They will keep 4/16/2020 telehealth visit with Dr. Zamudio.  I recommend that they discussed with him rechecking her kidney function with home health again at that time to make sure her creatinine was trending in the right direction.  She will demonstrate understanding of the plan of care.

## 2020-04-14 ENCOUNTER — TELEPHONE (OUTPATIENT)
Dept: CARDIOLOGY | Facility: CLINIC | Age: 60
End: 2020-04-14

## 2020-04-14 NOTE — TELEPHONE ENCOUNTER
[phone]  9:19am    Pt called stating she received a vm this morning and was told she was to be doing therapy-from [facility].      Pt states she has home care therapists and is doing it at home.    I informed pt if they called back just to inform them she has home therapists.      [facility]

## 2020-04-14 NOTE — TELEPHONE ENCOUNTER
[phone]  10:36am    Goldie at [facility] reached out to the pt to schedule cardiac rehab and states the pt declined. VEDA    [facility]

## 2020-04-14 NOTE — TELEPHONE ENCOUNTER
Ok. I believe this was ordered after she was discharged from [facility]. I do not believe we were involved in her home care. She needs to establish with a PCP.

## 2020-04-15 ENCOUNTER — HOSPITAL ENCOUNTER (OUTPATIENT)
Dept: OTHER | Facility: HOSPITAL | Age: 60
Discharge: HOME | End: 2020-04-15
Attending: PHYSICAL MEDICINE & REHABILITATION

## 2020-04-15 LAB
ALBUMIN SERPL-MCNC: 4 G/DL (ref 3.5–5)
ALBUMIN/GLOB SERPL: 1.1 {RATIO} (ref 1.4–2.6)
ALP SERPL-CCNC: 137 U/L (ref 43–160)
ALT SERPL-CCNC: 15 U/L (ref 10–40)
ANION GAP SERPL CALC-SCNC: 17 MMOL/L (ref 8–19)
AST SERPL-CCNC: 17 U/L (ref 15–50)
BASOPHILS # BLD AUTO: 0.06 10*3/UL (ref 0–0.2)
BASOPHILS NFR BLD AUTO: 0.6 % (ref 0–3)
BILIRUB SERPL-MCNC: 0.25 MG/DL (ref 0.2–1.3)
BUN SERPL-MCNC: 21 MG/DL (ref 5–25)
BUN/CREAT SERPL: 20 {RATIO} (ref 6–20)
CALCIUM SERPL-MCNC: 10.7 MG/DL (ref 8.7–10.4)
CHLORIDE SERPL-SCNC: 105 MMOL/L (ref 99–111)
CHOLEST SERPL-MCNC: 156 MG/DL (ref 107–200)
CHOLEST/HDLC SERPL: 3.5 {RATIO} (ref 3–6)
CONV ABS IMM GRAN: 0.06 10*3/UL (ref 0–0.2)
CONV CO2: 23 MMOL/L (ref 22–32)
CONV IMMATURE GRAN: 0.6 % (ref 0–1.8)
CONV TOTAL PROTEIN: 7.8 G/DL (ref 6.3–8.2)
CREAT UR-MCNC: 1.06 MG/DL (ref 0.5–0.9)
DEPRECATED RDW RBC AUTO: 46.4 FL (ref 36.4–46.3)
EOSINOPHIL # BLD AUTO: 0.21 10*3/UL (ref 0–0.7)
EOSINOPHIL # BLD AUTO: 2.2 % (ref 0–7)
ERYTHROCYTE [DISTWIDTH] IN BLOOD BY AUTOMATED COUNT: 13.1 % (ref 11.7–14.4)
GFR SERPLBLD BASED ON 1.73 SQ M-ARVRAT: 57 ML/MIN/{1.73_M2}
GLOBULIN UR ELPH-MCNC: 3.8 G/DL (ref 2–3.5)
GLUCOSE SERPL-MCNC: 106 MG/DL (ref 65–99)
HCT VFR BLD AUTO: 39.4 % (ref 37–47)
HDLC SERPL-MCNC: 45 MG/DL (ref 40–60)
HGB BLD-MCNC: 12.1 G/DL (ref 12–16)
LDLC SERPL CALC-MCNC: 79 MG/DL (ref 70–100)
LYMPHOCYTES # BLD AUTO: 1.96 10*3/UL (ref 1–5)
LYMPHOCYTES NFR BLD AUTO: 20.1 % (ref 20–45)
MCH RBC QN AUTO: 29.4 PG (ref 27–31)
MCHC RBC AUTO-ENTMCNC: 30.7 G/DL (ref 33–37)
MCV RBC AUTO: 95.9 FL (ref 81–99)
MONOCYTES # BLD AUTO: 0.64 10*3/UL (ref 0.2–1.2)
MONOCYTES NFR BLD AUTO: 6.6 % (ref 3–10)
NEUTROPHILS # BLD AUTO: 6.82 10*3/UL (ref 2–8)
NEUTROPHILS NFR BLD AUTO: 69.9 % (ref 30–85)
NRBC CBCN: 0 % (ref 0–0.7)
OSMOLALITY SERPL CALC.SUM OF ELEC: 295 MOSM/KG (ref 273–304)
PLATELET # BLD AUTO: 414 10*3/UL (ref 130–400)
PMV BLD AUTO: 10.9 FL (ref 9.4–12.3)
POTASSIUM SERPL-SCNC: 4.4 MMOL/L (ref 3.5–5.3)
RBC # BLD AUTO: 4.11 10*6/UL (ref 4.2–5.4)
SODIUM SERPL-SCNC: 141 MMOL/L (ref 135–147)
TRIGL SERPL-MCNC: 161 MG/DL (ref 40–150)
VLDLC SERPL-MCNC: 32 MG/DL (ref 5–37)
WBC # BLD AUTO: 9.75 10*3/UL (ref 4.8–10.8)

## 2020-04-16 ENCOUNTER — TELEMEDICINE (OUTPATIENT)
Dept: CARDIOLOGY | Facility: CLINIC | Age: 60
End: 2020-04-16

## 2020-04-16 VITALS
HEART RATE: 90 BPM | BODY MASS INDEX: 27.68 KG/M2 | HEIGHT: 60 IN | WEIGHT: 141 LBS | SYSTOLIC BLOOD PRESSURE: 108 MMHG | DIASTOLIC BLOOD PRESSURE: 73 MMHG

## 2020-04-16 DIAGNOSIS — N17.9 AKI (ACUTE KIDNEY INJURY) (HCC): ICD-10-CM

## 2020-04-16 DIAGNOSIS — I21.3 ACUTE ST ELEVATION MYOCARDIAL INFARCTION (STEMI) DUE TO OCCLUSION OF RIGHT CORONARY ARTERY (HCC): Primary | ICD-10-CM

## 2020-04-16 PROCEDURE — 99202 OFFICE O/P NEW SF 15 MIN: CPT | Performed by: INTERNAL MEDICINE

## 2020-04-16 NOTE — PROGRESS NOTES
Date of Office Visit: 2020    Patient Name: Falguni Minaya  : 1960    Encounter Provider: Marlon Zamudio MD  Referring Provider: No ref. provider found  Place of Service: [facility] CARDIOLOGY  Patient Care Team:  Provider, No Known as PCP - General    The patient consented to a telehealth visit.This was an audio and video enabled telemedicine encounter.    Chief Complaint   Patient presents with   • Coronary Artery Disease     History of Present Illness    The patient is a 60-year-old white female who presented to [facility] in late 2020.  She was diagnosed with an acute inferior wall myocardial infarction in evolution and transferred to [facility] where she underwent urgent cardiac catheterization demonstrating complete occlusion of the right coronary artery that was treated with stenting.  Her left ventricular function at the time of the catheterization was reduced to approximately 35%.  However an echocardiogram done post infarct indicated that the ejection fraction had improved to approximately 50%.  During the course of her hospitalization she was noted to have pleural effusions.  She was treated with oral antibiotics with the thought that part this may be pneumonia.  She was also treated with intravenous diuretics.  Additional issues such as abdominal discomfort were addressed.  Eventually she recovered well enough to be discharged to rehabilitation.    She was subsequently admitted to [facility] with congestive heart failure.  I do not have any information from that particular hospitalization.  I did speak with 1 of the cardiologist there at the time.  They basically adjusted her diuretic therapy for heart failure and she was discharged to home.  She has been home since that time.    The patient states that she feels well.  She does not complain of any chest discomfort or shortness of breath.  She  denies lightheadedness nor dizziness.  She does not complain of any lower extremity edema.  She does not use oxygen therapy.  She is not on dialysis.  She did experience acute kidney injury at the time of her hospitalization in [place].  Past Medical History:   Diagnosis Date   • Abdominal hernia    • Acute ST elevation myocardial infarction (STEMI) (CMS/[facility])     due to occlusion of right coronary artery   • Coronary artery disease    • Dyspepsia    • GERD (gastroesophageal reflux disease)    • Multiple sclerosis (CMS/[facility])    • Seizures (CMS/[facility])     last seizure 2018         Past Surgical History:   Procedure Laterality Date   • APPENDECTOMY     • CARDIAC CATHETERIZATION     • CARDIAC CATHETERIZATION N/A 2/24/2020    Procedure: Left Heart Cath;  Surgeon: Marlon Zamudio MD;  Location:  [facility];  Service: Cardiovascular;  Laterality: N/A;   • CARDIAC CATHETERIZATION N/A 2/24/2020    Procedure: Stent LATRICIA coronary;  Surgeon: Marlon Zamudio MD;  Location:  [facility];  Service: Cardiovascular;  Laterality: N/A;   • CARDIAC CATHETERIZATION  2/24/2020    Procedure: Percutaneous Manual Thrombectomy;  Surgeon: Marlon Zamudio MD;  Location:  [facility];  Service: Cardiovascular;;   • CARDIAC CATHETERIZATION N/A 2/24/2020    Procedure: Coronary angiography;  Surgeon: Marlon Zamudio MD;  [facility];  Service: Cardiovascular;  Laterality: N/A;   • CARDIAC CATHETERIZATION N/A 2/24/2020    Procedure: Left ventriculography;  Surgeon: Marlon Zamudio MD;  Location:  [facility];  Service: Cardiovascular;  Laterality: N/A;   • CHOLECYSTECTOMY     • COLONOSCOPY N/A 2/29/2020    Procedure: COLONOSCOPY to  cecum:;  Surgeon: Krystal Sarabia MD;  Location: [facility] ENDOSCOPY;  Service: Gastroenterology;  Laterality: N/A;  pre:  anemia and abd pain  post:  hemorrhoids, tortuous colon   • ENDOSCOPY N/A  2/29/2020    Procedure: ESOPHAGOGASTRODUODENOSCOPY  with biopsies;  Surgeon: Krystal Sarabia MD;  Location[facility] ENDOSCOPY;  Service: Gastroenterology;  Laterality: N/A;  pre:  anemia and abd pain  post:  mild gastritis,    • HYSTERECTOMY     • TONSILLECTOMY             Current Outpatient Medications:   •  aspirin 81 MG EC tablet, Take 1 tablet by mouth Daily., Disp: 30 tablet, Rfl: 11  •  atorvastatin (LIPITOR) 40 MG tablet, Take 1 tablet by mouth Every Night., Disp: 30 tablet, Rfl: 11  •  calcium carbonate (TUMS) 500 MG chewable tablet, Chew 500 mg 4 (Four) Times a Day As Needed for Indigestion or Heartburn (for heartburn)., Disp: 30 tablet, Rfl: 0  •  carvedilol (COREG) 12.5 MG tablet, Take 1 tablet by mouth Every 12 (Twelve) Hours., Disp: 60 tablet, Rfl: 6  •  clonazePAM (KlonoPIN) 1 MG tablet, Take 1 mg by mouth 2 (Two) Times a Day As Needed for Anxiety., Disp: , Rfl:   •  clopidogrel (PLAVIX) 75 MG tablet, Take 1 tablet by mouth Daily., Disp: 30 tablet, Rfl: 11  •  FLUoxetine (PROzac) 20 MG capsule, Take 20 mg by mouth 3 (Three) Times a Day., Disp: , Rfl:   •  gabapentin (NEURONTIN) 300 MG capsule, Take 300 mg by mouth 4 (Four) Times a Day., Disp: , Rfl:   •  losartan (COZAAR) 50 MG tablet, Take 1 tablet by mouth Daily., Disp: , Rfl:   •  modafinil (PROVIGIL) 200 MG tablet, Take 200 mg by mouth 2 (Two) Times a Day., Disp: , Rfl:   •  omeprazole (priLOSEC) 40 MG capsule, Take 40 mg by mouth Daily As Needed., Disp: , Rfl:   •  ondansetron (ZOFRAN) 4 MG tablet, Take 4 mg by mouth Every 6 (Six) Hours As Needed for Nausea or Vomiting., Disp: , Rfl:   •  pramipexole (MIRAPEX) 0.125 MG tablet, Take 1 tablet by mouth Every Night., Disp: 30 tablet, Rfl: 1  •  sucralfate (CARAFATE) 1 g tablet, Take 1 g by mouth 4 (Four) Times a Day., Disp: , Rfl:   •  vitamin D (ERGOCALCIFEROL) 1.25 MG (65032 UT) capsule capsule, Take 50,000 Units by mouth Every 7 (Seven) Days. On Thursdays, Disp: , Rfl:   •  furosemide (LASIX) 40 MG  "tablet, Take 1 tablet by mouth Daily., Disp: 30 tablet, Rfl: 2  •  potassium chloride (MICRO-K) 10 MEQ CR capsule, Take 2 capsules by mouth 2 (Two) Times a Day With Meals., Disp: 30 capsule, Rfl: 11  •  simethicone (MYLICON) 80 MG chewable tablet, Chew 1 tablet 3 (Three) Times a Day., Disp: 90 tablet, Rfl: 0  •  spironolactone (ALDACTONE) 25 MG tablet, Take 25 mg by mouth Daily., Disp: , Rfl:       Social History     Socioeconomic History   • Marital status:      Spouse name: Not on file   • Number of children: Not on file   • Years of education: Not on file   • Highest education level: Not on file   Tobacco Use   • Smoking status: Former Smoker     Packs/day: 0.50     Years: 20.00     Pack years: 10.00     Last attempt to quit: 2010     Years since quitting: 10.2   • Smokeless tobacco: Never Used   • Tobacco comment: caffeine use   Substance and Sexual Activity   • Alcohol use: Not Currently   • Drug use: Never   • Sexual activity: Defer         Review of Systems   Constitution: Negative.   HENT: Negative.    Eyes: Negative.    Cardiovascular: Negative.    Respiratory: Negative.    Endocrine: Negative.    Skin: Negative.    Musculoskeletal: Negative.    Gastrointestinal: Negative.    Neurological: Negative.    Psychiatric/Behavioral: Negative.        Procedures    Procedures        Objective:    /73   Pulse 90   Ht 152.4 cm (60\")   Wt 64 kg (141 lb) Comment: 27lbs weight loss since feb.  BMI 27.54 kg/m²         Physical Exam  No direct physical examination was done.  The patient was comfortable and appeared to be quite relaxed at home.  She did not appear to be in acute distress.  She did not exhibit signs of shortness of breath.  Assessment:       Diagnosis Plan   1. Acute ST elevation myocardial infarction (STEMI) due to occlusion of right coronary artery (CMS/[facility])     2. MACY (acute kidney injury) (CMS/[facility])       1.  Coronary artery disease: Acute inferior ST JOSE.  LATRICIA RCA February 2020  2.  " Acute kidney injury: Resolved.  We will need to obtain blood work from [facility]  3.  Congestive heart failure: Her initial ejection fraction was 35% but appeared to improve by echocardiogram.  We will need to obtain follow-up echo from [facility]  4.  Multiple sclerosis       Plan:       1.  Follow-up in the office in 3 months  2.  Obtain records from [facility]  3.  Continue present medication  4.  Reviewed pandemic precautions.    Total time of theincluding direct patient contact was 20 minutes.

## 2020-04-21 ENCOUNTER — HOSPITAL ENCOUNTER (OUTPATIENT)
Dept: OTHER | Facility: HOSPITAL | Age: 60
Discharge: HOME | End: 2020-04-21
Attending: PHYSICAL MEDICINE & REHABILITATION

## 2020-04-21 LAB
APPEARANCE UR: ABNORMAL
BILIRUB UR QL: NEGATIVE
COLOR UR: YELLOW
CONV BACTERIA: ABNORMAL
CONV COLLECTION SOURCE (UA): ABNORMAL
CONV UROBILINOGEN IN URINE BY AUTOMATED TEST STRIP: 1 {EHRLICHU}/DL (ref 0.1–1)
GLUCOSE UR QL: NEGATIVE MG/DL
HGB UR QL STRIP: NEGATIVE
KETONES UR QL STRIP: NEGATIVE MG/DL
LEUKOCYTE ESTERASE UR QL STRIP: ABNORMAL
NITRITE UR QL STRIP: POSITIVE
PH UR STRIP.AUTO: 5 [PH] (ref 5–8)
PROT UR QL: 30 MG/DL
RBC #/AREA URNS HPF: ABNORMAL /[HPF]
SP GR UR: 1.01 (ref 1–1.03)
SQUAMOUS SPT QL MICRO: ABNORMAL /[HPF]
WBC #/AREA URNS HPF: ABNORMAL /[HPF]

## 2020-04-23 LAB
AMOXICILLIN+CLAV SUSC ISLT: 4
AMPICILLIN SUSC ISLT: 4
AMPICILLIN+SULBAC SUSC ISLT: <=2
BACTERIA UR CULT: ABNORMAL
CEFAZOLIN SUSC ISLT: <=4
CEFEPIME SUSC ISLT: <=1
CEFTAZIDIME SUSC ISLT: <=1
CEFTRIAXONE SUSC ISLT: <=1
CEFUROXIME ORAL SUSC ISLT: 4
CEFUROXIME PARENTER SUSC ISLT: 4
CIPROFLOXACIN SUSC ISLT: <=0.25
ERTAPENEM SUSC ISLT: <=0.5
GENTAMICIN SUSC ISLT: <=1
LEVOFLOXACIN SUSC ISLT: <=0.12
NITROFURANTOIN SUSC ISLT: <=16
TETRACYCLINE SUSC ISLT: <=1
TMP SMX SUSC ISLT: <=20
TOBRAMYCIN SUSC ISLT: <=1

## 2020-05-29 RX ORDER — PRAMIPEXOLE DIHYDROCHLORIDE 0.12 MG/1
TABLET ORAL
Qty: 30 TABLET | Refills: 1 | Status: SHIPPED | OUTPATIENT
Start: 2020-05-29 | End: 2020-07-20

## 2020-07-20 RX ORDER — PRAMIPEXOLE DIHYDROCHLORIDE 0.12 MG/1
TABLET ORAL
Qty: 30 TABLET | Refills: 5 | Status: SHIPPED | OUTPATIENT
Start: 2020-07-20 | End: 2021-01-22

## 2020-07-29 ENCOUNTER — OFFICE VISIT (OUTPATIENT)
Dept: CARDIOLOGY | Facility: CLINIC | Age: 60
End: 2020-07-29

## 2020-07-29 ENCOUNTER — LAB (OUTPATIENT)
Dept: LAB | Facility: HOSPITAL | Age: 60
End: 2020-07-29

## 2020-07-29 VITALS
OXYGEN SATURATION: 98 % | HEIGHT: 60 IN | WEIGHT: 160 LBS | SYSTOLIC BLOOD PRESSURE: 92 MMHG | BODY MASS INDEX: 31.41 KG/M2 | DIASTOLIC BLOOD PRESSURE: 64 MMHG | RESPIRATION RATE: 18 BRPM | HEART RATE: 93 BPM

## 2020-07-29 DIAGNOSIS — I95.2 HYPOTENSION DUE TO DRUGS: ICD-10-CM

## 2020-07-29 DIAGNOSIS — I21.3 ACUTE ST ELEVATION MYOCARDIAL INFARCTION (STEMI) DUE TO OCCLUSION OF RIGHT CORONARY ARTERY (HCC): Primary | ICD-10-CM

## 2020-07-29 DIAGNOSIS — I25.5 ISCHEMIC CARDIOMYOPATHY: ICD-10-CM

## 2020-07-29 DIAGNOSIS — I25.10 CORONARY ARTERY DISEASE INVOLVING NATIVE CORONARY ARTERY OF NATIVE HEART, ANGINA PRESENCE UNSPECIFIED: ICD-10-CM

## 2020-07-29 DIAGNOSIS — I50.9 PLEURAL EFFUSION DUE TO CHF (CONGESTIVE HEART FAILURE) (HCC): ICD-10-CM

## 2020-07-29 DIAGNOSIS — I21.3 ACUTE ST ELEVATION MYOCARDIAL INFARCTION (STEMI) DUE TO OCCLUSION OF RIGHT CORONARY ARTERY (HCC): ICD-10-CM

## 2020-07-29 DIAGNOSIS — N17.9 AKI (ACUTE KIDNEY INJURY) (HCC): ICD-10-CM

## 2020-07-29 DIAGNOSIS — Z95.5 S/P DRUG ELUTING CORONARY STENT PLACEMENT: ICD-10-CM

## 2020-07-29 DIAGNOSIS — R10.13 DYSPEPSIA: ICD-10-CM

## 2020-07-29 LAB
ANION GAP SERPL CALCULATED.3IONS-SCNC: 7.9 MMOL/L (ref 5–15)
BUN SERPL-MCNC: 14 MG/DL (ref 8–23)
BUN/CREAT SERPL: 14.9 (ref 7–25)
CALCIUM SPEC-SCNC: 10.2 MG/DL (ref 8.6–10.5)
CHLORIDE SERPL-SCNC: 102 MMOL/L (ref 98–107)
CO2 SERPL-SCNC: 25.1 MMOL/L (ref 22–29)
CREAT SERPL-MCNC: 0.94 MG/DL (ref 0.57–1)
DEPRECATED RDW RBC AUTO: 38.3 FL (ref 37–54)
ERYTHROCYTE [DISTWIDTH] IN BLOOD BY AUTOMATED COUNT: 11.9 % (ref 12.3–15.4)
GFR SERPL CREATININE-BSD FRML MDRD: 61 ML/MIN/1.73
GLUCOSE SERPL-MCNC: 118 MG/DL (ref 65–99)
HCT VFR BLD AUTO: 34.4 % (ref 34–46.6)
HGB BLD-MCNC: 11.5 G/DL (ref 12–15.9)
MCH RBC QN AUTO: 29.9 PG (ref 26.6–33)
MCHC RBC AUTO-ENTMCNC: 33.4 G/DL (ref 31.5–35.7)
MCV RBC AUTO: 89.6 FL (ref 79–97)
PLATELET # BLD AUTO: 348 10*3/MM3 (ref 140–450)
PMV BLD AUTO: 10.7 FL (ref 6–12)
POTASSIUM SERPL-SCNC: 4.6 MMOL/L (ref 3.5–5.2)
RBC # BLD AUTO: 3.84 10*6/MM3 (ref 3.77–5.28)
SODIUM SERPL-SCNC: 135 MMOL/L (ref 136–145)
WBC # BLD AUTO: 6.5 10*3/MM3 (ref 3.4–10.8)

## 2020-07-29 PROCEDURE — 80048 BASIC METABOLIC PNL TOTAL CA: CPT

## 2020-07-29 PROCEDURE — 99214 OFFICE O/P EST MOD 30 MIN: CPT | Performed by: NURSE PRACTITIONER

## 2020-07-29 PROCEDURE — 93000 ELECTROCARDIOGRAM COMPLETE: CPT | Performed by: NURSE PRACTITIONER

## 2020-07-29 PROCEDURE — 85027 COMPLETE CBC AUTOMATED: CPT

## 2020-07-29 PROCEDURE — 36415 COLL VENOUS BLD VENIPUNCTURE: CPT

## 2020-07-29 RX ORDER — DIMETHYL FUMARATE 240 MG/1
240 CAPSULE ORAL 2 TIMES DAILY
COMMUNITY
End: 2022-10-06 | Stop reason: ALTCHOICE

## 2020-07-29 NOTE — PROGRESS NOTES
Date of Office Visit: 2020  Encounter Provider: WESTLEY Mclean  Primary Cardiologist: Dr. Zamudio  Place of Service: [facility] CARDIOLOGY  Patient Name: Falguni Minaya  :1960      Subjective:     Chief Complaint:  3-month CAD heart failure follow-up    History of Present Illness:  Falguni Minaya is a pleasant 60 y.o. female who I have seen during hospitalization only..  Outside records have been requested and reviewed by me if available.  She has a history of GERD, abdominal hernia, multiple sclerosis and seizure disorder.  She has newly diagnosed coronary artery disease as well as ischemic cardiomyopathy/HF.    2020 patient was found lethargic at home EMS was called.  She was found to have Inferior ST elevation was transferred to [facility] and underwent cardiac catheterization with a occluded distal RCA treated with thrombectomy PTCA and drug-eluting stent placement.  She also had 90% mid segment circumflex disease and 8 to 90% proximal ramus disease she had 20 to 30% mid LAD disease with 20 to 30% proximal D2 stenosis. Initial EF was 30-35% on LV gram. Echo done post infarct showed improvement in EF to 50% with normal wall motion and no significant valvular disease.  Plan was for evaluation of residual narrowings after recovery.  While inpatient she had normal bilateral lower extremity venous duplex.  She had a somewhat complicated hospital course.  She had issues with abdominal discomfort, loose stools, emesis, hypotension and fevers.  She was started on antibiotics for suspected sepsis.  She had a CT of the abdomen that showed some concern the vaginal area so she was evaluated by gynecology but they did not feel it was suspicious for gynecologic etiology.  She also became anemic and stool was positive for occult blood.  She had an EGD and colonoscopy that showed gastritis with normal duodenum and colonoscopy showed nonbleeding internal  hemorrhoids.  She started on Carafate in addition to Prilosec but abdominal pain persisted through much of this day.  3/1/2020 patient was significantly more dyspneic with pain in her shoulder blades CT of chest was performed that showed large bilateral pleural effusions with possible pneumonia.  She was treated with IV diuretics and oral antibiotics.  At discharge I recommended a capsule enteroscopy as an outpatient.  She was discharged 3/7/2020 to rehab.    Apparently some changes were made in rehab and there was some issue with getting her Plavix for a few days in March.  She was admitted to [facility] for heart failure and at some point in time she was started on spironolactone. Apparently she had a repeat echo that showed her EF was around 30%.  But I do not have this echo report.  Patient later started having some hypotension and dizziness.  Her spironolactone was stopped.  Because of her persistent dizziness CBC was checked she was not anemic and had normal white blood cell and platelet counts, BMP showed a bump in her creatinine to 1.31 up from 0.90, normal electrolytes and mildly elevated glucose 132.  I recommended stopping her furosemide for a few days.  And continue to hold the spironolactone and losartan.    4/16/2020 patient did a telehealth visit with Dr. Zamudio.  They declined doing a telehealth visit with me in March.  She was overall feeling well without chest discomfort shortness of breath, lightheadedness or dizziness.  Blood pressure seems a little bit better.  She is not having any lower extremity edema.  Dr. Zamudio wanted to follow-up with an echocardiogram at [facility] and repeat labs.    She is presenting today for 3-month office follow-up.  The patient is present today with her .  She is overall been doing okay.  She denies any further chest pain or discomfort.  She does have some dyspnea on exertion and occasional nocturnal dyspnea but no orthopnea, no lower extremity  edema.  She denies cough, fevers, chills.  In the last several months she is gained 10 pounds as she is pretty sedentary and does not watch her diet but states her  does not let her have salty foods.  Her  denies that she is witnessed apnea or snoring but occasionally talks in her sleep.  She did complete physical therapy and Occupational Therapy.  At home her blood pressure runs typically in the low 100s/50s.  She does still have occasional blood pressure readings in the 80s/50s associated with some dizziness.  She denies syncope, near syncope or falls associated with dizziness.  She does have fatigue.  She states is difficult to determine if her shortness of breath is new or worsening as she is somewhat limited by her multiple sclerosis.  She is not having any new neurologic symptoms today and does follow regularly with neurology.  She still has some right upper quadrant and epigastric abdominal pain that she attributes to GERD as well as some nausea and fluctuating bowel habits this is not new for her.  She has not yet followed up with GI due to the pandemic but reports she will contact them to make an appointment as their plan was to do a capsule endoscopy post discharge.  She does not recall having a repeat echocardiogram when she was hospitalized for heart failure at [facility].  She does check her oxygen level at home and it runs about 98%.  She has been compliant with her aspirin, Plavix and other medical therapy and denies any abnormal bleeding issues.  She denies any palpitations or rapid irregular heartbeat.      Past Medical History:   Diagnosis Date   • Abdominal hernia    • Acute ST elevation myocardial infarction (STEMI) (CMS/[facility])     due to occlusion of right coronary artery   • Coronary artery disease    • Dyspepsia    • GERD (gastroesophageal reflux disease)    • Multiple sclerosis (CMS/[facility])    • Seizures (CMS/[facility])     last seizure 2018     Past Surgical History:   Procedure  Laterality Date   • APPENDECTOMY     • CARDIAC CATHETERIZATION     • CARDIAC CATHETERIZATION N/A 2/24/2020    Procedure: Left Heart Cath;  Surgeon: Marlon Zamudio MD;  Location[facility] C[facility]ASIVE LOC[facility];  Service: Cardiovascular;  Laterality: N/A;   • CARDIAC CATHETERIZATION N/A 2/24/2020    Procedure: Stent LATRICIA coronary;  Surgeon: Marlon Zamudio MD;  Location[facility] C[facility];  Service: Cardiovascular;  Laterality: N/A;   • CARDIAC CATHETERIZATION  2/24/2020    Procedure: Percutaneous Manual Thrombectomy;  Surgeon: Marlon Zamudio MD;  Location[facility] C[facility];  Service: Cardiovascular;;   • CARDIAC CATHETERIZATION N/A 2/24/2020    Procedure: Coronary angiography;  Surgeon: Marlon Zamudio MD;  Location: [facility] C[facility] LOC[facility];  Service: Cardiovascular;  Laterality: N/A;   • CARDIAC CATHETERIZATION N/A 2/24/2020    Procedure: Left ventriculography;  Surgeon: Marlon Zamudio MD;  Location: [facility] CATH INVASIVE LOCATION;  Service: Cardiovascular;  Laterality: N/A;   • CHOLECYSTECTOMY     • COLONOSCOPY N/A 2/29/2020    Procedure: COLONOSCOPY to  cecum:;  Surgeon: Krystal Sarabia MD;  Location: [facility] ENDOSCOPY;  Service: Gastroenterology;  Laterality: N/A;  pre:  anemia and abd pain  post:  hemorrhoids, tortuous colon   • ENDOSCOPY N/A 2/29/2020    Procedure: ESOPHAGOGASTRODUODENOSCOPY  with biopsies;  Surgeon: Krystal Sarabia MD;  Location: [facility] ENDOSCOPY;  Service: Gastroenterology;  Laterality: N/A;  pre:  anemia and abd pain  post:  mild gastritis,    • HYSTERECTOMY     • TONSILLECTOMY       Outpatient Medications Prior to Visit   Medication Sig Dispense Refill   • aspirin 81 MG EC tablet Take 1 tablet by mouth Daily. 30 tablet 11   • atorvastatin (LIPITOR) 40 MG tablet Take 1 tablet by mouth Every Night. 30 tablet 11   • carvedilol (COREG) 12.5 MG tablet Take 1 tablet by mouth Every 12 (Twelve) Hours. 60 tablet 6   • clonazePAM (KlonoPIN) 1  MG tablet Take 1 mg by mouth 2 (Two) Times a Day As Needed for Anxiety.     • clopidogrel (PLAVIX) 75 MG tablet Take 1 tablet by mouth Daily. 30 tablet 11   • Dimethyl Fumarate 240 MG capsule delayed-release Take 240 mg by mouth 2 (two) times a day.     • FLUoxetine (PROzac) 20 MG capsule Take 20 mg by mouth 3 (Three) Times a Day.     • furosemide (LASIX) 40 MG tablet Take 1 tablet by mouth Daily. 30 tablet 2   • gabapentin (NEURONTIN) 300 MG capsule Take 300 mg by mouth 4 (Four) Times a Day.     • losartan (COZAAR) 50 MG tablet Take 1 tablet by mouth Daily. (Patient taking differently: Take 25 mg by mouth Daily.)     • modafinil (PROVIGIL) 200 MG tablet Take 200 mg by mouth 2 (Two) Times a Day.     • omeprazole (priLOSEC) 40 MG capsule Take 40 mg by mouth Daily As Needed.     • ondansetron (ZOFRAN) 4 MG tablet Take 4 mg by mouth Every 6 (Six) Hours As Needed for Nausea or Vomiting.     • pramipexole (MIRAPEX) 0.125 MG tablet TAKE 1 TABLET BY MOUTH EVERY NIGHT AT BEDTIME 30 tablet 5   • calcium carbonate (TUMS) 500 MG chewable tablet Chew 500 mg 4 (Four) Times a Day As Needed for Indigestion or Heartburn (for heartburn). 30 tablet 0   • potassium chloride (MICRO-K) 10 MEQ CR capsule Take 2 capsules by mouth 2 (Two) Times a Day With Meals. 30 capsule 11   • simethicone (MYLICON) 80 MG chewable tablet Chew 1 tablet 3 (Three) Times a Day. 90 tablet 0   • spironolactone (ALDACTONE) 25 MG tablet Take 25 mg by mouth Daily.     • sucralfate (CARAFATE) 1 g tablet Take 1 g by mouth 4 (Four) Times a Day.     • vitamin D (ERGOCALCIFEROL) 1.25 MG (34048 UT) capsule capsule Take 50,000 Units by mouth Every 7 (Seven) Days. On Thursdays       No facility-administered medications prior to visit.        Allergies as of 07/29/2020 - Reviewed 07/29/2020   Allergen Reaction Noted   • Codeine Hives 02/24/2020   • Morphine Hives 02/24/2020     Social History     Socioeconomic History   • Marital status:      Spouse name: Not on  file   • Number of children: Not on file   • Years of education: Not on file   • Highest education level: Not on file   Tobacco Use   • Smoking status: Former Smoker     Packs/day: 0.50     Years: 20.00     Pack years: 10.00     Last attempt to quit: 2010     Years since quitting: 10.5   • Smokeless tobacco: Never Used   • Tobacco comment: caffeine use   Substance and Sexual Activity   • Alcohol use: Not Currently   • Drug use: Never   • Sexual activity: Defer     Family History   Problem Relation Age of Onset   • Thyroid disease Mother    • Hypertension Sister    • Coronary artery disease Brother      Review of Systems   Constitution: Positive for malaise/fatigue and weight gain (10 LBS). Negative for chills and fever.   HENT: Negative for ear pain, hearing loss, nosebleeds and sore throat.    Eyes: Positive for blurred vision and double vision. Negative for pain, vision loss in left eye and vision loss in right eye.   Cardiovascular: Positive for dyspnea on exertion and paroxysmal nocturnal dyspnea. Negative for chest pain, claudication, irregular heartbeat, leg swelling, near-syncope, orthopnea, palpitations and syncope.   Respiratory: Positive for shortness of breath. Negative for cough, snoring and wheezing.    Endocrine: Positive for heat intolerance. Negative for cold intolerance.   Hematologic/Lymphatic: Negative for bleeding problem.   Skin: Negative for color change, itching, rash and unusual hair distribution.   Musculoskeletal: Positive for myalgias. Negative for falls, joint pain and joint swelling.   Gastrointestinal: Negative for abdominal pain, diarrhea, hematochezia, melena, nausea and vomiting.   Genitourinary: Positive for frequency. Negative for decreased libido, hematuria, hesitancy and incomplete emptying.   Neurological: Positive for dizziness, headaches, light-headedness, numbness and paresthesias. Negative for excessive daytime sleepiness, loss of balance and seizures.  "  Psychiatric/Behavioral: Positive for depression.          Objective:     Vitals:    07/29/20 1250   BP: 92/64   BP Location: Left arm   Patient Position: Sitting   Pulse: 93   Resp: 18   SpO2: 98%   Weight: 72.6 kg (160 lb)   Height: 152.4 cm (60\")     Body mass index is 31.25 kg/m².    PHYSICAL EXAM:  Physical Exam   Constitutional: She is oriented to person, place, and time. She appears well-developed and well-nourished. No distress.   HENT:   Head: Normocephalic and atraumatic.   Eyes: Pupils are equal, round, and reactive to light. Conjunctivae and EOM are normal.   Neck: No JVD present. Carotid bruit is not present.   Cardiovascular: Normal rate, regular rhythm, normal heart sounds and intact distal pulses.   No murmur heard.  Pulses:       Radial pulses are 2+ on the right side, and 2+ on the left side.        Dorsalis pedis pulses are 2+ on the right side, and 2+ on the left side.        Posterior tibial pulses are 2+ on the left side.   No pitting lower extremity edema   Pulmonary/Chest: Effort normal and breath sounds normal. No accessory muscle usage. No tachypnea. No respiratory distress. She has no decreased breath sounds. She has no wheezes. She has no rhonchi. She has no rales. She exhibits no tenderness.   Abdominal: Soft. Bowel sounds are normal. She exhibits no distension. There is tenderness in the right upper quadrant and epigastric area. There is no rebound and no guarding.   Obese abdomen   Neurological: She is alert and oriented to person, place, and time. She displays tremor.   Skin: Skin is warm, dry and intact. She is not diaphoretic. No erythema.   Psychiatric: She has a normal mood and affect. Her speech is normal and behavior is normal. Judgment and thought content normal. Cognition and memory are normal.   Nursing note and vitals reviewed.        ECG 12 Lead  Date/Time: 7/29/2020 1:05 PM  Performed by: Saniya Caputo APRN  Authorized by: Saniya Caputo APRN   Comparison: compared " with previous ECG from 3/1/2020  Rhythm: sinus rhythm  Rate: normal  BPM: 80  Q waves: III, II and aVF    T inversion: II, III, aVF, V4, V5 and V6  QRS axis: borderline left.    Clinical impression: abnormal EKG  Comments: T wave inversion V4 to V6 may be a little more pronounced than previous ECGs though previous ECGs had a lot of artifact  Indication: Inferior STEMI s/p LATRICIA              Most recent lab review: None since last visit.     Assessment:       Diagnosis Plan   1. Acute ST elevation myocardial infarction (STEMI) due to occlusion of right coronary artery (CMS/[facility])  Basic Metabolic Panel    ECG 12 Lead    CBC (No Diff)   2. S/P drug eluting coronary stent placement  Basic Metabolic Panel    ECG 12 Lead    CBC (No Diff)   3. Coronary artery disease involving native coronary artery of native heart, angina presence unspecified  Basic Metabolic Panel    ECG 12 Lead    CBC (No Diff)   4. Ischemic cardiomyopathy  Basic Metabolic Panel    ECG 12 Lead    CBC (No Diff)   5. Pleural effusion due to CHF (congestive heart failure) (CMS/[facility])  Basic Metabolic Panel    ECG 12 Lead    CBC (No Diff)   6. Hypotension due to drugs  Basic Metabolic Panel    ECG 12 Lead    CBC (No Diff)   7. MACY (acute kidney injury) (CMS/[facility])  Basic Metabolic Panel    ECG 12 Lead    CBC (No Diff)   8. Dyspepsia         Plan:     1. Coronary artery disease: Status post inferior STEMI February 2020 treated with thrombectomy and drug-eluting stent to the distal RCA.  She had residual severe disease plan was to reevaluate after she had fully recovered.  She is on dual antiplatelet therapy and statin therapy.  She has T wave inversion V4 to V6.  2. Ischemic cardiomyopathy/heart failure: Initial EF was 30 to 35% on LV gram but improved on echo post infarct of 50% with normal wall motion..  She had acute heart failure and pleural effusions while inpatient March 2020 and later was hospitalized after discharge at [facility] for heart failure and  apparently EF was 30% at [facility] though I do not have her echo report from that and she does not recall having an echocardiogram..  She is on GDMT with carvedilol, losartan and on diuretic therapy with furosemide.    3.  Hypotension: Patient had some issues with hypotension so meds were held.  BP is low normal today.  She is not dizzy today.  4. MACY: Last labs creatinine bumped to 1.31 4/7/2020. Her diuretics were held temporarily.  Recheck today showed improvement.  5. Anemia/GI bleed/abdominal: Occurred while inpatient.  She saw GI and had an EGD and colonoscopy with recommendation for capsule endoscopy.  She just had some bleeding hemorrhoids mild gastritis as part of that work-up. GYN also saw her.  Anemia was improved on follow-up labs April 2020.  She still has some GI symptoms which are ongoing and chronic.  She will make a follow-up appointment with GI.  Hemoglobin mildly low but stable 11.5 today.  6. Multiple sclerosis: Defer to neurology  7.  Elevated LFTs: While inpatient.  This improved by discharge.  We will monitor on her atorvastatin.  8. Hyperlipidemia: February 2020 , total cholesterol 202, HDL 53, triglycerides 65.  She is now on atorvastatin.  Recommend repeat labs in a few months in a fasting state with home health NP with LFTs.  9.  Hyponatremia: Mild.  Sodium 135.  We will continue to monitor in the future.  Continue diuretics as is at this time.    Patient does not appear to be volume overloaded on exam.  She has a class II symptoms.  Recheck a CBC and BMP todayshowed mild stable anemia Hemoglobin 11.5, normal white blood cell count and platelet count, BMP showed mild hyponatremia sodium 135 but creatinine normalized 0.94 with normal potassium, BUN and other electrolytes, glucose mildly elevated 118 .  Will discuss her case further with Dr. Zamudio including repeat echocardiogram and plan of care for her residual CAD.      I advised on the importance of medication compliance,  good blood pressure, blood sugar and cholesterol control, as well as heart healthy diet, regular exercise and avoidance of tobacco for cardiovascular disease risk factor modification.         Follow-up with Dr. Zamudio to be determined once I discuss the case further.   I advised the patient to contact our office with any questions or concerns.      It has been a pleasure to participate in this patient's care. Please feel free to contact me with any questions or concerns.     WESTLEY Mclean  07/29/2020             Your medication list           Accurate as of July 29, 2020  1:44 PM. If you have any questions, ask your nurse or doctor.               CHANGE how you take these medications      Instructions Last Dose Given Next Dose Due   losartan 50 MG tablet  Commonly known as:  COZAAR  What changed:  how much to take      Take 1 tablet by mouth Daily.          CONTINUE taking these medications      Instructions Last Dose Given Next Dose Due   aspirin 81 MG EC tablet      Take 1 tablet by mouth Daily.       atorvastatin 40 MG tablet  Commonly known as:  LIPITOR      Take 1 tablet by mouth Every Night.       carvedilol 12.5 MG tablet  Commonly known as:  COREG      Take 1 tablet by mouth Every 12 (Twelve) Hours.       clonazePAM 1 MG tablet  Commonly known as:  KlonoPIN      Take 1 mg by mouth 2 (Two) Times a Day As Needed for Anxiety.       clopidogrel 75 MG tablet  Commonly known as:  PLAVIX      Take 1 tablet by mouth Daily.       Dimethyl Fumarate 240 MG capsule delayed-release      Take 240 mg by mouth 2 (two) times a day.       FLUoxetine 20 MG capsule  Commonly known as:  PROzac      Take 20 mg by mouth 3 (Three) Times a Day.       furosemide 40 MG tablet  Commonly known as:  LASIX      Take 1 tablet by mouth Daily.       gabapentin 300 MG capsule  Commonly known as:  NEURONTIN      Take 300 mg by mouth 4 (Four) Times a Day.       modafinil 200 MG tablet  Commonly known as:  PROVIGIL      Take 200 mg by  mouth 2 (Two) Times a Day.       omeprazole 40 MG capsule  Commonly known as:  priLOSEC      Take 40 mg by mouth Daily As Needed.       ondansetron 4 MG tablet  Commonly known as:  ZOFRAN      Take 4 mg by mouth Every 6 (Six) Hours As Needed for Nausea or Vomiting.       pramipexole 0.125 MG tablet  Commonly known as:  MIRAPEX      TAKE 1 TABLET BY MOUTH EVERY NIGHT AT BEDTIME          STOP taking these medications    calcium carbonate 500 MG chewable tablet  Commonly known as:  TUMS  Stopped by:  WESTLEY Mclean        potassium chloride 10 MEQ CR capsule  Commonly known as:  MICRO-K  Stopped by:  WESTLEY Mclean        simethicone 80 MG chewable tablet  Commonly known as:  MYLICON  Stopped by:  WESTLEY Mclean        spironolactone 25 MG tablet  Commonly known as:  ALDACTONE  Stopped by:  WESTLEY Mclean        sucralfate 1 g tablet  Commonly known as:  CARAFATE  Stopped by:  WESTLEY Mclean        vitamin D 1.25 MG (55197 UT) capsule capsule  Commonly known as:  ERGOCALCIFEROL  Stopped by:  WESTLEY Mclean               The above medication changes may not have been made by this provider.  Medication list was updated to reflect medications patient is currently taking including medication changes and discontinuations made by other healthcare providers or the patients themselves.     Dictated utilizing Dragon Dictation System.

## 2020-07-30 ENCOUNTER — TELEPHONE (OUTPATIENT)
Dept: CARDIOLOGY | Facility: CLINIC | Age: 60
End: 2020-07-30

## 2020-07-30 DIAGNOSIS — I50.9 PLEURAL EFFUSION DUE TO CHF (CONGESTIVE HEART FAILURE) (HCC): ICD-10-CM

## 2020-07-30 DIAGNOSIS — I25.5 ISCHEMIC CARDIOMYOPATHY: Primary | ICD-10-CM

## 2020-07-30 NOTE — TELEPHONE ENCOUNTER
Spoke with patient after discussing with Dr. Zamudio.  Her anemia stable hemoglobin mildly low 11.5 unchanged from 2 months ago.BMP with normal creatinine BUN GFR potassium, sodium mildly low 135  which we will monitor down the road.  Glucose 118.  She is not fasting.  We will continue current medical regimen.  We will repeat an echocardiogram and determine further plan for her additional coronary artery disease and follow-up with Dr. Zamudio post echocardiogram.  Patient was educated to call our office if she has not heard about scheduling the echo within 1 week.  Patient verbalized understanding of the plan of care.

## 2020-08-17 ENCOUNTER — HOSPITAL ENCOUNTER (OUTPATIENT)
Dept: CARDIOLOGY | Facility: HOSPITAL | Age: 60
Discharge: HOME | End: 2020-08-17
Admitting: NURSE PRACTITIONER

## 2020-08-17 ENCOUNTER — OFFICE VISIT (OUTPATIENT)
Dept: GASTROENTEROLOGY | Facility: CLINIC | Age: 60
End: 2020-08-17

## 2020-08-17 VITALS — TEMPERATURE: 98.2 F | HEIGHT: 60 IN | BODY MASS INDEX: 31.25 KG/M2 | WEIGHT: 159.2 LBS

## 2020-08-17 VITALS
SYSTOLIC BLOOD PRESSURE: 110 MMHG | BODY MASS INDEX: 31.41 KG/M2 | WEIGHT: 160 LBS | HEIGHT: 60 IN | DIASTOLIC BLOOD PRESSURE: 60 MMHG | HEART RATE: 87 BPM

## 2020-08-17 DIAGNOSIS — R10.10 PAIN OF UPPER ABDOMEN: Primary | ICD-10-CM

## 2020-08-17 DIAGNOSIS — I50.9 PLEURAL EFFUSION DUE TO CHF (CONGESTIVE HEART FAILURE) (HCC): ICD-10-CM

## 2020-08-17 DIAGNOSIS — D50.8 OTHER IRON DEFICIENCY ANEMIA: ICD-10-CM

## 2020-08-17 DIAGNOSIS — I25.5 ISCHEMIC CARDIOMYOPATHY: ICD-10-CM

## 2020-08-17 PROCEDURE — 93306 TTE W/DOPPLER COMPLETE: CPT

## 2020-08-17 PROCEDURE — 93306 TTE W/DOPPLER COMPLETE: CPT | Performed by: INTERNAL MEDICINE

## 2020-08-17 PROCEDURE — 99213 OFFICE O/P EST LOW 20 MIN: CPT | Performed by: INTERNAL MEDICINE

## 2020-08-17 NOTE — PROGRESS NOTES
Chief Complaint   Patient presents with   • Abdominal Pain   • Heartburn       Falguni Minaya is a  60 y.o. female here for a follow up visit for left upper quadrant pain heartburn and anemia    The patient continues with right upper quadrant pain that does elevate into the left upper quadrant.  Worse after meals better with rest.  Her gallbladder has been removed  There is no vomiting, tea colored urine jaundice or acholic stools  Liver function test have been normal  CAT scan of the abdomen was unrevealing  EGD and colonoscopy normal  She is here to be evaluated for the right upper quadrant pain and also for anemia, iron deficiency      Past Medical History:   Diagnosis Date   • Abdominal hernia    • Acute ST elevation myocardial infarction (STEMI) (CMS/HCC)     due to occlusion of right coronary artery   • Coronary artery disease    • Dyspepsia    • GERD (gastroesophageal reflux disease)    • Multiple sclerosis (CMS/HCC)    • Seizures (CMS/HCC)     last seizure 2018       Past Surgical History:   Procedure Laterality Date   • APPENDECTOMY     • CARDIAC CATHETERIZATION     • CARDIAC CATHETERIZATION N/A 2/24/2020    Procedure: Left Heart Cath;  Surgeon: Marlon Zamudio MD;  Location: [facility] CATH INVASIVE LOCATION;  Service: Cardiovascular;  Laterality: N/A;   • CARDIAC CATHETERIZATION N/A 2/24/2020    Procedure: Stent LATRICIA coronary;  Surgeon: Marlon Zamudio MD;  Location: [facility] CATH INVASIVE LOCATION;  Service: Cardiovascular;  Laterality: N/A;   • CARDIAC CATHETERIZATION  2/24/2020    Procedure: Percutaneous Manual Thrombectomy;  Surgeon: Marlon Zamudio MD;  Location: [facility] CATH INVASIVE LOCATION;  Service: Cardiovascular;;   • CARDIAC CATHETERIZATION N/A 2/24/2020    Procedure: Coronary angiography;  Surgeon: Marlon Zamudio MD;  Location: [facility] CATH INVASIVE LOCATION;  Service: Cardiovascular;  Laterality: N/A;   • CARDIAC CATHETERIZATION N/A 2/24/2020    Procedure: Left ventriculography;   Surgeon: Marlon Zamudio MD;  Location: [facility] CATH INVASIVE LOCATION;  Service: Cardiovascular;  Laterality: N/A;   • CHOLECYSTECTOMY     • COLONOSCOPY N/A 2/29/2020    Procedure: COLONOSCOPY to  cecum:;  Surgeon: Krystal Sarabia MD;  Location: [facility] ENDOSCOPY;  Service: Gastroenterology;  Laterality: N/A;  pre:  anemia and abd pain  post:  hemorrhoids, tortuous colon   • ENDOSCOPY N/A 2/29/2020    Procedure: ESOPHAGOGASTRODUODENOSCOPY  with biopsies;  Surgeon: Krystal Sarabia MD;  Location: [facility] ENDOSCOPY;  Service: Gastroenterology;  Laterality: N/A;  pre:  anemia and abd pain  post:  mild gastritis,    • HYSTERECTOMY     • TONSILLECTOMY         Scheduled Meds:    Continuous Infusions:  No current facility-administered medications for this visit.     PRN Meds:.    Allergies   Allergen Reactions   • Codeine Hives   • Morphine Hives       Social History     Socioeconomic History   • Marital status:      Spouse name: Not on file   • Number of children: Not on file   • Years of education: Not on file   • Highest education level: Not on file   Tobacco Use   • Smoking status: Former Smoker     Packs/day: 0.50     Years: 20.00     Pack years: 10.00     Last attempt to quit: 2010     Years since quitting: 10.6   • Smokeless tobacco: Never Used   • Tobacco comment: caffeine use   Substance and Sexual Activity   • Alcohol use: Not Currently   • Drug use: Never   • Sexual activity: Defer       Family History   Problem Relation Age of Onset   • Thyroid disease Mother    • Hypertension Sister    • Coronary artery disease Brother        Review of Systems   Gastrointestinal: Positive for abdominal pain.   All other systems reviewed and are negative.      There were no vitals filed for this visit.    Physical Exam   Constitutional: She is oriented to person, place, and time. She appears well-developed and well-nourished.   HENT:   Head: Normocephalic and atraumatic.   Eyes: Pupils are equal, round, and reactive  to light.   Cardiovascular: Normal rate, regular rhythm and normal heart sounds.   Pulmonary/Chest: Effort normal and breath sounds normal.   Abdominal: Soft. Bowel sounds are normal. She exhibits no shifting dullness, no distension, no pulsatile liver, no fluid wave, no abdominal bruit, no ascites, no pulsatile midline mass and no mass. There is no hepatosplenomegaly. There is tenderness. There is no rigidity and no guarding. No hernia.   Musculoskeletal: Normal range of motion.   Neurological: She is alert and oriented to person, place, and time.   Skin: Skin is warm and dry.   Psychiatric: She has a normal mood and affect. Her behavior is normal. Thought content normal.   Nursing note and vitals reviewed.      Problem list    Right upper quadrant pain  Status post cholecystectomy  CT scan normal.  LFTs normal  Iron deficiency anemia with normal EGD and colonoscopy      Assessment/Plan      Schedule capsule endoscopy for iron deficiency anemia heme positive stools and normal bidirectional endoscopy  Schedule MRCP with and without contrast for right upper quadrant pain status post cholecystectomy (they would like to do the MRI and Obed on the same day they do her MRI for her multiple sclerosis, we will arrange this when I know the date of the MRI and Josephine for her MS)

## 2020-08-18 LAB
AORTIC ARCH: 2.6 CM
ASCENDING AORTA: 3 CM
BH CV ECHO MEAS - ACS: 1.8 CM
BH CV ECHO MEAS - AO MAX PG (FULL): 3.3 MMHG
BH CV ECHO MEAS - AO MAX PG: 5.8 MMHG
BH CV ECHO MEAS - AO MEAN PG (FULL): 1.5 MMHG
BH CV ECHO MEAS - AO MEAN PG: 3 MMHG
BH CV ECHO MEAS - AO ROOT AREA (BSA CORRECTED): 1.9
BH CV ECHO MEAS - AO ROOT AREA: 8 CM^2
BH CV ECHO MEAS - AO ROOT DIAM: 3.2 CM
BH CV ECHO MEAS - AO V2 MAX: 120.8 CM/SEC
BH CV ECHO MEAS - AO V2 MEAN: 81.2 CM/SEC
BH CV ECHO MEAS - AO V2 VTI: 22.5 CM
BH CV ECHO MEAS - ASC AORTA: 3 CM
BH CV ECHO MEAS - AVA(I,A): 1.6 CM^2
BH CV ECHO MEAS - AVA(I,D): 1.6 CM^2
BH CV ECHO MEAS - AVA(V,A): 1.8 CM^2
BH CV ECHO MEAS - AVA(V,D): 1.8 CM^2
BH CV ECHO MEAS - BSA(HAYCOCK): 1.8 M^2
BH CV ECHO MEAS - BSA: 1.7 M^2
BH CV ECHO MEAS - BZI_BMI: 31.2 KILOGRAMS/M^2
BH CV ECHO MEAS - BZI_METRIC_HEIGHT: 152.4 CM
BH CV ECHO MEAS - BZI_METRIC_WEIGHT: 72.6 KG
BH CV ECHO MEAS - EDV(MOD-SP2): 46 ML
BH CV ECHO MEAS - EDV(MOD-SP4): 60 ML
BH CV ECHO MEAS - EDV(TEICH): 62.7 ML
BH CV ECHO MEAS - EF(CUBED): 58.7 %
BH CV ECHO MEAS - EF(MOD-BP): 53 %
BH CV ECHO MEAS - EF(MOD-SP2): 52.2 %
BH CV ECHO MEAS - EF(MOD-SP4): 53.3 %
BH CV ECHO MEAS - EF(TEICH): 51 %
BH CV ECHO MEAS - ESV(MOD-SP2): 22 ML
BH CV ECHO MEAS - ESV(MOD-SP4): 28 ML
BH CV ECHO MEAS - ESV(TEICH): 30.7 ML
BH CV ECHO MEAS - FS: 25.5 %
BH CV ECHO MEAS - IVS/LVPW: 1.1
BH CV ECHO MEAS - IVSD: 1.1 CM
BH CV ECHO MEAS - LAT PEAK E' VEL: 7.7 CM/SEC
BH CV ECHO MEAS - LV DIASTOLIC VOL/BSA (35-75): 35.3 ML/M^2
BH CV ECHO MEAS - LV MASS(C)D: 124.4 GRAMS
BH CV ECHO MEAS - LV MASS(C)DI: 73.3 GRAMS/M^2
BH CV ECHO MEAS - LV MAX PG: 2.6 MMHG
BH CV ECHO MEAS - LV MEAN PG: 1.5 MMHG
BH CV ECHO MEAS - LV SYSTOLIC VOL/BSA (12-30): 16.5 ML/M^2
BH CV ECHO MEAS - LV V1 MAX: 80.1 CM/SEC
BH CV ECHO MEAS - LV V1 MEAN: 59.5 CM/SEC
BH CV ECHO MEAS - LV V1 VTI: 13.7 CM
BH CV ECHO MEAS - LVIDD: 3.8 CM
BH CV ECHO MEAS - LVIDS: 2.8 CM
BH CV ECHO MEAS - LVLD AP2: 6.9 CM
BH CV ECHO MEAS - LVLD AP4: 7.2 CM
BH CV ECHO MEAS - LVLS AP2: 6 CM
BH CV ECHO MEAS - LVLS AP4: 6 CM
BH CV ECHO MEAS - LVOT AREA (M): 2.8 CM^2
BH CV ECHO MEAS - LVOT AREA: 2.7 CM^2
BH CV ECHO MEAS - LVOT DIAM: 1.9 CM
BH CV ECHO MEAS - LVPWD: 0.99 CM
BH CV ECHO MEAS - MED PEAK E' VEL: 5.7 CM/SEC
BH CV ECHO MEAS - MV A DUR: 0.12 SEC
BH CV ECHO MEAS - MV A MAX VEL: 88.3 CM/SEC
BH CV ECHO MEAS - MV DEC SLOPE: 200.1 CM/SEC^2
BH CV ECHO MEAS - MV DEC TIME: 0.27 SEC
BH CV ECHO MEAS - MV E MAX VEL: 51.8 CM/SEC
BH CV ECHO MEAS - MV E/A: 0.59
BH CV ECHO MEAS - MV MAX PG: 3.7 MMHG
BH CV ECHO MEAS - MV MEAN PG: 1.2 MMHG
BH CV ECHO MEAS - MV P1/2T MAX VEL: 63.8 CM/SEC
BH CV ECHO MEAS - MV P1/2T: 93.4 MSEC
BH CV ECHO MEAS - MV V2 MAX: 96.4 CM/SEC
BH CV ECHO MEAS - MV V2 MEAN: 51.7 CM/SEC
BH CV ECHO MEAS - MV V2 VTI: 23.1 CM
BH CV ECHO MEAS - MVA P1/2T LCG: 3.4 CM^2
BH CV ECHO MEAS - MVA(P1/2T): 2.4 CM^2
BH CV ECHO MEAS - MVA(VTI): 1.6 CM^2
BH CV ECHO MEAS - PA ACC TIME: 0.1 SEC
BH CV ECHO MEAS - PA MAX PG (FULL): 1.7 MMHG
BH CV ECHO MEAS - PA MAX PG: 3.5 MMHG
BH CV ECHO MEAS - PA PR(ACCEL): 36.2 MMHG
BH CV ECHO MEAS - PA V2 MAX: 93.7 CM/SEC
BH CV ECHO MEAS - PULM A REVS DUR: 0.11 SEC
BH CV ECHO MEAS - PULM A REVS VEL: 36 CM/SEC
BH CV ECHO MEAS - PULM DIAS VEL: 35.1 CM/SEC
BH CV ECHO MEAS - PULM S/D: 1.4
BH CV ECHO MEAS - PULM SYS VEL: 49.7 CM/SEC
BH CV ECHO MEAS - PVA(V,A): 1.9 CM^2
BH CV ECHO MEAS - PVA(V,D): 1.9 CM^2
BH CV ECHO MEAS - QP/QS: 1
BH CV ECHO MEAS - RAP SYSTOLE: 3 MMHG
BH CV ECHO MEAS - RV MAX PG: 1.8 MMHG
BH CV ECHO MEAS - RV MEAN PG: 1.1 MMHG
BH CV ECHO MEAS - RV V1 MAX: 66.4 CM/SEC
BH CV ECHO MEAS - RV V1 MEAN: 51.5 CM/SEC
BH CV ECHO MEAS - RV V1 VTI: 14 CM
BH CV ECHO MEAS - RVOT AREA: 2.7 CM^2
BH CV ECHO MEAS - RVOT DIAM: 1.9 CM
BH CV ECHO MEAS - RVSP: 30.5 MMHG
BH CV ECHO MEAS - SI(AO): 106.2 ML/M^2
BH CV ECHO MEAS - SI(CUBED): 19.3 ML/M^2
BH CV ECHO MEAS - SI(LVOT): 21.8 ML/M^2
BH CV ECHO MEAS - SI(MOD-SP2): 14.1 ML/M^2
BH CV ECHO MEAS - SI(MOD-SP4): 18.8 ML/M^2
BH CV ECHO MEAS - SI(TEICH): 18.8 ML/M^2
BH CV ECHO MEAS - SUP REN AO DIAM: 1.8 CM
BH CV ECHO MEAS - SV(AO): 180.3 ML
BH CV ECHO MEAS - SV(CUBED): 32.7 ML
BH CV ECHO MEAS - SV(LVOT): 37 ML
BH CV ECHO MEAS - SV(MOD-SP2): 24 ML
BH CV ECHO MEAS - SV(MOD-SP4): 32 ML
BH CV ECHO MEAS - SV(RVOT): 38.2 ML
BH CV ECHO MEAS - SV(TEICH): 32 ML
BH CV ECHO MEAS - TAPSE (>1.6): 1.9 CM
BH CV ECHO MEAS - TR MAX VEL: 262 CM/SEC
BH CV ECHO MEASUREMENTS AVERAGE E/E' RATIO: 7.73
BH CV XLRA - RV BASE: 2.6 CM
BH CV XLRA - RV LENGTH: 7.2 CM
BH CV XLRA - RV MID: 2.7 CM
BH CV XLRA - TDI S': 10.8 CM/SEC
LEFT ATRIUM VOLUME INDEX: 19 ML/M2
MAXIMAL PREDICTED HEART RATE: 159 BPM
SINUS: 2.7 CM
STJ: 2.8 CM
STRESS TARGET HR: 135 BPM

## 2020-08-19 ENCOUNTER — TELEPHONE (OUTPATIENT)
Dept: CARDIOLOGY | Facility: CLINIC | Age: 60
End: 2020-08-19

## 2020-08-19 NOTE — TELEPHONE ENCOUNTER
Called and spoke with the patient.  Echo showed improvement of EF to normal she still had basal inferior akinesis, grade 1 diastolic dysfunction, trace MR but the remainder of echo essentially normal.  Informed her I discussed her case with Dr. Zamudio and no further invasive work-up at this point in time for residual coronary disease.  She will call sooner with chest pain or worsening dyspnea or recurrence of heart failure symptoms.  Otherwise we will follow-up with her again in 3 months unless again otherwise needed sooner based on symptoms or problems.  She was transferred to [facility] to get a follow-up scheduled with Dr. Zamudio in 3 months.

## 2020-08-19 NOTE — TELEPHONE ENCOUNTER
----- Message from Marlon Zamudio MD sent at 8/18/2020  3:17 PM EDT -----  Regarding: RE: Follow up/further testing  No invasive workup at this point.  Is she going to follow up in [facility]?  If not I should see her in next few months      ----- Message -----  From: Saniya Caputo APRN  Sent: 8/18/2020   8:37 AM EDT  To: Marlon Zamudio MD  Subject: Follow up/further testing                        Patient of yours MI back in February with thrombectomy and stenting to distal RCA.she had residual severe disease in the ramus and circumflex and mild LAD disease.  She had a complicated stay with heart failure and several GI issues and GI bleeding.  Had somewhat difficulty on guideline directed medical therapy due to hypotension.  She was readmitted to [facility] with heart failure in March.  She had not had any chest pain she does have dyspnea on exertion nocturnal dyspnea at times but no edema.  She is pretty limited in her activity due to her multiple sclerosis.    You had wanted to reevaluate her residual coronary diseaseWe repeated an echo and her LV function has improved back to normal 53% with akinetic basal inferior wall to determine next steps and when you wanted to see her again.  Just wanted make sure based on this test you do not want to do any further invasive treatment with her right now. Please let me know when you want to see her again.     Also of note patient recently saw Dr. Yael myers for ongoing upper abdominal pain.  Looks like his plans are for repeat scopes and MRCP. We have not yet been called about it but I am sure may regarding her antiplatelet therapy.

## 2020-08-19 NOTE — PROGRESS NOTES
Called and spoke with the patient.  Echo showed improvement of EF to normal she still had basal inferior akinesis, grade 1 diastolic dysfunction, trace MR but the remainder of echo essentially normal.  Informed her I discussed her case with Dr. Zamudio and no further invasive work-up at this point in time for residual coronary disease.  She will call sooner with chest pain or worsening dyspnea or recurrence of heart failure symptoms.  Otherwise we will follow-up with her again in 3 months unless again otherwise needed sooner based on symptoms or problems.  She was transferred to [facility] to get a follow-up scheduled with Dr. Zamudio in 3 months.

## 2020-09-08 ENCOUNTER — TELEPHONE (OUTPATIENT)
Dept: GASTROENTEROLOGY | Facility: CLINIC | Age: 60
End: 2020-09-08

## 2020-09-08 DIAGNOSIS — R10.10 UPPER ABDOMINAL PAIN: Primary | ICD-10-CM

## 2020-09-08 DIAGNOSIS — D50.9 IRON DEFICIENCY ANEMIA, UNSPECIFIED IRON DEFICIENCY ANEMIA TYPE: ICD-10-CM

## 2020-09-08 NOTE — TELEPHONE ENCOUNTER
Returned patient's phone call. She states she wants to have her MRCP done at [facility]. Advised patient once Dr. Fischer has signed off on her order, will fax it to [phone].She verb understanding.

## 2020-09-08 NOTE — TELEPHONE ENCOUNTER
----- Message from Todd Alberto sent at 9/8/2020  1:20 PM EDT -----  Regarding: MRI  Contact: [phone]  Pt. Suppose to have order for MRI but is not in system. Wants to have both stomach and brain at same time.

## 2020-09-18 NOTE — TELEPHONE ENCOUNTER
Yeni Yarbrough [address]   Phone Number: [phone]             Patient is requesting a call back from RN.

## 2020-09-18 NOTE — TELEPHONE ENCOUNTER
Called pt back. She states she has not heard anything reg scheduling her MRI or capsule study advised will refax the order for the MRI to [facility] and the capsule machine is not working currently. We are waiting for a new machine to be delivered. Advised will call once we have the new machine. Pt verb understanding.

## 2020-10-13 RX ORDER — LOSARTAN POTASSIUM 25 MG/1
25 TABLET ORAL
Qty: 30 TABLET | Refills: 6 | Status: SHIPPED | OUTPATIENT
Start: 2020-10-13 | End: 2021-05-17

## 2020-10-19 ENCOUNTER — TELEPHONE (OUTPATIENT)
Dept: GASTROENTEROLOGY | Facility: CLINIC | Age: 60
End: 2020-10-19

## 2020-10-19 NOTE — TELEPHONE ENCOUNTER
----- Message from Todd Loo sent at 10/19/2020 10:06 AM EDT -----  Regarding: mri  Contact: [phone]  Armida from [facility] Authorization wants to make sure the MRI is suppose to be at [facility] and not [place]. She said if she does not answer just leave a detail message letting her know.

## 2020-10-20 ENCOUNTER — HOSPITAL ENCOUNTER (OUTPATIENT)
Dept: MRI IMAGING | Facility: HOSPITAL | Age: 60
Discharge: HOME | End: 2020-10-20
Admitting: INTERNAL MEDICINE

## 2020-10-20 DIAGNOSIS — D50.9 IRON DEFICIENCY ANEMIA, UNSPECIFIED IRON DEFICIENCY ANEMIA TYPE: ICD-10-CM

## 2020-10-20 DIAGNOSIS — R10.10 UPPER ABDOMINAL PAIN: ICD-10-CM

## 2020-10-20 PROCEDURE — 82565 ASSAY OF CREATININE: CPT

## 2020-10-20 PROCEDURE — 74183 MRI ABD W/O CNTR FLWD CNTR: CPT

## 2020-10-20 PROCEDURE — A9577 INJ MULTIHANCE: HCPCS | Performed by: INTERNAL MEDICINE

## 2020-10-20 PROCEDURE — 0 GADOBENATE DIMEGLUMINE 529 MG/ML SOLUTION: Performed by: INTERNAL MEDICINE

## 2020-10-20 RX ADMIN — GADOBENATE DIMEGLUMINE 14 ML: 529 INJECTION, SOLUTION INTRAVENOUS at 18:03

## 2020-10-21 LAB — CREAT BLDA-MCNC: 1 MG/DL (ref 0.6–1.3)

## 2020-11-04 ENCOUNTER — TELEPHONE (OUTPATIENT)
Dept: GASTROENTEROLOGY | Facility: CLINIC | Age: 60
End: 2020-11-04

## 2020-11-19 ENCOUNTER — OFFICE VISIT (OUTPATIENT)
Dept: CARDIOLOGY | Facility: CLINIC | Age: 60
End: 2020-11-19

## 2020-11-19 VITALS
OXYGEN SATURATION: 98 % | HEIGHT: 60 IN | BODY MASS INDEX: 32 KG/M2 | SYSTOLIC BLOOD PRESSURE: 130 MMHG | HEART RATE: 80 BPM | WEIGHT: 163 LBS | DIASTOLIC BLOOD PRESSURE: 80 MMHG

## 2020-11-19 DIAGNOSIS — I50.22 CHRONIC SYSTOLIC CONGESTIVE HEART FAILURE (HCC): ICD-10-CM

## 2020-11-19 DIAGNOSIS — I25.10 CORONARY ARTERY DISEASE INVOLVING NATIVE CORONARY ARTERY OF NATIVE HEART, ANGINA PRESENCE UNSPECIFIED: Primary | ICD-10-CM

## 2020-11-19 PROCEDURE — 99214 OFFICE O/P EST MOD 30 MIN: CPT | Performed by: INTERNAL MEDICINE

## 2020-11-19 PROCEDURE — 93000 ELECTROCARDIOGRAM COMPLETE: CPT | Performed by: INTERNAL MEDICINE

## 2020-11-19 NOTE — PROGRESS NOTES
Date of Office Visit: 2020    Patient Name: Falguni Minaya  : 1960    Encounter Provider: Marlon Zamudio MD  Referring Provider: No ref. provider found  Place of Service: [facility] CARDIOLOGY  Patient Care Team:  Provider, No Known as PCP - General      Coronary artery disease      History of Present Illness    The patient is a 60-year-old white female with a history of coronary artery disease who returns to the office today for follow-up.    In 2020 she was noted to have an inferior ST elevation myocardial infarction.  She underwent a catheterization with the findings of an occluded distal right coronary artery that was treated by thrombectomy and drug-eluting stent placement.  She also had significant disease of the circumflex as well as mild disease of the left anterior descending.  Her initial ejection fraction was approximately 30 to 35% but showed improvement on a post procedure echocardiogram with an ejection fraction of 50%.  Following her infarct she was treated for congestive heart failure and associated pleural effusion.  An echocardiogram at that time showed that her ejection fraction had been reduced again to about 30%.  She returns today for follow-up.  Her major complaints are that of shortness of breath and intermittent lightheadedness.  She does not really complain of angina pectoris, palpitations nor is she particularly fatigued.  She does not have any lower extremity edema that she is aware of.  She denies orthopnea.    Past Medical History:   Diagnosis Date   • Abdominal hernia    • Acute ST elevation myocardial infarction (STEMI) (CMS/HCC)     due to occlusion of right coronary artery   • Coronary artery disease    • Dyspepsia    • GERD (gastroesophageal reflux disease)    • Multiple sclerosis (CMS/HCC)    • Seizures (CMS/HCC)     last seizure          Past Surgical History:   Procedure Laterality Date   • APPENDECTOMY     •  CARDIAC CATHETERIZATION     • CARDIAC CATHETERIZATION N/A 2/24/2020    Procedure: Left Heart Cath;  Surgeon: Marlon Zamudio MD;  [facility];  Service: Cardiovascular;  Laterality: N/A;   • CARDIAC CATHETERIZATION N/A 2/24/2020    Procedure: Stent LATRICIA coronary;  Surgeon: Marlon Zamudio MD;  Location:  [facility] CATH INVASIVE LOCATION;  Service: Cardiovascular;  Laterality: N/A;   • CARDIAC CATHETERIZATION  2/24/2020    Procedure: Percutaneous Manual Thrombectomy;  Surgeon: Marlon Zamudio MD;  [facility];  Service: Cardiovascular;;   • CARDIAC CATHETERIZATION N/A 2/24/2020    Procedure: Coronary angiography;  Surgeon: Marlon Zamudio MD;  [facility];  Service: Cardiovascular;  Laterality: N/A;   • CARDIAC CATHETERIZATION N/A 2/24/2020    Procedure: Left ventriculography;  Surgeon: Marlon Zamudio MD;  [facility];  Service: Cardiovascular;  Laterality: N/A;   • CHOLECYSTECTOMY     • COLONOSCOPY N/A 2/29/2020    Procedure: COLONOSCOPY to  cecum:;  Surgeon: Krystal Sarabia MD;  Location: [facility] ENDOSCOPY;  Service: Gastroenterology;  Laterality: N/A;  pre:  anemia and abd pain  post:  hemorrhoids, tortuous colon   • ENDOSCOPY N/A 2/29/2020    Procedure: ESOPHAGOGASTRODUODENOSCOPY  with biopsies;  Surgeon: Krystal Sarabia MD;  Location: [facility] ENDOSCOPY;  Service: Gastroenterology;  Laterality: N/A;  pre:  anemia and abd pain  post:  mild gastritis,    • HYSTERECTOMY     • TONSILLECTOMY             Current Outpatient Medications:   •  aspirin 81 MG EC tablet, Take 1 tablet by mouth Daily., Disp: 30 tablet, Rfl: 11  •  atorvastatin (LIPITOR) 40 MG tablet, Take 1 tablet by mouth Every Night., Disp: 30 tablet, Rfl: 11  •  carvedilol (COREG) 12.5 MG tablet, Take 1 tablet by mouth Every 12 (Twelve) Hours., Disp: 60 tablet, Rfl: 6  •  clonazePAM (KlonoPIN) 1 MG tablet, Take 1 mg by mouth  2 (Two) Times a Day As Needed for Anxiety., Disp: , Rfl:   •  clopidogrel (PLAVIX) 75 MG tablet, Take 1 tablet by mouth Daily., Disp: 30 tablet, Rfl: 11  •  Dimethyl Fumarate 240 MG capsule delayed-release, Take 240 mg by mouth 2 (two) times a day., Disp: , Rfl:   •  FLUoxetine (PROzac) 20 MG capsule, Take 20 mg by mouth 3 (Three) Times a Day., Disp: , Rfl:   •  furosemide (LASIX) 40 MG tablet, Take 1 tablet by mouth Daily., Disp: 30 tablet, Rfl: 2  •  gabapentin (NEURONTIN) 300 MG capsule, Take 300 mg by mouth 4 (Four) Times a Day., Disp: , Rfl:   •  losartan (COZAAR) 25 MG tablet, TAKE 1 TABLET BY MOUTH DAILY, Disp: 30 tablet, Rfl: 6  •  modafinil (PROVIGIL) 200 MG tablet, Take 200 mg by mouth 2 (Two) Times a Day., Disp: , Rfl:   •  omeprazole (priLOSEC) 40 MG capsule, Take 40 mg by mouth Daily As Needed., Disp: , Rfl:   •  ondansetron (ZOFRAN) 4 MG tablet, Take 4 mg by mouth Every 6 (Six) Hours As Needed for Nausea or Vomiting., Disp: , Rfl:   •  pramipexole (MIRAPEX) 0.125 MG tablet, TAKE 1 TABLET BY MOUTH EVERY NIGHT AT BEDTIME, Disp: 30 tablet, Rfl: 5      Social History     Socioeconomic History   • Marital status:      Spouse name: Not on file   • Number of children: Not on file   • Years of education: Not on file   • Highest education level: Not on file   Tobacco Use   • Smoking status: Former Smoker     Packs/day: 0.50     Years: 20.00     Pack years: 10.00     Quit date: 2010     Years since quitting: 10.8   • Smokeless tobacco: Never Used   • Tobacco comment: caffeine use   Substance and Sexual Activity   • Alcohol use: Not Currently   • Drug use: Never   • Sexual activity: Defer         Review of Systems   Constitution: Negative.   HENT: Negative.    Eyes: Negative.    Cardiovascular: Positive for dyspnea on exertion.   Respiratory: Negative.    Endocrine: Negative.    Skin: Negative.    Musculoskeletal: Negative.    Gastrointestinal: Negative.    Neurological: Positive for light-headedness.  "  Psychiatric/Behavioral: Negative.        Procedures      ECG 12 Lead    Date/Time: 11/19/2020 3:00 PM  Performed by: Marlon Zamudio MD  Authorized by: Marlon Zamudio MD   Comparison: compared with previous ECG from 7/29/2020  Rhythm: sinus rhythm  Rate: normal  Q waves: II, III and aVF    QRS axis: left                  Objective:    /80 (BP Location: Left arm, Patient Position: Sitting, Cuff Size: Adult)   Pulse 80   Ht 152.4 cm (60\")   Wt 73.9 kg (163 lb)   SpO2 98%   BMI 31.83 kg/m²         Vitals signs reviewed.   Constitutional:       Appearance: Well-developed.   Eyes:      Pupils: Pupils are equal, round, and reactive to light.   HENT:      Head: Normocephalic.   Neck:      Musculoskeletal: Normal range of motion.      Thyroid: No thyromegaly.      Vascular: No carotid bruit or JVD.   Pulmonary:      Effort: Pulmonary effort is normal.      Breath sounds: Normal breath sounds.   Cardiovascular:      Normal rate. Regular rhythm.      No gallop.   Pulses:     Intact distal pulses.   Edema:     Peripheral edema absent.   Abdominal:      General: Bowel sounds are normal.      Palpations: Abdomen is soft.   Skin:     General: Skin is warm and dry.      Findings: No erythema.   Neurological:      Mental Status: Alert and oriented to person, place, and time.             Assessment:       Diagnosis Plan   1. Coronary artery disease involving native coronary artery of native heart, angina presence unspecified  Stress Test With Myocardial Perfusion One Day   2. Chronic systolic congestive heart failure (CMS/HCC)       1.  Coronary artery disease: Status post previous inferior myocardial infarction with intervention.  No angina pectoris.  Plan to repeat stress Cardiolite study when she returns in March.  2.  Congestive heart failure: Left ventricular ejection fraction reduced to 30% based on most recent echocardiogram.  Appears to be well compensated on physical exam  3.  Hyperlipidemia: On " statin therapy.         Plan:          NSC[facility]

## 2020-12-07 RX ORDER — SIMETHICONE 80 MG/1
TABLET, CHEWABLE ORAL
Qty: 90 TABLET | Refills: 0 | OUTPATIENT
Start: 2020-12-07

## 2020-12-10 ENCOUNTER — HOSPITAL ENCOUNTER (OUTPATIENT)
Dept: OTHER | Facility: HOSPITAL | Age: 60
Discharge: HOME | End: 2020-12-10
Attending: NURSE PRACTITIONER

## 2020-12-10 LAB
CREAT BLD-MCNC: 1 MG/DL (ref 0.6–1.4)
GFR SERPLBLD BASED ON 1.73 SQ M-ARVRAT: >60 ML/MIN/{1.73_M2}

## 2021-01-11 ENCOUNTER — OFFICE VISIT (OUTPATIENT)
Dept: GASTROENTEROLOGY | Facility: CLINIC | Age: 61
End: 2021-01-11

## 2021-01-11 VITALS — HEIGHT: 60 IN | BODY MASS INDEX: 33.41 KG/M2 | WEIGHT: 170.2 LBS | TEMPERATURE: 98.6 F

## 2021-01-11 DIAGNOSIS — D50.9 IRON DEFICIENCY ANEMIA, UNSPECIFIED IRON DEFICIENCY ANEMIA TYPE: ICD-10-CM

## 2021-01-11 DIAGNOSIS — R10.11 RIGHT UPPER QUADRANT ABDOMINAL PAIN: ICD-10-CM

## 2021-01-11 DIAGNOSIS — K59.00 CONSTIPATION, UNSPECIFIED CONSTIPATION TYPE: Primary | ICD-10-CM

## 2021-01-11 DIAGNOSIS — R11.0 NAUSEA: ICD-10-CM

## 2021-01-11 DIAGNOSIS — K21.9 GASTROESOPHAGEAL REFLUX DISEASE, UNSPECIFIED WHETHER ESOPHAGITIS PRESENT: ICD-10-CM

## 2021-01-11 DIAGNOSIS — R68.81 EARLY SATIETY: ICD-10-CM

## 2021-01-11 PROCEDURE — 99214 OFFICE O/P EST MOD 30 MIN: CPT | Performed by: NURSE PRACTITIONER

## 2021-01-11 RX ORDER — TIZANIDINE 2 MG/1
2 TABLET ORAL EVERY 8 HOURS PRN
COMMUNITY
Start: 2020-12-21 | End: 2022-07-06

## 2021-01-11 RX ORDER — LEVETIRACETAM 500 MG/1
500 TABLET ORAL 2 TIMES DAILY
COMMUNITY
Start: 2020-12-29 | End: 2022-10-06 | Stop reason: ALTCHOICE

## 2021-01-11 RX ORDER — ERGOCALCIFEROL 1.25 MG/1
CAPSULE ORAL
COMMUNITY
Start: 2021-01-08 | End: 2022-07-06

## 2021-01-11 NOTE — PROGRESS NOTES
Chief Complaint   Patient presents with   • Follow-up   • Abdominal Pain   • Nausea     HPI    Falguni Minaya is a  60 y.o. female here for a follow up visit for abdominal pain and nausea.  This patient follows with Dr. Fischer, new to me.  Last seen by Dr. Fischer in August 2020 for symptoms of abdominal pain and anemia.  She was scheduled for a capsule endoscopy for iron deficiency anemia, heme positive stools, and normal bidirectional endoscopic evaluation.  She also had an MRCP due to right upper quadrant abdominal pain.    Subsequent capsule endoscopy performed in November 2020 was normal as well as MRI.    Currently she is mainly struggling with constipation, continued right upper quadrant abdominal pain, and nausea.  There is some mild early satiety.  She feels like GERD is well controlled on once daily Prilosec.  She takes Zofran as needed.  She has been struggling with the symptoms for approximately 1 year.  She tells me she had a gastric emptying study performed in [place] several years ago but is unclear if she has gastroparesis or not.  She denies vomiting.  Her appetite is good.  Her weight is stable.  Her bowels are moving approximately every 3 to 4 days with small-volume piecemeal like stools.  She denies rectal bleeding.    Her last hemogram was July with H/H 11.5/34.4.    Past Medical History:   Diagnosis Date   • Abdominal hernia    • Acute ST elevation myocardial infarction (STEMI) (CMS/HCC)     due to occlusion of right coronary artery   • Coronary artery disease    • Dyspepsia    • GERD (gastroesophageal reflux disease)    • Multiple sclerosis (CMS/HCC)    • Seizures (CMS/HCC)     last seizure 2018       Past Surgical History:   Procedure Laterality Date   • APPENDECTOMY     • CARDIAC CATHETERIZATION     • CARDIAC CATHETERIZATION N/A 2/24/2020    Procedure: Left Heart Cath;  Surgeon: Marlon Zamudio MD;  Location: [facility] CATH INVASIVE LOCATION;  Service: Cardiovascular;  Laterality: N/A;    • CARDIAC CATHETERIZATION N/A 2/24/2020    Procedure: Stent LATRICIA coronary;  Surgeon: Marlon Zamudio MD;  Location:  [facility] CATH INVASIVE LOCATION;  Service: Cardiovascular;  Laterality: N/A;   • CARDIAC CATHETERIZATION  2/24/2020    Procedure: Percutaneous Manual Thrombectomy;  Surgeon: Marlon Zamudio MD;  Location:  [facility] CATH INVASIVE LOCATION;  Service: Cardiovascular;;   • CARDIAC CATHETERIZATION N/A 2/24/2020    Procedure: Coronary angiography;  Surgeon: Marlon Zamudio MD;  Location:  [facility] CATH INVASIVE LOCATION;  Service: Cardiovascular;  Laterality: N/A;   • CARDIAC CATHETERIZATION N/A 2/24/2020    Procedure: Left ventriculography;  Surgeon: Marlon Zamudio MD;  Location:  [facility] CATH INVASIVE LOCATION;  Service: Cardiovascular;  Laterality: N/A;   • CHOLECYSTECTOMY     • COLONOSCOPY N/A 2/29/2020    Procedure: COLONOSCOPY to  cecum:;  Surgeon: Krystal Sarabia MD;  [facility] ENDOSCOPY;  Service: Gastroenterology;  Laterality: N/A;  pre:  anemia and abd pain  post:  hemorrhoids, tortuous colon   • ENDOSCOPY N/A 2/29/2020    Procedure: ESOPHAGOGASTRODUODENOSCOPY  with biopsies;  Surgeon: Krystal Sarabia MD;  [facility] ENDOSCOPY;  Service: Gastroenterology;  Laterality: N/A;  pre:  anemia and abd pain  post:  mild gastritis,    • HYSTERECTOMY     • TONSILLECTOMY         Scheduled Meds:  Outpatient Encounter Medications as of 1/11/2021   Medication Sig Dispense Refill   • aspirin 81 MG EC tablet Take 1 tablet by mouth Daily. 30 tablet 11   • atorvastatin (LIPITOR) 40 MG tablet Take 1 tablet by mouth Every Night. 30 tablet 11   • carvedilol (COREG) 12.5 MG tablet Take 1 tablet by mouth Every 12 (Twelve) Hours. 60 tablet 6   • clonazePAM (KlonoPIN) 1 MG tablet Take 1 mg by mouth 2 (Two) Times a Day As Needed for Anxiety.     • clopidogrel (PLAVIX) 75 MG tablet Take 1 tablet by mouth Daily. 30 tablet 11   • Dimethyl Fumarate 240 MG capsule delayed-release Take 240 mg by mouth 2  (two) times a day.     • FLUoxetine (PROzac) 20 MG capsule Take 20 mg by mouth 3 (Three) Times a Day.     • furosemide (LASIX) 40 MG tablet Take 1 tablet by mouth Daily. 30 tablet 2   • gabapentin (NEURONTIN) 300 MG capsule Take 300 mg by mouth 4 (Four) Times a Day.     • losartan (COZAAR) 25 MG tablet TAKE 1 TABLET BY MOUTH DAILY 30 tablet 6   • omeprazole (priLOSEC) 40 MG capsule Take 40 mg by mouth Daily As Needed.     • ondansetron (ZOFRAN) 4 MG tablet Take 4 mg by mouth Every 6 (Six) Hours As Needed for Nausea or Vomiting.     • pramipexole (MIRAPEX) 0.125 MG tablet TAKE 1 TABLET BY MOUTH EVERY NIGHT AT BEDTIME 30 tablet 5   • tiZANidine (ZANAFLEX) 2 MG tablet Take 2 mg by mouth Every 8 (Eight) Hours As Needed.     • vitamin D (ERGOCALCIFEROL) 1.25 MG (60624 UT) capsule capsule      • levETIRAcetam (KEPPRA) 500 MG tablet Take 500 mg by mouth 2 (Two) Times a Day.     • modafinil (PROVIGIL) 200 MG tablet Take 200 mg by mouth 2 (Two) Times a Day.       No facility-administered encounter medications on file as of 2021.        Continuous Infusions:No current facility-administered medications for this visit.       PRN Meds:.    Allergies   Allergen Reactions   • Codeine Hives   • Morphine Hives       Social History     Socioeconomic History   • Marital status:      Spouse name: Not on file   • Number of children: Not on file   • Years of education: Not on file   • Highest education level: Not on file   Tobacco Use   • Smoking status: Former Smoker     Packs/day: 0.50     Years: 20.00     Pack years: 10.00     Quit date:      Years since quittin.0   • Smokeless tobacco: Never Used   • Tobacco comment: caffeine use   Substance and Sexual Activity   • Alcohol use: Not Currently   • Drug use: Never   • Sexual activity: Defer       Family History   Problem Relation Age of Onset   • Thyroid disease Mother    • Hypertension Sister    • Coronary artery disease Brother        Review of Systems    Constitutional: Negative for activity change, appetite change, fatigue, fever and unexpected weight change.   HENT: Negative for trouble swallowing.    Respiratory: Negative for apnea, cough, choking, chest tightness, shortness of breath and wheezing.    Cardiovascular: Negative for chest pain, palpitations and leg swelling.   Gastrointestinal: Positive for abdominal pain, constipation and nausea. Negative for abdominal distention, anal bleeding, blood in stool, diarrhea, rectal pain and vomiting.       Vitals:    01/11/21 1416   Temp: 98.6 °F (37 °C)       Physical Exam  Constitutional:       Appearance: She is well-developed.   Cardiovascular:      Rate and Rhythm: Normal rate and regular rhythm.      Heart sounds: Normal heart sounds.   Pulmonary:      Effort: Pulmonary effort is normal. No respiratory distress.      Breath sounds: Normal breath sounds. No wheezing.   Abdominal:      General: Bowel sounds are normal. There is no distension.      Palpations: Abdomen is soft. There is no mass.      Tenderness: There is no abdominal tenderness. There is no guarding.      Hernia: No hernia is present.   Neurological:      Mental Status: She is alert and oriented to person, place, and time.   Psychiatric:         Behavior: Behavior normal.       Assessment    Constipation  Right upper quadrant abdominal pain  Nausea  Early satiety  GERD  Anemia    Plan    Pleasant 60-year-old female seen today in follow-up.  Her primary issue seems to be constipation.  She still struggle right upper quadrant abdominal pain, nausea, and mild early satiety.  I also wonder if she has an element of gastroparesis.  We discussed updating her gastric emptying study but she is hesitant because she is going through a lot of testing for multiple sclerosis.  We did review gastroparesis type diet which I want her to try.  I also feel like if her constipation was better managed she may have improvement in upper GI symptoms.  I gave her Trulance  3 mg once daily to trial.  If she does not feel better with Trulance and continued PPI therapy consider updating her gastric emptying study.    Labs today to reassess mild anemia seen over the summer.    Follow-up 4 weeks.

## 2021-01-12 ENCOUNTER — TELEPHONE (OUTPATIENT)
Dept: GASTROENTEROLOGY | Facility: CLINIC | Age: 61
End: 2021-01-12

## 2021-01-12 LAB
BASOPHILS # BLD AUTO: 0.05 10*3/MM3 (ref 0–0.2)
BASOPHILS NFR BLD AUTO: 0.6 % (ref 0–1.5)
EOSINOPHIL # BLD AUTO: 0.11 10*3/MM3 (ref 0–0.4)
EOSINOPHIL NFR BLD AUTO: 1.4 % (ref 0.3–6.2)
ERYTHROCYTE [DISTWIDTH] IN BLOOD BY AUTOMATED COUNT: 13.2 % (ref 12.3–15.4)
FOLATE SERPL-MCNC: 7.79 NG/ML (ref 4.78–24.2)
HCT VFR BLD AUTO: 35.8 % (ref 34–46.6)
HGB BLD-MCNC: 12 G/DL (ref 12–15.9)
IMM GRANULOCYTES # BLD AUTO: 0.04 10*3/MM3 (ref 0–0.05)
IMM GRANULOCYTES NFR BLD AUTO: 0.5 % (ref 0–0.5)
IRON SATN MFR SERPL: 14 % (ref 20–50)
IRON SERPL-MCNC: 66 MCG/DL (ref 37–145)
LYMPHOCYTES # BLD AUTO: 2.32 10*3/MM3 (ref 0.7–3.1)
LYMPHOCYTES NFR BLD AUTO: 29.5 % (ref 19.6–45.3)
MCH RBC QN AUTO: 29.9 PG (ref 26.6–33)
MCHC RBC AUTO-ENTMCNC: 33.5 G/DL (ref 31.5–35.7)
MCV RBC AUTO: 89.1 FL (ref 79–97)
MONOCYTES # BLD AUTO: 0.62 10*3/MM3 (ref 0.1–0.9)
MONOCYTES NFR BLD AUTO: 7.9 % (ref 5–12)
NEUTROPHILS # BLD AUTO: 4.72 10*3/MM3 (ref 1.7–7)
NEUTROPHILS NFR BLD AUTO: 60.1 % (ref 42.7–76)
NRBC BLD AUTO-RTO: 0 /100 WBC (ref 0–0.2)
PLATELET # BLD AUTO: 342 10*3/MM3 (ref 140–450)
RBC # BLD AUTO: 4.02 10*6/MM3 (ref 3.77–5.28)
TIBC SERPL-MCNC: 456 MCG/DL
UIBC SERPL-MCNC: 390 MCG/DL (ref 112–346)
VIT B12 SERPL-MCNC: 921 PG/ML (ref 211–946)
WBC # BLD AUTO: 7.86 10*3/MM3 (ref 3.4–10.8)

## 2021-01-12 NOTE — PROGRESS NOTES
Let the patient know that her blood counts are normal.  No anemia.  No vitamin deficiencies.  Keep follow-up.

## 2021-01-12 NOTE — TELEPHONE ENCOUNTER
----- Message from WESTLEY Kenney sent at 1/12/2021 10:22 AM EST -----  Let the patient know that her blood counts are normal.  No anemia.  No vitamin deficiencies.  Keep follow-up.

## 2021-01-22 RX ORDER — PRAMIPEXOLE DIHYDROCHLORIDE 0.12 MG/1
TABLET ORAL
Qty: 30 TABLET | Refills: 5 | Status: SHIPPED | OUTPATIENT
Start: 2021-01-22 | End: 2022-08-14

## 2021-01-25 ENCOUNTER — HOSPITAL ENCOUNTER (OUTPATIENT)
Dept: OTHER | Facility: HOSPITAL | Age: 61
Discharge: HOME | End: 2021-01-25
Attending: INTERNAL MEDICINE

## 2021-01-25 LAB
ALBUMIN SERPL-MCNC: 4.2 G/DL (ref 3.5–5)
ALBUMIN/GLOB SERPL: 1.2 {RATIO} (ref 1.4–2.6)
ALP SERPL-CCNC: 128 U/L (ref 43–160)
ALT SERPL-CCNC: 23 U/L (ref 10–40)
ANION GAP SERPL CALC-SCNC: 17 MMOL/L (ref 8–19)
APPEARANCE UR: ABNORMAL
AST SERPL-CCNC: 28 U/L (ref 15–50)
BASOPHILS # BLD AUTO: 0.08 10*3/UL (ref 0–0.2)
BASOPHILS NFR BLD AUTO: 1.2 % (ref 0–3)
BILIRUB SERPL-MCNC: 0.36 MG/DL (ref 0.2–1.3)
BILIRUB UR QL: NEGATIVE
BUN SERPL-MCNC: 16 MG/DL (ref 5–25)
BUN/CREAT SERPL: 15 {RATIO} (ref 6–20)
CALCIUM SERPL-MCNC: 10.8 MG/DL (ref 8.7–10.4)
CHLORIDE SERPL-SCNC: 103 MMOL/L (ref 99–111)
COLOR UR: YELLOW
CONV ABS IMM GRAN: 0.03 10*3/UL (ref 0–0.2)
CONV BACTERIA: NEGATIVE
CONV CO2: 22 MMOL/L (ref 22–32)
CONV COLLECTION SOURCE (UA): ABNORMAL
CONV CRYSTALS: ABNORMAL /[HPF]
CONV HYALINE CASTS IN URINE MICRO: ABNORMAL /[LPF]
CONV IMMATURE GRAN: 0.5 % (ref 0–1.8)
CONV TOTAL PROTEIN: 7.7 G/DL (ref 6.3–8.2)
CONV UROBILINOGEN IN URINE BY AUTOMATED TEST STRIP: 0.2 {EHRLICHU}/DL (ref 0.1–1)
CREAT UR-MCNC: 1.08 MG/DL (ref 0.5–0.9)
DEPRECATED RDW RBC AUTO: 46.5 FL (ref 36.4–46.3)
EOSINOPHIL # BLD AUTO: 0.09 10*3/UL (ref 0–0.7)
EOSINOPHIL # BLD AUTO: 1.4 % (ref 0–7)
ERYTHROCYTE [DISTWIDTH] IN BLOOD BY AUTOMATED COUNT: 13.1 % (ref 11.7–14.4)
GFR SERPLBLD BASED ON 1.73 SQ M-ARVRAT: 55 ML/MIN/{1.73_M2}
GLOBULIN UR ELPH-MCNC: 3.5 G/DL (ref 2–3.5)
GLUCOSE SERPL-MCNC: 69 MG/DL (ref 65–99)
GLUCOSE UR QL: NEGATIVE MG/DL
HCT VFR BLD AUTO: 39.4 % (ref 37–47)
HGB BLD-MCNC: 12.1 G/DL (ref 12–16)
HGB UR QL STRIP: NEGATIVE
KETONES UR QL STRIP: NEGATIVE MG/DL
LEUKOCYTE ESTERASE UR QL STRIP: ABNORMAL
LYMPHOCYTES # BLD AUTO: 3.1 10*3/UL (ref 1–5)
LYMPHOCYTES NFR BLD AUTO: 46.7 % (ref 20–45)
MCH RBC QN AUTO: 29.7 PG (ref 27–31)
MCHC RBC AUTO-ENTMCNC: 30.7 G/DL (ref 33–37)
MCV RBC AUTO: 96.6 FL (ref 81–99)
MONOCYTES # BLD AUTO: 0.53 10*3/UL (ref 0.2–1.2)
MONOCYTES NFR BLD AUTO: 8 % (ref 3–10)
NEUTROPHILS # BLD AUTO: 2.81 10*3/UL (ref 2–8)
NEUTROPHILS NFR BLD AUTO: 42.2 % (ref 30–85)
NITRITE UR QL STRIP: NEGATIVE
NRBC CBCN: 0 % (ref 0–0.7)
OSMOLALITY SERPL CALC.SUM OF ELEC: 286 MOSM/KG (ref 273–304)
PH UR STRIP.AUTO: 5 [PH] (ref 5–8)
PLATELET # BLD AUTO: 332 10*3/UL (ref 130–400)
PMV BLD AUTO: 11.2 FL (ref 9.4–12.3)
POTASSIUM SERPL-SCNC: 4.2 MMOL/L (ref 3.5–5.3)
PROT UR QL: NEGATIVE MG/DL
RBC # BLD AUTO: 4.08 10*6/UL (ref 4.2–5.4)
RBC #/AREA URNS HPF: ABNORMAL /[HPF]
SODIUM SERPL-SCNC: 138 MMOL/L (ref 135–147)
SP GR UR: 1.02 (ref 1–1.03)
WBC # BLD AUTO: 6.64 10*3/UL (ref 4.8–10.8)
WBC #/AREA URNS HPF: ABNORMAL /[HPF]

## 2021-01-26 LAB
CONV CREATININE URINE, RANDOM: 122.2 MG/DL (ref 10–300)
CONV MICROALBUM.,U,RANDOM: 12.1 MG/L (ref 0–20)
CONV PROTEIN TO CREATININE RATIO (RANDOM URINE): 0.09 {RATIO} (ref 0–0.1)
MICROALBUMIN/CREAT UR: 9.9 MG/G{CRE} (ref 0–35)
PROT UR-MCNC: 10.6 MG/DL

## 2021-03-12 ENCOUNTER — TRANSCRIBE ORDERS (OUTPATIENT)
Dept: SLEEP MEDICINE | Facility: HOSPITAL | Age: 61
End: 2021-03-12

## 2021-03-12 DIAGNOSIS — Z01.818 OTHER SPECIFIED PRE-OPERATIVE EXAMINATION: Primary | ICD-10-CM

## 2021-03-16 RX ORDER — CLOPIDOGREL BISULFATE 75 MG/1
TABLET ORAL
Qty: 30 TABLET | Refills: 11 | Status: SHIPPED | OUTPATIENT
Start: 2021-03-16 | End: 2022-07-06 | Stop reason: SDUPTHER

## 2021-03-16 RX ORDER — ATORVASTATIN CALCIUM 40 MG/1
TABLET, FILM COATED ORAL
Qty: 30 TABLET | Refills: 11 | Status: SHIPPED | OUTPATIENT
Start: 2021-03-16 | End: 2022-07-06 | Stop reason: SDUPTHER

## 2021-03-20 ENCOUNTER — LAB (OUTPATIENT)
Dept: LAB | Facility: HOSPITAL | Age: 61
End: 2021-03-20

## 2021-03-20 DIAGNOSIS — Z01.818 OTHER SPECIFIED PRE-OPERATIVE EXAMINATION: ICD-10-CM

## 2021-03-20 PROCEDURE — C9803 HOPD COVID-19 SPEC COLLECT: HCPCS

## 2021-03-20 PROCEDURE — U0004 COV-19 TEST NON-CDC HGH THRU: HCPCS

## 2021-03-22 LAB — SARS-COV-2 RNA RESP QL NAA+PROBE: NOT DETECTED

## 2021-03-23 ENCOUNTER — HOSPITAL ENCOUNTER (OUTPATIENT)
Dept: GENERAL RADIOLOGY | Facility: HOSPITAL | Age: 61
Discharge: HOME | End: 2021-03-23

## 2021-03-23 ENCOUNTER — LAB (OUTPATIENT)
Dept: LAB | Facility: HOSPITAL | Age: 61
End: 2021-03-23

## 2021-03-23 ENCOUNTER — TELEPHONE (OUTPATIENT)
Dept: CARDIOLOGY | Facility: CLINIC | Age: 61
End: 2021-03-23

## 2021-03-23 ENCOUNTER — HOSPITAL ENCOUNTER (OUTPATIENT)
Dept: CARDIOLOGY | Facility: HOSPITAL | Age: 61
Discharge: HOME | End: 2021-03-23

## 2021-03-23 ENCOUNTER — OFFICE VISIT (OUTPATIENT)
Dept: CARDIOLOGY | Facility: CLINIC | Age: 61
End: 2021-03-23

## 2021-03-23 VITALS
HEIGHT: 60 IN | SYSTOLIC BLOOD PRESSURE: 130 MMHG | OXYGEN SATURATION: 98 % | DIASTOLIC BLOOD PRESSURE: 78 MMHG | RESPIRATION RATE: 18 BRPM | HEART RATE: 74 BPM | BODY MASS INDEX: 34.55 KG/M2 | WEIGHT: 176 LBS

## 2021-03-23 VITALS — BODY MASS INDEX: 33.38 KG/M2 | HEIGHT: 60 IN | WEIGHT: 170 LBS

## 2021-03-23 DIAGNOSIS — I21.11 STEMI INVOLVING RIGHT CORONARY ARTERY (HCC): ICD-10-CM

## 2021-03-23 DIAGNOSIS — R06.02 SHORTNESS OF BREATH: ICD-10-CM

## 2021-03-23 DIAGNOSIS — I25.10 CORONARY ARTERY DISEASE INVOLVING NATIVE CORONARY ARTERY OF NATIVE HEART, ANGINA PRESENCE UNSPECIFIED: ICD-10-CM

## 2021-03-23 DIAGNOSIS — I25.10 CORONARY ARTERY DISEASE INVOLVING NATIVE CORONARY ARTERY OF NATIVE HEART, ANGINA PRESENCE UNSPECIFIED: Primary | ICD-10-CM

## 2021-03-23 DIAGNOSIS — I25.5 ISCHEMIC CARDIOMYOPATHY: ICD-10-CM

## 2021-03-23 LAB
ALBUMIN SERPL-MCNC: 4.3 G/DL (ref 3.5–5.2)
ALBUMIN/GLOB SERPL: 1.2 G/DL
ALP SERPL-CCNC: 120 U/L (ref 39–117)
ALT SERPL W P-5'-P-CCNC: 16 U/L (ref 1–33)
ANION GAP SERPL CALCULATED.3IONS-SCNC: 9.8 MMOL/L (ref 5–15)
AST SERPL-CCNC: 16 U/L (ref 1–32)
BH CV NUCLEAR PRIOR STUDY: 3
BH CV REST NUCLEAR ISOTOPE DOSE: 10.9 MCI
BH CV STRESS BP STAGE 1: NORMAL
BH CV STRESS DURATION MIN STAGE 1: 4
BH CV STRESS DURATION SEC STAGE 1: 30
BH CV STRESS GRADE STAGE 1: 10
BH CV STRESS HR STAGE 1: 135
BH CV STRESS METS STAGE 1: 5
BH CV STRESS NUCLEAR ISOTOPE DOSE: 33.7 MCI
BH CV STRESS PROTOCOL 1: NORMAL
BH CV STRESS RECOVERY BP: NORMAL MMHG
BH CV STRESS RECOVERY HR: 88 BPM
BH CV STRESS SPEED STAGE 1: 1.7
BH CV STRESS STAGE 1: 1
BILIRUB SERPL-MCNC: 0.3 MG/DL (ref 0–1.2)
BUN SERPL-MCNC: 18 MG/DL (ref 8–23)
BUN/CREAT SERPL: 17.5 (ref 7–25)
CALCIUM SPEC-SCNC: 10.5 MG/DL (ref 8.6–10.5)
CHLORIDE SERPL-SCNC: 105 MMOL/L (ref 98–107)
CO2 SERPL-SCNC: 26.2 MMOL/L (ref 22–29)
CREAT SERPL-MCNC: 1.03 MG/DL (ref 0.57–1)
DEPRECATED RDW RBC AUTO: 40.5 FL (ref 37–54)
ERYTHROCYTE [DISTWIDTH] IN BLOOD BY AUTOMATED COUNT: 12.1 % (ref 12.3–15.4)
GFR SERPL CREATININE-BSD FRML MDRD: 54 ML/MIN/1.73
GLOBULIN UR ELPH-MCNC: 3.5 GM/DL
GLUCOSE SERPL-MCNC: 109 MG/DL (ref 65–99)
HCT VFR BLD AUTO: 35.8 % (ref 34–46.6)
HGB BLD-MCNC: 12.2 G/DL (ref 12–15.9)
LV EF NUC BP: 54 %
MAXIMAL PREDICTED HEART RATE: 158 BPM
MCH RBC QN AUTO: 31.2 PG (ref 26.6–33)
MCHC RBC AUTO-ENTMCNC: 34.1 G/DL (ref 31.5–35.7)
MCV RBC AUTO: 91.6 FL (ref 79–97)
NT-PROBNP SERPL-MCNC: 358.8 PG/ML (ref 0–900)
PERCENT MAX PREDICTED HR: 85.44 %
PLATELET # BLD AUTO: 314 10*3/MM3 (ref 140–450)
PMV BLD AUTO: 10.7 FL (ref 6–12)
POTASSIUM SERPL-SCNC: 4.6 MMOL/L (ref 3.5–5.2)
PROT SERPL-MCNC: 7.8 G/DL (ref 6–8.5)
RBC # BLD AUTO: 3.91 10*6/MM3 (ref 3.77–5.28)
SODIUM SERPL-SCNC: 141 MMOL/L (ref 136–145)
STRESS BASELINE BP: NORMAL MMHG
STRESS BASELINE HR: 80 BPM
STRESS PERCENT HR: 101 %
STRESS POST ESTIMATED WORKLOAD: 5 METS
STRESS POST EXERCISE DUR MIN: 4 MIN
STRESS POST EXERCISE DUR SEC: 30 SEC
STRESS POST PEAK BP: NORMAL MMHG
STRESS POST PEAK HR: 135 BPM
STRESS TARGET HR: 134 BPM
WBC # BLD AUTO: 7.93 10*3/MM3 (ref 3.4–10.8)

## 2021-03-23 PROCEDURE — 93016 CV STRESS TEST SUPVJ ONLY: CPT | Performed by: INTERNAL MEDICINE

## 2021-03-23 PROCEDURE — 85027 COMPLETE CBC AUTOMATED: CPT

## 2021-03-23 PROCEDURE — A9502 TC99M TETROFOSMIN: HCPCS | Performed by: INTERNAL MEDICINE

## 2021-03-23 PROCEDURE — 83880 ASSAY OF NATRIURETIC PEPTIDE: CPT

## 2021-03-23 PROCEDURE — 78452 HT MUSCLE IMAGE SPECT MULT: CPT | Performed by: INTERNAL MEDICINE

## 2021-03-23 PROCEDURE — 99214 OFFICE O/P EST MOD 30 MIN: CPT | Performed by: NURSE PRACTITIONER

## 2021-03-23 PROCEDURE — 93000 ELECTROCARDIOGRAM COMPLETE: CPT | Performed by: NURSE PRACTITIONER

## 2021-03-23 PROCEDURE — 78452 HT MUSCLE IMAGE SPECT MULT: CPT

## 2021-03-23 PROCEDURE — 93018 CV STRESS TEST I&R ONLY: CPT | Performed by: INTERNAL MEDICINE

## 2021-03-23 PROCEDURE — 0 TECHNETIUM TETROFOSMIN KIT: Performed by: INTERNAL MEDICINE

## 2021-03-23 PROCEDURE — 93017 CV STRESS TEST TRACING ONLY: CPT

## 2021-03-23 PROCEDURE — 36415 COLL VENOUS BLD VENIPUNCTURE: CPT

## 2021-03-23 PROCEDURE — 71046 X-RAY EXAM CHEST 2 VIEWS: CPT

## 2021-03-23 PROCEDURE — 80053 COMPREHEN METABOLIC PANEL: CPT

## 2021-03-23 RX ADMIN — TETROFOSMIN 1 DOSE: 1.38 INJECTION, POWDER, LYOPHILIZED, FOR SOLUTION INTRAVENOUS at 07:35

## 2021-03-23 RX ADMIN — TETROFOSMIN 1 DOSE: 1.38 INJECTION, POWDER, LYOPHILIZED, FOR SOLUTION INTRAVENOUS at 08:22

## 2021-03-23 NOTE — TELEPHONE ENCOUNTER
I left her a voicemail to call me regarding her stress test today.  Findings consistent with prior infarct with no ischemia.  Her symptoms are likely not cardiac.

## 2021-03-23 NOTE — PROGRESS NOTES
"  Date of Office Visit: 2021  Encounter Provider: WESTLEY Campa  Place of Service: [facility] CARDIOLOGY  Patient Name: Falguni Minaya  :1960    Chief Complaint   Patient presents with   • Coronary Artery Disease     4 MTH FOLLOW UP   :     HPI: Falguni Minaya is a 61 y.o. female who presents today for follow-up.  Old records have been obtained and reviewed by me.  She is a patient of Dr. Zamudio's with a past cardiac history significant for coronary artery disease.  In 2020, she had an inferior STEMI and underwent thrombectomy and drug-eluting stent placement of the distal RCA.  At that time, she also had significant disease of the circumflex and mild disease of the LAD.  Initial EF was approximately 30 to 35%.  However, this procedure echocardiogram revealed normal LV systolic function with an EF of 50%.  She was treated for CHF and associated pleural effusion.  A repeat echocardiogram in 2020 revealed an EF in the range of 56 to 60%.   She was last seen in the office by Dr. Zamudio on 2020 at which time she reported shortness of breath and intermittent lightheadedness.  Dr. Zamudio recommended repeating a stress test when she returns for her appointment in March.  She is here today for follow-up.   Since she saw Dr. Zamudio, her symptoms of shortness of breath are the same.  She reports shortness of breath with exertion but denies any PND or orthopnea.  She says her shortness of breath varies from a day-to-day basis.  She has also had 2 episodes where she feels like her blood pressure drops.  She states that she will \"go down\" and by that she means she will lower herself to the ground.  She denies any loss of consciousness.  These episodes are associated with lightheadedness and diaphoresis.  Her last episode was on .  She reports a longstanding history of \"stomach problems\" including heartburn and gastroparesis.  She will " occasionally go 4 to 5 days without a bowel movement.  She denies any chest pain, palpitations, edema, or syncope.  She denies any bleeding difficulties or melena.  She also tells me that she has multiple sclerosis.    Past Medical History:   Diagnosis Date   • Abdominal hernia    • Acute ST elevation myocardial infarction (STEMI) (CMS/[facility])     due to occlusion of right coronary artery   • Coronary artery disease    • Dyspepsia    • GERD (gastroesophageal reflux disease)    • Multiple sclerosis (CMS/[facility])    • Seizures (CMS/[facility])     last seizure 2018       Past Surgical History:   Procedure Laterality Date   • APPENDECTOMY     • CARDIAC CATHETERIZATION     • CARDIAC CATHETERIZATION N/A 2/24/2020    Procedure: Left Heart Cath;  Surgeon: Marlon Zamudio MD;  Location:  [facility] CATH INVASIVE LOCATION;  Service: Cardiovascular;  Laterality: N/A;   • CARDIAC CATHETERIZATION N/A 2/24/2020    Procedure: Stent LATRICIA coronary;  Surgeon: Marlon Zamudio MD;  Location:  [facility] CATH INVASIVE LOCATION;  Service: Cardiovascular;  Laterality: N/A;   • CARDIAC CATHETERIZATION  2/24/2020    Procedure: Percutaneous Manual Thrombectomy;  Surgeon: Marlon Zamudio MD;  Location: [facility] CATH INVASIVE LOCATION;  Service: Cardiovascular;;   • CARDIAC CATHETERIZATION N/A 2/24/2020    Procedure: Coronary angiography;  Surgeon: Marlon Zamudio MD;  Location: [facility] CATH INVASIVE LOCATION;  Service: Cardiovascular;  Laterality: N/A;   • CARDIAC CATHETERIZATION N/A 2/24/2020    Procedure: Left ventriculography;  Surgeon: Marlon Zamudio MD;  Location:  [facility] CATH INVASIVE LOCATION;  Service: Cardiovascular;  Laterality: N/A;   • CHOLECYSTECTOMY     • COLONOSCOPY N/A 2/29/2020    Procedure: COLONOSCOPY to  cecum:;  Surgeon: Krystal Sarabia MD;  Location: [facility] ENDOSCOPY;  Service: Gastroenterology;  Laterality: N/A;  pre:  anemia and abd pain  post:  hemorrhoids, tortuous colon   • ENDOSCOPY N/A 2/29/2020    Procedure:  ESOPHAGOGASTRODUODENOSCOPY  with biopsies;  Surgeon: Krystal Sarabia MD;  Location: [facility] ENDOSCOPY;  Service: Gastroenterology;  Laterality: N/A;  pre:  anemia and abd pain  post:  mild gastritis,    • HYSTERECTOMY     • TONSILLECTOMY         Social History     Socioeconomic History   • Marital status:      Spouse name: Not on file   • Number of children: Not on file   • Years of education: Not on file   • Highest education level: Not on file   Tobacco Use   • Smoking status: Former Smoker     Packs/day: 0.50     Years: 20.00     Pack years: 10.00     Quit date:      Years since quittin.2   • Smokeless tobacco: Never Used   • Tobacco comment: CAFFEINE USE: 2 CUPS COFFEE DAILY   Vaping Use   • Vaping Use: Never used   Substance and Sexual Activity   • Alcohol use: Not Currently   • Drug use: Never   • Sexual activity: Defer       Family History   Problem Relation Age of Onset   • Thyroid disease Mother    • Hypertension Sister    • Coronary artery disease Brother        Review of Systems   Constitutional: Negative.   Cardiovascular: Positive for dyspnea on exertion and near-syncope. Negative for chest pain, leg swelling, orthopnea, paroxysmal nocturnal dyspnea and syncope.   Respiratory: Negative.    Hematologic/Lymphatic: Negative for bleeding problem.   Musculoskeletal: Negative for falls.   Gastrointestinal: Positive for heartburn. Negative for melena.   Neurological: Positive for light-headedness. Negative for dizziness.       Allergies   Allergen Reactions   • Codeine Hives   • Morphine Hives         Current Outpatient Medications:   •  aspirin 81 MG EC tablet, Take 1 tablet by mouth Daily., Disp: 30 tablet, Rfl: 11  •  atorvastatin (LIPITOR) 40 MG tablet, TAKE 1 TABLET BY MOUTH EVERY NIGHT AT BEDTIME, Disp: 30 tablet, Rfl: 11  •  carvedilol (COREG) 12.5 MG tablet, Take 1 tablet by mouth Every 12 (Twelve) Hours., Disp: 60 tablet, Rfl: 6  •  clonazePAM (KlonoPIN) 1 MG tablet, Take 1 mg by mouth  "2 (Two) Times a Day As Needed for Anxiety., Disp: , Rfl:   •  clopidogrel (PLAVIX) 75 MG tablet, TAKE 1 TABLET BY MOUTH ONCE DAILY, Disp: 30 tablet, Rfl: 11  •  Dimethyl Fumarate 240 MG capsule delayed-release, Take 240 mg by mouth 2 (two) times a day., Disp: , Rfl:   •  FLUoxetine (PROzac) 20 MG capsule, Take 20 mg by mouth 3 (Three) Times a Day., Disp: , Rfl:   •  furosemide (LASIX) 40 MG tablet, Take 1 tablet by mouth Daily., Disp: 30 tablet, Rfl: 2  •  gabapentin (NEURONTIN) 300 MG capsule, Take 300 mg by mouth 4 (Four) Times a Day., Disp: , Rfl:   •  levETIRAcetam (KEPPRA) 500 MG tablet, Take 500 mg by mouth 2 (Two) Times a Day., Disp: , Rfl:   •  losartan (COZAAR) 25 MG tablet, TAKE 1 TABLET BY MOUTH DAILY, Disp: 30 tablet, Rfl: 6  •  modafinil (PROVIGIL) 200 MG tablet, Take 200 mg by mouth 2 (Two) Times a Day., Disp: , Rfl:   •  omeprazole (priLOSEC) 40 MG capsule, Take 40 mg by mouth Daily As Needed., Disp: , Rfl:   •  ondansetron (ZOFRAN) 4 MG tablet, Take 4 mg by mouth Every 6 (Six) Hours As Needed for Nausea or Vomiting., Disp: , Rfl:   •  pramipexole (MIRAPEX) 0.125 MG tablet, TAKE 1 TABLET BY MOUTH EVERY NIGHT AT BEDTIME, Disp: 30 tablet, Rfl: 5  •  tiZANidine (ZANAFLEX) 2 MG tablet, Take 2 mg by mouth Every 8 (Eight) Hours As Needed., Disp: , Rfl:   •  vitamin D (ERGOCALCIFEROL) 1.25 MG (05687 UT) capsule capsule, , Disp: , Rfl:   No current facility-administered medications for this visit.      Objective:     Vitals:    03/23/21 1046 03/23/21 1052   BP: 130/78 130/78   BP Location: Right arm Left arm   Patient Position: Sitting Sitting   Pulse: 92 74   Resp: 18    SpO2: 98%    Weight: 79.8 kg (176 lb)    Height: 152.4 cm (60\")      Body mass index is 34.37 kg/m².    PHYSICAL EXAM:    Neck:      Vascular: No JVD.   Pulmonary:      Effort: Pulmonary effort is normal.      Breath sounds: Normal breath sounds.   Cardiovascular:      Normal rate. Regular rhythm.      Murmurs: There is no murmur.      No " gallop. No click. No rub.   Pulses:     Intact distal pulses.           ECG 12 Lead    Date/Time: 3/23/2021 10:59 AM  Performed by: Cony Figueroa APRN  Authorized by: Cony Figueroa APRN   Comparison: compared with previous ECG from 11/19/2020  Similar to previous ECG  Rhythm: sinus rhythm  Rate: normal  BPM: 74  Q waves: III, aVF and II    T inversion: II, III, aVF, V4, V5 and V6    Clinical impression: abnormal EKG  Comments: Indication: CAD              Assessment:       Diagnosis Plan   1. Coronary artery disease involving native coronary artery of native heart, angina presence unspecified  ECG 12 Lead   2. STEMI involving right coronary artery (CMS/HCC)     3. Ischemic cardiomyopathy     4. Shortness of breath  XR Chest 2 View    CBC (No Diff)    Comprehensive Metabolic Panel    proBNP     Orders Placed This Encounter   Procedures   • XR Chest 2 View     Standing Status:   Future     Number of Occurrences:   1     Standing Expiration Date:   3/23/2022     Order Specific Question:   Reason for Exam:     Answer:   shortness of breath   • CBC (No Diff)     Standing Status:   Future     Number of Occurrences:   1     Standing Expiration Date:   3/23/2022   • Comprehensive Metabolic Panel     Standing Status:   Future     Number of Occurrences:   1     Standing Expiration Date:   3/23/2022   • proBNP     Standing Status:   Future     Number of Occurrences:   1     Standing Expiration Date:   3/23/2022   • ECG 12 Lead     This order was created via procedure documentation          Plan:       1.  Coronary artery disease.  Status post inferior STEMI in February 2020 with subsequent stenting of the distal RCA.  At that time, she was also noted to have an 80 to 90% narrowing of the proximal ramus as well as a 90% narrowing in the mid left circumflex.  She is on dual antiplatelet therapy with aspirin and Plavix as well as high intensity statin therapy with atorvastatin 40 mg.  She is reporting shortness of  breath with exertion along with near syncopal episodes and heartburn.  Stress test today revealed a medium to large sized infarct in the inferior wall and lateral wall with no significant ischemia.  Her exercise capacity was moderately impaired.  I discussed these results with Dr. Talamantes.  Findings are consistent with prior infarct.  I do not think her symptoms are anginal.  I will also discuss with Dr. Zamudio when he returns to the office next week.      2.  Ischemic cardiomyopathy/shortness of breath.  Her last echocardiogram of August 2020 revealed normalization of her EF.  She appears euvolemic today.  For thoroughness sake, I checked labs and a chest x-ray.  He is not in heart failure.  She is not anemic.      Further recommendations will be made pending the results of my discussions with Dr. Zamudio.      As always, it has been a pleasure to participate in your patient's care.      Sincerely,         WESTLEY Wang

## 2021-03-23 NOTE — PROGRESS NOTES
Cony, I see that you saw this patient in office today.  Would you like to call and review her stress testing with her?

## 2021-03-24 NOTE — TELEPHONE ENCOUNTER
I spoke with her.  I think her symptoms are due to deconditioning and being overweight.  I recommended cardiac therapy; however, she declines.  She will follow-up with Dr. Zamudio in 6 months.      Please schedule a follow-up with Dr. Zamudio in 6 months.

## 2021-04-08 ENCOUNTER — HOSPITAL ENCOUNTER (OUTPATIENT)
Dept: OTHER | Facility: HOSPITAL | Age: 61
Discharge: HOME | End: 2021-04-08
Attending: INTERNAL MEDICINE

## 2021-04-08 LAB
ALBUMIN SERPL-MCNC: 3.8 G/DL (ref 3.5–5)
ALBUMIN/GLOB SERPL: 1 {RATIO} (ref 1.4–2.6)
ALP SERPL-CCNC: 123 U/L (ref 43–160)
ALT SERPL-CCNC: 17 U/L (ref 10–40)
ANION GAP SERPL CALC-SCNC: 14 MMOL/L (ref 8–19)
AST SERPL-CCNC: 18 U/L (ref 15–50)
BASOPHILS # BLD AUTO: 0.06 10*3/UL (ref 0–0.2)
BASOPHILS NFR BLD AUTO: 1.2 % (ref 0–3)
BILIRUB SERPL-MCNC: 0.21 MG/DL (ref 0.2–1.3)
BUN SERPL-MCNC: 17 MG/DL (ref 5–25)
BUN/CREAT SERPL: 18 {RATIO} (ref 6–20)
CALCIUM SERPL-MCNC: 10.7 MG/DL (ref 8.7–10.4)
CHLORIDE SERPL-SCNC: 106 MMOL/L (ref 99–111)
CONV ABS IMM GRAN: 0.04 10*3/UL (ref 0–0.2)
CONV CO2: 24 MMOL/L (ref 22–32)
CONV IMMATURE GRAN: 0.8 % (ref 0–1.8)
CONV TOTAL PROTEIN: 7.5 G/DL (ref 6.3–8.2)
CREAT UR-MCNC: 0.93 MG/DL (ref 0.5–0.9)
DEPRECATED RDW RBC AUTO: 43.1 FL (ref 36.4–46.3)
EOSINOPHIL # BLD AUTO: 0.13 10*3/UL (ref 0–0.7)
EOSINOPHIL # BLD AUTO: 2.6 % (ref 0–7)
ERYTHROCYTE [DISTWIDTH] IN BLOOD BY AUTOMATED COUNT: 12.3 % (ref 11.7–14.4)
GFR SERPLBLD BASED ON 1.73 SQ M-ARVRAT: >60 ML/MIN/{1.73_M2}
GLOBULIN UR ELPH-MCNC: 3.7 G/DL (ref 2–3.5)
GLUCOSE SERPL-MCNC: 110 MG/DL (ref 65–99)
HCT VFR BLD AUTO: 38 % (ref 37–47)
HGB BLD-MCNC: 11.8 G/DL (ref 12–16)
LYMPHOCYTES # BLD AUTO: 1.92 10*3/UL (ref 1–5)
LYMPHOCYTES NFR BLD AUTO: 38.4 % (ref 20–45)
MCH RBC QN AUTO: 29.6 PG (ref 27–31)
MCHC RBC AUTO-ENTMCNC: 31.1 G/DL (ref 33–37)
MCV RBC AUTO: 95.5 FL (ref 81–99)
MONOCYTES # BLD AUTO: 0.38 10*3/UL (ref 0.2–1.2)
MONOCYTES NFR BLD AUTO: 7.6 % (ref 3–10)
NEUTROPHILS # BLD AUTO: 2.47 10*3/UL (ref 2–8)
NEUTROPHILS NFR BLD AUTO: 49.4 % (ref 30–85)
NRBC CBCN: 0 % (ref 0–0.7)
OSMOLALITY SERPL CALC.SUM OF ELEC: 292 MOSM/KG (ref 273–304)
PLATELET # BLD AUTO: 343 10*3/UL (ref 130–400)
PMV BLD AUTO: 10.9 FL (ref 9.4–12.3)
POTASSIUM SERPL-SCNC: 4.4 MMOL/L (ref 3.5–5.3)
RBC # BLD AUTO: 3.98 10*6/UL (ref 4.2–5.4)
SODIUM SERPL-SCNC: 140 MMOL/L (ref 135–147)
WBC # BLD AUTO: 5 10*3/UL (ref 4.8–10.8)

## 2021-05-14 ENCOUNTER — HOSPITAL ENCOUNTER (OUTPATIENT)
Dept: OTHER | Facility: HOSPITAL | Age: 61
Discharge: HOME | End: 2021-05-14
Attending: INTERNAL MEDICINE

## 2021-05-14 LAB
25(OH)D3 SERPL-MCNC: 18.9 NG/ML (ref 30–100)
ALBUMIN SERPL-MCNC: 4 G/DL (ref 3.5–5)
ALBUMIN/GLOB SERPL: 1.1 {RATIO} (ref 1.4–2.6)
ALP SERPL-CCNC: 105 U/L (ref 43–160)
ALT SERPL-CCNC: 14 U/L (ref 10–40)
ANION GAP SERPL CALC-SCNC: 17 MMOL/L (ref 8–19)
AST SERPL-CCNC: 19 U/L (ref 15–50)
BASOPHILS # BLD AUTO: 0.08 10*3/UL (ref 0–0.2)
BASOPHILS NFR BLD AUTO: 1.4 % (ref 0–3)
BILIRUB SERPL-MCNC: 0.32 MG/DL (ref 0.2–1.3)
BUN SERPL-MCNC: 19 MG/DL (ref 5–25)
BUN/CREAT SERPL: 19 {RATIO} (ref 6–20)
CALCIUM SERPL-MCNC: 10.1 MG/DL (ref 8.7–10.4)
CHLORIDE SERPL-SCNC: 107 MMOL/L (ref 99–111)
CONV ABS IMM GRAN: 0.03 10*3/UL (ref 0–0.2)
CONV CO2: 22 MMOL/L (ref 22–32)
CONV IMMATURE GRAN: 0.5 % (ref 0–1.8)
CONV TOTAL PROTEIN: 7.5 G/DL (ref 6.3–8.2)
CREAT UR-MCNC: 1.02 MG/DL (ref 0.5–0.9)
DEPRECATED RDW RBC AUTO: 44.1 FL (ref 36.4–46.3)
EOSINOPHIL # BLD AUTO: 0.1 10*3/UL (ref 0–0.7)
EOSINOPHIL # BLD AUTO: 1.7 % (ref 0–7)
ERYTHROCYTE [DISTWIDTH] IN BLOOD BY AUTOMATED COUNT: 12.7 % (ref 11.7–14.4)
GFR SERPLBLD BASED ON 1.73 SQ M-ARVRAT: 59 ML/MIN/{1.73_M2}
GLOBULIN UR ELPH-MCNC: 3.5 G/DL (ref 2–3.5)
GLUCOSE SERPL-MCNC: 107 MG/DL (ref 65–99)
HCT VFR BLD AUTO: 36.5 % (ref 37–47)
HGB BLD-MCNC: 11.4 G/DL (ref 12–16)
LYMPHOCYTES # BLD AUTO: 2.08 10*3/UL (ref 1–5)
LYMPHOCYTES NFR BLD AUTO: 35.3 % (ref 20–45)
MCH RBC QN AUTO: 29.9 PG (ref 27–31)
MCHC RBC AUTO-ENTMCNC: 31.2 G/DL (ref 33–37)
MCV RBC AUTO: 95.8 FL (ref 81–99)
MONOCYTES # BLD AUTO: 0.6 10*3/UL (ref 0.2–1.2)
MONOCYTES NFR BLD AUTO: 10.2 % (ref 3–10)
NEUTROPHILS # BLD AUTO: 3 10*3/UL (ref 2–8)
NEUTROPHILS NFR BLD AUTO: 50.9 % (ref 30–85)
NRBC CBCN: 0 % (ref 0–0.7)
OSMOLALITY SERPL CALC.SUM OF ELEC: 297 MOSM/KG (ref 273–304)
PLATELET # BLD AUTO: 309 10*3/UL (ref 130–400)
PMV BLD AUTO: 11.4 FL (ref 9.4–12.3)
POTASSIUM SERPL-SCNC: 4.4 MMOL/L (ref 3.5–5.3)
RBC # BLD AUTO: 3.81 10*6/UL (ref 4.2–5.4)
SODIUM SERPL-SCNC: 142 MMOL/L (ref 135–147)
WBC # BLD AUTO: 5.89 10*3/UL (ref 4.8–10.8)

## 2021-05-17 ENCOUNTER — HOSPITAL ENCOUNTER (OUTPATIENT)
Dept: OTHER | Facility: HOSPITAL | Age: 61
Discharge: HOME | End: 2021-05-17
Attending: INTERNAL MEDICINE

## 2021-05-17 LAB
APPEARANCE UR: CLEAR
BILIRUB UR QL: NEGATIVE
COLOR UR: YELLOW
CONV BACTERIA: NEGATIVE
CONV COLLECTION SOURCE (UA): ABNORMAL
CONV UROBILINOGEN IN URINE BY AUTOMATED TEST STRIP: 1 {EHRLICHU}/DL (ref 0.1–1)
GLUCOSE UR QL: NEGATIVE MG/DL
HGB UR QL STRIP: NEGATIVE
KETONES UR QL STRIP: NEGATIVE MG/DL
LEUKOCYTE ESTERASE UR QL STRIP: ABNORMAL
NITRITE UR QL STRIP: NEGATIVE
PH UR STRIP.AUTO: 6 [PH] (ref 5–8)
PROT UR QL: NEGATIVE MG/DL
PTH-INTACT SERPL-MCNC: 118.6 PG/ML (ref 11.1–79.5)
RBC #/AREA URNS HPF: ABNORMAL /[HPF]
RENAL EPI CELLS #/AREA URNS HPF: ABNORMAL /[HPF]
SP GR UR: 1.02 (ref 1–1.03)
SQUAMOUS SPT QL MICRO: ABNORMAL /[HPF]
WBC #/AREA URNS HPF: ABNORMAL /[HPF]

## 2021-05-17 RX ORDER — LOSARTAN POTASSIUM 25 MG/1
25 TABLET ORAL
Qty: 30 TABLET | Refills: 6 | Status: SHIPPED | OUTPATIENT
Start: 2021-05-17 | End: 2022-07-06 | Stop reason: SDUPTHER

## 2021-05-18 RX ORDER — CARVEDILOL 12.5 MG/1
12.5 TABLET ORAL EVERY 12 HOURS SCHEDULED
Qty: 60 TABLET | Refills: 6 | Status: SHIPPED | OUTPATIENT
Start: 2021-05-18 | End: 2021-11-29

## 2021-05-18 NOTE — TELEPHONE ENCOUNTER
Pharmacy requesting a refill on her Carvedilol 12.5 MG Tablets    LOV   -   3/23/2021    With Cony Boucher   -   9/23/2021    with Dr Lizz Mujica labs 3/23/2021    Please advise      Fay LEWIS

## 2021-07-29 ENCOUNTER — LAB (OUTPATIENT)
Dept: LAB | Facility: HOSPITAL | Age: 61
End: 2021-07-29

## 2021-07-29 ENCOUNTER — TRANSCRIBE ORDERS (OUTPATIENT)
Dept: LAB | Facility: HOSPITAL | Age: 61
End: 2021-07-29

## 2021-07-29 DIAGNOSIS — I13.10 MALIGNANT HYPERTENSIVE HEART AND CHRONIC KIDNEY DISEASE STAGE III (HCC): ICD-10-CM

## 2021-07-29 DIAGNOSIS — I13.10 MALIGNANT HYPERTENSIVE HEART AND CHRONIC KIDNEY DISEASE STAGE III (HCC): Primary | ICD-10-CM

## 2021-07-29 DIAGNOSIS — N18.30 MALIGNANT HYPERTENSIVE HEART AND CHRONIC KIDNEY DISEASE STAGE III (HCC): Primary | ICD-10-CM

## 2021-07-29 DIAGNOSIS — N18.30 MALIGNANT HYPERTENSIVE HEART AND CHRONIC KIDNEY DISEASE STAGE III (HCC): ICD-10-CM

## 2021-07-29 LAB
25(OH)D3 SERPL-MCNC: 35 NG/ML
ALBUMIN SERPL-MCNC: 4.4 G/DL (ref 3.5–5.2)
ALBUMIN/GLOB SERPL: 1.3 G/DL
ALP SERPL-CCNC: 117 U/L (ref 39–117)
ALT SERPL W P-5'-P-CCNC: 22 U/L (ref 1–33)
ANION GAP SERPL CALCULATED.3IONS-SCNC: 10.9 MMOL/L (ref 5–15)
AST SERPL-CCNC: 25 U/L (ref 1–32)
BACTERIA UR QL AUTO: ABNORMAL /HPF
BASOPHILS # BLD AUTO: 0.08 10*3/MM3 (ref 0–0.2)
BASOPHILS NFR BLD AUTO: 0.9 % (ref 0–1.5)
BILIRUB SERPL-MCNC: 0.3 MG/DL (ref 0–1.2)
BILIRUB UR QL STRIP: NEGATIVE
BUN SERPL-MCNC: 15 MG/DL (ref 8–23)
BUN/CREAT SERPL: 13.8 (ref 7–25)
CALCIUM SPEC-SCNC: 10.7 MG/DL (ref 8.6–10.5)
CHLORIDE SERPL-SCNC: 103 MMOL/L (ref 98–107)
CLARITY UR: CLEAR
CO2 SERPL-SCNC: 26.1 MMOL/L (ref 22–29)
COLOR UR: YELLOW
CREAT SERPL-MCNC: 1.09 MG/DL (ref 0.57–1)
DEPRECATED RDW RBC AUTO: 41 FL (ref 37–54)
EOSINOPHIL # BLD AUTO: 0.21 10*3/MM3 (ref 0–0.4)
EOSINOPHIL NFR BLD AUTO: 2.5 % (ref 0.3–6.2)
ERYTHROCYTE [DISTWIDTH] IN BLOOD BY AUTOMATED COUNT: 12.1 % (ref 12.3–15.4)
GFR SERPL CREATININE-BSD FRML MDRD: 51 ML/MIN/1.73
GLOBULIN UR ELPH-MCNC: 3.5 GM/DL
GLUCOSE SERPL-MCNC: 74 MG/DL (ref 65–99)
GLUCOSE UR STRIP-MCNC: NEGATIVE MG/DL
HCT VFR BLD AUTO: 39.1 % (ref 34–46.6)
HGB BLD-MCNC: 12.8 G/DL (ref 12–15.9)
HGB UR QL STRIP.AUTO: NEGATIVE
HYALINE CASTS UR QL AUTO: ABNORMAL /LPF
IMM GRANULOCYTES # BLD AUTO: 0.04 10*3/MM3 (ref 0–0.05)
IMM GRANULOCYTES NFR BLD AUTO: 0.5 % (ref 0–0.5)
KETONES UR QL STRIP: NEGATIVE
LEUKOCYTE ESTERASE UR QL STRIP.AUTO: ABNORMAL
LYMPHOCYTES # BLD AUTO: 2.65 10*3/MM3 (ref 0.7–3.1)
LYMPHOCYTES NFR BLD AUTO: 31.1 % (ref 19.6–45.3)
MCH RBC QN AUTO: 30.3 PG (ref 26.6–33)
MCHC RBC AUTO-ENTMCNC: 32.7 G/DL (ref 31.5–35.7)
MCV RBC AUTO: 92.7 FL (ref 79–97)
MONOCYTES # BLD AUTO: 0.8 10*3/MM3 (ref 0.1–0.9)
MONOCYTES NFR BLD AUTO: 9.4 % (ref 5–12)
NEUTROPHILS NFR BLD AUTO: 4.75 10*3/MM3 (ref 1.7–7)
NEUTROPHILS NFR BLD AUTO: 55.6 % (ref 42.7–76)
NITRITE UR QL STRIP: NEGATIVE
NRBC BLD AUTO-RTO: 0 /100 WBC (ref 0–0.2)
PH UR STRIP.AUTO: 6.5 [PH] (ref 5–8)
PLATELET # BLD AUTO: 358 10*3/MM3 (ref 140–450)
PMV BLD AUTO: 11.5 FL (ref 6–12)
POTASSIUM SERPL-SCNC: 4.3 MMOL/L (ref 3.5–5.2)
PROT SERPL-MCNC: 7.9 G/DL (ref 6–8.5)
PROT UR QL STRIP: NEGATIVE
PTH-INTACT SERPL-MCNC: 160 PG/ML (ref 15–65)
RBC # BLD AUTO: 4.22 10*6/MM3 (ref 3.77–5.28)
RBC # UR: ABNORMAL /HPF
REF LAB TEST METHOD: ABNORMAL
SODIUM SERPL-SCNC: 140 MMOL/L (ref 136–145)
SP GR UR STRIP: 1.01 (ref 1–1.03)
SQUAMOUS #/AREA URNS HPF: ABNORMAL /HPF
UROBILINOGEN UR QL STRIP: ABNORMAL
WBC # BLD AUTO: 8.53 10*3/MM3 (ref 3.4–10.8)
WBC UR QL AUTO: ABNORMAL /HPF

## 2021-07-29 PROCEDURE — 85025 COMPLETE CBC W/AUTO DIFF WBC: CPT

## 2021-07-29 PROCEDURE — 82306 VITAMIN D 25 HYDROXY: CPT

## 2021-07-29 PROCEDURE — 36415 COLL VENOUS BLD VENIPUNCTURE: CPT

## 2021-07-29 PROCEDURE — 83970 ASSAY OF PARATHORMONE: CPT

## 2021-07-29 PROCEDURE — 80053 COMPREHEN METABOLIC PANEL: CPT

## 2021-07-29 PROCEDURE — 81001 URINALYSIS AUTO W/SCOPE: CPT

## 2021-09-25 ENCOUNTER — LAB (OUTPATIENT)
Dept: LAB | Facility: HOSPITAL | Age: 61
End: 2021-09-25

## 2021-09-25 ENCOUNTER — TRANSCRIBE ORDERS (OUTPATIENT)
Dept: ADMINISTRATIVE | Facility: HOSPITAL | Age: 61
End: 2021-09-25

## 2021-09-25 DIAGNOSIS — I12.9 MALIGNANT HYPERTENSION WITH CHRONIC KIDNEY DISEASE STAGE III (HCC): Primary | ICD-10-CM

## 2021-09-25 DIAGNOSIS — N18.30 MALIGNANT HYPERTENSION WITH CHRONIC KIDNEY DISEASE STAGE III (HCC): Primary | ICD-10-CM

## 2021-09-25 DIAGNOSIS — I12.9 MALIGNANT HYPERTENSION WITH CHRONIC KIDNEY DISEASE STAGE III (HCC): ICD-10-CM

## 2021-09-25 DIAGNOSIS — N18.30 MALIGNANT HYPERTENSION WITH CHRONIC KIDNEY DISEASE STAGE III (HCC): ICD-10-CM

## 2021-09-25 DIAGNOSIS — E83.52 HYPERCALCEMIA: ICD-10-CM

## 2021-09-25 LAB
25(OH)D3 SERPL-MCNC: 37.5 NG/ML (ref 30–100)
ALBUMIN SERPL-MCNC: 4.2 G/DL (ref 3.5–5.2)
ALBUMIN/GLOB SERPL: 1.3 G/DL
ALP SERPL-CCNC: 104 U/L (ref 39–117)
ALT SERPL W P-5'-P-CCNC: 19 U/L (ref 1–33)
ANION GAP SERPL CALCULATED.3IONS-SCNC: 10.2 MMOL/L (ref 5–15)
AST SERPL-CCNC: 24 U/L (ref 1–32)
BILIRUB SERPL-MCNC: 0.6 MG/DL (ref 0–1.2)
BUN SERPL-MCNC: 18 MG/DL (ref 8–23)
BUN/CREAT SERPL: 18 (ref 7–25)
CALCIUM SPEC-SCNC: 10 MG/DL (ref 8.6–10.5)
CHLORIDE SERPL-SCNC: 107 MMOL/L (ref 98–107)
CO2 SERPL-SCNC: 24.8 MMOL/L (ref 22–29)
CREAT SERPL-MCNC: 1 MG/DL (ref 0.57–1)
GFR SERPL CREATININE-BSD FRML MDRD: 56 ML/MIN/1.73
GLOBULIN UR ELPH-MCNC: 3.2 GM/DL
GLUCOSE SERPL-MCNC: 106 MG/DL (ref 65–99)
POTASSIUM SERPL-SCNC: 4.3 MMOL/L (ref 3.5–5.2)
PROT SERPL-MCNC: 7.4 G/DL (ref 6–8.5)
PTH-INTACT SERPL-MCNC: 183 PG/ML (ref 15–65)
SODIUM SERPL-SCNC: 142 MMOL/L (ref 136–145)

## 2021-09-25 PROCEDURE — 36415 COLL VENOUS BLD VENIPUNCTURE: CPT

## 2021-09-25 PROCEDURE — 80053 COMPREHEN METABOLIC PANEL: CPT

## 2021-09-25 PROCEDURE — 82306 VITAMIN D 25 HYDROXY: CPT

## 2021-09-25 PROCEDURE — 83970 ASSAY OF PARATHORMONE: CPT

## 2021-09-29 RX ORDER — FUROSEMIDE 40 MG/1
40 TABLET ORAL DAILY
Qty: 30 TABLET | Refills: 2 | Status: SHIPPED | OUTPATIENT
Start: 2021-09-29 | End: 2022-07-06 | Stop reason: SDUPTHER

## 2021-10-04 ENCOUNTER — APPOINTMENT (OUTPATIENT)
Dept: GENERAL RADIOLOGY | Facility: HOSPITAL | Age: 61
End: 2021-10-04

## 2021-10-04 ENCOUNTER — HOSPITAL ENCOUNTER (EMERGENCY)
Facility: HOSPITAL | Age: 61
Discharge: HOME | End: 2021-10-04
Attending: EMERGENCY MEDICINE | Admitting: EMERGENCY MEDICINE

## 2021-10-04 ENCOUNTER — APPOINTMENT (OUTPATIENT)
Dept: CT IMAGING | Facility: HOSPITAL | Age: 61
End: 2021-10-04

## 2021-10-04 VITALS
RESPIRATION RATE: 26 BRPM | DIASTOLIC BLOOD PRESSURE: 84 MMHG | SYSTOLIC BLOOD PRESSURE: 122 MMHG | HEIGHT: 60 IN | OXYGEN SATURATION: 96 % | BODY MASS INDEX: 35.58 KG/M2 | TEMPERATURE: 98.1 F | HEART RATE: 84 BPM | WEIGHT: 181.22 LBS

## 2021-10-04 DIAGNOSIS — N39.0 ACUTE URINARY TRACT INFECTION: Primary | ICD-10-CM

## 2021-10-04 DIAGNOSIS — F13.930 BENZODIAZEPINE WITHDRAWAL WITHOUT COMPLICATION (HCC): ICD-10-CM

## 2021-10-04 LAB
ALBUMIN SERPL-MCNC: 4.5 G/DL (ref 3.5–5.2)
ALBUMIN/GLOB SERPL: 1.4 G/DL
ALP SERPL-CCNC: 115 U/L (ref 39–117)
ALT SERPL W P-5'-P-CCNC: 19 U/L (ref 1–33)
AMMONIA BLD-SCNC: 36 UMOL/L (ref 11–51)
ANION GAP SERPL CALCULATED.3IONS-SCNC: 13.9 MMOL/L (ref 5–15)
AST SERPL-CCNC: 19 U/L (ref 1–32)
BACTERIA UR QL AUTO: ABNORMAL /HPF
BASOPHILS # BLD AUTO: 0.05 10*3/MM3 (ref 0–0.2)
BASOPHILS NFR BLD AUTO: 0.7 % (ref 0–1.5)
BILIRUB SERPL-MCNC: 0.5 MG/DL (ref 0–1.2)
BILIRUB UR QL STRIP: NEGATIVE
BUN SERPL-MCNC: 20 MG/DL (ref 8–23)
BUN/CREAT SERPL: 18.2 (ref 7–25)
CALCIUM SPEC-SCNC: 10.6 MG/DL (ref 8.6–10.5)
CHLORIDE SERPL-SCNC: 105 MMOL/L (ref 98–107)
CK SERPL-CCNC: 75 U/L (ref 20–180)
CLARITY UR: ABNORMAL
CO2 SERPL-SCNC: 20.1 MMOL/L (ref 22–29)
COLOR UR: YELLOW
CREAT SERPL-MCNC: 1.1 MG/DL (ref 0.57–1)
D-LACTATE SERPL-SCNC: 1.3 MMOL/L (ref 0.5–2)
DEPRECATED RDW RBC AUTO: 46.3 FL (ref 37–54)
EOSINOPHIL # BLD AUTO: 0.07 10*3/MM3 (ref 0–0.4)
EOSINOPHIL NFR BLD AUTO: 1 % (ref 0.3–6.2)
ERYTHROCYTE [DISTWIDTH] IN BLOOD BY AUTOMATED COUNT: 13.2 % (ref 12.3–15.4)
GFR SERPL CREATININE-BSD FRML MDRD: 50 ML/MIN/1.73
GLOBULIN UR ELPH-MCNC: 3.3 GM/DL
GLUCOSE SERPL-MCNC: 131 MG/DL (ref 65–99)
GLUCOSE UR STRIP-MCNC: NEGATIVE MG/DL
HCT VFR BLD AUTO: 40.7 % (ref 34–46.6)
HGB BLD-MCNC: 12.7 G/DL (ref 12–15.9)
HGB UR QL STRIP.AUTO: NEGATIVE
HOLD SPECIMEN: NORMAL
HOLD SPECIMEN: NORMAL
HYALINE CASTS UR QL AUTO: ABNORMAL /LPF
IMM GRANULOCYTES # BLD AUTO: 0.03 10*3/MM3 (ref 0–0.05)
IMM GRANULOCYTES NFR BLD AUTO: 0.4 % (ref 0–0.5)
KETONES UR QL STRIP: ABNORMAL
LEUKOCYTE ESTERASE UR QL STRIP.AUTO: ABNORMAL
LYMPHOCYTES # BLD AUTO: 1.87 10*3/MM3 (ref 0.7–3.1)
LYMPHOCYTES NFR BLD AUTO: 25.8 % (ref 19.6–45.3)
MAGNESIUM SERPL-MCNC: 1.8 MG/DL (ref 1.6–2.4)
MCH RBC QN AUTO: 30.1 PG (ref 26.6–33)
MCHC RBC AUTO-ENTMCNC: 31.2 G/DL (ref 31.5–35.7)
MCV RBC AUTO: 96.4 FL (ref 79–97)
MONOCYTES # BLD AUTO: 0.5 10*3/MM3 (ref 0.1–0.9)
MONOCYTES NFR BLD AUTO: 6.9 % (ref 5–12)
NEUTROPHILS NFR BLD AUTO: 4.72 10*3/MM3 (ref 1.7–7)
NEUTROPHILS NFR BLD AUTO: 65.2 % (ref 42.7–76)
NITRITE UR QL STRIP: NEGATIVE
NRBC BLD AUTO-RTO: 0 /100 WBC (ref 0–0.2)
PH UR STRIP.AUTO: 5.5 [PH] (ref 5–8)
PLATELET # BLD AUTO: 354 10*3/MM3 (ref 140–450)
PMV BLD AUTO: 11.4 FL (ref 6–12)
POTASSIUM SERPL-SCNC: 4.1 MMOL/L (ref 3.5–5.2)
PROT SERPL-MCNC: 7.8 G/DL (ref 6–8.5)
PROT UR QL STRIP: NEGATIVE
RBC # BLD AUTO: 4.22 10*6/MM3 (ref 3.77–5.28)
RBC # UR: ABNORMAL /HPF
REF LAB TEST METHOD: ABNORMAL
SODIUM SERPL-SCNC: 139 MMOL/L (ref 136–145)
SP GR UR STRIP: 1.02 (ref 1–1.03)
SQUAMOUS #/AREA URNS HPF: ABNORMAL /HPF
TROPONIN T SERPL-MCNC: <0.01 NG/ML (ref 0–0.03)
UROBILINOGEN UR QL STRIP: ABNORMAL
WBC # BLD AUTO: 7.24 10*3/MM3 (ref 3.4–10.8)
WBC UR QL AUTO: ABNORMAL /HPF
WHOLE BLOOD HOLD SPECIMEN: NORMAL
WHOLE BLOOD HOLD SPECIMEN: NORMAL

## 2021-10-04 PROCEDURE — 25010000002 LORAZEPAM PER 2 MG: Performed by: EMERGENCY MEDICINE

## 2021-10-04 PROCEDURE — 82140 ASSAY OF AMMONIA: CPT | Performed by: NURSE PRACTITIONER

## 2021-10-04 PROCEDURE — 96365 THER/PROPH/DIAG IV INF INIT: CPT

## 2021-10-04 PROCEDURE — 71045 X-RAY EXAM CHEST 1 VIEW: CPT

## 2021-10-04 PROCEDURE — 85025 COMPLETE CBC W/AUTO DIFF WBC: CPT | Performed by: NURSE PRACTITIONER

## 2021-10-04 PROCEDURE — 87040 BLOOD CULTURE FOR BACTERIA: CPT | Performed by: NURSE PRACTITIONER

## 2021-10-04 PROCEDURE — 81001 URINALYSIS AUTO W/SCOPE: CPT | Performed by: NURSE PRACTITIONER

## 2021-10-04 PROCEDURE — 99283 EMERGENCY DEPT VISIT LOW MDM: CPT

## 2021-10-04 PROCEDURE — 70450 CT HEAD/BRAIN W/O DYE: CPT

## 2021-10-04 PROCEDURE — 25010000002 ONDANSETRON PER 1 MG: Performed by: EMERGENCY MEDICINE

## 2021-10-04 PROCEDURE — 36415 COLL VENOUS BLD VENIPUNCTURE: CPT | Performed by: EMERGENCY MEDICINE

## 2021-10-04 PROCEDURE — 96375 TX/PRO/DX INJ NEW DRUG ADDON: CPT

## 2021-10-04 PROCEDURE — 93005 ELECTROCARDIOGRAM TRACING: CPT | Performed by: NURSE PRACTITIONER

## 2021-10-04 PROCEDURE — 82550 ASSAY OF CK (CPK): CPT | Performed by: NURSE PRACTITIONER

## 2021-10-04 PROCEDURE — 25010000002 CEFTRIAXONE PER 250 MG: Performed by: NURSE PRACTITIONER

## 2021-10-04 PROCEDURE — 25010000002 METHYLPREDNISOLONE PER 125 MG: Performed by: NURSE PRACTITIONER

## 2021-10-04 PROCEDURE — 80053 COMPREHEN METABOLIC PANEL: CPT | Performed by: NURSE PRACTITIONER

## 2021-10-04 PROCEDURE — 83735 ASSAY OF MAGNESIUM: CPT | Performed by: NURSE PRACTITIONER

## 2021-10-04 PROCEDURE — 25010000002 HALOPERIDOL LACTATE PER 5 MG: Performed by: NURSE PRACTITIONER

## 2021-10-04 PROCEDURE — 84484 ASSAY OF TROPONIN QUANT: CPT | Performed by: NURSE PRACTITIONER

## 2021-10-04 PROCEDURE — 83605 ASSAY OF LACTIC ACID: CPT | Performed by: NURSE PRACTITIONER

## 2021-10-04 RX ORDER — ONDANSETRON 2 MG/ML
4 INJECTION INTRAMUSCULAR; INTRAVENOUS ONCE
Status: COMPLETED | OUTPATIENT
Start: 2021-10-04 | End: 2021-10-04

## 2021-10-04 RX ORDER — SODIUM CHLORIDE 0.9 % (FLUSH) 0.9 %
10 SYRINGE (ML) INJECTION AS NEEDED
Status: DISCONTINUED | OUTPATIENT
Start: 2021-10-04 | End: 2021-10-04 | Stop reason: HOSPADM

## 2021-10-04 RX ORDER — LORAZEPAM 2 MG/ML
1 INJECTION INTRAMUSCULAR ONCE
Status: COMPLETED | OUTPATIENT
Start: 2021-10-04 | End: 2021-10-04

## 2021-10-04 RX ORDER — PREDNISONE 50 MG/1
50 TABLET ORAL DAILY
Qty: 5 TABLET | Refills: 0 | Status: SHIPPED | OUTPATIENT
Start: 2021-10-04 | End: 2022-07-06

## 2021-10-04 RX ORDER — DIROXIMEL FUMARATE 231 MG/1
CAPSULE ORAL
COMMUNITY

## 2021-10-04 RX ORDER — METHYLPREDNISOLONE SODIUM SUCCINATE 125 MG/2ML
125 INJECTION, POWDER, LYOPHILIZED, FOR SOLUTION INTRAMUSCULAR; INTRAVENOUS ONCE
Status: COMPLETED | OUTPATIENT
Start: 2021-10-04 | End: 2021-10-04

## 2021-10-04 RX ORDER — CEPHALEXIN 500 MG/1
500 CAPSULE ORAL 4 TIMES DAILY
Qty: 28 CAPSULE | Refills: 0 | Status: SHIPPED | OUTPATIENT
Start: 2021-10-04 | End: 2021-10-11

## 2021-10-04 RX ORDER — CEFTRIAXONE SODIUM 1 G/50ML
1 INJECTION, SOLUTION INTRAVENOUS ONCE
Status: COMPLETED | OUTPATIENT
Start: 2021-10-04 | End: 2021-10-04

## 2021-10-04 RX ORDER — OLANZAPINE 2.5 MG/1
2.5 TABLET ORAL NIGHTLY
COMMUNITY

## 2021-10-04 RX ORDER — HALOPERIDOL 5 MG/ML
5 INJECTION INTRAMUSCULAR ONCE
Status: COMPLETED | OUTPATIENT
Start: 2021-10-04 | End: 2021-10-04

## 2021-10-04 RX ADMIN — SODIUM CHLORIDE 1000 ML: 9 INJECTION, SOLUTION INTRAVENOUS at 08:48

## 2021-10-04 RX ADMIN — CEFTRIAXONE SODIUM 1 G: 1 INJECTION, SOLUTION INTRAVENOUS at 11:27

## 2021-10-04 RX ADMIN — LORAZEPAM 1 MG: 2 INJECTION INTRAMUSCULAR; INTRAVENOUS at 08:56

## 2021-10-04 RX ADMIN — HALOPERIDOL LACTATE 5 MG: 5 INJECTION, SOLUTION INTRAMUSCULAR at 08:56

## 2021-10-04 RX ADMIN — ONDANSETRON 4 MG: 2 INJECTION INTRAMUSCULAR; INTRAVENOUS at 08:49

## 2021-10-04 RX ADMIN — METHYLPREDNISOLONE SODIUM SUCCINATE 125 MG: 125 INJECTION, POWDER, FOR SOLUTION INTRAMUSCULAR; INTRAVENOUS at 08:51

## 2021-10-04 NOTE — ED PROVIDER NOTES
Patient is 61 y.o. year old female that presents to the ED for evaluation of generalized body weakness. Patient states that she's been out of clonzapam for a while now and believes that is the cause of her sx.    Physical Exam  Vitals and nursing note reviewed.   Constitutional:       General: She is not in acute distress.     Appearance: Normal appearance. She is not toxic-appearing.   HENT:      Head: Normocephalic and atraumatic.      Jaw: There is normal jaw occlusion.   Eyes:      General: Lids are normal.      Extraocular Movements: Extraocular movements intact.      Conjunctiva/sclera: Conjunctivae normal.      Pupils: Pupils are equal, round, and reactive to light.   Cardiovascular:      Rate and Rhythm: Normal rate and regular rhythm.      Pulses: Normal pulses.      Heart sounds: Normal heart sounds.   Pulmonary:      Effort: Pulmonary effort is normal. No respiratory distress.      Breath sounds: Normal breath sounds. No wheezing or rhonchi.   Abdominal:      General: Abdomen is flat.      Palpations: Abdomen is soft.      Tenderness: There is no abdominal tenderness. There is no guarding or rebound.   Musculoskeletal:         General: Normal range of motion.      Cervical back: Normal range of motion and neck supple.      Right lower leg: No edema.      Left lower leg: No edema.   Skin:     General: Skin is warm and dry.   Neurological:      Mental Status: She is alert and oriented to person, place, and time. Mental status is at baseline.   Psychiatric:         Mood and Affect: Mood normal.         ED Course:    /86 (BP Location: Right arm, Patient Position: Sitting)   Pulse 85   Temp 98.1 °F (36.7 °C) (Oral)   Resp 26   SpO2 99%   Results for orders placed or performed in visit on 09/25/21   PTH, Intact    Specimen: Blood   Result Value Ref Range    PTH, Intact 183.0 (H) 15.0 - 65.0 pg/mL   Comprehensive Metabolic Panel    Specimen: Blood   Result Value Ref Range    Glucose 106 (H) 65 - 99  mg/dL    BUN 18 8 - 23 mg/dL    Creatinine 1.00 0.57 - 1.00 mg/dL    Sodium 142 136 - 145 mmol/L    Potassium 4.3 3.5 - 5.2 mmol/L    Chloride 107 98 - 107 mmol/L    CO2 24.8 22.0 - 29.0 mmol/L    Calcium 10.0 8.6 - 10.5 mg/dL    Total Protein 7.4 6.0 - 8.5 g/dL    Albumin 4.20 3.50 - 5.20 g/dL    ALT (SGPT) 19 1 - 33 U/L    AST (SGOT) 24 1 - 32 U/L    Alkaline Phosphatase 104 39 - 117 U/L    Total Bilirubin 0.6 0.0 - 1.2 mg/dL    eGFR Non African Amer 56 (L) >60 mL/min/1.73    Globulin 3.2 gm/dL    A/G Ratio 1.3 g/dL    BUN/Creatinine Ratio 18.0 7.0 - 25.0    Anion Gap 10.2 5.0 - 15.0 mmol/L   Vitamin D 25 Hydroxy    Specimen: Blood   Result Value Ref Range    25 Hydroxy, Vitamin D 37.5 30.0 - 100.0 ng/ml     Medications   sodium chloride 0.9 % flush 10 mL (has no administration in time range)   sodium chloride 0.9 % bolus 1,000 mL (has no administration in time range)   LORazepam (ATIVAN) injection 1 mg (has no administration in time range)   ondansetron (ZOFRAN) injection 4 mg (has no administration in time range)     No results found.    MDM:      I have seen and evaluated this patient and agree with the nurse practitioner or physician assistant´s documentation and assessment. All charts, labs, and imaging studies were reviewed. Documentation of one or more elements of my assessment included in the medical record. I agree with findings, exam, and plan.    Disposition:   ED Disposition     None            Clincal Impression: Weakness    Nona Rojo  08:05 EDT  10/04/21         Nona Rojo  10/04/21 0808       Elpidio Platt MD  10/04/21 2950

## 2021-10-04 NOTE — ED NOTES
Discussed discharge with pt and . Verbalizes understanding. Aware of prescriptions. Wheelchair provided. Needs met. IV removed.      Ashely Kowalski RN  10/04/21 [id number]

## 2021-10-04 NOTE — ED PROVIDER NOTES
Subjective    states that wife has been getting progressively weak for about the last week.  states that patient does have a history of MS furthermore, has been off her clonazepam for over a week. Spouse states that there are typical [facility] that they use has not been staffed so they were unable to get the medication.  also states that patient has been having nausea and diarrhea for about 1 week.      History provided by:  Patient   used: No    Weakness - Generalized  Severity:  Moderate  Onset quality:  Gradual  Duration: For about a week.  Timing:  Constant  Progression:  Worsening  Chronicity:  New  Context comment:  Family states patient ran out of her Klonopin.  Relieved by:  Nothing  Worsened by:  Nothing  Associated symptoms: diarrhea    Associated symptoms: no abdominal pain, no chest pain, no cough, no numbness in extremities, no fever, no headaches, no hematochezia, no nausea, no seizures, no sensory-motor deficit, no shortness of breath, no stroke symptoms, no syncope and no vomiting        Review of Systems   Constitutional: Negative for chills and fever.   HENT: Negative for congestion, ear pain and sore throat.    Eyes: Negative for pain.   Respiratory: Negative for cough, chest tightness and shortness of breath.    Cardiovascular: Negative for chest pain and syncope.   Gastrointestinal: Positive for diarrhea. Negative for abdominal pain, hematochezia, nausea and vomiting.   Genitourinary: Negative for flank pain and hematuria.   Musculoskeletal: Negative for joint swelling.        Tremors noted in upper extremities.  Generalized weakness requiring 2 people to  a patient to bed.   Skin: Negative for pallor.   Neurological: Negative for seizures and headaches.   Psychiatric/Behavioral: Negative for suicidal ideas. The patient is nervous/anxious.         Tremors noted to upper extremities.   All other systems reviewed and are negative.      Past Medical  History:   Diagnosis Date   • Abdominal hernia    • Acute ST elevation myocardial infarction (STEMI) ([facility])     due to occlusion of right coronary artery   • Coronary artery disease    • Dyspepsia    • GERD (gastroesophageal reflux disease)    • Multiple sclerosis (HCC)    • Seizures (HCC)     last seizure 2018       Allergies   Allergen Reactions   • Codeine Hives     Hyperactivity, nausea   • Morphine Hives     Hyperactivity, nausea       Past Surgical History:   Procedure Laterality Date   • APPENDECTOMY     • CARDIAC CATHETERIZATION     • CARDIAC CATHETERIZATION N/A 2/24/2020    Procedure: Left Heart Cath;  Surgeon: Marlon Zamudio MD;  Location: [facility] CATH INVASIVE LOCATION;  Service: Cardiovascular;  Laterality: N/A;   • CARDIAC CATHETERIZATION N/A 2/24/2020    Procedure: Stent LATRICIA coronary;  Surgeon: Marlon Zamudio MD;  [facility] CATH INVASIVE LOCATION;  Service: Cardiovascular;  Laterality: N/A;   • CARDIAC CATHETERIZATION  2/24/2020    Procedure: Percutaneous Manual Thrombectomy;  Surgeon: Marlon Zamudio MD;  Location: [facility] CATH INVASIVE LOCATION;  Service: Cardiovascular;;   • CARDIAC CATHETERIZATION N/A 2/24/2020    Procedure: Coronary angiography;  Surgeon: Marlon Zamudio MD;  Location: [facility] CATH INVASIVE LOCATION;  Service: Cardiovascular;  Laterality: N/A;   • CARDIAC CATHETERIZATION N/A 2/24/2020    Procedure: Left ventriculography;  Surgeon: Marlon Zamudio MD;  Location: [facility] CATH INVASIVE LOCATION;  Service: Cardiovascular;  Laterality: N/A;   • CHOLECYSTECTOMY     • COLONOSCOPY N/A 2/29/2020    Procedure: COLONOSCOPY to  cecum:;  Surgeon: Krystal Sarabia MD;  Location: [facility] ENDOSCOPY;  Service: Gastroenterology;  Laterality: N/A;  pre:  anemia and abd pain  post:  hemorrhoids, tortuous colon   • ENDOSCOPY N/A 2/29/2020    Procedure: ESOPHAGOGASTRODUODENOSCOPY  with biopsies;  Surgeon: Krystal Sarabia MD;  Location: [facility] ENDOSCOPY;  Service:  Gastroenterology;  Laterality: N/A;  pre:  anemia and abd pain  post:  mild gastritis,    • HYSTERECTOMY     • TONSILLECTOMY         Family History   Problem Relation Age of Onset   • Thyroid disease Mother    • Hypertension Sister    • Coronary artery disease Brother        Social History     Socioeconomic History   • Marital status:      Spouse name: Not on file   • Number of children: Not on file   • Years of education: Not on file   • Highest education level: Not on file   Tobacco Use   • Smoking status: Former Smoker     Packs/day: 0.50     Years: 20.00     Pack years: 10.00     Quit date:      Years since quittin.7   • Smokeless tobacco: Never Used   • Tobacco comment: CAFFEINE USE: 2 CUPS COFFEE DAILY   Vaping Use   • Vaping Use: Never used   Substance and Sexual Activity   • Alcohol use: Not Currently   • Drug use: Never   • Sexual activity: Defer           Objective   Physical Exam  Vitals and nursing note reviewed.   Constitutional:       General: She is not in acute distress.     Appearance: Normal appearance. She is not toxic-appearing.   HENT:      Head: Normocephalic and atraumatic.      Mouth/Throat:      Mouth: Mucous membranes are moist.   Eyes:      Extraocular Movements: Extraocular movements intact.      Pupils: Pupils are equal, round, and reactive to light.   Cardiovascular:      Rate and Rhythm: Normal rate and regular rhythm.      Pulses: Normal pulses.      Heart sounds: Normal heart sounds.   Pulmonary:      Effort: Pulmonary effort is normal. No respiratory distress.      Breath sounds: Normal breath sounds.   Abdominal:      General: Abdomen is flat.      Palpations: Abdomen is soft.      Tenderness: There is no abdominal tenderness.   Musculoskeletal:         General: Normal range of motion.      Cervical back: Normal range of motion and neck supple.   Skin:     General: Skin is warm and dry.   Neurological:      General: No focal deficit present.      Mental Status: She  is alert and oriented to person, place, and time. Mental status is at baseline.      Motor: Tremor present.      Gait: Gait abnormal.      Comments: Patient speech is somewhat stuttered and slow to respond.    Tremors noted in patient's upper extremities.   Psychiatric:         Mood and Affect: Mood normal.         Thought Content: Thought content normal.         Judgment: Judgment normal.         Procedures           ED Course    Patient states that she feels much better and is wanting to go home at this time.  Speech is much improved and patient no longer has tremors noted to bilateral upper extremities.  Family at bedside agree with patient going home.  Also, family states that they have transferred her prescriptions from [facility] to [facility].  Family states will have all access to her meds to include Klonopin.                                       MDM  Number of Diagnoses or Management Options  Diagnosis management comments: I have spoken with Ms. Minaya. I have explained the patient´s condition, diagnoses and treatment plan based on the information available to me at this time. I have answered the patient's questions and addressed any concerns. The patient has a good  understanding of the patient´s diagnosis, condition, and treatment plan as can be expected at this point. The vital signs have been stable. The patient´s condition is stable and appropriate for discharge from the emergency department.      The patient will pursue further outpatient evaluation with the primary care physician or other designated or consulting physician as outlined in the discharge instructions. They are agreeable to this plan of care and follow-up instructions have been explained in detail. The patient has received these instructions in written format and have expressed an understanding of the discharge instructions. The patient is aware that any significant change in condition or worsening of symptoms should prompt  an immediate return to this or the closest emergency department or call to 1.       Amount and/or Complexity of Data Reviewed  Clinical lab tests: reviewed  Tests in the radiology section of CPT®: reviewed  Tests in the medicine section of CPT®: reviewed    Risk of Complications, Morbidity, and/or Mortality  Presenting problems: moderate  Diagnostic procedures: low  Management options: low    Patient Progress  Patient progress: stable      Final diagnoses:   Acute urinary tract infection   Benzodiazepine withdrawal without complication (HCC)       ED Disposition  ED Disposition     ED Disposition Condition Comment    Discharge Stable           Lolita Terry, APRN  [address] [facility] [address]  [place] [address]  [phone]    In 3 days           Medication List      New Prescriptions    cephalexin 500 MG capsule  Commonly known as: KEFLEX  Take 1 capsule by mouth 4 (Four) Times a Day for 7 days.     predniSONE 50 MG tablet  Commonly known as: DELTASONE  Take 1 tablet by mouth Daily.           Where to Get Your Medications      These medications were sent to [facility], [address] [phone]  - [phone] [address] [address]    Phone: [phone]   · cephalexin 500 MG capsule  · predniSONE 50 MG tablet          Chris Coley, APRN  10/04/21 [id number]

## 2021-10-09 LAB
BACTERIA SPEC AEROBE CULT: NORMAL
BACTERIA SPEC AEROBE CULT: NORMAL

## 2021-10-21 ENCOUNTER — OFFICE VISIT (OUTPATIENT)
Dept: CARDIOLOGY | Facility: CLINIC | Age: 61
End: 2021-10-21

## 2021-10-21 VITALS
DIASTOLIC BLOOD PRESSURE: 86 MMHG | HEART RATE: 76 BPM | WEIGHT: 189 LBS | BODY MASS INDEX: 37.11 KG/M2 | OXYGEN SATURATION: 99 % | HEIGHT: 60 IN | SYSTOLIC BLOOD PRESSURE: 142 MMHG

## 2021-10-21 DIAGNOSIS — Z95.5 S/P DRUG ELUTING CORONARY STENT PLACEMENT: ICD-10-CM

## 2021-10-21 DIAGNOSIS — I25.10 CORONARY ARTERY DISEASE INVOLVING NATIVE CORONARY ARTERY OF NATIVE HEART WITHOUT ANGINA PECTORIS: ICD-10-CM

## 2021-10-21 DIAGNOSIS — I25.5 ISCHEMIC CARDIOMYOPATHY: Primary | ICD-10-CM

## 2021-10-21 LAB — QT INTERVAL: 395 MS

## 2021-10-21 PROCEDURE — 99214 OFFICE O/P EST MOD 30 MIN: CPT | Performed by: NURSE PRACTITIONER

## 2021-10-21 RX ORDER — PANTOPRAZOLE SODIUM 40 MG/1
40 TABLET, DELAYED RELEASE ORAL DAILY
COMMUNITY
Start: 2021-09-22 | End: 2022-07-06

## 2021-10-21 NOTE — PROGRESS NOTES
"    CARDIOLOGY        Patient Name: Falguni Minaya  :1960  Age: 61 y.o.  Primary Cardiologist: Marlon Zamudio MD  Encounter Provider:  WESTLEY Durán    Date of Service: 10/21/21          CHIEF COMPLAINT / REASON FOR OFFICE VISIT     Coronary Artery Disease ([facility] ED f/u )      HISTORY OF PRESENT ILLNESS       HPI  Falguni Minaya is a 61 y.o. female who presents today for ED reevaluation.     Pt has a  history significant for history of STEMI, CAD, cardiomyopathy, multiple sclerosis, seizure disorder.    Patient recently evaluated in the [facility] emergency department on 10/4/2021 with complaints of body tremors and generalized weakness.  It is reported the patient has history of multiple sclerosis and has been off of her clonazepam for 1 week.  [facility] stated that they had been unable to obtain her medication.  It was thought that patient was having benzodiazepine withdrawal.  Urinalysis also revealed acute UTI and patient was placed on antibiotics.    Now that patient is back on her Klonopin she is feeling much better.  She continues to have chronic but stable fatigue as well as positional lightheadedness.  She did not have any cardiac symptoms at her emergency department visit.  She currently denies chest discomfort, shortness of breath at rest or with exertion, heart palpitations.      The following portions of the patient's history were reviewed and updated as appropriate: allergies, current medications, past family history, past medical history, past social history, past surgical history and problem list.      VITAL SIGNS     Visit Vitals  /86 (BP Location: Left arm, Patient Position: Sitting, Cuff Size: Adult)   Pulse 76   Ht 152.4 cm (60\")   Wt 85.7 kg (189 lb)   SpO2 99%   BMI 36.91 kg/m²         Wt Readings from Last 3 Encounters:   10/21/21 85.7 kg (189 lb)   10/04/21 82.2 kg (181 lb 3.5 oz)   21 77.1 kg (170 lb)     Body mass index is 36.91 kg/m².      REVIEW OF " SYSTEMS   Review of Systems   Constitutional: Positive for malaise/fatigue. Negative for chills, fever, weight gain and weight loss.   Cardiovascular: Negative for leg swelling.   Respiratory: Negative for cough, snoring and wheezing.    Hematologic/Lymphatic: Negative for bleeding problem. Does not bruise/bleed easily.   Skin: Negative for color change.   Musculoskeletal: Negative for falls, joint pain and myalgias.   Gastrointestinal: Negative for melena.   Genitourinary: Negative for hematuria.   Neurological: Positive for light-headedness. Negative for excessive daytime sleepiness.   Psychiatric/Behavioral: Negative for depression. The patient is not nervous/anxious.            PHYSICAL EXAMINATION     Vitals and nursing note reviewed.   Constitutional:       Appearance: Normal appearance. Well-developed.   Eyes:      Conjunctiva/sclera: Conjunctivae normal.   Neck:      Vascular: No carotid bruit.   Pulmonary:      Breath sounds: Normal breath sounds.   Cardiovascular:      Normal rate. Regular rhythm. Normal S1 with normal intensity. Normal S2 with normal intensity.      Murmurs: There is no murmur.      No gallop. No click. No rub.   Edema:     Peripheral edema absent.   Musculoskeletal: Normal range of motion. Skin:     General: Skin is warm and dry.   Neurological:      Mental Status: Alert and oriented to person, place, and time.      GCS: GCS eye subscore is 4. GCS verbal subscore is 5. GCS motor subscore is 6.   Psychiatric:         Speech: Speech normal.         Behavior: Behavior normal.         Thought Content: Thought content normal.         Judgment: Judgment normal.           REVIEWED DATA     Procedures    Cardiac Procedures:  1. Echocardiogram 8/17/2020.  LVEF 53%.  LV wall weakness consistent with LVH.  Akinesis of the basal inferior wall.  2. Myocardial perfusion stress imaging and 3/23/2021.  Findings consistent with indeterminate ECG stress test.  Myocardial perfusion imaging indicates medium  to large sized infarct in the inferior wall and lateral wall with no significant ischemia.          ASSESSMENT & PLAN      Diagnosis Plan   1. Ischemic cardiomyopathy     2. Coronary artery disease involving native coronary artery of native heart without angina pectoris     3. S/P drug eluting coronary stent placement           SUMMARY/DISCUSSION  1. Ischemic cardiomyopathy.  Patient with history of ischemic cardiomyopathy who was recently seen in the ED for benzodiazepine withdrawal and acute UTI.  She denies shortness of breath at rest or with exertion, orthopnea.  She is euvolemic on exam.  Last echocardiogram revealed preserved LVEF.  She will continue GDMT with carvedilol, furosemide, losartan.  2. CAD with prior stenting.  Denies angina or dyspnea.  Continue GDMT with aspirin, atorvastatin, carvedilol, Plavix, losartan  3. Follow-up for routine assessment in 6 months.  Sooner for problems or complications.        MEDICATIONS         Discharge Medications          Accurate as of October 21, 2021  3:53 PM. If you have any questions, ask your nurse or doctor.            Continue These Medications      Instructions Start Date   aspirin 81 MG EC tablet   81 mg, Oral, Daily      atorvastatin 40 MG tablet  Commonly known as: LIPITOR   TAKE 1 TABLET BY MOUTH EVERY NIGHT AT BEDTIME      carvedilol 12.5 MG tablet  Commonly known as: COREG   12.5 mg, Oral, Every 12 Hours Scheduled      clonazePAM 1 MG tablet  Commonly known as: KlonoPIN   1 mg, Oral, 2 Times Daily PRN      clopidogrel 75 MG tablet  Commonly known as: PLAVIX   TAKE 1 TABLET BY MOUTH ONCE DAILY      Dimethyl Fumarate capsule delayed-release  Commonly known as: TECFIDERA   240 mg, Oral, 2 times daily      FLUoxetine 20 MG capsule  Commonly known as: PROzac   40 mg, Oral, 3 Times Daily      furosemide 40 MG tablet  Commonly known as: LASIX   40 mg, Oral, Daily      gabapentin 300 MG capsule  Commonly known as: NEURONTIN   300 mg, Oral, 4 Times Daily       levETIRAcetam 500 MG tablet  Commonly known as: KEPPRA   500 mg, Oral, 2 Times Daily      losartan 25 MG tablet  Commonly known as: COZAAR   25 mg, Oral, Every 24 Hours Scheduled      modafinil 200 MG tablet  Commonly known as: PROVIGIL   200 mg, Oral, 2 Times Daily      OLANZapine 2.5 MG tablet  Commonly known as: zyPREXA   2.5 mg, Oral, Nightly      omeprazole 40 MG capsule  Commonly known as: priLOSEC   40 mg, Oral, Daily PRN      ondansetron 4 MG tablet  Commonly known as: ZOFRAN   4 mg, Oral, Every 6 Hours PRN      pantoprazole 40 MG EC tablet  Commonly known as: PROTONIX   40 mg, Oral, Daily      pramipexole 0.125 MG tablet  Commonly known as: MIRAPEX   TAKE 1 TABLET BY MOUTH EVERY NIGHT AT BEDTIME      predniSONE 50 MG tablet  Commonly known as: DELTASONE   50 mg, Oral, Daily      tiZANidine 2 MG tablet  Commonly known as: ZANAFLEX   2 mg, Oral, Every 8 Hours PRN      vitamin D 1.25 MG (68552 UT) capsule capsule  Commonly known as: ERGOCALCIFEROL   No dose, route, or frequency recorded.      Vumerity (Starter) 231 MG capsule delayed-release  Generic drug: Diroximel Fumarate (Starter)   Oral                 **Dragon Disclaimer:   Much of this encounter note is an electronic transcription/translation of spoken language to printed text. The electronic translation of spoken language may permit erroneous, or at times, nonsensical words or phrases to be inadvertently transcribed. Although I have reviewed the note for such errors, some may still exist.

## 2021-11-10 ENCOUNTER — TRANSCRIBE ORDERS (OUTPATIENT)
Dept: LAB | Facility: HOSPITAL | Age: 61
End: 2021-11-10

## 2021-11-10 DIAGNOSIS — N18.30 STAGE 3 CHRONIC KIDNEY DISEASE, UNSPECIFIED WHETHER STAGE 3A OR 3B CKD (HCC): Primary | ICD-10-CM

## 2021-11-29 RX ORDER — CARVEDILOL 12.5 MG/1
TABLET ORAL
Qty: 180 TABLET | Refills: 1 | Status: SHIPPED | OUTPATIENT
Start: 2021-11-29 | End: 2022-05-25

## 2022-01-19 ENCOUNTER — TRANSCRIBE ORDERS (OUTPATIENT)
Dept: ADMINISTRATIVE | Facility: HOSPITAL | Age: 62
End: 2022-01-19

## 2022-01-19 ENCOUNTER — LAB (OUTPATIENT)
Dept: LAB | Facility: HOSPITAL | Age: 62
End: 2022-01-19

## 2022-01-19 DIAGNOSIS — I10 ESSENTIAL HYPERTENSION: ICD-10-CM

## 2022-01-19 DIAGNOSIS — E55.9 VITAMIN D DEFICIENCY: ICD-10-CM

## 2022-01-19 DIAGNOSIS — E21.3 HYPERPARATHYROIDISM: ICD-10-CM

## 2022-01-19 DIAGNOSIS — N18.30 STAGE 3 CHRONIC KIDNEY DISEASE, UNSPECIFIED WHETHER STAGE 3A OR 3B CKD: Primary | ICD-10-CM

## 2022-01-19 DIAGNOSIS — N18.30 STAGE 3 CHRONIC KIDNEY DISEASE, UNSPECIFIED WHETHER STAGE 3A OR 3B CKD: ICD-10-CM

## 2022-01-19 LAB
25(OH)D3 SERPL-MCNC: 27.7 NG/ML
ALBUMIN SERPL-MCNC: 4.4 G/DL (ref 3.5–5.2)
ALBUMIN/GLOB SERPL: 1.2 G/DL
ALP SERPL-CCNC: 100 U/L (ref 39–117)
ALT SERPL W P-5'-P-CCNC: 20 U/L (ref 1–33)
ANION GAP SERPL CALCULATED.3IONS-SCNC: 11 MMOL/L (ref 5–15)
AST SERPL-CCNC: 22 U/L (ref 1–32)
BACTERIA UR QL AUTO: ABNORMAL /HPF
BASOPHILS # BLD AUTO: 0.07 10*3/MM3 (ref 0–0.2)
BASOPHILS NFR BLD AUTO: 0.8 % (ref 0–1.5)
BILIRUB SERPL-MCNC: 0.3 MG/DL (ref 0–1.2)
BILIRUB UR QL STRIP: NEGATIVE
BUN SERPL-MCNC: 20 MG/DL (ref 8–23)
BUN/CREAT SERPL: 15.9 (ref 7–25)
CALCIUM SPEC-SCNC: 10.6 MG/DL (ref 8.6–10.5)
CHLORIDE SERPL-SCNC: 104 MMOL/L (ref 98–107)
CLARITY UR: CLEAR
CO2 SERPL-SCNC: 25 MMOL/L (ref 22–29)
COLOR UR: YELLOW
CREAT SERPL-MCNC: 1.26 MG/DL (ref 0.57–1)
DEPRECATED RDW RBC AUTO: 41.5 FL (ref 37–54)
EOSINOPHIL # BLD AUTO: 0.13 10*3/MM3 (ref 0–0.4)
EOSINOPHIL NFR BLD AUTO: 1.4 % (ref 0.3–6.2)
ERYTHROCYTE [DISTWIDTH] IN BLOOD BY AUTOMATED COUNT: 12.9 % (ref 12.3–15.4)
GFR SERPL CREATININE-BSD FRML MDRD: 43 ML/MIN/1.73
GLOBULIN UR ELPH-MCNC: 3.8 GM/DL
GLUCOSE SERPL-MCNC: 128 MG/DL (ref 65–99)
GLUCOSE UR STRIP-MCNC: NEGATIVE MG/DL
HCT VFR BLD AUTO: 37.7 % (ref 34–46.6)
HGB BLD-MCNC: 12.1 G/DL (ref 12–15.9)
HGB UR QL STRIP.AUTO: NEGATIVE
HYALINE CASTS UR QL AUTO: ABNORMAL /LPF
IMM GRANULOCYTES # BLD AUTO: 0.05 10*3/MM3 (ref 0–0.05)
IMM GRANULOCYTES NFR BLD AUTO: 0.5 % (ref 0–0.5)
KETONES UR QL STRIP: NEGATIVE
LEUKOCYTE ESTERASE UR QL STRIP.AUTO: ABNORMAL
LYMPHOCYTES # BLD AUTO: 2.42 10*3/MM3 (ref 0.7–3.1)
LYMPHOCYTES NFR BLD AUTO: 26.6 % (ref 19.6–45.3)
MCH RBC QN AUTO: 28.5 PG (ref 26.6–33)
MCHC RBC AUTO-ENTMCNC: 32.1 G/DL (ref 31.5–35.7)
MCV RBC AUTO: 88.9 FL (ref 79–97)
MONOCYTES # BLD AUTO: 0.59 10*3/MM3 (ref 0.1–0.9)
MONOCYTES NFR BLD AUTO: 6.5 % (ref 5–12)
NEUTROPHILS NFR BLD AUTO: 5.85 10*3/MM3 (ref 1.7–7)
NEUTROPHILS NFR BLD AUTO: 64.2 % (ref 42.7–76)
NITRITE UR QL STRIP: POSITIVE
NRBC BLD AUTO-RTO: 0 /100 WBC (ref 0–0.2)
PH UR STRIP.AUTO: 5.5 [PH] (ref 5–8)
PHOSPHATE SERPL-MCNC: 2.7 MG/DL (ref 2.5–4.5)
PLATELET # BLD AUTO: 360 10*3/MM3 (ref 140–450)
PMV BLD AUTO: 11.8 FL (ref 6–12)
POTASSIUM SERPL-SCNC: 4.8 MMOL/L (ref 3.5–5.2)
PROT SERPL-MCNC: 8.2 G/DL (ref 6–8.5)
PROT UR QL STRIP: ABNORMAL
PTH-INTACT SERPL-MCNC: 274 PG/ML (ref 15–65)
RBC # BLD AUTO: 4.24 10*6/MM3 (ref 3.77–5.28)
RBC # UR STRIP: ABNORMAL /HPF
REF LAB TEST METHOD: ABNORMAL
SODIUM SERPL-SCNC: 140 MMOL/L (ref 136–145)
SP GR UR STRIP: 1.02 (ref 1–1.03)
SQUAMOUS #/AREA URNS HPF: ABNORMAL /HPF
UROBILINOGEN UR QL STRIP: ABNORMAL
WBC # UR STRIP: ABNORMAL /HPF
WBC NRBC COR # BLD: 9.11 10*3/MM3 (ref 3.4–10.8)

## 2022-01-19 PROCEDURE — 85025 COMPLETE CBC W/AUTO DIFF WBC: CPT

## 2022-01-19 PROCEDURE — 83970 ASSAY OF PARATHORMONE: CPT

## 2022-01-19 PROCEDURE — 80053 COMPREHEN METABOLIC PANEL: CPT

## 2022-01-19 PROCEDURE — 36415 COLL VENOUS BLD VENIPUNCTURE: CPT

## 2022-01-19 PROCEDURE — 82306 VITAMIN D 25 HYDROXY: CPT

## 2022-01-19 PROCEDURE — 84100 ASSAY OF PHOSPHORUS: CPT

## 2022-01-19 PROCEDURE — 81001 URINALYSIS AUTO W/SCOPE: CPT

## 2022-03-08 ENCOUNTER — TRANSCRIBE ORDERS (OUTPATIENT)
Dept: ADMINISTRATIVE | Facility: HOSPITAL | Age: 62
End: 2022-03-08

## 2022-03-08 DIAGNOSIS — G35 MULTIPLE SCLEROSIS: Primary | ICD-10-CM

## 2022-03-31 ENCOUNTER — HOSPITAL ENCOUNTER (OUTPATIENT)
Dept: MRI IMAGING | Facility: HOSPITAL | Age: 62
Discharge: HOME | End: 2022-03-31
Admitting: PSYCHIATRY & NEUROLOGY

## 2022-03-31 DIAGNOSIS — G35 MULTIPLE SCLEROSIS: ICD-10-CM

## 2022-03-31 LAB
CREAT BLDA-MCNC: 1.2 MG/DL
EGFRCR SERPLBLD CKD-EPI 2021: 51 ML/MIN/1.73

## 2022-03-31 PROCEDURE — 0 GADOBENATE DIMEGLUMINE 529 MG/ML SOLUTION: Performed by: PSYCHIATRY & NEUROLOGY

## 2022-03-31 PROCEDURE — A9577 INJ MULTIHANCE: HCPCS | Performed by: PSYCHIATRY & NEUROLOGY

## 2022-03-31 PROCEDURE — 70553 MRI BRAIN STEM W/O & W/DYE: CPT

## 2022-03-31 PROCEDURE — 82565 ASSAY OF CREATININE: CPT

## 2022-03-31 RX ADMIN — GADOBENATE DIMEGLUMINE 15 ML: 529 INJECTION, SOLUTION INTRAVENOUS at 17:46

## 2022-04-19 ENCOUNTER — TRANSCRIBE ORDERS (OUTPATIENT)
Dept: ADMINISTRATIVE | Facility: HOSPITAL | Age: 62
End: 2022-04-19

## 2022-04-19 DIAGNOSIS — G35 MULTIPLE SCLEROSIS: Primary | ICD-10-CM

## 2022-04-20 ENCOUNTER — LAB (OUTPATIENT)
Dept: LAB | Facility: HOSPITAL | Age: 62
End: 2022-04-20

## 2022-04-20 ENCOUNTER — TRANSCRIBE ORDERS (OUTPATIENT)
Dept: ADMINISTRATIVE | Facility: HOSPITAL | Age: 62
End: 2022-04-20

## 2022-04-20 DIAGNOSIS — N39.0 URINARY TRACT INFECTION WITHOUT HEMATURIA, SITE UNSPECIFIED: ICD-10-CM

## 2022-04-20 DIAGNOSIS — N18.30 STAGE 3 CHRONIC KIDNEY DISEASE, UNSPECIFIED WHETHER STAGE 3A OR 3B CKD: Primary | ICD-10-CM

## 2022-04-20 DIAGNOSIS — N18.30 STAGE 3 CHRONIC KIDNEY DISEASE, UNSPECIFIED WHETHER STAGE 3A OR 3B CKD: ICD-10-CM

## 2022-04-20 DIAGNOSIS — E21.3 HYPERPARATHYROIDISM: ICD-10-CM

## 2022-04-20 DIAGNOSIS — E55.9 VITAMIN D DEFICIENCY: ICD-10-CM

## 2022-04-20 LAB
25(OH)D3 SERPL-MCNC: 22.6 NG/ML (ref 30–100)
ALBUMIN SERPL-MCNC: 4.5 G/DL (ref 3.5–5.2)
ALBUMIN UR-MCNC: 1.2 MG/DL
ALBUMIN/GLOB SERPL: 1.2 G/DL
ALP SERPL-CCNC: 124 U/L (ref 39–117)
ALT SERPL W P-5'-P-CCNC: 17 U/L (ref 1–33)
ANION GAP SERPL CALCULATED.3IONS-SCNC: 9.3 MMOL/L (ref 5–15)
AST SERPL-CCNC: 20 U/L (ref 1–32)
BACTERIA UR QL AUTO: ABNORMAL /HPF
BASOPHILS # BLD AUTO: 0.09 10*3/MM3 (ref 0–0.2)
BASOPHILS NFR BLD AUTO: 1.2 % (ref 0–1.5)
BILIRUB SERPL-MCNC: 0.2 MG/DL (ref 0–1.2)
BILIRUB UR QL STRIP: NEGATIVE
BUN SERPL-MCNC: 20 MG/DL (ref 8–23)
BUN/CREAT SERPL: 15.4 (ref 7–25)
CALCIUM SPEC-SCNC: 10.5 MG/DL (ref 8.6–10.5)
CHLORIDE SERPL-SCNC: 104 MMOL/L (ref 98–107)
CLARITY UR: CLEAR
CO2 SERPL-SCNC: 24.7 MMOL/L (ref 22–29)
COLOR UR: YELLOW
CREAT SERPL-MCNC: 1.3 MG/DL (ref 0.57–1)
CREAT UR-MCNC: 125.9 MG/DL
CREAT UR-MCNC: 135.9 MG/DL
DEPRECATED RDW RBC AUTO: 42 FL (ref 37–54)
EGFRCR SERPLBLD CKD-EPI 2021: 46.3 ML/MIN/1.73
EOSINOPHIL # BLD AUTO: 0.12 10*3/MM3 (ref 0–0.4)
EOSINOPHIL NFR BLD AUTO: 1.6 % (ref 0.3–6.2)
ERYTHROCYTE [DISTWIDTH] IN BLOOD BY AUTOMATED COUNT: 12.9 % (ref 12.3–15.4)
GLOBULIN UR ELPH-MCNC: 3.7 GM/DL
GLUCOSE SERPL-MCNC: 120 MG/DL (ref 65–99)
GLUCOSE UR STRIP-MCNC: NEGATIVE MG/DL
HCT VFR BLD AUTO: 36 % (ref 34–46.6)
HGB BLD-MCNC: 11.4 G/DL (ref 12–15.9)
HGB UR QL STRIP.AUTO: NEGATIVE
HYALINE CASTS UR QL AUTO: ABNORMAL /LPF
IMM GRANULOCYTES # BLD AUTO: 0.03 10*3/MM3 (ref 0–0.05)
IMM GRANULOCYTES NFR BLD AUTO: 0.4 % (ref 0–0.5)
KETONES UR QL STRIP: NEGATIVE
LEUKOCYTE ESTERASE UR QL STRIP.AUTO: ABNORMAL
LYMPHOCYTES # BLD AUTO: 3.05 10*3/MM3 (ref 0.7–3.1)
LYMPHOCYTES NFR BLD AUTO: 41.7 % (ref 19.6–45.3)
MCH RBC QN AUTO: 28.3 PG (ref 26.6–33)
MCHC RBC AUTO-ENTMCNC: 31.7 G/DL (ref 31.5–35.7)
MCV RBC AUTO: 89.3 FL (ref 79–97)
MICROALBUMIN/CREAT UR: 9.5 MG/G
MONOCYTES # BLD AUTO: 0.54 10*3/MM3 (ref 0.1–0.9)
MONOCYTES NFR BLD AUTO: 7.4 % (ref 5–12)
NEUTROPHILS NFR BLD AUTO: 3.48 10*3/MM3 (ref 1.7–7)
NEUTROPHILS NFR BLD AUTO: 47.7 % (ref 42.7–76)
NITRITE UR QL STRIP: NEGATIVE
NRBC BLD AUTO-RTO: 0 /100 WBC (ref 0–0.2)
PH UR STRIP.AUTO: 5.5 [PH] (ref 5–8)
PLATELET # BLD AUTO: 398 10*3/MM3 (ref 140–450)
PMV BLD AUTO: 11.4 FL (ref 6–12)
POTASSIUM SERPL-SCNC: 4.8 MMOL/L (ref 3.5–5.2)
PROT ?TM UR-MCNC: 15.7 MG/DL
PROT SERPL-MCNC: 8.2 G/DL (ref 6–8.5)
PROT UR QL STRIP: NEGATIVE
PROT/CREAT UR: 0.12 MG/G{CREAT}
PTH-INTACT SERPL-MCNC: 204 PG/ML (ref 15–65)
RBC # BLD AUTO: 4.03 10*6/MM3 (ref 3.77–5.28)
RBC # UR STRIP: ABNORMAL /HPF
REF LAB TEST METHOD: ABNORMAL
RENAL EPI CELLS #/AREA URNS HPF: ABNORMAL /HPF
SODIUM SERPL-SCNC: 138 MMOL/L (ref 136–145)
SP GR UR STRIP: 1.02 (ref 1–1.03)
SQUAMOUS #/AREA URNS HPF: ABNORMAL /HPF
UROBILINOGEN UR QL STRIP: ABNORMAL
WBC # UR STRIP: ABNORMAL /HPF
WBC NRBC COR # BLD: 7.31 10*3/MM3 (ref 3.4–10.8)

## 2022-04-20 PROCEDURE — 82306 VITAMIN D 25 HYDROXY: CPT

## 2022-04-20 PROCEDURE — 83970 ASSAY OF PARATHORMONE: CPT

## 2022-04-20 PROCEDURE — 36415 COLL VENOUS BLD VENIPUNCTURE: CPT

## 2022-04-20 PROCEDURE — 84156 ASSAY OF PROTEIN URINE: CPT

## 2022-04-20 PROCEDURE — 80053 COMPREHEN METABOLIC PANEL: CPT

## 2022-04-20 PROCEDURE — 81001 URINALYSIS AUTO W/SCOPE: CPT

## 2022-04-20 PROCEDURE — 82570 ASSAY OF URINE CREATININE: CPT

## 2022-04-20 PROCEDURE — 87086 URINE CULTURE/COLONY COUNT: CPT

## 2022-04-20 PROCEDURE — 85025 COMPLETE CBC W/AUTO DIFF WBC: CPT

## 2022-04-20 PROCEDURE — 82043 UR ALBUMIN QUANTITATIVE: CPT

## 2022-04-21 LAB — BACTERIA SPEC AEROBE CULT: NO GROWTH

## 2022-05-25 RX ORDER — CARVEDILOL 12.5 MG/1
TABLET ORAL
Qty: 180 TABLET | Refills: 1 | Status: SHIPPED | OUTPATIENT
Start: 2022-05-25 | End: 2022-07-06 | Stop reason: SDUPTHER

## 2022-05-25 NOTE — TELEPHONE ENCOUNTER
Per LOV note from 10/2021 pt is due to f/u in 6 months however the appt was not scheduled. Please call pt and schedule this for further refills. Refills will be sent. // HG

## 2022-07-06 ENCOUNTER — OFFICE VISIT (OUTPATIENT)
Dept: CARDIOLOGY | Facility: CLINIC | Age: 62
End: 2022-07-06

## 2022-07-06 VITALS
OXYGEN SATURATION: 98 % | BODY MASS INDEX: 39.74 KG/M2 | DIASTOLIC BLOOD PRESSURE: 84 MMHG | HEART RATE: 102 BPM | HEIGHT: 60 IN | WEIGHT: 202.4 LBS | SYSTOLIC BLOOD PRESSURE: 118 MMHG

## 2022-07-06 DIAGNOSIS — E78.5 HYPERLIPIDEMIA LDL GOAL <70: ICD-10-CM

## 2022-07-06 DIAGNOSIS — Z95.5 S/P DRUG ELUTING CORONARY STENT PLACEMENT: ICD-10-CM

## 2022-07-06 DIAGNOSIS — R06.02 SHORTNESS OF BREATH: ICD-10-CM

## 2022-07-06 DIAGNOSIS — I25.5 ISCHEMIC CARDIOMYOPATHY: ICD-10-CM

## 2022-07-06 DIAGNOSIS — R06.01 ORTHOPNEA: ICD-10-CM

## 2022-07-06 DIAGNOSIS — R06.09 DYSPNEA ON EXERTION: ICD-10-CM

## 2022-07-06 DIAGNOSIS — I25.10 CORONARY ARTERY DISEASE INVOLVING NATIVE CORONARY ARTERY OF NATIVE HEART, UNSPECIFIED WHETHER ANGINA PRESENT: Primary | ICD-10-CM

## 2022-07-06 PROCEDURE — 93000 ELECTROCARDIOGRAM COMPLETE: CPT | Performed by: NURSE PRACTITIONER

## 2022-07-06 PROCEDURE — 99214 OFFICE O/P EST MOD 30 MIN: CPT | Performed by: NURSE PRACTITIONER

## 2022-07-06 RX ORDER — CLOPIDOGREL BISULFATE 75 MG/1
75 TABLET ORAL DAILY
Qty: 90 TABLET | Refills: 3 | Status: SHIPPED | OUTPATIENT
Start: 2022-07-06

## 2022-07-06 RX ORDER — LOSARTAN POTASSIUM 25 MG/1
25 TABLET ORAL
Qty: 90 TABLET | Refills: 3 | Status: SHIPPED | OUTPATIENT
Start: 2022-07-06

## 2022-07-06 RX ORDER — ATORVASTATIN CALCIUM 40 MG/1
40 TABLET, FILM COATED ORAL
Qty: 90 TABLET | Refills: 3 | Status: SHIPPED | OUTPATIENT
Start: 2022-07-06

## 2022-07-06 RX ORDER — CARVEDILOL 12.5 MG/1
12.5 TABLET ORAL EVERY 12 HOURS
Qty: 180 TABLET | Refills: 3 | Status: SHIPPED | OUTPATIENT
Start: 2022-07-06 | End: 2022-07-11 | Stop reason: SDUPTHER

## 2022-07-06 RX ORDER — FUROSEMIDE 40 MG/1
40 TABLET ORAL DAILY
Qty: 90 TABLET | Refills: 3 | Status: SHIPPED | OUTPATIENT
Start: 2022-07-06

## 2022-07-06 NOTE — PROGRESS NOTES
Date of Office Visit: 2022  Encounter Provider: WESTLEY Rojas  Place of Service: [facility] CARDIOLOGY  Patient Name: Falguni Minaya  :1960    Chief Complaint   Patient presents with   • Cardiomyopathy          :     HPI: Falguni Minaya is a 62 y.o. female who is a patient of Dr. Zamudio.  She is new to me today and presents for 6-month follow-up.  He has a history of coronary artery disease, multiple sclerosis and seizure disorder.  In 2020 she was noted to have inferior ST elevation myocardial infarction.  She underwent a catheterization with findings of an occluded distal right coronary artery that was treated by thrombectomy and drug-eluting stent placement.  She also has significant disease in the circumflex as well as mild disease of the left anterior descending.  Her initial ejection fraction was approximately 30 to 35% but showed improvement on postprocedure echocardiogram with an ejection fraction of 50%.    Following her MI, she was treated for congestive heart failure and associated pleural effusion.  Repeat 2D echocardiogram showed her ejection fraction had been reduced again to 30%.  5 months later, a follow-up 2D echocardiogram showed her LVEF was back to normal at 53%.  She underwent a Cardiolite stress test on 3/23/2021 which showed medium to large size infarct located in inferior wall and lateral wall with no significant ischemia noted.  LVEF 54% with some mild LV wall hypokinesis.    Ms. Minaya has a history of chronic fatigue and positional hypotension.  Today she presents with dyspnea. This has been worsening over the last few months.  She thinks it may be the heat or even her weight gain.  She has gained approximately 50 pounds since her MI in 2020.  She notes that her diet is not the best and she is pretty inactive.  Physical therapy comes to her home weekly as she has some hip issues that have been ongoing.      She states  "that she has an indigestion feeling sometimes when she lies down which does not happen very often.  She endorses orthopnea and sleeps at an incline.  She denies palpitations.  Her blood pressure is well controlled.  She has approximately 2-3 times a week that she gets a little bit lightheaded which has been normal for her.  On physical exam she appears euvolemic.  Breath sounds are clear.  She notes that she has a nonproductive cough at times that feels \"more from the throat not the lungs\".  She notes that her legs become swollen over the course of the day which relieves for the most part with elevation.  She takes Lasix daily and has great urine output.     Previous testing and notes have been reviewed by me.   Past Medical History:   Diagnosis Date   • Abdominal hernia    • Acute ST elevation myocardial infarction (STEMI) ([facility])     due to occlusion of right coronary artery   • Coronary artery disease    • Dyspepsia    • GERD (gastroesophageal reflux disease)    • Multiple sclerosis ([facility])    • Seizures ([facility])     last seizure 2018       Past Surgical History:   Procedure Laterality Date   • APPENDECTOMY     • CARDIAC CATHETERIZATION     • CARDIAC CATHETERIZATION N/A 2/24/2020    Procedure: Left Heart Cath;  Surgeon: Marlon Zamudio MD;  Location: [facility] INVASIVE LOCATION;  Service: Cardiovascular;  Laterality: N/A;   • CARDIAC CATHETERIZATION N/A 2/24/2020    Procedure: Stent LATRICIA coronary;  Surgeon: Marlon Zamudio MD;  Location: [facility] CATH INVASIVE LOCATION;  Service: Cardiovascular;  Laterality: N/A;   • CARDIAC CATHETERIZATION  2/24/2020    Procedure: Percutaneous Manual Thrombectomy;  Surgeon: Marlon Zamudio MD;  Location: [facility] CATH INVASIVE LOCATION;  Service: Cardiovascular;;   • CARDIAC CATHETERIZATION N/A 2/24/2020    Procedure: Coronary angiography;  Surgeon: Marlon Zamudio MD;  Location: [facility] CATH INVASIVE LOCATION;  Service: Cardiovascular;  Laterality: N/A;   • CARDIAC " CATHETERIZATION N/A 2020    Procedure: Left ventriculography;  Surgeon: Marlon Zamudio MD;  Location: [facility] CATH INVASIVE LOCATION;  Service: Cardiovascular;  Laterality: N/A;   • CHOLECYSTECTOMY     • COLONOSCOPY N/A 2020    Procedure: COLONOSCOPY to  cecum:;  Surgeon: Krystal Sarabia MD;  Location: [facility] ENDOSCOPY;  Service: Gastroenterology;  Laterality: N/A;  pre:  anemia and abd pain  post:  hemorrhoids, tortuous colon   • ENDOSCOPY N/A 2020    Procedure: ESOPHAGOGASTRODUODENOSCOPY  with biopsies;  Surgeon: Krystal Sarabia MD;  Location: [facility] ENDOSCOPY;  Service: Gastroenterology;  Laterality: N/A;  pre:  anemia and abd pain  post:  mild gastritis,    • HYSTERECTOMY     • TONSILLECTOMY         Social History     Socioeconomic History   • Marital status:    Tobacco Use   • Smoking status: Former Smoker     Packs/day: 0.50     Years: 20.00     Pack years: 10.00     Quit date:      Years since quittin.5   • Smokeless tobacco: Never Used   • Tobacco comment: CAFFEINE USE: 2 CUPS COFFEE DAILY   Vaping Use   • Vaping Use: Never used   Substance and Sexual Activity   • Alcohol use: Not Currently   • Drug use: Never   • Sexual activity: Defer       Family History   Problem Relation Age of Onset   • Thyroid disease Mother    • Hypertension Sister    • Coronary artery disease Brother        Review of Systems   Constitutional: Positive for weight gain.   HENT: Negative.    Eyes: Negative.    Cardiovascular: Positive for dyspnea on exertion and orthopnea.   Respiratory: Positive for shortness of breath.    Endocrine: Negative.    Hematologic/Lymphatic: Negative.         Aspirin and Plavix   Skin: Negative.    Musculoskeletal: Positive for muscle weakness.   Gastrointestinal: Negative.    Genitourinary: Negative.    Neurological: Negative.    Psychiatric/Behavioral: Negative.    Allergic/Immunologic: Negative.        Allergies   Allergen Reactions   • Codeine Hives      Hyperactivity, nausea   • Morphine Hives     Hyperactivity, nausea         Current Outpatient Medications:   •  aspirin 81 MG EC tablet, Take 1 tablet by mouth Daily., Disp: 30 tablet, Rfl: 11  •  atorvastatin (LIPITOR) 40 MG tablet, Take 1 tablet by mouth every night at bedtime., Disp: 90 tablet, Rfl: 3  •  carvedilol (COREG) 12.5 MG tablet, Take 1 tablet by mouth Every 12 (Twelve) Hours., Disp: 180 tablet, Rfl: 3  •  clonazePAM (KlonoPIN) 1 MG tablet, Take 1 mg by mouth 2 (Two) Times a Day As Needed for Anxiety., Disp: , Rfl:   •  clopidogrel (PLAVIX) 75 MG tablet, Take 1 tablet by mouth Daily., Disp: 90 tablet, Rfl: 3  •  Dimethyl Fumarate 240 MG capsule delayed-release, Take 240 mg by mouth 2 (two) times a day., Disp: , Rfl:   •  Diroximel Fumarate, Starter, (Vumerity, Starter,) 231 MG capsule delayed-release, Take  by mouth., Disp: , Rfl:   •  FLUoxetine (PROzac) 20 MG capsule, Take 40 mg by mouth 3 (Three) Times a Day., Disp: , Rfl:   •  furosemide (LASIX) 40 MG tablet, Take 1 tablet by mouth Daily., Disp: 90 tablet, Rfl: 3  •  gabapentin (NEURONTIN) 300 MG capsule, Take 300 mg by mouth 4 (Four) Times a Day., Disp: , Rfl:   •  levETIRAcetam (KEPPRA) 500 MG tablet, Take 500 mg by mouth 2 (Two) Times a Day., Disp: , Rfl:   •  losartan (COZAAR) 25 MG tablet, Take 1 tablet by mouth Daily., Disp: 90 tablet, Rfl: 3  •  modafinil (PROVIGIL) 200 MG tablet, Take 200 mg by mouth 2 (Two) Times a Day., Disp: , Rfl:   •  OLANZapine (zyPREXA) 2.5 MG tablet, Take 2.5 mg by mouth Every Night., Disp: , Rfl:   •  omeprazole (priLOSEC) 40 MG capsule, Take 40 mg by mouth Daily As Needed., Disp: , Rfl:   •  ondansetron (ZOFRAN) 4 MG tablet, Take 4 mg by mouth Every 6 (Six) Hours As Needed for Nausea or Vomiting., Disp: , Rfl:   •  pramipexole (MIRAPEX) 0.125 MG tablet, TAKE 1 TABLET BY MOUTH EVERY NIGHT AT BEDTIME, Disp: 30 tablet, Rfl: 5      Objective:     Vitals:    07/06/22 1309   BP: 118/84   BP Location: Right arm   Patient  "Position: Sitting   Cuff Size: Adult   Pulse: 102   SpO2: 98%   Weight: 91.8 kg (202 lb 6.4 oz)   Height: 152.4 cm (60\")     Body mass index is 39.53 kg/m².     Cardiac studies:  2D Echocardiogram 8/17/2020:  LVEF 53%, grade 1 diastolic dysfunction, basal inferior wall akinetic  Normal RV size and systolic function  Normal LA and RA size  All valves with normal structure and function    2D Echocardiogram 3/10/2020: ([facility])  Technically difficult images  Moderately reduced EF 30% with focal wall motion abnormalities involving distal anteroapical, inferior and septal  Moderate LA enlargement fibrocalcific disease of AV  Trace pericardial effusion    2D Echocardiogram 2/25/2020:  LVEF 50%, grade 1 diastolic dysfunction  Normal RV size and systolic function  All valves with normal structure and function    Left heart cath 2/24/2020:  1.  RCA: The right coronary artery is completely occluded in the distal segment.  After reperfusion the posterior descending branch was large and normal.  The posterior lateral branches were medium to large caliber and normal.  2.  Left main: The left main coronary artery appeared to be normal.  3.  LAD: The left anterior descending coronary artery has mild plaquing in the proximal segment but no measurable stenosis.  The artery is narrowed 20 to 30% in the midsegment.  The distal segment is normal.  The first diagonal branch is small the second diagonal branch is medium caliber with a 20 to 30% proximal stenosis.  4.  Ramus intermediate disease: The ramus branch is narrowed approximately 80 to 90% in its proximal portion.  The distal portion is a medium to small caliber.  5.  LCx: The left circumflex coronary artery is normal in the proximal segment.  The artery is narrowed 90% in the midsegment after the origin of the large obtuse marginal branch.  The artery terminates as a small obtuse marginal branch which is normal.  The major obtuse marginal branch arising from the " proximal to mid segment is branching and normal.     Left ventriculography: The overall size of the ventricle is normal.  The global contractility of the ventricle is abnormal with inferior hypokinesia.  The estimated ejection fraction is 30 to 35%.      PHYSICAL EXAM:    Constitutional:       Appearance: Not in distress. Chronically ill-appearing.   Neck:      Vascular: No JVR. JVD normal.   Pulmonary:      Effort: Increased respiratory effort.      Breath sounds: Normal breath sounds. No wheezing. No rhonchi. No rales.   Chest:      Chest wall: Not tender to palpatation.   Cardiovascular:      PMI at left midclavicular line. Normal rate. Regular rhythm. Normal S1. Normal S2.      Murmurs: There is no murmur.      No gallop. No click. No rub.   Pulses:     Intact distal pulses.   Edema:     Thigh: bilateral trace edema of the thigh.     Pretibial: bilateral trace edema of the pretibial area.     Ankle: bilateral trace edema of the ankle.     Feet: bilateral trace edema of the feet.  Abdominal:      General: Bowel sounds are normal.      Palpations: Abdomen is soft.      Tenderness: There is no abdominal tenderness.   Musculoskeletal: Normal range of motion.         General: No tenderness. Skin:     General: Skin is warm and dry.   Neurological:      General: No focal deficit present.      Mental Status: Alert and oriented to person, place and time.           ECG 12 Lead    Date/Time: 7/6/2022 2:05 PM  Performed by: Shaila Urban APRN  Authorized by: Shaila Urban APRN   Comparison: compared with previous ECG from 3/23/2021  Similar to previous ECG  Rhythm: sinus rhythm  Rate: normal  BPM: 73  T inversion: III and aVF  T flattening: V3             Assessment:       Diagnosis Plan   1. Coronary artery disease involving native coronary artery of native heart, unspecified whether angina present     2. S/P drug eluting coronary stent placement     3. Ischemic cardiomyopathy     4. Dyspnea on exertion  Adult  Transthoracic Echo Complete W/ Cont if Necessary Per Protocol   5. Hyperlipidemia LDL goal <70  Lipid Panel   6. Shortness of breath     7. Orthopnea       Orders Placed This Encounter   Procedures   • Lipid Panel     Standing Status:   Future     Standing Expiration Date:   7/6/2023     Order Specific Question:   Release to patient     Answer:   Immediate   • Adult Transthoracic Echo Complete W/ Cont if Necessary Per Protocol     Standing Status:   Future     Standing Expiration Date:   7/6/2023     Scheduling Instructions:      arelis perform test at [facility]     Order Specific Question:   Reason for exam?     Answer:   Dyspnea          Plan:       1.  Coronary artery disease: Status post previous NSTEMI with PCI/LATRICIA to RCA.  Known nonobstructive disease in left circumflex.  No angina.  2.  Chronic diastolic heart failure: Latest LVEF 54% on stress test in 2021.  Dyspneic.  2D echocardiogram  3.  Hyperlipidemia: Lipid panel 4/2020 shows triglycerides 161, total cholesterol 156, HDL 45 and LDL 79.  Lipid panel to be drawn prior to next visit  4.  Ischemic cardiomyopathy: LV EF 30%->54%.  Dyspnea, orthopnea.  2D echocardiogram ordered  5.  Dyspnea: 2D echo  6.  Orthopnea    Ms. Minaya will follow up with Dr. Aj in 3 months.  She will have a 2D echocardiogram prior to her next visit.  She will call sooner for any questions or concerns.       Your medication list          Accurate as of July 6, 2022  2:03 PM. If you have any questions, ask your nurse or doctor.            CONTINUE taking these medications      Instructions Last Dose Given Next Dose Due   aspirin 81 MG EC tablet      Take 1 tablet by mouth Daily.       atorvastatin 40 MG tablet  Commonly known as: LIPITOR      Take 1 tablet by mouth every night at bedtime.       carvedilol 12.5 MG tablet  Commonly known as: COREG      Take 1 tablet by mouth Every 12 (Twelve) Hours.       clonazePAM 1 MG tablet  Commonly known as: KlonoPIN      Take 1 mg by  mouth 2 (Two) Times a Day As Needed for Anxiety.       clopidogrel 75 MG tablet  Commonly known as: PLAVIX      Take 1 tablet by mouth Daily.       Dimethyl Fumarate capsule delayed-release  Commonly known as: TECFIDERA      Take 240 mg by mouth 2 (two) times a day.       FLUoxetine 20 MG capsule  Commonly known as: PROzac      Take 40 mg by mouth 3 (Three) Times a Day.       furosemide 40 MG tablet  Commonly known as: LASIX      Take 1 tablet by mouth Daily.       gabapentin 300 MG capsule  Commonly known as: NEURONTIN      Take 300 mg by mouth 4 (Four) Times a Day.       levETIRAcetam 500 MG tablet  Commonly known as: KEPPRA      Take 500 mg by mouth 2 (Two) Times a Day.       losartan 25 MG tablet  Commonly known as: COZAAR      Take 1 tablet by mouth Daily.       modafinil 200 MG tablet  Commonly known as: PROVIGIL      Take 200 mg by mouth 2 (Two) Times a Day.       OLANZapine 2.5 MG tablet  Commonly known as: zyPREXA      Take 2.5 mg by mouth Every Night.       omeprazole 40 MG capsule  Commonly known as: priLOSEC      Take 40 mg by mouth Daily As Needed.       ondansetron 4 MG tablet  Commonly known as: ZOFRAN      Take 4 mg by mouth Every 6 (Six) Hours As Needed for Nausea or Vomiting.       pramipexole 0.125 MG tablet  Commonly known as: MIRAPEX      TAKE 1 TABLET BY MOUTH EVERY NIGHT AT BEDTIME       Vumerity (Starter) 231 MG capsule delayed-release  Generic drug: Diroximel Fumarate (Starter)      Take  by mouth.          STOP taking these medications    pantoprazole 40 MG EC tablet  Commonly known as: PROTONIX  Stopped by: WESTLEY Rojas        predniSONE 50 MG tablet  Commonly known as: DELTASONE  Stopped by: WESTLEY Rojas        tiZANidine 2 MG tablet  Commonly known as: ZANAFLEX  Stopped by: WESTLEY Rojas        vitamin D 1.25 MG (91760 UT) capsule capsule  Commonly known as: ERGOCALCIFEROL  Stopped by: WESTLEY Rojas              Where to Get Your Medications       These medications were sent to [facility]  ([facility]) - [place], [place] - [address] - [phone]  - [phone] FX  [address], [facility] [address]    Phone: [phone]   · atorvastatin 40 MG tablet  · carvedilol 12.5 MG tablet  · clopidogrel 75 MG tablet  · furosemide 40 MG tablet  · losartan 25 MG tablet           As always, it has been a pleasure to participate in your patient's care.      Sincerely,       WESTLEY Kumar

## 2022-07-11 RX ORDER — CARVEDILOL 12.5 MG/1
12.5 TABLET ORAL EVERY 12 HOURS
Qty: 180 TABLET | Refills: 3 | Status: SHIPPED | OUTPATIENT
Start: 2022-07-11

## 2022-07-25 ENCOUNTER — HOSPITAL ENCOUNTER (OUTPATIENT)
Dept: CARDIOLOGY | Facility: HOSPITAL | Age: 62
Discharge: HOME | End: 2022-07-25

## 2022-07-25 ENCOUNTER — TRANSCRIBE ORDERS (OUTPATIENT)
Dept: LAB | Facility: HOSPITAL | Age: 62
End: 2022-07-25

## 2022-07-25 ENCOUNTER — LAB (OUTPATIENT)
Dept: LAB | Facility: HOSPITAL | Age: 62
End: 2022-07-25

## 2022-07-25 DIAGNOSIS — E55.9 VITAMIN D DEFICIENCY, UNSPECIFIED: ICD-10-CM

## 2022-07-25 DIAGNOSIS — N25.81 SECONDARY HYPERPARATHYROIDISM OF RENAL ORIGIN: ICD-10-CM

## 2022-07-25 DIAGNOSIS — N18.31 STAGE 3A CHRONIC KIDNEY DISEASE: Primary | ICD-10-CM

## 2022-07-25 DIAGNOSIS — I10 ESSENTIAL HYPERTENSION, MALIGNANT: ICD-10-CM

## 2022-07-25 DIAGNOSIS — R79.89 HYPOURICEMIA: ICD-10-CM

## 2022-07-25 DIAGNOSIS — N18.31 STAGE 3A CHRONIC KIDNEY DISEASE: ICD-10-CM

## 2022-07-25 DIAGNOSIS — R06.09 DYSPNEA ON EXERTION: ICD-10-CM

## 2022-07-25 LAB
25(OH)D3 SERPL-MCNC: 24.1 NG/ML (ref 30–100)
ALBUMIN SERPL-MCNC: 4.2 G/DL (ref 3.5–5.2)
ALBUMIN UR-MCNC: <1.2 MG/DL
ALBUMIN/GLOB SERPL: 1.3 G/DL
ALP SERPL-CCNC: 104 U/L (ref 39–117)
ALT SERPL W P-5'-P-CCNC: 17 U/L (ref 1–33)
ANION GAP SERPL CALCULATED.3IONS-SCNC: 13 MMOL/L (ref 5–15)
AST SERPL-CCNC: 18 U/L (ref 1–32)
BACTERIA UR QL AUTO: ABNORMAL /HPF
BASOPHILS # BLD AUTO: 0.07 10*3/MM3 (ref 0–0.2)
BASOPHILS NFR BLD AUTO: 0.9 % (ref 0–1.5)
BILIRUB SERPL-MCNC: 0.2 MG/DL (ref 0–1.2)
BILIRUB UR QL STRIP: NEGATIVE
BUN SERPL-MCNC: 22 MG/DL (ref 8–23)
BUN/CREAT SERPL: 17.9 (ref 7–25)
CALCIUM SPEC-SCNC: 10.2 MG/DL (ref 8.6–10.5)
CHLORIDE SERPL-SCNC: 105 MMOL/L (ref 98–107)
CLARITY UR: CLEAR
CO2 SERPL-SCNC: 24 MMOL/L (ref 22–29)
COLOR UR: YELLOW
CREAT SERPL-MCNC: 1.23 MG/DL (ref 0.57–1)
CREAT UR-MCNC: 116.4 MG/DL
CREAT UR-MCNC: 96.1 MG/DL
DEPRECATED RDW RBC AUTO: 41.6 FL (ref 37–54)
EGFRCR SERPLBLD CKD-EPI 2021: 49.5 ML/MIN/1.73
EOSINOPHIL # BLD AUTO: 0.14 10*3/MM3 (ref 0–0.4)
EOSINOPHIL NFR BLD AUTO: 1.9 % (ref 0.3–6.2)
ERYTHROCYTE [DISTWIDTH] IN BLOOD BY AUTOMATED COUNT: 13.5 % (ref 12.3–15.4)
GLOBULIN UR ELPH-MCNC: 3.3 GM/DL
GLUCOSE SERPL-MCNC: 96 MG/DL (ref 65–99)
GLUCOSE UR STRIP-MCNC: NEGATIVE MG/DL
HCT VFR BLD AUTO: 33.9 % (ref 34–46.6)
HGB BLD-MCNC: 11.2 G/DL (ref 12–15.9)
HGB UR QL STRIP.AUTO: NEGATIVE
HYALINE CASTS UR QL AUTO: ABNORMAL /LPF
IMM GRANULOCYTES # BLD AUTO: 0.03 10*3/MM3 (ref 0–0.05)
IMM GRANULOCYTES NFR BLD AUTO: 0.4 % (ref 0–0.5)
KETONES UR QL STRIP: NEGATIVE
LEUKOCYTE ESTERASE UR QL STRIP.AUTO: ABNORMAL
LYMPHOCYTES # BLD AUTO: 2.88 10*3/MM3 (ref 0.7–3.1)
LYMPHOCYTES NFR BLD AUTO: 38.3 % (ref 19.6–45.3)
MCH RBC QN AUTO: 28.4 PG (ref 26.6–33)
MCHC RBC AUTO-ENTMCNC: 33 G/DL (ref 31.5–35.7)
MCV RBC AUTO: 86 FL (ref 79–97)
MICROALBUMIN/CREAT UR: NORMAL MG/G{CREAT}
MONOCYTES # BLD AUTO: 0.63 10*3/MM3 (ref 0.1–0.9)
MONOCYTES NFR BLD AUTO: 8.4 % (ref 5–12)
NEUTROPHILS NFR BLD AUTO: 3.76 10*3/MM3 (ref 1.7–7)
NEUTROPHILS NFR BLD AUTO: 50.1 % (ref 42.7–76)
NITRITE UR QL STRIP: NEGATIVE
NRBC BLD AUTO-RTO: 0 /100 WBC (ref 0–0.2)
PH UR STRIP.AUTO: 6.5 [PH] (ref 5–8)
PHOSPHATE SERPL-MCNC: 2.9 MG/DL (ref 2.5–4.5)
PLATELET # BLD AUTO: 326 10*3/MM3 (ref 140–450)
PMV BLD AUTO: 11.7 FL (ref 6–12)
POTASSIUM SERPL-SCNC: 4.4 MMOL/L (ref 3.5–5.2)
PROT ?TM UR-MCNC: 13.1 MG/DL
PROT SERPL-MCNC: 7.5 G/DL (ref 6–8.5)
PROT UR QL STRIP: NEGATIVE
PROT/CREAT UR: 0.11 MG/G{CREAT}
RBC # BLD AUTO: 3.94 10*6/MM3 (ref 3.77–5.28)
RBC # UR STRIP: ABNORMAL /HPF
REF LAB TEST METHOD: ABNORMAL
SODIUM SERPL-SCNC: 142 MMOL/L (ref 136–145)
SP GR UR STRIP: 1.02 (ref 1–1.03)
SQUAMOUS #/AREA URNS HPF: ABNORMAL /HPF
UROBILINOGEN UR QL STRIP: ABNORMAL
WBC # UR STRIP: ABNORMAL /HPF
WBC NRBC COR # BLD: 7.51 10*3/MM3 (ref 3.4–10.8)

## 2022-07-25 PROCEDURE — 80053 COMPREHEN METABOLIC PANEL: CPT

## 2022-07-25 PROCEDURE — 36415 COLL VENOUS BLD VENIPUNCTURE: CPT

## 2022-07-25 PROCEDURE — 82570 ASSAY OF URINE CREATININE: CPT

## 2022-07-25 PROCEDURE — 84100 ASSAY OF PHOSPHORUS: CPT

## 2022-07-25 PROCEDURE — 84156 ASSAY OF PROTEIN URINE: CPT

## 2022-07-25 PROCEDURE — 93306 TTE W/DOPPLER COMPLETE: CPT

## 2022-07-25 PROCEDURE — 85025 COMPLETE CBC W/AUTO DIFF WBC: CPT

## 2022-07-25 PROCEDURE — 82306 VITAMIN D 25 HYDROXY: CPT

## 2022-07-25 PROCEDURE — 83970 ASSAY OF PARATHORMONE: CPT

## 2022-07-25 PROCEDURE — 93306 TTE W/DOPPLER COMPLETE: CPT | Performed by: INTERNAL MEDICINE

## 2022-07-25 PROCEDURE — 82043 UR ALBUMIN QUANTITATIVE: CPT

## 2022-07-25 PROCEDURE — 81001 URINALYSIS AUTO W/SCOPE: CPT

## 2022-07-26 LAB — PTH-INTACT SERPL-MCNC: 170 PG/ML (ref 15–65)

## 2022-07-28 LAB
BH CV ECHO MEAS - AO ROOT DIAM: 2.8 CM
BH CV ECHO MEAS - EF(MOD-BP): 62.1 %
BH CV ECHO MEAS - IVSD: 1.4 CM
BH CV ECHO MEAS - LA DIMENSION: 3.7 CM
BH CV ECHO MEAS - LAT PEAK E' VEL: 11 CM/SEC
BH CV ECHO MEAS - LVIDD: 4.6 CM
BH CV ECHO MEAS - LVIDS: 2.9 CM
BH CV ECHO MEAS - LVOT DIAM: 2.1 CM
BH CV ECHO MEAS - LVPWD: 1.3 CM
BH CV ECHO MEAS - MED PEAK E' VEL: 6.09 CM/SEC
BH CV ECHO MEAS - MV A MAX VEL: 110 CM/SEC
BH CV ECHO MEAS - MV DEC TIME: 243 MSEC
BH CV ECHO MEAS - MV E MAX VEL: 89.7 CM/SEC
BH CV ECHO MEAS - MV E/A: 0.8
BH CV ECHO MEAS - RVDD: 2.5 CM
BH CV ECHO MEAS - TR MAX PG: 25 MMHG
BH CV ECHO MEAS - TR MAX VEL: 251 CM/SEC
BH CV ECHO MEASUREMENTS AVERAGE E/E' RATIO: 10.5
IVRT: 87 MSEC
LEFT ATRIUM VOLUME INDEX: 25.3 ML/M2
MAXIMAL PREDICTED HEART RATE: 157 BPM
STRESS TARGET HR: 133 BPM

## 2022-08-01 ENCOUNTER — TELEPHONE (OUTPATIENT)
Dept: CARDIOLOGY | Facility: CLINIC | Age: 62
End: 2022-08-01

## 2022-08-14 PROCEDURE — U0004 COV-19 TEST NON-CDC HGH THRU: HCPCS | Performed by: EMERGENCY MEDICINE

## 2022-08-30 ENCOUNTER — TRANSCRIBE ORDERS (OUTPATIENT)
Dept: ADMINISTRATIVE | Facility: HOSPITAL | Age: 62
End: 2022-08-30

## 2022-08-30 DIAGNOSIS — M54.50 LOW BACK PAIN, UNSPECIFIED BACK PAIN LATERALITY, UNSPECIFIED CHRONICITY, UNSPECIFIED WHETHER SCIATICA PRESENT: Primary | ICD-10-CM

## 2022-09-20 ENCOUNTER — HOSPITAL ENCOUNTER (OUTPATIENT)
Dept: MRI IMAGING | Facility: HOSPITAL | Age: 62
Discharge: HOME | End: 2022-09-20
Admitting: NEUROLOGICAL SURGERY

## 2022-09-20 DIAGNOSIS — M54.50 LOW BACK PAIN, UNSPECIFIED BACK PAIN LATERALITY, UNSPECIFIED CHRONICITY, UNSPECIFIED WHETHER SCIATICA PRESENT: ICD-10-CM

## 2022-09-20 PROCEDURE — 72148 MRI LUMBAR SPINE W/O DYE: CPT

## 2022-10-06 ENCOUNTER — OFFICE VISIT (OUTPATIENT)
Dept: CARDIOLOGY | Facility: CLINIC | Age: 62
End: 2022-10-06

## 2022-10-06 VITALS
WEIGHT: 201.2 LBS | HEIGHT: 60 IN | BODY MASS INDEX: 39.5 KG/M2 | DIASTOLIC BLOOD PRESSURE: 78 MMHG | HEART RATE: 67 BPM | SYSTOLIC BLOOD PRESSURE: 92 MMHG

## 2022-10-06 DIAGNOSIS — E78.5 HYPERLIPIDEMIA LDL GOAL <70: ICD-10-CM

## 2022-10-06 DIAGNOSIS — I25.10 CORONARY ARTERY DISEASE INVOLVING NATIVE CORONARY ARTERY OF NATIVE HEART WITHOUT ANGINA PECTORIS: Primary | ICD-10-CM

## 2022-10-06 DIAGNOSIS — I25.5 ISCHEMIC CARDIOMYOPATHY: ICD-10-CM

## 2022-10-06 PROBLEM — I21.3: Status: RESOLVED | Noted: 2020-02-24 | Resolved: 2022-10-06

## 2022-10-06 PROCEDURE — 93000 ELECTROCARDIOGRAM COMPLETE: CPT | Performed by: INTERNAL MEDICINE

## 2022-10-06 PROCEDURE — 99214 OFFICE O/P EST MOD 30 MIN: CPT | Performed by: INTERNAL MEDICINE

## 2022-10-06 NOTE — PROGRESS NOTES
[facility] Follow Up Office Note     Encounter Date:10/06/22  Patient:Falguni Minaya  :1960  MRN:[id number]      Chief Complaint:   Chief Complaint   Patient presents with   • Coronary Artery Disease     3 month f/u         History of Presenting Illness:      Ms. Minaya is a 62 y.o. woman with past medical history notable for coronary artery disease status post percutaneous intervention, chronic systolic congestive heart failure, mixed hyperlipidemia, multiple sclerosis, and seizure disorder who presents the office for scheduled follow-up.  Overall patient is actually doing reasonably well.  She does have chronic shortness of breath recently got over upper respiratory infection requiring a steroid Dosepak but is getting better.  Still has a little mild residual cough.  She was seen by one of her nurse practitioners 3 months ago and at that time again because some of her persistent shortness of breath she did have a repeat echocardiogram done which showed recovery of her left ventricular function.  Overall she is doing reasonably well.  She is transitioning her care from her former partner over to myself for her first visit with me today.      Review of Systems:  Review of Systems   Constitutional: Negative.   HENT: Negative.    Eyes: Negative.    Cardiovascular: Negative.    Respiratory: Positive for cough and shortness of breath.    Endocrine: Negative.    Hematologic/Lymphatic: Negative.    Skin: Negative.    Musculoskeletal: Negative.    Gastrointestinal: Negative.    Genitourinary: Negative.    Neurological: Negative.    Psychiatric/Behavioral: Negative.    Allergic/Immunologic: Negative.        Current Outpatient Medications on File Prior to Visit   Medication Sig Dispense Refill   • aspirin 81 MG EC tablet Take 1 tablet by mouth Daily. 30 tablet 11   • atorvastatin (LIPITOR) 40 MG tablet Take 1 tablet by mouth every night at bedtime. 90 tablet 3   • carvedilol (COREG) 12.5 MG tablet Take 1  tablet by mouth Every 12 (Twelve) Hours. 180 tablet 3   • clonazePAM (KlonoPIN) 1 MG tablet Take 1 mg by mouth 2 (Two) Times a Day As Needed for Anxiety.     • clopidogrel (PLAVIX) 75 MG tablet Take 1 tablet by mouth Daily. 90 tablet 3   • Diroximel Fumarate, Starter, (Vumerity, Starter,) 231 MG capsule delayed-release Take  by mouth.     • FLUoxetine (PROzac) 40 MG capsule Take 80 mg by mouth Daily.     • furosemide (LASIX) 40 MG tablet Take 1 tablet by mouth Daily. 90 tablet 3   • gabapentin (NEURONTIN) 300 MG capsule Take 300 mg by mouth 4 (Four) Times a Day.     • losartan (COZAAR) 25 MG tablet Take 1 tablet by mouth Daily. 90 tablet 3   • meloxicam (MOBIC) 15 MG tablet Take 15 mg by mouth Daily.     • OLANZapine (zyPREXA) 2.5 MG tablet Take 2.5 mg by mouth Every Night.     • omeprazole (priLOSEC) 40 MG capsule Take 40 mg by mouth Daily As Needed.     • ondansetron (ZOFRAN) 4 MG tablet Take 1 tablet by mouth Every 8 (Eight) Hours As Needed.     • pantoprazole (PROTONIX) 40 MG EC tablet Take 40 mg by mouth Daily.     • pramipexole (MIRAPEX) 0.25 MG tablet Take 0.25 mg by mouth every night at bedtime.     • tiZANidine (ZANAFLEX) 2 MG tablet Take 2 mg by mouth 3 (Three) Times a Day As Needed. for muscle spams     • [DISCONTINUED] benzonatate (TESSALON) 200 MG capsule Take 1 capsule by mouth 3 (Three) Times a Day As Needed for Cough. 40 capsule 0   • [DISCONTINUED] brompheniramine-pseudoephedrine-DM 30-2-10 MG/5ML syrup Take 5 mL by mouth 4 (Four) Times a Day As Needed for Cough or Allergies. 118 mL 0   • [DISCONTINUED] cefdinir (OMNICEF) 300 MG capsule Take 1 capsule by mouth 2 (Two) Times a Day. 20 capsule 0   • [DISCONTINUED] Dimethyl Fumarate 240 MG capsule delayed-release Take 240 mg by mouth 2 (two) times a day.     • [DISCONTINUED] levETIRAcetam (KEPPRA) 500 MG tablet Take 500 mg by mouth 2 (Two) Times a Day.     • [DISCONTINUED] predniSONE (DELTASONE) 20 MG tablet Take 3 tab po qd x 2 days then take 2 tab po  qd x 3 days then take 1 tab po qd x 3 days 15 tablet 0     No current facility-administered medications on file prior to visit.       Allergies   Allergen Reactions   • Codeine Hives     Hyperactivity, nausea   • Morphine Hives     Hyperactivity, nausea       Past Medical History:   Diagnosis Date   • Abdominal hernia    • Acute ST elevation myocardial infarction (STEMI) ([facility])     due to occlusion of right coronary artery   • Acute ST elevation myocardial infarction (STEMI) due to occlusion of right coronary artery ([facility]) 2/24/2020   • Coronary artery disease    • Dyspepsia    • GERD (gastroesophageal reflux disease)    • Multiple sclerosis ([facility])    • Seizures ([facility])     last seizure 2018       Past Surgical History:   Procedure Laterality Date   • APPENDECTOMY     • CARDIAC CATHETERIZATION     • CARDIAC CATHETERIZATION N/A 2/24/2020    Procedure: Left Heart Cath;  Surgeon: Marlon Zamudio MD;  Location: [facility] CATH INVASIVE LOCATION;  Service: Cardiovascular;  Laterality: N/A;   • CARDIAC CATHETERIZATION N/A 2/24/2020    Procedure: Stent LATRICIA coronary;  Surgeon: Marlon Zamudio MD;  Location: [facility] CATH INVASIVE LOCATION;  Service: Cardiovascular;  Laterality: N/A;   • CARDIAC CATHETERIZATION  2/24/2020    Procedure: Percutaneous Manual Thrombectomy;  Surgeon: Marlon Zamudio MD;  Location[facility];  Service: Cardiovascular;;   • CARDIAC CATHETERIZATION N/A 2/24/2020    Procedure: Coronary angiography;  Surgeon: Mralon Zamudio MD;  Location: [facility] CATH INVASIVE LOCATION;  Service: Cardiovascular;  Laterality: N/A;   • CARDIAC CATHETERIZATION N/A 2/24/2020    Procedure: Left ventriculography;  Surgeon: Marlon Zamudio MD;  [facility];  Service: Cardiovascular;  Laterality: N/A;   • CHOLECYSTECTOMY     • COLONOSCOPY N/A 2/29/2020    Procedure: COLONOSCOPY to  cecum:;  Surgeon: Krystal Sarabia MD;  Location: [facility] ENDOSCOPY;  Service:  "Gastroenterology;  Laterality: N/A;  pre:  anemia and abd pain  post:  hemorrhoids, tortuous colon   • ENDOSCOPY N/A 2020    Procedure: ESOPHAGOGASTRODUODENOSCOPY  with biopsies;  Surgeon: Krystal Sarabia MD;  Location: [facility] ENDOSCOPY;  Service: Gastroenterology;  Laterality: N/A;  pre:  anemia and abd pain  post:  mild gastritis,    • HYSTERECTOMY     • TONSILLECTOMY         Social History     Socioeconomic History   • Marital status:    Tobacco Use   • Smoking status: Former Smoker     Packs/day: 0.50     Years: 20.00     Pack years: 10.00     Quit date:      Years since quittin.7   • Smokeless tobacco: Never Used   • Tobacco comment: CAFFEINE USE: 2 CUPS COFFEE DAILY   Vaping Use   • Vaping Use: Never used   Substance and Sexual Activity   • Alcohol use: Not Currently   • Drug use: Never   • Sexual activity: Defer       Family History   Problem Relation Age of Onset   • Thyroid disease Mother    • Hypertension Sister    • Coronary artery disease Brother        The following portions of the patient's history were reviewed and updated as appropriate: allergies, current medications, past family history, past medical history, past social history, past surgical history and problem list.       Objective:       Vitals:    10/06/22 1224   BP: 92/78   BP Location: Right arm   Patient Position: Sitting   Pulse: 67   Weight: 91.3 kg (201 lb 3.2 oz)   Height: 152.4 cm (60\")     Body mass index is 39.29 kg/m².     Physical Exam:  Constitutional: Well appearing, well developed, no acute distress   HENT: Oropharynx clear and membrane moist  Eyes: Normal conjunctiva, no sclera icterus.  Neck: Supple, no carotid bruit bilaterally.  Cardiovascular: Regular rate and rhythm, No Murmur, No bilateral lower extremity edema.  Pulmonary: Normal respiratory effort , normal lung sounds, no wheezing.  Abdominal: Soft, nontender, no hepatosplenomegaly, liver is non-pulsatile.  Neurological: Alert and orient x 3. "   Skin: Warm, dry, no ecchymosis, no rash.  Psych: Appropriate mood and affect. Normal judgment and insight.         Lab Results   Component Value Date     07/25/2022     04/20/2022    K 4.4 07/25/2022    K 4.8 04/20/2022     07/25/2022     04/20/2022    CO2 24.0 07/25/2022    CO2 24.7 04/20/2022    BUN 22 07/25/2022    BUN 20 04/20/2022    CREATININE 1.23 (H) 07/25/2022    CREATININE 1.30 (H) 04/20/2022    EGFRIFNONA 43 (L) 01/19/2022    EGFRIFNONA 50 (L) 10/04/2021    GLUCOSE 96 07/25/2022    GLUCOSE 120 (H) 04/20/2022    CALCIUM 10.2 07/25/2022    CALCIUM 10.5 04/20/2022    ALBUMIN 4.20 07/25/2022    ALBUMIN 4.50 04/20/2022    BILITOT 0.2 07/25/2022    BILITOT 0.2 04/20/2022    AST 18 07/25/2022    AST 20 04/20/2022    ALT 17 07/25/2022    ALT 17 04/20/2022     Lab Results   Component Value Date    WBC 7.51 07/25/2022    WBC 7.31 04/20/2022    HGB 11.2 (L) 07/25/2022    HGB 11.4 (L) 04/20/2022    HCT 33.9 (L) 07/25/2022    HCT 36.0 04/20/2022    MCV 86.0 07/25/2022    MCV 89.3 04/20/2022     07/25/2022     04/20/2022     Lab Results   Component Value Date    CHOL 202 (H) 02/24/2020    TRIG 161 (H) 04/15/2020    TRIG 122 03/10/2020    HDL 45 04/15/2020    HDL 43 03/10/2020    LDL 79 04/15/2020    LDL 61 (L) 03/10/2020     Lab Results   Component Value Date    PROBNP 358.8 03/23/2021    BNP 41115 (H) 03/10/2020     Lab Results   Component Value Date    CKTOTAL 75 10/04/2021    CKMB 2.2 03/10/2020    TROPONINT <0.010 10/04/2021     No results found for: TSH        ECG 12 Lead    Date/Time: 10/6/2022 1:16 PM  Performed by: Keith Riley MD  Authorized by: Keith Riley MD   Comparison: compared with previous ECG from 7/6/2022  Similar to previous ECG  Rhythm: sinus rhythm  Q waves: III, aVF and II    T inversion: II, III and aVF          Echocardiogram 7/28/2022:  · Technically difficult images  · Calculated left ventricular EF = 62.1% Estimated left ventricular EF was  in agreement with the calculated left ventricular EF.  · Left ventricular wall thickness is consistent with mild concentric hypertrophy.    Stress MPI 3/23/2021:  · Overall, the patient's exercise capacity was moderately impaired.  · Equivocal ECG evidence of myocardial ischemia.Negative clinical evidence of myocardial ischemia. Findings consistent with an indeterminate ECG stress test.  · Myocardial perfusion imaging indicates a medium-to-large-sized infarct located in the inferior wall and lateral wall with no significant ischemia noted.  · Left ventricular ejection fraction is normal. (Calculated EF = 54%). Abnormal LV wall motion consistent with mild hypokinesis.    Cardiac catheterization 2/24/2020:  · RCA: The right coronary artery is completely occluded in the distal segment.  After reperfusion the posterior descending branch was large and normal.  The posterior lateral branches were medium to large caliber and normal.  · Left main: The left main coronary artery appeared to be normal.  · LAD: The left anterior descending coronary artery has mild plaquing in the proximal segment but no measurable stenosis.  The artery is narrowed 20 to 30% in the midsegment.  The distal segment is normal.  The first diagonal branch is small the second diagonal branch is medium caliber with a 20 to 30% proximal stenosis.  · Ramus intermediate disease: The ramus branch is narrowed approximately 80 to 90% in its proximal portion.  The distal portion is a medium to small caliber.  · LCx: The left circumflex coronary artery is normal in the proximal segment.  The artery is narrowed 90% in the midsegment after the origin of the large obtuse marginal branch.  The artery terminates as a small obtuse marginal branch which is normal.  The major obtuse marginal branch arising from the proximal to mid segment is branching and normal.  · Successful revascularization to RCA with placement of 3.5 x 23 mm Xience drug-eluting stent        Assessment:          Diagnosis Plan   1. Coronary artery disease involving native coronary artery of native heart without angina pectoris  ECG 12 Lead   2. Ischemic cardiomyopathy     3. Hyperlipidemia LDL goal <70            Plan:       Ms. Minaya is a 62 y.o. woman with past medical history notable for coronary artery disease status post percutaneous intervention, chronic systolic congestive heart failure, mixed hyperlipidemia, multiple sclerosis, and seizure disorder who presents the office for scheduled follow-up.  Overall she is doing well.  No changes are needed time.  We will plan on seeing her back in 6 months.    Coronary artery disease without angina:  · Continue aspirin  · Continue beta-blocker  · Continue high potency statin    Ischemic cardiomyopathy:  · Patient has recovered left ventricular function on most recent echocardiogram 7/2022  · Continue beta-blocker  · Continue losartan  · Continue current dose of Lasix    Mixed hyperlipidemia:  · Continue statin therapy      Follow-up:  6 months      Keith Riley MD  [facility]  10/06/22  13:17 EDT

## 2022-12-07 ENCOUNTER — TRANSCRIBE ORDERS (OUTPATIENT)
Dept: ADMINISTRATIVE | Facility: HOSPITAL | Age: 62
End: 2022-12-07

## 2022-12-07 ENCOUNTER — LAB (OUTPATIENT)
Dept: LAB | Facility: HOSPITAL | Age: 62
End: 2022-12-07

## 2022-12-07 DIAGNOSIS — E21.3 HYPERPARATHYROIDISM: ICD-10-CM

## 2022-12-07 DIAGNOSIS — E21.3 HYPERPARATHYROIDISM: Primary | ICD-10-CM

## 2022-12-07 DIAGNOSIS — E78.5 HYPERLIPIDEMIA LDL GOAL <70: ICD-10-CM

## 2022-12-07 DIAGNOSIS — E55.9 AVITAMINOSIS D: ICD-10-CM

## 2022-12-07 DIAGNOSIS — N18.30 STAGE 3 CHRONIC KIDNEY DISEASE, UNSPECIFIED WHETHER STAGE 3A OR 3B CKD: ICD-10-CM

## 2022-12-07 LAB
25(OH)D3 SERPL-MCNC: 30.3 NG/ML (ref 30–100)
ALBUMIN SERPL-MCNC: 4.1 G/DL (ref 3.5–5.2)
ALBUMIN UR-MCNC: <1.2 MG/DL
ALBUMIN/GLOB SERPL: 1.3 G/DL
ALP SERPL-CCNC: 111 U/L (ref 39–117)
ALT SERPL W P-5'-P-CCNC: 11 U/L (ref 1–33)
ANION GAP SERPL CALCULATED.3IONS-SCNC: 9 MMOL/L (ref 5–15)
AST SERPL-CCNC: 13 U/L (ref 1–32)
BACTERIA UR QL AUTO: NORMAL /HPF
BASOPHILS # BLD AUTO: 0.06 10*3/MM3 (ref 0–0.2)
BASOPHILS NFR BLD AUTO: 0.6 % (ref 0–1.5)
BILIRUB SERPL-MCNC: 0.3 MG/DL (ref 0–1.2)
BILIRUB UR QL STRIP: NEGATIVE
BUN SERPL-MCNC: 21 MG/DL (ref 8–23)
BUN/CREAT SERPL: 19.4 (ref 7–25)
CALCIUM SPEC-SCNC: 10.6 MG/DL (ref 8.6–10.5)
CHLORIDE SERPL-SCNC: 108 MMOL/L (ref 98–107)
CHOLEST SERPL-MCNC: 161 MG/DL (ref 0–200)
CLARITY UR: CLEAR
CO2 SERPL-SCNC: 22 MMOL/L (ref 22–29)
COLOR UR: YELLOW
CREAT SERPL-MCNC: 1.08 MG/DL (ref 0.57–1)
CREAT UR-MCNC: 36.9 MG/DL
DEPRECATED RDW RBC AUTO: 44.8 FL (ref 37–54)
EGFRCR SERPLBLD CKD-EPI 2021: 57.8 ML/MIN/1.73
EOSINOPHIL # BLD AUTO: 0.17 10*3/MM3 (ref 0–0.4)
EOSINOPHIL NFR BLD AUTO: 1.7 % (ref 0.3–6.2)
ERYTHROCYTE [DISTWIDTH] IN BLOOD BY AUTOMATED COUNT: 14.3 % (ref 12.3–15.4)
GLOBULIN UR ELPH-MCNC: 3.2 GM/DL
GLUCOSE SERPL-MCNC: 85 MG/DL (ref 65–99)
GLUCOSE UR STRIP-MCNC: NEGATIVE MG/DL
HCT VFR BLD AUTO: 31.4 % (ref 34–46.6)
HDLC SERPL-MCNC: 52 MG/DL (ref 40–60)
HGB BLD-MCNC: 9.9 G/DL (ref 12–15.9)
HGB UR QL STRIP.AUTO: NEGATIVE
HYALINE CASTS UR QL AUTO: NORMAL /LPF
IMM GRANULOCYTES # BLD AUTO: 0.16 10*3/MM3 (ref 0–0.05)
IMM GRANULOCYTES NFR BLD AUTO: 1.6 % (ref 0–0.5)
KETONES UR QL STRIP: NEGATIVE
LDLC SERPL CALC-MCNC: 86 MG/DL (ref 0–100)
LDLC/HDLC SERPL: 1.59 {RATIO}
LEUKOCYTE ESTERASE UR QL STRIP.AUTO: ABNORMAL
LYMPHOCYTES # BLD AUTO: 2.59 10*3/MM3 (ref 0.7–3.1)
LYMPHOCYTES NFR BLD AUTO: 25.2 % (ref 19.6–45.3)
MCH RBC QN AUTO: 27.6 PG (ref 26.6–33)
MCHC RBC AUTO-ENTMCNC: 31.5 G/DL (ref 31.5–35.7)
MCV RBC AUTO: 87.5 FL (ref 79–97)
MONOCYTES # BLD AUTO: 0.9 10*3/MM3 (ref 0.1–0.9)
MONOCYTES NFR BLD AUTO: 8.8 % (ref 5–12)
NEUTROPHILS NFR BLD AUTO: 6.4 10*3/MM3 (ref 1.7–7)
NEUTROPHILS NFR BLD AUTO: 62.1 % (ref 42.7–76)
NITRITE UR QL STRIP: NEGATIVE
NRBC BLD AUTO-RTO: 0.1 /100 WBC (ref 0–0.2)
PH UR STRIP.AUTO: 5.5 [PH] (ref 5–8)
PHOSPHATE SERPL-MCNC: 2.5 MG/DL (ref 2.5–4.5)
PLATELET # BLD AUTO: 505 10*3/MM3 (ref 140–450)
PMV BLD AUTO: 10.8 FL (ref 6–12)
POTASSIUM SERPL-SCNC: 4.5 MMOL/L (ref 3.5–5.2)
PROT ?TM UR-MCNC: <4 MG/DL
PROT SERPL-MCNC: 7.3 G/DL (ref 6–8.5)
PROT UR QL STRIP: NEGATIVE
PROT/CREAT UR: NORMAL MG/G{CREAT}
PTH-INTACT SERPL-MCNC: 259 PG/ML (ref 15–65)
RBC # BLD AUTO: 3.59 10*6/MM3 (ref 3.77–5.28)
RBC # UR STRIP: NORMAL /HPF
REF LAB TEST METHOD: NORMAL
SODIUM SERPL-SCNC: 139 MMOL/L (ref 136–145)
SP GR UR STRIP: 1.01 (ref 1–1.03)
SQUAMOUS #/AREA URNS HPF: NORMAL /HPF
TRIGL SERPL-MCNC: 132 MG/DL (ref 0–150)
UROBILINOGEN UR QL STRIP: ABNORMAL
VLDLC SERPL-MCNC: 23 MG/DL (ref 5–40)
WBC # UR STRIP: NORMAL /HPF
WBC NRBC COR # BLD: 10.28 10*3/MM3 (ref 3.4–10.8)

## 2022-12-07 PROCEDURE — 82306 VITAMIN D 25 HYDROXY: CPT

## 2022-12-07 PROCEDURE — 82043 UR ALBUMIN QUANTITATIVE: CPT

## 2022-12-07 PROCEDURE — 80061 LIPID PANEL: CPT

## 2022-12-07 PROCEDURE — 81001 URINALYSIS AUTO W/SCOPE: CPT

## 2022-12-07 PROCEDURE — 82570 ASSAY OF URINE CREATININE: CPT

## 2022-12-07 PROCEDURE — 85025 COMPLETE CBC W/AUTO DIFF WBC: CPT

## 2022-12-07 PROCEDURE — 84100 ASSAY OF PHOSPHORUS: CPT

## 2022-12-07 PROCEDURE — 83970 ASSAY OF PARATHORMONE: CPT

## 2022-12-07 PROCEDURE — 80053 COMPREHEN METABOLIC PANEL: CPT

## 2022-12-07 PROCEDURE — 36415 COLL VENOUS BLD VENIPUNCTURE: CPT

## 2022-12-07 PROCEDURE — 84156 ASSAY OF PROTEIN URINE: CPT

## 2022-12-15 ENCOUNTER — TRANSCRIBE ORDERS (OUTPATIENT)
Dept: ADMINISTRATIVE | Facility: HOSPITAL | Age: 62
End: 2022-12-15

## 2022-12-15 DIAGNOSIS — G35 MULTIPLE SCLEROSIS: Primary | ICD-10-CM

## 2022-12-22 ENCOUNTER — TRANSCRIBE ORDERS (OUTPATIENT)
Dept: ADMINISTRATIVE | Facility: HOSPITAL | Age: 62
End: 2022-12-22

## 2022-12-22 DIAGNOSIS — E21.3 HYPERPARATHYROIDISM: ICD-10-CM

## 2022-12-22 DIAGNOSIS — L81.8 OSTEOPOROSIS AND OCULOCUTANEOUS HYPOPIGMENTATION SYNDROME: Primary | ICD-10-CM

## 2022-12-22 DIAGNOSIS — Q87.89 OSTEOPOROSIS AND OCULOCUTANEOUS HYPOPIGMENTATION SYNDROME: Primary | ICD-10-CM

## 2022-12-22 DIAGNOSIS — M81.8 OSTEOPOROSIS AND OCULOCUTANEOUS HYPOPIGMENTATION SYNDROME: Primary | ICD-10-CM

## 2023-01-04 ENCOUNTER — HOSPITAL ENCOUNTER (OUTPATIENT)
Dept: BONE DENSITY | Facility: HOSPITAL | Age: 63
Discharge: HOME | End: 2023-01-04
Payer: MEDICARE

## 2023-01-04 ENCOUNTER — LAB (OUTPATIENT)
Dept: LAB | Facility: HOSPITAL | Age: 63
End: 2023-01-04
Payer: MEDICARE

## 2023-01-04 ENCOUNTER — TRANSCRIBE ORDERS (OUTPATIENT)
Dept: LAB | Facility: HOSPITAL | Age: 63
End: 2023-01-04
Payer: MEDICARE

## 2023-01-04 DIAGNOSIS — E05.90 HYPERTHYROIDISM: ICD-10-CM

## 2023-01-04 DIAGNOSIS — E83.52 HYPERCALCEMIA: ICD-10-CM

## 2023-01-04 DIAGNOSIS — L81.8 OSTEOPOROSIS AND OCULOCUTANEOUS HYPOPIGMENTATION SYNDROME: ICD-10-CM

## 2023-01-04 DIAGNOSIS — Q87.89 OSTEOPOROSIS AND OCULOCUTANEOUS HYPOPIGMENTATION SYNDROME: ICD-10-CM

## 2023-01-04 DIAGNOSIS — N18.30 STAGE 3 CHRONIC KIDNEY DISEASE, UNSPECIFIED WHETHER STAGE 3A OR 3B CKD: ICD-10-CM

## 2023-01-04 DIAGNOSIS — M81.8 OSTEOPOROSIS AND OCULOCUTANEOUS HYPOPIGMENTATION SYNDROME: ICD-10-CM

## 2023-01-04 DIAGNOSIS — E21.3 HYPERPARATHYROIDISM: ICD-10-CM

## 2023-01-04 DIAGNOSIS — D64.9 ANEMIA, UNSPECIFIED TYPE: Primary | ICD-10-CM

## 2023-01-04 DIAGNOSIS — D64.9 ANEMIA, UNSPECIFIED TYPE: ICD-10-CM

## 2023-01-04 LAB
BASOPHILS # BLD AUTO: 0.07 10*3/MM3 (ref 0–0.2)
BASOPHILS NFR BLD AUTO: 1 % (ref 0–1.5)
DEPRECATED RDW RBC AUTO: 43.7 FL (ref 37–54)
EOSINOPHIL # BLD AUTO: 0.1 10*3/MM3 (ref 0–0.4)
EOSINOPHIL NFR BLD AUTO: 1.4 % (ref 0.3–6.2)
ERYTHROCYTE [DISTWIDTH] IN BLOOD BY AUTOMATED COUNT: 14 % (ref 12.3–15.4)
FOLATE SERPL-MCNC: 8.22 NG/ML (ref 4.78–24.2)
HCT VFR BLD AUTO: 31 % (ref 34–46.6)
HGB BLD-MCNC: 10 G/DL (ref 12–15.9)
IMM GRANULOCYTES # BLD AUTO: 0.03 10*3/MM3 (ref 0–0.05)
IMM GRANULOCYTES NFR BLD AUTO: 0.4 % (ref 0–0.5)
IRON 24H UR-MRATE: 34 MCG/DL (ref 37–145)
IRON SATN MFR SERPL: 6 % (ref 20–50)
LYMPHOCYTES # BLD AUTO: 2.53 10*3/MM3 (ref 0.7–3.1)
LYMPHOCYTES NFR BLD AUTO: 34.8 % (ref 19.6–45.3)
MCH RBC QN AUTO: 27.9 PG (ref 26.6–33)
MCHC RBC AUTO-ENTMCNC: 32.3 G/DL (ref 31.5–35.7)
MCV RBC AUTO: 86.4 FL (ref 79–97)
MONOCYTES # BLD AUTO: 0.7 10*3/MM3 (ref 0.1–0.9)
MONOCYTES NFR BLD AUTO: 9.6 % (ref 5–12)
NEUTROPHILS NFR BLD AUTO: 3.85 10*3/MM3 (ref 1.7–7)
NEUTROPHILS NFR BLD AUTO: 52.8 % (ref 42.7–76)
NRBC BLD AUTO-RTO: 0.1 /100 WBC (ref 0–0.2)
PLATELET # BLD AUTO: 355 10*3/MM3 (ref 140–450)
PMV BLD AUTO: 11.4 FL (ref 6–12)
RBC # BLD AUTO: 3.59 10*6/MM3 (ref 3.77–5.28)
TIBC SERPL-MCNC: 538 MCG/DL (ref 298–536)
TRANSFERRIN SERPL-MCNC: 361 MG/DL (ref 200–360)
VIT B12 BLD-MCNC: 790 PG/ML (ref 211–946)
WBC NRBC COR # BLD: 7.28 10*3/MM3 (ref 3.4–10.8)

## 2023-01-04 PROCEDURE — 86334 IMMUNOFIX E-PHORESIS SERUM: CPT

## 2023-01-04 PROCEDURE — 82607 VITAMIN B-12: CPT

## 2023-01-04 PROCEDURE — 82784 ASSAY IGA/IGD/IGG/IGM EACH: CPT

## 2023-01-04 PROCEDURE — 83521 IG LIGHT CHAINS FREE EACH: CPT

## 2023-01-04 PROCEDURE — 82746 ASSAY OF FOLIC ACID SERUM: CPT

## 2023-01-04 PROCEDURE — 84165 PROTEIN E-PHORESIS SERUM: CPT

## 2023-01-04 PROCEDURE — 77080 DXA BONE DENSITY AXIAL: CPT

## 2023-01-04 PROCEDURE — 84155 ASSAY OF PROTEIN SERUM: CPT

## 2023-01-04 PROCEDURE — 84466 ASSAY OF TRANSFERRIN: CPT

## 2023-01-04 PROCEDURE — 36415 COLL VENOUS BLD VENIPUNCTURE: CPT

## 2023-01-04 PROCEDURE — 85025 COMPLETE CBC W/AUTO DIFF WBC: CPT

## 2023-01-04 PROCEDURE — 83540 ASSAY OF IRON: CPT

## 2023-01-05 LAB
ALBUMIN SERPL ELPH-MCNC: 3.5 G/DL (ref 2.9–4.4)
ALBUMIN/GLOB SERPL: 1 {RATIO} (ref 0.7–1.7)
ALPHA1 GLOB SERPL ELPH-MCNC: 0.3 G/DL (ref 0–0.4)
ALPHA2 GLOB SERPL ELPH-MCNC: 1.1 G/DL (ref 0.4–1)
B-GLOBULIN SERPL ELPH-MCNC: 1.2 G/DL (ref 0.7–1.3)
GAMMA GLOB SERPL ELPH-MCNC: 0.9 G/DL (ref 0.4–1.8)
GLOBULIN SER CALC-MCNC: 3.5 G/DL (ref 2.2–3.9)
IGA SERPL-MCNC: 184 MG/DL (ref 87–352)
IGG SERPL-MCNC: 1057 MG/DL (ref 586–1602)
IGM SERPL-MCNC: 119 MG/DL (ref 26–217)
LABORATORY COMMENT REPORT: ABNORMAL
M PROTEIN SERPL ELPH-MCNC: ABNORMAL G/DL
PROT PATTERN SERPL ELPH-IMP: ABNORMAL
PROT PATTERN SERPL IFE-IMP: NORMAL
PROT SERPL-MCNC: 7 G/DL (ref 6–8.5)

## 2023-01-06 LAB
KAPPA LC FREE SER-MCNC: 36.2 MG/L (ref 3.3–19.4)
KAPPA LC FREE/LAMBDA FREE SER: 1.23 {RATIO} (ref 0.26–1.65)
LAMBDA LC FREE SERPL-MCNC: 29.4 MG/L (ref 5.7–26.3)

## 2023-01-11 PROBLEM — D63.1 ANEMIA DUE TO STAGE 3 CHRONIC KIDNEY DISEASE: Status: ACTIVE | Noted: 2023-01-11

## 2023-01-11 PROBLEM — N18.30 ANEMIA DUE TO STAGE 3 CHRONIC KIDNEY DISEASE: Status: ACTIVE | Noted: 2023-01-11

## 2023-01-11 PROBLEM — E61.1 IRON DEFICIENCY: Status: ACTIVE | Noted: 2023-01-11

## 2023-01-19 ENCOUNTER — HOSPITAL ENCOUNTER (OUTPATIENT)
Dept: INFUSION THERAPY | Facility: HOSPITAL | Age: 63
Setting detail: INFUSION SERIES
Discharge: HOME | End: 2023-01-19
Payer: MEDICARE

## 2023-01-19 VITALS
SYSTOLIC BLOOD PRESSURE: 127 MMHG | TEMPERATURE: 98.2 F | HEART RATE: 90 BPM | RESPIRATION RATE: 20 BRPM | BODY MASS INDEX: 41.25 KG/M2 | WEIGHT: 210.1 LBS | DIASTOLIC BLOOD PRESSURE: 82 MMHG | OXYGEN SATURATION: 96 % | HEIGHT: 60 IN

## 2023-01-19 DIAGNOSIS — N18.30 ANEMIA DUE TO STAGE 3 CHRONIC KIDNEY DISEASE, UNSPECIFIED WHETHER STAGE 3A OR 3B CKD: Primary | ICD-10-CM

## 2023-01-19 DIAGNOSIS — E61.1 IRON DEFICIENCY: ICD-10-CM

## 2023-01-19 DIAGNOSIS — D63.1 ANEMIA DUE TO STAGE 3 CHRONIC KIDNEY DISEASE, UNSPECIFIED WHETHER STAGE 3A OR 3B CKD: Primary | ICD-10-CM

## 2023-01-19 PROCEDURE — 96365 THER/PROPH/DIAG IV INF INIT: CPT

## 2023-01-19 PROCEDURE — 25010000002 IRON SUCROSE PER 1 MG: Performed by: NURSE PRACTITIONER

## 2023-01-19 PROCEDURE — 96366 THER/PROPH/DIAG IV INF ADDON: CPT

## 2023-01-19 RX ADMIN — IRON SUCROSE 500 MG: 20 INJECTION, SOLUTION INTRAVENOUS at 13:58

## 2023-01-26 ENCOUNTER — HOSPITAL ENCOUNTER (OUTPATIENT)
Dept: INFUSION THERAPY | Facility: HOSPITAL | Age: 63
Setting detail: INFUSION SERIES
Discharge: HOME | End: 2023-01-26
Payer: MEDICARE

## 2023-01-26 VITALS — DIASTOLIC BLOOD PRESSURE: 66 MMHG | SYSTOLIC BLOOD PRESSURE: 140 MMHG | HEART RATE: 83 BPM

## 2023-01-26 DIAGNOSIS — N18.30 ANEMIA DUE TO STAGE 3 CHRONIC KIDNEY DISEASE, UNSPECIFIED WHETHER STAGE 3A OR 3B CKD: Primary | ICD-10-CM

## 2023-01-26 DIAGNOSIS — D63.1 ANEMIA DUE TO STAGE 3 CHRONIC KIDNEY DISEASE, UNSPECIFIED WHETHER STAGE 3A OR 3B CKD: Primary | ICD-10-CM

## 2023-01-26 DIAGNOSIS — E61.1 IRON DEFICIENCY: ICD-10-CM

## 2023-01-26 PROCEDURE — 96366 THER/PROPH/DIAG IV INF ADDON: CPT

## 2023-01-26 PROCEDURE — 96365 THER/PROPH/DIAG IV INF INIT: CPT

## 2023-01-26 PROCEDURE — 25010000002 IRON SUCROSE PER 1 MG: Performed by: NURSE PRACTITIONER

## 2023-01-26 RX ADMIN — IRON SUCROSE 500 MG: 20 INJECTION, SOLUTION INTRAVENOUS at 13:27

## 2023-03-02 ENCOUNTER — LAB (OUTPATIENT)
Dept: LAB | Facility: HOSPITAL | Age: 63
End: 2023-03-02
Payer: MEDICARE

## 2023-03-02 ENCOUNTER — TRANSCRIBE ORDERS (OUTPATIENT)
Dept: LAB | Facility: HOSPITAL | Age: 63
End: 2023-03-02
Payer: MEDICARE

## 2023-03-02 DIAGNOSIS — E61.1 IRON DEFICIENCY: ICD-10-CM

## 2023-03-02 DIAGNOSIS — N18.30 STAGE 3 CHRONIC KIDNEY DISEASE, UNSPECIFIED WHETHER STAGE 3A OR 3B CKD: ICD-10-CM

## 2023-03-02 DIAGNOSIS — D64.9 ANEMIA, UNSPECIFIED TYPE: Primary | ICD-10-CM

## 2023-03-02 DIAGNOSIS — D64.9 ANEMIA, UNSPECIFIED TYPE: ICD-10-CM

## 2023-03-02 LAB
ALBUMIN SERPL-MCNC: 4.3 G/DL (ref 3.5–5.2)
ALBUMIN/GLOB SERPL: 1.3 G/DL
ALP SERPL-CCNC: 117 U/L (ref 39–117)
ALT SERPL W P-5'-P-CCNC: 18 U/L (ref 1–33)
ANION GAP SERPL CALCULATED.3IONS-SCNC: 13 MMOL/L (ref 5–15)
AST SERPL-CCNC: 22 U/L (ref 1–32)
BACTERIA UR QL AUTO: ABNORMAL /HPF
BASOPHILS # BLD AUTO: 0.07 10*3/MM3 (ref 0–0.2)
BASOPHILS NFR BLD AUTO: 1 % (ref 0–1.5)
BILIRUB SERPL-MCNC: 0.3 MG/DL (ref 0–1.2)
BILIRUB UR QL STRIP: NEGATIVE
BUN SERPL-MCNC: 37 MG/DL (ref 8–23)
BUN/CREAT SERPL: 22.6 (ref 7–25)
CALCIUM SPEC-SCNC: 10.8 MG/DL (ref 8.6–10.5)
CHLORIDE SERPL-SCNC: 107 MMOL/L (ref 98–107)
CLARITY UR: ABNORMAL
CO2 SERPL-SCNC: 20 MMOL/L (ref 22–29)
COLOR UR: YELLOW
CREAT SERPL-MCNC: 1.64 MG/DL (ref 0.57–1)
DEPRECATED RDW RBC AUTO: 46.7 FL (ref 37–54)
EGFRCR SERPLBLD CKD-EPI 2021: 34.8 ML/MIN/1.73
EOSINOPHIL # BLD AUTO: 0.18 10*3/MM3 (ref 0–0.4)
EOSINOPHIL NFR BLD AUTO: 2.7 % (ref 0.3–6.2)
ERYTHROCYTE [DISTWIDTH] IN BLOOD BY AUTOMATED COUNT: 14.5 % (ref 12.3–15.4)
GLOBULIN UR ELPH-MCNC: 3.2 GM/DL
GLUCOSE SERPL-MCNC: 132 MG/DL (ref 65–99)
GLUCOSE UR STRIP-MCNC: NEGATIVE MG/DL
HCT VFR BLD AUTO: 35.6 % (ref 34–46.6)
HGB BLD-MCNC: 11.7 G/DL (ref 12–15.9)
HGB UR QL STRIP.AUTO: NEGATIVE
HYALINE CASTS UR QL AUTO: ABNORMAL /LPF
IMM GRANULOCYTES # BLD AUTO: 0.03 10*3/MM3 (ref 0–0.05)
IMM GRANULOCYTES NFR BLD AUTO: 0.4 % (ref 0–0.5)
IRON 24H UR-MRATE: 61 MCG/DL (ref 37–145)
IRON SATN MFR SERPL: 14 % (ref 20–50)
KETONES UR QL STRIP: NEGATIVE
LEUKOCYTE ESTERASE UR QL STRIP.AUTO: ABNORMAL
LYMPHOCYTES # BLD AUTO: 2.6 10*3/MM3 (ref 0.7–3.1)
LYMPHOCYTES NFR BLD AUTO: 38.5 % (ref 19.6–45.3)
MCH RBC QN AUTO: 29.1 PG (ref 26.6–33)
MCHC RBC AUTO-ENTMCNC: 32.9 G/DL (ref 31.5–35.7)
MCV RBC AUTO: 88.6 FL (ref 79–97)
MONOCYTES # BLD AUTO: 0.66 10*3/MM3 (ref 0.1–0.9)
MONOCYTES NFR BLD AUTO: 9.8 % (ref 5–12)
NEUTROPHILS NFR BLD AUTO: 3.22 10*3/MM3 (ref 1.7–7)
NEUTROPHILS NFR BLD AUTO: 47.6 % (ref 42.7–76)
NITRITE UR QL STRIP: NEGATIVE
NRBC BLD AUTO-RTO: 0 /100 WBC (ref 0–0.2)
PH UR STRIP.AUTO: 5.5 [PH] (ref 5–8)
PLATELET # BLD AUTO: 325 10*3/MM3 (ref 140–450)
PMV BLD AUTO: 11.4 FL (ref 6–12)
POTASSIUM SERPL-SCNC: 4.7 MMOL/L (ref 3.5–5.2)
PROT SERPL-MCNC: 7.5 G/DL (ref 6–8.5)
PROT UR QL STRIP: NEGATIVE
RBC # BLD AUTO: 4.02 10*6/MM3 (ref 3.77–5.28)
RBC # UR STRIP: ABNORMAL /HPF
REF LAB TEST METHOD: ABNORMAL
SODIUM SERPL-SCNC: 140 MMOL/L (ref 136–145)
SP GR UR STRIP: 1.02 (ref 1–1.03)
SQUAMOUS #/AREA URNS HPF: ABNORMAL /HPF
TIBC SERPL-MCNC: 425 MCG/DL (ref 298–536)
TRANSFERRIN SERPL-MCNC: 285 MG/DL (ref 200–360)
UROBILINOGEN UR QL STRIP: ABNORMAL
WBC # UR STRIP: ABNORMAL /HPF
WBC NRBC COR # BLD: 6.76 10*3/MM3 (ref 3.4–10.8)

## 2023-03-02 PROCEDURE — 80053 COMPREHEN METABOLIC PANEL: CPT

## 2023-03-02 PROCEDURE — 85025 COMPLETE CBC W/AUTO DIFF WBC: CPT

## 2023-03-02 PROCEDURE — 83540 ASSAY OF IRON: CPT

## 2023-03-02 PROCEDURE — 81001 URINALYSIS AUTO W/SCOPE: CPT

## 2023-03-02 PROCEDURE — 84466 ASSAY OF TRANSFERRIN: CPT

## 2023-03-02 PROCEDURE — 36415 COLL VENOUS BLD VENIPUNCTURE: CPT

## 2023-03-11 ENCOUNTER — DOCUMENTATION (OUTPATIENT)
Dept: TELEMETRY | Facility: HOSPITAL | Age: 63
End: 2023-03-11
Payer: MEDICARE

## 2023-03-11 DIAGNOSIS — N17.9 AKI (ACUTE KIDNEY INJURY): Primary | ICD-10-CM

## 2023-03-11 DIAGNOSIS — N18.31 STAGE 3A CHRONIC KIDNEY DISEASE: ICD-10-CM

## 2023-03-13 ENCOUNTER — LAB (OUTPATIENT)
Dept: LAB | Facility: HOSPITAL | Age: 63
End: 2023-03-13
Payer: MEDICARE

## 2023-03-13 DIAGNOSIS — N18.31 STAGE 3A CHRONIC KIDNEY DISEASE: ICD-10-CM

## 2023-03-13 DIAGNOSIS — N17.9 AKI (ACUTE KIDNEY INJURY): ICD-10-CM

## 2023-03-13 LAB
ANION GAP SERPL CALCULATED.3IONS-SCNC: 10.9 MMOL/L (ref 5–15)
BUN SERPL-MCNC: 14 MG/DL (ref 8–23)
BUN/CREAT SERPL: 13.9 (ref 7–25)
CALCIUM SPEC-SCNC: 11.4 MG/DL (ref 8.6–10.5)
CHLORIDE SERPL-SCNC: 108 MMOL/L (ref 98–107)
CO2 SERPL-SCNC: 24.1 MMOL/L (ref 22–29)
CREAT SERPL-MCNC: 1.01 MG/DL (ref 0.57–1)
EGFRCR SERPLBLD CKD-EPI 2021: 62.3 ML/MIN/1.73
GLUCOSE SERPL-MCNC: 110 MG/DL (ref 65–99)
POTASSIUM SERPL-SCNC: 4.6 MMOL/L (ref 3.5–5.2)
SODIUM SERPL-SCNC: 143 MMOL/L (ref 136–145)

## 2023-03-13 PROCEDURE — 80048 BASIC METABOLIC PNL TOTAL CA: CPT

## 2023-03-13 PROCEDURE — 36415 COLL VENOUS BLD VENIPUNCTURE: CPT

## 2023-03-27 ENCOUNTER — APPOINTMENT (OUTPATIENT)
Dept: LAB | Facility: HOSPITAL | Age: 63
End: 2023-03-27
Payer: MEDICARE

## 2023-03-27 ENCOUNTER — TRANSCRIBE ORDERS (OUTPATIENT)
Dept: ADMINISTRATIVE | Facility: HOSPITAL | Age: 63
End: 2023-03-27
Payer: MEDICARE

## 2023-03-27 ENCOUNTER — LAB (OUTPATIENT)
Dept: LAB | Facility: HOSPITAL | Age: 63
End: 2023-03-27
Payer: MEDICARE

## 2023-03-27 DIAGNOSIS — I10 PRIMARY HYPERTENSION: ICD-10-CM

## 2023-03-27 DIAGNOSIS — N18.30 STAGE 3 CHRONIC KIDNEY DISEASE, UNSPECIFIED WHETHER STAGE 3A OR 3B CKD: Primary | ICD-10-CM

## 2023-03-27 LAB
ALBUMIN SERPL-MCNC: 4.6 G/DL (ref 3.5–5.2)
ALBUMIN/GLOB SERPL: 1.4 G/DL
ALP SERPL-CCNC: 110 U/L (ref 39–117)
ALT SERPL W P-5'-P-CCNC: 17 U/L (ref 1–33)
ANION GAP SERPL CALCULATED.3IONS-SCNC: 11.7 MMOL/L (ref 5–15)
AST SERPL-CCNC: 14 U/L (ref 1–32)
BASOPHILS # BLD AUTO: 0.03 10*3/MM3 (ref 0–0.2)
BASOPHILS NFR BLD AUTO: 0.3 % (ref 0–1.5)
BILIRUB SERPL-MCNC: 0.4 MG/DL (ref 0–1.2)
BUN SERPL-MCNC: 30 MG/DL (ref 8–23)
BUN/CREAT SERPL: 23.8 (ref 7–25)
CALCIUM SPEC-SCNC: 11.5 MG/DL (ref 8.6–10.5)
CHLORIDE SERPL-SCNC: 99 MMOL/L (ref 98–107)
CO2 SERPL-SCNC: 27.3 MMOL/L (ref 22–29)
COLLECT DURATION TIME UR: 24 HRS
CREAT CL 24H UR+SERPL-VRATE: 52.9 ML/MIN (ref 88–128)
CREAT CL 24H UR+SERPL-VRATE: 76.2 L/24 HR (ref 126.7–184.3)
CREAT SERPL-MCNC: 1.26 MG/DL (ref 0.57–1)
CREAT UR-MCNC: 53 MG/DL
CREATINE 24H UR-MRATE: 1.06 G/24 HR (ref 0.7–1.6)
DEPRECATED RDW RBC AUTO: 45.4 FL (ref 37–54)
EGFRCR SERPLBLD CKD-EPI 2021: 47.8 ML/MIN/1.73
EOSINOPHIL # BLD AUTO: 0.07 10*3/MM3 (ref 0–0.4)
EOSINOPHIL NFR BLD AUTO: 0.7 % (ref 0.3–6.2)
ERYTHROCYTE [DISTWIDTH] IN BLOOD BY AUTOMATED COUNT: 14.2 % (ref 12.3–15.4)
GLOBULIN UR ELPH-MCNC: 3.4 GM/DL
GLUCOSE SERPL-MCNC: 91 MG/DL (ref 65–99)
HCT VFR BLD AUTO: 39.6 % (ref 34–46.6)
HGB BLD-MCNC: 12.9 G/DL (ref 12–15.9)
IMM GRANULOCYTES # BLD AUTO: 0.08 10*3/MM3 (ref 0–0.05)
IMM GRANULOCYTES NFR BLD AUTO: 0.8 % (ref 0–0.5)
LYMPHOCYTES # BLD AUTO: 3.17 10*3/MM3 (ref 0.7–3.1)
LYMPHOCYTES NFR BLD AUTO: 31 % (ref 19.6–45.3)
MCH RBC QN AUTO: 29 PG (ref 26.6–33)
MCHC RBC AUTO-ENTMCNC: 32.6 G/DL (ref 31.5–35.7)
MCV RBC AUTO: 89 FL (ref 79–97)
MONOCYTES # BLD AUTO: 0.96 10*3/MM3 (ref 0.1–0.9)
MONOCYTES NFR BLD AUTO: 9.4 % (ref 5–12)
NEUTROPHILS NFR BLD AUTO: 5.9 10*3/MM3 (ref 1.7–7)
NEUTROPHILS NFR BLD AUTO: 57.8 % (ref 42.7–76)
NRBC BLD AUTO-RTO: 0 /100 WBC (ref 0–0.2)
PLATELET # BLD AUTO: 381 10*3/MM3 (ref 140–450)
PMV BLD AUTO: 11.1 FL (ref 6–12)
POTASSIUM SERPL-SCNC: 4 MMOL/L (ref 3.5–5.2)
PROT SERPL-MCNC: 8 G/DL (ref 6–8.5)
RBC # BLD AUTO: 4.45 10*6/MM3 (ref 3.77–5.28)
SODIUM SERPL-SCNC: 138 MMOL/L (ref 136–145)
SPECIMEN VOL 24H UR: 2000 ML
WBC NRBC COR # BLD: 10.21 10*3/MM3 (ref 3.4–10.8)

## 2023-03-27 PROCEDURE — 82436 ASSAY OF URINE CHLORIDE: CPT

## 2023-03-27 PROCEDURE — 36415 COLL VENOUS BLD VENIPUNCTURE: CPT

## 2023-03-27 PROCEDURE — 82340 ASSAY OF CALCIUM IN URINE: CPT

## 2023-03-27 PROCEDURE — 84133 ASSAY OF URINE POTASSIUM: CPT

## 2023-03-27 PROCEDURE — 83735 ASSAY OF MAGNESIUM: CPT

## 2023-03-27 PROCEDURE — 83935 ASSAY OF URINE OSMOLALITY: CPT

## 2023-03-27 PROCEDURE — 82131 AMINO ACIDS SINGLE QUANT: CPT

## 2023-03-27 PROCEDURE — 83945 ASSAY OF OXALATE: CPT

## 2023-03-27 PROCEDURE — 80053 COMPREHEN METABOLIC PANEL: CPT

## 2023-03-27 PROCEDURE — 82575 CREATININE CLEARANCE TEST: CPT

## 2023-03-27 PROCEDURE — 84392 ASSAY OF URINE SULFATE: CPT

## 2023-03-27 PROCEDURE — 85025 COMPLETE CBC W/AUTO DIFF WBC: CPT

## 2023-03-27 PROCEDURE — 81003 URINALYSIS AUTO W/O SCOPE: CPT

## 2023-03-27 PROCEDURE — 82570 ASSAY OF URINE CREATININE: CPT

## 2023-03-27 PROCEDURE — 86335 IMMUNFIX E-PHORSIS/URINE/CSF: CPT

## 2023-03-27 PROCEDURE — 84560 ASSAY OF URINE/URIC ACID: CPT

## 2023-03-27 PROCEDURE — 84105 ASSAY OF URINE PHOSPHORUS: CPT

## 2023-03-27 PROCEDURE — 84165 PROTEIN E-PHORESIS SERUM: CPT

## 2023-03-27 PROCEDURE — 84300 ASSAY OF URINE SODIUM: CPT

## 2023-03-27 PROCEDURE — 82140 ASSAY OF AMMONIA: CPT

## 2023-03-27 PROCEDURE — 82507 ASSAY OF CITRATE: CPT

## 2023-03-28 LAB
ALBUMIN SERPL ELPH-MCNC: 3.9 G/DL (ref 2.9–4.4)
ALBUMIN/GLOB SERPL: 1 {RATIO} (ref 0.7–1.7)
ALPHA1 GLOB SERPL ELPH-MCNC: 0.3 G/DL (ref 0–0.4)
ALPHA2 GLOB SERPL ELPH-MCNC: 1 G/DL (ref 0.4–1)
B-GLOBULIN SERPL ELPH-MCNC: 1.2 G/DL (ref 0.7–1.3)
GAMMA GLOB SERPL ELPH-MCNC: 1.3 G/DL (ref 0.4–1.8)
GLOBULIN SER CALC-MCNC: 3.8 G/DL (ref 2.2–3.9)
LABORATORY COMMENT REPORT: NORMAL
M PROTEIN SERPL ELPH-MCNC: NORMAL G/DL
PROT SERPL-MCNC: 7.7 G/DL (ref 6–8.5)

## 2023-03-29 LAB — INTERPRETATION UR IFE-IMP: NORMAL

## 2023-03-31 ENCOUNTER — HOSPITAL ENCOUNTER (OUTPATIENT)
Dept: MRI IMAGING | Facility: HOSPITAL | Age: 63
Discharge: HOME | End: 2023-03-31
Payer: MEDICARE

## 2023-03-31 DIAGNOSIS — G35 MULTIPLE SCLEROSIS: ICD-10-CM

## 2023-03-31 PROCEDURE — 0 GADOBENATE DIMEGLUMINE 529 MG/ML SOLUTION: Performed by: PSYCHIATRY & NEUROLOGY

## 2023-03-31 PROCEDURE — 72157 MRI CHEST SPINE W/O & W/DYE: CPT

## 2023-03-31 PROCEDURE — 70553 MRI BRAIN STEM W/O & W/DYE: CPT

## 2023-03-31 PROCEDURE — A9577 INJ MULTIHANCE: HCPCS | Performed by: PSYCHIATRY & NEUROLOGY

## 2023-03-31 RX ADMIN — GADOBENATE DIMEGLUMINE 18 ML: 529 INJECTION, SOLUTION INTRAVENOUS at 12:06

## 2023-04-04 ENCOUNTER — TRANSCRIBE ORDERS (OUTPATIENT)
Dept: LAB | Facility: HOSPITAL | Age: 63
End: 2023-04-04
Payer: MEDICARE

## 2023-04-04 LAB
CALCIUM 24H UR-MCNC: 9.1 MG/DL
CALCIUM 24H UR-MRATE: 182 MG/24 HR (ref 100–300)
COLLECT DURATION TIME UR: 24 HRS
SPECIMEN VOL 24H UR: 2000 ML

## 2023-04-10 ENCOUNTER — OFFICE VISIT (OUTPATIENT)
Dept: CARDIOLOGY | Facility: CLINIC | Age: 63
End: 2023-04-10
Payer: MEDICARE

## 2023-04-10 VITALS
DIASTOLIC BLOOD PRESSURE: 80 MMHG | OXYGEN SATURATION: 99 % | SYSTOLIC BLOOD PRESSURE: 121 MMHG | HEIGHT: 60 IN | WEIGHT: 207 LBS | HEART RATE: 64 BPM | BODY MASS INDEX: 40.64 KG/M2

## 2023-04-10 DIAGNOSIS — I25.5 ISCHEMIC CARDIOMYOPATHY: ICD-10-CM

## 2023-04-10 DIAGNOSIS — Z95.5 S/P DRUG ELUTING CORONARY STENT PLACEMENT: ICD-10-CM

## 2023-04-10 DIAGNOSIS — E78.5 HYPERLIPIDEMIA LDL GOAL <70: ICD-10-CM

## 2023-04-10 DIAGNOSIS — I25.10 CORONARY ARTERY DISEASE INVOLVING NATIVE CORONARY ARTERY OF NATIVE HEART WITHOUT ANGINA PECTORIS: Primary | ICD-10-CM

## 2023-04-10 DIAGNOSIS — R06.02 SHORTNESS OF BREATH: ICD-10-CM

## 2023-04-10 PROCEDURE — 1159F MED LIST DOCD IN RCRD: CPT | Performed by: NURSE PRACTITIONER

## 2023-04-10 PROCEDURE — 93000 ELECTROCARDIOGRAM COMPLETE: CPT | Performed by: NURSE PRACTITIONER

## 2023-04-10 PROCEDURE — 99214 OFFICE O/P EST MOD 30 MIN: CPT | Performed by: NURSE PRACTITIONER

## 2023-04-10 PROCEDURE — 1160F RVW MEDS BY RX/DR IN RCRD: CPT | Performed by: NURSE PRACTITIONER

## 2023-04-10 RX ORDER — MODAFINIL 100 MG/1
TABLET ORAL EVERY 24 HOURS
COMMUNITY

## 2023-04-10 RX ORDER — NITROFURANTOIN 25; 75 MG/1; MG/1
CAPSULE ORAL
COMMUNITY

## 2023-04-10 NOTE — PROGRESS NOTES
Date of Office Visit: 04/10/2023  Encounter Provider: WESTLEY Conroy  Place of Service: [facility] CARDIOLOGY  Patient Name: Falguni Minaya  :1960    Chief Complaint   Patient presents with   • Follow-up   • Coronary Artery Disease   :     HPI: Falguni Minaya is a 63 y.o. female who is a patient of  Dr. Riley and is new to me today.  She has a history of coronary disease with percutaneous intervention, chronic systolic heart failure, mixed hyperlipidemia, multiple sclerosis and seizure disorder.  She was last in the office 6 months ago for follow-up.  She was doing reasonably well.  She has some chronic shortness of breath and it was stable.  Her repeat echocardiogram previously showed improved LV function.    Overall she is the same. She has some dyspnea when she does more than walking around her house. Sometimes she is unsure whether its from his MS or weight gain and stamina. Exercise can be hard for her because of her physical limitations. She denies chest pain.  Previous testing and notes have been reviewed by me.   Past Medical History:   Diagnosis Date   • Abdominal hernia    • Acute ST elevation myocardial infarction (STEMI)     due to occlusion of right coronary artery   • Acute ST elevation myocardial infarction (STEMI) due to occlusion of right coronary artery 2020   • Coronary artery disease    • Dyspepsia    • GERD (gastroesophageal reflux disease)    • Multiple sclerosis    • Seizures     last seizure        Past Surgical History:   Procedure Laterality Date   • APPENDECTOMY     • CARDIAC CATHETERIZATION     • CARDIAC CATHETERIZATION N/A 2020    Procedure: Left Heart Cath;  Surgeon: Marlon Zamudio MD;  Location: [facility] CATH INVASIVE LOCATION;  Service: Cardiovascular;  Laterality: N/A;   • CARDIAC CATHETERIZATION N/A 2020    Procedure: Stent LATRICIA coronary;  Surgeon: Marlon Zamudio MD;  Location: [facility] CATH INVASIVE LOCATION;   Service: Cardiovascular;  Laterality: N/A;   • CARDIAC CATHETERIZATION  2020    Procedure: Percutaneous Manual Thrombectomy;  Surgeon: Marlon Zamudio MD;  [facility];  Service: Cardiovascular;;   • CARDIAC CATHETERIZATION N/A 2020    Procedure: Coronary angiography;  Surgeon: Marlon Zamudio MD;  [facility];  Service: Cardiovascular;  Laterality: N/A;   • CARDIAC CATHETERIZATION N/A 2020    Procedure: Left ventriculography;  Surgeon: Marlon Zamudio MD;  [facility];  Service: Cardiovascular;  Laterality: N/A;   • CHOLECYSTECTOMY     • COLONOSCOPY N/A 2020    Procedure: COLONOSCOPY to  cecum:;  Surgeon: Krystal Sarabia MD;  [facility];  Service: Gastroenterology;  Laterality: N/A;  pre:  anemia and abd pain  post:  hemorrhoids, tortuous colon   • ENDOSCOPY N/A 2020    Procedure: ESOPHAGOGASTRODUODENOSCOPY  with biopsies;  Surgeon: Krystal Sarabia MD;  Location: [facility] ENDOSCOPY;  Service: Gastroenterology;  Laterality: N/A;  pre:  anemia and abd pain  post:  mild gastritis,    • HYSTERECTOMY     • TONSILLECTOMY         Social History     Socioeconomic History   • Marital status:    Tobacco Use   • Smoking status: Former     Packs/day: 0.50     Years: 20.00     Pack years: 10.00     Types: Cigarettes     Quit date:      Years since quittin.2   • Smokeless tobacco: Never   • Tobacco comments:     CAFFEINE USE: 2 CUPS COFFEE DAILY   Vaping Use   • Vaping Use: Never used   Substance and Sexual Activity   • Alcohol use: Not Currently   • Drug use: Never   • Sexual activity: Defer       Family History   Problem Relation Age of Onset   • Thyroid disease Mother    • Hypertension Sister    • Coronary artery disease Brother        Review of Systems   Constitutional: Negative for diaphoresis and malaise/fatigue.   Cardiovascular: Negative for chest pain, claudication,  dyspnea on exertion, irregular heartbeat, leg swelling, near-syncope, orthopnea, palpitations, paroxysmal nocturnal dyspnea and syncope.   Respiratory: Negative for cough, shortness of breath and sleep disturbances due to breathing.    Musculoskeletal: Negative for falls.   Neurological: Negative for dizziness and weakness.   Psychiatric/Behavioral: Negative for altered mental status and substance abuse.       Allergies   Allergen Reactions   • Codeine Hives     Hyperactivity, nausea   • Morphine Hives     Hyperactivity, nausea         Current Outpatient Medications:   •  aspirin 81 MG EC tablet, Take 1 tablet by mouth Daily., Disp: 30 tablet, Rfl: 11  •  atorvastatin (LIPITOR) 40 MG tablet, Take 1 tablet by mouth every night at bedtime., Disp: 90 tablet, Rfl: 3  •  carvedilol (COREG) 12.5 MG tablet, Take 1 tablet by mouth Every 12 (Twelve) Hours., Disp: 180 tablet, Rfl: 3  •  clonazePAM (KlonoPIN) 1 MG tablet, Take 1 tablet by mouth 2 (Two) Times a Day As Needed for Anxiety., Disp: , Rfl:   •  clopidogrel (PLAVIX) 75 MG tablet, Take 1 tablet by mouth Daily., Disp: 90 tablet, Rfl: 3  •  Diroximel Fumarate, Starter, (Vumerity, Starter,) 231 MG capsule delayed-release, Take  by mouth., Disp: , Rfl:   •  FLUoxetine (PROzac) 40 MG capsule, Take 2 capsules by mouth Daily., Disp: , Rfl:   •  furosemide (LASIX) 40 MG tablet, Take 1 tablet by mouth Daily., Disp: 90 tablet, Rfl: 3  •  gabapentin (NEURONTIN) 300 MG capsule, Take 1 capsule by mouth 4 (Four) Times a Day., Disp: , Rfl:   •  losartan (COZAAR) 25 MG tablet, Take 1 tablet by mouth Daily., Disp: 90 tablet, Rfl: 3  •  meloxicam (MOBIC) 15 MG tablet, Take 1 tablet by mouth Daily., Disp: , Rfl:   •  modafinil (PROVIGIL) 100 MG tablet, Daily., Disp: , Rfl:   •  nitrofurantoin, macrocrystal-monohydrate, (MACROBID) 100 MG capsule, nitrofurantoin monohydrate/macrocrystals 100 mg capsule  TAKE ONE CAPSULE BY MOUTH TWICE DAILY FOR 5 DAYS, Disp: , Rfl:   •  OLANZapine  "(zyPREXA) 2.5 MG tablet, Take 1 tablet by mouth Every Night., Disp: , Rfl:   •  omeprazole (priLOSEC) 40 MG capsule, Take 1 capsule by mouth Daily As Needed., Disp: , Rfl:   •  ondansetron (ZOFRAN) 4 MG tablet, Take 1 tablet by mouth Every 8 (Eight) Hours As Needed., Disp: , Rfl:   •  pantoprazole (PROTONIX) 40 MG EC tablet, Take 1 tablet by mouth Daily., Disp: , Rfl:   •  pramipexole (MIRAPEX) 0.25 MG tablet, Take 1 tablet by mouth every night at bedtime., Disp: , Rfl:   •  tiZANidine (ZANAFLEX) 2 MG tablet, Take 1 tablet by mouth 3 (Three) Times a Day As Needed. for muscle spams, Disp: , Rfl:       Objective:     Vitals:    04/10/23 1211   BP: 121/80   Pulse: 64   SpO2: 99%   Weight: 93.9 kg (207 lb)   Height: 152.4 cm (60\")     Body mass index is 40.43 kg/m².    PHYSICAL EXAM:    Constitutional:       General: Not in acute distress.     Appearance: Normal appearance. Well-developed.   Eyes:      Pupils: Pupils are equal, round, and reactive to light.   HENT:      Head: Normocephalic.   Neck:      Vascular: No carotid bruit or JVD.   Pulmonary:      Effort: Pulmonary effort is normal. No tachypnea.      Breath sounds: Normal breath sounds. No wheezing. No rales.   Cardiovascular:      Normal rate. Regular rhythm.      No gallop.   Pulses:     Intact distal pulses.   Abdominal:      General: Bowel sounds are normal.      Palpations: Abdomen is soft.      Tenderness: There is no abdominal tenderness.   Musculoskeletal: Normal range of motion.      Cervical back: Normal range of motion and neck supple. No edema. Skin:     General: Skin is warm and dry.   Neurological:      Mental Status: Alert and oriented to person, place, and time.           ECG 12 Lead    Date/Time: 4/10/2023 3:29 PM  Performed by: Rufina Koenig APRN  Authorized by: Rufina Koenig APRN   Comparison: compared with previous ECG from 10/6/2022  Similar to previous ECG  Rhythm: sinus rhythm  Rate: normal  Q waves: II, III and aVF    QRS axis: " normal  Other findings: non-specific ST-T wave changes    Clinical impression: non-specific ECG              Assessment:       Diagnosis Plan   1. Coronary artery disease involving native coronary artery of native heart without angina pectoris     -  no angina. Negative stress test last year. Continue    2. Hyperlipidemia LDL goal <70     - on statin   3. Ischemic cardiomyopathy     Stable on current therapy.   4. S/P drug eluting coronary stent placement        5. Shortness of breath     Chronic. Recommended exercise to build stamina     No orders of the defined types were placed in this encounter.         Plan:       Encouraged some exercise and strengthing. Does not appear to have angina. Follow up in 6 months.         Your medication list          Accurate as of April 10, 2023  3:29 PM. If you have any questions, ask your nurse or doctor.            CONTINUE taking these medications      Instructions Last Dose Given Next Dose Due   aspirin 81 MG EC tablet      Take 1 tablet by mouth Daily.       atorvastatin 40 MG tablet  Commonly known as: LIPITOR      Take 1 tablet by mouth every night at bedtime.       carvedilol 12.5 MG tablet  Commonly known as: COREG      Take 1 tablet by mouth Every 12 (Twelve) Hours.       clonazePAM 1 MG tablet  Commonly known as: KlonoPIN      Take 1 tablet by mouth 2 (Two) Times a Day As Needed for Anxiety.       clopidogrel 75 MG tablet  Commonly known as: PLAVIX      Take 1 tablet by mouth Daily.       FLUoxetine 40 MG capsule  Commonly known as: PROzac      Take 2 capsules by mouth Daily.       furosemide 40 MG tablet  Commonly known as: LASIX      Take 1 tablet by mouth Daily.       gabapentin 300 MG capsule  Commonly known as: NEURONTIN      Take 1 capsule by mouth 4 (Four) Times a Day.       losartan 25 MG tablet  Commonly known as: COZAAR      Take 1 tablet by mouth Daily.       meloxicam 15 MG tablet  Commonly known as: MOBIC      Take 1 tablet by mouth Daily.       modafinil  100 MG tablet  Commonly known as: PROVIGIL      Daily.       nitrofurantoin (macrocrystal-monohydrate) 100 MG capsule  Commonly known as: MACROBID      nitrofurantoin monohydrate/macrocrystals 100 mg capsule   TAKE ONE CAPSULE BY MOUTH TWICE DAILY FOR 5 DAYS       OLANZapine 2.5 MG tablet  Commonly known as: zyPREXA      Take 1 tablet by mouth Every Night.       omeprazole 40 MG capsule  Commonly known as: priLOSEC      Take 1 capsule by mouth Daily As Needed.       ondansetron 4 MG tablet  Commonly known as: ZOFRAN      Take 1 tablet by mouth Every 8 (Eight) Hours As Needed.       pantoprazole 40 MG EC tablet  Commonly known as: PROTONIX      Take 1 tablet by mouth Daily.       pramipexole 0.25 MG tablet  Commonly known as: MIRAPEX      Take 1 tablet by mouth every night at bedtime.       tiZANidine 2 MG tablet  Commonly known as: ZANAFLEX      Take 1 tablet by mouth 3 (Three) Times a Day As Needed. for muscle spams       Vumerity (Starter) 231 MG capsule delayed-release  Generic drug: Diroximel Fumarate (Starter)      Take  by mouth.                As always, it has been a pleasure to participate in your patient's care.      Sincerely,     Rufina CHRISTY

## 2023-04-11 LAB
AMMONIA 24H UR-MCNC: ABNORMAL UG/DL
AMMONIA 24H UR-SRATE: 15 MEQ/24 HR
CA H2 PHOS DIHYD 24H SATFR UR: 1.98 RATIO (ref 0–3)
CALCIUM 24H UR-MCNC: 9.7 MG/DL
CALCIUM 24H UR-MRATE: 194 MG/24 HR (ref 0–320)
CAOX INDEX 24H UR-RTO: 2.98 RATIO (ref 0–6)
CHLORIDE 24H UR-SCNC: 89 MMOL/L
CHLORIDE 24H UR-SRATE: 178 MMOL/24 HR (ref 38–210)
CITRATE 24H UR-MCNC: 100 MG/L
CITRATE 24H UR-MRATE: 200 MG/24 HR (ref 320–1240)
CREAT 24H UR-MCNC: 51.7 MG/DL
CREAT 24H UR-MRATE: 1034 MG/24 HR (ref 800–1800)
CYSTINE 24H UR-MCNC: 6.75 MG/L
CYSTINE 24H UR-MRATE: 13.5 MG/24 HR (ref 2.1–58)
LABORATORY COMMENT REPORT: ABNORMAL
MAGNESIUM 24H UR-MRATE: 40 MG/24 HR (ref 12–293)
MAGNESIUM UR-MCNC: 2 MG/DL
NA URATE 24H SATFR UR: 3.03 RATIO (ref 0–4)
OSMOLALITY UR: 494 MOSMOL/KG (ref 300–900)
OXALATE 24H UR-MRATE: 16 MG/24 HR (ref 4–31)
OXALATE UR-MCNC: 8 MG/L
PH 24H UR: 6.8 [PH] (ref 4.5–8)
PHOSPHATE 24H UR-MCNC: 23.3 MG/DL
PHOSPHATE 24H UR-MRATE: 466 MG/24 HR (ref 261–1078)
POTASSIUM 24H UR-SRATE: 55.2 MMOL/24 HR (ref 14–95)
POTASSIUM UR-SCNC: 27.6 MMOL/L
SODIUM 24H UR-SCNC: 114 MMOL/L
SODIUM 24H UR-SRATE: 228 MMOL/24 HR (ref 39–258)
SPECIMEN VOL 24H UR: 2000 ML/24 HR (ref 600–1600)
SPECIMEN VOL 24H UR: 2000 ML/24 HR (ref 600–1600)
SULFATE 24H UR-SCNC: 9 MEQ/L
SULFATE 24H UR-SRATE: 18 MEQ/24 HR (ref 0–30)
TRI-PHOS 24H SATFR UR: 0.03 RATIO (ref 0–1)
URATE 24H SATFR UR: 0.17 RATIO (ref 0–1.2)
URATE 24H UR-MCNC: 21.9 MG/DL
URATE 24H UR-MRATE: 438 MG/24 HR (ref 142–713)

## 2023-05-08 RX ORDER — LOSARTAN POTASSIUM 25 MG/1
25 TABLET ORAL
Qty: 90 TABLET | Refills: 3 | Status: SHIPPED | OUTPATIENT
Start: 2023-05-08

## 2023-05-08 RX ORDER — CLOPIDOGREL BISULFATE 75 MG/1
75 TABLET ORAL DAILY
Qty: 90 TABLET | Refills: 3 | Status: SHIPPED | OUTPATIENT
Start: 2023-05-08

## 2023-05-17 RX ORDER — ATORVASTATIN CALCIUM 40 MG/1
40 TABLET, FILM COATED ORAL
Qty: 90 TABLET | Refills: 3 | Status: SHIPPED | OUTPATIENT
Start: 2023-05-17

## 2023-05-17 RX ORDER — FUROSEMIDE 40 MG/1
40 TABLET ORAL DAILY
Qty: 90 TABLET | Refills: 3 | Status: SHIPPED | OUTPATIENT
Start: 2023-05-17

## 2023-05-26 RX ORDER — CARVEDILOL 12.5 MG/1
TABLET ORAL
Qty: 180 TABLET | Refills: 3 | Status: SHIPPED | OUTPATIENT
Start: 2023-05-26 | End: 2023-05-30

## 2023-05-30 RX ORDER — CARVEDILOL 12.5 MG/1
TABLET ORAL
Qty: 200 TABLET | Refills: 2 | Status: SHIPPED | OUTPATIENT
Start: 2023-05-30

## 2023-07-26 ENCOUNTER — HOSPITAL ENCOUNTER (OUTPATIENT)
Dept: NUCLEAR MEDICINE | Facility: HOSPITAL | Age: 63
Discharge: HOME | End: 2023-07-26
Payer: MEDICARE

## 2023-07-26 DIAGNOSIS — E21.3 HYPERPARATHYROIDISM: ICD-10-CM

## 2023-07-26 PROCEDURE — 78072 PARATHYRD PLANAR W/SPECT&CT: CPT

## 2023-07-26 PROCEDURE — A9500 TC99M SESTAMIBI: HCPCS | Performed by: INTERNAL MEDICINE

## 2023-07-26 PROCEDURE — 0 TECHNETIUM SESTAMIBI: Performed by: INTERNAL MEDICINE

## 2023-07-26 RX ADMIN — TECHNETIUM TC 99M SESTAMIBI 1 DOSE: 1 INJECTION INTRAVENOUS at 11:00

## 2023-09-12 ENCOUNTER — TELEPHONE (OUTPATIENT)
Dept: CARDIOLOGY | Facility: CLINIC | Age: 63
End: 2023-09-12
Payer: MEDICARE

## 2023-09-12 NOTE — TELEPHONE ENCOUNTER
Received paperwork from  [facility]. Patient is needing cardiac clearance. Patient will be having a neck exploration and parathyroidectomy. She does take ASA and Plavix.    May they proceed with surgery?    Their fax number [phone]

## 2023-09-12 NOTE — TELEPHONE ENCOUNTER
Patient is doing well from a cardiac perspective when I last saw her in the office may proceed forward with surgery.  May hold aspirin and clopidogrel as needed for her surgery

## 2023-09-16 ENCOUNTER — PREP FOR SURGERY (OUTPATIENT)
Dept: OTHER | Facility: HOSPITAL | Age: 63
End: 2023-09-16
Payer: MEDICARE

## 2023-09-16 DIAGNOSIS — E21.3 HYPERPARATHYROIDISM: Primary | ICD-10-CM

## 2023-09-16 DIAGNOSIS — E83.52 HYPERCALCEMIA: ICD-10-CM

## 2023-09-16 DIAGNOSIS — D35.1 PARATHYROID ADENOMA: ICD-10-CM

## 2023-09-16 DIAGNOSIS — E04.1 THYROID NODULE: ICD-10-CM

## 2023-09-16 DIAGNOSIS — Z01.818 PREOP TESTING: Primary | ICD-10-CM

## 2023-09-16 DIAGNOSIS — D68.9 COAGULATION DEFECT, UNSPECIFIED: ICD-10-CM

## 2023-09-18 PROBLEM — E83.52 HYPERCALCEMIA: Status: ACTIVE | Noted: 2023-09-18

## 2023-09-18 PROBLEM — E04.1 THYROID NODULE: Status: ACTIVE | Noted: 2023-09-18

## 2023-09-18 PROBLEM — D35.1 PARATHYROID ADENOMA: Status: ACTIVE | Noted: 2023-09-18

## 2023-09-18 PROBLEM — E21.3 HYPERPARATHYROIDISM: Status: ACTIVE | Noted: 2023-09-18

## 2023-09-21 ENCOUNTER — OFFICE VISIT (OUTPATIENT)
Dept: FAMILY MEDICINE CLINIC | Facility: CLINIC | Age: 63
End: 2023-09-21
Payer: MEDICARE

## 2023-09-21 VITALS
SYSTOLIC BLOOD PRESSURE: 128 MMHG | BODY MASS INDEX: 42.6 KG/M2 | TEMPERATURE: 97.3 F | OXYGEN SATURATION: 97 % | WEIGHT: 217 LBS | HEART RATE: 87 BPM | HEIGHT: 60 IN | DIASTOLIC BLOOD PRESSURE: 80 MMHG

## 2023-09-21 DIAGNOSIS — G47.00 INSOMNIA, UNSPECIFIED TYPE: ICD-10-CM

## 2023-09-21 DIAGNOSIS — I25.10 CORONARY ARTERY DISEASE INVOLVING NATIVE CORONARY ARTERY OF NATIVE HEART WITHOUT ANGINA PECTORIS: ICD-10-CM

## 2023-09-21 DIAGNOSIS — F41.9 ANXIETY: ICD-10-CM

## 2023-09-21 DIAGNOSIS — Z71.85 IMMUNIZATION COUNSELING: Primary | ICD-10-CM

## 2023-09-21 DIAGNOSIS — Z00.00 HEALTH MAINTENANCE EXAMINATION: ICD-10-CM

## 2023-09-21 DIAGNOSIS — Z79.899 HIGH RISK MEDICATION USE: ICD-10-CM

## 2023-09-21 DIAGNOSIS — Z12.31 BREAST CANCER SCREENING BY MAMMOGRAM: ICD-10-CM

## 2023-09-21 DIAGNOSIS — Z23 ENCOUNTER FOR IMMUNIZATION: ICD-10-CM

## 2023-09-21 DIAGNOSIS — G35 MULTIPLE SCLEROSIS: ICD-10-CM

## 2023-09-21 LAB
AMPHET+METHAMPHET UR QL: NEGATIVE
BARBITURATES UR QL SCN: NEGATIVE
BENZODIAZ UR QL SCN: POSITIVE
CANNABINOIDS SERPL QL: NEGATIVE
COCAINE UR QL: NEGATIVE
FENTANYL UR-MCNC: NEGATIVE NG/ML
METHADONE UR QL SCN: NEGATIVE
OPIATES UR QL: NEGATIVE
OXYCODONE UR QL SCN: NEGATIVE

## 2023-09-21 PROCEDURE — 80307 DRUG TEST PRSMV CHEM ANLYZR: CPT | Performed by: FAMILY MEDICINE

## 2023-09-21 NOTE — PROGRESS NOTES
Chief Complaint  Establish Care    Subjective      History of Present Illness  Falguni Minaya is a 63 y.o. female who presents to [facility] with a past medical history of  Past Medical History:   Diagnosis Date    Abdominal hernia     Acute ST elevation myocardial infarction (STEMI)     due to occlusion of right coronary artery    Acute ST elevation myocardial infarction (STEMI) due to occlusion of right coronary artery 2/24/2020    Coronary artery disease     Dyspepsia     GERD (gastroesophageal reflux disease)     Multiple sclerosis     Seizures     last seizure 2018     Here with  to establish care.     Follows Neurology, Nephrology, Cardiology, Gastroenterology, ENT, Ortho.    History of multiple sclerosis. She follows a Neurologist regularly. They are switching some meds around to find something that is covered by insurance.    History of CAD with prior stent, chronic systolic heart failure. Last saw [facility] in 4/10/2023. Continues on statin, aspirin, and plavix therapy. Has follow up scheduled for 10/11/2023.    History of seizure disorder. She is not on any anti-seizure medications. She last had a seizure about 18 months ago.    She has been on clonazepam 1mg qhs for a few years for her anxiety and sleep. Since her home PCP closed down, she has started to wean herself down to 1mg every other night. She is okay with switching to something else for sleep. She has tried melatonin which didn't do much. Has not tried hydroxyzine or trazodone.    She has a known parathyroid adenoma and has surgery scheduled for 10/17/2023 for neck exploration with adenoma excision and possible thyroidectomy. Her nephrologist noticed her calcium level was high which is how they found it.    She follows gastroenterology for acid reflux and for a hernia. She reports her acid reflux symptoms are well controlled.    Follows Nephrology for kidney disease and increased  "GFR.    Follows Ortho for back pain and sciatica. She has gotten injections in the past from them.      Objective   Vital Signs:   Vitals:    09/21/23 1248   BP: 128/80   Pulse: 87   Temp: 97.3 °F (36.3 °C)   SpO2: 97%   Weight: 98.4 kg (217 lb)   Height: 152.4 cm (60\")       Wt Readings from Last 3 Encounters:   09/21/23 98.4 kg (217 lb)   04/10/23 93.9 kg (207 lb)   01/19/23 95.3 kg (210 lb 1.6 oz)     BP Readings from Last 3 Encounters:   09/21/23 128/80   04/10/23 121/80   01/26/23 140/66         Physical Exam  Vitals and nursing note reviewed.   Constitutional:       Appearance: Normal appearance.   Cardiovascular:      Rate and Rhythm: Normal rate and regular rhythm.   Pulmonary:      Effort: Pulmonary effort is normal. No respiratory distress.      Breath sounds: Normal breath sounds. No wheezing.   Abdominal:      General: Bowel sounds are normal. There is no distension.   Skin:     General: Skin is warm and dry.   Neurological:      Mental Status: She is alert.   Psychiatric:         Mood and Affect: Mood normal.         Behavior: Behavior normal.          Result Review :  The following data was reviewed by: Williams Sage MD on 09/21/2023:  No visits with results within 1 Month(s) from this visit.   Latest known visit with results is:   Lab on 06/27/2023   Component Date Value    Glucose 06/27/2023 128 (H)     BUN 06/27/2023 24 (H)     Creatinine 06/27/2023 1.44 (H)     Sodium 06/27/2023 139     Potassium 06/27/2023 4.5     Chloride 06/27/2023 105     CO2 06/27/2023 22.5     Calcium 06/27/2023 10.7 (H)     Total Protein 06/27/2023 7.7     Albumin 06/27/2023 4.1     ALT (SGPT) 06/27/2023 13     AST (SGOT) 06/27/2023 11     Alkaline Phosphatase 06/27/2023 106     Total Bilirubin 06/27/2023 0.4     Globulin 06/27/2023 3.6     A/G Ratio 06/27/2023 1.1     BUN/Creatinine Ratio 06/27/2023 16.7     Anion Gap 06/27/2023 11.5     eGFR 06/27/2023 40.7 (L)     Phosphorus 06/27/2023 3.0     25 Hydroxy, Vitamin D " 06/27/2023 26.6 (L)     WBC 06/27/2023 13.05 (H)     RBC 06/27/2023 3.89     Hemoglobin 06/27/2023 11.7 (L)     Hematocrit 06/27/2023 34.8     MCV 06/27/2023 89.5     MCH 06/27/2023 30.1     MCHC 06/27/2023 33.6     RDW 06/27/2023 12.6     RDW-SD 06/27/2023 40.9     MPV 06/27/2023 11.0     Platelets 06/27/2023 361     Neutrophil % 06/27/2023 70.1     Lymphocyte % 06/27/2023 21.8     Monocyte % 06/27/2023 6.0     Eosinophil % 06/27/2023 0.6     Basophil % 06/27/2023 0.6     Immature Grans % 06/27/2023 0.9 (H)     Neutrophils, Absolute 06/27/2023 9.14 (H)     Lymphocytes, Absolute 06/27/2023 2.85     Monocytes, Absolute 06/27/2023 0.78     Eosinophils, Absolute 06/27/2023 0.08     Basophils, Absolute 06/27/2023 0.08     Immature Grans, Absolute 06/27/2023 0.12 (H)     nRBC 06/27/2023 0.0        Procedures        Assessment and Plan   Diagnoses and all orders for this visit:    1. Immunization counseling (Primary)  -     Tdap Vaccine => 6yo IM (BOOSTRIX)  -     Fluzone (or Fluarix & Flulaval for VFC) >6 Mos ([id number])    2. Health maintenance examination    3. Breast cancer screening by mammogram  -     Mammo Screening Bilateral With CAD; Future    4. Anxiety    5. Insomnia, unspecified type    6. Multiple sclerosis    7. Coronary artery disease involving native coronary artery of native heart without angina pectoris    8. Encounter for immunization  -     Fluzone (or Fluarix & Flulaval for VFC) >6 Mos ([id number])    9. High risk medication use  -     Urine Drug Screen - Urine, Clean Catch; Future  -     Urine Drug Screen - Urine, Clean Catch      Discussed that I'd like to switch her off clonazepam to something safer for her insomnia and anxiety. She signed controlled substance agreement and gave urine - assuming all looks good I will send her a 2-week script for clonazepam 0.5mg every other night prn to wean her off. Then we can start either hydroxyzine or trazodone for insomnia.    Reviewed specialist notes and  upcoming appointments with patient. She has a good grasp of her medical conditions and medications.    Class 3 Severe Obesity (BMI >=40). Obesity-related health conditions include the following: GERD. Obesity is unchanged. BMI is is above average; BMI management plan is completed. We discussed portion control and increasing exercise.    Continue to work on sleep hygiene. Try to go to sleep and wake up at the same times each day, as keeping a consistent schedule helps your body get used to sleeping at those times. Avoid distractions in the bedroom - keep the bedroom dark and quiet. Blackout curtains for your windows can be helpful. Avoid using electronics such as your cell phone or watching TV while in bed. Avoid any caffeine (such as coffee and soda) after noon.     Health Maintenance Due   Topic Date Due    ZOSTER VACCINE (1 of 2) Never done    MAMMOGRAM  07/08/2016    ANNUAL WELLNESS VISIT  Never done     Advised she get shingles vaccine at pharmacy.    FOLLOW UP  Return in about 3 months (around 12/21/2023).  Patient was given instructions and counseling regarding her condition or for health maintenance advice. Please see specific information pulled into the AVS if appropriate.       Williams Sage MD  09/21/23  13:34 EDT    CURRENT & DISCONTINUED MEDICATIONS  Current Outpatient Medications   Medication Instructions    aspirin 81 mg, Oral, Daily    atorvastatin (LIPITOR) 40 mg, Oral, Every Night at Bedtime    carvedilol (COREG) 12.5 MG tablet TAKE 1 TABLET BY MOUTH  EVERY 12 HOURS    clonazePAM (KLONOPIN) 1 mg, Oral, 2 Times Daily PRN    clopidogrel (PLAVIX) 75 mg, Oral, Daily    Diroximel Fumarate, Starter, (Vumerity, Starter,) 231 MG capsule delayed-release Oral    FLUoxetine (PROZAC) 80 mg, Oral, Daily    furosemide (LASIX) 40 mg, Oral, Daily    gabapentin (NEURONTIN) 300 mg, Oral, 4 Times Daily    losartan (COZAAR) 25 mg, Oral, Every 24 Hours Scheduled    meloxicam (MOBIC) 15 mg, Oral, Daily    modafinil  (PROVIGIL) 100 MG tablet Every 24 Hours    nitrofurantoin, macrocrystal-monohydrate, (MACROBID) 100 MG capsule nitrofurantoin monohydrate/macrocrystals 100 mg capsule   TAKE ONE CAPSULE BY MOUTH TWICE DAILY FOR 5 DAYS    OLANZapine (ZYPREXA) 2.5 mg, Oral, Nightly    ondansetron (ZOFRAN) 4 MG tablet 1 tablet, Oral, Every 8 Hours PRN    pantoprazole (PROTONIX) 40 mg, Oral, Daily    pramipexole (MIRAPEX) 0.25 mg, Oral, Every Night at Bedtime    tiZANidine (ZANAFLEX) 2 mg, Oral, 3 Times Daily PRN, for muscle spams       Medications Discontinued During This Encounter   Medication Reason    omeprazole (priLOSEC) 40 MG capsule *Therapy completed

## 2023-09-22 DIAGNOSIS — F41.9 ANXIETY: ICD-10-CM

## 2023-09-22 DIAGNOSIS — G47.00 INSOMNIA, UNSPECIFIED TYPE: Primary | ICD-10-CM

## 2023-09-22 RX ORDER — HYDROXYZINE HYDROCHLORIDE 25 MG/1
25 TABLET, FILM COATED ORAL 3 TIMES DAILY PRN
Qty: 90 TABLET | Refills: 0 | Status: SHIPPED | OUTPATIENT
Start: 2023-09-22 | End: 2023-12-21

## 2023-09-22 RX ORDER — CLONAZEPAM 1 MG/1
0.5 TABLET ORAL NIGHTLY PRN
Qty: 7 TABLET | Refills: 0 | Status: SHIPPED | OUTPATIENT
Start: 2023-09-22 | End: 2023-10-06

## 2023-09-26 ENCOUNTER — TRANSCRIBE ORDERS (OUTPATIENT)
Dept: ADMINISTRATIVE | Facility: HOSPITAL | Age: 63
End: 2023-09-26
Payer: MEDICARE

## 2023-09-26 DIAGNOSIS — Z12.31 SCREENING MAMMOGRAM FOR BREAST CANCER: Primary | ICD-10-CM

## 2023-10-02 ENCOUNTER — TELEPHONE (OUTPATIENT)
Dept: FAMILY MEDICINE CLINIC | Facility: CLINIC | Age: 63
End: 2023-10-02
Payer: MEDICARE

## 2023-10-02 ENCOUNTER — OFFICE VISIT (OUTPATIENT)
Dept: FAMILY MEDICINE CLINIC | Facility: CLINIC | Age: 63
End: 2023-10-02
Payer: MEDICARE

## 2023-10-02 VITALS
WEIGHT: 215 LBS | HEIGHT: 60 IN | TEMPERATURE: 97.3 F | OXYGEN SATURATION: 100 % | HEART RATE: 68 BPM | BODY MASS INDEX: 42.21 KG/M2

## 2023-10-02 DIAGNOSIS — R30.0 DYSURIA: Primary | ICD-10-CM

## 2023-10-02 PROBLEM — M51.36 DEGENERATION OF LUMBAR INTERVERTEBRAL DISC: Status: ACTIVE | Noted: 2022-08-29

## 2023-10-02 PROBLEM — M81.0 OSTEOPOROSIS: Status: ACTIVE | Noted: 2023-03-09

## 2023-10-02 PROBLEM — N17.9 AKI (ACUTE KIDNEY INJURY): Status: RESOLVED | Noted: 2020-04-09 | Resolved: 2023-10-02

## 2023-10-02 PROBLEM — K22.70 BARRETT'S ESOPHAGUS: Status: ACTIVE | Noted: 2023-10-02

## 2023-10-02 PROBLEM — R79.89 SERUM CREATININE RAISED: Status: ACTIVE | Noted: 2021-01-18

## 2023-10-02 PROBLEM — M47.816 ARTHROPATHY OF LUMBAR FACET JOINT: Status: ACTIVE | Noted: 2022-09-26

## 2023-10-02 PROBLEM — K21.9 GASTROESOPHAGEAL REFLUX DISEASE: Status: ACTIVE | Noted: 2022-07-26

## 2023-10-02 PROBLEM — M53.3 SACROILIAC JOINT PAIN: Status: ACTIVE | Noted: 2023-03-06

## 2023-10-02 PROBLEM — K63.5 BENIGN COLONIC POLYP: Status: ACTIVE | Noted: 2023-10-02

## 2023-10-02 PROBLEM — I50.9 CONGESTIVE HEART FAILURE: Status: ACTIVE | Noted: 2021-01-18

## 2023-10-02 PROBLEM — K46.9 ABDOMINAL HERNIA: Status: RESOLVED | Noted: 2023-10-02 | Resolved: 2023-10-02

## 2023-10-02 PROBLEM — E55.9 VITAMIN D DEFICIENCY: Status: ACTIVE | Noted: 2021-05-18

## 2023-10-02 PROBLEM — K31.84 GASTROPARESIS: Status: ACTIVE | Noted: 2023-10-02

## 2023-10-02 PROBLEM — D63.1 ANEMIA IN CHRONIC KIDNEY DISEASE: Status: ACTIVE | Noted: 2022-12-22

## 2023-10-02 PROBLEM — N18.9 ANEMIA IN CHRONIC KIDNEY DISEASE: Status: ACTIVE | Noted: 2022-12-22

## 2023-10-02 PROBLEM — M54.16 LUMBAR RADICULOPATHY: Status: ACTIVE | Noted: 2022-11-11

## 2023-10-02 LAB
BILIRUB BLD-MCNC: NEGATIVE MG/DL
CLARITY, POC: ABNORMAL
COLOR UR: YELLOW
EXPIRATION DATE: ABNORMAL
GLUCOSE UR STRIP-MCNC: NEGATIVE MG/DL
KETONES UR QL: NEGATIVE
LEUKOCYTE EST, POC: ABNORMAL
Lab: ABNORMAL
NITRITE UR-MCNC: NEGATIVE MG/ML
PH UR: 5 [PH] (ref 5–8)
PROT UR STRIP-MCNC: ABNORMAL MG/DL
RBC # UR STRIP: ABNORMAL /UL
SP GR UR: 1 (ref 1–1.03)
UROBILINOGEN UR QL: ABNORMAL

## 2023-10-02 PROCEDURE — 87077 CULTURE AEROBIC IDENTIFY: CPT | Performed by: FAMILY MEDICINE

## 2023-10-02 PROCEDURE — 87086 URINE CULTURE/COLONY COUNT: CPT | Performed by: FAMILY MEDICINE

## 2023-10-02 PROCEDURE — 99213 OFFICE O/P EST LOW 20 MIN: CPT | Performed by: FAMILY MEDICINE

## 2023-10-02 PROCEDURE — 87186 SC STD MICRODIL/AGAR DIL: CPT | Performed by: FAMILY MEDICINE

## 2023-10-02 PROCEDURE — 81003 URINALYSIS AUTO W/O SCOPE: CPT | Performed by: FAMILY MEDICINE

## 2023-10-02 RX ORDER — PHENAZOPYRIDINE HYDROCHLORIDE 200 MG/1
200 TABLET, FILM COATED ORAL 3 TIMES DAILY
Qty: 6 TABLET | Refills: 0 | Status: SHIPPED | OUTPATIENT
Start: 2023-10-02 | End: 2023-10-04

## 2023-10-02 RX ORDER — PRAMIPEXOLE DIHYDROCHLORIDE 0.5 MG/1
0.5 TABLET ORAL
COMMUNITY
Start: 2023-09-22

## 2023-10-02 RX ORDER — NITROFURANTOIN 25; 75 MG/1; MG/1
100 CAPSULE ORAL 2 TIMES DAILY
Qty: 14 CAPSULE | Refills: 0 | Status: SHIPPED | OUTPATIENT
Start: 2023-10-02 | End: 2023-10-09

## 2023-10-02 NOTE — PROGRESS NOTES
"Chief Complaint    Difficulty Urinating    Subjective      Falguni Minaya presents to [facility]    History of Present Illness    1.) DYSURIA : Onset - 3 days ago. Per patient - burning with urination. She also reports lower abdominal pain. No fevers or chills. No flank pain.    Objective     Vital Signs:     Pulse 68   Temp 97.3 °F (36.3 °C) (Temporal)   Ht 152.4 cm (60\")   Wt 97.5 kg (215 lb)   SpO2 100%   BMI 41.99 kg/m²       Physical Exam  Vitals reviewed.   Constitutional:       General: She is not in acute distress.     Appearance: Normal appearance. She is well-developed.   HENT:      Head: Normocephalic and atraumatic.      Right Ear: Hearing and external ear normal.      Left Ear: Hearing and external ear normal.      Nose: Nose normal.   Eyes:      General: Lids are normal.         Right eye: No discharge.         Left eye: No discharge.      Conjunctiva/sclera: Conjunctivae normal.   Pulmonary:      Effort: Pulmonary effort is normal.   Abdominal:      General: There is no distension.   Musculoskeletal:         General: No swelling.      Cervical back: Neck supple.   Skin:     Coloration: Skin is not jaundiced.      Findings: No erythema.   Neurological:      Mental Status: She is alert. Mental status is at baseline.   Psychiatric:         Mood and Affect: Mood and affect normal.         Thought Content: Thought content normal.     Assessment and Plan     Diagnoses and all orders for this visit:    1. Dysuria (Primary)  Comments:  UA reviewed. Start abx as noted. Urine sent for culture. Additional recs per review of urine culture.  Orders:  -     POCT urinalysis dipstick, automated  -     Urine Culture - Urine, Urine, Clean Catch    Other orders  -     nitrofurantoin, macrocrystal-monohydrate, (Macrobid) 100 MG capsule; Take 1 capsule by mouth 2 (Two) Times a Day for 7 days.  Dispense: 14 capsule; Refill: 0  -     phenazopyridine (Pyridium) 200 MG tablet; Take 1 " tablet by mouth 3 times a day for 2 days.  Dispense: 6 tablet; Refill: 0    Patient was given instructions and counseling regarding her condition or for health maintenance advice. Please see specific information pulled into the AVS if appropriate.

## 2023-10-02 NOTE — TELEPHONE ENCOUNTER
Caller: Falguni Minaya    Relationship: Self    Best call back number:[phone]     What medication are you requesting: SOMETHING FOR UTI    What are your current symptoms: PAINFUL URINATION    How long have you been experiencing symptoms: 3-4 DAYS    Have you had these symptoms before:    [x] Yes  [] No    Have you been treated for these symptoms before:   [x] Yes  [] No    If a prescription is needed, what is your preferred pharmacy and phone number: [facility] [address] [phone] [facility] [phone]

## 2023-10-04 LAB — BACTERIA SPEC AEROBE CULT: ABNORMAL

## 2023-10-05 ENCOUNTER — TELEPHONE (OUTPATIENT)
Dept: FAMILY MEDICINE CLINIC | Facility: CLINIC | Age: 63
End: 2023-10-05
Payer: MEDICARE

## 2023-10-05 DIAGNOSIS — N39.0 COMPLICATED UTI (URINARY TRACT INFECTION): Primary | ICD-10-CM

## 2023-10-05 RX ORDER — SULFAMETHOXAZOLE AND TRIMETHOPRIM 800; 160 MG/1; MG/1
1 TABLET ORAL 2 TIMES DAILY
Qty: 28 TABLET | Refills: 0 | Status: SHIPPED | OUTPATIENT
Start: 2023-10-05 | End: 2023-10-19

## 2023-10-05 NOTE — TELEPHONE ENCOUNTER
Patient's  called, she is in more pain, blood in urine, and severe low back pain, patient's  thinks it has went into the kidneys, please advise.

## 2023-10-05 NOTE — TELEPHONE ENCOUNTER
I called patient. She is having worse urinary pain, low back pain, some fever. Discussed return clinic visit vs ER. She will call to make an appointment tomorrow with me. I am switching her antibiotic to something that covers her urine culture result. Her urine grew Proteus resistant to Nitrofurantoin , I am switching her to Bactrim which the Proteus was susceptible to. Will treat for 14 days given likely complicated UTI.    Discussed that if symptoms worsen, to go directly to the ER. Otherwise see me in clinic tomorrow. Patient verbalized understanding.

## 2023-10-11 ENCOUNTER — APPOINTMENT (OUTPATIENT)
Dept: CT IMAGING | Facility: HOSPITAL | Age: 63
End: 2023-10-11
Payer: MEDICARE

## 2023-10-11 ENCOUNTER — HOSPITAL ENCOUNTER (EMERGENCY)
Facility: HOSPITAL | Age: 63
Discharge: HOME | End: 2023-10-11
Attending: EMERGENCY MEDICINE | Admitting: EMERGENCY MEDICINE
Payer: MEDICARE

## 2023-10-11 VITALS
BODY MASS INDEX: 41.38 KG/M2 | WEIGHT: 210.76 LBS | SYSTOLIC BLOOD PRESSURE: 143 MMHG | OXYGEN SATURATION: 97 % | RESPIRATION RATE: 20 BRPM | TEMPERATURE: 98.1 F | HEIGHT: 60 IN | HEART RATE: 85 BPM | DIASTOLIC BLOOD PRESSURE: 77 MMHG

## 2023-10-11 DIAGNOSIS — R74.8 ELEVATED LIPASE: ICD-10-CM

## 2023-10-11 DIAGNOSIS — M54.9 MUSCULOSKELETAL BACK PAIN: Primary | ICD-10-CM

## 2023-10-11 LAB
ALBUMIN SERPL-MCNC: 3.4 G/DL (ref 3.5–5.2)
ALBUMIN/GLOB SERPL: 0.7 G/DL
ALP SERPL-CCNC: 158 U/L (ref 39–117)
ALT SERPL W P-5'-P-CCNC: 8 U/L (ref 1–33)
ANION GAP SERPL CALCULATED.3IONS-SCNC: 9.8 MMOL/L (ref 5–15)
AST SERPL-CCNC: 11 U/L (ref 1–32)
BACTERIA UR QL AUTO: ABNORMAL /HPF
BASOPHILS # BLD AUTO: 0.06 10*3/MM3 (ref 0–0.2)
BASOPHILS NFR BLD AUTO: 0.7 % (ref 0–1.5)
BILIRUB SERPL-MCNC: 0.2 MG/DL (ref 0–1.2)
BILIRUB UR QL STRIP: NEGATIVE
BUN SERPL-MCNC: 20 MG/DL (ref 8–23)
BUN/CREAT SERPL: 18.3 (ref 7–25)
CALCIUM SPEC-SCNC: 10.6 MG/DL (ref 8.6–10.5)
CHLORIDE SERPL-SCNC: 105 MMOL/L (ref 98–107)
CLARITY UR: CLEAR
CO2 SERPL-SCNC: 22.2 MMOL/L (ref 22–29)
COLOR UR: YELLOW
CREAT SERPL-MCNC: 1.09 MG/DL (ref 0.57–1)
D-LACTATE SERPL-SCNC: 1.2 MMOL/L (ref 0.5–2)
DEPRECATED RDW RBC AUTO: 46.9 FL (ref 37–54)
EGFRCR SERPLBLD CKD-EPI 2021: 56.8 ML/MIN/1.73
EOSINOPHIL # BLD AUTO: 0.13 10*3/MM3 (ref 0–0.4)
EOSINOPHIL NFR BLD AUTO: 1.5 % (ref 0.3–6.2)
ERYTHROCYTE [DISTWIDTH] IN BLOOD BY AUTOMATED COUNT: 13.5 % (ref 12.3–15.4)
GLOBULIN UR ELPH-MCNC: 4.6 GM/DL
GLUCOSE SERPL-MCNC: 126 MG/DL (ref 65–99)
GLUCOSE UR STRIP-MCNC: NEGATIVE MG/DL
HCT VFR BLD AUTO: 34.6 % (ref 34–46.6)
HGB BLD-MCNC: 10.2 G/DL (ref 12–15.9)
HGB UR QL STRIP.AUTO: NEGATIVE
HOLD SPECIMEN: NORMAL
HOLD SPECIMEN: NORMAL
HYALINE CASTS UR QL AUTO: ABNORMAL /LPF
IMM GRANULOCYTES # BLD AUTO: 0.07 10*3/MM3 (ref 0–0.05)
IMM GRANULOCYTES NFR BLD AUTO: 0.8 % (ref 0–0.5)
KETONES UR QL STRIP: NEGATIVE
LEUKOCYTE ESTERASE UR QL STRIP.AUTO: ABNORMAL
LIPASE SERPL-CCNC: 415 U/L (ref 13–60)
LYMPHOCYTES # BLD AUTO: 2.01 10*3/MM3 (ref 0.7–3.1)
LYMPHOCYTES NFR BLD AUTO: 22.5 % (ref 19.6–45.3)
MCH RBC QN AUTO: 27.5 PG (ref 26.6–33)
MCHC RBC AUTO-ENTMCNC: 29.5 G/DL (ref 31.5–35.7)
MCV RBC AUTO: 93.3 FL (ref 79–97)
MONOCYTES # BLD AUTO: 0.51 10*3/MM3 (ref 0.1–0.9)
MONOCYTES NFR BLD AUTO: 5.7 % (ref 5–12)
NEUTROPHILS NFR BLD AUTO: 6.14 10*3/MM3 (ref 1.7–7)
NEUTROPHILS NFR BLD AUTO: 68.8 % (ref 42.7–76)
NITRITE UR QL STRIP: NEGATIVE
NRBC BLD AUTO-RTO: 0 /100 WBC (ref 0–0.2)
PH UR STRIP.AUTO: <=5 [PH] (ref 5–8)
PLATELET # BLD AUTO: 557 10*3/MM3 (ref 140–450)
PMV BLD AUTO: 9.9 FL (ref 6–12)
POTASSIUM SERPL-SCNC: 4.5 MMOL/L (ref 3.5–5.2)
PROT SERPL-MCNC: 8 G/DL (ref 6–8.5)
PROT UR QL STRIP: NEGATIVE
RBC # BLD AUTO: 3.71 10*6/MM3 (ref 3.77–5.28)
RBC # UR STRIP: ABNORMAL /HPF
REF LAB TEST METHOD: ABNORMAL
SODIUM SERPL-SCNC: 137 MMOL/L (ref 136–145)
SP GR UR STRIP: >1.03 (ref 1–1.03)
SQUAMOUS #/AREA URNS HPF: ABNORMAL /HPF
UROBILINOGEN UR QL STRIP: ABNORMAL
WBC # UR STRIP: ABNORMAL /HPF
WBC NRBC COR # BLD: 8.92 10*3/MM3 (ref 3.4–10.8)
WHOLE BLOOD HOLD COAG: NORMAL
WHOLE BLOOD HOLD SPECIMEN: NORMAL

## 2023-10-11 PROCEDURE — 25010000002 ONDANSETRON PER 1 MG

## 2023-10-11 PROCEDURE — 25010000002 KETOROLAC TROMETHAMINE PER 15 MG

## 2023-10-11 PROCEDURE — 85025 COMPLETE CBC W/AUTO DIFF WBC: CPT | Performed by: EMERGENCY MEDICINE

## 2023-10-11 PROCEDURE — 96372 THER/PROPH/DIAG INJ SC/IM: CPT

## 2023-10-11 PROCEDURE — 99285 EMERGENCY DEPT VISIT HI MDM: CPT

## 2023-10-11 PROCEDURE — 81001 URINALYSIS AUTO W/SCOPE: CPT | Performed by: EMERGENCY MEDICINE

## 2023-10-11 PROCEDURE — 83605 ASSAY OF LACTIC ACID: CPT | Performed by: EMERGENCY MEDICINE

## 2023-10-11 PROCEDURE — 74177 CT ABD & PELVIS W/CONTRAST: CPT

## 2023-10-11 PROCEDURE — 25810000003 SODIUM CHLORIDE 0.9 % SOLUTION

## 2023-10-11 PROCEDURE — 96374 THER/PROPH/DIAG INJ IV PUSH: CPT

## 2023-10-11 PROCEDURE — 80053 COMPREHEN METABOLIC PANEL: CPT | Performed by: EMERGENCY MEDICINE

## 2023-10-11 PROCEDURE — 83690 ASSAY OF LIPASE: CPT | Performed by: EMERGENCY MEDICINE

## 2023-10-11 PROCEDURE — 25510000001 IOPAMIDOL PER 1 ML: Performed by: EMERGENCY MEDICINE

## 2023-10-11 RX ORDER — CYCLOBENZAPRINE HCL 10 MG
10 TABLET ORAL 3 TIMES DAILY
Qty: 20 TABLET | Refills: 0 | Status: SHIPPED | OUTPATIENT
Start: 2023-10-11 | End: 2023-10-13

## 2023-10-11 RX ORDER — KETOROLAC TROMETHAMINE 30 MG/ML
30 INJECTION, SOLUTION INTRAMUSCULAR; INTRAVENOUS ONCE
Status: COMPLETED | OUTPATIENT
Start: 2023-10-11 | End: 2023-10-11

## 2023-10-11 RX ORDER — SODIUM CHLORIDE 0.9 % (FLUSH) 0.9 %
10 SYRINGE (ML) INJECTION AS NEEDED
Status: DISCONTINUED | OUTPATIENT
Start: 2023-10-11 | End: 2023-10-11 | Stop reason: HOSPADM

## 2023-10-11 RX ORDER — ONDANSETRON 2 MG/ML
4 INJECTION INTRAMUSCULAR; INTRAVENOUS ONCE
Status: COMPLETED | OUTPATIENT
Start: 2023-10-11 | End: 2023-10-11

## 2023-10-11 RX ADMIN — ONDANSETRON 4 MG: 2 INJECTION INTRAMUSCULAR; INTRAVENOUS at 11:25

## 2023-10-11 RX ADMIN — KETOROLAC TROMETHAMINE 30 MG: 60 INJECTION, SOLUTION INTRAMUSCULAR at 11:25

## 2023-10-11 RX ADMIN — SODIUM CHLORIDE 1000 ML: 9 INJECTION, SOLUTION INTRAVENOUS at 11:25

## 2023-10-11 RX ADMIN — IOPAMIDOL 100 ML: 755 INJECTION, SOLUTION INTRAVENOUS at 11:40

## 2023-10-11 NOTE — ED PROVIDER NOTES
Time: 10:59 AM EDT  Date of encounter:  10/11/2023  Independent Historian/Clinical History and Information was obtained by:   Patient    History is limited by: N/A    Chief Complaint   Patient presents with    Flank Pain         History of Present Illness:  Patient is a 63 y.o. year old female who presents to the emergency department for evaluation of bilateral flank pain for the past week.  Patient states that she saw her PCP a week ago was diagnosed with a UTI due to dysuria.  She has been taking antibiotics (Bactrim).  She admits to dysuria, nausea and chills.  Patient denies fall or injury.  She denies fever and vomiting.    Patient has a history of MS and is currently switching to a new medication.  She denies alcohol use, she denies diabetic medication.    Patient Care Team  Primary Care Provider: Williams Sage MD    Past Medical History:     Allergies   Allergen Reactions    Codeine Hives     Hyperactivity, nausea    Morphine Hives     Hyperactivity, nausea     Past Medical History:   Diagnosis Date    Abdominal hernia     Acute ST elevation myocardial infarction (STEMI) due to occlusion of right coronary artery 02/24/2020    Coronary artery disease     Dyspepsia     GERD (gastroesophageal reflux disease)     Multiple sclerosis      Past Surgical History:   Procedure Laterality Date    APPENDECTOMY      CARDIAC CATHETERIZATION      CARDIAC CATHETERIZATION N/A 2/24/2020    Procedure: Left Heart Cath;  Surgeon: Marlon Zamudio MD;  Location: [facility] INVASIVE LOCATION;  Service: Cardiovascular;  Laterality: N/A;    CARDIAC CATHETERIZATION N/A 2/24/2020    Procedure: Stent LATRICIA coronary;  Surgeon: Marlon Zamudio MD;  Location: [facility] CATH INVASIVE LOCATION;  Service: Cardiovascular;  Laterality: N/A;    CARDIAC CATHETERIZATION  2/24/2020    Procedure: Percutaneous Manual Thrombectomy;  Surgeon: Marlon Zamudio MD;  Location: [facility] CATH INVASIVE LOCATION;  Service: Cardiovascular;;     CARDIAC CATHETERIZATION N/A 2/24/2020    Procedure: Coronary angiography;  Surgeon: Marlon Zamudio MD;  Location:  [facility] CATH INVASIVE LOCATION;  Service: Cardiovascular;  Laterality: N/A;    CARDIAC CATHETERIZATION N/A 2/24/2020    Procedure: Left ventriculography;  Surgeon: Marlon Zamudio MD;  Location:  [facility] CATH INVASIVE LOCATION;  Service: Cardiovascular;  Laterality: N/A;    CHOLECYSTECTOMY      COLONOSCOPY N/A 2/29/2020    Procedure: COLONOSCOPY to  cecum:;  Surgeon: Krystal Sarabia MD;  Location:  [facility];  Service: Gastroenterology;  Laterality: N/A;  pre:  anemia and abd pain  post:  hemorrhoids, tortuous colon    ENDOSCOPY N/A 2/29/2020    Procedure: ESOPHAGOGASTRODUODENOSCOPY  with biopsies;  Surgeon: Krystal Sarabia MD;  Location:  [facility];  Service: Gastroenterology;  Laterality: N/A;  pre:  anemia and abd pain  post:  mild gastritis,     HYSTERECTOMY      TONSILLECTOMY       Family History   Problem Relation Age of Onset    Thyroid disease Mother     Hypertension Sister     Coronary artery disease Brother        Home Medications:  Prior to Admission medications    Medication Sig Start Date End Date Taking? Authorizing Provider   aspirin 81 MG EC tablet Take 1 tablet by mouth Daily. 3/7/20   Marlon Zamudio MD   atorvastatin (LIPITOR) 40 MG tablet TAKE 1 TABLET BY MOUTH  EVERY NIGHT AT BEDTIME 5/17/23   Keith Riley MD   carvedilol (COREG) 12.5 MG tablet TAKE 1 TABLET BY MOUTH  EVERY 12 HOURS 5/30/23   Shaila Urban APRN   clonazePAM (KlonoPIN) 1 MG tablet Take 0.5 tablets by mouth At Night As Needed (Anxiety or Insomnia) for up to 14 days. 9/22/23 10/6/23  Williams Sage MD   clopidogrel (PLAVIX) 75 MG tablet TAKE 1 TABLET BY MOUTH  DAILY 5/8/23   Keith Riley MD   Diroximel Fumarate, Starter, (Vumerity, Starter,) 231 MG capsule delayed-release Take  by mouth.    Provider, MD Rosas   FLUoxetine (PROzac) 40 MG capsule Take 2 capsules by  mouth Daily. 22   Emergency, Nurse Cong RN   furosemide (LASIX) 40 MG tablet TAKE 1 TABLET BY MOUTH  DAILY 23   Keith Riley MD   gabapentin (NEURONTIN) 300 MG capsule Take 1 capsule by mouth 4 (Four) Times a Day.    Rosas Meier MD   hydrOXYzine (ATARAX) 25 MG tablet Take 1 tablet by mouth 3 (Three) Times a Day As Needed (Anxiety or Insomnia) for up to 90 days. 23  Williams Sage MD   losartan (COZAAR) 25 MG tablet TAKE 1 TABLET BY MOUTH  DAILY 23   Keith Riley MD   meloxicam (MOBIC) 15 MG tablet Take 1 tablet by mouth Daily. 22   Emergency, Nurse Cong RN   modafinil (PROVIGIL) 100 MG tablet Daily.    Rosas Meier MD   OLANZapine (zyPREXA) 2.5 MG tablet Take 1 tablet by mouth Every Night.    Rosas Meier MD   ondansetron (ZOFRAN) 4 MG tablet Take 1 tablet by mouth Every 8 (Eight) Hours As Needed. 22   Emergency, Nurse Cong RN   pantoprazole (PROTONIX) 40 MG EC tablet Take 1 tablet by mouth Daily. 22   Nurse Cong Corrales RN   pramipexole (MIRAPEX) 0.5 MG tablet Take 1 tablet by mouth every night at bedtime. 23   Rosas Meier MD   sulfamethoxazole-trimethoprim (BACTRIM DS,SEPTRA DS) 800-160 MG per tablet Take 1 tablet by mouth 2 (Two) Times a Day for 14 days. 10/5/23 10/19/23  Williams Sage MD   tiZANidine (ZANAFLEX) 2 MG tablet Take 1 tablet by mouth 3 (Three) Times a Day As Needed. for muscle spams 22   Emergency, Nurse Cong RN        Social History:   Social History     Tobacco Use    Smoking status: Former     Packs/day: 0.50     Years: 20.00     Additional pack years: 0.00     Total pack years: 10.00     Types: Cigarettes     Start date:      Quit date: 2010     Years since quittin.7    Smokeless tobacco: Never    Tobacco comments:     CAFFEINE USE: 2 CUPS COFFEE DAILY   Vaping Use    Vaping Use: Never used   Substance Use Topics    Alcohol use: Not Currently    Drug use: Never  "        Review of Systems:  Review of Systems   Constitutional:  Positive for chills. Negative for fever.   HENT: Negative.     Eyes: Negative.    Respiratory: Negative.     Cardiovascular: Negative.    Gastrointestinal:  Positive for abdominal pain and nausea.   Endocrine: Negative.    Genitourinary:  Positive for dysuria and flank pain. Negative for hematuria.   Skin: Negative.    Allergic/Immunologic: Negative.    Neurological: Negative.    Hematological: Negative.    Psychiatric/Behavioral: Negative.          Physical Exam:  /77   Pulse 85   Temp 98.1 °F (36.7 °C)   Resp 20   Ht 152.4 cm (60\")   Wt 95.6 kg (210 lb 12.2 oz)   SpO2 97%   BMI 41.16 kg/m²         Physical Exam  Vitals and nursing note reviewed.   Constitutional:       General: She is not in acute distress.     Appearance: Normal appearance. She is normal weight. She is not ill-appearing or toxic-appearing.   HENT:      Head: Normocephalic and atraumatic.      Nose: Nose normal.      Mouth/Throat:      Mouth: Mucous membranes are moist.   Eyes:      Extraocular Movements: Extraocular movements intact.      Conjunctiva/sclera: Conjunctivae normal.      Pupils: Pupils are equal, round, and reactive to light.   Cardiovascular:      Rate and Rhythm: Normal rate and regular rhythm.      Heart sounds: Normal heart sounds.   Pulmonary:      Effort: Pulmonary effort is normal.      Breath sounds: Normal breath sounds.   Abdominal:      General: Abdomen is flat.      Palpations: Abdomen is soft.      Tenderness: There is abdominal tenderness. There is right CVA tenderness and left CVA tenderness. There is no guarding or rebound.   Musculoskeletal:         General: Normal range of motion.      Cervical back: Normal range of motion and neck supple.   Skin:     General: Skin is warm and dry.   Neurological:      General: No focal deficit present.      Mental Status: She is alert and oriented to person, place, and time.   Psychiatric:         Mood " and Affect: Mood normal.         Behavior: Behavior normal.                  Procedures:  Procedures      Medical Decision Making:      Comorbidities that affect care:    MS, Coronary Artery Disease    External Notes reviewed:    Previous Clinic Note: PCP visit on October 2, 2023 where she was seen for dysuria      The following orders were placed and all results were independently analyzed by me:  Orders Placed This Encounter   Procedures    CT Abdomen Pelvis With Contrast    [facility] Draw    Comprehensive Metabolic Panel    Lipase    Urinalysis With Microscopic If Indicated (No Culture) - Urine, Clean Catch    Lactic Acid, Plasma    CBC Auto Differential    Urinalysis, Microscopic Only - Urine, Clean Catch    NPO Diet NPO Type: Strict NPO    Undress & Gown    Insert Peripheral IV    CBC & Differential    Green Top (Gel)    Lavender Top    Gold Top - SST    Light Blue Top       Medications Given in the Emergency Department:  Medications   sodium chloride 0.9 % flush 10 mL (has no administration in time range)   ondansetron (ZOFRAN) injection 4 mg (4 mg Intravenous Given 10/11/23 1125)   ketorolac (TORADOL) injection 30 mg (30 mg Intramuscular Given 10/11/23 1125)   sodium chloride 0.9 % bolus 1,000 mL (0 mL Intravenous Stopped 10/11/23 1155)   iopamidol (ISOVUE-370) 76 % injection 100 mL (100 mL Intravenous Given 10/11/23 1140)        ED Course:    The patient was initially evaluated in the triage area where orders were placed. The patient was later dispositioned by Daysi Darnell PA-C.      The patient was advised to stay for completion of workup which includes but is not limited to communication of labs and radiological results, reassessment and plan. The patient was advised that leaving prior to disposition by a provider could result in critical findings that are not communicated to the patient.     ED Course as of 10/11/23 1414   Wed Oct 11, 2023   1411 Patient states her back pain has resolved. [AJ]       ED Course User Index  [AJ] Daysi Darnell PA-C       Labs:    Lab Results (last 24 hours)       Procedure Component Value Units Date/Time    CBC & Differential [[id number]]  (Abnormal) Collected: 10/11/23 1040    Specimen: Blood Updated: 10/11/23 1047    Narrative:      The following orders were created for panel order CBC & Differential.  Procedure                               Abnormality         Status                     ---------                               -----------         ------                     CBC Auto Differential[[id number]]        Abnormal            Final result                 Please view results for these tests on the individual orders.    Comprehensive Metabolic Panel [[id number]]  (Abnormal) Collected: 10/11/23 1040    Specimen: Blood Updated: 10/11/23 1103     Glucose 126 mg/dL      BUN 20 mg/dL      Creatinine 1.09 mg/dL      Sodium 137 mmol/L      Potassium 4.5 mmol/L      Chloride 105 mmol/L      CO2 22.2 mmol/L      Calcium 10.6 mg/dL      Total Protein 8.0 g/dL      Albumin 3.4 g/dL      ALT (SGPT) 8 U/L      AST (SGOT) 11 U/L      Alkaline Phosphatase 158 U/L      Total Bilirubin 0.2 mg/dL      Globulin 4.6 gm/dL      A/G Ratio 0.7 g/dL      BUN/Creatinine Ratio 18.3     Anion Gap 9.8 mmol/L      eGFR 56.8 mL/min/1.73     Narrative:      GFR Normal >60  Chronic Kidney Disease <60  Kidney Failure <15      Lipase [[id number]]  (Abnormal) Collected: 10/11/23 1040    Specimen: Blood Updated: 10/11/23 1109     Lipase 415 U/L     Lactic Acid, Plasma [[id number]]  (Normal) Collected: 10/11/23 1040    Specimen: Blood Updated: 10/11/23 1101     Lactate 1.2 mmol/L     CBC Auto Differential [[id number]]  (Abnormal) Collected: 10/11/23 1040    Specimen: Blood Updated: 10/11/23 1047     WBC 8.92 10*3/mm3      RBC 3.71 10*6/mm3      Hemoglobin 10.2 g/dL      Hematocrit 34.6 %      MCV 93.3 fL      MCH 27.5 pg      MCHC 29.5 g/dL      RDW 13.5 %      RDW-SD 46.9 fl      MPV 9.9 fL      Platelets  557 10*3/mm3      Neutrophil % 68.8 %      Lymphocyte % 22.5 %      Monocyte % 5.7 %      Eosinophil % 1.5 %      Basophil % 0.7 %      Immature Grans % 0.8 %      Neutrophils, Absolute 6.14 10*3/mm3      Lymphocytes, Absolute 2.01 10*3/mm3      Monocytes, Absolute 0.51 10*3/mm3      Eosinophils, Absolute 0.13 10*3/mm3      Basophils, Absolute 0.06 10*3/mm3      Immature Grans, Absolute 0.07 10*3/mm3      nRBC 0.0 /100 WBC     Urinalysis With Microscopic If Indicated (No Culture) - Urine, Clean Catch [[id number]]  (Abnormal) Collected: 10/11/23 1311    Specimen: Urine, Clean Catch Updated: 10/11/23 1344     Color, UA Yellow     Appearance, UA Clear     pH, UA <=5.0     Specific Gravity, UA >1.030     Glucose, UA Negative     Ketones, UA Negative     Bilirubin, UA Negative     Blood, UA Negative     Protein, UA Negative     Leuk Esterase, UA Trace     Nitrite, UA Negative     Urobilinogen, UA 0.2 E.U./dL    Urinalysis, Microscopic Only - Urine, Clean Catch [[id number]]  (Abnormal) Collected: 10/11/23 1311    Specimen: Urine, Clean Catch Updated: 10/11/23 1344     RBC, UA 0-2 /HPF      WBC, UA 6-12 /HPF      Bacteria, UA None Seen /HPF      Squamous Epithelial Cells, UA 0-2 /HPF      Hyaline Casts, UA 0-2 /LPF      Methodology Automated Microscopy             Imaging:    CT Abdomen Pelvis With Contrast    Result Date: 10/11/2023  PROCEDURE: CT ABDOMEN PELVIS W CONTRAST  COMPARISON: [facility], CT, ABDOMEN/PELVIS WITH CONTRAST, 12/12/2017, 15:51.  INDICATIONS: Bilateral flank pain/dysuria hematuria  TECHNIQUE: After obtaining the patient's consent, CT images were created with non-ionic intravenous contrast material.   PROTOCOL:   Standard imaging protocol performed    RADIATION:   DLP: 1179mGy*cm   Automated exposure control was utilized to minimize radiation dose. CONTRAST: 100cc Isovue 370 I.V.  FINDINGS:  There is a stable calcified right lower lobe pulmonary nodule.  There are stents within the  coronary arteries.  No pleural or pericardial fluid is identified.  Liver and spleen are unremarkable.  The gallbladder is surgically absent.  The pancreas appears normal.  Both adrenal glands are normal.  There are no renal stones identified.  There is an upper pole small right renal cyst.  No solid renal masses are identified.  No filling defects are identified in the collecting system.  The ureters are of normal course and caliber to the urinary bladder.  No dilated or thickened loops of small or large bowel are identified.  No pelvic masses or fluid collections are present.  The uterus is surgically absent.  The appendix is not identified and by history is absent.        1. No acute findings in the abdomen or pelvis. 2. No evidence of renal stone disease or solid renal masses. 3. Cholecystectomy, hysterectomy, and appendectomy.     Bhavik Avila MD       Electronically Signed and Approved By: Bhavik Avila MD on 10/11/2023 at 12:22                Differential Diagnosis and Discussion:      Abdominal Pain: Based on the patient's signs and symptoms, I considered abdominal aortic aneurysm, small bowel obstruction, pancreatitis, acute cholecystitis, acute appendecitis, peptic ulcer disease, gastritis, colitis, endocrine disorders, irritable bowel syndrome and other differential diagnosis an etiology of the patient's abdominal pain.  Back Pain: The patient presents with back pain. My differential diagnosis includes but is not limited to acute spinal epidural abscess, acute spinal epidural bleed, cauda equina syndrome, abdominal aortic aneurysm, aortic dissection, kidney stone, pyelonephritis, musculoskeletal back pain, spinal fracture, and osteoarthritis.   Dysuria: Differential diagnosis includes but is not limited to urethritis, cystitis, pyelonephritis, ureteral calculi, neoplasm, chemical irritant, urethral stricture, and trauma  Flank Pain: Differential diagnosis includes but is not limited to kidney stones,  pyelonephritis, musculoskeletal disorders, renal infarction, urinary tract infection, hydronephrosis, radiculopathy, aortic aneurysm, renal cell carcinoma.    All labs were reviewed and interpreted by me.  CT scan radiology impression was interpreted by me.    MDM     Amount and/or Complexity of Data Reviewed  Clinical lab tests: reviewed  Tests in the radiology section of CPT®: reviewed  Decide to obtain previous medical records or to obtain history from someone other than the patient: yes     The patient´s symptoms are consistent with musculoskeletal back pain. The patient is now resting comfortably, feels better, is alert, talkative, interactive and in no distress. The repeat examination is unremarkable and benign. The patient is neurologically intact and is ambulatory in the ED. The patient has no fever, no bowel or bladder incontinence, no saddle anesthesia, and is otherwise alert and well appearing. The history, physical exam, and diagnostics (if any) do not suggest the presence of acute spinal epidural abscess, acute spinal epidural bleed, cauda equina syndrome, abdominal aortic aneurysm, aortic dissection or other process requiring further testing, treatment or consultation in the emergency department. The vital signs have been stable. The patient's condition is stable and appropriate for discharge. The patient will pursue further outpatient evaluation with the primary care physician or other designated for consulting position as indicated in the discharge instructions.          Patient Care Considerations:    ANTIBIOTICS: I considered prescribing antibiotics as an outpatient however UA was negative      Consultants/Shared Management Plan:    None    Social Determinants of Health:    Patient has presented with family members who are responsible, reliable and will ensure follow up care.      Disposition and Care Coordination:    Discharged: I considered escalation of care by admitting this patient to the  hospital, however the patient has improved and is suitable and stable for discharge.    I have explained the patient´s condition, diagnoses and treatment plan based on the information available to me at this time. I have answered questions and addressed any concerns. The patient has a good  understanding of the patient´s diagnosis, condition, and treatment plan as can be expected at this point. The vital signs have been stable. The patient´s condition is stable and appropriate for discharge from the emergency department.      The patient will pursue further outpatient evaluation with the primary care physician or other designated or consulting physician as outlined in the discharge instructions. They are agreeable to this plan of care and follow-up instructions have been explained in detail. The patient has received these instructions in written format and have expressed an understanding of the discharge instructions. The patient is aware that any significant change in condition or worsening of symptoms should prompt an immediate return to this or the closest emergency department or call to 911.  I have explained discharge medications and the need for follow up with the patient/caretakers. This was also printed in the discharge instructions. Patient was discharged with the following medications and follow up:      Medication List      No changes were made to your prescriptions during this visit.      Williams Sage MD  [address] [place] [address] [phone]    In 3 days         Final diagnoses:   Musculoskeletal back pain   Elevated lipase        ED Disposition       ED Disposition   Discharge    Condition   Stable    Comment   --               This medical record created using voice recognition software.             Daysi Darnell PA-C  10/11/23 1503

## 2023-10-11 NOTE — DISCHARGE INSTRUCTIONS
Your CT was negative for any acute findings, no kidney stones or kidney infection seen no pancreatitis seen on your CT as well.  However your lipase was elevated, please follow-up with your PCP  Your urine was negative for UTI or kidney infection.  I have sent muscle relaxers to the pharmacy please take with Tylenol Motrin as needed

## 2023-10-13 ENCOUNTER — PATIENT ROUNDING (BHMG ONLY) (OUTPATIENT)
Dept: FAMILY MEDICINE CLINIC | Facility: CLINIC | Age: 63
End: 2023-10-13
Payer: MEDICARE

## 2023-10-13 RX ORDER — CYCLOBENZAPRINE HCL 10 MG
10 TABLET ORAL 3 TIMES DAILY PRN
COMMUNITY

## 2023-10-13 NOTE — PAT
SPOKE WITH ROSIE AT DR DISLA OFFICE REGARDING PT REPORTED WOULD NOT BE ABLE TO GET TESTING DONE PRIOR TO PROCEDURE ON 10/17/23. ADVISED PT WAS IN ER ON 10/11/23 WITH BACK/FLANK PAIN AND HAD SOME TESTING DONE AT THAT TIME BUT DID NOT HAVE PT/INR, PTT, OR CXR DONE. HGB WAS 10.2 LIPASE 415 ON 10/11/23 WITH ER VISIT. PER ROSIE [facility] AND [facility] OUT OF COUNTRY PRESENTLY BUT WOULD GIVE MESSAGE TO THEM MONDAY AM.

## 2023-10-13 NOTE — PROGRESS NOTES
My name is Reid Estrada      I am  with [facility]  [facility]  [address] [place] [address] DR [place] [id number] [phone].    I am sending this message to officially welcome you to our practice and ask about your recent visit.    Tell me about your visit with us. What things went well?      We're always looking for ways to make our patients' experiences even better. Do you have recommendations on ways we may improve?      Overall were you satisfied with your first visit to our practice?        I appreciate you taking the time to respond with me today. Is there anything else I can do for you?     Thank you, and have a great day.

## 2023-10-16 ENCOUNTER — ANESTHESIA EVENT (OUTPATIENT)
Dept: PERIOP | Facility: HOSPITAL | Age: 63
End: 2023-10-16
Payer: MEDICARE

## 2023-10-17 ENCOUNTER — HOSPITAL ENCOUNTER (OUTPATIENT)
Facility: HOSPITAL | Age: 63
Setting detail: OBSERVATION
Discharge: HOME | End: 2023-10-18
Attending: OTOLARYNGOLOGY | Admitting: OTOLARYNGOLOGY
Payer: MEDICARE

## 2023-10-17 ENCOUNTER — ANESTHESIA (OUTPATIENT)
Dept: PERIOP | Facility: HOSPITAL | Age: 63
End: 2023-10-17
Payer: MEDICARE

## 2023-10-17 ENCOUNTER — APPOINTMENT (OUTPATIENT)
Dept: GENERAL RADIOLOGY | Facility: HOSPITAL | Age: 63
End: 2023-10-17
Payer: MEDICARE

## 2023-10-17 DIAGNOSIS — E04.1 THYROID NODULE: ICD-10-CM

## 2023-10-17 DIAGNOSIS — E21.3 HYPERPARATHYROIDISM: ICD-10-CM

## 2023-10-17 DIAGNOSIS — G89.18 POSTOPERATIVE PAIN: Primary | ICD-10-CM

## 2023-10-17 DIAGNOSIS — D35.1 PARATHYROID ADENOMA: ICD-10-CM

## 2023-10-17 DIAGNOSIS — E83.52 HYPERCALCEMIA: ICD-10-CM

## 2023-10-17 LAB
ANION GAP SERPL CALCULATED.3IONS-SCNC: 10.1 MMOL/L (ref 5–15)
APTT PPP: 23.1 SECONDS (ref 24.2–34.2)
BUN SERPL-MCNC: 21 MG/DL (ref 8–23)
BUN/CREAT SERPL: 15.2 (ref 7–25)
CALCIUM SPEC-SCNC: 10.3 MG/DL (ref 8.6–10.5)
CALCIUM SPEC-SCNC: 10.4 MG/DL (ref 8.6–10.5)
CALCIUM SPEC-SCNC: 9.9 MG/DL (ref 8.6–10.5)
CHLORIDE SERPL-SCNC: 106 MMOL/L (ref 98–107)
CO2 SERPL-SCNC: 20.9 MMOL/L (ref 22–29)
CREAT SERPL-MCNC: 1.38 MG/DL (ref 0.57–1)
EGFRCR SERPLBLD CKD-EPI 2021: 42.8 ML/MIN/1.73
GLUCOSE SERPL-MCNC: 129 MG/DL (ref 65–99)
POTASSIUM SERPL-SCNC: 4.4 MMOL/L (ref 3.5–5.2)
PTH-INTACT SERPL-MCNC: 105.3 PG/ML (ref 15–65)
PTH-INTACT SERPL-MCNC: 17.5 PG/ML (ref 15–65)
SODIUM SERPL-SCNC: 137 MMOL/L (ref 136–145)

## 2023-10-17 PROCEDURE — 25010000002 ONDANSETRON PER 1 MG: Performed by: NURSE ANESTHETIST, CERTIFIED REGISTERED

## 2023-10-17 PROCEDURE — G0378 HOSPITAL OBSERVATION PER HR: HCPCS

## 2023-10-17 PROCEDURE — 85730 THROMBOPLASTIN TIME PARTIAL: CPT | Performed by: OTOLARYNGOLOGY

## 2023-10-17 PROCEDURE — 25010000002 FENTANYL CITRATE (PF) 50 MCG/ML SOLUTION: Performed by: NURSE ANESTHETIST, CERTIFIED REGISTERED

## 2023-10-17 PROCEDURE — 25010000002 DEXAMETHASONE PER 1 MG: Performed by: NURSE ANESTHETIST, CERTIFIED REGISTERED

## 2023-10-17 PROCEDURE — 25010000002 PROPOFOL 10 MG/ML EMULSION: Performed by: NURSE ANESTHETIST, CERTIFIED REGISTERED

## 2023-10-17 PROCEDURE — 25010000002 MIDAZOLAM PER 1MG: Performed by: ANESTHESIOLOGY

## 2023-10-17 PROCEDURE — 25010000002 SUGAMMADEX 200 MG/2ML SOLUTION: Performed by: NURSE ANESTHETIST, CERTIFIED REGISTERED

## 2023-10-17 PROCEDURE — 88331 PATH CONSLTJ SURG 1 BLK 1SPC: CPT | Performed by: OTOLARYNGOLOGY

## 2023-10-17 PROCEDURE — 83970 ASSAY OF PARATHORMONE: CPT | Performed by: OTOLARYNGOLOGY

## 2023-10-17 PROCEDURE — 25810000003 LACTATED RINGERS PER 1000 ML: Performed by: ANESTHESIOLOGY

## 2023-10-17 PROCEDURE — 88305 TISSUE EXAM BY PATHOLOGIST: CPT | Performed by: OTOLARYNGOLOGY

## 2023-10-17 PROCEDURE — 82310 ASSAY OF CALCIUM: CPT | Performed by: OTOLARYNGOLOGY

## 2023-10-17 PROCEDURE — 80048 BASIC METABOLIC PNL TOTAL CA: CPT | Performed by: OTOLARYNGOLOGY

## 2023-10-17 PROCEDURE — 71046 X-RAY EXAM CHEST 2 VIEWS: CPT

## 2023-10-17 DEVICE — LIGACLIP MCA MULTIPLE CLIP APPLIERS, 20 SMALL CLIPS
Type: IMPLANTABLE DEVICE | Site: NECK | Status: FUNCTIONAL
Brand: LIGACLIP

## 2023-10-17 RX ORDER — ACETAMINOPHEN 500 MG
1000 TABLET ORAL ONCE
Status: COMPLETED | OUTPATIENT
Start: 2023-10-17 | End: 2023-10-17

## 2023-10-17 RX ORDER — HYDROXYZINE HYDROCHLORIDE 25 MG/1
25 TABLET, FILM COATED ORAL 3 TIMES DAILY PRN
Status: DISCONTINUED | OUTPATIENT
Start: 2023-10-17 | End: 2023-10-18 | Stop reason: HOSPADM

## 2023-10-17 RX ORDER — FUROSEMIDE 40 MG/1
40 TABLET ORAL DAILY
Status: DISCONTINUED | OUTPATIENT
Start: 2023-10-17 | End: 2023-10-18 | Stop reason: HOSPADM

## 2023-10-17 RX ORDER — PROPOFOL 10 MG/ML
VIAL (ML) INTRAVENOUS AS NEEDED
Status: DISCONTINUED | OUTPATIENT
Start: 2023-10-17 | End: 2023-10-17 | Stop reason: SURG

## 2023-10-17 RX ORDER — PHENYLEPHRINE HCL IN 0.9% NACL 1 MG/10 ML
SYRINGE (ML) INTRAVENOUS AS NEEDED
Status: DISCONTINUED | OUTPATIENT
Start: 2023-10-17 | End: 2023-10-17 | Stop reason: SURG

## 2023-10-17 RX ORDER — LIDOCAINE HYDROCHLORIDE AND EPINEPHRINE 10; 10 MG/ML; UG/ML
INJECTION, SOLUTION INFILTRATION; PERINEURAL AS NEEDED
Status: DISCONTINUED | OUTPATIENT
Start: 2023-10-17 | End: 2023-10-17 | Stop reason: HOSPADM

## 2023-10-17 RX ORDER — PRAMIPEXOLE DIHYDROCHLORIDE 0.5 MG/1
0.5 TABLET ORAL NIGHTLY
Status: DISCONTINUED | OUTPATIENT
Start: 2023-10-17 | End: 2023-10-18 | Stop reason: HOSPADM

## 2023-10-17 RX ORDER — DIROXIMEL FUMARATE 231 MG/1
462 CAPSULE ORAL 2 TIMES DAILY
COMMUNITY
End: 2023-10-27 | Stop reason: ALTCHOICE

## 2023-10-17 RX ORDER — SODIUM CHLORIDE 0.9 % (FLUSH) 0.9 %
10 SYRINGE (ML) INJECTION AS NEEDED
Status: DISCONTINUED | OUTPATIENT
Start: 2023-10-17 | End: 2023-10-18 | Stop reason: HOSPADM

## 2023-10-17 RX ORDER — CARVEDILOL 12.5 MG/1
12.5 TABLET ORAL EVERY 12 HOURS
Status: DISCONTINUED | OUTPATIENT
Start: 2023-10-17 | End: 2023-10-18 | Stop reason: HOSPADM

## 2023-10-17 RX ORDER — DEXMEDETOMIDINE HYDROCHLORIDE 100 UG/ML
INJECTION, SOLUTION INTRAVENOUS AS NEEDED
Status: DISCONTINUED | OUTPATIENT
Start: 2023-10-17 | End: 2023-10-17 | Stop reason: SURG

## 2023-10-17 RX ORDER — FENTANYL CITRATE 50 UG/ML
INJECTION, SOLUTION INTRAMUSCULAR; INTRAVENOUS AS NEEDED
Status: DISCONTINUED | OUTPATIENT
Start: 2023-10-17 | End: 2023-10-17 | Stop reason: SURG

## 2023-10-17 RX ORDER — PROMETHAZINE HYDROCHLORIDE 25 MG/1
25 SUPPOSITORY RECTAL ONCE AS NEEDED
Status: DISCONTINUED | OUTPATIENT
Start: 2023-10-17 | End: 2023-10-17 | Stop reason: HOSPADM

## 2023-10-17 RX ORDER — SODIUM CHLORIDE 450 MG/100ML
75 INJECTION, SOLUTION INTRAVENOUS CONTINUOUS
Status: DISCONTINUED | OUTPATIENT
Start: 2023-10-17 | End: 2023-10-18 | Stop reason: HOSPADM

## 2023-10-17 RX ORDER — ONDANSETRON 2 MG/ML
4 INJECTION INTRAMUSCULAR; INTRAVENOUS ONCE AS NEEDED
Status: DISCONTINUED | OUTPATIENT
Start: 2023-10-17 | End: 2023-10-17 | Stop reason: HOSPADM

## 2023-10-17 RX ORDER — DEXAMETHASONE SODIUM PHOSPHATE 4 MG/ML
INJECTION, SOLUTION INTRA-ARTICULAR; INTRALESIONAL; INTRAMUSCULAR; INTRAVENOUS; SOFT TISSUE AS NEEDED
Status: DISCONTINUED | OUTPATIENT
Start: 2023-10-17 | End: 2023-10-17 | Stop reason: SURG

## 2023-10-17 RX ORDER — MIDAZOLAM HYDROCHLORIDE 2 MG/2ML
2 INJECTION, SOLUTION INTRAMUSCULAR; INTRAVENOUS ONCE
Status: COMPLETED | OUTPATIENT
Start: 2023-10-17 | End: 2023-10-17

## 2023-10-17 RX ORDER — SODIUM CHLORIDE 9 MG/ML
40 INJECTION, SOLUTION INTRAVENOUS AS NEEDED
Status: DISCONTINUED | OUTPATIENT
Start: 2023-10-17 | End: 2023-10-17 | Stop reason: HOSPADM

## 2023-10-17 RX ORDER — GABAPENTIN 300 MG/1
300 CAPSULE ORAL 4 TIMES DAILY
Status: DISCONTINUED | OUTPATIENT
Start: 2023-10-17 | End: 2023-10-18 | Stop reason: HOSPADM

## 2023-10-17 RX ORDER — ONDANSETRON 2 MG/ML
INJECTION INTRAMUSCULAR; INTRAVENOUS AS NEEDED
Status: DISCONTINUED | OUTPATIENT
Start: 2023-10-17 | End: 2023-10-17 | Stop reason: SURG

## 2023-10-17 RX ORDER — HYDROCODONE BITARTRATE AND ACETAMINOPHEN 5; 325 MG/1; MG/1
1 TABLET ORAL EVERY 6 HOURS PRN
Status: DISCONTINUED | OUTPATIENT
Start: 2023-10-17 | End: 2023-10-18 | Stop reason: HOSPADM

## 2023-10-17 RX ORDER — OLANZAPINE 2.5 MG/1
2.5 TABLET ORAL NIGHTLY
Status: DISCONTINUED | OUTPATIENT
Start: 2023-10-17 | End: 2023-10-18 | Stop reason: HOSPADM

## 2023-10-17 RX ORDER — GINSENG 100 MG
CAPSULE ORAL AS NEEDED
Status: DISCONTINUED | OUTPATIENT
Start: 2023-10-17 | End: 2023-10-17 | Stop reason: HOSPADM

## 2023-10-17 RX ORDER — BISACODYL 5 MG/1
5 TABLET, DELAYED RELEASE ORAL DAILY PRN
Status: DISCONTINUED | OUTPATIENT
Start: 2023-10-17 | End: 2023-10-18 | Stop reason: HOSPADM

## 2023-10-17 RX ORDER — TIZANIDINE 4 MG/1
2 TABLET ORAL EVERY 8 HOURS PRN
Status: DISCONTINUED | OUTPATIENT
Start: 2023-10-17 | End: 2023-10-18 | Stop reason: HOSPADM

## 2023-10-17 RX ORDER — LOSARTAN POTASSIUM 25 MG/1
25 TABLET ORAL
Status: DISCONTINUED | OUTPATIENT
Start: 2023-10-17 | End: 2023-10-18 | Stop reason: HOSPADM

## 2023-10-17 RX ORDER — BISACODYL 10 MG
10 SUPPOSITORY, RECTAL RECTAL DAILY PRN
Status: DISCONTINUED | OUTPATIENT
Start: 2023-10-17 | End: 2023-10-18 | Stop reason: HOSPADM

## 2023-10-17 RX ORDER — PROMETHAZINE HYDROCHLORIDE 12.5 MG/1
25 TABLET ORAL ONCE AS NEEDED
Status: DISCONTINUED | OUTPATIENT
Start: 2023-10-17 | End: 2023-10-17 | Stop reason: HOSPADM

## 2023-10-17 RX ORDER — ROCURONIUM BROMIDE 10 MG/ML
INJECTION, SOLUTION INTRAVENOUS AS NEEDED
Status: DISCONTINUED | OUTPATIENT
Start: 2023-10-17 | End: 2023-10-17 | Stop reason: SURG

## 2023-10-17 RX ORDER — SODIUM CHLORIDE, SODIUM LACTATE, POTASSIUM CHLORIDE, CALCIUM CHLORIDE 600; 310; 30; 20 MG/100ML; MG/100ML; MG/100ML; MG/100ML
9 INJECTION, SOLUTION INTRAVENOUS CONTINUOUS PRN
Status: DISCONTINUED | OUTPATIENT
Start: 2023-10-17 | End: 2023-10-17 | Stop reason: HOSPADM

## 2023-10-17 RX ORDER — OXYCODONE HYDROCHLORIDE 5 MG/1
5 TABLET ORAL
Status: DISCONTINUED | OUTPATIENT
Start: 2023-10-17 | End: 2023-10-17 | Stop reason: HOSPADM

## 2023-10-17 RX ORDER — CYCLOBENZAPRINE HCL 10 MG
10 TABLET ORAL 3 TIMES DAILY PRN
Status: DISCONTINUED | OUTPATIENT
Start: 2023-10-17 | End: 2023-10-17 | Stop reason: SDUPTHER

## 2023-10-17 RX ORDER — SODIUM CHLORIDE 9 MG/ML
40 INJECTION, SOLUTION INTRAVENOUS AS NEEDED
Status: DISCONTINUED | OUTPATIENT
Start: 2023-10-17 | End: 2023-10-18 | Stop reason: HOSPADM

## 2023-10-17 RX ORDER — FLUOXETINE HYDROCHLORIDE 20 MG/1
80 CAPSULE ORAL DAILY
Status: DISCONTINUED | OUTPATIENT
Start: 2023-10-17 | End: 2023-10-18 | Stop reason: HOSPADM

## 2023-10-17 RX ORDER — MEPERIDINE HYDROCHLORIDE 25 MG/ML
12.5 INJECTION INTRAMUSCULAR; INTRAVENOUS; SUBCUTANEOUS
Status: DISCONTINUED | OUTPATIENT
Start: 2023-10-17 | End: 2023-10-17 | Stop reason: HOSPADM

## 2023-10-17 RX ORDER — CLONAZEPAM 0.5 MG/1
0.5 TABLET ORAL NIGHTLY PRN
Status: DISCONTINUED | OUTPATIENT
Start: 2023-10-17 | End: 2023-10-18 | Stop reason: HOSPADM

## 2023-10-17 RX ORDER — ATORVASTATIN CALCIUM 40 MG/1
40 TABLET, FILM COATED ORAL DAILY
Status: DISCONTINUED | OUTPATIENT
Start: 2023-10-17 | End: 2023-10-18 | Stop reason: HOSPADM

## 2023-10-17 RX ORDER — SULFAMETHOXAZOLE AND TRIMETHOPRIM 800; 160 MG/1; MG/1
1 TABLET ORAL 2 TIMES DAILY
Status: DISCONTINUED | OUTPATIENT
Start: 2023-10-17 | End: 2023-10-18 | Stop reason: HOSPADM

## 2023-10-17 RX ORDER — SODIUM CHLORIDE 0.9 % (FLUSH) 0.9 %
10 SYRINGE (ML) INJECTION EVERY 12 HOURS SCHEDULED
Status: DISCONTINUED | OUTPATIENT
Start: 2023-10-17 | End: 2023-10-18 | Stop reason: HOSPADM

## 2023-10-17 RX ORDER — GINSENG 100 MG
1 CAPSULE ORAL EVERY 8 HOURS SCHEDULED
Status: DISCONTINUED | OUTPATIENT
Start: 2023-10-17 | End: 2023-10-18 | Stop reason: HOSPADM

## 2023-10-17 RX ORDER — LIDOCAINE HYDROCHLORIDE 20 MG/ML
INJECTION, SOLUTION EPIDURAL; INFILTRATION; INTRACAUDAL; PERINEURAL AS NEEDED
Status: DISCONTINUED | OUTPATIENT
Start: 2023-10-17 | End: 2023-10-17 | Stop reason: SURG

## 2023-10-17 RX ORDER — AMOXICILLIN 250 MG
2 CAPSULE ORAL 2 TIMES DAILY
Status: DISCONTINUED | OUTPATIENT
Start: 2023-10-17 | End: 2023-10-18 | Stop reason: HOSPADM

## 2023-10-17 RX ORDER — PANTOPRAZOLE SODIUM 40 MG/1
40 TABLET, DELAYED RELEASE ORAL DAILY
Status: DISCONTINUED | OUTPATIENT
Start: 2023-10-17 | End: 2023-10-18 | Stop reason: HOSPADM

## 2023-10-17 RX ORDER — ASPIRIN 81 MG/1
81 TABLET, CHEWABLE ORAL DAILY
Status: ON HOLD | COMMUNITY
End: 2023-10-18 | Stop reason: SDUPTHER

## 2023-10-17 RX ORDER — POLYETHYLENE GLYCOL 3350 17 G/17G
17 POWDER, FOR SOLUTION ORAL DAILY PRN
Status: DISCONTINUED | OUTPATIENT
Start: 2023-10-17 | End: 2023-10-18 | Stop reason: HOSPADM

## 2023-10-17 RX ADMIN — ATORVASTATIN CALCIUM 40 MG: 40 TABLET, FILM COATED ORAL at 15:19

## 2023-10-17 RX ADMIN — PROPOFOL 150 MG: 10 INJECTION, EMULSION INTRAVENOUS at 11:15

## 2023-10-17 RX ADMIN — SUGAMMADEX 200 MG: 100 INJECTION, SOLUTION INTRAVENOUS at 12:59

## 2023-10-17 RX ADMIN — BACITRACIN 0.9 G: 500 OINTMENT TOPICAL at 22:16

## 2023-10-17 RX ADMIN — DEXMEDETOMIDINE 40 MCG: 100 INJECTION, SOLUTION INTRAVENOUS at 11:22

## 2023-10-17 RX ADMIN — PANTOPRAZOLE SODIUM 40 MG: 40 TABLET, DELAYED RELEASE ORAL at 15:19

## 2023-10-17 RX ADMIN — FUROSEMIDE 40 MG: 40 TABLET ORAL at 15:19

## 2023-10-17 RX ADMIN — FENTANYL CITRATE 50 MCG: 50 INJECTION, SOLUTION INTRAMUSCULAR; INTRAVENOUS at 11:15

## 2023-10-17 RX ADMIN — DEXAMETHASONE SODIUM PHOSPHATE 4 MG: 4 INJECTION, SOLUTION INTRAMUSCULAR; INTRAVENOUS at 11:36

## 2023-10-17 RX ADMIN — SODIUM CHLORIDE 75 ML/HR: 4.5 INJECTION, SOLUTION INTRAVENOUS at 15:20

## 2023-10-17 RX ADMIN — ACETAMINOPHEN 1000 MG: 500 TABLET ORAL at 08:57

## 2023-10-17 RX ADMIN — CARVEDILOL 12.5 MG: 12.5 TABLET, FILM COATED ORAL at 15:19

## 2023-10-17 RX ADMIN — GABAPENTIN 300 MG: 300 CAPSULE ORAL at 17:26

## 2023-10-17 RX ADMIN — SODIUM CHLORIDE, POTASSIUM CHLORIDE, SODIUM LACTATE AND CALCIUM CHLORIDE 9 ML/HR: 600; 310; 30; 20 INJECTION, SOLUTION INTRAVENOUS at 08:57

## 2023-10-17 RX ADMIN — GABAPENTIN 300 MG: 300 CAPSULE ORAL at 21:24

## 2023-10-17 RX ADMIN — MIDAZOLAM HYDROCHLORIDE 2 MG: 1 INJECTION, SOLUTION INTRAMUSCULAR; INTRAVENOUS at 10:36

## 2023-10-17 RX ADMIN — PRAMIPEXOLE DIHYDROCHLORIDE 0.5 MG: 0.5 TABLET ORAL at 21:24

## 2023-10-17 RX ADMIN — ONDANSETRON 4 MG: 2 INJECTION INTRAMUSCULAR; INTRAVENOUS at 12:15

## 2023-10-17 RX ADMIN — LOSARTAN POTASSIUM 25 MG: 25 TABLET, FILM COATED ORAL at 15:19

## 2023-10-17 RX ADMIN — ROCURONIUM BROMIDE 35 MG: 50 INJECTION INTRAVENOUS at 11:15

## 2023-10-17 RX ADMIN — SULFAMETHOXAZOLE AND TRIMETHOPRIM 1 TABLET: 800; 160 TABLET ORAL at 21:24

## 2023-10-17 RX ADMIN — LIDOCAINE HYDROCHLORIDE 80 MG: 20 INJECTION, SOLUTION EPIDURAL; INFILTRATION; INTRACAUDAL; PERINEURAL at 11:15

## 2023-10-17 RX ADMIN — FLUOXETINE 80 MG: 20 CAPSULE ORAL at 15:24

## 2023-10-17 RX ADMIN — FENTANYL CITRATE 50 MCG: 50 INJECTION, SOLUTION INTRAMUSCULAR; INTRAVENOUS at 11:46

## 2023-10-17 RX ADMIN — OLANZAPINE 2.5 MG: 2.5 TABLET, FILM COATED ORAL at 21:24

## 2023-10-17 RX ADMIN — Medication 100 MCG: at 11:35

## 2023-10-17 RX ADMIN — DEXMEDETOMIDINE 10 MCG: 100 INJECTION, SOLUTION INTRAVENOUS at 11:15

## 2023-10-17 NOTE — ANESTHESIA POSTPROCEDURE EVALUATION
Patient: Falguni Minaya    Procedure Summary       Date: 10/17/23 Room / Location: [facility] OR 02 / [facility] MAIN OR    Anesthesia Start: 1107 Anesthesia Stop: 1311    Procedure: NECK EXPLORATION WITH PARATHYROID ADENOMA EXCISION AND FROZEN SECTION, POSSIBLE THYROIDECTOMY, RECURRENT LARYNGEAL NERVE MONITORING, INTRAOPERATIVE INTACT PTH ASSAY (Bilateral: Neck) Diagnosis:       Hyperparathyroidism      Hypercalcemia      Parathyroid adenoma      Thyroid nodule      (Hyperparathyroidism [E21.3])      (Hypercalcemia [E83.52])      (Parathyroid adenoma [D35.1])      (Thyroid nodule [E04.1])    Surgeons: Ezekiel Giles MD Provider: Keith Zarate MD    Anesthesia Type: general ASA Status: 4            Anesthesia Type: general    Vitals  Vitals Value Taken Time   /90 10/17/23 1335   Temp 36.3 °C (97.3 °F) 10/17/23 1310   Pulse 75 10/17/23 1335   Resp 20 10/17/23 1335   SpO2 96 % 10/17/23 1335           Post Anesthesia Care and Evaluation    Patient location during evaluation: bedside  Patient participation: complete - patient participated  Level of consciousness: awake  Pain score: 2  Pain management: adequate    Airway patency: patent  PONV Status: none  Cardiovascular status: acceptable and stable  Respiratory status: acceptable  Hydration status: acceptable    Comments: An Anesthesiologist personally participated in the most demanding procedures (including induction and emergence if applicable) in the anesthesia plan, monitored the course of anesthesia administration at frequent intervals and remained physically present and available for immediate diagnosis and treatment of emergencies.

## 2023-10-17 NOTE — ANESTHESIA PREPROCEDURE EVALUATION
Anesthesia Evaluation     Patient summary reviewed and Nursing notes reviewed                Airway   Mallampati: I  TM distance: >3 FB  Neck ROM: full  No difficulty expected  Dental    (+) upper dentures    Pulmonary - negative pulmonary ROS and normal exam    breath sounds clear to auscultation  (+) pleural effusion,shortness of breath  Cardiovascular - negative cardio ROS and normal exam    Rhythm: regular  Rate: normal    (+) hypertension, past MI , CAD, CHF , orthopnea, hyperlipidemia      Neuro/Psych- negative ROS  (+) seizures, headaches, numbness  GI/Hepatic/Renal/Endo - negative ROS   (+) GERD, renal disease-, thyroid problem thyroid nodules    Musculoskeletal (-) negative ROS    Abdominal    Substance History - negative use     OB/GYN negative ob/gyn ROS         Other   arthritis,     ROS/Med Hx Other: Multiple sclerosis               Anesthesia Plan    ASA 4     general     intravenous induction     Anesthetic plan, risks, benefits, and alternatives have been provided, discussed and informed consent has been obtained with: patient.    CODE STATUS:

## 2023-10-17 NOTE — OP NOTE
PARATHYROIDECTOMY  Procedure Report    Patient Name:  Falguni Minaya  YOB: 1960    Date of Surgery:  10/17/2023     Indications:    Falguni Minaya is a 63 year old female with complaints of hypercalcemia and diagnosed with primary hyperparathyroidism.  Patient was recommended    NECK EXPLORATION WITH PARATHYROID ADENOMA EXCISION AND FROZEN SECTION, POSSIBLE THYROIDECTOMY, RECURRENT LARYNGEAL NERVE MONITORING, INTRAOPERATIVE INTACT PTH ASSAY.  All the risks, benefits, and alternatives were discussed.  Patient understands and wants to proceed with the planned procedures.    Pre-op Diagnosis:   Hyperparathyroidism [E21.3]  Hypercalcemia [E83.52]  Parathyroid adenoma [D35.1]  Thyroid nodule [E04.1]    Post-Op Diagnosis Codes:     * Hyperparathyroidism [E21.3]     * Hypercalcemia [E83.52]     * Parathyroid adenoma [D35.1]     * Thyroid nodule [E04.1]     Procedure/CPT® Codes:      Procedure(s):  NECK EXPLORATION WITH RIGHT INFERIOR PARATHYROID ADENOMA EXCISION AND FROZEN SECTION  RECURRENT LARYNGEAL NERVE MONITORING (1 HOUR)  INTRAOPERATIVE INTACT PTH ASSAY    Surgeon(s):  Ezekiel Giles MD    Staff:  Circulator: Blanquita Colmenares RN  Scrub Person: Cindy Cesar Emily M  Assistant: Zakia Arnold RN CSA  Other: Kasia Mario RN     Assistant: Zakia Arnold RN CSA was responsible for performing the following activities: Retraction, Suction, Irrigation and Suturing and their skilled assistance was necessary for the success of this case.     Anesthesia: General    Estimated Blood Loss: 5 mL    Implants:    Implant Name Type Inv. Item Serial No.  Lot No. LRB No. Used Action   CLIPAPPLR M/ ENDO LIGACLIP 9 3/8IN  - [id number] Implant CLIPAPPLR M/ ENDO LIGACLIP 9 3/8IN   [employer]  DIV OF J AND J [id number]  2 Implanted       Specimen:          Specimens       ID Source Type Tests Collected By Collected At Frozen?    A Parathyroid Gland Tissue TISSUE PATHOLOGY  EXAM   Ezekiel Giles MD 10/17/23 1205 Yes    Description: right inferior parathyroid adenoma    Comment: Frozen section  or2  #[id number]     Drains:   10 K[place]ili Hemovac drain on grenade suction       Findings:   1.  Right inferior parathyroid adenoma 1.5 cm x 1.1 cm  2.  Specimen on frozen section confirmed hypercellular parathyroid consistent with adenoma.  3.  Preop calcium 10.4 and intact .3  4.  10-minute post removal of adenoma calcium dropped to 9.9 and intact PTH dropped to 17.5.  5.  Therefore left side and right superior parathyroid not explored.  6.  Postop voice normal.    Complications:   none    Procedure Description:     The patient was taken to the operating room and general endotracheal    anesthesia was performed in the supine position using special endotracheal tube with electrodes in place for continuous nerve monitoring.  After the    patient had been adequately anesthetized and endotracheal tube secured, the  patient was placed in hyperextension with shoulder roll in place.  Neck was  then prepped with Betadine and draped in the usual sterile manner. Ground electrodes for the nerve monitoring were placed and then hooked up to nerve monitor for continuous nerve monitoring.  At this point incision line that was drawn preoperatively was marked again and infiltrated with 1% Xylocaine with 1:100,000 epinephrine.  Incision was then carried out with a 15-blade, followed by electrocautery for hemostasis.  Subplatysmal flap was elevated superiorly and inferiorly, followed by [place] retractor placement with retraction devices in place.      Deep fascia was divided in the midline through the median raphe and strap muscles were retracted on the right side.  At that point the trachea was visible with isthmus of thyroid present.   Right thyroid was  retracted medially exposing tracheoesophageal groove.   Then subsequent dissection was identified recurrent laryngeal nerve with confirmation using nerve  probe.  Posterior to the nerve further dissection was made and parathyroid adenoma was identified in the inferior portion and it was dissected completely.    Upon removal it measured 1.5 cm x 1.1 cm and subsequently it was sent    for frozen section and confirmed as hypercellular parathyroid consistent with adenoma.  In the meantime, the wound was copiously irrigated and    cleaned.  Nerve was confirmed again with the probe.  Attempt was not made to find the superior parathyroid also palpation of the right thyroid revealed no palpable thyroid nodule.  Therefore, a 10-Maltese    drain was placed in the cavity and brought out through a separate stab    incision.  Midline fascia was closed with 4-0 Vicryl in a running fashion and    subcutaneous closure was made with 4-0 Vicryl and 5-0 Vicryl in an    interrupted manner.  Skin was closed with 6-0 Prolene in a running fashion and drain was sutured in place with 6-0 Prolene and hooked    up to grenade suction.   Intact PTH dropped from preop of 105.3 to    17.5 and calcium dropped from preop of 10.4 to 9.9.  Therefore, it was    decided not to explore the left side and of course bacitracin was applied    along the incision.  Subsequently, the patient was extubated and transferred to the recovery room in good condition.  Postop voice was normal.       Ezekiel Giles MD     Date: 10/17/2023  Time: 13:46 EDT

## 2023-10-17 NOTE — H&P
PRIMARY CARE PROVIDER: Williams Sage MD  REFERRING PROVIDER: Williams Sage MD    CHIEF COMPLAINT:  Preoperative evaluation for surgery    Subjective   History of Present Illness:  Falguni Minaya is a  63 y.o.  female who is here for the following problems:    Hyperparathyroidism    Hypercalcemia    Parathyroid adenoma    Thyroid nodule      She is scheduled for NECK EXPLORATION WITH PARATHYROID ADENOMA EXCISION AND FROZEN SECTION, POSSIBLE THYROIDECTOMY, RECURRENT LARYNGEAL NERVE MONITORING, INTRAOPERATIVE INTACT PTH ASSAY (Bilateral). There has been no significant change in the history since the preoperative office evaluation.     Review of Systems:  CONSTITUTIONAL: no fever or chills  PULMONARY: no cough or shortness of breath  GI: no nausea or vomiting    Past History:  Medical History: has a past medical history of Abdominal hernia, Acute ST elevation myocardial infarction (STEMI) due to occlusion of right coronary artery (02/24/2020), CHF (congestive heart failure), CKD (chronic kidney disease), Coronary artery disease, Dyspepsia, GERD (gastroesophageal reflux disease), Hyperlipidemia, Hypertension, Ischemic cardiomyopathy, and Multiple sclerosis.   Surgical History: has a past surgical history that includes Hysterectomy; Cardiac catheterization; Appendectomy; Tonsillectomy; Cholecystectomy; Cardiac catheterization (N/A, 2/24/2020); Cardiac catheterization (N/A, 2/24/2020); Cardiac catheterization (2/24/2020); Cardiac catheterization (N/A, 2/24/2020); Cardiac catheterization (N/A, 2/24/2020); Esophagogastroduodenoscopy (N/A, 2/29/2020); and Colonoscopy (N/A, 2/29/2020).   Family History: family history includes Coronary artery disease in her brother; Hypertension in her sister; Thyroid disease in her mother.   Social History: reports that she quit smoking about 13 years ago. Her smoking use included cigarettes. She started smoking about 33 years ago. She has a 10.00 pack-year smoking history. She  has never used smokeless tobacco. She reports that she does not currently use alcohol. She reports that she does not use drugs.  Home Medications:  FLUoxetine, OLANZapine, aspirin, atorvastatin, carvedilol, clonazePAM, clopidogrel, cyclobenzaprine, furosemide, gabapentin, hydrOXYzine, losartan, meloxicam, ondansetron, pantoprazole, pramipexole, sulfamethoxazole-trimethoprim, and tiZANidine     Allergies: Codeine and Morphine     Objective     Vital Signs:  Temp:  [96.9 °F (36.1 °C)] 96.9 °F (36.1 °C)  Heart Rate:  [77] 77  Resp:  [18] 18  BP: (117)/(79) 117/79    Physical Exam:  CONSTITUTIONAL: well nourished, well-developed, alert, oriented, in no acute distress   COMMUNICATION AND VOICE: able to communicate normally, normal voice quality  HEAD: normocephalic, no lesions, atraumatic, no tenderness, no masses   FACE: appearance normal, no lesions, no tenderness, no deformities, facial motion symmetric  EYES: ocular motility normal, eyelids normal, orbits normal, no proptosis, conjunctiva normal , pupils equal, round   EARS:  Hearing: hearing to conversational voice intact bilaterally   External Ears: normal bilaterally, no lesions  NOSE:  External Nose: external nasal structure normal, no tenderness on palpation, no nasal discharge, no lesions, no evidence of trauma, nostrils patent, septal perforation 1 cm otherwise in midline  ORAL:  Lips: upper and lower lips without lesion   NECK:  Inspection and Palpation: neck appearance normal, no masses or tenderness  CHEST/RESPIRATORY: normal respiratory effort   CARDIOVASCULAR: no cyanosis or edema   NEUROLOGICAL/PSYCHIATRIC: oriented to time, place and person, mood normal, affect appropriate, CN II-XII intact grossly    ASSESSMENT:    Hyperparathyroidism    Hypercalcemia    Parathyroid adenoma    Thyroid nodule    PLAN:  NECK EXPLORATION WITH PARATHYROID ADENOMA EXCISION AND FROZEN SECTION, POSSIBLE THYROIDECTOMY, RECURRENT LARYNGEAL NERVE MONITORING, INTRAOPERATIVE  INTACT PTH ASSAY (Bilateral)    PARATHYROIDECTOMY: A parathyroid exploration and excision was recommended. The risks and benefits were explained including but not limited to bleeding, infection, risks of the general anesthesia, pain, recurrent laryngeal nerve injury with hoarseness and airway loss, hypocalcemia (temporary or permanent), and persistent hypercalcemia. Operative possibilities including 3/4 parathyroidectomy, parathyroid reimplantation, hemithyroidectomy, total thyroidectomy, and michael dissections were discussed. Alternatives were discussed. Questions were asked appropriately answered.       Ezekiel Giles MD  10/17/23  07:37 EDT

## 2023-10-17 NOTE — PLAN OF CARE
Goal Outcome Evaluation:  Plan of Care Reviewed With: patient      Pt arrived to unit post operative neck exploration with parathyroid adenoma excision at 1430. Pt alert and oriented x4. Pt has no complaints of pain since being on the unit. Surgical incision clean, intact, and open to air. MILE drain sutured and intact.

## 2023-10-18 ENCOUNTER — READMISSION MANAGEMENT (OUTPATIENT)
Dept: CALL CENTER | Facility: HOSPITAL | Age: 63
End: 2023-10-18
Payer: MEDICARE

## 2023-10-18 VITALS
DIASTOLIC BLOOD PRESSURE: 68 MMHG | BODY MASS INDEX: 41.33 KG/M2 | OXYGEN SATURATION: 94 % | HEART RATE: 75 BPM | RESPIRATION RATE: 16 BRPM | TEMPERATURE: 98.6 F | HEIGHT: 60 IN | SYSTOLIC BLOOD PRESSURE: 133 MMHG | WEIGHT: 210.54 LBS

## 2023-10-18 PROBLEM — E21.3 HYPERPARATHYROIDISM: Status: RESOLVED | Noted: 2023-10-17 | Resolved: 2023-10-18

## 2023-10-18 LAB
CALCIUM SPEC-SCNC: 8.8 MG/DL (ref 8.6–10.5)
CYTO UR: NORMAL
LAB AP CASE REPORT: NORMAL
LAB AP CLINICAL INFORMATION: NORMAL
Lab: NORMAL
PATH REPORT.FINAL DX SPEC: NORMAL
PATH REPORT.GROSS SPEC: NORMAL
PTH-INTACT SERPL-MCNC: 33.2 PG/ML (ref 15–65)

## 2023-10-18 PROCEDURE — G0378 HOSPITAL OBSERVATION PER HR: HCPCS

## 2023-10-18 PROCEDURE — 82310 ASSAY OF CALCIUM: CPT | Performed by: OTOLARYNGOLOGY

## 2023-10-18 PROCEDURE — 83970 ASSAY OF PARATHORMONE: CPT | Performed by: OTOLARYNGOLOGY

## 2023-10-18 RX ORDER — HYDROCODONE BITARTRATE AND ACETAMINOPHEN 10; 325 MG/1; MG/1
1 TABLET ORAL EVERY 6 HOURS PRN
Status: DISCONTINUED | OUTPATIENT
Start: 2023-10-18 | End: 2023-10-18 | Stop reason: HOSPADM

## 2023-10-18 RX ORDER — ASPIRIN 81 MG/1
81 TABLET, CHEWABLE ORAL DAILY
Start: 2023-10-25

## 2023-10-18 RX ORDER — CLOPIDOGREL BISULFATE 75 MG/1
75 TABLET ORAL DAILY
Start: 2023-10-23

## 2023-10-18 RX ORDER — HYDROCODONE BITARTRATE AND ACETAMINOPHEN 7.5; 325 MG/1; MG/1
1 TABLET ORAL EVERY 6 HOURS PRN
Qty: 20 TABLET | Refills: 0 | Status: SHIPPED | OUTPATIENT
Start: 2023-10-18 | End: 2023-10-27 | Stop reason: ALTCHOICE

## 2023-10-18 RX ORDER — MELOXICAM 15 MG/1
15 TABLET ORAL DAILY PRN
Start: 2023-10-25

## 2023-10-18 RX ORDER — HYDROCODONE BITARTRATE AND ACETAMINOPHEN 5; 325 MG/1; MG/1
1 TABLET ORAL ONCE
Status: COMPLETED | OUTPATIENT
Start: 2023-10-18 | End: 2023-10-18

## 2023-10-18 RX ADMIN — GABAPENTIN 300 MG: 300 CAPSULE ORAL at 08:34

## 2023-10-18 RX ADMIN — SULFAMETHOXAZOLE AND TRIMETHOPRIM 1 TABLET: 800; 160 TABLET ORAL at 08:34

## 2023-10-18 RX ADMIN — BACITRACIN 0.9 G: 500 OINTMENT TOPICAL at 06:10

## 2023-10-18 RX ADMIN — SODIUM CHLORIDE 75 ML/HR: 4.5 INJECTION, SOLUTION INTRAVENOUS at 02:33

## 2023-10-18 RX ADMIN — LOSARTAN POTASSIUM 25 MG: 25 TABLET, FILM COATED ORAL at 08:34

## 2023-10-18 RX ADMIN — CARVEDILOL 12.5 MG: 12.5 TABLET, FILM COATED ORAL at 02:33

## 2023-10-18 RX ADMIN — PANTOPRAZOLE SODIUM 40 MG: 40 TABLET, DELAYED RELEASE ORAL at 08:34

## 2023-10-18 RX ADMIN — HYDROCODONE BITARTRATE AND ACETAMINOPHEN 1 TABLET: 5; 325 TABLET ORAL at 02:33

## 2023-10-18 RX ADMIN — HYDROCODONE BITARTRATE AND ACETAMINOPHEN 1 TABLET: 5; 325 TABLET ORAL at 01:05

## 2023-10-18 RX ADMIN — FLUOXETINE 80 MG: 20 CAPSULE ORAL at 08:34

## 2023-10-18 RX ADMIN — HYDROCODONE BITARTRATE AND ACETAMINOPHEN 1 TABLET: 10; 325 TABLET ORAL at 09:48

## 2023-10-18 RX ADMIN — ATORVASTATIN CALCIUM 40 MG: 40 TABLET, FILM COATED ORAL at 08:34

## 2023-10-18 NOTE — OUTREACH NOTE
Prep Survey      Flowsheet Row Responses   [facility] facility patient discharged from? [facility]   Is LACE score < 7 ? Yes   Eligibility [facility]   Hospital [facility]   Date of Admission 10/17/23   Date of Discharge 10/18/23   Discharge Disposition Home or Self Care   Discharge diagnosis Neck exploration with parathyroid adenoma escision   Does the patient have one of the following disease processes/diagnoses(primary or secondary)? General Surgery   Does the patient have Home health ordered? No   Is there a DME ordered? No   Prep survey completed? Yes            JUAN C TRINH - Registered Nurse

## 2023-10-18 NOTE — DISCHARGE SUMMARY
Date of Discharge:  10/18/2023    Discharge Diagnosis:   Stable    Problem List:  Active Hospital Problems   No active problems to display.      Resolved Hospital Problems    Diagnosis Date Resolved POA    **Hyperparathyroidism [E21.3] 10/17/2023 Unknown    Hyperparathyroidism [E21.3] 10/18/2023 Yes    Hypercalcemia [E83.52] 10/17/2023 Unknown    Parathyroid adenoma [D35.1] 10/17/2023 Unknown    Thyroid nodule [E04.1] 10/17/2023 Unknown       Presenting Problem/History of Present Illness  Hyperparathyroidism [E21.3]  Hypercalcemia [E83.52]  Parathyroid adenoma [D35.1]  Thyroid nodule [E04.1]    Hospital Course  Patient is a 63 y.o. female presented with above problems and underwent below procedure uneventfully.  On her postop day 1 patient is doing well with good voice incision is unremarkable.  [place] is negative.  Drainage is minimal but still producing bloody.  Therefore she will be discharged to home with the drain in place.  Patient will go home with instructions and follow-ups.    Procedures Performed    Procedure(s):  NECK EXPLORATION WITH PARATHYROID ADENOMA EXCISION AND FROZEN SECTION,, RECURRENT LARYNGEAL NERVE MONITORING, INTRAOPERATIVE INTACT PTH ASSAY  -------------------       Consults:   Consults       No orders found for last 30 day(s).            Pertinent Test Results: labs: Calcium dropped to 8.8 and PTH dropped to 33.2 which both of these labs are normal now.    Condition on Discharge: Stable    Vital Signs  Temp:  [97.3 °F (36.3 °C)-98.8 °F (37.1 °C)] 98.6 °F (37 °C)  Heart Rate:  [74-93] 75  Resp:  [16-27] 16  BP: (115-153)/(53-96) 133/68    Discharge Disposition  Home or Self Care    Discharge Medications     Discharge Medications        New Medications        Instructions Start Date   HYDROcodone-acetaminophen 7.5-325 MG per tablet  Commonly known as: NORCO   1 tablet, Oral, Every 6 Hours PRN             Changes to Medications        Instructions Start Date   aspirin 81 MG chewable  tablet  What changed: These instructions start on October 25, 2023. If you are unsure what to do until then, ask your doctor or other care provider.   81 mg, Oral, Daily   Start Date: October 25, 2023     atorvastatin 40 MG tablet  Commonly known as: LIPITOR  What changed: when to take this   40 mg, Oral, Every Night at Bedtime      clonazePAM 1 MG tablet  Commonly known as: KlonoPIN  What changed:   how much to take  when to take this   0.5 mg, Oral, Nightly PRN      clopidogrel 75 MG tablet  Commonly known as: PLAVIX  What changed: These instructions start on October 23, 2023. If you are unsure what to do until then, ask your doctor or other care provider.   75 mg, Oral, Daily   Start Date: October 23, 2023     losartan 25 MG tablet  Commonly known as: COZAAR  What changed: when to take this   25 mg, Oral, Every 24 Hours Scheduled      meloxicam 15 MG tablet  Commonly known as: MOBIC  What changed:   reasons to take this  These instructions start on October 25, 2023. If you are unsure what to do until then, ask your doctor or other care provider.   15 mg, Oral, Daily PRN   Start Date: October 25, 2023            Continue These Medications        Instructions Start Date   carvedilol 12.5 MG tablet  Commonly known as: COREG   TAKE 1 TABLET BY MOUTH  EVERY 12 HOURS      cyclobenzaprine 10 MG tablet  Commonly known as: FLEXERIL   10 mg, Oral, 3 Times Daily PRN      FLUoxetine 40 MG capsule  Commonly known as: PROzac   40 mg, Oral, 2 Times Daily      furosemide 40 MG tablet  Commonly known as: LASIX   40 mg, Oral, Daily      gabapentin 300 MG capsule  Commonly known as: NEURONTIN   300 mg, Oral, 4 Times Daily      hydrOXYzine 25 MG tablet  Commonly known as: ATARAX   25 mg, Oral, 3 Times Daily PRN      OLANZapine 2.5 MG tablet  Commonly known as: zyPREXA   2.5 mg, Oral, Daily      ondansetron 4 MG tablet  Commonly known as: ZOFRAN   1 tablet, Oral, Every 6 Hours PRN      pantoprazole 40 MG EC tablet  Commonly known as:  PROTONIX   40 mg, Oral, Daily      pramipexole 0.5 MG tablet  Commonly known as: MIRAPEX   0.5 mg, Oral, Every Night at Bedtime      sulfamethoxazole-trimethoprim 800-160 MG per tablet  Commonly known as: BACTRIM DS,SEPTRA DS   1 tablet, Oral, 2 Times Daily      tiZANidine 2 MG tablet  Commonly known as: ZANAFLEX   2 mg, Oral, 3 Times Daily PRN, for muscle spams      Vumerity 231 MG capsule delayed-release  Generic drug: Diroximel Fumarate   462 mg, Oral, 2 Times Daily               Discharge Diet       Activity at Discharge  Activity Instructions       Discharge Activity      1) No driving while taking narcotics.   2) Return to school / work as instructed or when ready  3) May shower after drain is removed and drain hole is sealed  4) Do not lift / push / pull more than 10 lbs.            Follow-up Appointments  Future Appointments   Date Time Provider Department Center   10/27/2023  3:00 PM Tiarra Lynn APRN [facility]   12/5/2023  4:00 PM [address]     Additional Instructions for the Follow-ups that You Need to Schedule       Discharge Follow-up with Specified Provider: DR. GILES; 1 Week   As directed      To: DR. GILES   Follow Up: 1 Week   Follow Up Details: WHEN THE DRAINAGE IS LESS THAN 5 MILLILITERS OVER 24 HOURS, CALL OFFICE FOR DRAIN REMOVAL.  OTHERWISE KEEP THE APPOINTMENT ALREADY SCHEDULED IN ONE WEEK FOR SUTURE REMOVAL        Dressing Change Instructions   As directed      Keep incision dry until drain is removed and drain hole is sealed.  If no drain, keep incision dry for 48 hours    Order Comments: Keep incision dry until drain is removed and drain hole is sealed.  If no drain, keep incision dry for 48 hours         Notify Physician or Go To The ED For the Following Conditions   As directed      Notify Dr. Bhavik Giles if there is any drain malfunction or facial nerve weakness.    Order Comments: Notify Dr. Bhavik Giles if there is any drain malfunction or facial nerve  weakness.                 Test Results Pending at Discharge      Ezekiel Giles MD    Time: 30 minutes

## 2023-10-19 ENCOUNTER — TRANSITIONAL CARE MANAGEMENT TELEPHONE ENCOUNTER (OUTPATIENT)
Dept: CALL CENTER | Facility: HOSPITAL | Age: 63
End: 2023-10-19
Payer: MEDICARE

## 2023-10-19 NOTE — OUTREACH NOTE
Call Center TCM Note      Flowsheet Row Responses   [facility] patient discharged from? [facility]   Does the patient have one of the following disease processes/diagnoses(primary or secondary)? General Surgery   TCM attempt successful? Yes  [verbal release for Veto Minaya--]   Call start time 1246   Call end time 1253   Discharge diagnosis Neck exploration with parathyroid adenoma excision   Medication alerts for this patient Patient understands instructions regarding ASA, Plavix and Meloxicam   Meds reviewed with patient/caregiver? Yes   Is the patient having any side effects they believe may be caused by any medication additions or changes? No   Does the patient have all medications related to this admission filled (includes all antibiotics, pain medications, etc.) Yes   Is the patient taking all medications as directed (includes completed medication regime)? Yes   Comments Pt will f/u with ENT surgeon and nephrology   Does the patient have an appointment with their PCP within 7-14 days of discharge? No   Nursing Interventions Patient declined scheduling/rescheduling appointment at this time   Has home health visited the patient within 72 hours of discharge? N/A   Psychosocial issues? No   Did the patient receive a copy of their discharge instructions? Yes   Nursing interventions Reviewed instructions with patient   What is the patient's perception of their health status since discharge? Improving  [Reports no worsening edema, bloody drainage decreasing and will update surgeon tomorrow for possible drain removal.  No issues with voice, denies swallowing or breathing challenges.]   Nursing interventions Nurse provided patient education  [Post op instructions reviewed]   Is the patient /caregiver able to teach back basic post-op care? Lifting as instructed by MD in discharge instructions, No tub bath, swimming, or hot tub until instructed by MD, Keep incision areas clean,dry and protected   Is the  patient/caregiver able to teach back signs and symptoms of incisional infection? Increased redness, swelling or pain at the incisonal site, Increased drainage or bleeding, Incisional warmth, Pus or odor from incision, Fever  [Pt aware of incisional care with peroxide and neosporin tid]   Is the patient/caregiver able to teach back steps to recovery at home? Rest and rebuild strength, gradually increase activity   Is the patient/caregiver able to teach back the hierarchy of who to call/visit for symptoms/problems? PCP, Specialist, Home health nurse, Urgent Care, ED, 911 Yes   TCM call completed? Yes   Call end time [id number]            Halley Hyde, SIDNEY    10/19/2023, 13:06 EDT

## 2023-10-27 ENCOUNTER — OFFICE VISIT (OUTPATIENT)
Dept: CARDIOLOGY | Facility: CLINIC | Age: 63
End: 2023-10-27
Payer: MEDICARE

## 2023-10-27 VITALS
HEIGHT: 60 IN | WEIGHT: 212.4 LBS | DIASTOLIC BLOOD PRESSURE: 70 MMHG | SYSTOLIC BLOOD PRESSURE: 102 MMHG | HEART RATE: 64 BPM | BODY MASS INDEX: 41.7 KG/M2

## 2023-10-27 DIAGNOSIS — I25.10 CORONARY ARTERY DISEASE INVOLVING NATIVE CORONARY ARTERY OF NATIVE HEART WITHOUT ANGINA PECTORIS: Primary | ICD-10-CM

## 2023-10-27 NOTE — PROGRESS NOTES
[facility] Follow Up Office Note     Encounter Date:10/27/23  Patient:Falguni Minaya  :1960  MRN:[id number]      Chief Complaint:   Chief Complaint   Patient presents with    Coronary Artery Disease     6 month f/u         History of Presenting Illness:        Falguni Minaya is a 63 y.o. female who is here for follow-up.  She is a patient of Dr Riley.    Patient has past medical history significant for coronary artery disease with PCI, chronic systolic congestive heart failure, mixed hyperlipidemia, multiple sclerosis, seizure disorder.    In 2020 patient had PCI of RCA.  Most recent echocardiogram 2022 showed normalization of LV function.  She has chronic dyspnea.    Patient was last seen in 2023 by WESTLEY Raymundo.  At this time she was stable from a cardiac perspective and no changes were recommended.    In October patient underwent parathyroidectomy with ENT.  She had an uncomplicated recovery.    Today patient reports she is stable.  She is not having chest pain, increased dyspnea, PND orthopnea.  She has chronic and stable lower extremity edema that is stable.  It gets worse throughout the day and better overnight.  She takes daily Lasix    Review of Systems:  Review of Systems   Cardiovascular:  Negative for chest pain, dyspnea on exertion, leg swelling, orthopnea and palpitations.   Respiratory:  Negative for shortness of breath.        Current Outpatient Medications on File Prior to Visit   Medication Sig Dispense Refill    aspirin 81 MG chewable tablet Chew 1 tablet Daily.      atorvastatin (LIPITOR) 40 MG tablet TAKE 1 TABLET BY MOUTH  EVERY NIGHT AT BEDTIME (Patient taking differently: Take 1 tablet by mouth Daily.) 90 tablet 3    carvedilol (COREG) 12.5 MG tablet TAKE 1 TABLET BY MOUTH  EVERY 12 HOURS (Patient taking differently: Take 1 tablet by mouth Every 12 (Twelve) Hours.) 200 tablet 2    clopidogrel (PLAVIX) 75 MG tablet Take 1 tablet by mouth Daily.       cyclobenzaprine (FLEXERIL) 10 MG tablet Take 1 tablet by mouth 3 (Three) Times a Day As Needed for Muscle Spasms.      FLUoxetine (PROzac) 40 MG capsule Take 1 capsule by mouth 2 (Two) Times a Day.      furosemide (LASIX) 40 MG tablet TAKE 1 TABLET BY MOUTH  DAILY 90 tablet 3    gabapentin (NEURONTIN) 300 MG capsule Take 1 capsule by mouth 4 (Four) Times a Day.      losartan (COZAAR) 25 MG tablet TAKE 1 TABLET BY MOUTH  DAILY (Patient taking differently: Take 1 tablet by mouth Daily.) 90 tablet 3    meloxicam (MOBIC) 15 MG tablet Take 1 tablet by mouth Daily As Needed for Moderate Pain.      OLANZapine (zyPREXA) 2.5 MG tablet Take 1 tablet by mouth Daily.      ondansetron (ZOFRAN) 4 MG tablet Take 1 tablet by mouth Every 6 (Six) Hours As Needed for Nausea or Vomiting.      pantoprazole (PROTONIX) 40 MG EC tablet Take 1 tablet by mouth Daily.      pramipexole (MIRAPEX) 0.5 MG tablet Take 1 tablet by mouth every night at bedtime.      tiZANidine (ZANAFLEX) 2 MG tablet Take 1 tablet by mouth 3 (Three) Times a Day As Needed. for muscle spams      [DISCONTINUED] clonazePAM (KlonoPIN) 1 MG tablet Take 0.5 tablets by mouth At Night As Needed (Anxiety or Insomnia) for up to 14 days. (Patient taking differently: Take 1 tablet by mouth Every Night.) 7 tablet 0    [DISCONTINUED] Diroximel Fumarate (Vumerity) 231 MG capsule delayed-release Take 2 capsules by mouth 2 (Two) Times a Day.      [DISCONTINUED] HYDROcodone-acetaminophen (NORCO) 7.5-325 MG per tablet Take 1 tablet by mouth Every 6 (Six) Hours As Needed for Moderate Pain (Pain). 20 tablet 0    [DISCONTINUED] hydrOXYzine (ATARAX) 25 MG tablet Take 1 tablet by mouth 3 (Three) Times a Day As Needed (Anxiety or Insomnia) for up to 90 days. 90 tablet 0     No current facility-administered medications on file prior to visit.       Allergies   Allergen Reactions    Codeine Hives     Hyperactivity, nausea    Morphine Hives     Hyperactivity, nausea       Past Medical History:    Diagnosis Date    Abdominal hernia     Acute ST elevation myocardial infarction (STEMI) due to occlusion of right coronary artery 02/24/2020    CHF (congestive heart failure)     DENIES CP GETS SOA WITH EXERTION. FOLLOWED BY DR ERIC/KASIA GREY. DECREASED ACTIVITY USES WALKER/ROLLATOR    CKD (chronic kidney disease)     Coronary artery disease     Dyspepsia     GERD (gastroesophageal reflux disease)     Hyperlipidemia     Hypertension     Ischemic cardiomyopathy     Multiple sclerosis        Past Surgical History:   Procedure Laterality Date    APPENDECTOMY      CARDIAC CATHETERIZATION      CARDIAC CATHETERIZATION N/A 2/24/2020    Procedure: Left Heart Cath;  Surgeon: Marlon Zamudio MD;  Location: [facility] CATH INVASIVE LOCATION;  Service: Cardiovascular;  Laterality: N/A;    CARDIAC CATHETERIZATION N/A 2/24/2020    Procedure: Stent LATRICIA coronary;  Surgeon: Marlon Zamudio MD;  Location: [facility] CATH INVASIVE LOCATION;  Service: Cardiovascular;  Laterality: N/A;    CARDIAC CATHETERIZATION  2/24/2020    Procedure: Percutaneous Manual Thrombectomy;  Surgeon: Marlon Zamudio MD;  Location: [facility] CATH INVASIVE LOCATION;  Service: Cardiovascular;;    CARDIAC CATHETERIZATION N/A 2/24/2020    Procedure: Coronary angiography;  Surgeon: Marlon Zamudio MD;  Location: [facility] CATH INVASIVE LOCATION;  Service: Cardiovascular;  Laterality: N/A;    CARDIAC CATHETERIZATION N/A 2/24/2020    Procedure: Left ventriculography;  Surgeon: Marlon Zamudio MD;  Location: [facility] CATH INVASIVE LOCATION;  Service: Cardiovascular;  Laterality: N/A;    CHOLECYSTECTOMY      COLONOSCOPY N/A 2/29/2020    Procedure: COLONOSCOPY to  cecum:;  Surgeon: Krystal Sarabia MD;  Location: [facility] ENDOSCOPY;  Service: Gastroenterology;  Laterality: N/A;  pre:  anemia and abd pain  post:  hemorrhoids, tortuous colon    ENDOSCOPY N/A 2/29/2020    Procedure: ESOPHAGOGASTRODUODENOSCOPY  with biopsies;  Surgeon: Krystal Sarabia MD;   "Location: [facility] ENDOSCOPY;  Service: Gastroenterology;  Laterality: N/A;  pre:  anemia and abd pain  post:  mild gastritis,     HYSTERECTOMY      PARATHYROIDECTOMY Bilateral 10/17/2023    Procedure: NECK EXPLORATION WITH PARATHYROID ADENOMA EXCISION AND FROZEN SECTION,, RECURRENT LARYNGEAL NERVE MONITORING, INTRAOPERATIVE INTACT PTH ASSAY;  Surgeon: Ezekiel Giles MD;  Location: [facility];  Service: ENT;  Laterality: Bilateral;    TONSILLECTOMY         Social History     Socioeconomic History    Marital status:    Tobacco Use    Smoking status: Former     Packs/day: 0.50     Years: 20.00     Additional pack years: 0.00     Total pack years: 10.00     Types: Cigarettes     Start date:      Quit date:      Years since quittin.8    Smokeless tobacco: Never    Tobacco comments:     CAFFEINE USE: 2 CUPS COFFEE DAILY   Vaping Use    Vaping Use: Never used   Substance and Sexual Activity    Alcohol use: Not Currently    Drug use: Never    Sexual activity: Defer       Family History   Problem Relation Age of Onset    Thyroid disease Mother     Hypertension Sister     Coronary artery disease Brother        The following portions of the patient's history were reviewed and updated as appropriate: allergies, current medications, past family history, past medical history, past social history, past surgical history and problem list.       Objective:       Vitals:    10/27/23 1453   BP: 102/70   BP Location: Left arm   Patient Position: Sitting   Pulse: 64   Weight: 96.3 kg (212 lb 6.4 oz)   Height: 152.4 cm (60\")         Physical Exam:  Constitutional: Well appearing, well developed, no acute distress   HENT: Oropharynx clear and membrane moist  Eyes: Normal conjunctiva, no sclera icterus  Neck: Supple, no carotid bruit bilaterally  Cardiovascular: Regular rate and rhythm, No Murmur, No bilateral lower extremity edema  Pulmonary: Normal respiratory effort, normal lung sounds, no wheezing  Neurological: " "Alert and orient x 3  Skin: Warm, dry, no ecchymosis, no rash  Psych: Appropriate mood and affect. Normal judgment and insight         Lab Results   Component Value Date     10/17/2023     10/11/2023    K 4.4 10/17/2023    K 4.5 10/11/2023     10/17/2023     10/11/2023    CO2 20.9 (L) 10/17/2023    CO2 22.2 10/11/2023    BUN 21 10/17/2023    BUN 20 10/11/2023    CREATININE 1.38 (H) 10/17/2023    CREATININE 1.09 (H) 10/11/2023    EGFRIFNONA 43 (L) 01/19/2022    EGFRIFNONA 50 (L) 10/04/2021    GLUCOSE 129 (H) 10/17/2023    GLUCOSE 126 (H) 10/11/2023    CALCIUM 8.8 10/18/2023    CALCIUM 9.9 10/17/2023    PROTENTOTREF 7.7 03/27/2023    PROTENTOTREF 7.0 01/04/2023    ALBUMIN 3.4 (L) 10/11/2023    ALBUMIN 4.1 06/27/2023    BILITOT 0.2 10/11/2023    BILITOT 0.4 06/27/2023    AST 11 10/11/2023    AST 11 06/27/2023    ALT 8 10/11/2023    ALT 13 06/27/2023     Lab Results   Component Value Date    WBC 8.92 10/11/2023    WBC 7.56 09/08/2023    HGB 10.2 (L) 10/11/2023    HGB 11.0 (L) 09/08/2023    HCT 34.6 10/11/2023    HCT 37.1 09/08/2023    MCV 93.3 10/11/2023    MCV 96.4 09/08/2023     (H) 10/11/2023     09/08/2023     Lab Results   Component Value Date    CHOL 161 12/07/2022    CHOL 202 (H) 02/24/2020    TRIG 132 12/07/2022    TRIG 161 (H) 04/15/2020    HDL 52 12/07/2022    HDL 45 04/15/2020    LDL 86 12/07/2022    LDL 79 04/15/2020     Lab Results   Component Value Date    PROBNP 358.8 03/23/2021    BNP 63255 (H) 03/10/2020     Lab Results   Component Value Date    CKTOTAL 75 10/04/2021    CKMB 2.2 03/10/2020    TROPONINT <0.010 10/04/2021     No results found for: \"TSH\"        ECG 12 Lead    Date/Time: 10/27/2023 4:29 PM  Performed by: Tiarra Lynn APRN    Authorized by: Tiarra Lynn APRN  Comparison: compared with previous ECG from 4/10/2023  Similar to previous ECG  Rhythm: sinus rhythm  Rate: normal  BPM: 64  Q waves: II, III and aVF    ST Segments: ST segments " normal             Assessment:          Diagnosis Plan   1. Coronary artery disease involving native coronary artery of native heart without angina pectoris  ECG 12 Lead             Plan:       Coronary artery disease - history of PCI 2020.  Continue aspirin, clopidogrel, beta-blocker and high potency statin    Ischemic cardiomyopathy - recovered LVEF on most recent echocardiogram 7/2022.  Continue beta-blocker, losartan    Mixed hyperlipidemia -continue high potency statin.  Lipid panel 12/2022 with LDL slightly above goal.  We will continue to follow-up, could consider adding Zetia if remains above goal range      Patient is seen today for follow-up.  I am not recommending any changes.  We will continue to monitor her LDL and consider Zetia.  Follow-up in 6 months or earlier with problems  Orders Placed This Encounter   Procedures    ECG 12 Lead     This order was created via procedure documentation     Order Specific Question:   Release to patient     Answer:   Routine Release [[id number]]            WESTLEY Lake  [facility]  10/27/23  16:30 EDT

## 2023-11-06 ENCOUNTER — LAB (OUTPATIENT)
Dept: LAB | Facility: HOSPITAL | Age: 63
End: 2023-11-06
Payer: MEDICARE

## 2023-11-06 ENCOUNTER — TRANSCRIBE ORDERS (OUTPATIENT)
Dept: ADMINISTRATIVE | Facility: HOSPITAL | Age: 63
End: 2023-11-06
Payer: MEDICARE

## 2023-11-06 DIAGNOSIS — Z01.818 PREOP TESTING: ICD-10-CM

## 2023-11-06 DIAGNOSIS — E21.3 HYPERPARATHYROIDISM: ICD-10-CM

## 2023-11-06 DIAGNOSIS — E03.9 HYPOTHYROIDISM, UNSPECIFIED TYPE: Primary | ICD-10-CM

## 2023-11-06 DIAGNOSIS — D68.9 COAGULATION DEFECT, UNSPECIFIED: ICD-10-CM

## 2023-11-06 DIAGNOSIS — E03.9 HYPOTHYROIDISM, UNSPECIFIED TYPE: ICD-10-CM

## 2023-11-06 DIAGNOSIS — G35 MULTIPLE SCLEROSIS: ICD-10-CM

## 2023-11-06 DIAGNOSIS — Z79.899 ENCOUNTER FOR LONG-TERM (CURRENT) USE OF OTHER MEDICATIONS: ICD-10-CM

## 2023-11-06 DIAGNOSIS — E21.3 HYPERPARATHYROIDISM: Primary | ICD-10-CM

## 2023-11-06 DIAGNOSIS — G35 MULTIPLE SCLEROSIS: Primary | ICD-10-CM

## 2023-11-06 LAB
ALBUMIN SERPL-MCNC: 3.1 G/DL (ref 3.5–5.2)
ALBUMIN/GLOB SERPL: 0.8 G/DL
ALP SERPL-CCNC: 147 U/L (ref 39–117)
ALT SERPL W P-5'-P-CCNC: 9 U/L (ref 1–33)
ANION GAP SERPL CALCULATED.3IONS-SCNC: 12 MMOL/L (ref 5–15)
APTT PPP: 30 SECONDS (ref 24.2–34.2)
AST SERPL-CCNC: 14 U/L (ref 1–32)
BASOPHILS # BLD AUTO: 0.06 10*3/MM3 (ref 0–0.2)
BASOPHILS NFR BLD AUTO: 0.8 % (ref 0–1.5)
BILIRUB SERPL-MCNC: 0.3 MG/DL (ref 0–1.2)
BUN SERPL-MCNC: 8 MG/DL (ref 8–23)
BUN/CREAT SERPL: 8.6 (ref 7–25)
CALCIUM SPEC-SCNC: 8.4 MG/DL (ref 8.6–10.5)
CHLORIDE SERPL-SCNC: 104 MMOL/L (ref 98–107)
CO2 SERPL-SCNC: 25 MMOL/L (ref 22–29)
CREAT SERPL-MCNC: 0.93 MG/DL (ref 0.57–1)
DEPRECATED RDW RBC AUTO: 43.9 FL (ref 37–54)
EGFRCR SERPLBLD CKD-EPI 2021: 68.8 ML/MIN/1.73
EOSINOPHIL # BLD AUTO: 0.13 10*3/MM3 (ref 0–0.4)
EOSINOPHIL NFR BLD AUTO: 1.7 % (ref 0.3–6.2)
ERYTHROCYTE [DISTWIDTH] IN BLOOD BY AUTOMATED COUNT: 14.1 % (ref 12.3–15.4)
GLOBULIN UR ELPH-MCNC: 4 GM/DL
GLUCOSE SERPL-MCNC: 117 MG/DL (ref 65–99)
HCT VFR BLD AUTO: 28.9 % (ref 34–46.6)
HGB BLD-MCNC: 9.4 G/DL (ref 12–15.9)
IMM GRANULOCYTES # BLD AUTO: 0.03 10*3/MM3 (ref 0–0.05)
IMM GRANULOCYTES NFR BLD AUTO: 0.4 % (ref 0–0.5)
INR PPP: 1.07 (ref 0.86–1.15)
LYMPHOCYTES # BLD AUTO: 1.92 10*3/MM3 (ref 0.7–3.1)
LYMPHOCYTES NFR BLD AUTO: 25.5 % (ref 19.6–45.3)
MCH RBC QN AUTO: 28.1 PG (ref 26.6–33)
MCHC RBC AUTO-ENTMCNC: 32.5 G/DL (ref 31.5–35.7)
MCV RBC AUTO: 86.5 FL (ref 79–97)
MONOCYTES # BLD AUTO: 0.66 10*3/MM3 (ref 0.1–0.9)
MONOCYTES NFR BLD AUTO: 8.8 % (ref 5–12)
NEUTROPHILS NFR BLD AUTO: 4.74 10*3/MM3 (ref 1.7–7)
NEUTROPHILS NFR BLD AUTO: 62.8 % (ref 42.7–76)
NRBC BLD AUTO-RTO: 0.1 /100 WBC (ref 0–0.2)
PLATELET # BLD AUTO: 450 10*3/MM3 (ref 140–450)
PMV BLD AUTO: 10.8 FL (ref 6–12)
POTASSIUM SERPL-SCNC: 3.4 MMOL/L (ref 3.5–5.2)
PROT SERPL-MCNC: 7.1 G/DL (ref 6–8.5)
PROTHROMBIN TIME: 14 SECONDS (ref 11.8–14.9)
PTH-INTACT SERPL-MCNC: 103 PG/ML (ref 15–65)
RBC # BLD AUTO: 3.34 10*6/MM3 (ref 3.77–5.28)
SODIUM SERPL-SCNC: 141 MMOL/L (ref 136–145)
T4 FREE SERPL-MCNC: 0.98 NG/DL (ref 0.93–1.7)
TSH SERPL DL<=0.05 MIU/L-ACNC: 1.64 UIU/ML (ref 0.27–4.2)
WBC NRBC COR # BLD: 7.54 10*3/MM3 (ref 3.4–10.8)

## 2023-11-06 PROCEDURE — 85025 COMPLETE CBC W/AUTO DIFF WBC: CPT

## 2023-11-06 PROCEDURE — 84443 ASSAY THYROID STIM HORMONE: CPT

## 2023-11-06 PROCEDURE — 83970 ASSAY OF PARATHORMONE: CPT

## 2023-11-06 PROCEDURE — 84439 ASSAY OF FREE THYROXINE: CPT

## 2023-11-06 PROCEDURE — 36415 COLL VENOUS BLD VENIPUNCTURE: CPT

## 2023-11-06 PROCEDURE — 80053 COMPREHEN METABOLIC PANEL: CPT

## 2023-11-06 PROCEDURE — 85730 THROMBOPLASTIN TIME PARTIAL: CPT

## 2023-11-06 PROCEDURE — 85610 PROTHROMBIN TIME: CPT

## 2023-11-13 ENCOUNTER — TELEPHONE (OUTPATIENT)
Dept: OTOLARYNGOLOGY | Facility: CLINIC | Age: 63
End: 2023-11-13

## 2023-11-13 NOTE — TELEPHONE ENCOUNTER
Provider: DR DISLA    Caller: FEDERICO CEJA    Relationship to Patient: SELF    Reason for Call: SAME DAY CANCEL-SICK, WILL CALLBCK TO RESCHEDULE

## 2023-12-01 ENCOUNTER — TELEPHONE (OUTPATIENT)
Age: 63
End: 2023-12-01
Payer: MEDICARE

## 2023-12-01 NOTE — TELEPHONE ENCOUNTER
Patient called stating she has been sick for the past two months. She is feeling sick to her stomach and having diarrhea. Fatigue, no appetite,vomiting. She has not went to the emergency room at all.       She was having symptoms when she was seen by Tiarra calderon in October. They just were not as bad as they are now. Her APRN thinks it could be some of her heart medications.     She can be reached at [phone]

## 2023-12-19 ENCOUNTER — TELEPHONE (OUTPATIENT)
Dept: FAMILY MEDICINE CLINIC | Facility: CLINIC | Age: 63
End: 2023-12-19
Payer: MEDICARE

## 2023-12-19 DIAGNOSIS — N30.00 ACUTE CYSTITIS WITHOUT HEMATURIA: Primary | ICD-10-CM

## 2023-12-19 RX ORDER — NITROFURANTOIN 25; 75 MG/1; MG/1
100 CAPSULE ORAL 2 TIMES DAILY
Qty: 10 CAPSULE | Refills: 0 | Status: SHIPPED | OUTPATIENT
Start: 2023-12-19 | End: 2023-12-24

## 2023-12-19 NOTE — TELEPHONE ENCOUNTER
Caller: Falguni Minaya    Relationship: Self    Best call back number: [phone]     What medication are you requesting: ANTIBIOTIC FOR UTI    What are your current symptoms: BURNING WITH URINATION, FREQUENCY, URGENCY    How long have you been experiencing symptoms: 4 DAYS    Have you had these symptoms before:    [x] Yes  [] No    Have you been treated for these symptoms before:   [x] Yes  [] No    If a prescription is needed, what is your preferred pharmacy and phone number: [facility] [phone] [facility] [phone] [facility]     Additional notes:  PATIENT REPORTS SHE DOES NOT FEEL COMFORTABLE COMING IN, SHE IS ALSO HAVING NAUSEA, VOMITTING AND DIARRHEA

## 2023-12-24 ENCOUNTER — HOSPITAL ENCOUNTER (EMERGENCY)
Facility: HOSPITAL | Age: 63
Discharge: HOME | End: 2023-12-24
Attending: EMERGENCY MEDICINE | Admitting: EMERGENCY MEDICINE
Payer: MEDICARE

## 2023-12-24 ENCOUNTER — APPOINTMENT (OUTPATIENT)
Dept: GENERAL RADIOLOGY | Facility: HOSPITAL | Age: 63
End: 2023-12-24
Payer: MEDICARE

## 2023-12-24 ENCOUNTER — APPOINTMENT (OUTPATIENT)
Dept: CT IMAGING | Facility: HOSPITAL | Age: 63
End: 2023-12-24
Payer: MEDICARE

## 2023-12-24 VITALS
DIASTOLIC BLOOD PRESSURE: 88 MMHG | SYSTOLIC BLOOD PRESSURE: 151 MMHG | OXYGEN SATURATION: 99 % | HEART RATE: 80 BPM | TEMPERATURE: 98.2 F | HEIGHT: 60 IN | BODY MASS INDEX: 39.99 KG/M2 | WEIGHT: 203.7 LBS | RESPIRATION RATE: 20 BRPM

## 2023-12-24 DIAGNOSIS — E87.6 HYPOKALEMIA: ICD-10-CM

## 2023-12-24 DIAGNOSIS — N30.00 ACUTE CYSTITIS WITHOUT HEMATURIA: Primary | ICD-10-CM

## 2023-12-24 DIAGNOSIS — E83.42 HYPOMAGNESEMIA: ICD-10-CM

## 2023-12-24 DIAGNOSIS — Z86.2 HISTORY OF ANEMIA: ICD-10-CM

## 2023-12-24 LAB
ALBUMIN SERPL-MCNC: 2.9 G/DL (ref 3.5–5.2)
ALBUMIN/GLOB SERPL: 0.6 G/DL
ALP SERPL-CCNC: 179 U/L (ref 39–117)
ALT SERPL W P-5'-P-CCNC: 9 U/L (ref 1–33)
ANION GAP SERPL CALCULATED.3IONS-SCNC: 14.5 MMOL/L (ref 5–15)
AST SERPL-CCNC: 19 U/L (ref 1–32)
BACTERIA UR QL AUTO: ABNORMAL /HPF
BASOPHILS # BLD AUTO: 0.01 10*3/MM3 (ref 0–0.2)
BASOPHILS NFR BLD AUTO: 0.2 % (ref 0–1.5)
BILIRUB SERPL-MCNC: 0.3 MG/DL (ref 0–1.2)
BILIRUB UR QL STRIP: NEGATIVE
BUN SERPL-MCNC: 8 MG/DL (ref 8–23)
BUN/CREAT SERPL: 7.1 (ref 7–25)
CALCIUM SPEC-SCNC: 8.2 MG/DL (ref 8.6–10.5)
CHLORIDE SERPL-SCNC: 103 MMOL/L (ref 98–107)
CLARITY UR: ABNORMAL
CO2 SERPL-SCNC: 24.5 MMOL/L (ref 22–29)
COLOR UR: YELLOW
CREAT SERPL-MCNC: 1.12 MG/DL (ref 0.57–1)
DEPRECATED RDW RBC AUTO: 56.3 FL (ref 37–54)
EGFRCR SERPLBLD CKD-EPI 2021: 55 ML/MIN/1.73
EOSINOPHIL # BLD AUTO: 0.01 10*3/MM3 (ref 0–0.4)
EOSINOPHIL NFR BLD AUTO: 0.2 % (ref 0.3–6.2)
ERYTHROCYTE [DISTWIDTH] IN BLOOD BY AUTOMATED COUNT: 17.1 % (ref 12.3–15.4)
GLOBULIN UR ELPH-MCNC: 4.5 GM/DL
GLUCOSE SERPL-MCNC: 116 MG/DL (ref 65–99)
GLUCOSE UR STRIP-MCNC: NEGATIVE MG/DL
HCT VFR BLD AUTO: 32.8 % (ref 34–46.6)
HGB BLD-MCNC: 10.2 G/DL (ref 12–15.9)
HGB UR QL STRIP.AUTO: ABNORMAL
HOLD SPECIMEN: NORMAL
HOLD SPECIMEN: NORMAL
HYALINE CASTS UR QL AUTO: ABNORMAL /LPF
IMM GRANULOCYTES # BLD AUTO: 0.05 10*3/MM3 (ref 0–0.05)
IMM GRANULOCYTES NFR BLD AUTO: 1 % (ref 0–0.5)
KETONES UR QL STRIP: ABNORMAL
LEUKOCYTE ESTERASE UR QL STRIP.AUTO: ABNORMAL
LYMPHOCYTES # BLD AUTO: 1.55 10*3/MM3 (ref 0.7–3.1)
LYMPHOCYTES NFR BLD AUTO: 29.5 % (ref 19.6–45.3)
MAGNESIUM SERPL-MCNC: 1.5 MG/DL (ref 1.6–2.4)
MCH RBC QN AUTO: 29 PG (ref 26.6–33)
MCHC RBC AUTO-ENTMCNC: 31.1 G/DL (ref 31.5–35.7)
MCV RBC AUTO: 93.2 FL (ref 79–97)
MONOCYTES # BLD AUTO: 0.67 10*3/MM3 (ref 0.1–0.9)
MONOCYTES NFR BLD AUTO: 12.8 % (ref 5–12)
NEUTROPHILS NFR BLD AUTO: 2.96 10*3/MM3 (ref 1.7–7)
NEUTROPHILS NFR BLD AUTO: 56.3 % (ref 42.7–76)
NITRITE UR QL STRIP: NEGATIVE
NRBC BLD AUTO-RTO: 0.4 /100 WBC (ref 0–0.2)
PH UR STRIP.AUTO: 6 [PH] (ref 5–8)
PLATELET # BLD AUTO: 485 10*3/MM3 (ref 140–450)
PMV BLD AUTO: 9.6 FL (ref 6–12)
POTASSIUM SERPL-SCNC: 2.8 MMOL/L (ref 3.5–5.2)
PROT SERPL-MCNC: 7.4 G/DL (ref 6–8.5)
PROT UR QL STRIP: ABNORMAL
RBC # BLD AUTO: 3.52 10*6/MM3 (ref 3.77–5.28)
RBC # UR STRIP: ABNORMAL /HPF
REF LAB TEST METHOD: ABNORMAL
SODIUM SERPL-SCNC: 142 MMOL/L (ref 136–145)
SP GR UR STRIP: 1.01 (ref 1–1.03)
SQUAMOUS #/AREA URNS HPF: ABNORMAL /HPF
TROPONIN T SERPL HS-MCNC: 12 NG/L
UROBILINOGEN UR QL STRIP: ABNORMAL
WBC # UR STRIP: ABNORMAL /HPF
WBC NRBC COR # BLD AUTO: 5.25 10*3/MM3 (ref 3.4–10.8)
WHOLE BLOOD HOLD COAG: NORMAL
WHOLE BLOOD HOLD SPECIMEN: NORMAL

## 2023-12-24 PROCEDURE — P9612 CATHETERIZE FOR URINE SPEC: HCPCS

## 2023-12-24 PROCEDURE — 84484 ASSAY OF TROPONIN QUANT: CPT

## 2023-12-24 PROCEDURE — 93005 ELECTROCARDIOGRAM TRACING: CPT

## 2023-12-24 PROCEDURE — 85025 COMPLETE CBC W/AUTO DIFF WBC: CPT

## 2023-12-24 PROCEDURE — 83735 ASSAY OF MAGNESIUM: CPT

## 2023-12-24 PROCEDURE — 80053 COMPREHEN METABOLIC PANEL: CPT

## 2023-12-24 PROCEDURE — 87086 URINE CULTURE/COLONY COUNT: CPT | Performed by: REGISTERED NURSE

## 2023-12-24 PROCEDURE — 71045 X-RAY EXAM CHEST 1 VIEW: CPT

## 2023-12-24 PROCEDURE — 96375 TX/PRO/DX INJ NEW DRUG ADDON: CPT

## 2023-12-24 PROCEDURE — 93005 ELECTROCARDIOGRAM TRACING: CPT | Performed by: EMERGENCY MEDICINE

## 2023-12-24 PROCEDURE — 81001 URINALYSIS AUTO W/SCOPE: CPT | Performed by: REGISTERED NURSE

## 2023-12-24 PROCEDURE — 99284 EMERGENCY DEPT VISIT MOD MDM: CPT

## 2023-12-24 PROCEDURE — 25010000002 ONDANSETRON PER 1 MG: Performed by: REGISTERED NURSE

## 2023-12-24 PROCEDURE — 70450 CT HEAD/BRAIN W/O DYE: CPT

## 2023-12-24 PROCEDURE — 96367 TX/PROPH/DG ADDL SEQ IV INF: CPT

## 2023-12-24 PROCEDURE — 96365 THER/PROPH/DIAG IV INF INIT: CPT

## 2023-12-24 PROCEDURE — 25010000002 CEFTRIAXONE PER 250 MG

## 2023-12-24 PROCEDURE — 25010000002 POTASSIUM CHLORIDE 10 MEQ/100ML SOLUTION: Performed by: REGISTERED NURSE

## 2023-12-24 PROCEDURE — 25810000003 SODIUM CHLORIDE 0.9 % SOLUTION: Performed by: REGISTERED NURSE

## 2023-12-24 RX ORDER — ONDANSETRON 2 MG/ML
4 INJECTION INTRAMUSCULAR; INTRAVENOUS ONCE
Status: COMPLETED | OUTPATIENT
Start: 2023-12-24 | End: 2023-12-24

## 2023-12-24 RX ORDER — POTASSIUM CHLORIDE 750 MG/1
40 CAPSULE, EXTENDED RELEASE ORAL ONCE
Status: COMPLETED | OUTPATIENT
Start: 2023-12-24 | End: 2023-12-24

## 2023-12-24 RX ORDER — SULFAMETHOXAZOLE AND TRIMETHOPRIM 800; 160 MG/1; MG/1
1 TABLET ORAL 2 TIMES DAILY
Qty: 14 TABLET | Refills: 0 | Status: SHIPPED | OUTPATIENT
Start: 2023-12-24 | End: 2023-12-31

## 2023-12-24 RX ORDER — SODIUM CHLORIDE 0.9 % (FLUSH) 0.9 %
10 SYRINGE (ML) INJECTION AS NEEDED
Status: DISCONTINUED | OUTPATIENT
Start: 2023-12-24 | End: 2023-12-24 | Stop reason: HOSPADM

## 2023-12-24 RX ORDER — CEFTRIAXONE SODIUM 1 G/50ML
1000 INJECTION, SOLUTION INTRAVENOUS ONCE
Status: COMPLETED | OUTPATIENT
Start: 2023-12-24 | End: 2023-12-24

## 2023-12-24 RX ORDER — ONDANSETRON 4 MG/1
4 TABLET, ORALLY DISINTEGRATING ORAL EVERY 8 HOURS PRN
Qty: 15 TABLET | Refills: 0 | Status: SHIPPED | OUTPATIENT
Start: 2023-12-24

## 2023-12-24 RX ORDER — POTASSIUM CHLORIDE 7.45 MG/ML
10 INJECTION INTRAVENOUS ONCE
Status: COMPLETED | OUTPATIENT
Start: 2023-12-24 | End: 2023-12-24

## 2023-12-24 RX ORDER — ACETAMINOPHEN 325 MG/1
650 TABLET ORAL ONCE
Status: COMPLETED | OUTPATIENT
Start: 2023-12-24 | End: 2023-12-24

## 2023-12-24 RX ADMIN — SODIUM CHLORIDE 1000 ML: 9 INJECTION, SOLUTION INTRAVENOUS at 13:55

## 2023-12-24 RX ADMIN — CEFTRIAXONE SODIUM 1000 MG: 1 INJECTION, SOLUTION INTRAVENOUS at 14:59

## 2023-12-24 RX ADMIN — POTASSIUM CHLORIDE 40 MEQ: 10 CAPSULE, COATED, EXTENDED RELEASE ORAL at 13:55

## 2023-12-24 RX ADMIN — POTASSIUM CHLORIDE 10 MEQ: 10 INJECTION, SOLUTION INTRAVENOUS at 13:55

## 2023-12-24 RX ADMIN — ONDANSETRON 4 MG: 2 INJECTION INTRAMUSCULAR; INTRAVENOUS at 13:55

## 2023-12-24 RX ADMIN — ACETAMINOPHEN 650 MG: 325 TABLET ORAL at 13:55

## 2023-12-24 NOTE — ED PROVIDER NOTES
Time: 1:35 PM EST  Date of encounter:  12/24/2023  Independent Historian/Clinical History and Information was obtained by:   Patient    History is limited by: N/A    Chief Complaint   Patient presents with    Nausea    Vomiting         History of Present Illness:  Patient is a 63 y.o. year old female who presents to the emergency department for evaluation of nausea/vomiting and abdominal pain intermittently for the past 2 months.  Patient reports last night started intractable vomiting as well as diarrhea.  Complains of generalized dizziness and body aches.  Patient states she fell on Friday and hit her head, denies loss of consciousness.  WESTLEY Ireland    Patient is 63 female presenting today due to intermittent nausea, vomiting and diarrhea for the past couple months but worsened this week.  She states her diarrhea resolved last night.  She was recently prescribed amoxicillin this past Tuesday for suspected UTI.  She called her PCP and they were concerned she might have a UTI, she had not given a urine sample.  Patient states she only took 1 dose of amoxicillin on Tuesday.  Patient's  states that she had a fall out of bed due to her MS this past Friday.  Hit her head but no LOC.  she states she had some slight dysuria last night.  She denies fever, chills or hematuria.  She denies abdominal pain.  She has had prior cholecystectomy, appendectomy, total hysterectomy.     Patient Care Team  Primary Care Provider: Williams Sage MD    Past Medical History:     Allergies   Allergen Reactions    Codeine Hives     Hyperactivity, nausea    Morphine Hives     Hyperactivity, nausea     Past Medical History:   Diagnosis Date    Abdominal hernia     Acute ST elevation myocardial infarction (STEMI) due to occlusion of right coronary artery 02/24/2020    CHF (congestive heart failure)     DENIES CP GETS SOA WITH EXERTION. FOLLOWED BY DR ERIC/KASIA GREY. DECREASED ACTIVITY USES WALKER/ROLLATOR    CKD (chronic  kidney disease)     Coronary artery disease     Dyspepsia     GERD (gastroesophageal reflux disease)     Hyperlipidemia     Hypertension     Ischemic cardiomyopathy     Multiple sclerosis      Past Surgical History:   Procedure Laterality Date    APPENDECTOMY      CARDIAC CATHETERIZATION      CARDIAC CATHETERIZATION N/A 2/24/2020    Procedure: Left Heart Cath;  Surgeon: Marlon Zamudio MD;  Location[facility]AS[facility] LOC[facility];  Service: Cardiovascular;  Laterality: N/A;    CARDIAC CATHETERIZATION N/A 2/24/2020    Procedure: Stent LATRICIA coronary;  Surgeon: Marlon Zamudio MD;  [facility];  Service: Cardiovascular;  Laterality: N/A;    CARDIAC CATHETERIZATION  2/24/2020    Procedure: Percutaneous Manual Thrombectomy;  Surgeon: Marlon Zamudio MD;  Location[facility];  Service: Cardiovascular;;    CARDIAC CATHETERIZATION N/A 2/24/2020    Procedure: Coronary angiography;  Surgeon: Marlon Zamudio MD;  Location[facility];  Service: Cardiovascular;  Laterality: N/A;    CARDIAC CATHETERIZATION N/A 2/24/2020    Procedure: Left ventriculography;  Surgeon: Marlon Zamudio MD;  Location: [facility] CATH INVASIVE LOCATION;  Service: Cardiovascular;  Laterality: N/A;    CHOLECYSTECTOMY      COLONOSCOPY N/A 2/29/2020    Procedure: COLONOSCOPY to  cecum:;  Surgeon: Krystal Sarabia MD;  Location: [facility] ENDOSCOPY;  Service: Gastroenterology;  Laterality: N/A;  pre:  anemia and abd pain  post:  hemorrhoids, tortuous colon    ENDOSCOPY N/A 2/29/2020    Procedure: ESOPHAGOGASTRODUODENOSCOPY  with biopsies;  Surgeon: Krystal Sarabia MD;  Location: [facility] ENDOSCOPY;  Service: Gastroenterology;  Laterality: N/A;  pre:  anemia and abd pain  post:  mild gastritis,     HYSTERECTOMY      PARATHYROIDECTOMY Bilateral 10/17/2023    Procedure: NECK EXPLORATION WITH PARATHYROID ADENOMA EXCISION AND FROZEN SECTION,, RECURRENT LARYNGEAL NERVE MONITORING,  INTRAOPERATIVE INTACT PTH ASSAY;  Surgeon: Ezekiel Giles MD;  Location: [facility] MAIN OR;  Service: ENT;  Laterality: Bilateral;    TONSILLECTOMY       Family History   Problem Relation Age of Onset    Thyroid disease Mother     Hypertension Sister     Coronary artery disease Brother        Home Medications:  Prior to Admission medications    Medication Sig Start Date End Date Taking? Authorizing Provider   aspirin 81 MG chewable tablet Chew 1 tablet Daily. 10/25/23   Ezekiel Giles MD   atorvastatin (LIPITOR) 40 MG tablet TAKE 1 TABLET BY MOUTH  EVERY NIGHT AT BEDTIME  Patient taking differently: Take 1 tablet by mouth Daily. 5/17/23   Keith Riley MD   carvedilol (COREG) 12.5 MG tablet TAKE 1 TABLET BY MOUTH  EVERY 12 HOURS  Patient taking differently: Take 1 tablet by mouth Every 12 (Twelve) Hours. 5/30/23   Shaila Urban APRN   clopidogrel (PLAVIX) 75 MG tablet Take 1 tablet by mouth Daily. 10/23/23   Ezekiel Giles MD   cyclobenzaprine (FLEXERIL) 10 MG tablet Take 1 tablet by mouth 3 (Three) Times a Day As Needed for Muscle Spasms.    ProviderRosas MD   FLUoxetine (PROzac) 40 MG capsule Take 1 capsule by mouth 2 (Two) Times a Day. 7/25/22   Emergency, Nurse Epic, RN   furosemide (LASIX) 40 MG tablet TAKE 1 TABLET BY MOUTH  DAILY 5/17/23   Keith Riley MD   gabapentin (NEURONTIN) 300 MG capsule Take 1 capsule by mouth 4 (Four) Times a Day.    ProviderRosas MD   losartan (COZAAR) 25 MG tablet TAKE 1 TABLET BY MOUTH  DAILY  Patient taking differently: Take 1 tablet by mouth Daily. 5/8/23   Keith Riley MD   meloxicam (MOBIC) 15 MG tablet Take 1 tablet by mouth Daily As Needed for Moderate Pain. 10/25/23   Ezekiel Giles MD   nitrofurantoin, macrocrystal-monohydrate, (Macrobid) 100 MG capsule Take 1 capsule by mouth 2 (Two) Times a Day for 5 days. 12/19/23 12/24/23  Williams Sage MD   OLANZapine (zyPREXA) 2.5 MG tablet Take 1 tablet by mouth Daily.    Provider,  "Rosas, MD   ondansetron (ZOFRAN) 4 MG tablet Take 1 tablet by mouth Every 6 (Six) Hours As Needed for Nausea or Vomiting. 22   Emergency, Nurse Cong RN   pantoprazole (PROTONIX) 40 MG EC tablet Take 1 tablet by mouth Daily. 22   Emergency, Nurse Cong RN   pramipexole (MIRAPEX) 0.5 MG tablet Take 1 tablet by mouth every night at bedtime. 23   Provider, MD Rosas   tiZANidine (ZANAFLEX) 2 MG tablet Take 1 tablet by mouth 3 (Three) Times a Day As Needed. for muscle spams 22   Emergency, Nurse Cong RN        Social History:   Social History     Tobacco Use    Smoking status: Former     Packs/day: 0.50     Years: 20.00     Additional pack years: 0.00     Total pack years: 10.00     Types: Cigarettes     Start date:      Quit date:      Years since quittin.9    Smokeless tobacco: Never    Tobacco comments:     CAFFEINE USE: 2 CUPS COFFEE DAILY   Vaping Use    Vaping Use: Never used   Substance Use Topics    Alcohol use: Not Currently    Drug use: Never         Review of Systems:  Review of Systems   Constitutional: Negative.  Negative for chills and fever.   HENT: Negative.     Eyes: Negative.    Respiratory: Negative.     Cardiovascular: Negative.    Gastrointestinal: Negative.  Positive for diarrhea, nausea and vomiting. Negative for abdominal pain.   Endocrine: Negative.    Genitourinary: Negative.  Positive for dysuria. Negative for flank pain.   Musculoskeletal: Negative.    Skin: Negative.    Allergic/Immunologic: Negative.    Neurological: Negative.    Hematological: Negative.    Psychiatric/Behavioral: Negative.          Physical Exam:  /88   Pulse 80   Temp 98.2 °F (36.8 °C) (Oral)   Resp 20   Ht 152.4 cm (60\")   Wt 92.4 kg (203 lb 11.2 oz)   SpO2 99%   BMI 39.78 kg/m²         Physical Exam  Vitals and nursing note reviewed.   Constitutional:       Appearance: Normal appearance. She is normal weight.   HENT:      Head: Normocephalic and atraumatic. No " contusion, masses or laceration. Hair is normal.        Nose: Nose normal.      Mouth/Throat:      Mouth: Mucous membranes are moist.   Eyes:      Extraocular Movements: Extraocular movements intact.      Conjunctiva/sclera: Conjunctivae normal.      Pupils: Pupils are equal, round, and reactive to light.   Cardiovascular:      Rate and Rhythm: Normal rate and regular rhythm.      Heart sounds: Normal heart sounds.   Pulmonary:      Effort: Pulmonary effort is normal.      Breath sounds: Normal breath sounds.   Abdominal:      General: Abdomen is flat. Bowel sounds are normal.      Palpations: Abdomen is soft.      Tenderness: There is no abdominal tenderness. There is no right CVA tenderness, left CVA tenderness, guarding or rebound.   Musculoskeletal:         General: Normal range of motion.      Cervical back: Normal range of motion and neck supple.   Skin:     General: Skin is warm and dry.   Neurological:      General: No focal deficit present.      Mental Status: She is alert and oriented to person, place, and time.   Psychiatric:         Mood and Affect: Mood normal.         Behavior: Behavior normal.               Procedures:  Procedures      Medical Decision Making:      Comorbidities that affect care:    Chronic Kidney Disease, Congestive Heart Failure, Coronary Artery Disease, Hypertension    External Notes reviewed:    Previous Admission Note: Admission to telemetry on October 17, 2023 due to postoperative pain, Previous ED Note: [facility] ED visit October 11, 2023 for musculoskeletal back pain and elevated lipase, and Previous Radiological Studies: CT abdomen pelvis from 5/11/2020 that Was negative      The following orders were placed and all results were independently analyzed by me:  Orders Placed This Encounter   Procedures    Urine Culture - Urine,    CT Head Without Contrast    XR Chest 1 View    [place] Draw    Comprehensive Metabolic Panel    Single High Sensitivity Troponin T    Magnesium    CBC Auto  Differential    Urinalysis With Culture If Indicated - Urine, Catheter    Urinalysis, Microscopic Only - Urine, Clean Catch    Urinalysis With Culture If Indicated -    NPO Diet NPO Type: Strict NPO    Undress & Gown    Continuous Pulse Oximetry    Vital Signs    Orthostatic Blood Pressure    Oxygen Therapy- Nasal Cannula; Titrate 1-6 LPM Per SpO2; 90 - 95%    POC Glucose Once    ECG 12 Lead ED Triage Standing Order; Weak / Dizzy / AMS    Insert Peripheral IV    Fall Precautions    CBC & Differential    Green Top (Gel)    Lavender Top    Gold Top - SST    Light Blue Top       Medications Given in the Emergency Department:  Medications   sodium chloride 0.9 % flush 10 mL (has no administration in time range)   magnesium oxide (MAG-OX) tablet 400 mg (has no administration in time range)   acetaminophen (TYLENOL) tablet 650 mg (650 mg Oral Given 12/24/23 1355)   ondansetron (ZOFRAN) injection 4 mg (4 mg Intravenous Given 12/24/23 1355)   sodium chloride 0.9 % bolus 1,000 mL (0 mL Intravenous Stopped 12/24/23 1459)   potassium chloride 10 mEq in 100 mL IVPB (0 mEq Intravenous Stopped 12/24/23 1459)   potassium chloride (MICRO-K/KLOR-CON) CR capsule (40 mEq Oral Given 12/24/23 1355)   cefTRIAXone (ROCEPHIN) IVPB 1,000 mg (0 mg Intravenous Stopped 12/24/23 1522)        ED Course:    The patient was initially evaluated in the triage area where orders were placed. The patient was later dispositioned by Daysi Darnell PA-C.      The patient was advised to stay for completion of workup which includes but is not limited to communication of labs and radiological results, reassessment and plan. The patient was advised that leaving prior to disposition by a provider could result in critical findings that are not communicated to the patient.          Labs:    Lab Results (last 24 hours)       Procedure Component Value Units Date/Time    CBC & Differential [[id number]]  (Abnormal) Collected: 12/24/23 1219    Specimen: Blood  Updated: 12/24/23 1226    Narrative:      The following orders were created for panel order CBC & Differential.  Procedure                               Abnormality         Status                     ---------                               -----------         ------                     CBC Auto Differential[[id number]]        Abnormal            Final result                 Please view results for these tests on the individual orders.    Comprehensive Metabolic Panel [[id number]]  (Abnormal) Collected: 12/24/23 1219    Specimen: Blood Updated: 12/24/23 1245     Glucose 116 mg/dL      BUN 8 mg/dL      Creatinine 1.12 mg/dL      Sodium 142 mmol/L      Potassium 2.8 mmol/L      Chloride 103 mmol/L      CO2 24.5 mmol/L      Calcium 8.2 mg/dL      Total Protein 7.4 g/dL      Albumin 2.9 g/dL      ALT (SGPT) 9 U/L      AST (SGOT) 19 U/L      Alkaline Phosphatase 179 U/L      Total Bilirubin 0.3 mg/dL      Globulin 4.5 gm/dL      A/G Ratio 0.6 g/dL      BUN/Creatinine Ratio 7.1     Anion Gap 14.5 mmol/L      eGFR 55.0 mL/min/1.73     Narrative:      GFR Normal >60  Chronic Kidney Disease <60  Kidney Failure <15      Single High Sensitivity Troponin T [[id number]]  (Normal) Collected: 12/24/23 1219    Specimen: Blood Updated: 12/24/23 1245     HS Troponin T 12 ng/L     Narrative:      High Sensitive Troponin T Reference Range:  <14.0 ng/L- Negative Female for AMI  <22.0 ng/L- Negative Male for AMI  >=14 - Abnormal Female indicating possible myocardial injury.  >=22 - Abnormal Male indicating possible myocardial injury.   Clinicians would have to utilize clinical acumen, EKG, Troponin, and serial changes to determine if it is an Acute Myocardial Infarction or myocardial injury due to an underlying chronic condition.         Magnesium [[id number]]  (Abnormal) Collected: 12/24/23 1219    Specimen: Blood Updated: 12/24/23 1245     Magnesium 1.5 mg/dL     CBC Auto Differential [[id number]]  (Abnormal) Collected: 12/24/23 1219     Specimen: Blood Updated: 12/24/23 1226     WBC 5.25 10*3/mm3      RBC 3.52 10*6/mm3      Hemoglobin 10.2 g/dL      Hematocrit 32.8 %      MCV 93.2 fL      MCH 29.0 pg      MCHC 31.1 g/dL      RDW 17.1 %      RDW-SD 56.3 fl      MPV 9.6 fL      Platelets 485 10*3/mm3      Neutrophil % 56.3 %      Lymphocyte % 29.5 %      Monocyte % 12.8 %      Eosinophil % 0.2 %      Basophil % 0.2 %      Immature Grans % 1.0 %      Neutrophils, Absolute 2.96 10*3/mm3      Lymphocytes, Absolute 1.55 10*3/mm3      Monocytes, Absolute 0.67 10*3/mm3      Eosinophils, Absolute 0.01 10*3/mm3      Basophils, Absolute 0.01 10*3/mm3      Immature Grans, Absolute 0.05 10*3/mm3      nRBC 0.4 /100 WBC     Urinalysis With Culture If Indicated - Urine, Catheter [[id number]]  (Abnormal) Collected: 12/24/23 1413    Specimen: Urine, Catheter Updated: 12/24/23 1424     Color, UA Yellow     Appearance, UA Cloudy     pH, UA 6.0     Specific Gravity, UA 1.012     Glucose, UA Negative     Ketones, UA Trace     Bilirubin, UA Negative     Blood, UA Trace     Protein, UA Trace     Leuk Esterase, UA Large (3+)     Nitrite, UA Negative     Urobilinogen, UA 0.2 E.U./dL    Narrative:      In absence of clinical symptoms, the presence of pyuria, bacteria, and/or nitrites on the urinalysis result does not correlate with infection.    Urinalysis, Microscopic Only - Urine, Catheter [[id number]]  (Abnormal) Collected: 12/24/23 1413    Specimen: Urine, Catheter Updated: 12/24/23 1424     RBC, UA 0-2 /HPF      WBC, UA Too Numerous to Count /HPF      Bacteria, UA None Seen /HPF      Squamous Epithelial Cells, UA 0-2 /HPF      Hyaline Casts, UA None Seen /LPF      Methodology Automated Microscopy    Urine Culture - Urine, Urine, Catheter [[id number]] Collected: 12/24/23 1413    Specimen: Urine, Catheter Updated: 12/24/23 1424             Imaging:    CT Head Without Contrast    Result Date: 12/24/2023  PROCEDURE: CT HEAD WO CONTRAST  COMPARISON:  [facility],  CT, CT HEAD WO CONTRAST, 10/04/2021, 8:38. INDICATIONS: fall/HEAD INJURY/HEAD PAIN X 1 DAY AGO  PROTOCOL:   Standard imaging protocol performed    RADIATION:   DLP: 954.8 mGy*cm   MA and/or KV was adjusted to minimize radiation dose.     TECHNIQUE: After obtaining the patient's consent, CT images were obtained without non-ionic intravenous contrast material.  FINDINGS:  There is no evidence for acute intracranial hemorrhage. No definitive acute focal ischemia is observed. Nonspecific white matter changes are noted bilaterally likely due to chronic small vessel ischemic changes or age related changes. Associated diffuse volume loss is noted. There is no evidence for abnormal cerebral edema. No mass effect or midline shift is seen. The basilar cisterns are patent. The skull is intact. The paranasal sinuses and mastoid air cells are clear.  A small hematoma is noted measuring approximately 1.5 cm within the scalp soft tissues overlying the right aspect of the skull.        1. No evidence for acute intracranial abnormality. 2. Nonspecific white matter changes bilaterally likely related to chronic small vessel ischemic changes or age related changes. Associated diffuse volume loss is noted. 3. A small scalp hematoma is noted measuring approximately 1.5 cm.     ADWOA GRIMES MD       Electronically Signed and Approved By: ADWOA GRIMES MD on 12/24/2023 at 12:47             XR Chest 1 View    Result Date: 12/24/2023  PROCEDURE: XR CHEST 1 VW  COMPARISON: [facility], JULIANNE, XR CHEST 1 VW, 10/04/2021, 8:13.  [facility], CR, XR CHEST 2 VW, 10/17/2023, 8:33.  INDICATIONS: Weak/Dizzy/AMS triage protocol  FINDINGS:  Chronic changes are noted.  No new consolidations or pleural effusions are observed. The cardiac silhouette and mediastinum are unchanged. No definitive acute osseous abnormalities are seen on this single view.        1. No change from the previous study with no evidence for acute  cardiopulmonary process.         ADWOA GRIMES MD       Electronically Signed and Approved By: ADWOA GRIMES MD on 12/24/2023 at 12:18                Differential Diagnosis and Discussion:      Diarrhea: Differential diagnosis includes but is not limited to malabsorption syndrome, bacterial infection, carcinoid syndrome, pancreatic hypersecretion, viral infection, celiac sprue, Crohn's disease, ulcerative colitis, ischemic colitis, colitis, hypermotility, and irritable bowel syndrome.  Dizziness: Based on the patient's history, signs, and symptoms, the diffential diagnosis includes but is not limited to meningitis, stroke, sepsis, subarachnoid hemorrhage, intracranial bleeding, encephalitis, vertigo, electrolyte imbalance, and metabolic disorders.  Headache: Differential diagnosis includes but is not limited to migraine, cluster headache, hypertension, tumor, subarachnoid bleeding, pseudotumor cerebri, temporal arteritis, infections, tension headache, and TMJ syndrome.  Vomiting: Differential diagnosis includes but is not limited to migraine, labyrinthine disorders, psychogenic, metabolic and endocrine causes, peptic ulcer, gastric outlet obstruction, gastritis, gastroenteritis, appendicitis, intestinal obstruction, paralytic ileus, food poisoning, cholecystitis, acute hepatitis, acute pancreatitis, acute febrile illness, and myocardial infarction.  Weakness: Based on the patient's history, signs, and symptoms, the diffential diagnosis includes but is not limited to meningitis, stroke, sepsis, subarachnoid hemorrhage, intracranial bleeding, encephalitis, acute uti, dehydration, MS, myasthenia gravis, Guillan [place], migraine variant, neuromuscular disorders vertigo, electrolyte imbalance, and metabolic disorders.    All labs were reviewed and interpreted by me.  EKG was interpreted by me.  CT scan radiology impression was interpreted by me.    MDM     Amount and/or Complexity of Data Reviewed  Clinical lab tests:  reviewed  Tests in the radiology section of CPT®: reviewed  Tests in the medicine section of CPT®: reviewed  Decide to obtain previous medical records or to obtain history from someone other than the patient: yes                 Patient Care Considerations:    CT ABDOMEN AND PELVIS: I considered ordering a CT scan of the abdomen and pelvis however abdomen soft and nontender.  She had a negative CT abdomen in October 2023      Consultants/Shared Management Plan:    None    Social Determinants of Health:    Patient has presented with family members who are responsible, reliable and will ensure follow up care.      Disposition and Care Coordination:    Discharged: I considered escalation of care by admitting this patient to the hospital, however the patient has improved and is suitable and stable for discharge.    I have explained the patient´s condition, diagnoses and treatment plan based on the information available to me at this time. I have answered questions and addressed any concerns. The patient has a good  understanding of the patient´s diagnosis, condition, and treatment plan as can be expected at this point. The vital signs have been stable. The patient´s condition is stable and appropriate for discharge from the emergency department.      The patient will pursue further outpatient evaluation with the primary care physician or other designated or consulting physician as outlined in the discharge instructions. They are agreeable to this plan of care and follow-up instructions have been explained in detail. The patient has received these instructions in written format and have expressed an understanding of the discharge instructions. The patient is aware that any significant change in condition or worsening of symptoms should prompt an immediate return to this or the closest emergency department or call to 911.  I have explained discharge medications and the need for follow up with the patient/caretakers. This  was also printed in the discharge instructions. Patient was discharged with the following medications and follow up:      Medication List        New Prescriptions      ondansetron ODT 4 MG disintegrating tablet  Commonly known as: ZOFRAN-ODT  Place 1 tablet on the tongue Every 8 (Eight) Hours As Needed for Nausea or Vomiting.     sulfamethoxazole-trimethoprim 800-160 MG per tablet  Commonly known as: BACTRIM DS,SEPTRA DS  Take 1 tablet by mouth 2 (Two) Times a Day for 7 days.            Changed      atorvastatin 40 MG tablet  Commonly known as: LIPITOR  TAKE 1 TABLET BY MOUTH  EVERY NIGHT AT BEDTIME  What changed: when to take this     losartan 25 MG tablet  Commonly known as: COZAAR  TAKE 1 TABLET BY MOUTH  DAILY  What changed: when to take this               Where to Get Your Medications        These medications were sent to [facility]/pharmacy #[id number] - [place], [place] - [address] - [phone] [facility] [phone] FX  [address] [place] [address]      Hours: 24-hours Phone: [phone]   ondansetron ODT 4 MG disintegrating tablet  sulfamethoxazole-trimethoprim 800-160 MG per tablet      No follow-up provider specified.     Final diagnoses:   Acute cystitis without hematuria   Hypokalemia   History of anemia   Hypomagnesemia        ED Disposition       ED Disposition   Discharge    Condition   Stable    Comment   --               This medical record created using voice recognition software.             Daysi Darnell PA-C  12/24/23 [id number]

## 2023-12-24 NOTE — DISCHARGE INSTRUCTIONS
Your lab work shows you have a UTI, you have been given a round of antibiotics.  That should be good for 24 hours, have also sent antibiotics to the pharmacy and sent your urine for culture.  If antibiotics need be changed you will receive a phone call from [facility] in 2 to 3 days.  Your potassium was a little low, that has been replaced here in the ED along with your magnesium.  CT of the head was negative  Chest x-ray negative  I have sent Zofran for nausea vomiting, please take prior to taking your antibiotic    Please follow-up with your PCP

## 2023-12-25 LAB — BACTERIA SPEC AEROBE CULT: NO GROWTH

## 2024-01-01 ENCOUNTER — APPOINTMENT (OUTPATIENT)
Dept: CARDIOLOGY | Facility: HOSPITAL | Age: 64
End: 2024-01-01
Payer: MEDICARE

## 2024-01-01 ENCOUNTER — APPOINTMENT (OUTPATIENT)
Dept: CT IMAGING | Facility: HOSPITAL | Age: 64
End: 2024-01-01
Payer: MEDICARE

## 2024-01-01 ENCOUNTER — APPOINTMENT (OUTPATIENT)
Dept: GENERAL RADIOLOGY | Facility: HOSPITAL | Age: 64
End: 2024-01-01
Payer: MEDICARE

## 2024-01-01 ENCOUNTER — APPOINTMENT (OUTPATIENT)
Facility: HOSPITAL | Age: 64
End: 2024-01-01
Payer: MEDICARE

## 2024-01-01 ENCOUNTER — HOSPITAL ENCOUNTER (INPATIENT)
Facility: HOSPITAL | Age: 64
LOS: 16 days | End: 2024-03-06
Attending: EMERGENCY MEDICINE | Admitting: INTERNAL MEDICINE
Payer: MEDICARE

## 2024-01-01 VITALS
RESPIRATION RATE: 18 BRPM | DIASTOLIC BLOOD PRESSURE: 67 MMHG | SYSTOLIC BLOOD PRESSURE: 91 MMHG | TEMPERATURE: 97.2 F | HEIGHT: 60 IN | BODY MASS INDEX: 35.75 KG/M2 | WEIGHT: 182.1 LBS | HEART RATE: 113 BPM | OXYGEN SATURATION: 94 %

## 2024-01-01 DIAGNOSIS — R26.2 DIFFICULTY IN WALKING: ICD-10-CM

## 2024-01-01 DIAGNOSIS — J96.01 ACUTE RESPIRATORY FAILURE WITH HYPOXIA: Primary | ICD-10-CM

## 2024-01-01 DIAGNOSIS — J18.9 PNEUMONIA DUE TO INFECTIOUS ORGANISM, UNSPECIFIED LATERALITY, UNSPECIFIED PART OF LUNG: ICD-10-CM

## 2024-01-01 DIAGNOSIS — Z78.9 DECREASED ACTIVITIES OF DAILY LIVING (ADL): ICD-10-CM

## 2024-01-01 LAB
A FUMIGATUS IGG SER QL: NEGATIVE
A PULLULANS IGG SER QL: NEGATIVE
ALBUMIN SERPL-MCNC: 2.2 G/DL (ref 3.5–5.2)
ALBUMIN SERPL-MCNC: 2.4 G/DL (ref 3.5–5.2)
ALBUMIN SERPL-MCNC: 2.4 G/DL (ref 3.5–5.2)
ALBUMIN SERPL-MCNC: 2.7 G/DL (ref 3.5–5.2)
ALBUMIN SERPL-MCNC: 2.9 G/DL (ref 3.5–5.2)
ALBUMIN SERPL-MCNC: 3 G/DL (ref 3.5–5.2)
ALBUMIN/GLOB SERPL: 0.6 G/DL
ALBUMIN/GLOB SERPL: 0.8 G/DL
ALP SERPL-CCNC: 153 U/L (ref 39–117)
ALP SERPL-CCNC: 161 U/L (ref 39–117)
ALT SERPL W P-5'-P-CCNC: 29 U/L (ref 1–33)
ALT SERPL W P-5'-P-CCNC: 372 U/L (ref 1–33)
ANA SER QL: NEGATIVE
ANION GAP SERPL CALCULATED.3IONS-SCNC: 11.3 MMOL/L (ref 5–15)
ANION GAP SERPL CALCULATED.3IONS-SCNC: 12.9 MMOL/L (ref 5–15)
ANION GAP SERPL CALCULATED.3IONS-SCNC: 13.1 MMOL/L (ref 5–15)
ANION GAP SERPL CALCULATED.3IONS-SCNC: 13.4 MMOL/L (ref 5–15)
ANION GAP SERPL CALCULATED.3IONS-SCNC: 13.9 MMOL/L (ref 5–15)
ANION GAP SERPL CALCULATED.3IONS-SCNC: 14.3 MMOL/L (ref 5–15)
ANION GAP SERPL CALCULATED.3IONS-SCNC: 15.2 MMOL/L (ref 5–15)
ANION GAP SERPL CALCULATED.3IONS-SCNC: 15.2 MMOL/L (ref 5–15)
ANION GAP SERPL CALCULATED.3IONS-SCNC: 16 MMOL/L (ref 5–15)
ANION GAP SERPL CALCULATED.3IONS-SCNC: 16.1 MMOL/L (ref 5–15)
ANION GAP SERPL CALCULATED.3IONS-SCNC: 16.4 MMOL/L (ref 5–15)
ANION GAP SERPL CALCULATED.3IONS-SCNC: 17.6 MMOL/L (ref 5–15)
ANION GAP SERPL CALCULATED.3IONS-SCNC: 18.4 MMOL/L (ref 5–15)
ANION GAP SERPL CALCULATED.3IONS-SCNC: 18.4 MMOL/L (ref 5–15)
ANION GAP SERPL CALCULATED.3IONS-SCNC: 18.5 MMOL/L (ref 5–15)
ANION GAP SERPL CALCULATED.3IONS-SCNC: 18.7 MMOL/L (ref 5–15)
ANION GAP SERPL CALCULATED.3IONS-SCNC: 18.9 MMOL/L (ref 5–15)
ANION GAP SERPL CALCULATED.3IONS-SCNC: 31.8 MMOL/L (ref 5–15)
ANISOCYTOSIS BLD QL: ABNORMAL
ANISOCYTOSIS BLD QL: NORMAL
ARTERIAL PATENCY WRIST A: ABNORMAL
ARTERIAL PATENCY WRIST A: POSITIVE
AST SERPL-CCNC: 39 U/L (ref 1–32)
AST SERPL-CCNC: 516 U/L (ref 1–32)
B PARAPERT DNA SPEC QL NAA+PROBE: NOT DETECTED
B PERT DNA SPEC QL NAA+PROBE: NOT DETECTED
BACTERIA SPEC AEROBE CULT: NORMAL
BACTERIA SPEC AEROBE CULT: NORMAL
BACTERIA SPEC RESP CULT: NORMAL
BACTERIA UR QL AUTO: ABNORMAL /HPF
BASE EXCESS BLDA CALC-SCNC: -19.8 MMOL/L (ref -2–2)
BASE EXCESS BLDA CALC-SCNC: -2.5 MMOL/L (ref -2–2)
BASE EXCESS BLDA CALC-SCNC: 5.3 MMOL/L (ref -2–2)
BASE EXCESS BLDA CALC-SCNC: 5.8 MMOL/L (ref -2–2)
BASOPHILS # BLD AUTO: 0.01 10*3/MM3 (ref 0–0.2)
BASOPHILS # BLD AUTO: 0.01 10*3/MM3 (ref 0–0.2)
BASOPHILS # BLD AUTO: 0.02 10*3/MM3 (ref 0–0.2)
BASOPHILS # BLD AUTO: 0.02 10*3/MM3 (ref 0–0.2)
BASOPHILS # BLD AUTO: 0.06 10*3/MM3 (ref 0–0.2)
BASOPHILS # BLD AUTO: 0.06 10*3/MM3 (ref 0–0.2)
BASOPHILS # BLD AUTO: 0.07 10*3/MM3 (ref 0–0.2)
BASOPHILS # BLD AUTO: 0.13 10*3/MM3 (ref 0–0.2)
BASOPHILS # BLD AUTO: 0.15 10*3/MM3 (ref 0–0.2)
BASOPHILS NFR BLD AUTO: 0.1 % (ref 0–1.5)
BASOPHILS NFR BLD AUTO: 0.2 % (ref 0–1.5)
BASOPHILS NFR BLD AUTO: 0.3 % (ref 0–1.5)
BASOPHILS NFR BLD AUTO: 0.3 % (ref 0–1.5)
BASOPHILS NFR BLD AUTO: 0.5 % (ref 0–1.5)
BASOPHILS NFR BLD AUTO: 0.6 % (ref 0–1.5)
BASOPHILS NFR BLD AUTO: 0.6 % (ref 0–1.5)
BDY SITE: ABNORMAL
BH CV ECHO MEAS - AO ROOT DIAM: 2.38 CM
BH CV ECHO MEAS - EDV(CUBED): 70.5 ML
BH CV ECHO MEAS - EDV(MOD-SP2): 26.3 ML
BH CV ECHO MEAS - EDV(MOD-SP4): 52.2 ML
BH CV ECHO MEAS - EF(MOD-BP): 57.6 %
BH CV ECHO MEAS - EF(MOD-SP2): 55.5 %
BH CV ECHO MEAS - EF(MOD-SP4): 60.7 %
BH CV ECHO MEAS - ESV(CUBED): 27.1 ML
BH CV ECHO MEAS - ESV(MOD-SP2): 11.7 ML
BH CV ECHO MEAS - ESV(MOD-SP4): 20.5 ML
BH CV ECHO MEAS - FS: 27.3 %
BH CV ECHO MEAS - IVS/LVPW: 0.99 CM
BH CV ECHO MEAS - IVSD: 1.1 CM
BH CV ECHO MEAS - LA DIMENSION: 2.46 CM
BH CV ECHO MEAS - LAT PEAK E' VEL: 5.5 CM/SEC
BH CV ECHO MEAS - LV MASS(C)D: 153.9 GRAMS
BH CV ECHO MEAS - LVIDD: 4.1 CM
BH CV ECHO MEAS - LVIDS: 3 CM
BH CV ECHO MEAS - LVOT AREA: 3.2 CM2
BH CV ECHO MEAS - LVOT DIAM: 2.01 CM
BH CV ECHO MEAS - LVPWD: 1.11 CM
BH CV ECHO MEAS - MED PEAK E' VEL: 4.5 CM/SEC
BH CV ECHO MEAS - MV A MAX VEL: 75.7 CM/SEC
BH CV ECHO MEAS - MV DEC SLOPE: 228.7 CM/SEC2
BH CV ECHO MEAS - MV DEC TIME: 0.28 SEC
BH CV ECHO MEAS - MV E MAX VEL: 64.6 CM/SEC
BH CV ECHO MEAS - MV E/A: 0.85
BH CV ECHO MEAS - RVDD: 2.38 CM
BH CV ECHO MEAS - SV(MOD-SP2): 14.6 ML
BH CV ECHO MEAS - SV(MOD-SP4): 31.7 ML
BH CV ECHO MEAS - TAPSE (>1.6): 1.52 CM
BH CV ECHO MEAS - TR MAX PG: 28.6 MMHG
BH CV ECHO MEAS - TR MAX VEL: 267.3 CM/SEC
BH CV ECHO MEASUREMENTS AVERAGE E/E' RATIO: 12.92
BH CV LOWER VASCULAR LEFT COMMON FEMORAL AUGMENT: NORMAL
BH CV LOWER VASCULAR LEFT COMMON FEMORAL COMPETENT: NORMAL
BH CV LOWER VASCULAR LEFT COMMON FEMORAL COMPRESS: NORMAL
BH CV LOWER VASCULAR LEFT COMMON FEMORAL PHASIC: NORMAL
BH CV LOWER VASCULAR LEFT COMMON FEMORAL SPONT: NORMAL
BH CV LOWER VASCULAR LEFT DISTAL FEMORAL COMPRESS: NORMAL
BH CV LOWER VASCULAR LEFT GASTRONEMIUS COMPRESS: NORMAL
BH CV LOWER VASCULAR LEFT GREATER SAPH AK AUGMENT: NORMAL
BH CV LOWER VASCULAR LEFT GREATER SAPH AK COMPETENT: NORMAL
BH CV LOWER VASCULAR LEFT GREATER SAPH AK COMPRESS: NORMAL
BH CV LOWER VASCULAR LEFT GREATER SAPH AK PHASIC: NORMAL
BH CV LOWER VASCULAR LEFT GREATER SAPH AK SPONT: NORMAL
BH CV LOWER VASCULAR LEFT GREATER SAPH BK COMPRESS: NORMAL
BH CV LOWER VASCULAR LEFT LESSER SAPH COMPRESS: NORMAL
BH CV LOWER VASCULAR LEFT MID FEMORAL AUGMENT: NORMAL
BH CV LOWER VASCULAR LEFT MID FEMORAL COMPETENT: NORMAL
BH CV LOWER VASCULAR LEFT MID FEMORAL COMPRESS: NORMAL
BH CV LOWER VASCULAR LEFT MID FEMORAL PHASIC: NORMAL
BH CV LOWER VASCULAR LEFT MID FEMORAL SPONT: NORMAL
BH CV LOWER VASCULAR LEFT PERONEAL AUGMENT: NORMAL
BH CV LOWER VASCULAR LEFT PERONEAL COMPETENT: NORMAL
BH CV LOWER VASCULAR LEFT PERONEAL COMPRESS: NORMAL
BH CV LOWER VASCULAR LEFT PERONEAL PHASIC: NORMAL
BH CV LOWER VASCULAR LEFT PERONEAL SPONT: NORMAL
BH CV LOWER VASCULAR LEFT POPLITEAL AUGMENT: NORMAL
BH CV LOWER VASCULAR LEFT POPLITEAL COMPETENT: NORMAL
BH CV LOWER VASCULAR LEFT POPLITEAL COMPRESS: NORMAL
BH CV LOWER VASCULAR LEFT POPLITEAL PHASIC: NORMAL
BH CV LOWER VASCULAR LEFT POPLITEAL SPONT: NORMAL
BH CV LOWER VASCULAR LEFT POSTERIOR TIBIAL AUGMENT: NORMAL
BH CV LOWER VASCULAR LEFT POSTERIOR TIBIAL COMPETENT: NORMAL
BH CV LOWER VASCULAR LEFT POSTERIOR TIBIAL COMPRESS: NORMAL
BH CV LOWER VASCULAR LEFT POSTERIOR TIBIAL PHASIC: NORMAL
BH CV LOWER VASCULAR LEFT POSTERIOR TIBIAL SPONT: NORMAL
BH CV LOWER VASCULAR LEFT PROXIMAL FEMORAL COMPRESS: NORMAL
BH CV LOWER VASCULAR RIGHT COMMON FEMORAL AUGMENT: NORMAL
BH CV LOWER VASCULAR RIGHT COMMON FEMORAL COMPETENT: NORMAL
BH CV LOWER VASCULAR RIGHT COMMON FEMORAL COMPRESS: NORMAL
BH CV LOWER VASCULAR RIGHT COMMON FEMORAL PHASIC: NORMAL
BH CV LOWER VASCULAR RIGHT COMMON FEMORAL SPONT: NORMAL
BH CV LOWER VASCULAR RIGHT DISTAL FEMORAL COMPRESS: NORMAL
BH CV LOWER VASCULAR RIGHT GASTRONEMIUS COMPRESS: NORMAL
BH CV LOWER VASCULAR RIGHT GREATER SAPH AK AUGMENT: NORMAL
BH CV LOWER VASCULAR RIGHT GREATER SAPH AK COMPETENT: NORMAL
BH CV LOWER VASCULAR RIGHT GREATER SAPH AK COMPRESS: NORMAL
BH CV LOWER VASCULAR RIGHT GREATER SAPH AK PHASIC: NORMAL
BH CV LOWER VASCULAR RIGHT GREATER SAPH AK SPONT: NORMAL
BH CV LOWER VASCULAR RIGHT GREATER SAPH BK COMPRESS: NORMAL
BH CV LOWER VASCULAR RIGHT LESSER SAPH COMPRESS: NORMAL
BH CV LOWER VASCULAR RIGHT MID FEMORAL AUGMENT: NORMAL
BH CV LOWER VASCULAR RIGHT MID FEMORAL COMPETENT: NORMAL
BH CV LOWER VASCULAR RIGHT MID FEMORAL COMPRESS: NORMAL
BH CV LOWER VASCULAR RIGHT MID FEMORAL PHASIC: NORMAL
BH CV LOWER VASCULAR RIGHT MID FEMORAL SPONT: NORMAL
BH CV LOWER VASCULAR RIGHT PERONEAL AUGMENT: NORMAL
BH CV LOWER VASCULAR RIGHT PERONEAL COMPETENT: NORMAL
BH CV LOWER VASCULAR RIGHT PERONEAL COMPRESS: NORMAL
BH CV LOWER VASCULAR RIGHT PERONEAL PHASIC: NORMAL
BH CV LOWER VASCULAR RIGHT PERONEAL SPONT: NORMAL
BH CV LOWER VASCULAR RIGHT POPLITEAL AUGMENT: NORMAL
BH CV LOWER VASCULAR RIGHT POPLITEAL COMPETENT: NORMAL
BH CV LOWER VASCULAR RIGHT POPLITEAL COMPRESS: NORMAL
BH CV LOWER VASCULAR RIGHT POPLITEAL PHASIC: NORMAL
BH CV LOWER VASCULAR RIGHT POPLITEAL SPONT: NORMAL
BH CV LOWER VASCULAR RIGHT POSTERIOR TIBIAL AUGMENT: NORMAL
BH CV LOWER VASCULAR RIGHT POSTERIOR TIBIAL COMPETENT: NORMAL
BH CV LOWER VASCULAR RIGHT POSTERIOR TIBIAL COMPRESS: NORMAL
BH CV LOWER VASCULAR RIGHT POSTERIOR TIBIAL PHASIC: NORMAL
BH CV LOWER VASCULAR RIGHT POSTERIOR TIBIAL SPONT: NORMAL
BH CV LOWER VASCULAR RIGHT PROXIMAL FEMORAL COMPRESS: NORMAL
BILIRUB SERPL-MCNC: 0.7 MG/DL (ref 0–1.2)
BILIRUB SERPL-MCNC: 0.8 MG/DL (ref 0–1.2)
BILIRUB UR QL STRIP: NEGATIVE
BUN SERPL-MCNC: 22 MG/DL (ref 8–23)
BUN SERPL-MCNC: 24 MG/DL (ref 8–23)
BUN SERPL-MCNC: 36 MG/DL (ref 8–23)
BUN SERPL-MCNC: 41 MG/DL (ref 8–23)
BUN SERPL-MCNC: 45 MG/DL (ref 8–23)
BUN SERPL-MCNC: 48 MG/DL (ref 8–23)
BUN SERPL-MCNC: 49 MG/DL (ref 8–23)
BUN SERPL-MCNC: 50 MG/DL (ref 8–23)
BUN SERPL-MCNC: 54 MG/DL (ref 8–23)
BUN SERPL-MCNC: 60 MG/DL (ref 8–23)
BUN SERPL-MCNC: 66 MG/DL (ref 8–23)
BUN SERPL-MCNC: 69 MG/DL (ref 8–23)
BUN SERPL-MCNC: 73 MG/DL (ref 8–23)
BUN SERPL-MCNC: 74 MG/DL (ref 8–23)
BUN SERPL-MCNC: 74 MG/DL (ref 8–23)
BUN SERPL-MCNC: 77 MG/DL (ref 8–23)
BUN/CREAT SERPL: 17.1 (ref 7–25)
BUN/CREAT SERPL: 18.9 (ref 7–25)
BUN/CREAT SERPL: 20.8 (ref 7–25)
BUN/CREAT SERPL: 22.4 (ref 7–25)
BUN/CREAT SERPL: 24.2 (ref 7–25)
BUN/CREAT SERPL: 25 (ref 7–25)
BUN/CREAT SERPL: 27 (ref 7–25)
BUN/CREAT SERPL: 29.1 (ref 7–25)
BUN/CREAT SERPL: 29.2 (ref 7–25)
BUN/CREAT SERPL: 32.7 (ref 7–25)
BUN/CREAT SERPL: 33.8 (ref 7–25)
BUN/CREAT SERPL: 34.4 (ref 7–25)
BUN/CREAT SERPL: 36.6 (ref 7–25)
BUN/CREAT SERPL: 36.9 (ref 7–25)
BUN/CREAT SERPL: 39.3 (ref 7–25)
BUN/CREAT SERPL: 40 (ref 7–25)
BUN/CREAT SERPL: 40.2 (ref 7–25)
BUN/CREAT SERPL: 40.6 (ref 7–25)
BURR CELLS BLD QL SMEAR: ABNORMAL
C PNEUM DNA NPH QL NAA+NON-PROBE: NOT DETECTED
C-ANCA TITR SER IF: NORMAL TITER
CA-I BLDA-SCNC: 1.09 MMOL/L (ref 1.13–1.32)
CA-I BLDA-SCNC: 1.35 MMOL/L (ref 1.13–1.32)
CALCIUM SPEC-SCNC: 10.4 MG/DL (ref 8.6–10.5)
CALCIUM SPEC-SCNC: 8.3 MG/DL (ref 8.6–10.5)
CALCIUM SPEC-SCNC: 8.6 MG/DL (ref 8.6–10.5)
CALCIUM SPEC-SCNC: 8.9 MG/DL (ref 8.6–10.5)
CALCIUM SPEC-SCNC: 9 MG/DL (ref 8.6–10.5)
CALCIUM SPEC-SCNC: 9.1 MG/DL (ref 8.6–10.5)
CALCIUM SPEC-SCNC: 9.2 MG/DL (ref 8.6–10.5)
CALCIUM SPEC-SCNC: 9.3 MG/DL (ref 8.6–10.5)
CALCIUM SPEC-SCNC: 9.3 MG/DL (ref 8.6–10.5)
CALCIUM SPEC-SCNC: 9.4 MG/DL (ref 8.6–10.5)
CALCIUM SPEC-SCNC: 9.6 MG/DL (ref 8.6–10.5)
CALCIUM SPEC-SCNC: 9.7 MG/DL (ref 8.6–10.5)
CCP IGA+IGG SERPL IA-ACNC: 3 UNITS (ref 0–19)
CHLORIDE BLDA-SCNC: 101 MMOL/L (ref 98–106)
CHLORIDE BLDA-SCNC: 96 MMOL/L (ref 98–106)
CHLORIDE SERPL-SCNC: 100 MMOL/L (ref 98–107)
CHLORIDE SERPL-SCNC: 101 MMOL/L (ref 98–107)
CHLORIDE SERPL-SCNC: 101 MMOL/L (ref 98–107)
CHLORIDE SERPL-SCNC: 90 MMOL/L (ref 98–107)
CHLORIDE SERPL-SCNC: 90 MMOL/L (ref 98–107)
CHLORIDE SERPL-SCNC: 91 MMOL/L (ref 98–107)
CHLORIDE SERPL-SCNC: 92 MMOL/L (ref 98–107)
CHLORIDE SERPL-SCNC: 92 MMOL/L (ref 98–107)
CHLORIDE SERPL-SCNC: 93 MMOL/L (ref 98–107)
CHLORIDE SERPL-SCNC: 94 MMOL/L (ref 98–107)
CHLORIDE SERPL-SCNC: 94 MMOL/L (ref 98–107)
CHLORIDE SERPL-SCNC: 95 MMOL/L (ref 98–107)
CHLORIDE SERPL-SCNC: 97 MMOL/L (ref 98–107)
CHLORIDE SERPL-SCNC: 97 MMOL/L (ref 98–107)
CHLORIDE SERPL-SCNC: 98 MMOL/L (ref 98–107)
CHLORIDE SERPL-SCNC: 98 MMOL/L (ref 98–107)
CHLORIDE SERPL-SCNC: 99 MMOL/L (ref 98–107)
CHLORIDE SERPL-SCNC: 99 MMOL/L (ref 98–107)
CHROMATIN AB SERPL-ACNC: <10 IU/ML (ref 0–14)
CLARITY UR: ABNORMAL
CLUMPED PLATELETS: PRESENT
CO2 SERPL-SCNC: 17.1 MMOL/L (ref 22–29)
CO2 SERPL-SCNC: 17.6 MMOL/L (ref 22–29)
CO2 SERPL-SCNC: 17.9 MMOL/L (ref 22–29)
CO2 SERPL-SCNC: 19.6 MMOL/L (ref 22–29)
CO2 SERPL-SCNC: 19.9 MMOL/L (ref 22–29)
CO2 SERPL-SCNC: 20 MMOL/L (ref 22–29)
CO2 SERPL-SCNC: 22.1 MMOL/L (ref 22–29)
CO2 SERPL-SCNC: 22.3 MMOL/L (ref 22–29)
CO2 SERPL-SCNC: 22.4 MMOL/L (ref 22–29)
CO2 SERPL-SCNC: 22.7 MMOL/L (ref 22–29)
CO2 SERPL-SCNC: 22.8 MMOL/L (ref 22–29)
CO2 SERPL-SCNC: 23.6 MMOL/L (ref 22–29)
CO2 SERPL-SCNC: 23.8 MMOL/L (ref 22–29)
CO2 SERPL-SCNC: 24.1 MMOL/L (ref 22–29)
CO2 SERPL-SCNC: 24.5 MMOL/L (ref 22–29)
CO2 SERPL-SCNC: 24.6 MMOL/L (ref 22–29)
CO2 SERPL-SCNC: 25.7 MMOL/L (ref 22–29)
CO2 SERPL-SCNC: 7.2 MMOL/L (ref 22–29)
COHGB MFR BLD: 0.1 % (ref 0–1.5)
COHGB MFR BLD: 0.2 % (ref 0–1.5)
COHGB MFR BLD: 0.3 % (ref 0–1.5)
COHGB MFR BLD: 0.4 % (ref 0–1.5)
COLOR UR: YELLOW
CREAT SERPL-MCNC: 1.27 MG/DL (ref 0.57–1)
CREAT SERPL-MCNC: 1.29 MG/DL (ref 0.57–1)
CREAT SERPL-MCNC: 1.49 MG/DL (ref 0.57–1)
CREAT SERPL-MCNC: 1.57 MG/DL (ref 0.57–1)
CREAT SERPL-MCNC: 1.64 MG/DL (ref 0.57–1)
CREAT SERPL-MCNC: 1.65 MG/DL (ref 0.57–1)
CREAT SERPL-MCNC: 1.68 MG/DL (ref 0.57–1)
CREAT SERPL-MCNC: 1.72 MG/DL (ref 0.57–1)
CREAT SERPL-MCNC: 1.78 MG/DL (ref 0.57–1)
CREAT SERPL-MCNC: 1.8 MG/DL (ref 0.57–1)
CREAT SERPL-MCNC: 1.8 MG/DL (ref 0.57–1)
CREAT SERPL-MCNC: 1.83 MG/DL (ref 0.57–1)
CREAT SERPL-MCNC: 1.84 MG/DL (ref 0.57–1)
CREAT SERPL-MCNC: 1.85 MG/DL (ref 0.57–1)
CREAT SERPL-MCNC: 1.87 MG/DL (ref 0.57–1)
CREAT SERPL-MCNC: 1.95 MG/DL (ref 0.57–1)
CREAT SERPL-MCNC: 1.96 MG/DL (ref 0.57–1)
CREAT SERPL-MCNC: 2.6 MG/DL (ref 0.57–1)
CRP SERPL-MCNC: 0.76 MG/DL (ref 0–0.5)
CRP SERPL-MCNC: 22.81 MG/DL (ref 0–0.5)
CRP SERPL-MCNC: 3.82 MG/DL (ref 0–0.5)
D DIMER PPP FEU-MCNC: 0.93 MCGFEU/ML (ref 0–0.65)
D-LACTATE SERPL-SCNC: 1.7 MMOL/L (ref 0.5–2)
D-LACTATE SERPL-SCNC: 12.7 MMOL/L (ref 0.5–2)
D-LACTATE SERPL-SCNC: 2.8 MMOL/L (ref 0.5–2)
DACRYOCYTES BLD QL SMEAR: ABNORMAL
DEPRECATED RDW RBC AUTO: 53.1 FL (ref 37–54)
DEPRECATED RDW RBC AUTO: 54 FL (ref 37–54)
DEPRECATED RDW RBC AUTO: 57 FL (ref 37–54)
DEPRECATED RDW RBC AUTO: 57.1 FL (ref 37–54)
DEPRECATED RDW RBC AUTO: 57.5 FL (ref 37–54)
DEPRECATED RDW RBC AUTO: 58.9 FL (ref 37–54)
DEPRECATED RDW RBC AUTO: 59 FL (ref 37–54)
DEPRECATED RDW RBC AUTO: 60.8 FL (ref 37–54)
DEPRECATED RDW RBC AUTO: 61.1 FL (ref 37–54)
DEPRECATED RDW RBC AUTO: 61.1 FL (ref 37–54)
DEPRECATED RDW RBC AUTO: 61.4 FL (ref 37–54)
DEPRECATED RDW RBC AUTO: 61.6 FL (ref 37–54)
DEPRECATED RDW RBC AUTO: 62.3 FL (ref 37–54)
DEPRECATED RDW RBC AUTO: 63.1 FL (ref 37–54)
DPYD GENE MUT ANL BLD/T: NEGATIVE
EGFRCR SERPLBLD CKD-EPI 2021: 19.9 ML/MIN/1.73
EGFRCR SERPLBLD CKD-EPI 2021: 27.9 ML/MIN/1.73
EGFRCR SERPLBLD CKD-EPI 2021: 28.1 ML/MIN/1.73
EGFRCR SERPLBLD CKD-EPI 2021: 29.6 ML/MIN/1.73
EGFRCR SERPLBLD CKD-EPI 2021: 29.9 ML/MIN/1.73
EGFRCR SERPLBLD CKD-EPI 2021: 30.1 ML/MIN/1.73
EGFRCR SERPLBLD CKD-EPI 2021: 30.3 ML/MIN/1.73
EGFRCR SERPLBLD CKD-EPI 2021: 30.9 ML/MIN/1.73
EGFRCR SERPLBLD CKD-EPI 2021: 30.9 ML/MIN/1.73
EGFRCR SERPLBLD CKD-EPI 2021: 31.4 ML/MIN/1.73
EGFRCR SERPLBLD CKD-EPI 2021: 32.7 ML/MIN/1.73
EGFRCR SERPLBLD CKD-EPI 2021: 33.6 ML/MIN/1.73
EGFRCR SERPLBLD CKD-EPI 2021: 34.4 ML/MIN/1.73
EGFRCR SERPLBLD CKD-EPI 2021: 34.6 ML/MIN/1.73
EGFRCR SERPLBLD CKD-EPI 2021: 36.5 ML/MIN/1.73
EGFRCR SERPLBLD CKD-EPI 2021: 38.8 ML/MIN/1.73
EGFRCR SERPLBLD CKD-EPI 2021: 46.2 ML/MIN/1.73
EGFRCR SERPLBLD CKD-EPI 2021: 47 ML/MIN/1.73
ELLIPTOCYTES BLD QL SMEAR: ABNORMAL
ENA SCL70 AB SER-ACNC: <0.2 AI (ref 0–0.9)
ENA SS-A AB SER-ACNC: <0.2 AI (ref 0–0.9)
ENA SS-B AB SER-ACNC: <0.2 AI (ref 0–0.9)
EOSINOPHIL # BLD AUTO: 0 10*3/MM3 (ref 0–0.4)
EOSINOPHIL # BLD AUTO: 0.01 10*3/MM3 (ref 0–0.4)
EOSINOPHIL # BLD AUTO: 0.02 10*3/MM3 (ref 0–0.4)
EOSINOPHIL # BLD AUTO: 0.02 10*3/MM3 (ref 0–0.4)
EOSINOPHIL # BLD AUTO: 0.03 10*3/MM3 (ref 0–0.4)
EOSINOPHIL NFR BLD AUTO: 0 % (ref 0.3–6.2)
EOSINOPHIL NFR BLD AUTO: 0.1 % (ref 0.3–6.2)
EOSINOPHIL NFR BLD AUTO: 0.1 % (ref 0.3–6.2)
EOSINOPHIL NFR BLD AUTO: 0.3 % (ref 0.3–6.2)
ERYTHROCYTE [DISTWIDTH] IN BLOOD BY AUTOMATED COUNT: 15.3 % (ref 12.3–15.4)
ERYTHROCYTE [DISTWIDTH] IN BLOOD BY AUTOMATED COUNT: 15.4 % (ref 12.3–15.4)
ERYTHROCYTE [DISTWIDTH] IN BLOOD BY AUTOMATED COUNT: 15.5 % (ref 12.3–15.4)
ERYTHROCYTE [DISTWIDTH] IN BLOOD BY AUTOMATED COUNT: 15.6 % (ref 12.3–15.4)
ERYTHROCYTE [DISTWIDTH] IN BLOOD BY AUTOMATED COUNT: 15.7 % (ref 12.3–15.4)
ERYTHROCYTE [DISTWIDTH] IN BLOOD BY AUTOMATED COUNT: 15.9 % (ref 12.3–15.4)
ERYTHROCYTE [DISTWIDTH] IN BLOOD BY AUTOMATED COUNT: 16.2 % (ref 12.3–15.4)
ERYTHROCYTE [DISTWIDTH] IN BLOOD BY AUTOMATED COUNT: 16.2 % (ref 12.3–15.4)
ERYTHROCYTE [DISTWIDTH] IN BLOOD BY AUTOMATED COUNT: 16.3 % (ref 12.3–15.4)
ERYTHROCYTE [DISTWIDTH] IN BLOOD BY AUTOMATED COUNT: 16.4 % (ref 12.3–15.4)
ERYTHROCYTE [DISTWIDTH] IN BLOOD BY AUTOMATED COUNT: 16.7 % (ref 12.3–15.4)
ERYTHROCYTE [DISTWIDTH] IN BLOOD BY AUTOMATED COUNT: 16.7 % (ref 12.3–15.4)
ERYTHROCYTE [DISTWIDTH] IN BLOOD BY AUTOMATED COUNT: 16.9 % (ref 12.3–15.4)
ERYTHROCYTE [DISTWIDTH] IN BLOOD BY AUTOMATED COUNT: 17.1 % (ref 12.3–15.4)
ERYTHROCYTE [DISTWIDTH] IN BLOOD BY AUTOMATED COUNT: 17.1 % (ref 12.3–15.4)
ERYTHROCYTE [DISTWIDTH] IN BLOOD BY AUTOMATED COUNT: 17.2 % (ref 12.3–15.4)
ERYTHROCYTE [DISTWIDTH] IN BLOOD BY AUTOMATED COUNT: 17.2 % (ref 12.3–15.4)
ERYTHROCYTE [SEDIMENTATION RATE] IN BLOOD: 75 MM/HR (ref 0–30)
ERYTHROCYTE [SEDIMENTATION RATE] IN BLOOD: 76 MM/HR (ref 0–30)
ERYTHROCYTE [SEDIMENTATION RATE] IN BLOOD: 84 MM/HR (ref 0–30)
FHHB: 12.3 % (ref 0–5)
FHHB: 12.8 % (ref 0–5)
FHHB: 13.8 % (ref 0–5)
FHHB: 8.8 % (ref 0–5)
FLUAV SUBTYP SPEC NAA+PROBE: NOT DETECTED
FLUAV SUBTYP SPEC NAA+PROBE: NOT DETECTED
FLUBV RNA ISLT QL NAA+PROBE: NOT DETECTED
FLUBV RNA ISLT QL NAA+PROBE: NOT DETECTED
FUNGITELL VALUE: <31.25 PG/ML
GAS FLOW AIRWAY: 15 LPM
GAS FLOW AIRWAY: 40 LPM
GAS FLOW AIRWAY: 6 LPM
GLOBULIN UR ELPH-MCNC: 3.8 GM/DL
GLOBULIN UR ELPH-MCNC: 4 GM/DL
GLUCOSE BLDA-MCNC: 125 MG/DL (ref 65–99)
GLUCOSE BLDA-MCNC: 349 MG/DL (ref 65–99)
GLUCOSE BLDC GLUCOMTR-MCNC: 110 MG/DL (ref 70–99)
GLUCOSE BLDC GLUCOMTR-MCNC: 116 MG/DL (ref 70–99)
GLUCOSE BLDC GLUCOMTR-MCNC: 116 MG/DL (ref 70–99)
GLUCOSE BLDC GLUCOMTR-MCNC: 118 MG/DL (ref 70–99)
GLUCOSE BLDC GLUCOMTR-MCNC: 123 MG/DL (ref 70–99)
GLUCOSE BLDC GLUCOMTR-MCNC: 125 MG/DL (ref 70–99)
GLUCOSE BLDC GLUCOMTR-MCNC: 127 MG/DL (ref 70–99)
GLUCOSE BLDC GLUCOMTR-MCNC: 129 MG/DL (ref 70–99)
GLUCOSE BLDC GLUCOMTR-MCNC: 134 MG/DL (ref 70–99)
GLUCOSE BLDC GLUCOMTR-MCNC: 137 MG/DL (ref 70–99)
GLUCOSE BLDC GLUCOMTR-MCNC: 137 MG/DL (ref 70–99)
GLUCOSE BLDC GLUCOMTR-MCNC: 138 MG/DL (ref 70–99)
GLUCOSE BLDC GLUCOMTR-MCNC: 140 MG/DL (ref 70–99)
GLUCOSE BLDC GLUCOMTR-MCNC: 140 MG/DL (ref 70–99)
GLUCOSE BLDC GLUCOMTR-MCNC: 148 MG/DL (ref 70–99)
GLUCOSE BLDC GLUCOMTR-MCNC: 149 MG/DL (ref 70–99)
GLUCOSE BLDC GLUCOMTR-MCNC: 151 MG/DL (ref 70–99)
GLUCOSE BLDC GLUCOMTR-MCNC: 151 MG/DL (ref 70–99)
GLUCOSE BLDC GLUCOMTR-MCNC: 154 MG/DL (ref 70–99)
GLUCOSE BLDC GLUCOMTR-MCNC: 157 MG/DL (ref 70–99)
GLUCOSE BLDC GLUCOMTR-MCNC: 158 MG/DL (ref 70–99)
GLUCOSE BLDC GLUCOMTR-MCNC: 162 MG/DL (ref 70–99)
GLUCOSE BLDC GLUCOMTR-MCNC: 165 MG/DL (ref 70–99)
GLUCOSE BLDC GLUCOMTR-MCNC: 167 MG/DL (ref 70–99)
GLUCOSE BLDC GLUCOMTR-MCNC: 169 MG/DL (ref 70–99)
GLUCOSE BLDC GLUCOMTR-MCNC: 170 MG/DL (ref 70–99)
GLUCOSE BLDC GLUCOMTR-MCNC: 170 MG/DL (ref 70–99)
GLUCOSE BLDC GLUCOMTR-MCNC: 174 MG/DL (ref 70–99)
GLUCOSE BLDC GLUCOMTR-MCNC: 177 MG/DL (ref 70–99)
GLUCOSE BLDC GLUCOMTR-MCNC: 177 MG/DL (ref 70–99)
GLUCOSE BLDC GLUCOMTR-MCNC: 180 MG/DL (ref 70–99)
GLUCOSE BLDC GLUCOMTR-MCNC: 181 MG/DL (ref 70–99)
GLUCOSE BLDC GLUCOMTR-MCNC: 182 MG/DL (ref 70–99)
GLUCOSE BLDC GLUCOMTR-MCNC: 186 MG/DL (ref 70–99)
GLUCOSE BLDC GLUCOMTR-MCNC: 191 MG/DL (ref 70–99)
GLUCOSE BLDC GLUCOMTR-MCNC: 192 MG/DL (ref 70–99)
GLUCOSE BLDC GLUCOMTR-MCNC: 192 MG/DL (ref 70–99)
GLUCOSE BLDC GLUCOMTR-MCNC: 193 MG/DL (ref 70–99)
GLUCOSE BLDC GLUCOMTR-MCNC: 195 MG/DL (ref 70–99)
GLUCOSE BLDC GLUCOMTR-MCNC: 197 MG/DL (ref 70–99)
GLUCOSE BLDC GLUCOMTR-MCNC: 198 MG/DL (ref 70–99)
GLUCOSE BLDC GLUCOMTR-MCNC: 203 MG/DL (ref 70–99)
GLUCOSE BLDC GLUCOMTR-MCNC: 204 MG/DL (ref 70–99)
GLUCOSE BLDC GLUCOMTR-MCNC: 204 MG/DL (ref 70–99)
GLUCOSE BLDC GLUCOMTR-MCNC: 205 MG/DL (ref 70–99)
GLUCOSE BLDC GLUCOMTR-MCNC: 206 MG/DL (ref 70–99)
GLUCOSE BLDC GLUCOMTR-MCNC: 208 MG/DL (ref 70–99)
GLUCOSE BLDC GLUCOMTR-MCNC: 210 MG/DL (ref 70–99)
GLUCOSE BLDC GLUCOMTR-MCNC: 220 MG/DL (ref 70–99)
GLUCOSE BLDC GLUCOMTR-MCNC: 226 MG/DL (ref 70–99)
GLUCOSE BLDC GLUCOMTR-MCNC: 227 MG/DL (ref 70–99)
GLUCOSE BLDC GLUCOMTR-MCNC: 238 MG/DL (ref 70–99)
GLUCOSE BLDC GLUCOMTR-MCNC: 246 MG/DL (ref 70–99)
GLUCOSE BLDC GLUCOMTR-MCNC: 265 MG/DL (ref 70–99)
GLUCOSE BLDC GLUCOMTR-MCNC: 316 MG/DL (ref 70–99)
GLUCOSE BLDC GLUCOMTR-MCNC: 68 MG/DL (ref 70–99)
GLUCOSE BLDC GLUCOMTR-MCNC: 98 MG/DL (ref 70–99)
GLUCOSE SERPL-MCNC: 110 MG/DL (ref 65–99)
GLUCOSE SERPL-MCNC: 119 MG/DL (ref 65–99)
GLUCOSE SERPL-MCNC: 122 MG/DL (ref 65–99)
GLUCOSE SERPL-MCNC: 128 MG/DL (ref 65–99)
GLUCOSE SERPL-MCNC: 136 MG/DL (ref 65–99)
GLUCOSE SERPL-MCNC: 143 MG/DL (ref 65–99)
GLUCOSE SERPL-MCNC: 146 MG/DL (ref 65–99)
GLUCOSE SERPL-MCNC: 149 MG/DL (ref 65–99)
GLUCOSE SERPL-MCNC: 153 MG/DL (ref 65–99)
GLUCOSE SERPL-MCNC: 160 MG/DL (ref 65–99)
GLUCOSE SERPL-MCNC: 180 MG/DL (ref 65–99)
GLUCOSE SERPL-MCNC: 184 MG/DL (ref 65–99)
GLUCOSE SERPL-MCNC: 190 MG/DL (ref 65–99)
GLUCOSE SERPL-MCNC: 192 MG/DL (ref 65–99)
GLUCOSE SERPL-MCNC: 193 MG/DL (ref 65–99)
GLUCOSE SERPL-MCNC: 211 MG/DL (ref 65–99)
GLUCOSE SERPL-MCNC: 301 MG/DL (ref 65–99)
GLUCOSE SERPL-MCNC: 98 MG/DL (ref 65–99)
GLUCOSE UR STRIP-MCNC: NEGATIVE MG/DL
GRAM STN SPEC: NORMAL
GRAM STN SPEC: NORMAL
HADV DNA SPEC NAA+PROBE: NOT DETECTED
HBA1C MFR BLD: 6.4 % (ref 4.8–5.6)
HCO3 BLDA-SCNC: 21 MMOL/L (ref 22–26)
HCO3 BLDA-SCNC: 26.7 MMOL/L (ref 22–26)
HCO3 BLDA-SCNC: 27.5 MMOL/L (ref 22–26)
HCO3 BLDA-SCNC: 9.9 MMOL/L (ref 22–26)
HCOV 229E RNA SPEC QL NAA+PROBE: NOT DETECTED
HCOV HKU1 RNA SPEC QL NAA+PROBE: NOT DETECTED
HCOV NL63 RNA SPEC QL NAA+PROBE: NOT DETECTED
HCOV OC43 RNA SPEC QL NAA+PROBE: NOT DETECTED
HCT VFR BLD AUTO: 25.5 % (ref 34–46.6)
HCT VFR BLD AUTO: 26.7 % (ref 34–46.6)
HCT VFR BLD AUTO: 27.1 % (ref 34–46.6)
HCT VFR BLD AUTO: 27.3 % (ref 34–46.6)
HCT VFR BLD AUTO: 27.9 % (ref 34–46.6)
HCT VFR BLD AUTO: 28 % (ref 34–46.6)
HCT VFR BLD AUTO: 28.2 % (ref 34–46.6)
HCT VFR BLD AUTO: 28.5 % (ref 34–46.6)
HCT VFR BLD AUTO: 29.1 % (ref 34–46.6)
HCT VFR BLD AUTO: 29.3 % (ref 34–46.6)
HCT VFR BLD AUTO: 29.6 % (ref 34–46.6)
HCT VFR BLD AUTO: 29.8 % (ref 34–46.6)
HCT VFR BLD AUTO: 29.9 % (ref 34–46.6)
HCT VFR BLD AUTO: 30.1 % (ref 34–46.6)
HCT VFR BLD AUTO: 30.6 % (ref 34–46.6)
HCT VFR BLD AUTO: 31.4 % (ref 34–46.6)
HCT VFR BLD AUTO: 31.4 % (ref 34–46.6)
HGB BLD-MCNC: 10 G/DL (ref 12–15.9)
HGB BLD-MCNC: 8.1 G/DL (ref 12–15.9)
HGB BLD-MCNC: 8.5 G/DL (ref 12–15.9)
HGB BLD-MCNC: 8.6 G/DL (ref 12–15.9)
HGB BLD-MCNC: 8.6 G/DL (ref 12–15.9)
HGB BLD-MCNC: 8.7 G/DL (ref 12–15.9)
HGB BLD-MCNC: 8.8 G/DL (ref 12–15.9)
HGB BLD-MCNC: 8.9 G/DL (ref 12–15.9)
HGB BLD-MCNC: 9 G/DL (ref 12–15.9)
HGB BLD-MCNC: 9.2 G/DL (ref 12–15.9)
HGB BLD-MCNC: 9.3 G/DL (ref 12–15.9)
HGB BLD-MCNC: 9.5 G/DL (ref 12–15.9)
HGB BLD-MCNC: 9.5 G/DL (ref 12–15.9)
HGB BLD-MCNC: 9.7 G/DL (ref 12–15.9)
HGB BLD-MCNC: 9.9 G/DL (ref 12–15.9)
HGB BLDA-MCNC: 10.2 G/DL (ref 11.7–14.6)
HGB BLDA-MCNC: 10.8 G/DL (ref 11.7–14.6)
HGB BLDA-MCNC: 11.1 G/DL (ref 11.7–14.6)
HGB BLDA-MCNC: 7.6 G/DL (ref 11.7–14.6)
HGB UR QL STRIP.AUTO: NEGATIVE
HMPV RNA NPH QL NAA+NON-PROBE: NOT DETECTED
HOLD SPECIMEN: NORMAL
HOLD SPECIMEN: NORMAL
HPIV1 RNA ISLT QL NAA+PROBE: NOT DETECTED
HPIV2 RNA SPEC QL NAA+PROBE: NOT DETECTED
HPIV3 RNA NPH QL NAA+PROBE: NOT DETECTED
HPIV4 P GENE NPH QL NAA+PROBE: NOT DETECTED
HYALINE CASTS UR QL AUTO: ABNORMAL /LPF
IGA1 MFR SER: 310 MG/DL (ref 70–400)
IGG1 SER-MCNC: 1135 MG/DL (ref 700–1600)
IGM SERPL-MCNC: 115 MG/DL (ref 40–230)
IMM GRANULOCYTES # BLD AUTO: 0.1 10*3/MM3 (ref 0–0.05)
IMM GRANULOCYTES # BLD AUTO: 0.1 10*3/MM3 (ref 0–0.05)
IMM GRANULOCYTES # BLD AUTO: 0.11 10*3/MM3 (ref 0–0.05)
IMM GRANULOCYTES # BLD AUTO: 0.12 10*3/MM3 (ref 0–0.05)
IMM GRANULOCYTES # BLD AUTO: 0.69 10*3/MM3 (ref 0–0.05)
IMM GRANULOCYTES # BLD AUTO: 0.8 10*3/MM3 (ref 0–0.05)
IMM GRANULOCYTES # BLD AUTO: 1.1 10*3/MM3 (ref 0–0.05)
IMM GRANULOCYTES # BLD AUTO: 1.66 10*3/MM3 (ref 0–0.05)
IMM GRANULOCYTES # BLD AUTO: 1.84 10*3/MM3 (ref 0–0.05)
IMM GRANULOCYTES NFR BLD AUTO: 0.7 % (ref 0–0.5)
IMM GRANULOCYTES NFR BLD AUTO: 1.1 % (ref 0–0.5)
IMM GRANULOCYTES NFR BLD AUTO: 1.1 % (ref 0–0.5)
IMM GRANULOCYTES NFR BLD AUTO: 1.3 % (ref 0–0.5)
IMM GRANULOCYTES NFR BLD AUTO: 4.6 % (ref 0–0.5)
IMM GRANULOCYTES NFR BLD AUTO: 5.1 % (ref 0–0.5)
IMM GRANULOCYTES NFR BLD AUTO: 6 % (ref 0–0.5)
IMM GRANULOCYTES NFR BLD AUTO: 7.3 % (ref 0–0.5)
IMM GRANULOCYTES NFR BLD AUTO: 7.4 % (ref 0–0.5)
IMP & REVIEW OF LAB RESULTS: NORMAL
INHALED O2 CONCENTRATION: 100 %
INHALED O2 CONCENTRATION: 44 %
INHALED O2 CONCENTRATION: 77 %
INHALED O2 CONCENTRATION: ABNORMAL %
IVRT: 92 MS
KETONES UR QL STRIP: NEGATIVE
L PNEUMO1 AG UR QL IA: NEGATIVE
LACEYELLA SACCHARI AB SER QL ID: NEGATIVE
LACTATE BLDA-SCNC: 17.06 MMOL/L (ref 0.5–2)
LACTATE BLDA-SCNC: 2.37 MMOL/L (ref 0.5–2)
LACTATE BLDA-SCNC: ABNORMAL MMOL/L
LARGE PLATELETS: ABNORMAL
LARGE PLATELETS: ABNORMAL
LEFT ATRIUM VOLUME INDEX: 10 ML/M2
LEUKOCYTE ESTERASE UR QL STRIP.AUTO: ABNORMAL
LYMPHOCYTES # BLD AUTO: 0.5 10*3/MM3 (ref 0.7–3.1)
LYMPHOCYTES # BLD AUTO: 0.57 10*3/MM3 (ref 0.7–3.1)
LYMPHOCYTES # BLD AUTO: 0.59 10*3/MM3 (ref 0.7–3.1)
LYMPHOCYTES # BLD AUTO: 0.7 10*3/MM3 (ref 0.7–3.1)
LYMPHOCYTES # BLD AUTO: 0.73 10*3/MM3 (ref 0.7–3.1)
LYMPHOCYTES # BLD AUTO: 0.78 10*3/MM3 (ref 0.7–3.1)
LYMPHOCYTES # BLD AUTO: 0.83 10*3/MM3 (ref 0.7–3.1)
LYMPHOCYTES # BLD AUTO: 0.86 10*3/MM3 (ref 0.7–3.1)
LYMPHOCYTES # BLD AUTO: 1.08 10*3/MM3 (ref 0.7–3.1)
LYMPHOCYTES # BLD MANUAL: 0 10*3/MM3 (ref 0.7–3.1)
LYMPHOCYTES # BLD MANUAL: 0.78 10*3/MM3 (ref 0.7–3.1)
LYMPHOCYTES # BLD MANUAL: 0.81 10*3/MM3 (ref 0.7–3.1)
LYMPHOCYTES # BLD MANUAL: 2.9 10*3/MM3 (ref 0.7–3.1)
LYMPHOCYTES NFR BLD AUTO: 3.1 % (ref 19.6–45.3)
LYMPHOCYTES NFR BLD AUTO: 3.4 % (ref 19.6–45.3)
LYMPHOCYTES NFR BLD AUTO: 4.7 % (ref 19.6–45.3)
LYMPHOCYTES NFR BLD AUTO: 4.7 % (ref 19.6–45.3)
LYMPHOCYTES NFR BLD AUTO: 4.8 % (ref 19.6–45.3)
LYMPHOCYTES NFR BLD AUTO: 5.2 % (ref 19.6–45.3)
LYMPHOCYTES NFR BLD AUTO: 5.8 % (ref 19.6–45.3)
LYMPHOCYTES NFR BLD AUTO: 7.2 % (ref 19.6–45.3)
LYMPHOCYTES NFR BLD AUTO: 7.6 % (ref 19.6–45.3)
LYMPHOCYTES NFR BLD MANUAL: 1 % (ref 5–12)
LYMPHOCYTES NFR BLD MANUAL: 2 % (ref 5–12)
LYMPHOCYTES NFR BLD MANUAL: 4 % (ref 5–12)
Lab: NORMAL
Lab: NORMAL
M PNEUMO IGG SER IA-ACNC: NOT DETECTED
MACROCYTES BLD QL SMEAR: ABNORMAL
MACROCYTES BLD QL SMEAR: ABNORMAL
MAGNESIUM SERPL-MCNC: 2 MG/DL (ref 1.6–2.4)
MAGNESIUM SERPL-MCNC: 2.2 MG/DL (ref 1.6–2.4)
MAGNESIUM SERPL-MCNC: 2.3 MG/DL (ref 1.6–2.4)
MAGNESIUM SERPL-MCNC: 2.4 MG/DL (ref 1.6–2.4)
MAGNESIUM SERPL-MCNC: 2.8 MG/DL (ref 1.6–2.4)
MCH RBC QN AUTO: 29.8 PG (ref 26.6–33)
MCH RBC QN AUTO: 29.9 PG (ref 26.6–33)
MCH RBC QN AUTO: 29.9 PG (ref 26.6–33)
MCH RBC QN AUTO: 30 PG (ref 26.6–33)
MCH RBC QN AUTO: 30.1 PG (ref 26.6–33)
MCH RBC QN AUTO: 30.3 PG (ref 26.6–33)
MCH RBC QN AUTO: 30.4 PG (ref 26.6–33)
MCH RBC QN AUTO: 30.5 PG (ref 26.6–33)
MCH RBC QN AUTO: 30.5 PG (ref 26.6–33)
MCH RBC QN AUTO: 30.9 PG (ref 26.6–33)
MCH RBC QN AUTO: 30.9 PG (ref 26.6–33)
MCH RBC QN AUTO: 31 PG (ref 26.6–33)
MCH RBC QN AUTO: 31 PG (ref 26.6–33)
MCH RBC QN AUTO: 31.1 PG (ref 26.6–33)
MCH RBC QN AUTO: 31.1 PG (ref 26.6–33)
MCHC RBC AUTO-ENTMCNC: 29.2 G/DL (ref 31.5–35.7)
MCHC RBC AUTO-ENTMCNC: 29.7 G/DL (ref 31.5–35.7)
MCHC RBC AUTO-ENTMCNC: 30.9 G/DL (ref 31.5–35.7)
MCHC RBC AUTO-ENTMCNC: 30.9 G/DL (ref 31.5–35.7)
MCHC RBC AUTO-ENTMCNC: 31.1 G/DL (ref 31.5–35.7)
MCHC RBC AUTO-ENTMCNC: 31.2 G/DL (ref 31.5–35.7)
MCHC RBC AUTO-ENTMCNC: 31.5 G/DL (ref 31.5–35.7)
MCHC RBC AUTO-ENTMCNC: 31.7 G/DL (ref 31.5–35.7)
MCHC RBC AUTO-ENTMCNC: 31.8 G/DL (ref 31.5–35.7)
MCHC RBC AUTO-ENTMCNC: 31.9 G/DL (ref 31.5–35.7)
MCHC RBC AUTO-ENTMCNC: 32.1 G/DL (ref 31.5–35.7)
MCHC RBC AUTO-ENTMCNC: 32.1 G/DL (ref 31.5–35.7)
MCHC RBC AUTO-ENTMCNC: 32.2 G/DL (ref 31.5–35.7)
MCV RBC AUTO: 102.4 FL (ref 79–97)
MCV RBC AUTO: 106.2 FL (ref 79–97)
MCV RBC AUTO: 93.8 FL (ref 79–97)
MCV RBC AUTO: 94.3 FL (ref 79–97)
MCV RBC AUTO: 94.3 FL (ref 79–97)
MCV RBC AUTO: 94.4 FL (ref 79–97)
MCV RBC AUTO: 94.7 FL (ref 79–97)
MCV RBC AUTO: 96.3 FL (ref 79–97)
MCV RBC AUTO: 96.5 FL (ref 79–97)
MCV RBC AUTO: 96.8 FL (ref 79–97)
MCV RBC AUTO: 97.3 FL (ref 79–97)
MCV RBC AUTO: 97.6 FL (ref 79–97)
MCV RBC AUTO: 97.7 FL (ref 79–97)
MCV RBC AUTO: 97.9 FL (ref 79–97)
MCV RBC AUTO: 98 FL (ref 79–97)
MCV RBC AUTO: 98.2 FL (ref 79–97)
MCV RBC AUTO: 98.2 FL (ref 79–97)
METAMYELOCYTES NFR BLD MANUAL: 1 % (ref 0–0)
METAMYELOCYTES NFR BLD MANUAL: 1 % (ref 0–0)
METAMYELOCYTES NFR BLD MANUAL: 2 % (ref 0–0)
METHGB BLD QL: 0 % (ref 0–1.5)
METHGB BLD QL: 0.1 % (ref 0–1.5)
METHGB BLD QL: 0.3 % (ref 0–1.5)
METHGB BLD QL: 0.7 % (ref 0–1.5)
MODALITY: ABNORMAL
MONOCYTES # BLD AUTO: 0.17 10*3/MM3 (ref 0.1–0.9)
MONOCYTES # BLD AUTO: 0.5 10*3/MM3 (ref 0.1–0.9)
MONOCYTES # BLD AUTO: 0.63 10*3/MM3 (ref 0.1–0.9)
MONOCYTES # BLD AUTO: 0.76 10*3/MM3 (ref 0.1–0.9)
MONOCYTES # BLD AUTO: 0.77 10*3/MM3 (ref 0.1–0.9)
MONOCYTES # BLD AUTO: 0.93 10*3/MM3 (ref 0.1–0.9)
MONOCYTES # BLD AUTO: 1.01 10*3/MM3 (ref 0.1–0.9)
MONOCYTES # BLD AUTO: 1.56 10*3/MM3 (ref 0.1–0.9)
MONOCYTES # BLD AUTO: 1.59 10*3/MM3 (ref 0.1–0.9)
MONOCYTES # BLD: 0.13 10*3/MM3 (ref 0.1–0.9)
MONOCYTES # BLD: 0.45 10*3/MM3 (ref 0.1–0.9)
MONOCYTES # BLD: 0.78 10*3/MM3 (ref 0.1–0.9)
MONOCYTES NFR BLD AUTO: 2.1 % (ref 5–12)
MONOCYTES NFR BLD AUTO: 4.5 % (ref 5–12)
MONOCYTES NFR BLD AUTO: 4.7 % (ref 5–12)
MONOCYTES NFR BLD AUTO: 5.3 % (ref 5–12)
MONOCYTES NFR BLD AUTO: 5.5 % (ref 5–12)
MONOCYTES NFR BLD AUTO: 5.7 % (ref 5–12)
MONOCYTES NFR BLD AUTO: 6.2 % (ref 5–12)
MONOCYTES NFR BLD AUTO: 7.1 % (ref 5–12)
MONOCYTES NFR BLD AUTO: 8.3 % (ref 5–12)
MRSA DNA SPEC QL NAA+PROBE: NORMAL
MYELOCYTES NFR BLD MANUAL: 1 % (ref 0–0)
MYELOPEROXIDASE AB SER IA-ACNC: <0.2 UNITS (ref 0–0.9)
NEUTROPHILS # BLD AUTO: 12.28 10*3/MM3 (ref 1.7–7)
NEUTROPHILS # BLD AUTO: 17.41 10*3/MM3 (ref 1.7–7)
NEUTROPHILS # BLD AUTO: 17.88 10*3/MM3 (ref 1.7–7)
NEUTROPHILS # BLD AUTO: 21.63 10*3/MM3 (ref 1.7–7)
NEUTROPHILS NFR BLD AUTO: 11.16 10*3/MM3 (ref 1.7–7)
NEUTROPHILS NFR BLD AUTO: 12.51 10*3/MM3 (ref 1.7–7)
NEUTROPHILS NFR BLD AUTO: 14.9 10*3/MM3 (ref 1.7–7)
NEUTROPHILS NFR BLD AUTO: 15.67 10*3/MM3 (ref 1.7–7)
NEUTROPHILS NFR BLD AUTO: 18.03 10*3/MM3 (ref 1.7–7)
NEUTROPHILS NFR BLD AUTO: 20.94 10*3/MM3 (ref 1.7–7)
NEUTROPHILS NFR BLD AUTO: 7.3 10*3/MM3 (ref 1.7–7)
NEUTROPHILS NFR BLD AUTO: 7.59 10*3/MM3 (ref 1.7–7)
NEUTROPHILS NFR BLD AUTO: 80 % (ref 42.7–76)
NEUTROPHILS NFR BLD AUTO: 82.5 % (ref 42.7–76)
NEUTROPHILS NFR BLD AUTO: 82.5 % (ref 42.7–76)
NEUTROPHILS NFR BLD AUTO: 82.8 % (ref 42.7–76)
NEUTROPHILS NFR BLD AUTO: 85.1 % (ref 42.7–76)
NEUTROPHILS NFR BLD AUTO: 85.1 % (ref 42.7–76)
NEUTROPHILS NFR BLD AUTO: 89.3 % (ref 42.7–76)
NEUTROPHILS NFR BLD AUTO: 89.4 % (ref 42.7–76)
NEUTROPHILS NFR BLD AUTO: 89.5 % (ref 42.7–76)
NEUTROPHILS NFR BLD AUTO: 9.56 10*3/MM3 (ref 1.7–7)
NEUTROPHILS NFR BLD MANUAL: 79 % (ref 42.7–76)
NEUTROPHILS NFR BLD MANUAL: 85 % (ref 42.7–76)
NEUTROPHILS NFR BLD MANUAL: 90 % (ref 42.7–76)
NEUTROPHILS NFR BLD MANUAL: 94 % (ref 42.7–76)
NEUTS BAND NFR BLD MANUAL: 1 % (ref 0–5)
NEUTS BAND NFR BLD MANUAL: 3 % (ref 0–5)
NEUTS BAND NFR BLD MANUAL: 5 % (ref 0–5)
NEUTS BAND NFR BLD MANUAL: 7 % (ref 0–5)
NEUTS HYPERSEG # BLD: ABNORMAL 10*3/UL
NEUTS VAC BLD QL SMEAR: ABNORMAL
NITRITE UR QL STRIP: POSITIVE
NOTE: ABNORMAL
NRBC BLD AUTO-RTO: 0 /100 WBC (ref 0–0.2)
NRBC BLD AUTO-RTO: 0.2 /100 WBC (ref 0–0.2)
NRBC BLD AUTO-RTO: 0.9 /100 WBC (ref 0–0.2)
NRBC BLD AUTO-RTO: 1 /100 WBC (ref 0–0.2)
NRBC BLD AUTO-RTO: 1.2 /100 WBC (ref 0–0.2)
NRBC BLD AUTO-RTO: 2.3 /100 WBC (ref 0–0.2)
NRBC SPEC MANUAL: 1 /100 WBC (ref 0–0.2)
NRBC SPEC MANUAL: 15 /100 WBC (ref 0–0.2)
NRBC SPEC MANUAL: 2 /100 WBC (ref 0–0.2)
NRBC SPEC MANUAL: 6 /100 WBC (ref 0–0.2)
NT-PROBNP SERPL-MCNC: 2438 PG/ML (ref 0–900)
NT-PROBNP SERPL-MCNC: 3124 PG/ML (ref 0–900)
NT-PROBNP SERPL-MCNC: 9704 PG/ML (ref 0–900)
NT-PROBNP SERPL-MCNC: ABNORMAL PG/ML (ref 0–900)
OXYHGB MFR BLDV: 86 % (ref 94–99)
OXYHGB MFR BLDV: 86.3 % (ref 94–99)
OXYHGB MFR BLDV: 87.1 % (ref 94–99)
OXYHGB MFR BLDV: 90.8 % (ref 94–99)
P-ANCA ATYPICAL TITR SER IF: NORMAL TITER
P-ANCA TITR SER IF: NORMAL TITER
PATHOLOGIST NAME: NORMAL
PCO2 BLDA: 28.5 MM HG (ref 35–45)
PCO2 BLDA: 30.1 MM HG (ref 35–45)
PCO2 BLDA: 31.8 MM HG (ref 35–45)
PCO2 BLDA: 40.2 MM HG (ref 35–45)
PH BLDA: 7.01 PH UNITS (ref 7.35–7.45)
PH BLDA: 7.44 PH UNITS (ref 7.35–7.45)
PH BLDA: 7.58 PH UNITS (ref 7.35–7.45)
PH BLDA: 7.59 PH UNITS (ref 7.35–7.45)
PH UR STRIP.AUTO: 6 [PH] (ref 5–8)
PHOSPHATE SERPL-MCNC: 2.9 MG/DL (ref 2.5–4.5)
PHOSPHATE SERPL-MCNC: 3 MG/DL (ref 2.5–4.5)
PHOSPHATE SERPL-MCNC: 3.2 MG/DL (ref 2.5–4.5)
PHOSPHATE SERPL-MCNC: 3.6 MG/DL (ref 2.5–4.5)
PHOSPHATE SERPL-MCNC: 3.7 MG/DL (ref 2.5–4.5)
PHOSPHATE SERPL-MCNC: 3.8 MG/DL (ref 2.5–4.5)
PHOSPHATE SERPL-MCNC: 3.8 MG/DL (ref 2.5–4.5)
PHOSPHATE SERPL-MCNC: 4 MG/DL (ref 2.5–4.5)
PHOSPHATE SERPL-MCNC: 4.2 MG/DL (ref 2.5–4.5)
PIGEON SERUM IGG QL: NEGATIVE
PLATELET # BLD AUTO: 223 10*3/MM3 (ref 140–450)
PLATELET # BLD AUTO: 245 10*3/MM3 (ref 140–450)
PLATELET # BLD AUTO: 298 10*3/MM3 (ref 140–450)
PLATELET # BLD AUTO: 308 10*3/MM3 (ref 140–450)
PLATELET # BLD AUTO: 348 10*3/MM3 (ref 140–450)
PLATELET # BLD AUTO: 356 10*3/MM3 (ref 140–450)
PLATELET # BLD AUTO: 356 10*3/MM3 (ref 140–450)
PLATELET # BLD AUTO: 367 10*3/MM3 (ref 140–450)
PLATELET # BLD AUTO: 369 10*3/MM3 (ref 140–450)
PLATELET # BLD AUTO: 389 10*3/MM3 (ref 140–450)
PLATELET # BLD AUTO: 398 10*3/MM3 (ref 140–450)
PLATELET # BLD AUTO: 399 10*3/MM3 (ref 140–450)
PLATELET # BLD AUTO: 428 10*3/MM3 (ref 140–450)
PLATELET # BLD AUTO: 435 10*3/MM3 (ref 140–450)
PLATELET # BLD AUTO: 457 10*3/MM3 (ref 140–450)
PLATELET # BLD AUTO: 460 10*3/MM3 (ref 140–450)
PLATELET # BLD AUTO: 461 10*3/MM3 (ref 140–450)
PMV BLD AUTO: 11 FL (ref 6–12)
PMV BLD AUTO: 11.2 FL (ref 6–12)
PMV BLD AUTO: 11.3 FL (ref 6–12)
PMV BLD AUTO: 11.4 FL (ref 6–12)
PMV BLD AUTO: 11.6 FL (ref 6–12)
PMV BLD AUTO: 11.7 FL (ref 6–12)
PMV BLD AUTO: 11.7 FL (ref 6–12)
PO2 BLD: 122 MM[HG] (ref 0–500)
PO2 BLD: 53 MM[HG] (ref 0–500)
PO2 BLD: 64 MM[HG] (ref 0–500)
PO2 BLD: ABNORMAL MM[HG]
PO2 BLDA: 49.1 MM HG (ref 80–100)
PO2 BLDA: 52.5 MM HG (ref 80–100)
PO2 BLDA: 53.8 MM HG (ref 80–100)
PO2 BLDA: 82.8 MM HG (ref 80–100)
POIKILOCYTOSIS BLD QL SMEAR: ABNORMAL
POIKILOCYTOSIS BLD QL SMEAR: ABNORMAL
POLYCHROMASIA BLD QL SMEAR: ABNORMAL
POTASSIUM BLDA-SCNC: 2.91 MMOL/L (ref 3.5–5)
POTASSIUM BLDA-SCNC: 5.78 MMOL/L (ref 3.5–5)
POTASSIUM SERPL-SCNC: 3.1 MMOL/L (ref 3.5–5.2)
POTASSIUM SERPL-SCNC: 3.2 MMOL/L (ref 3.5–5.2)
POTASSIUM SERPL-SCNC: 3.4 MMOL/L (ref 3.5–5.2)
POTASSIUM SERPL-SCNC: 3.4 MMOL/L (ref 3.5–5.2)
POTASSIUM SERPL-SCNC: 3.6 MMOL/L (ref 3.5–5.2)
POTASSIUM SERPL-SCNC: 3.7 MMOL/L (ref 3.5–5.2)
POTASSIUM SERPL-SCNC: 3.9 MMOL/L (ref 3.5–5.2)
POTASSIUM SERPL-SCNC: 4.2 MMOL/L (ref 3.5–5.2)
POTASSIUM SERPL-SCNC: 4.3 MMOL/L (ref 3.5–5.2)
POTASSIUM SERPL-SCNC: 4.4 MMOL/L (ref 3.5–5.2)
POTASSIUM SERPL-SCNC: 4.6 MMOL/L (ref 3.5–5.2)
POTASSIUM SERPL-SCNC: 4.8 MMOL/L (ref 3.5–5.2)
POTASSIUM SERPL-SCNC: 5.6 MMOL/L (ref 3.5–5.2)
PROCALCITONIN SERPL-MCNC: 0.22 NG/ML (ref 0–0.25)
PROCALCITONIN SERPL-MCNC: 0.23 NG/ML (ref 0–0.25)
PROCALCITONIN SERPL-MCNC: 0.76 NG/ML (ref 0–0.25)
PROT SERPL-MCNC: 6.4 G/DL (ref 6–8.5)
PROT SERPL-MCNC: 6.8 G/DL (ref 6–8.5)
PROT UR QL STRIP: ABNORMAL
PROTEINASE3 AB SER IA-ACNC: <0.2 UNITS (ref 0–0.9)
QT INTERVAL: 365 MS
QT INTERVAL: 387 MS
QT INTERVAL: 439 MS
QTC INTERVAL: 438 MS
QTC INTERVAL: 488 MS
QTC INTERVAL: 515 MS
RBC # BLD AUTO: 2.61 10*6/MM3 (ref 3.77–5.28)
RBC # BLD AUTO: 2.74 10*6/MM3 (ref 3.77–5.28)
RBC # BLD AUTO: 2.78 10*6/MM3 (ref 3.77–5.28)
RBC # BLD AUTO: 2.82 10*6/MM3 (ref 3.77–5.28)
RBC # BLD AUTO: 2.85 10*6/MM3 (ref 3.77–5.28)
RBC # BLD AUTO: 2.85 10*6/MM3 (ref 3.77–5.28)
RBC # BLD AUTO: 2.86 10*6/MM3 (ref 3.77–5.28)
RBC # BLD AUTO: 2.89 10*6/MM3 (ref 3.77–5.28)
RBC # BLD AUTO: 2.89 10*6/MM3 (ref 3.77–5.28)
RBC # BLD AUTO: 2.93 10*6/MM3 (ref 3.77–5.28)
RBC # BLD AUTO: 3.05 10*6/MM3 (ref 3.77–5.28)
RBC # BLD AUTO: 3.08 10*6/MM3 (ref 3.77–5.28)
RBC # BLD AUTO: 3.12 10*6/MM3 (ref 3.77–5.28)
RBC # BLD AUTO: 3.14 10*6/MM3 (ref 3.77–5.28)
RBC # BLD AUTO: 3.24 10*6/MM3 (ref 3.77–5.28)
RBC # BLD AUTO: 3.26 10*6/MM3 (ref 3.77–5.28)
RBC # BLD AUTO: 3.33 10*6/MM3 (ref 3.77–5.28)
RBC # UR STRIP: ABNORMAL /HPF
REF LAB TEST METHOD: ABNORMAL
REFERENCE VALUE: NORMAL
RHINOVIRUS RNA SPEC NAA+PROBE: NOT DETECTED
RSV RNA NPH QL NAA+NON-PROBE: NOT DETECTED
RSV RNA NPH QL NAA+NON-PROBE: NOT DETECTED
S PNEUM AG SPEC QL LA: NEGATIVE
S RECTIVIRGULA IGG SER QL ID: NEGATIVE
SAO2 % BLDCOA: 86.2 % (ref 95–99)
SAO2 % BLDCOA: 87.1 % (ref 95–99)
SAO2 % BLDCOA: 87.6 % (ref 95–99)
SAO2 % BLDCOA: 91.2 % (ref 95–99)
SARS-COV-2 RNA RESP QL NAA+PROBE: NOT DETECTED
SARS-COV-2 RNA RESP QL NAA+PROBE: NOT DETECTED
SCAN SLIDE: NORMAL
SMALL PLATELETS BLD QL SMEAR: ABNORMAL
SMALL PLATELETS BLD QL SMEAR: ABNORMAL
SMALL PLATELETS BLD QL SMEAR: ADEQUATE
SODIUM BLDA-SCNC: 138.3 MMOL/L (ref 136–146)
SODIUM BLDA-SCNC: 147.1 MMOL/L (ref 136–146)
SODIUM SERPL-SCNC: 126 MMOL/L (ref 136–145)
SODIUM SERPL-SCNC: 129 MMOL/L (ref 136–145)
SODIUM SERPL-SCNC: 129 MMOL/L (ref 136–145)
SODIUM SERPL-SCNC: 130 MMOL/L (ref 136–145)
SODIUM SERPL-SCNC: 131 MMOL/L (ref 136–145)
SODIUM SERPL-SCNC: 132 MMOL/L (ref 136–145)
SODIUM SERPL-SCNC: 132 MMOL/L (ref 136–145)
SODIUM SERPL-SCNC: 133 MMOL/L (ref 136–145)
SODIUM SERPL-SCNC: 134 MMOL/L (ref 136–145)
SODIUM SERPL-SCNC: 135 MMOL/L (ref 136–145)
SODIUM SERPL-SCNC: 136 MMOL/L (ref 136–145)
SODIUM SERPL-SCNC: 137 MMOL/L (ref 136–145)
SODIUM SERPL-SCNC: 137 MMOL/L (ref 136–145)
SP GR UR STRIP: 1.01 (ref 1–1.03)
SQUAMOUS #/AREA URNS HPF: ABNORMAL /HPF
STOMATOCYTES BLD QL SMEAR: ABNORMAL
T VULGARIS IGG SER QL: NEGATIVE
TROPONIN T SERPL HS-MCNC: 18 NG/L
TROPONIN T SERPL HS-MCNC: 56 NG/L
UROBILINOGEN UR QL STRIP: ABNORMAL
VARIANT LYMPHS NFR BLD MANUAL: 0 % (ref 19.6–45.3)
VARIANT LYMPHS NFR BLD MANUAL: 1 % (ref 0–5)
VARIANT LYMPHS NFR BLD MANUAL: 14 % (ref 19.6–45.3)
VARIANT LYMPHS NFR BLD MANUAL: 4 % (ref 19.6–45.3)
VARIANT LYMPHS NFR BLD MANUAL: 5 % (ref 19.6–45.3)
WBC # UR STRIP: ABNORMAL /HPF
WBC MORPH BLD: NORMAL
WBC NRBC COR # BLD AUTO: 10.69 10*3/MM3 (ref 3.4–10.8)
WBC NRBC COR # BLD AUTO: 11.17 10*3/MM3 (ref 3.4–10.8)
WBC NRBC COR # BLD AUTO: 11.78 10*3/MM3 (ref 3.4–10.8)
WBC NRBC COR # BLD AUTO: 12.46 10*3/MM3 (ref 3.4–10.8)
WBC NRBC COR # BLD AUTO: 13.49 10*3/MM3 (ref 3.4–10.8)
WBC NRBC COR # BLD AUTO: 13.99 10*3/MM3 (ref 3.4–10.8)
WBC NRBC COR # BLD AUTO: 15.48 10*3/MM3 (ref 3.4–10.8)
WBC NRBC COR # BLD AUTO: 17.52 10*3/MM3 (ref 3.4–10.8)
WBC NRBC COR # BLD AUTO: 18.41 10*3/MM3 (ref 3.4–10.8)
WBC NRBC COR # BLD AUTO: 19.43 10*3/MM3 (ref 3.4–10.8)
WBC NRBC COR # BLD AUTO: 20.01 10*3/MM3 (ref 3.4–10.8)
WBC NRBC COR # BLD AUTO: 20.73 10*3/MM3 (ref 3.4–10.8)
WBC NRBC COR # BLD AUTO: 22.3 10*3/MM3 (ref 3.4–10.8)
WBC NRBC COR # BLD AUTO: 22.51 10*3/MM3 (ref 3.4–10.8)
WBC NRBC COR # BLD AUTO: 25.36 10*3/MM3 (ref 3.4–10.8)
WBC NRBC COR # BLD AUTO: 8.18 10*3/MM3 (ref 3.4–10.8)
WBC NRBC COR # BLD AUTO: 9.2 10*3/MM3 (ref 3.4–10.8)
WHOLE BLOOD HOLD COAG: NORMAL
WHOLE BLOOD HOLD SPECIMEN: NORMAL
WHOLE BLOOD HOLD SPECIMEN: NORMAL

## 2024-01-01 PROCEDURE — 86140 C-REACTIVE PROTEIN: CPT

## 2024-01-01 PROCEDURE — 25010000002 HEPARIN (PORCINE) PER 1000 UNITS: Performed by: STUDENT IN AN ORGANIZED HEALTH CARE EDUCATION/TRAINING PROGRAM

## 2024-01-01 PROCEDURE — 94761 N-INVAS EAR/PLS OXIMETRY MLT: CPT

## 2024-01-01 PROCEDURE — 82948 REAGENT STRIP/BLOOD GLUCOSE: CPT | Performed by: INTERNAL MEDICINE

## 2024-01-01 PROCEDURE — 94799 UNLISTED PULMONARY SVC/PX: CPT

## 2024-01-01 PROCEDURE — 82784 ASSAY IGA/IGD/IGG/IGM EACH: CPT

## 2024-01-01 PROCEDURE — 86431 RHEUMATOID FACTOR QUANT: CPT

## 2024-01-01 PROCEDURE — 63710000001 INSULIN LISPRO (HUMAN) PER 5 UNITS: Performed by: INTERNAL MEDICINE

## 2024-01-01 PROCEDURE — 36600 WITHDRAWAL OF ARTERIAL BLOOD: CPT | Performed by: INTERNAL MEDICINE

## 2024-01-01 PROCEDURE — 83516 IMMUNOASSAY NONANTIBODY: CPT

## 2024-01-01 PROCEDURE — 99233 SBSQ HOSP IP/OBS HIGH 50: CPT | Performed by: INTERNAL MEDICINE

## 2024-01-01 PROCEDURE — 80053 COMPREHEN METABOLIC PANEL: CPT | Performed by: EMERGENCY MEDICINE

## 2024-01-01 PROCEDURE — 84145 PROCALCITONIN (PCT): CPT | Performed by: STUDENT IN AN ORGANIZED HEALTH CARE EDUCATION/TRAINING PROGRAM

## 2024-01-01 PROCEDURE — 83880 ASSAY OF NATRIURETIC PEPTIDE: CPT | Performed by: EMERGENCY MEDICINE

## 2024-01-01 PROCEDURE — 25010000002 FUROSEMIDE PER 20 MG: Performed by: NURSE PRACTITIONER

## 2024-01-01 PROCEDURE — 25010000002 METHYLPREDNISOLONE PER 40 MG

## 2024-01-01 PROCEDURE — 97110 THERAPEUTIC EXERCISES: CPT

## 2024-01-01 PROCEDURE — 94664 DEMO&/EVAL PT USE INHALER: CPT

## 2024-01-01 PROCEDURE — 85652 RBC SED RATE AUTOMATED: CPT | Performed by: INTERNAL MEDICINE

## 2024-01-01 PROCEDURE — 25010000002 METHYLPREDNISOLONE PER 125 MG: Performed by: INTERNAL MEDICINE

## 2024-01-01 PROCEDURE — 80069 RENAL FUNCTION PANEL: CPT | Performed by: INTERNAL MEDICINE

## 2024-01-01 PROCEDURE — 99232 SBSQ HOSP IP/OBS MODERATE 35: CPT | Performed by: STUDENT IN AN ORGANIZED HEALTH CARE EDUCATION/TRAINING PROGRAM

## 2024-01-01 PROCEDURE — 25810000003 LACTATED RINGERS SOLUTION: Performed by: STUDENT IN AN ORGANIZED HEALTH CARE EDUCATION/TRAINING PROGRAM

## 2024-01-01 PROCEDURE — 71045 X-RAY EXAM CHEST 1 VIEW: CPT

## 2024-01-01 PROCEDURE — 99223 1ST HOSP IP/OBS HIGH 75: CPT | Performed by: INTERNAL MEDICINE

## 2024-01-01 PROCEDURE — 86235 NUCLEAR ANTIGEN ANTIBODY: CPT

## 2024-01-01 PROCEDURE — 36600 WITHDRAWAL OF ARTERIAL BLOOD: CPT | Performed by: PHYSICIAN ASSISTANT

## 2024-01-01 PROCEDURE — 83050 HGB METHEMOGLOBIN QUAN: CPT | Performed by: EMERGENCY MEDICINE

## 2024-01-01 PROCEDURE — 86037 ANCA TITER EACH ANTIBODY: CPT

## 2024-01-01 PROCEDURE — 93010 ELECTROCARDIOGRAM REPORT: CPT | Performed by: SPECIALIST

## 2024-01-01 PROCEDURE — 83880 ASSAY OF NATRIURETIC PEPTIDE: CPT | Performed by: NURSE PRACTITIONER

## 2024-01-01 PROCEDURE — 83036 HEMOGLOBIN GLYCOSYLATED A1C: CPT | Performed by: INTERNAL MEDICINE

## 2024-01-01 PROCEDURE — 87205 SMEAR GRAM STAIN: CPT | Performed by: INTERNAL MEDICINE

## 2024-01-01 PROCEDURE — 94760 N-INVAS EAR/PLS OXIMETRY 1: CPT

## 2024-01-01 PROCEDURE — 82948 REAGENT STRIP/BLOOD GLUCOSE: CPT

## 2024-01-01 PROCEDURE — 99291 CRITICAL CARE FIRST HOUR: CPT | Performed by: INTERNAL MEDICINE

## 2024-01-01 PROCEDURE — 25010000002 ONDANSETRON PER 1 MG: Performed by: INTERNAL MEDICINE

## 2024-01-01 PROCEDURE — 85027 COMPLETE CBC AUTOMATED: CPT | Performed by: NURSE PRACTITIONER

## 2024-01-01 PROCEDURE — 99291 CRITICAL CARE FIRST HOUR: CPT | Performed by: STUDENT IN AN ORGANIZED HEALTH CARE EDUCATION/TRAINING PROGRAM

## 2024-01-01 PROCEDURE — 25010000002 METHYLPREDNISOLONE PER 40 MG: Performed by: STUDENT IN AN ORGANIZED HEALTH CARE EDUCATION/TRAINING PROGRAM

## 2024-01-01 PROCEDURE — 36415 COLL VENOUS BLD VENIPUNCTURE: CPT | Performed by: INTERNAL MEDICINE

## 2024-01-01 PROCEDURE — 85025 COMPLETE CBC W/AUTO DIFF WBC: CPT | Performed by: STUDENT IN AN ORGANIZED HEALTH CARE EDUCATION/TRAINING PROGRAM

## 2024-01-01 PROCEDURE — 97530 THERAPEUTIC ACTIVITIES: CPT

## 2024-01-01 PROCEDURE — 80048 BASIC METABOLIC PNL TOTAL CA: CPT | Performed by: INTERNAL MEDICINE

## 2024-01-01 PROCEDURE — 82805 BLOOD GASES W/O2 SATURATION: CPT | Performed by: PHYSICIAN ASSISTANT

## 2024-01-01 PROCEDURE — 87637 SARSCOV2&INF A&B&RSV AMP PRB: CPT | Performed by: EMERGENCY MEDICINE

## 2024-01-01 PROCEDURE — 25010000002 FUROSEMIDE PER 20 MG

## 2024-01-01 PROCEDURE — 25010000002 FUROSEMIDE PER 20 MG: Performed by: INTERNAL MEDICINE

## 2024-01-01 PROCEDURE — 25010000002 AZITHROMYCIN PER 500 MG: Performed by: EMERGENCY MEDICINE

## 2024-01-01 PROCEDURE — 86038 ANTINUCLEAR ANTIBODIES: CPT

## 2024-01-01 PROCEDURE — 25010000002 CEFEPIME PER 500 MG: Performed by: INTERNAL MEDICINE

## 2024-01-01 PROCEDURE — 86602 ANTINOMYCES ANTIBODY: CPT

## 2024-01-01 PROCEDURE — 94660 CPAP INITIATION&MGMT: CPT

## 2024-01-01 PROCEDURE — 25010000002 ENOXAPARIN PER 10 MG: Performed by: INTERNAL MEDICINE

## 2024-01-01 PROCEDURE — 80048 BASIC METABOLIC PNL TOTAL CA: CPT | Performed by: STUDENT IN AN ORGANIZED HEALTH CARE EDUCATION/TRAINING PROGRAM

## 2024-01-01 PROCEDURE — 86671 FUNGUS NES ANTIBODY: CPT

## 2024-01-01 PROCEDURE — 25010000002 METHYLPREDNISOLONE PER 40 MG: Performed by: NURSE PRACTITIONER

## 2024-01-01 PROCEDURE — 85379 FIBRIN DEGRADATION QUANT: CPT | Performed by: NURSE PRACTITIONER

## 2024-01-01 PROCEDURE — 83735 ASSAY OF MAGNESIUM: CPT | Performed by: INTERNAL MEDICINE

## 2024-01-01 PROCEDURE — 83735 ASSAY OF MAGNESIUM: CPT

## 2024-01-01 PROCEDURE — 93005 ELECTROCARDIOGRAM TRACING: CPT

## 2024-01-01 PROCEDURE — 97161 PT EVAL LOW COMPLEX 20 MIN: CPT

## 2024-01-01 PROCEDURE — 82805 BLOOD GASES W/O2 SATURATION: CPT | Performed by: INTERNAL MEDICINE

## 2024-01-01 PROCEDURE — 93306 TTE W/DOPPLER COMPLETE: CPT

## 2024-01-01 PROCEDURE — 85007 BL SMEAR W/DIFF WBC COUNT: CPT | Performed by: STUDENT IN AN ORGANIZED HEALTH CARE EDUCATION/TRAINING PROGRAM

## 2024-01-01 PROCEDURE — 83880 ASSAY OF NATRIURETIC PEPTIDE: CPT

## 2024-01-01 PROCEDURE — 86609 BACTERIUM ANTIBODY: CPT

## 2024-01-01 PROCEDURE — 83605 ASSAY OF LACTIC ACID: CPT

## 2024-01-01 PROCEDURE — 84484 ASSAY OF TROPONIN QUANT: CPT | Performed by: EMERGENCY MEDICINE

## 2024-01-01 PROCEDURE — 84100 ASSAY OF PHOSPHORUS: CPT | Performed by: INTERNAL MEDICINE

## 2024-01-01 PROCEDURE — 99233 SBSQ HOSP IP/OBS HIGH 50: CPT | Performed by: STUDENT IN AN ORGANIZED HEALTH CARE EDUCATION/TRAINING PROGRAM

## 2024-01-01 PROCEDURE — 25010000002 VANCOMYCIN 5 G RECONSTITUTED SOLUTION: Performed by: INTERNAL MEDICINE

## 2024-01-01 PROCEDURE — 97162 PT EVAL MOD COMPLEX 30 MIN: CPT

## 2024-01-01 PROCEDURE — 87641 MR-STAPH DNA AMP PROBE: CPT | Performed by: INTERNAL MEDICINE

## 2024-01-01 PROCEDURE — 81001 URINALYSIS AUTO W/SCOPE: CPT | Performed by: INTERNAL MEDICINE

## 2024-01-01 PROCEDURE — 25810000003 SODIUM CHLORIDE 0.9 % SOLUTION: Performed by: INTERNAL MEDICINE

## 2024-01-01 PROCEDURE — 83050 HGB METHEMOGLOBIN QUAN: CPT | Performed by: PHYSICIAN ASSISTANT

## 2024-01-01 PROCEDURE — 97165 OT EVAL LOW COMPLEX 30 MIN: CPT

## 2024-01-01 PROCEDURE — 83050 HGB METHEMOGLOBIN QUAN: CPT | Performed by: INTERNAL MEDICINE

## 2024-01-01 PROCEDURE — 86140 C-REACTIVE PROTEIN: CPT | Performed by: INTERNAL MEDICINE

## 2024-01-01 PROCEDURE — 85027 COMPLETE CBC AUTOMATED: CPT | Performed by: INTERNAL MEDICINE

## 2024-01-01 PROCEDURE — 85025 COMPLETE CBC W/AUTO DIFF WBC: CPT

## 2024-01-01 PROCEDURE — 84484 ASSAY OF TROPONIN QUANT: CPT

## 2024-01-01 PROCEDURE — 97168 OT RE-EVAL EST PLAN CARE: CPT

## 2024-01-01 PROCEDURE — 85025 COMPLETE CBC W/AUTO DIFF WBC: CPT | Performed by: INTERNAL MEDICINE

## 2024-01-01 PROCEDURE — 86331 IMMUNODIFFUSION OUCHTERLONY: CPT

## 2024-01-01 PROCEDURE — 86606 ASPERGILLUS ANTIBODY: CPT

## 2024-01-01 PROCEDURE — 82375 ASSAY CARBOXYHB QUANT: CPT | Performed by: INTERNAL MEDICINE

## 2024-01-01 PROCEDURE — 82375 ASSAY CARBOXYHB QUANT: CPT | Performed by: EMERGENCY MEDICINE

## 2024-01-01 PROCEDURE — 83605 ASSAY OF LACTIC ACID: CPT | Performed by: EMERGENCY MEDICINE

## 2024-01-01 PROCEDURE — 84145 PROCALCITONIN (PCT): CPT | Performed by: NURSE PRACTITIONER

## 2024-01-01 PROCEDURE — 87449 NOS EACH ORGANISM AG IA: CPT | Performed by: INTERNAL MEDICINE

## 2024-01-01 PROCEDURE — 87070 CULTURE OTHR SPECIMN AEROBIC: CPT | Performed by: INTERNAL MEDICINE

## 2024-01-01 PROCEDURE — 94640 AIRWAY INHALATION TREATMENT: CPT

## 2024-01-01 PROCEDURE — 36600 WITHDRAWAL OF ARTERIAL BLOOD: CPT | Performed by: EMERGENCY MEDICINE

## 2024-01-01 PROCEDURE — 99232 SBSQ HOSP IP/OBS MODERATE 35: CPT | Performed by: INTERNAL MEDICINE

## 2024-01-01 PROCEDURE — 86200 CCP ANTIBODY: CPT

## 2024-01-01 PROCEDURE — 25810000003 SODIUM CHLORIDE 0.9 % SOLUTION: Performed by: EMERGENCY MEDICINE

## 2024-01-01 PROCEDURE — 82375 ASSAY CARBOXYHB QUANT: CPT | Performed by: PHYSICIAN ASSISTANT

## 2024-01-01 PROCEDURE — 25010000002 FUROSEMIDE PER 20 MG: Performed by: EMERGENCY MEDICINE

## 2024-01-01 PROCEDURE — 80053 COMPREHEN METABOLIC PANEL: CPT

## 2024-01-01 PROCEDURE — 25010000002 CALCIUM GLUCONATE-NACL 1-0.675 GM/50ML-% SOLUTION

## 2024-01-01 PROCEDURE — 25010000002 AMPICILLIN-SULBACTAM PER 1.5 G: Performed by: EMERGENCY MEDICINE

## 2024-01-01 PROCEDURE — 93005 ELECTROCARDIOGRAM TRACING: CPT | Performed by: EMERGENCY MEDICINE

## 2024-01-01 PROCEDURE — 85652 RBC SED RATE AUTOMATED: CPT

## 2024-01-01 PROCEDURE — 87040 BLOOD CULTURE FOR BACTERIA: CPT | Performed by: EMERGENCY MEDICINE

## 2024-01-01 PROCEDURE — 0202U NFCT DS 22 TRGT SARS-COV-2: CPT | Performed by: INTERNAL MEDICINE

## 2024-01-01 PROCEDURE — 84100 ASSAY OF PHOSPHORUS: CPT

## 2024-01-01 PROCEDURE — 84145 PROCALCITONIN (PCT): CPT | Performed by: INTERNAL MEDICINE

## 2024-01-01 PROCEDURE — 85025 COMPLETE CBC W/AUTO DIFF WBC: CPT | Performed by: EMERGENCY MEDICINE

## 2024-01-01 PROCEDURE — 93970 EXTREMITY STUDY: CPT | Performed by: SURGERY

## 2024-01-01 PROCEDURE — 82805 BLOOD GASES W/O2 SATURATION: CPT | Performed by: EMERGENCY MEDICINE

## 2024-01-01 PROCEDURE — 85007 BL SMEAR W/DIFF WBC COUNT: CPT

## 2024-01-01 PROCEDURE — 99285 EMERGENCY DEPT VISIT HI MDM: CPT

## 2024-01-01 PROCEDURE — 71250 CT THORAX DX C-: CPT

## 2024-01-01 PROCEDURE — 87899 AGENT NOS ASSAY W/OPTIC: CPT | Performed by: INTERNAL MEDICINE

## 2024-01-01 PROCEDURE — 25010000002 VASOPRESSIN 0.2 UNIT/ML SOLUTION: Performed by: STUDENT IN AN ORGANIZED HEALTH CARE EDUCATION/TRAINING PROGRAM

## 2024-01-01 PROCEDURE — 36415 COLL VENOUS BLD VENIPUNCTURE: CPT

## 2024-01-01 PROCEDURE — 25010000002 POTASSIUM CHLORIDE 10 MEQ/100ML SOLUTION

## 2024-01-01 PROCEDURE — 93970 EXTREMITY STUDY: CPT

## 2024-01-01 RX ORDER — ONDANSETRON 4 MG/1
4 TABLET, ORALLY DISINTEGRATING ORAL EVERY 8 HOURS PRN
Status: DISCONTINUED | OUTPATIENT
Start: 2024-01-01 | End: 2024-01-01 | Stop reason: HOSPADM

## 2024-01-01 RX ORDER — DIAZEPAM 5 MG/ML
5 INJECTION, SOLUTION INTRAMUSCULAR; INTRAVENOUS
Status: DISCONTINUED | OUTPATIENT
Start: 2024-01-01 | End: 2024-01-01 | Stop reason: HOSPADM

## 2024-01-01 RX ORDER — POTASSIUM CHLORIDE 750 MG/1
40 CAPSULE, EXTENDED RELEASE ORAL ONCE
Status: COMPLETED | OUTPATIENT
Start: 2024-01-01 | End: 2024-01-01

## 2024-01-01 RX ORDER — LORAZEPAM 2 MG/ML
1 CONCENTRATE ORAL
Status: DISCONTINUED | OUTPATIENT
Start: 2024-01-01 | End: 2024-01-01 | Stop reason: HOSPADM

## 2024-01-01 RX ORDER — GUAIFENESIN 600 MG/1
1200 TABLET, EXTENDED RELEASE ORAL EVERY 12 HOURS SCHEDULED
Status: DISCONTINUED | OUTPATIENT
Start: 2024-01-01 | End: 2024-01-01 | Stop reason: HOSPADM

## 2024-01-01 RX ORDER — METOLAZONE 5 MG/1
10 TABLET ORAL ONCE
Status: COMPLETED | OUTPATIENT
Start: 2024-01-01 | End: 2024-01-01

## 2024-01-01 RX ORDER — BISACODYL 5 MG/1
5 TABLET, DELAYED RELEASE ORAL DAILY PRN
Status: DISCONTINUED | OUTPATIENT
Start: 2024-01-01 | End: 2024-01-01 | Stop reason: HOSPADM

## 2024-01-01 RX ORDER — FUROSEMIDE 10 MG/ML
20 INJECTION INTRAMUSCULAR; INTRAVENOUS 2 TIMES DAILY
Status: DISCONTINUED | OUTPATIENT
Start: 2024-01-01 | End: 2024-01-01

## 2024-01-01 RX ORDER — PRAMIPEXOLE DIHYDROCHLORIDE 0.5 MG/1
0.5 TABLET ORAL NIGHTLY
Status: DISCONTINUED | OUTPATIENT
Start: 2024-01-01 | End: 2024-01-01 | Stop reason: HOSPADM

## 2024-01-01 RX ORDER — ACETAMINOPHEN 160 MG/5ML
650 SOLUTION ORAL EVERY 4 HOURS PRN
Status: DISCONTINUED | OUTPATIENT
Start: 2024-01-01 | End: 2024-01-01

## 2024-01-01 RX ORDER — FUROSEMIDE 10 MG/ML
20 INJECTION INTRAMUSCULAR; INTRAVENOUS
Status: DISCONTINUED | OUTPATIENT
Start: 2024-01-01 | End: 2024-01-01

## 2024-01-01 RX ORDER — METHYLPREDNISOLONE SODIUM SUCCINATE 40 MG/ML
40 INJECTION, POWDER, LYOPHILIZED, FOR SOLUTION INTRAMUSCULAR; INTRAVENOUS EVERY 12 HOURS
Status: DISCONTINUED | OUTPATIENT
Start: 2024-01-01 | End: 2024-01-01

## 2024-01-01 RX ORDER — POTASSIUM CHLORIDE 750 MG/1
40 CAPSULE, EXTENDED RELEASE ORAL ONCE
Qty: 4 CAPSULE | Refills: 0 | Status: COMPLETED | OUTPATIENT
Start: 2024-01-01 | End: 2024-01-01

## 2024-01-01 RX ORDER — LORAZEPAM 0.5 MG/1
0.5 TABLET ORAL
Status: DISCONTINUED | OUTPATIENT
Start: 2024-01-01 | End: 2024-01-01 | Stop reason: HOSPADM

## 2024-01-01 RX ORDER — ACETAMINOPHEN 650 MG/1
650 SUPPOSITORY RECTAL EVERY 4 HOURS PRN
Status: DISCONTINUED | OUTPATIENT
Start: 2024-01-01 | End: 2024-01-01

## 2024-01-01 RX ORDER — IPRATROPIUM BROMIDE AND ALBUTEROL SULFATE 2.5; .5 MG/3ML; MG/3ML
3 SOLUTION RESPIRATORY (INHALATION) EVERY 4 HOURS PRN
Status: DISCONTINUED | OUTPATIENT
Start: 2024-01-01 | End: 2024-01-01 | Stop reason: HOSPADM

## 2024-01-01 RX ORDER — DOBUTAMINE HYDROCHLORIDE 200 MG/100ML
2-20 INJECTION INTRAVENOUS
Status: DISCONTINUED | OUTPATIENT
Start: 2024-01-01 | End: 2024-01-01 | Stop reason: HOSPADM

## 2024-01-01 RX ORDER — HEPARIN SODIUM 5000 [USP'U]/ML
5000 INJECTION, SOLUTION INTRAVENOUS; SUBCUTANEOUS EVERY 8 HOURS SCHEDULED
Status: DISCONTINUED | OUTPATIENT
Start: 2024-01-01 | End: 2024-01-01 | Stop reason: HOSPADM

## 2024-01-01 RX ORDER — OLANZAPINE 2.5 MG/1
2.5 TABLET, FILM COATED ORAL DAILY
Status: DISCONTINUED | OUTPATIENT
Start: 2024-01-01 | End: 2024-01-01 | Stop reason: HOSPADM

## 2024-01-01 RX ORDER — HEPARIN SODIUM 5000 [USP'U]/ML
5000 INJECTION, SOLUTION INTRAVENOUS; SUBCUTANEOUS EVERY 12 HOURS SCHEDULED
COMMUNITY

## 2024-01-01 RX ORDER — METHYLPREDNISOLONE SODIUM SUCCINATE 40 MG/ML
40 INJECTION, POWDER, LYOPHILIZED, FOR SOLUTION INTRAMUSCULAR; INTRAVENOUS EVERY 8 HOURS
Status: DISCONTINUED | OUTPATIENT
Start: 2024-01-01 | End: 2024-01-01

## 2024-01-01 RX ORDER — SODIUM BICARBONATE 650 MG/1
1300 TABLET ORAL 2 TIMES DAILY
COMMUNITY

## 2024-01-01 RX ORDER — METOLAZONE 5 MG/1
5 TABLET ORAL ONCE
Status: COMPLETED | OUTPATIENT
Start: 2024-01-01 | End: 2024-01-01

## 2024-01-01 RX ORDER — DIAZEPAM 5 MG/ML
10 INJECTION, SOLUTION INTRAMUSCULAR; INTRAVENOUS
Status: DISCONTINUED | OUTPATIENT
Start: 2024-01-01 | End: 2024-01-01 | Stop reason: HOSPADM

## 2024-01-01 RX ORDER — MIDODRINE HYDROCHLORIDE 10 MG/1
10 TABLET ORAL EVERY 8 HOURS
Status: DISCONTINUED | OUTPATIENT
Start: 2024-01-01 | End: 2024-01-01 | Stop reason: HOSPADM

## 2024-01-01 RX ORDER — DIPHENOXYLATE HYDROCHLORIDE AND ATROPINE SULFATE 2.5; .025 MG/1; MG/1
1 TABLET ORAL
Status: DISCONTINUED | OUTPATIENT
Start: 2024-01-01 | End: 2024-01-01 | Stop reason: HOSPADM

## 2024-01-01 RX ORDER — SODIUM CHLORIDE 0.9 % (FLUSH) 0.9 %
10 SYRINGE (ML) INJECTION AS NEEDED
Status: DISCONTINUED | OUTPATIENT
Start: 2024-01-01 | End: 2024-01-01

## 2024-01-01 RX ORDER — METHYLPREDNISOLONE SODIUM SUCCINATE 125 MG/2ML
62.5 INJECTION, POWDER, LYOPHILIZED, FOR SOLUTION INTRAMUSCULAR; INTRAVENOUS DAILY
Status: DISCONTINUED | OUTPATIENT
Start: 2024-01-01 | End: 2024-01-01

## 2024-01-01 RX ORDER — FUROSEMIDE 10 MG/ML
60 INJECTION INTRAMUSCULAR; INTRAVENOUS 2 TIMES DAILY
Status: DISCONTINUED | OUTPATIENT
Start: 2024-01-01 | End: 2024-01-01

## 2024-01-01 RX ORDER — AMOXICILLIN 250 MG
2 CAPSULE ORAL 2 TIMES DAILY
Status: DISCONTINUED | OUTPATIENT
Start: 2024-01-01 | End: 2024-01-01 | Stop reason: HOSPADM

## 2024-01-01 RX ORDER — NICOTINE POLACRILEX 4 MG
15 LOZENGE BUCCAL
Status: DISCONTINUED | OUTPATIENT
Start: 2024-01-01 | End: 2024-01-01 | Stop reason: HOSPADM

## 2024-01-01 RX ORDER — ACETAMINOPHEN 160 MG/5ML
650 SOLUTION ORAL EVERY 4 HOURS PRN
Status: DISCONTINUED | OUTPATIENT
Start: 2024-01-01 | End: 2024-01-01 | Stop reason: HOSPADM

## 2024-01-01 RX ORDER — GABAPENTIN 300 MG/1
300 CAPSULE ORAL 3 TIMES DAILY
Status: DISCONTINUED | OUTPATIENT
Start: 2024-01-01 | End: 2024-01-01 | Stop reason: HOSPADM

## 2024-01-01 RX ORDER — NOREPINEPHRINE BITARTRATE 0.03 MG/ML
.02-.3 INJECTION, SOLUTION INTRAVENOUS
Status: DISCONTINUED | OUTPATIENT
Start: 2024-01-01 | End: 2024-01-01 | Stop reason: HOSPADM

## 2024-01-01 RX ORDER — LORAZEPAM 2 MG/ML
2 CONCENTRATE ORAL
Status: DISCONTINUED | OUTPATIENT
Start: 2024-01-01 | End: 2024-01-01 | Stop reason: HOSPADM

## 2024-01-01 RX ORDER — BUDESONIDE 0.5 MG/2ML
0.5 INHALANT ORAL
Status: DISCONTINUED | OUTPATIENT
Start: 2024-01-01 | End: 2024-01-01 | Stop reason: HOSPADM

## 2024-01-01 RX ORDER — ENOXAPARIN SODIUM 100 MG/ML
40 INJECTION SUBCUTANEOUS EVERY 24 HOURS
Status: DISCONTINUED | OUTPATIENT
Start: 2024-01-01 | End: 2024-01-01

## 2024-01-01 RX ORDER — DEXTROSE MONOHYDRATE 25 G/50ML
25 INJECTION, SOLUTION INTRAVENOUS
Status: DISCONTINUED | OUTPATIENT
Start: 2024-01-01 | End: 2024-01-01 | Stop reason: HOSPADM

## 2024-01-01 RX ORDER — PANTOPRAZOLE SODIUM 40 MG/1
40 TABLET, DELAYED RELEASE ORAL DAILY
Status: DISCONTINUED | OUTPATIENT
Start: 2024-01-01 | End: 2024-01-01 | Stop reason: HOSPADM

## 2024-01-01 RX ORDER — POTASSIUM CHLORIDE 750 MG/1
40 CAPSULE, EXTENDED RELEASE ORAL DAILY
Status: DISCONTINUED | OUTPATIENT
Start: 2024-01-01 | End: 2024-01-01

## 2024-01-01 RX ORDER — INSULIN LISPRO 100 [IU]/ML
2-7 INJECTION, SOLUTION INTRAVENOUS; SUBCUTANEOUS
Status: DISCONTINUED | OUTPATIENT
Start: 2024-01-01 | End: 2024-01-01 | Stop reason: HOSPADM

## 2024-01-01 RX ORDER — LORAZEPAM 0.5 MG/1
2 TABLET ORAL
Status: DISCONTINUED | OUTPATIENT
Start: 2024-01-01 | End: 2024-01-01 | Stop reason: HOSPADM

## 2024-01-01 RX ORDER — VANCOMYCIN/0.9 % SOD CHLORIDE 1.5G/250ML
1500 PLASTIC BAG, INJECTION (ML) INTRAVENOUS EVERY 24 HOURS
Status: DISCONTINUED | OUTPATIENT
Start: 2024-01-01 | End: 2024-01-01

## 2024-01-01 RX ORDER — DIAZEPAM 5 MG/ML
2.5 INJECTION, SOLUTION INTRAMUSCULAR; INTRAVENOUS
Status: DISCONTINUED | OUTPATIENT
Start: 2024-01-01 | End: 2024-01-01 | Stop reason: HOSPADM

## 2024-01-01 RX ORDER — CLOPIDOGREL BISULFATE 75 MG/1
75 TABLET ORAL DAILY
Status: DISCONTINUED | OUTPATIENT
Start: 2024-01-01 | End: 2024-01-01 | Stop reason: HOSPADM

## 2024-01-01 RX ORDER — HYDROCODONE BITARTRATE AND ACETAMINOPHEN 7.5; 325 MG/1; MG/1
1 TABLET ORAL EVERY 8 HOURS PRN
Status: DISPENSED | OUTPATIENT
Start: 2024-01-01 | End: 2024-01-01

## 2024-01-01 RX ORDER — BISACODYL 10 MG
10 SUPPOSITORY, RECTAL RECTAL DAILY PRN
Status: DISCONTINUED | OUTPATIENT
Start: 2024-01-01 | End: 2024-01-01 | Stop reason: HOSPADM

## 2024-01-01 RX ORDER — TOPIRAMATE 50 MG/1
50 TABLET, FILM COATED ORAL 2 TIMES DAILY PRN
COMMUNITY

## 2024-01-01 RX ORDER — FUROSEMIDE 10 MG/ML
40 INJECTION INTRAMUSCULAR; INTRAVENOUS
Status: DISCONTINUED | OUTPATIENT
Start: 2024-01-01 | End: 2024-01-01

## 2024-01-01 RX ORDER — FUROSEMIDE 10 MG/ML
40 INJECTION INTRAMUSCULAR; INTRAVENOUS DAILY
Status: DISCONTINUED | OUTPATIENT
Start: 2024-01-01 | End: 2024-01-01 | Stop reason: HOSPADM

## 2024-01-01 RX ORDER — FLUOXETINE HYDROCHLORIDE 20 MG/1
40 CAPSULE ORAL 2 TIMES DAILY
Status: DISCONTINUED | OUTPATIENT
Start: 2024-01-01 | End: 2024-01-01 | Stop reason: HOSPADM

## 2024-01-01 RX ORDER — CALCIUM GLUCONATE 20 MG/ML
1000 INJECTION, SOLUTION INTRAVENOUS ONCE
Status: COMPLETED | OUTPATIENT
Start: 2024-01-01 | End: 2024-01-01

## 2024-01-01 RX ORDER — POTASSIUM CHLORIDE 7.45 MG/ML
10 INJECTION INTRAVENOUS
Qty: 200 ML | Refills: 0 | Status: COMPLETED | OUTPATIENT
Start: 2024-01-01 | End: 2024-01-01

## 2024-01-01 RX ORDER — DIPHENHYDRAMINE HYDROCHLORIDE AND LIDOCAINE HYDROCHLORIDE AND ALUMINUM HYDROXIDE AND MAGNESIUM HYDRO
10 KIT EVERY 6 HOURS
Status: DISPENSED | OUTPATIENT
Start: 2024-01-01 | End: 2024-01-01

## 2024-01-01 RX ORDER — VANCOMYCIN/0.9 % SOD CHLORIDE 1.5G/250ML
1500 PLASTIC BAG, INJECTION (ML) INTRAVENOUS EVERY 24 HOURS
Qty: 6500 ML | Refills: 0 | Status: DISCONTINUED | OUTPATIENT
Start: 2024-01-01 | End: 2024-01-01 | Stop reason: DRUGHIGH

## 2024-01-01 RX ORDER — ASPIRIN 81 MG/1
81 TABLET, CHEWABLE ORAL DAILY
Status: DISCONTINUED | OUTPATIENT
Start: 2024-01-01 | End: 2024-01-01 | Stop reason: HOSPADM

## 2024-01-01 RX ORDER — METHYLPREDNISOLONE SODIUM SUCCINATE 40 MG/ML
40 INJECTION, POWDER, LYOPHILIZED, FOR SOLUTION INTRAMUSCULAR; INTRAVENOUS DAILY
Status: DISCONTINUED | OUTPATIENT
Start: 2024-01-01 | End: 2024-01-01

## 2024-01-01 RX ORDER — ATORVASTATIN CALCIUM 40 MG/1
40 TABLET, FILM COATED ORAL DAILY
Status: DISCONTINUED | OUTPATIENT
Start: 2024-01-01 | End: 2024-01-01 | Stop reason: HOSPADM

## 2024-01-01 RX ORDER — ALPRAZOLAM 0.25 MG/1
0.25 TABLET ORAL EVERY 8 HOURS PRN
Status: ACTIVE | OUTPATIENT
Start: 2024-01-01 | End: 2024-01-01

## 2024-01-01 RX ORDER — ACETAMINOPHEN 325 MG/1
650 TABLET ORAL EVERY 4 HOURS PRN
Status: DISCONTINUED | OUTPATIENT
Start: 2024-01-01 | End: 2024-01-01

## 2024-01-01 RX ORDER — ALUMINA, MAGNESIA, AND SIMETHICONE 2400; 2400; 240 MG/30ML; MG/30ML; MG/30ML
15 SUSPENSION ORAL EVERY 6 HOURS PRN
Status: DISCONTINUED | OUTPATIENT
Start: 2024-01-01 | End: 2024-01-01 | Stop reason: HOSPADM

## 2024-01-01 RX ORDER — ONDANSETRON 2 MG/ML
4 INJECTION INTRAMUSCULAR; INTRAVENOUS EVERY 6 HOURS PRN
Status: DISCONTINUED | OUTPATIENT
Start: 2024-01-01 | End: 2024-01-01 | Stop reason: HOSPADM

## 2024-01-01 RX ORDER — ARFORMOTEROL TARTRATE 15 UG/2ML
15 SOLUTION RESPIRATORY (INHALATION)
Status: DISCONTINUED | OUTPATIENT
Start: 2024-01-01 | End: 2024-01-01 | Stop reason: HOSPADM

## 2024-01-01 RX ORDER — ACETAMINOPHEN 325 MG/1
650 TABLET ORAL EVERY 4 HOURS PRN
Status: DISCONTINUED | OUTPATIENT
Start: 2024-01-01 | End: 2024-01-01 | Stop reason: HOSPADM

## 2024-01-01 RX ORDER — IBUPROFEN 600 MG/1
1 TABLET ORAL
Status: DISCONTINUED | OUTPATIENT
Start: 2024-01-01 | End: 2024-01-01 | Stop reason: HOSPADM

## 2024-01-01 RX ORDER — CARVEDILOL 6.25 MG/1
6.25 TABLET ORAL 2 TIMES DAILY WITH MEALS
COMMUNITY

## 2024-01-01 RX ORDER — LORAZEPAM 0.5 MG/1
1 TABLET ORAL
Status: DISCONTINUED | OUTPATIENT
Start: 2024-01-01 | End: 2024-01-01 | Stop reason: HOSPADM

## 2024-01-01 RX ORDER — SODIUM CHLORIDE FOR INHALATION 3 %
4 VIAL, NEBULIZER (ML) INHALATION
Status: DISCONTINUED | OUTPATIENT
Start: 2024-01-01 | End: 2024-01-01 | Stop reason: HOSPADM

## 2024-01-01 RX ORDER — LORAZEPAM 2 MG/ML
0.5 CONCENTRATE ORAL
Status: DISCONTINUED | OUTPATIENT
Start: 2024-01-01 | End: 2024-01-01 | Stop reason: HOSPADM

## 2024-01-01 RX ORDER — BENZONATATE 100 MG/1
200 CAPSULE ORAL 3 TIMES DAILY PRN
Status: DISCONTINUED | OUTPATIENT
Start: 2024-01-01 | End: 2024-01-01 | Stop reason: HOSPADM

## 2024-01-01 RX ORDER — FUROSEMIDE 10 MG/ML
40 INJECTION INTRAMUSCULAR; INTRAVENOUS 2 TIMES DAILY
Status: DISCONTINUED | OUTPATIENT
Start: 2024-01-01 | End: 2024-01-01

## 2024-01-01 RX ORDER — FUROSEMIDE 10 MG/ML
40 INJECTION INTRAMUSCULAR; INTRAVENOUS ONCE
Status: COMPLETED | OUTPATIENT
Start: 2024-01-01 | End: 2024-01-01

## 2024-01-01 RX ORDER — ACETAMINOPHEN 650 MG/1
650 SUPPOSITORY RECTAL EVERY 4 HOURS PRN
Status: DISCONTINUED | OUTPATIENT
Start: 2024-01-01 | End: 2024-01-01 | Stop reason: HOSPADM

## 2024-01-01 RX ORDER — POLYETHYLENE GLYCOL 3350 17 G/17G
17 POWDER, FOR SOLUTION ORAL DAILY PRN
Status: DISCONTINUED | OUTPATIENT
Start: 2024-01-01 | End: 2024-01-01 | Stop reason: HOSPADM

## 2024-01-01 RX ADMIN — CLOPIDOGREL BISULFATE 75 MG: 75 TABLET, FILM COATED ORAL at 08:42

## 2024-01-01 RX ADMIN — FLUOXETINE HYDROCHLORIDE 40 MG: 20 CAPSULE ORAL at 21:28

## 2024-01-01 RX ADMIN — HYDROCORTISONE 1 APPLICATION: 1 OINTMENT TOPICAL at 20:37

## 2024-01-01 RX ADMIN — POTASSIUM CHLORIDE 40 MEQ: 10 CAPSULE, COATED, EXTENDED RELEASE ORAL at 08:20

## 2024-01-01 RX ADMIN — CLOPIDOGREL BISULFATE 75 MG: 75 TABLET, FILM COATED ORAL at 09:53

## 2024-01-01 RX ADMIN — METHYLPREDNISOLONE SODIUM SUCCINATE 40 MG: 40 INJECTION INTRAMUSCULAR; INTRAVENOUS at 00:46

## 2024-01-01 RX ADMIN — DOCUSATE SODIUM 50MG AND SENNOSIDES 8.6MG 2 TABLET: 8.6; 5 TABLET, FILM COATED ORAL at 21:33

## 2024-01-01 RX ADMIN — DOCUSATE SODIUM 50MG AND SENNOSIDES 8.6MG 2 TABLET: 8.6; 5 TABLET, FILM COATED ORAL at 20:36

## 2024-01-01 RX ADMIN — PANTOPRAZOLE SODIUM 40 MG: 40 TABLET, DELAYED RELEASE ORAL at 08:41

## 2024-01-01 RX ADMIN — OLANZAPINE 2.5 MG: 2.5 TABLET, FILM COATED ORAL at 08:55

## 2024-01-01 RX ADMIN — GABAPENTIN 300 MG: 300 CAPSULE ORAL at 12:55

## 2024-01-01 RX ADMIN — BUDESONIDE 0.5 MG: 0.5 INHALANT ORAL at 20:14

## 2024-01-01 RX ADMIN — DIPHENHYDRAMINE HYDROCHLORIDE AND LIDOCAINE HYDROCHLORIDE AND ALUMINUM HYDROXIDE AND MAGNESIUM HYDRO 10 ML: KIT at 05:34

## 2024-01-01 RX ADMIN — METHYLPREDNISOLONE SODIUM SUCCINATE 40 MG: 40 INJECTION INTRAMUSCULAR; INTRAVENOUS at 20:47

## 2024-01-01 RX ADMIN — MIDODRINE HYDROCHLORIDE 10 MG: 10 TABLET ORAL at 09:43

## 2024-01-01 RX ADMIN — ENOXAPARIN SODIUM 40 MG: 100 INJECTION SUBCUTANEOUS at 20:50

## 2024-01-01 RX ADMIN — POTASSIUM CHLORIDE 40 MEQ: 10 CAPSULE, COATED, EXTENDED RELEASE ORAL at 08:55

## 2024-01-01 RX ADMIN — CLOPIDOGREL BISULFATE 75 MG: 75 TABLET, FILM COATED ORAL at 09:19

## 2024-01-01 RX ADMIN — HYDROCORTISONE 1 APPLICATION: 1 OINTMENT TOPICAL at 20:49

## 2024-01-01 RX ADMIN — OLANZAPINE 2.5 MG: 2.5 TABLET, FILM COATED ORAL at 08:16

## 2024-01-01 RX ADMIN — HYDROCORTISONE 1 APPLICATION: 1 OINTMENT TOPICAL at 12:04

## 2024-01-01 RX ADMIN — BUDESONIDE 0.5 MG: 0.5 INHALANT ORAL at 07:09

## 2024-01-01 RX ADMIN — INSULIN LISPRO 2 UNITS: 100 INJECTION, SOLUTION INTRAVENOUS; SUBCUTANEOUS at 21:33

## 2024-01-01 RX ADMIN — DIPHENHYDRAMINE HYDROCHLORIDE AND LIDOCAINE HYDROCHLORIDE AND ALUMINUM HYDROXIDE AND MAGNESIUM HYDRO 10 ML: KIT at 01:43

## 2024-01-01 RX ADMIN — NOREPINEPHRINE BITARTRATE 3 MCG/KG/MIN: 0.03 INJECTION, SOLUTION INTRAVENOUS at 07:27

## 2024-01-01 RX ADMIN — OLANZAPINE 2.5 MG: 2.5 TABLET, FILM COATED ORAL at 08:21

## 2024-01-01 RX ADMIN — HYDROCORTISONE 1 APPLICATION: 1 OINTMENT TOPICAL at 17:14

## 2024-01-01 RX ADMIN — INSULIN LISPRO 3 UNITS: 100 INJECTION, SOLUTION INTRAVENOUS; SUBCUTANEOUS at 22:26

## 2024-01-01 RX ADMIN — ASPIRIN 81 MG CHEWABLE TABLET 81 MG: 81 TABLET CHEWABLE at 09:15

## 2024-01-01 RX ADMIN — HYDROCORTISONE 1 APPLICATION: 1 OINTMENT TOPICAL at 22:22

## 2024-01-01 RX ADMIN — GUAIFENESIN 1200 MG: 600 TABLET ORAL at 20:46

## 2024-01-01 RX ADMIN — MIDODRINE HYDROCHLORIDE 10 MG: 10 TABLET ORAL at 08:21

## 2024-01-01 RX ADMIN — DIPHENHYDRAMINE HYDROCHLORIDE AND LIDOCAINE HYDROCHLORIDE AND ALUMINUM HYDROXIDE AND MAGNESIUM HYDRO 10 ML: KIT at 11:33

## 2024-01-01 RX ADMIN — METHYLPREDNISOLONE SODIUM SUCCINATE 40 MG: 40 INJECTION INTRAMUSCULAR; INTRAVENOUS at 08:30

## 2024-01-01 RX ADMIN — MICONAZOLE NITRATE 1 APPLICATION: 2 POWDER TOPICAL at 22:00

## 2024-01-01 RX ADMIN — HYDROCORTISONE 1 APPLICATION: 1 OINTMENT TOPICAL at 20:44

## 2024-01-01 RX ADMIN — MICONAZOLE NITRATE 1 APPLICATION: 2 POWDER TOPICAL at 09:32

## 2024-01-01 RX ADMIN — SODIUM CHLORIDE SOLN NEBU 3% 4 ML: 3 NEBU SOLN at 18:21

## 2024-01-01 RX ADMIN — FUROSEMIDE 40 MG: 10 INJECTION, SOLUTION INTRAMUSCULAR; INTRAVENOUS at 09:18

## 2024-01-01 RX ADMIN — MIDODRINE HYDROCHLORIDE 10 MG: 10 TABLET ORAL at 18:45

## 2024-01-01 RX ADMIN — ARFORMOTEROL TARTRATE 15 MCG: 15 SOLUTION RESPIRATORY (INHALATION) at 18:14

## 2024-01-01 RX ADMIN — BUDESONIDE 0.5 MG: 0.5 INHALANT ORAL at 19:21

## 2024-01-01 RX ADMIN — BUDESONIDE 0.5 MG: 0.5 INHALANT ORAL at 08:16

## 2024-01-01 RX ADMIN — ASPIRIN 81 MG CHEWABLE TABLET 81 MG: 81 TABLET CHEWABLE at 09:31

## 2024-01-01 RX ADMIN — SODIUM CHLORIDE SOLN NEBU 3% 4 ML: 3 NEBU SOLN at 05:33

## 2024-01-01 RX ADMIN — HYDROCORTISONE 1 APPLICATION: 1 OINTMENT TOPICAL at 08:21

## 2024-01-01 RX ADMIN — GABAPENTIN 300 MG: 300 CAPSULE ORAL at 08:15

## 2024-01-01 RX ADMIN — BUDESONIDE 0.5 MG: 0.5 INHALANT ORAL at 06:21

## 2024-01-01 RX ADMIN — INSULIN LISPRO 2 UNITS: 100 INJECTION, SOLUTION INTRAVENOUS; SUBCUTANEOUS at 09:18

## 2024-01-01 RX ADMIN — GABAPENTIN 300 MG: 300 CAPSULE ORAL at 08:09

## 2024-01-01 RX ADMIN — MIDODRINE HYDROCHLORIDE 10 MG: 10 TABLET ORAL at 08:16

## 2024-01-01 RX ADMIN — FLUOXETINE HYDROCHLORIDE 40 MG: 20 CAPSULE ORAL at 21:23

## 2024-01-01 RX ADMIN — SODIUM CHLORIDE SOLN NEBU 3% 4 ML: 3 NEBU SOLN at 19:59

## 2024-01-01 RX ADMIN — HYDROCORTISONE 1 APPLICATION: 1 OINTMENT TOPICAL at 09:16

## 2024-01-01 RX ADMIN — GABAPENTIN 300 MG: 300 CAPSULE ORAL at 20:46

## 2024-01-01 RX ADMIN — ARFORMOTEROL TARTRATE 15 MCG: 15 SOLUTION RESPIRATORY (INHALATION) at 06:48

## 2024-01-01 RX ADMIN — OLANZAPINE 2.5 MG: 2.5 TABLET, FILM COATED ORAL at 08:15

## 2024-01-01 RX ADMIN — CLOPIDOGREL BISULFATE 75 MG: 75 TABLET, FILM COATED ORAL at 18:45

## 2024-01-01 RX ADMIN — PRAMIPEXOLE DIHYDROCHLORIDE 0.5 MG: 0.5 TABLET ORAL at 21:23

## 2024-01-01 RX ADMIN — GUAIFENESIN 1200 MG: 600 TABLET ORAL at 09:04

## 2024-01-01 RX ADMIN — INSULIN LISPRO 2 UNITS: 100 INJECTION, SOLUTION INTRAVENOUS; SUBCUTANEOUS at 12:03

## 2024-01-01 RX ADMIN — MIDODRINE HYDROCHLORIDE 10 MG: 10 TABLET ORAL at 02:56

## 2024-01-01 RX ADMIN — POTASSIUM CHLORIDE 10 MEQ: 7.46 INJECTION, SOLUTION INTRAVENOUS at 08:55

## 2024-01-01 RX ADMIN — GABAPENTIN 300 MG: 300 CAPSULE ORAL at 17:41

## 2024-01-01 RX ADMIN — FUROSEMIDE 40 MG: 10 INJECTION, SOLUTION INTRAMUSCULAR; INTRAVENOUS at 17:41

## 2024-01-01 RX ADMIN — METHYLPREDNISOLONE SODIUM SUCCINATE 40 MG: 40 INJECTION INTRAMUSCULAR; INTRAVENOUS at 16:51

## 2024-01-01 RX ADMIN — GABAPENTIN 300 MG: 300 CAPSULE ORAL at 09:18

## 2024-01-01 RX ADMIN — SODIUM CHLORIDE SOLN NEBU 3% 4 ML: 3 NEBU SOLN at 23:26

## 2024-01-01 RX ADMIN — CLOPIDOGREL BISULFATE 75 MG: 75 TABLET, FILM COATED ORAL at 08:22

## 2024-01-01 RX ADMIN — ATORVASTATIN CALCIUM 40 MG: 40 TABLET, FILM COATED ORAL at 08:14

## 2024-01-01 RX ADMIN — GUAIFENESIN 1200 MG: 600 TABLET ORAL at 08:42

## 2024-01-01 RX ADMIN — MIDODRINE HYDROCHLORIDE 10 MG: 10 TABLET ORAL at 16:44

## 2024-01-01 RX ADMIN — GABAPENTIN 300 MG: 300 CAPSULE ORAL at 21:23

## 2024-01-01 RX ADMIN — MIDODRINE HYDROCHLORIDE 10 MG: 10 TABLET ORAL at 17:19

## 2024-01-01 RX ADMIN — ENOXAPARIN SODIUM 40 MG: 100 INJECTION SUBCUTANEOUS at 20:23

## 2024-01-01 RX ADMIN — HYDROCORTISONE 1 APPLICATION: 1 OINTMENT TOPICAL at 11:59

## 2024-01-01 RX ADMIN — MIDODRINE HYDROCHLORIDE 10 MG: 10 TABLET ORAL at 09:49

## 2024-01-01 RX ADMIN — BUDESONIDE 0.5 MG: 0.5 INHALANT ORAL at 07:40

## 2024-01-01 RX ADMIN — GABAPENTIN 300 MG: 300 CAPSULE ORAL at 16:54

## 2024-01-01 RX ADMIN — ARFORMOTEROL TARTRATE 15 MCG: 15 SOLUTION RESPIRATORY (INHALATION) at 19:53

## 2024-01-01 RX ADMIN — GABAPENTIN 300 MG: 300 CAPSULE ORAL at 15:38

## 2024-01-01 RX ADMIN — POTASSIUM CHLORIDE 40 MEQ: 10 CAPSULE, COATED, EXTENDED RELEASE ORAL at 09:53

## 2024-01-01 RX ADMIN — DOCUSATE SODIUM 50MG AND SENNOSIDES 8.6MG 2 TABLET: 8.6; 5 TABLET, FILM COATED ORAL at 08:55

## 2024-01-01 RX ADMIN — GABAPENTIN 300 MG: 300 CAPSULE ORAL at 20:47

## 2024-01-01 RX ADMIN — FLUOXETINE HYDROCHLORIDE 40 MG: 20 CAPSULE ORAL at 20:36

## 2024-01-01 RX ADMIN — GUAIFENESIN 1200 MG: 600 TABLET ORAL at 09:49

## 2024-01-01 RX ADMIN — ASPIRIN 81 MG CHEWABLE TABLET 81 MG: 81 TABLET CHEWABLE at 08:14

## 2024-01-01 RX ADMIN — METHYLPREDNISOLONE SODIUM SUCCINATE 40 MG: 40 INJECTION INTRAMUSCULAR; INTRAVENOUS at 01:00

## 2024-01-01 RX ADMIN — GABAPENTIN 300 MG: 300 CAPSULE ORAL at 09:53

## 2024-01-01 RX ADMIN — DIPHENHYDRAMINE HYDROCHLORIDE AND LIDOCAINE HYDROCHLORIDE AND ALUMINUM HYDROXIDE AND MAGNESIUM HYDRO 10 ML: KIT at 05:18

## 2024-01-01 RX ADMIN — INSULIN LISPRO 3 UNITS: 100 INJECTION, SOLUTION INTRAVENOUS; SUBCUTANEOUS at 20:32

## 2024-01-01 RX ADMIN — PRAMIPEXOLE DIHYDROCHLORIDE 0.5 MG: 0.5 TABLET ORAL at 20:32

## 2024-01-01 RX ADMIN — MIDODRINE HYDROCHLORIDE 10 MG: 10 TABLET ORAL at 01:06

## 2024-01-01 RX ADMIN — INSULIN LISPRO 3 UNITS: 100 INJECTION, SOLUTION INTRAVENOUS; SUBCUTANEOUS at 11:14

## 2024-01-01 RX ADMIN — HYDROCODONE BITARTRATE AND ACETAMINOPHEN 1 TABLET: 7.5; 325 TABLET ORAL at 21:30

## 2024-01-01 RX ADMIN — CLOPIDOGREL BISULFATE 75 MG: 75 TABLET, FILM COATED ORAL at 09:18

## 2024-01-01 RX ADMIN — MICONAZOLE NITRATE 1 APPLICATION: 2 POWDER TOPICAL at 09:04

## 2024-01-01 RX ADMIN — ATORVASTATIN CALCIUM 40 MG: 40 TABLET, FILM COATED ORAL at 09:15

## 2024-01-01 RX ADMIN — MIDODRINE HYDROCHLORIDE 10 MG: 10 TABLET ORAL at 08:54

## 2024-01-01 RX ADMIN — SODIUM CHLORIDE SOLN NEBU 3% 4 ML: 3 NEBU SOLN at 07:09

## 2024-01-01 RX ADMIN — PANTOPRAZOLE SODIUM 40 MG: 40 TABLET, DELAYED RELEASE ORAL at 08:27

## 2024-01-01 RX ADMIN — CLOPIDOGREL BISULFATE 75 MG: 75 TABLET, FILM COATED ORAL at 08:15

## 2024-01-01 RX ADMIN — ARFORMOTEROL TARTRATE 15 MCG: 15 SOLUTION RESPIRATORY (INHALATION) at 07:40

## 2024-01-01 RX ADMIN — METHYLPREDNISOLONE SODIUM SUCCINATE 40 MG: 40 INJECTION INTRAMUSCULAR; INTRAVENOUS at 20:46

## 2024-01-01 RX ADMIN — FUROSEMIDE 20 MG: 10 INJECTION, SOLUTION INTRAMUSCULAR; INTRAVENOUS at 09:16

## 2024-01-01 RX ADMIN — DIPHENHYDRAMINE HYDROCHLORIDE AND LIDOCAINE HYDROCHLORIDE AND ALUMINUM HYDROXIDE AND MAGNESIUM HYDRO 10 ML: KIT at 01:00

## 2024-01-01 RX ADMIN — FUROSEMIDE 40 MG: 10 INJECTION, SOLUTION INTRAMUSCULAR; INTRAVENOUS at 08:16

## 2024-01-01 RX ADMIN — FUROSEMIDE 40 MG: 10 INJECTION, SOLUTION INTRAMUSCULAR; INTRAVENOUS at 08:20

## 2024-01-01 RX ADMIN — GUAIFENESIN 1200 MG: 600 TABLET ORAL at 08:22

## 2024-01-01 RX ADMIN — HYDROCORTISONE 1 APPLICATION: 1 OINTMENT TOPICAL at 21:28

## 2024-01-01 RX ADMIN — HYDROCORTISONE 1 APPLICATION: 1 OINTMENT TOPICAL at 19:22

## 2024-01-01 RX ADMIN — SODIUM CHLORIDE SOLN NEBU 3% 4 ML: 3 NEBU SOLN at 20:14

## 2024-01-01 RX ADMIN — MIDODRINE HYDROCHLORIDE 10 MG: 10 TABLET ORAL at 01:00

## 2024-01-01 RX ADMIN — BUDESONIDE 0.5 MG: 0.5 INHALANT ORAL at 08:02

## 2024-01-01 RX ADMIN — ARFORMOTEROL TARTRATE 15 MCG: 15 SOLUTION RESPIRATORY (INHALATION) at 20:14

## 2024-01-01 RX ADMIN — GABAPENTIN 300 MG: 300 CAPSULE ORAL at 17:13

## 2024-01-01 RX ADMIN — CLOPIDOGREL BISULFATE 75 MG: 75 TABLET, FILM COATED ORAL at 14:46

## 2024-01-01 RX ADMIN — GABAPENTIN 300 MG: 300 CAPSULE ORAL at 09:05

## 2024-01-01 RX ADMIN — MIDODRINE HYDROCHLORIDE 10 MG: 10 TABLET ORAL at 01:53

## 2024-01-01 RX ADMIN — PANTOPRAZOLE SODIUM 40 MG: 40 TABLET, DELAYED RELEASE ORAL at 08:22

## 2024-01-01 RX ADMIN — HYDROCORTISONE 1 APPLICATION: 1 OINTMENT TOPICAL at 11:33

## 2024-01-01 RX ADMIN — OLANZAPINE 2.5 MG: 2.5 TABLET, FILM COATED ORAL at 09:49

## 2024-01-01 RX ADMIN — ASPIRIN 81 MG CHEWABLE TABLET 81 MG: 81 TABLET CHEWABLE at 09:18

## 2024-01-01 RX ADMIN — PRAMIPEXOLE DIHYDROCHLORIDE 0.5 MG: 0.5 TABLET ORAL at 20:37

## 2024-01-01 RX ADMIN — ASPIRIN 81 MG CHEWABLE TABLET 81 MG: 81 TABLET CHEWABLE at 08:09

## 2024-01-01 RX ADMIN — MIDODRINE HYDROCHLORIDE 10 MG: 10 TABLET ORAL at 01:13

## 2024-01-01 RX ADMIN — GABAPENTIN 300 MG: 300 CAPSULE ORAL at 20:32

## 2024-01-01 RX ADMIN — MICONAZOLE NITRATE 1 APPLICATION: 2 POWDER TOPICAL at 09:43

## 2024-01-01 RX ADMIN — METHYLPREDNISOLONE SODIUM SUCCINATE 40 MG: 40 INJECTION INTRAMUSCULAR; INTRAVENOUS at 09:30

## 2024-01-01 RX ADMIN — SODIUM CHLORIDE SOLN NEBU 3% 4 ML: 3 NEBU SOLN at 07:40

## 2024-01-01 RX ADMIN — GUAIFENESIN 1200 MG: 600 TABLET ORAL at 08:15

## 2024-01-01 RX ADMIN — POTASSIUM CHLORIDE 40 MEQ: 10 CAPSULE, COATED, EXTENDED RELEASE ORAL at 09:49

## 2024-01-01 RX ADMIN — HYDROCORTISONE 1 APPLICATION: 1 OINTMENT TOPICAL at 12:07

## 2024-01-01 RX ADMIN — BUDESONIDE 0.5 MG: 0.5 INHALANT ORAL at 06:48

## 2024-01-01 RX ADMIN — INSULIN LISPRO 2 UNITS: 100 INJECTION, SOLUTION INTRAVENOUS; SUBCUTANEOUS at 20:48

## 2024-01-01 RX ADMIN — HYDROCORTISONE 1 APPLICATION: 1 OINTMENT TOPICAL at 17:50

## 2024-01-01 RX ADMIN — FUROSEMIDE 40 MG: 10 INJECTION, SOLUTION INTRAMUSCULAR; INTRAVENOUS at 11:00

## 2024-01-01 RX ADMIN — HEPARIN SODIUM 5000 UNITS: 5000 INJECTION INTRAVENOUS; SUBCUTANEOUS at 22:04

## 2024-01-01 RX ADMIN — GABAPENTIN 300 MG: 300 CAPSULE ORAL at 08:42

## 2024-01-01 RX ADMIN — HEPARIN SODIUM 5000 UNITS: 5000 INJECTION INTRAVENOUS; SUBCUTANEOUS at 05:01

## 2024-01-01 RX ADMIN — ENOXAPARIN SODIUM 40 MG: 100 INJECTION SUBCUTANEOUS at 20:46

## 2024-01-01 RX ADMIN — SODIUM CHLORIDE SOLN NEBU 3% 4 ML: 3 NEBU SOLN at 19:53

## 2024-01-01 RX ADMIN — ATORVASTATIN CALCIUM 40 MG: 40 TABLET, FILM COATED ORAL at 09:31

## 2024-01-01 RX ADMIN — GABAPENTIN 300 MG: 300 CAPSULE ORAL at 20:37

## 2024-01-01 RX ADMIN — HYDROCORTISONE 1 APPLICATION: 1 OINTMENT TOPICAL at 17:44

## 2024-01-01 RX ADMIN — MIDODRINE HYDROCHLORIDE 10 MG: 10 TABLET ORAL at 01:23

## 2024-01-01 RX ADMIN — AZITHROMYCIN 500 MG: 500 INJECTION, POWDER, LYOPHILIZED, FOR SOLUTION INTRAVENOUS at 13:35

## 2024-01-01 RX ADMIN — GABAPENTIN 300 MG: 300 CAPSULE ORAL at 21:33

## 2024-01-01 RX ADMIN — METHYLPREDNISOLONE SODIUM SUCCINATE 40 MG: 40 INJECTION INTRAMUSCULAR; INTRAVENOUS at 01:43

## 2024-01-01 RX ADMIN — CLOPIDOGREL BISULFATE 75 MG: 75 TABLET, FILM COATED ORAL at 08:55

## 2024-01-01 RX ADMIN — DOCUSATE SODIUM 50MG AND SENNOSIDES 8.6MG 2 TABLET: 8.6; 5 TABLET, FILM COATED ORAL at 20:44

## 2024-01-01 RX ADMIN — IPRATROPIUM BROMIDE AND ALBUTEROL SULFATE 3 ML: .5; 3 SOLUTION RESPIRATORY (INHALATION) at 07:31

## 2024-01-01 RX ADMIN — METHYLPREDNISOLONE SODIUM SUCCINATE 40 MG: 40 INJECTION INTRAMUSCULAR; INTRAVENOUS at 09:16

## 2024-01-01 RX ADMIN — PANTOPRAZOLE SODIUM 40 MG: 40 TABLET, DELAYED RELEASE ORAL at 09:18

## 2024-01-01 RX ADMIN — HYDROCORTISONE 1 APPLICATION: 1 OINTMENT TOPICAL at 09:07

## 2024-01-01 RX ADMIN — BUDESONIDE 0.5 MG: 0.5 INHALANT ORAL at 19:52

## 2024-01-01 RX ADMIN — METHYLPREDNISOLONE SODIUM SUCCINATE 40 MG: 40 INJECTION INTRAMUSCULAR; INTRAVENOUS at 16:54

## 2024-01-01 RX ADMIN — FLUOXETINE HYDROCHLORIDE 40 MG: 20 CAPSULE ORAL at 09:43

## 2024-01-01 RX ADMIN — GABAPENTIN 300 MG: 300 CAPSULE ORAL at 16:29

## 2024-01-01 RX ADMIN — MIDODRINE HYDROCHLORIDE 10 MG: 10 TABLET ORAL at 00:31

## 2024-01-01 RX ADMIN — FUROSEMIDE 40 MG: 10 INJECTION, SOLUTION INTRAMUSCULAR; INTRAVENOUS at 08:08

## 2024-01-01 RX ADMIN — ARFORMOTEROL TARTRATE 15 MCG: 15 SOLUTION RESPIRATORY (INHALATION) at 19:52

## 2024-01-01 RX ADMIN — MICONAZOLE NITRATE 1 APPLICATION: 2 POWDER TOPICAL at 09:16

## 2024-01-01 RX ADMIN — INSULIN LISPRO 3 UNITS: 100 INJECTION, SOLUTION INTRAVENOUS; SUBCUTANEOUS at 12:19

## 2024-01-01 RX ADMIN — OLANZAPINE 2.5 MG: 2.5 TABLET, FILM COATED ORAL at 08:41

## 2024-01-01 RX ADMIN — ARFORMOTEROL TARTRATE 15 MCG: 15 SOLUTION RESPIRATORY (INHALATION) at 07:09

## 2024-01-01 RX ADMIN — METHYLPREDNISOLONE SODIUM SUCCINATE 62.5 MG: 125 INJECTION INTRAMUSCULAR; INTRAVENOUS at 09:18

## 2024-01-01 RX ADMIN — ENOXAPARIN SODIUM 40 MG: 100 INJECTION SUBCUTANEOUS at 18:47

## 2024-01-01 RX ADMIN — BUDESONIDE 0.5 MG: 0.5 INHALANT ORAL at 05:35

## 2024-01-01 RX ADMIN — HYDROCORTISONE 1 APPLICATION: 1 OINTMENT TOPICAL at 17:28

## 2024-01-01 RX ADMIN — HYDROCORTISONE 1 APPLICATION: 1 OINTMENT TOPICAL at 20:23

## 2024-01-01 RX ADMIN — ATORVASTATIN CALCIUM 40 MG: 40 TABLET, FILM COATED ORAL at 08:22

## 2024-01-01 RX ADMIN — HEPARIN SODIUM 5000 UNITS: 5000 INJECTION INTRAVENOUS; SUBCUTANEOUS at 14:38

## 2024-01-01 RX ADMIN — HYDROCORTISONE 1 APPLICATION: 1 OINTMENT TOPICAL at 21:24

## 2024-01-01 RX ADMIN — METHYLPREDNISOLONE SODIUM SUCCINATE 62.5 MG: 125 INJECTION INTRAMUSCULAR; INTRAVENOUS at 18:46

## 2024-01-01 RX ADMIN — ARFORMOTEROL TARTRATE 15 MCG: 15 SOLUTION RESPIRATORY (INHALATION) at 06:28

## 2024-01-01 RX ADMIN — METHYLPREDNISOLONE SODIUM SUCCINATE 40 MG: 40 INJECTION INTRAMUSCULAR; INTRAVENOUS at 09:43

## 2024-01-01 RX ADMIN — GABAPENTIN 300 MG: 300 CAPSULE ORAL at 09:30

## 2024-01-01 RX ADMIN — GUAIFENESIN 1200 MG: 600 TABLET ORAL at 08:21

## 2024-01-01 RX ADMIN — SODIUM CHLORIDE SOLN NEBU 3% 4 ML: 3 NEBU SOLN at 07:48

## 2024-01-01 RX ADMIN — INSULIN LISPRO 2 UNITS: 100 INJECTION, SOLUTION INTRAVENOUS; SUBCUTANEOUS at 17:19

## 2024-01-01 RX ADMIN — BUDESONIDE 0.5 MG: 0.5 INHALANT ORAL at 19:53

## 2024-01-01 RX ADMIN — ATORVASTATIN CALCIUM 40 MG: 40 TABLET, FILM COATED ORAL at 18:45

## 2024-01-01 RX ADMIN — GUAIFENESIN 1200 MG: 600 TABLET ORAL at 08:55

## 2024-01-01 RX ADMIN — PRAMIPEXOLE DIHYDROCHLORIDE 0.5 MG: 0.5 TABLET ORAL at 20:51

## 2024-01-01 RX ADMIN — GUAIFENESIN 1200 MG: 600 TABLET ORAL at 09:53

## 2024-01-01 RX ADMIN — MIDODRINE HYDROCHLORIDE 10 MG: 10 TABLET ORAL at 17:14

## 2024-01-01 RX ADMIN — HEPARIN SODIUM 5000 UNITS: 5000 INJECTION INTRAVENOUS; SUBCUTANEOUS at 13:11

## 2024-01-01 RX ADMIN — GUAIFENESIN 1200 MG: 600 TABLET ORAL at 23:04

## 2024-01-01 RX ADMIN — MIDODRINE HYDROCHLORIDE 10 MG: 10 TABLET ORAL at 08:41

## 2024-01-01 RX ADMIN — PANTOPRAZOLE SODIUM 40 MG: 40 TABLET, DELAYED RELEASE ORAL at 08:15

## 2024-01-01 RX ADMIN — METOLAZONE 5 MG: 5 TABLET ORAL at 08:14

## 2024-01-01 RX ADMIN — ASPIRIN 81 MG CHEWABLE TABLET 81 MG: 81 TABLET CHEWABLE at 18:45

## 2024-01-01 RX ADMIN — FLUOXETINE HYDROCHLORIDE 40 MG: 20 CAPSULE ORAL at 08:09

## 2024-01-01 RX ADMIN — SODIUM CHLORIDE SOLN NEBU 3% 4 ML: 3 NEBU SOLN at 06:26

## 2024-01-01 RX ADMIN — GABAPENTIN 300 MG: 300 CAPSULE ORAL at 16:50

## 2024-01-01 RX ADMIN — METHYLPREDNISOLONE SODIUM SUCCINATE 40 MG: 40 INJECTION INTRAMUSCULAR; INTRAVENOUS at 08:08

## 2024-01-01 RX ADMIN — GUAIFENESIN 1200 MG: 600 TABLET ORAL at 21:32

## 2024-01-01 RX ADMIN — OLANZAPINE 2.5 MG: 2.5 TABLET, FILM COATED ORAL at 10:08

## 2024-01-01 RX ADMIN — MIDODRINE HYDROCHLORIDE 10 MG: 10 TABLET ORAL at 02:37

## 2024-01-01 RX ADMIN — MIDODRINE HYDROCHLORIDE 10 MG: 10 TABLET ORAL at 02:23

## 2024-01-01 RX ADMIN — MICONAZOLE NITRATE 1 APPLICATION: 2 POWDER TOPICAL at 22:23

## 2024-01-01 RX ADMIN — HYDROCORTISONE 1 APPLICATION: 1 OINTMENT TOPICAL at 21:29

## 2024-01-01 RX ADMIN — HEPARIN SODIUM 5000 UNITS: 5000 INJECTION INTRAVENOUS; SUBCUTANEOUS at 05:00

## 2024-01-01 RX ADMIN — FUROSEMIDE 40 MG: 10 INJECTION, SOLUTION INTRAMUSCULAR; INTRAVENOUS at 17:23

## 2024-01-01 RX ADMIN — INSULIN LISPRO 2 UNITS: 100 INJECTION, SOLUTION INTRAVENOUS; SUBCUTANEOUS at 12:37

## 2024-01-01 RX ADMIN — FLUOXETINE HYDROCHLORIDE 40 MG: 20 CAPSULE ORAL at 20:32

## 2024-01-01 RX ADMIN — GABAPENTIN 300 MG: 300 CAPSULE ORAL at 22:21

## 2024-01-01 RX ADMIN — INSULIN LISPRO 2 UNITS: 100 INJECTION, SOLUTION INTRAVENOUS; SUBCUTANEOUS at 21:08

## 2024-01-01 RX ADMIN — PANTOPRAZOLE SODIUM 40 MG: 40 TABLET, DELAYED RELEASE ORAL at 08:55

## 2024-01-01 RX ADMIN — MICONAZOLE NITRATE 1 APPLICATION: 2 POWDER TOPICAL at 09:27

## 2024-01-01 RX ADMIN — POLYETHYLENE GLYCOL 3350 17 G: 17 POWDER, FOR SOLUTION ORAL at 20:47

## 2024-01-01 RX ADMIN — HYDROCORTISONE 1 APPLICATION: 1 OINTMENT TOPICAL at 20:47

## 2024-01-01 RX ADMIN — GABAPENTIN 300 MG: 300 CAPSULE ORAL at 20:36

## 2024-01-01 RX ADMIN — DIPHENHYDRAMINE HYDROCHLORIDE AND LIDOCAINE HYDROCHLORIDE AND ALUMINUM HYDROXIDE AND MAGNESIUM HYDRO 10 ML: KIT at 17:24

## 2024-01-01 RX ADMIN — INSULIN LISPRO 2 UNITS: 100 INJECTION, SOLUTION INTRAVENOUS; SUBCUTANEOUS at 09:04

## 2024-01-01 RX ADMIN — INSULIN LISPRO 2 UNITS: 100 INJECTION, SOLUTION INTRAVENOUS; SUBCUTANEOUS at 16:44

## 2024-01-01 RX ADMIN — PANTOPRAZOLE SODIUM 40 MG: 40 TABLET, DELAYED RELEASE ORAL at 08:09

## 2024-01-01 RX ADMIN — BUDESONIDE 0.5 MG: 0.5 INHALANT ORAL at 19:59

## 2024-01-01 RX ADMIN — Medication 50 MEQ: at 07:30

## 2024-01-01 RX ADMIN — ASPIRIN 81 MG CHEWABLE TABLET 81 MG: 81 TABLET CHEWABLE at 08:21

## 2024-01-01 RX ADMIN — HEPARIN SODIUM 5000 UNITS: 5000 INJECTION INTRAVENOUS; SUBCUTANEOUS at 21:32

## 2024-01-01 RX ADMIN — INSULIN LISPRO 2 UNITS: 100 INJECTION, SOLUTION INTRAVENOUS; SUBCUTANEOUS at 16:52

## 2024-01-01 RX ADMIN — CLOPIDOGREL BISULFATE 75 MG: 75 TABLET, FILM COATED ORAL at 08:21

## 2024-01-01 RX ADMIN — HYDROCORTISONE 1 APPLICATION: 1 OINTMENT TOPICAL at 12:21

## 2024-01-01 RX ADMIN — HYDROCORTISONE 1 APPLICATION: 1 OINTMENT TOPICAL at 17:42

## 2024-01-01 RX ADMIN — ARFORMOTEROL TARTRATE 15 MCG: 15 SOLUTION RESPIRATORY (INHALATION) at 19:59

## 2024-01-01 RX ADMIN — ARFORMOTEROL TARTRATE 15 MCG: 15 SOLUTION RESPIRATORY (INHALATION) at 08:02

## 2024-01-01 RX ADMIN — ARFORMOTEROL TARTRATE 15 MCG: 15 SOLUTION RESPIRATORY (INHALATION) at 18:51

## 2024-01-01 RX ADMIN — HEPARIN SODIUM 5000 UNITS: 5000 INJECTION INTRAVENOUS; SUBCUTANEOUS at 06:17

## 2024-01-01 RX ADMIN — ARFORMOTEROL TARTRATE 15 MCG: 15 SOLUTION RESPIRATORY (INHALATION) at 19:25

## 2024-01-01 RX ADMIN — BUDESONIDE 0.5 MG: 0.5 INHALANT ORAL at 19:47

## 2024-01-01 RX ADMIN — IPRATROPIUM BROMIDE AND ALBUTEROL SULFATE 3 ML: .5; 3 SOLUTION RESPIRATORY (INHALATION) at 06:26

## 2024-01-01 RX ADMIN — ENOXAPARIN SODIUM 40 MG: 100 INJECTION SUBCUTANEOUS at 20:43

## 2024-01-01 RX ADMIN — ACETAMINOPHEN 650 MG: 325 TABLET ORAL at 17:19

## 2024-01-01 RX ADMIN — FLUOXETINE HYDROCHLORIDE 40 MG: 20 CAPSULE ORAL at 21:33

## 2024-01-01 RX ADMIN — FUROSEMIDE 40 MG: 10 INJECTION, SOLUTION INTRAMUSCULAR; INTRAVENOUS at 08:21

## 2024-01-01 RX ADMIN — INSULIN LISPRO 2 UNITS: 100 INJECTION, SOLUTION INTRAVENOUS; SUBCUTANEOUS at 21:28

## 2024-01-01 RX ADMIN — SODIUM CHLORIDE SOLN NEBU 3% 4 ML: 3 NEBU SOLN at 18:51

## 2024-01-01 RX ADMIN — OLANZAPINE 2.5 MG: 2.5 TABLET, FILM COATED ORAL at 09:53

## 2024-01-01 RX ADMIN — MIDODRINE HYDROCHLORIDE 10 MG: 10 TABLET ORAL at 17:20

## 2024-01-01 RX ADMIN — PRAMIPEXOLE DIHYDROCHLORIDE 0.5 MG: 0.5 TABLET ORAL at 22:20

## 2024-01-01 RX ADMIN — INSULIN LISPRO 3 UNITS: 100 INJECTION, SOLUTION INTRAVENOUS; SUBCUTANEOUS at 21:34

## 2024-01-01 RX ADMIN — DOCUSATE SODIUM 50MG AND SENNOSIDES 8.6MG 2 TABLET: 8.6; 5 TABLET, FILM COATED ORAL at 09:49

## 2024-01-01 RX ADMIN — FUROSEMIDE 40 MG: 10 INJECTION, SOLUTION INTRAMUSCULAR; INTRAVENOUS at 21:29

## 2024-01-01 RX ADMIN — ASPIRIN 81 MG CHEWABLE TABLET 81 MG: 81 TABLET CHEWABLE at 09:04

## 2024-01-01 RX ADMIN — GUAIFENESIN 1200 MG: 600 TABLET ORAL at 20:51

## 2024-01-01 RX ADMIN — HYDROCORTISONE 1 APPLICATION: 1 OINTMENT TOPICAL at 11:32

## 2024-01-01 RX ADMIN — MIDODRINE HYDROCHLORIDE 10 MG: 10 TABLET ORAL at 17:50

## 2024-01-01 RX ADMIN — ATORVASTATIN CALCIUM 40 MG: 40 TABLET, FILM COATED ORAL at 08:21

## 2024-01-01 RX ADMIN — BUDESONIDE 0.5 MG: 0.5 INHALANT ORAL at 23:26

## 2024-01-01 RX ADMIN — HYDROCORTISONE 1 APPLICATION: 1 OINTMENT TOPICAL at 21:33

## 2024-01-01 RX ADMIN — BUDESONIDE 0.5 MG: 0.5 INHALANT ORAL at 20:23

## 2024-01-01 RX ADMIN — ASPIRIN 81 MG CHEWABLE TABLET 81 MG: 81 TABLET CHEWABLE at 08:42

## 2024-01-01 RX ADMIN — METHYLPREDNISOLONE SODIUM SUCCINATE 40 MG: 40 INJECTION INTRAMUSCULAR; INTRAVENOUS at 17:10

## 2024-01-01 RX ADMIN — INSULIN LISPRO 2 UNITS: 100 INJECTION, SOLUTION INTRAVENOUS; SUBCUTANEOUS at 11:59

## 2024-01-01 RX ADMIN — SODIUM CHLORIDE SOLN NEBU 3% 4 ML: 3 NEBU SOLN at 07:46

## 2024-01-01 RX ADMIN — ATORVASTATIN CALCIUM 40 MG: 40 TABLET, FILM COATED ORAL at 08:08

## 2024-01-01 RX ADMIN — INSULIN LISPRO 2 UNITS: 100 INJECTION, SOLUTION INTRAVENOUS; SUBCUTANEOUS at 17:09

## 2024-01-01 RX ADMIN — MIDODRINE HYDROCHLORIDE 10 MG: 10 TABLET ORAL at 00:42

## 2024-01-01 RX ADMIN — METOLAZONE 5 MG: 5 TABLET ORAL at 09:15

## 2024-01-01 RX ADMIN — SODIUM CHLORIDE, POTASSIUM CHLORIDE, SODIUM LACTATE AND CALCIUM CHLORIDE 500 ML: 600; 310; 30; 20 INJECTION, SOLUTION INTRAVENOUS at 09:44

## 2024-01-01 RX ADMIN — METHYLPREDNISOLONE SODIUM SUCCINATE 40 MG: 40 INJECTION INTRAMUSCULAR; INTRAVENOUS at 08:55

## 2024-01-01 RX ADMIN — ATORVASTATIN CALCIUM 40 MG: 40 TABLET, FILM COATED ORAL at 08:41

## 2024-01-01 RX ADMIN — INSULIN LISPRO 3 UNITS: 100 INJECTION, SOLUTION INTRAVENOUS; SUBCUTANEOUS at 17:28

## 2024-01-01 RX ADMIN — CLOPIDOGREL BISULFATE 75 MG: 75 TABLET, FILM COATED ORAL at 09:05

## 2024-01-01 RX ADMIN — INSULIN LISPRO 3 UNITS: 100 INJECTION, SOLUTION INTRAVENOUS; SUBCUTANEOUS at 20:35

## 2024-01-01 RX ADMIN — PANTOPRAZOLE SODIUM 40 MG: 40 TABLET, DELAYED RELEASE ORAL at 09:16

## 2024-01-01 RX ADMIN — DOCUSATE SODIUM 50MG AND SENNOSIDES 8.6MG 2 TABLET: 8.6; 5 TABLET, FILM COATED ORAL at 20:47

## 2024-01-01 RX ADMIN — MICONAZOLE NITRATE 1 APPLICATION: 2 POWDER TOPICAL at 09:07

## 2024-01-01 RX ADMIN — METHYLPREDNISOLONE SODIUM SUCCINATE 40 MG: 40 INJECTION INTRAMUSCULAR; INTRAVENOUS at 08:21

## 2024-01-01 RX ADMIN — ARFORMOTEROL TARTRATE 15 MCG: 15 SOLUTION RESPIRATORY (INHALATION) at 07:31

## 2024-01-01 RX ADMIN — FLUOXETINE HYDROCHLORIDE 40 MG: 20 CAPSULE ORAL at 08:22

## 2024-01-01 RX ADMIN — GABAPENTIN 300 MG: 300 CAPSULE ORAL at 20:23

## 2024-01-01 RX ADMIN — FLUOXETINE HYDROCHLORIDE 40 MG: 20 CAPSULE ORAL at 12:55

## 2024-01-01 RX ADMIN — HYDROCORTISONE 1 APPLICATION: 1 OINTMENT TOPICAL at 12:37

## 2024-01-01 RX ADMIN — ARFORMOTEROL TARTRATE 15 MCG: 15 SOLUTION RESPIRATORY (INHALATION) at 07:46

## 2024-01-01 RX ADMIN — METHYLPREDNISOLONE SODIUM SUCCINATE 40 MG: 40 INJECTION INTRAMUSCULAR; INTRAVENOUS at 01:53

## 2024-01-01 RX ADMIN — INSULIN LISPRO 4 UNITS: 100 INJECTION, SOLUTION INTRAVENOUS; SUBCUTANEOUS at 17:41

## 2024-01-01 RX ADMIN — FLUOXETINE HYDROCHLORIDE 40 MG: 20 CAPSULE ORAL at 20:44

## 2024-01-01 RX ADMIN — SODIUM CHLORIDE SOLN NEBU 3% 4 ML: 3 NEBU SOLN at 18:14

## 2024-01-01 RX ADMIN — MIDODRINE HYDROCHLORIDE 10 MG: 10 TABLET ORAL at 08:15

## 2024-01-01 RX ADMIN — BUDESONIDE 0.5 MG: 0.5 INHALANT ORAL at 19:09

## 2024-01-01 RX ADMIN — DOCUSATE SODIUM 50MG AND SENNOSIDES 8.6MG 2 TABLET: 8.6; 5 TABLET, FILM COATED ORAL at 20:37

## 2024-01-01 RX ADMIN — FUROSEMIDE 40 MG: 10 INJECTION, SOLUTION INTRAMUSCULAR; INTRAVENOUS at 08:39

## 2024-01-01 RX ADMIN — PRAMIPEXOLE DIHYDROCHLORIDE 0.5 MG: 0.5 TABLET ORAL at 21:03

## 2024-01-01 RX ADMIN — ARFORMOTEROL TARTRATE 15 MCG: 15 SOLUTION RESPIRATORY (INHALATION) at 07:48

## 2024-01-01 RX ADMIN — GABAPENTIN 300 MG: 300 CAPSULE ORAL at 08:54

## 2024-01-01 RX ADMIN — DOCUSATE SODIUM 50MG AND SENNOSIDES 8.6MG 2 TABLET: 8.6; 5 TABLET, FILM COATED ORAL at 21:23

## 2024-01-01 RX ADMIN — GUAIFENESIN 1200 MG: 600 TABLET ORAL at 20:36

## 2024-01-01 RX ADMIN — FUROSEMIDE 40 MG: 10 INJECTION, SOLUTION INTRAMUSCULAR; INTRAVENOUS at 11:33

## 2024-01-01 RX ADMIN — POTASSIUM CHLORIDE 40 MEQ: 10 CAPSULE, COATED, EXTENDED RELEASE ORAL at 09:43

## 2024-01-01 RX ADMIN — MICONAZOLE NITRATE 1 APPLICATION: 2 POWDER TOPICAL at 20:49

## 2024-01-01 RX ADMIN — SODIUM CHLORIDE SOLN NEBU 3% 4 ML: 3 NEBU SOLN at 19:09

## 2024-01-01 RX ADMIN — PANTOPRAZOLE SODIUM 40 MG: 40 TABLET, DELAYED RELEASE ORAL at 09:05

## 2024-01-01 RX ADMIN — PRAMIPEXOLE DIHYDROCHLORIDE 0.5 MG: 0.5 TABLET ORAL at 20:36

## 2024-01-01 RX ADMIN — PRAMIPEXOLE DIHYDROCHLORIDE 0.5 MG: 0.5 TABLET ORAL at 21:28

## 2024-01-01 RX ADMIN — MIDODRINE HYDROCHLORIDE 10 MG: 10 TABLET ORAL at 16:51

## 2024-01-01 RX ADMIN — GABAPENTIN 300 MG: 300 CAPSULE ORAL at 21:03

## 2024-01-01 RX ADMIN — METHYLPREDNISOLONE SODIUM SUCCINATE 40 MG: 40 INJECTION INTRAMUSCULAR; INTRAVENOUS at 08:31

## 2024-01-01 RX ADMIN — GABAPENTIN 300 MG: 300 CAPSULE ORAL at 21:28

## 2024-01-01 RX ADMIN — FLUOXETINE HYDROCHLORIDE 40 MG: 20 CAPSULE ORAL at 20:23

## 2024-01-01 RX ADMIN — HYDROCORTISONE 1 APPLICATION: 1 OINTMENT TOPICAL at 08:20

## 2024-01-01 RX ADMIN — FLUOXETINE HYDROCHLORIDE 40 MG: 20 CAPSULE ORAL at 20:46

## 2024-01-01 RX ADMIN — CALCIUM GLUCONATE 1000 MG: 20 INJECTION, SOLUTION INTRAVENOUS at 09:18

## 2024-01-01 RX ADMIN — PRAMIPEXOLE DIHYDROCHLORIDE 0.5 MG: 0.5 TABLET ORAL at 20:48

## 2024-01-01 RX ADMIN — INSULIN LISPRO 2 UNITS: 100 INJECTION, SOLUTION INTRAVENOUS; SUBCUTANEOUS at 08:54

## 2024-01-01 RX ADMIN — ATORVASTATIN CALCIUM 40 MG: 40 TABLET, FILM COATED ORAL at 09:05

## 2024-01-01 RX ADMIN — FLUOXETINE HYDROCHLORIDE 40 MG: 20 CAPSULE ORAL at 22:20

## 2024-01-01 RX ADMIN — SODIUM CHLORIDE SOLN NEBU 3% 4 ML: 3 NEBU SOLN at 06:18

## 2024-01-01 RX ADMIN — FUROSEMIDE 40 MG: 10 INJECTION, SOLUTION INTRAMUSCULAR; INTRAVENOUS at 21:28

## 2024-01-01 RX ADMIN — FLUOXETINE HYDROCHLORIDE 40 MG: 20 CAPSULE ORAL at 20:51

## 2024-01-01 RX ADMIN — MICONAZOLE NITRATE 1 APPLICATION: 2 POWDER TOPICAL at 21:04

## 2024-01-01 RX ADMIN — FLUOXETINE HYDROCHLORIDE 40 MG: 20 CAPSULE ORAL at 08:16

## 2024-01-01 RX ADMIN — FLUOXETINE HYDROCHLORIDE 40 MG: 20 CAPSULE ORAL at 20:37

## 2024-01-01 RX ADMIN — MIDODRINE HYDROCHLORIDE 10 MG: 10 TABLET ORAL at 18:02

## 2024-01-01 RX ADMIN — DOCUSATE SODIUM 50MG AND SENNOSIDES 8.6MG 2 TABLET: 8.6; 5 TABLET, FILM COATED ORAL at 20:51

## 2024-01-01 RX ADMIN — POTASSIUM CHLORIDE 40 MEQ: 10 CAPSULE, COATED, EXTENDED RELEASE ORAL at 09:18

## 2024-01-01 RX ADMIN — GUAIFENESIN 1200 MG: 600 TABLET ORAL at 20:47

## 2024-01-01 RX ADMIN — AMPICILLIN AND SULBACTAM 3 G: 2; 1 INJECTION, POWDER, FOR SOLUTION INTRAVENOUS at 14:43

## 2024-01-01 RX ADMIN — CEFEPIME 2000 MG: 2 INJECTION, POWDER, FOR SOLUTION INTRAVENOUS at 18:46

## 2024-01-01 RX ADMIN — MIDODRINE HYDROCHLORIDE 10 MG: 10 TABLET ORAL at 09:16

## 2024-01-01 RX ADMIN — GUAIFENESIN 1200 MG: 600 TABLET ORAL at 21:03

## 2024-01-01 RX ADMIN — MICONAZOLE NITRATE 1 APPLICATION: 2 POWDER TOPICAL at 11:59

## 2024-01-01 RX ADMIN — DOCUSATE SODIUM 50MG AND SENNOSIDES 8.6MG 2 TABLET: 8.6; 5 TABLET, FILM COATED ORAL at 08:20

## 2024-01-01 RX ADMIN — HEPARIN SODIUM 5000 UNITS: 5000 INJECTION INTRAVENOUS; SUBCUTANEOUS at 13:10

## 2024-01-01 RX ADMIN — ASPIRIN 81 MG CHEWABLE TABLET 81 MG: 81 TABLET CHEWABLE at 08:55

## 2024-01-01 RX ADMIN — GUAIFENESIN 1200 MG: 600 TABLET ORAL at 08:09

## 2024-01-01 RX ADMIN — DOCUSATE SODIUM 50MG AND SENNOSIDES 8.6MG 2 TABLET: 8.6; 5 TABLET, FILM COATED ORAL at 20:23

## 2024-01-01 RX ADMIN — FUROSEMIDE 40 MG: 10 INJECTION, SOLUTION INTRAMUSCULAR; INTRAVENOUS at 09:06

## 2024-01-01 RX ADMIN — DOCUSATE SODIUM 50MG AND SENNOSIDES 8.6MG 2 TABLET: 8.6; 5 TABLET, FILM COATED ORAL at 09:18

## 2024-01-01 RX ADMIN — INSULIN LISPRO 3 UNITS: 100 INJECTION, SOLUTION INTRAVENOUS; SUBCUTANEOUS at 20:44

## 2024-01-01 RX ADMIN — INSULIN LISPRO 2 UNITS: 100 INJECTION, SOLUTION INTRAVENOUS; SUBCUTANEOUS at 21:23

## 2024-01-01 RX ADMIN — ARFORMOTEROL TARTRATE 15 MCG: 15 SOLUTION RESPIRATORY (INHALATION) at 19:47

## 2024-01-01 RX ADMIN — SODIUM CHLORIDE SOLN NEBU 3% 4 ML: 3 NEBU SOLN at 09:24

## 2024-01-01 RX ADMIN — METHYLPREDNISOLONE SODIUM SUCCINATE 40 MG: 40 INJECTION INTRAMUSCULAR; INTRAVENOUS at 09:06

## 2024-01-01 RX ADMIN — BUDESONIDE 0.5 MG: 0.5 INHALANT ORAL at 19:25

## 2024-01-01 RX ADMIN — MICONAZOLE NITRATE 1 APPLICATION: 2 POWDER TOPICAL at 21:11

## 2024-01-01 RX ADMIN — GUAIFENESIN 1200 MG: 600 TABLET ORAL at 09:15

## 2024-01-01 RX ADMIN — MIDODRINE HYDROCHLORIDE 10 MG: 10 TABLET ORAL at 02:31

## 2024-01-01 RX ADMIN — FUROSEMIDE 40 MG: 10 INJECTION, SOLUTION INTRAMUSCULAR; INTRAVENOUS at 17:58

## 2024-01-01 RX ADMIN — HEPARIN SODIUM 5000 UNITS: 5000 INJECTION INTRAVENOUS; SUBCUTANEOUS at 21:24

## 2024-01-01 RX ADMIN — GABAPENTIN 300 MG: 300 CAPSULE ORAL at 08:22

## 2024-01-01 RX ADMIN — ENOXAPARIN SODIUM 40 MG: 100 INJECTION SUBCUTANEOUS at 18:13

## 2024-01-01 RX ADMIN — DIPHENHYDRAMINE HYDROCHLORIDE AND LIDOCAINE HYDROCHLORIDE AND ALUMINUM HYDROXIDE AND MAGNESIUM HYDRO 10 ML: KIT at 23:48

## 2024-01-01 RX ADMIN — GUAIFENESIN 1200 MG: 600 TABLET ORAL at 09:31

## 2024-01-01 RX ADMIN — METOLAZONE 5 MG: 5 TABLET ORAL at 14:18

## 2024-01-01 RX ADMIN — GUAIFENESIN 1200 MG: 600 TABLET ORAL at 08:54

## 2024-01-01 RX ADMIN — HYDROCORTISONE 1 APPLICATION: 1 OINTMENT TOPICAL at 17:58

## 2024-01-01 RX ADMIN — GUAIFENESIN 1200 MG: 600 TABLET ORAL at 21:29

## 2024-01-01 RX ADMIN — BUDESONIDE 0.5 MG: 0.5 INHALANT ORAL at 18:14

## 2024-01-01 RX ADMIN — GUAIFENESIN 1200 MG: 600 TABLET ORAL at 09:18

## 2024-01-01 RX ADMIN — GUAIFENESIN 1200 MG: 600 TABLET ORAL at 21:28

## 2024-01-01 RX ADMIN — FLUOXETINE HYDROCHLORIDE 40 MG: 20 CAPSULE ORAL at 08:21

## 2024-01-01 RX ADMIN — HYDROCORTISONE 1 APPLICATION: 1 OINTMENT TOPICAL at 08:08

## 2024-01-01 RX ADMIN — HYDROCORTISONE 1 APPLICATION: 1 OINTMENT TOPICAL at 20:33

## 2024-01-01 RX ADMIN — ARFORMOTEROL TARTRATE 15 MCG: 15 SOLUTION RESPIRATORY (INHALATION) at 20:32

## 2024-01-01 RX ADMIN — OLANZAPINE 2.5 MG: 2.5 TABLET, FILM COATED ORAL at 08:22

## 2024-01-01 RX ADMIN — HEPARIN SODIUM 5000 UNITS: 5000 INJECTION INTRAVENOUS; SUBCUTANEOUS at 14:45

## 2024-01-01 RX ADMIN — HEPARIN SODIUM 5000 UNITS: 5000 INJECTION INTRAVENOUS; SUBCUTANEOUS at 15:38

## 2024-01-01 RX ADMIN — DOCUSATE SODIUM 50MG AND SENNOSIDES 8.6MG 2 TABLET: 8.6; 5 TABLET, FILM COATED ORAL at 20:46

## 2024-01-01 RX ADMIN — MIDODRINE HYDROCHLORIDE 10 MG: 10 TABLET ORAL at 08:22

## 2024-01-01 RX ADMIN — ENOXAPARIN SODIUM 40 MG: 100 INJECTION SUBCUTANEOUS at 22:19

## 2024-01-01 RX ADMIN — MICONAZOLE NITRATE 1 APPLICATION: 2 POWDER TOPICAL at 21:17

## 2024-01-01 RX ADMIN — FUROSEMIDE 40 MG: 10 INJECTION, SOLUTION INTRAMUSCULAR; INTRAVENOUS at 17:50

## 2024-01-01 RX ADMIN — HYDROCORTISONE 1 APPLICATION: 1 OINTMENT TOPICAL at 09:31

## 2024-01-01 RX ADMIN — INSULIN LISPRO 2 UNITS: 100 INJECTION, SOLUTION INTRAVENOUS; SUBCUTANEOUS at 17:22

## 2024-01-01 RX ADMIN — METHYLPREDNISOLONE SODIUM SUCCINATE 40 MG: 40 INJECTION INTRAMUSCULAR; INTRAVENOUS at 09:53

## 2024-01-01 RX ADMIN — FUROSEMIDE 40 MG: 10 INJECTION, SOLUTION INTRAMUSCULAR; INTRAVENOUS at 09:54

## 2024-01-01 RX ADMIN — METHYLPREDNISOLONE SODIUM SUCCINATE 40 MG: 40 INJECTION INTRAMUSCULAR; INTRAVENOUS at 09:49

## 2024-01-01 RX ADMIN — ARFORMOTEROL TARTRATE 15 MCG: 15 SOLUTION RESPIRATORY (INHALATION) at 20:07

## 2024-01-01 RX ADMIN — ATORVASTATIN CALCIUM 40 MG: 40 TABLET, FILM COATED ORAL at 08:55

## 2024-01-01 RX ADMIN — HYDROCORTISONE 1 APPLICATION: 1 OINTMENT TOPICAL at 15:12

## 2024-01-01 RX ADMIN — BUDESONIDE 0.5 MG: 0.5 INHALANT ORAL at 07:31

## 2024-01-01 RX ADMIN — GABAPENTIN 300 MG: 300 CAPSULE ORAL at 08:21

## 2024-01-01 RX ADMIN — PRAMIPEXOLE DIHYDROCHLORIDE 0.5 MG: 0.5 TABLET ORAL at 20:44

## 2024-01-01 RX ADMIN — DOCUSATE SODIUM 50MG AND SENNOSIDES 8.6MG 2 TABLET: 8.6; 5 TABLET, FILM COATED ORAL at 21:28

## 2024-01-01 RX ADMIN — GUAIFENESIN 1200 MG: 600 TABLET ORAL at 20:44

## 2024-01-01 RX ADMIN — METHYLPREDNISOLONE SODIUM SUCCINATE 40 MG: 40 INJECTION INTRAMUSCULAR; INTRAVENOUS at 01:13

## 2024-01-01 RX ADMIN — ARFORMOTEROL TARTRATE 15 MCG: 15 SOLUTION RESPIRATORY (INHALATION) at 19:21

## 2024-01-01 RX ADMIN — DIPHENHYDRAMINE HYDROCHLORIDE AND LIDOCAINE HYDROCHLORIDE AND ALUMINUM HYDROXIDE AND MAGNESIUM HYDRO 10 ML: KIT at 17:14

## 2024-01-01 RX ADMIN — SODIUM CHLORIDE SOLN NEBU 3% 4 ML: 3 NEBU SOLN at 08:16

## 2024-01-01 RX ADMIN — HYDROCORTISONE 1 APPLICATION: 1 OINTMENT TOPICAL at 07:40

## 2024-01-01 RX ADMIN — PANTOPRAZOLE SODIUM 40 MG: 40 TABLET, DELAYED RELEASE ORAL at 18:45

## 2024-01-01 RX ADMIN — GABAPENTIN 300 MG: 300 CAPSULE ORAL at 09:49

## 2024-01-01 RX ADMIN — FUROSEMIDE 40 MG: 10 INJECTION, SOLUTION INTRAMUSCULAR; INTRAVENOUS at 17:28

## 2024-01-01 RX ADMIN — ATORVASTATIN CALCIUM 40 MG: 40 TABLET, FILM COATED ORAL at 09:49

## 2024-01-01 RX ADMIN — ARFORMOTEROL TARTRATE 15 MCG: 15 SOLUTION RESPIRATORY (INHALATION) at 06:29

## 2024-01-01 RX ADMIN — DIPHENHYDRAMINE HYDROCHLORIDE AND LIDOCAINE HYDROCHLORIDE AND ALUMINUM HYDROXIDE AND MAGNESIUM HYDRO 10 ML: KIT at 18:02

## 2024-01-01 RX ADMIN — METHYLPREDNISOLONE SODIUM SUCCINATE 40 MG: 40 INJECTION INTRAMUSCULAR; INTRAVENOUS at 01:23

## 2024-01-01 RX ADMIN — INSULIN LISPRO 2 UNITS: 100 INJECTION, SOLUTION INTRAVENOUS; SUBCUTANEOUS at 21:14

## 2024-01-01 RX ADMIN — FLUOXETINE HYDROCHLORIDE 40 MG: 20 CAPSULE ORAL at 08:55

## 2024-01-01 RX ADMIN — INSULIN LISPRO 2 UNITS: 100 INJECTION, SOLUTION INTRAVENOUS; SUBCUTANEOUS at 21:35

## 2024-01-01 RX ADMIN — HYDROCORTISONE 1 APPLICATION: 1 OINTMENT TOPICAL at 11:14

## 2024-01-01 RX ADMIN — GABAPENTIN 300 MG: 300 CAPSULE ORAL at 15:16

## 2024-01-01 RX ADMIN — BUDESONIDE 0.5 MG: 0.5 INHALANT ORAL at 07:48

## 2024-01-01 RX ADMIN — IPRATROPIUM BROMIDE AND ALBUTEROL SULFATE 3 ML: .5; 3 SOLUTION RESPIRATORY (INHALATION) at 08:02

## 2024-01-01 RX ADMIN — INSULIN LISPRO 2 UNITS: 100 INJECTION, SOLUTION INTRAVENOUS; SUBCUTANEOUS at 12:50

## 2024-01-01 RX ADMIN — GABAPENTIN 300 MG: 300 CAPSULE ORAL at 17:09

## 2024-01-01 RX ADMIN — ASPIRIN 81 MG CHEWABLE TABLET 81 MG: 81 TABLET CHEWABLE at 09:53

## 2024-01-01 RX ADMIN — BUDESONIDE 0.5 MG: 0.5 INHALANT ORAL at 07:47

## 2024-01-01 RX ADMIN — SODIUM CHLORIDE SOLN NEBU 3% 4 ML: 3 NEBU SOLN at 19:47

## 2024-01-01 RX ADMIN — INSULIN LISPRO 2 UNITS: 100 INJECTION, SOLUTION INTRAVENOUS; SUBCUTANEOUS at 17:50

## 2024-01-01 RX ADMIN — MICONAZOLE NITRATE 1 APPLICATION: 2 POWDER TOPICAL at 21:29

## 2024-01-01 RX ADMIN — METHYLPREDNISOLONE SODIUM SUCCINATE 40 MG: 40 INJECTION INTRAMUSCULAR; INTRAVENOUS at 17:22

## 2024-01-01 RX ADMIN — ACETAMINOPHEN 650 MG: 325 TABLET ORAL at 22:03

## 2024-01-01 RX ADMIN — HEPARIN SODIUM 5000 UNITS: 5000 INJECTION INTRAVENOUS; SUBCUTANEOUS at 21:03

## 2024-01-01 RX ADMIN — MIDODRINE HYDROCHLORIDE 10 MG: 10 TABLET ORAL at 00:36

## 2024-01-01 RX ADMIN — OLANZAPINE 2.5 MG: 2.5 TABLET, FILM COATED ORAL at 21:29

## 2024-01-01 RX ADMIN — VASOPRESSIN 0.03 UNITS/MIN: 0.2 INJECTION INTRAVENOUS at 06:45

## 2024-01-01 RX ADMIN — MIDODRINE HYDROCHLORIDE 10 MG: 10 TABLET ORAL at 01:14

## 2024-01-01 RX ADMIN — HYDROCORTISONE 1 APPLICATION: 1 OINTMENT TOPICAL at 09:54

## 2024-01-01 RX ADMIN — INSULIN LISPRO 2 UNITS: 100 INJECTION, SOLUTION INTRAVENOUS; SUBCUTANEOUS at 16:48

## 2024-01-01 RX ADMIN — GABAPENTIN 300 MG: 300 CAPSULE ORAL at 20:44

## 2024-01-01 RX ADMIN — BUDESONIDE 0.5 MG: 0.5 INHALANT ORAL at 20:32

## 2024-01-01 RX ADMIN — SODIUM CHLORIDE SOLN NEBU 3% 4 ML: 3 NEBU SOLN at 07:31

## 2024-01-01 RX ADMIN — DIPHENHYDRAMINE HYDROCHLORIDE AND LIDOCAINE HYDROCHLORIDE AND ALUMINUM HYDROXIDE AND MAGNESIUM HYDRO 10 ML: KIT at 17:42

## 2024-01-01 RX ADMIN — HEPARIN SODIUM 5000 UNITS: 5000 INJECTION INTRAVENOUS; SUBCUTANEOUS at 21:23

## 2024-01-01 RX ADMIN — MIDODRINE HYDROCHLORIDE 10 MG: 10 TABLET ORAL at 09:18

## 2024-01-01 RX ADMIN — POTASSIUM CHLORIDE 40 MEQ: 10 CAPSULE, COATED, EXTENDED RELEASE ORAL at 09:15

## 2024-01-01 RX ADMIN — FLUOXETINE HYDROCHLORIDE 40 MG: 20 CAPSULE ORAL at 08:50

## 2024-01-01 RX ADMIN — MIDODRINE HYDROCHLORIDE 10 MG: 10 TABLET ORAL at 16:53

## 2024-01-01 RX ADMIN — GABAPENTIN 300 MG: 300 CAPSULE ORAL at 20:51

## 2024-01-01 RX ADMIN — GABAPENTIN 300 MG: 300 CAPSULE ORAL at 16:31

## 2024-01-01 RX ADMIN — SODIUM BICARBONATE 50 MEQ: 84 INJECTION INTRAVENOUS at 07:30

## 2024-01-01 RX ADMIN — METHYLPREDNISOLONE SODIUM SUCCINATE 40 MG: 40 INJECTION INTRAMUSCULAR; INTRAVENOUS at 17:42

## 2024-01-01 RX ADMIN — HYDROCORTISONE 1 APPLICATION: 1 OINTMENT TOPICAL at 17:20

## 2024-01-01 RX ADMIN — BUDESONIDE 0.5 MG: 0.5 INHALANT ORAL at 06:18

## 2024-01-01 RX ADMIN — ARFORMOTEROL TARTRATE 15 MCG: 15 SOLUTION RESPIRATORY (INHALATION) at 18:21

## 2024-01-01 RX ADMIN — INSULIN LISPRO 2 UNITS: 100 INJECTION, SOLUTION INTRAVENOUS; SUBCUTANEOUS at 22:03

## 2024-01-01 RX ADMIN — MIDODRINE HYDROCHLORIDE 10 MG: 10 TABLET ORAL at 18:13

## 2024-01-01 RX ADMIN — GABAPENTIN 300 MG: 300 CAPSULE ORAL at 17:17

## 2024-01-01 RX ADMIN — INSULIN LISPRO 2 UNITS: 100 INJECTION, SOLUTION INTRAVENOUS; SUBCUTANEOUS at 21:42

## 2024-01-01 RX ADMIN — ENOXAPARIN SODIUM 40 MG: 100 INJECTION SUBCUTANEOUS at 19:21

## 2024-01-01 RX ADMIN — HYDROCORTISONE 1 APPLICATION: 1 OINTMENT TOPICAL at 17:18

## 2024-01-01 RX ADMIN — ASPIRIN 81 MG CHEWABLE TABLET 81 MG: 81 TABLET CHEWABLE at 08:22

## 2024-01-01 RX ADMIN — GABAPENTIN 300 MG: 300 CAPSULE ORAL at 22:03

## 2024-01-01 RX ADMIN — PRAMIPEXOLE DIHYDROCHLORIDE 0.5 MG: 0.5 TABLET ORAL at 21:35

## 2024-01-01 RX ADMIN — ENOXAPARIN SODIUM 40 MG: 100 INJECTION SUBCUTANEOUS at 18:00

## 2024-01-01 RX ADMIN — FLUOXETINE HYDROCHLORIDE 40 MG: 20 CAPSULE ORAL at 09:53

## 2024-01-01 RX ADMIN — HYDROCORTISONE 1 APPLICATION: 1 OINTMENT TOPICAL at 22:04

## 2024-01-01 RX ADMIN — OLANZAPINE 2.5 MG: 2.5 TABLET, FILM COATED ORAL at 09:43

## 2024-01-01 RX ADMIN — ATORVASTATIN CALCIUM 40 MG: 40 TABLET, FILM COATED ORAL at 09:53

## 2024-01-01 RX ADMIN — OLANZAPINE 2.5 MG: 2.5 TABLET, FILM COATED ORAL at 08:09

## 2024-01-01 RX ADMIN — ONDANSETRON 4 MG: 2 INJECTION INTRAMUSCULAR; INTRAVENOUS at 16:40

## 2024-01-01 RX ADMIN — DIPHENHYDRAMINE HYDROCHLORIDE AND LIDOCAINE HYDROCHLORIDE AND ALUMINUM HYDROXIDE AND MAGNESIUM HYDRO 10 ML: KIT at 05:09

## 2024-01-01 RX ADMIN — MIDODRINE HYDROCHLORIDE 10 MG: 10 TABLET ORAL at 01:46

## 2024-01-01 RX ADMIN — GUAIFENESIN 1200 MG: 600 TABLET ORAL at 21:23

## 2024-01-01 RX ADMIN — GUAIFENESIN 1200 MG: 600 TABLET ORAL at 20:23

## 2024-01-01 RX ADMIN — SODIUM CHLORIDE SOLN NEBU 3% 4 ML: 3 NEBU SOLN at 19:25

## 2024-01-01 RX ADMIN — HYDROCORTISONE 1 APPLICATION: 1 OINTMENT TOPICAL at 17:34

## 2024-01-01 RX ADMIN — CLOPIDOGREL BISULFATE 75 MG: 75 TABLET, FILM COATED ORAL at 08:09

## 2024-01-01 RX ADMIN — BUDESONIDE 0.5 MG: 0.5 INHALANT ORAL at 06:27

## 2024-01-01 RX ADMIN — HYDROCORTISONE 1 APPLICATION: 1 OINTMENT TOPICAL at 17:24

## 2024-01-01 RX ADMIN — ARFORMOTEROL TARTRATE 15 MCG: 15 SOLUTION RESPIRATORY (INHALATION) at 23:26

## 2024-01-01 RX ADMIN — SODIUM CHLORIDE SOLN NEBU 3% 4 ML: 3 NEBU SOLN at 06:48

## 2024-01-01 RX ADMIN — DIPHENHYDRAMINE HYDROCHLORIDE AND LIDOCAINE HYDROCHLORIDE AND ALUMINUM HYDROXIDE AND MAGNESIUM HYDRO 10 ML: KIT at 19:22

## 2024-01-01 RX ADMIN — INSULIN LISPRO 2 UNITS: 100 INJECTION, SOLUTION INTRAVENOUS; SUBCUTANEOUS at 12:06

## 2024-01-01 RX ADMIN — DOCUSATE SODIUM 50MG AND SENNOSIDES 8.6MG 2 TABLET: 8.6; 5 TABLET, FILM COATED ORAL at 08:21

## 2024-01-01 RX ADMIN — GABAPENTIN 300 MG: 300 CAPSULE ORAL at 17:23

## 2024-01-01 RX ADMIN — GABAPENTIN 300 MG: 300 CAPSULE ORAL at 17:19

## 2024-01-01 RX ADMIN — FLUOXETINE HYDROCHLORIDE 40 MG: 20 CAPSULE ORAL at 22:03

## 2024-01-01 RX ADMIN — METOLAZONE 5 MG: 5 TABLET ORAL at 09:48

## 2024-01-01 RX ADMIN — GABAPENTIN 300 MG: 300 CAPSULE ORAL at 08:55

## 2024-01-01 RX ADMIN — INSULIN LISPRO 3 UNITS: 100 INJECTION, SOLUTION INTRAVENOUS; SUBCUTANEOUS at 18:02

## 2024-01-01 RX ADMIN — DIPHENHYDRAMINE HYDROCHLORIDE AND LIDOCAINE HYDROCHLORIDE AND ALUMINUM HYDROXIDE AND MAGNESIUM HYDRO 10 ML: KIT at 05:00

## 2024-01-01 RX ADMIN — HYDROCORTISONE 1 APPLICATION: 1 OINTMENT TOPICAL at 08:45

## 2024-01-01 RX ADMIN — GABAPENTIN 300 MG: 300 CAPSULE ORAL at 18:02

## 2024-01-01 RX ADMIN — ARFORMOTEROL TARTRATE 15 MCG: 15 SOLUTION RESPIRATORY (INHALATION) at 20:23

## 2024-01-01 RX ADMIN — MICONAZOLE NITRATE 1 APPLICATION: 2 POWDER TOPICAL at 09:55

## 2024-01-01 RX ADMIN — INSULIN LISPRO 2 UNITS: 100 INJECTION, SOLUTION INTRAVENOUS; SUBCUTANEOUS at 16:50

## 2024-01-01 RX ADMIN — HYDROCORTISONE 1 APPLICATION: 1 OINTMENT TOPICAL at 12:50

## 2024-01-01 RX ADMIN — FUROSEMIDE 20 MG: 10 INJECTION, SOLUTION INTRAMUSCULAR; INTRAVENOUS at 20:32

## 2024-01-01 RX ADMIN — DOCUSATE SODIUM 50MG AND SENNOSIDES 8.6MG 2 TABLET: 8.6; 5 TABLET, FILM COATED ORAL at 08:09

## 2024-01-01 RX ADMIN — BUDESONIDE 0.5 MG: 0.5 INHALANT ORAL at 20:07

## 2024-01-01 RX ADMIN — POTASSIUM CHLORIDE 10 MEQ: 7.46 INJECTION, SOLUTION INTRAVENOUS at 09:56

## 2024-01-01 RX ADMIN — MIDODRINE HYDROCHLORIDE 10 MG: 10 TABLET ORAL at 08:55

## 2024-01-01 RX ADMIN — GUAIFENESIN 1200 MG: 600 TABLET ORAL at 20:37

## 2024-01-01 RX ADMIN — FLUOXETINE HYDROCHLORIDE 40 MG: 20 CAPSULE ORAL at 09:15

## 2024-01-01 RX ADMIN — GUAIFENESIN 1200 MG: 600 TABLET ORAL at 08:14

## 2024-01-01 RX ADMIN — METOLAZONE 10 MG: 5 TABLET ORAL at 08:41

## 2024-01-01 RX ADMIN — ARFORMOTEROL TARTRATE 15 MCG: 15 SOLUTION RESPIRATORY (INHALATION) at 06:18

## 2024-01-01 RX ADMIN — SODIUM CHLORIDE SOLN NEBU 3% 4 ML: 3 NEBU SOLN at 06:21

## 2024-01-01 RX ADMIN — SODIUM CHLORIDE SOLN NEBU 3% 4 ML: 3 NEBU SOLN at 19:52

## 2024-01-01 RX ADMIN — HYDROCORTISONE 1 APPLICATION: 1 OINTMENT TOPICAL at 18:03

## 2024-01-01 RX ADMIN — DIPHENHYDRAMINE HYDROCHLORIDE AND LIDOCAINE HYDROCHLORIDE AND ALUMINUM HYDROXIDE AND MAGNESIUM HYDRO 10 ML: KIT at 11:45

## 2024-01-01 RX ADMIN — ASPIRIN 81 MG CHEWABLE TABLET 81 MG: 81 TABLET CHEWABLE at 09:48

## 2024-01-01 RX ADMIN — GUAIFENESIN 1200 MG: 600 TABLET ORAL at 22:03

## 2024-01-01 RX ADMIN — INSULIN LISPRO 2 UNITS: 100 INJECTION, SOLUTION INTRAVENOUS; SUBCUTANEOUS at 17:48

## 2024-01-01 RX ADMIN — HYDROCORTISONE 1 APPLICATION: 1 OINTMENT TOPICAL at 09:20

## 2024-01-01 RX ADMIN — INSULIN LISPRO 2 UNITS: 100 INJECTION, SOLUTION INTRAVENOUS; SUBCUTANEOUS at 08:23

## 2024-01-01 RX ADMIN — BUDESONIDE 0.5 MG: 0.5 INHALANT ORAL at 06:26

## 2024-01-01 RX ADMIN — POTASSIUM CHLORIDE 40 MEQ: 10 CAPSULE, COATED, EXTENDED RELEASE ORAL at 12:55

## 2024-01-01 RX ADMIN — METHYLPREDNISOLONE SODIUM SUCCINATE 40 MG: 40 INJECTION INTRAMUSCULAR; INTRAVENOUS at 02:37

## 2024-01-01 RX ADMIN — SODIUM CHLORIDE SOLN NEBU 3% 4 ML: 3 NEBU SOLN at 19:24

## 2024-01-01 RX ADMIN — ARFORMOTEROL TARTRATE 15 MCG: 15 SOLUTION RESPIRATORY (INHALATION) at 05:35

## 2024-01-01 RX ADMIN — FLUOXETINE HYDROCHLORIDE 40 MG: 20 CAPSULE ORAL at 09:49

## 2024-01-01 RX ADMIN — PRAMIPEXOLE DIHYDROCHLORIDE 0.5 MG: 0.5 TABLET ORAL at 21:33

## 2024-01-01 RX ADMIN — FLUOXETINE HYDROCHLORIDE 40 MG: 20 CAPSULE ORAL at 20:49

## 2024-01-01 RX ADMIN — MIDODRINE HYDROCHLORIDE 10 MG: 10 TABLET ORAL at 00:46

## 2024-01-01 RX ADMIN — VANCOMYCIN HYDROCHLORIDE 1750 MG: 5 INJECTION, POWDER, LYOPHILIZED, FOR SOLUTION INTRAVENOUS at 21:30

## 2024-01-01 RX ADMIN — IPRATROPIUM BROMIDE AND ALBUTEROL SULFATE 3 ML: .5; 3 SOLUTION RESPIRATORY (INHALATION) at 19:21

## 2024-01-01 RX ADMIN — MICONAZOLE NITRATE 1 APPLICATION: 2 POWDER TOPICAL at 09:50

## 2024-01-01 RX ADMIN — DOCUSATE SODIUM 50MG AND SENNOSIDES 8.6MG 2 TABLET: 8.6; 5 TABLET, FILM COATED ORAL at 08:22

## 2024-01-01 RX ADMIN — HYDROCORTISONE 1 APPLICATION: 1 OINTMENT TOPICAL at 08:54

## 2024-01-01 RX ADMIN — DIPHENHYDRAMINE HYDROCHLORIDE AND LIDOCAINE HYDROCHLORIDE AND ALUMINUM HYDROXIDE AND MAGNESIUM HYDRO 10 ML: KIT at 15:13

## 2024-01-01 RX ADMIN — DOCUSATE SODIUM 50MG AND SENNOSIDES 8.6MG 2 TABLET: 8.6; 5 TABLET, FILM COATED ORAL at 09:30

## 2024-01-01 RX ADMIN — ARFORMOTEROL TARTRATE 15 MCG: 15 SOLUTION RESPIRATORY (INHALATION) at 09:23

## 2024-01-01 RX ADMIN — MIDODRINE HYDROCHLORIDE 10 MG: 10 TABLET ORAL at 17:48

## 2024-01-01 RX ADMIN — INSULIN LISPRO 3 UNITS: 100 INJECTION, SOLUTION INTRAVENOUS; SUBCUTANEOUS at 12:21

## 2024-01-01 RX ADMIN — ENOXAPARIN SODIUM 40 MG: 100 INJECTION SUBCUTANEOUS at 21:28

## 2024-01-01 RX ADMIN — PANTOPRAZOLE SODIUM 40 MG: 40 TABLET, DELAYED RELEASE ORAL at 09:30

## 2024-01-01 RX ADMIN — FUROSEMIDE 60 MG: 10 INJECTION, SOLUTION INTRAMUSCULAR; INTRAVENOUS at 22:21

## 2024-01-01 RX ADMIN — MIDODRINE HYDROCHLORIDE 10 MG: 10 TABLET ORAL at 17:42

## 2024-01-01 RX ADMIN — BUDESONIDE 0.5 MG: 0.5 INHALANT ORAL at 18:51

## 2024-01-01 RX ADMIN — FUROSEMIDE 40 MG: 10 INJECTION, SOLUTION INTRAMUSCULAR; INTRAVENOUS at 08:55

## 2024-01-01 RX ADMIN — BUDESONIDE 0.5 MG: 0.5 INHALANT ORAL at 18:21

## 2024-01-01 RX ADMIN — MIDODRINE HYDROCHLORIDE 10 MG: 10 TABLET ORAL at 16:54

## 2024-01-01 RX ADMIN — GUAIFENESIN 1200 MG: 600 TABLET ORAL at 20:32

## 2024-01-01 RX ADMIN — ATORVASTATIN CALCIUM 40 MG: 40 TABLET, FILM COATED ORAL at 08:15

## 2024-01-01 RX ADMIN — CEFEPIME 2000 MG: 2 INJECTION, POWDER, FOR SOLUTION INTRAVENOUS at 06:10

## 2024-01-01 RX ADMIN — METHYLPREDNISOLONE SODIUM SUCCINATE 40 MG: 40 INJECTION INTRAMUSCULAR; INTRAVENOUS at 18:02

## 2024-01-01 RX ADMIN — SODIUM CHLORIDE SOLN NEBU 3% 4 ML: 3 NEBU SOLN at 06:27

## 2024-01-01 RX ADMIN — SODIUM CHLORIDE SOLN NEBU 3% 4 ML: 3 NEBU SOLN at 20:23

## 2024-01-01 RX ADMIN — FLUOXETINE HYDROCHLORIDE 40 MG: 20 CAPSULE ORAL at 08:54

## 2024-01-01 RX ADMIN — MIDODRINE HYDROCHLORIDE 10 MG: 10 TABLET ORAL at 03:02

## 2024-01-01 RX ADMIN — ARFORMOTEROL TARTRATE 15 MCG: 15 SOLUTION RESPIRATORY (INHALATION) at 19:09

## 2024-01-01 RX ADMIN — BUDESONIDE 0.5 MG: 0.5 INHALANT ORAL at 09:24

## 2024-01-01 RX ADMIN — MICONAZOLE NITRATE 1 APPLICATION: 2 POWDER TOPICAL at 21:36

## 2024-01-01 RX ADMIN — OLANZAPINE 2.5 MG: 2.5 TABLET, FILM COATED ORAL at 09:27

## 2024-01-01 RX ADMIN — INSULIN LISPRO 3 UNITS: 100 INJECTION, SOLUTION INTRAVENOUS; SUBCUTANEOUS at 08:56

## 2024-01-01 RX ADMIN — ACETAMINOPHEN 650 MG: 325 TABLET ORAL at 20:36

## 2024-01-01 RX ADMIN — MIDODRINE HYDROCHLORIDE 10 MG: 10 TABLET ORAL at 09:05

## 2024-01-01 RX ADMIN — PANTOPRAZOLE SODIUM 40 MG: 40 TABLET, DELAYED RELEASE ORAL at 09:50

## 2024-01-01 RX ADMIN — HYDROCORTISONE 1 APPLICATION: 1 OINTMENT TOPICAL at 21:04

## 2024-01-01 RX ADMIN — POTASSIUM CHLORIDE 40 MEQ: 10 CAPSULE, COATED, EXTENDED RELEASE ORAL at 11:14

## 2024-01-01 RX ADMIN — CLOPIDOGREL BISULFATE 75 MG: 75 TABLET, FILM COATED ORAL at 08:41

## 2024-01-01 RX ADMIN — ACETAMINOPHEN 650 MG: 325 TABLET ORAL at 16:47

## 2024-01-01 RX ADMIN — ARFORMOTEROL TARTRATE 15 MCG: 15 SOLUTION RESPIRATORY (INHALATION) at 06:21

## 2024-01-01 RX ADMIN — HYDROCORTISONE 1 APPLICATION: 1 OINTMENT TOPICAL at 17:43

## 2024-01-01 RX ADMIN — INSULIN LISPRO 2 UNITS: 100 INJECTION, SOLUTION INTRAVENOUS; SUBCUTANEOUS at 12:55

## 2024-01-01 RX ADMIN — ARFORMOTEROL TARTRATE 15 MCG: 15 SOLUTION RESPIRATORY (INHALATION) at 08:16

## 2024-01-01 RX ADMIN — GABAPENTIN 300 MG: 300 CAPSULE ORAL at 15:48

## 2024-01-01 RX ADMIN — HYDROCORTISONE 1 APPLICATION: 1 OINTMENT TOPICAL at 17:17

## 2024-01-01 RX ADMIN — SODIUM CHLORIDE SOLN NEBU 3% 4 ML: 3 NEBU SOLN at 20:32

## 2024-01-01 RX ADMIN — HYDROCORTISONE 1 APPLICATION: 1 OINTMENT TOPICAL at 11:45

## 2024-01-01 RX ADMIN — HYDROCORTISONE 1 APPLICATION: 1 OINTMENT TOPICAL at 20:51

## 2024-01-01 RX ADMIN — MIDODRINE HYDROCHLORIDE 10 MG: 10 TABLET ORAL at 17:17

## 2024-01-01 RX ADMIN — HYDROCORTISONE 1 APPLICATION: 1 OINTMENT TOPICAL at 11:29

## 2024-01-01 RX ADMIN — INSULIN LISPRO 2 UNITS: 100 INJECTION, SOLUTION INTRAVENOUS; SUBCUTANEOUS at 11:35

## 2024-01-01 RX ADMIN — MICONAZOLE NITRATE 1 APPLICATION: 2 POWDER TOPICAL at 09:56

## 2024-01-01 RX ADMIN — METHYLPREDNISOLONE SODIUM SUCCINATE 40 MG: 40 INJECTION INTRAMUSCULAR; INTRAVENOUS at 08:54

## 2024-01-01 RX ADMIN — CLOPIDOGREL BISULFATE 75 MG: 75 TABLET, FILM COATED ORAL at 09:49

## 2024-01-01 RX ADMIN — SODIUM CHLORIDE SOLN NEBU 3% 4 ML: 3 NEBU SOLN at 08:02

## 2024-01-01 RX ADMIN — METHYLPREDNISOLONE SODIUM SUCCINATE 40 MG: 40 INJECTION INTRAMUSCULAR; INTRAVENOUS at 17:14

## 2024-01-01 RX ADMIN — HYDROCORTISONE 1 APPLICATION: 1 OINTMENT TOPICAL at 17:48

## 2024-01-01 RX ADMIN — PANTOPRAZOLE SODIUM 40 MG: 40 TABLET, DELAYED RELEASE ORAL at 09:53

## 2024-01-01 RX ADMIN — FLUOXETINE HYDROCHLORIDE 40 MG: 20 CAPSULE ORAL at 21:03

## 2024-01-01 RX ADMIN — FUROSEMIDE 20 MG: 10 INJECTION, SOLUTION INTRAMUSCULAR; INTRAVENOUS at 09:48

## 2024-01-01 RX ADMIN — PRAMIPEXOLE DIHYDROCHLORIDE 0.5 MG: 0.5 TABLET ORAL at 20:23

## 2024-01-01 RX ADMIN — IPRATROPIUM BROMIDE AND ALBUTEROL SULFATE 3 ML: .5; 3 SOLUTION RESPIRATORY (INHALATION) at 16:21

## 2024-01-01 RX ADMIN — GABAPENTIN 300 MG: 300 CAPSULE ORAL at 16:48

## 2024-01-01 RX ADMIN — FLUOXETINE HYDROCHLORIDE 40 MG: 20 CAPSULE ORAL at 09:04

## 2024-01-01 RX ADMIN — PANTOPRAZOLE SODIUM 40 MG: 40 TABLET, DELAYED RELEASE ORAL at 08:21

## 2024-01-01 RX ADMIN — ATORVASTATIN CALCIUM 40 MG: 40 TABLET, FILM COATED ORAL at 09:18

## 2024-01-01 RX ADMIN — HYDROCORTISONE 1 APPLICATION: 1 OINTMENT TOPICAL at 08:17

## 2024-01-01 RX ADMIN — MICONAZOLE NITRATE 1 APPLICATION: 2 POWDER TOPICAL at 21:35

## 2024-01-01 RX ADMIN — MIDODRINE HYDROCHLORIDE 10 MG: 10 TABLET ORAL at 17:45

## 2024-01-01 RX ADMIN — OLANZAPINE 2.5 MG: 2.5 TABLET, FILM COATED ORAL at 09:16

## 2024-01-01 RX ADMIN — FLUOXETINE HYDROCHLORIDE 40 MG: 20 CAPSULE ORAL at 08:15

## 2024-01-01 RX ADMIN — PRAMIPEXOLE DIHYDROCHLORIDE 0.5 MG: 0.5 TABLET ORAL at 22:03

## 2024-01-01 RX ADMIN — HEPARIN SODIUM 5000 UNITS: 5000 INJECTION INTRAVENOUS; SUBCUTANEOUS at 05:19

## 2024-01-22 ENCOUNTER — OFFICE VISIT (OUTPATIENT)
Dept: FAMILY MEDICINE CLINIC | Facility: CLINIC | Age: 64
End: 2024-01-22
Payer: MEDICARE

## 2024-01-22 VITALS — BODY MASS INDEX: 36.52 KG/M2 | WEIGHT: 186 LBS | HEIGHT: 60 IN | TEMPERATURE: 97.1 F

## 2024-01-22 DIAGNOSIS — U07.1 COVID-19: Primary | ICD-10-CM

## 2024-01-22 LAB
FLUAV SUBTYP SPEC NAA+PROBE: NOT DETECTED
FLUBV RNA ISLT QL NAA+PROBE: NOT DETECTED
RSV RNA NPH QL NAA+NON-PROBE: NOT DETECTED
SARS-COV-2 RNA RESP QL NAA+PROBE: DETECTED

## 2024-01-22 PROCEDURE — 87637 SARSCOV2&INF A&B&RSV AMP PRB: CPT | Performed by: FAMILY MEDICINE

## 2024-01-22 PROCEDURE — 99213 OFFICE O/P EST LOW 20 MIN: CPT | Performed by: FAMILY MEDICINE

## 2024-01-22 RX ORDER — DEXAMETHASONE 6 MG/1
6 TABLET ORAL DAILY
Qty: 10 TABLET | Refills: 0 | Status: SHIPPED | OUTPATIENT
Start: 2024-01-22

## 2024-01-22 RX ORDER — ALBUTEROL SULFATE 90 UG/1
2 AEROSOL, METERED RESPIRATORY (INHALATION) EVERY 4 HOURS PRN
Qty: 8 G | Refills: 0 | Status: SHIPPED | OUTPATIENT
Start: 2024-01-22

## 2024-01-22 NOTE — PROGRESS NOTES
"Chief Complaint    COVID (Home COVID test positive.  Cough, headache, body aches/chills, vomiting x 3 days.  Taking Tylenol and OTC cough syrup)    Subjective      Falguni Minaya presents to [facility]    History of Present Illness    1.) COVID 19 : Onset of sxs - 3 days ago. Per patient - cough - hx of smoking - quit 20 years ago - smoked x 20 years. Patient does admit to intermittent SOA. Also c/o myalgia, chills and emesis. No significant relief with OTC medications.     Objective     Vital Signs:     Temp 97.1 °F (36.2 °C) (Temporal)   Ht 152.4 cm (60\")   Wt 84.4 kg (186 lb)   BMI 36.33 kg/m²       Physical Exam  Vitals reviewed.   Constitutional:       General: She is not in acute distress.     Appearance: Normal appearance. She is well-developed.   HENT:      Head: Normocephalic and atraumatic.      Right Ear: Hearing and external ear normal. Tympanic membrane is not erythematous or bulging.      Left Ear: Hearing and external ear normal. Tympanic membrane is not erythematous or bulging.      Nose: Nose normal. Congestion present.   Eyes:      General: Lids are normal.         Right eye: No discharge.         Left eye: No discharge.      Conjunctiva/sclera: Conjunctivae normal.   Pulmonary:      Effort: Pulmonary effort is normal.      Breath sounds: No stridor. No wheezing, rhonchi or rales.   Abdominal:      General: There is no distension.   Musculoskeletal:         General: No swelling.      Cervical back: Neck supple.   Skin:     Coloration: Skin is not jaundiced.      Findings: No erythema.   Neurological:      Mental Status: She is alert. Mental status is at baseline.   Psychiatric:         Mood and Affect: Mood and affect normal.         Thought Content: Thought content normal.     Assessment and Plan     Diagnoses and all orders for this visit:    1. COVID-19 (Primary)  -     COVID-19, FLU A/B, RSV PCR 1 HR TAT - Swab, Nasopharynx; Future  -     COVID-19, FLU A/B, " RSV PCR 1 HR TAT - Swab, Nasopharynx    Other orders  -     dexAMETHasone (DECADRON) 6 MG tablet; Take 1 tablet by mouth Daily.  Dispense: 10 tablet; Refill: 0  -     albuterol sulfate  (90 Base) MCG/ACT inhaler; Inhale 2 puffs Every 4 (Four) Hours As Needed for Wheezing.  Dispense: 8 g; Refill: 0    Advised regarding the detriment of starting Paxlovid 2/2 current medications.  Due to patient's presentation, recommendation for trial of dexamethasone.  Quarantine.  Practice precautions.  KAREN PRN.  Adequate fluids and rest.  Continue NSAIDs/acetaminophen PRN.  Call office with any significant concerns within the next 24 to 48 hours or report to an acute care facility/ER.    Follow Up     Return if symptoms worsen or fail to improve.    Patient was given instructions and counseling regarding her condition or for health maintenance advice. Please see specific information pulled into the AVS if appropriate.

## 2024-01-31 ENCOUNTER — APPOINTMENT (OUTPATIENT)
Dept: CT IMAGING | Facility: HOSPITAL | Age: 64
End: 2024-01-31
Payer: MEDICARE

## 2024-01-31 ENCOUNTER — APPOINTMENT (OUTPATIENT)
Dept: GENERAL RADIOLOGY | Facility: HOSPITAL | Age: 64
End: 2024-01-31
Payer: MEDICARE

## 2024-01-31 ENCOUNTER — HOSPITAL ENCOUNTER (INPATIENT)
Facility: HOSPITAL | Age: 64
LOS: 14 days | Discharge: HOME | End: 2024-02-14
Attending: EMERGENCY MEDICINE | Admitting: STUDENT IN AN ORGANIZED HEALTH CARE EDUCATION/TRAINING PROGRAM
Payer: MEDICARE

## 2024-01-31 ENCOUNTER — APPOINTMENT (OUTPATIENT)
Dept: CARDIOLOGY | Facility: HOSPITAL | Age: 64
End: 2024-01-31
Payer: MEDICARE

## 2024-01-31 DIAGNOSIS — N39.0 ACUTE URINARY TRACT INFECTION: ICD-10-CM

## 2024-01-31 DIAGNOSIS — R41.82 ALTERED MENTAL STATUS, UNSPECIFIED ALTERED MENTAL STATUS TYPE: Primary | ICD-10-CM

## 2024-01-31 DIAGNOSIS — R13.12 OROPHARYNGEAL DYSPHAGIA: ICD-10-CM

## 2024-01-31 DIAGNOSIS — R26.2 DIFFICULTY WALKING: ICD-10-CM

## 2024-01-31 DIAGNOSIS — Z78.9 DECREASED ACTIVITIES OF DAILY LIVING (ADL): ICD-10-CM

## 2024-01-31 PROBLEM — J96.01 ACUTE HYPOXEMIC RESPIRATORY FAILURE: Status: ACTIVE | Noted: 2024-01-31

## 2024-01-31 LAB
ALBUMIN SERPL-MCNC: 2.9 G/DL (ref 3.5–5.2)
ALBUMIN/GLOB SERPL: 0.7 G/DL
ALP SERPL-CCNC: 119 U/L (ref 39–117)
ALT SERPL W P-5'-P-CCNC: 41 U/L (ref 1–33)
ANION GAP SERPL CALCULATED.3IONS-SCNC: 15.4 MMOL/L (ref 5–15)
ARTERIAL PATENCY WRIST A: ABNORMAL
AST SERPL-CCNC: 44 U/L (ref 1–32)
BACTERIA UR QL AUTO: ABNORMAL /HPF
BASE EXCESS BLDA CALC-SCNC: -1.3 MMOL/L (ref -2–2)
BASOPHILS # BLD AUTO: 0.02 10*3/MM3 (ref 0–0.2)
BASOPHILS NFR BLD AUTO: 0.1 % (ref 0–1.5)
BDY SITE: ABNORMAL
BILIRUB SERPL-MCNC: 0.4 MG/DL (ref 0–1.2)
BILIRUB UR QL STRIP: NEGATIVE
BUN SERPL-MCNC: 31 MG/DL (ref 8–23)
BUN/CREAT SERPL: 30.1 (ref 7–25)
CA-I BLDA-SCNC: 1.19 MMOL/L (ref 1.13–1.32)
CALCIUM SPEC-SCNC: 8.5 MG/DL (ref 8.6–10.5)
CHLORIDE BLDA-SCNC: 109 MMOL/L (ref 98–106)
CHLORIDE SERPL-SCNC: 105 MMOL/L (ref 98–107)
CLARITY UR: ABNORMAL
CO2 SERPL-SCNC: 20.6 MMOL/L (ref 22–29)
COHGB MFR BLD: 0.4 % (ref 0–1.5)
COLOR UR: YELLOW
CREAT SERPL-MCNC: 1.03 MG/DL (ref 0.57–1)
CRP SERPL-MCNC: 11.52 MG/DL (ref 0–0.5)
D-LACTATE SERPL-SCNC: 1.3 MMOL/L (ref 0.5–2)
D-LACTATE SERPL-SCNC: 2.1 MMOL/L (ref 0.5–2)
D-LACTATE SERPL-SCNC: 2.5 MMOL/L (ref 0.5–2)
DEPRECATED RDW RBC AUTO: 66.4 FL (ref 37–54)
EGFRCR SERPLBLD CKD-EPI 2021: 60.5 ML/MIN/1.73
EOSINOPHIL # BLD AUTO: 0 10*3/MM3 (ref 0–0.4)
EOSINOPHIL NFR BLD AUTO: 0 % (ref 0.3–6.2)
ERYTHROCYTE [DISTWIDTH] IN BLOOD BY AUTOMATED COUNT: 18.3 % (ref 12.3–15.4)
ERYTHROCYTE [SEDIMENTATION RATE] IN BLOOD: 46 MM/HR (ref 0–30)
FHHB: 6.3 % (ref 0–5)
GAS FLOW AIRWAY: 15 LPM
GLOBULIN UR ELPH-MCNC: 4.4 GM/DL
GLUCOSE BLDA-MCNC: 102 MG/DL (ref 65–99)
GLUCOSE BLDC GLUCOMTR-MCNC: 117 MG/DL (ref 70–99)
GLUCOSE BLDC GLUCOMTR-MCNC: 126 MG/DL (ref 70–99)
GLUCOSE SERPL-MCNC: 98 MG/DL (ref 65–99)
GLUCOSE UR STRIP-MCNC: NEGATIVE MG/DL
HCO3 BLDA-SCNC: 19.9 MMOL/L (ref 22–26)
HCT VFR BLD AUTO: 32.5 % (ref 34–46.6)
HGB BLD-MCNC: 10.4 G/DL (ref 12–15.9)
HGB BLDA-MCNC: 11.6 G/DL (ref 11.7–14.6)
HGB UR QL STRIP.AUTO: ABNORMAL
HOLD SPECIMEN: NORMAL
HOLD SPECIMEN: NORMAL
HYALINE CASTS UR QL AUTO: ABNORMAL /LPF
IMM GRANULOCYTES # BLD AUTO: 0.3 10*3/MM3 (ref 0–0.05)
IMM GRANULOCYTES NFR BLD AUTO: 1.5 % (ref 0–0.5)
INHALED O2 CONCENTRATION: 100 %
KETONES UR QL STRIP: NEGATIVE
L PNEUMO1 AG UR QL IA: NEGATIVE
LACTATE BLDA-SCNC: 2.26 MMOL/L (ref 0.5–2)
LEUKOCYTE ESTERASE UR QL STRIP.AUTO: ABNORMAL
LYMPHOCYTES # BLD AUTO: 0.81 10*3/MM3 (ref 0.7–3.1)
LYMPHOCYTES NFR BLD AUTO: 4.1 % (ref 19.6–45.3)
MCH RBC QN AUTO: 31.2 PG (ref 26.6–33)
MCHC RBC AUTO-ENTMCNC: 32 G/DL (ref 31.5–35.7)
MCV RBC AUTO: 97.6 FL (ref 79–97)
METHGB BLD QL: 0.3 % (ref 0–1.5)
MODALITY: ABNORMAL
MONOCYTES # BLD AUTO: 0.85 10*3/MM3 (ref 0.1–0.9)
MONOCYTES NFR BLD AUTO: 4.3 % (ref 5–12)
MRSA DNA SPEC QL NAA+PROBE: NORMAL
NEUTROPHILS NFR BLD AUTO: 17.61 10*3/MM3 (ref 1.7–7)
NEUTROPHILS NFR BLD AUTO: 90 % (ref 42.7–76)
NITRITE UR QL STRIP: POSITIVE
NOTE: ABNORMAL
NRBC BLD AUTO-RTO: 0.2 /100 WBC (ref 0–0.2)
NT-PROBNP SERPL-MCNC: 1536 PG/ML (ref 0–900)
OXYHGB MFR BLDV: 93 % (ref 94–99)
PCO2 BLDA: 23.9 MM HG (ref 35–45)
PH BLDA: 7.54 PH UNITS (ref 7.35–7.45)
PH UR STRIP.AUTO: 6.5 [PH] (ref 5–8)
PLATELET # BLD AUTO: 497 10*3/MM3 (ref 140–450)
PMV BLD AUTO: 10.7 FL (ref 6–12)
PO2 BLD: 66 MM[HG] (ref 0–500)
PO2 BLDA: 65.6 MM HG (ref 80–100)
POTASSIUM BLDA-SCNC: 3.9 MMOL/L (ref 3.5–5)
POTASSIUM SERPL-SCNC: 4.3 MMOL/L (ref 3.5–5.2)
PROCALCITONIN SERPL-MCNC: 0.29 NG/ML (ref 0–0.25)
PROCALCITONIN SERPL-MCNC: 0.32 NG/ML (ref 0–0.25)
PROT SERPL-MCNC: 7.3 G/DL (ref 6–8.5)
PROT UR QL STRIP: ABNORMAL
RBC # BLD AUTO: 3.33 10*6/MM3 (ref 3.77–5.28)
RBC # UR STRIP: ABNORMAL /HPF
REF LAB TEST METHOD: ABNORMAL
S PNEUM AG SPEC QL LA: NEGATIVE
SAO2 % BLDCOA: 93.7 % (ref 95–99)
SODIUM BLDA-SCNC: 142.8 MMOL/L (ref 136–146)
SODIUM SERPL-SCNC: 141 MMOL/L (ref 136–145)
SP GR UR STRIP: 1.01 (ref 1–1.03)
SQUAMOUS #/AREA URNS HPF: ABNORMAL /HPF
TROPONIN T SERPL HS-MCNC: 9 NG/L
UROBILINOGEN UR QL STRIP: ABNORMAL
WBC # UR STRIP: ABNORMAL /HPF
WBC NRBC COR # BLD AUTO: 19.59 10*3/MM3 (ref 3.4–10.8)
WHOLE BLOOD HOLD COAG: NORMAL
WHOLE BLOOD HOLD SPECIMEN: NORMAL

## 2024-01-31 PROCEDURE — 82948 REAGENT STRIP/BLOOD GLUCOSE: CPT | Performed by: NURSE PRACTITIONER

## 2024-01-31 PROCEDURE — 87077 CULTURE AEROBIC IDENTIFY: CPT | Performed by: EMERGENCY MEDICINE

## 2024-01-31 PROCEDURE — 36600 WITHDRAWAL OF ARTERIAL BLOOD: CPT | Performed by: EMERGENCY MEDICINE

## 2024-01-31 PROCEDURE — 25810000003 SODIUM CHLORIDE 0.9 % SOLUTION: Performed by: EMERGENCY MEDICINE

## 2024-01-31 PROCEDURE — 99291 CRITICAL CARE FIRST HOUR: CPT | Performed by: INTERNAL MEDICINE

## 2024-01-31 PROCEDURE — 85652 RBC SED RATE AUTOMATED: CPT | Performed by: INTERNAL MEDICINE

## 2024-01-31 PROCEDURE — 93005 ELECTROCARDIOGRAM TRACING: CPT | Performed by: EMERGENCY MEDICINE

## 2024-01-31 PROCEDURE — 99285 EMERGENCY DEPT VISIT HI MDM: CPT

## 2024-01-31 PROCEDURE — 93306 TTE W/DOPPLER COMPLETE: CPT | Performed by: INTERNAL MEDICINE

## 2024-01-31 PROCEDURE — 25010000002 FUROSEMIDE PER 20 MG: Performed by: INTERNAL MEDICINE

## 2024-01-31 PROCEDURE — 80053 COMPREHEN METABOLIC PANEL: CPT | Performed by: EMERGENCY MEDICINE

## 2024-01-31 PROCEDURE — 25010000002 HEPARIN (PORCINE) PER 1000 UNITS: Performed by: INTERNAL MEDICINE

## 2024-01-31 PROCEDURE — 82805 BLOOD GASES W/O2 SATURATION: CPT | Performed by: EMERGENCY MEDICINE

## 2024-01-31 PROCEDURE — 94799 UNLISTED PULMONARY SVC/PX: CPT

## 2024-01-31 PROCEDURE — 87641 MR-STAPH DNA AMP PROBE: CPT | Performed by: NURSE PRACTITIONER

## 2024-01-31 PROCEDURE — 87449 NOS EACH ORGANISM AG IA: CPT | Performed by: INTERNAL MEDICINE

## 2024-01-31 PROCEDURE — 25010000002 AZITHROMYCIN PER 500 MG: Performed by: INTERNAL MEDICINE

## 2024-01-31 PROCEDURE — 83050 HGB METHEMOGLOBIN QUAN: CPT | Performed by: EMERGENCY MEDICINE

## 2024-01-31 PROCEDURE — 87899 AGENT NOS ASSAY W/OPTIC: CPT | Performed by: INTERNAL MEDICINE

## 2024-01-31 PROCEDURE — 87086 URINE CULTURE/COLONY COUNT: CPT | Performed by: EMERGENCY MEDICINE

## 2024-01-31 PROCEDURE — 83605 ASSAY OF LACTIC ACID: CPT | Performed by: EMERGENCY MEDICINE

## 2024-01-31 PROCEDURE — 25810000003 SODIUM CHLORIDE 0.9 % SOLUTION: Performed by: INTERNAL MEDICINE

## 2024-01-31 PROCEDURE — 87186 SC STD MICRODIL/AGAR DIL: CPT | Performed by: EMERGENCY MEDICINE

## 2024-01-31 PROCEDURE — 83880 ASSAY OF NATRIURETIC PEPTIDE: CPT | Performed by: EMERGENCY MEDICINE

## 2024-01-31 PROCEDURE — 82375 ASSAY CARBOXYHB QUANT: CPT | Performed by: EMERGENCY MEDICINE

## 2024-01-31 PROCEDURE — 81001 URINALYSIS AUTO W/SCOPE: CPT | Performed by: EMERGENCY MEDICINE

## 2024-01-31 PROCEDURE — 25010000002 CEFTRIAXONE PER 250 MG: Performed by: EMERGENCY MEDICINE

## 2024-01-31 PROCEDURE — 85025 COMPLETE CBC W/AUTO DIFF WBC: CPT | Performed by: EMERGENCY MEDICINE

## 2024-01-31 PROCEDURE — 82948 REAGENT STRIP/BLOOD GLUCOSE: CPT

## 2024-01-31 PROCEDURE — 93010 ELECTROCARDIOGRAM REPORT: CPT | Performed by: SPECIALIST

## 2024-01-31 PROCEDURE — 94640 AIRWAY INHALATION TREATMENT: CPT

## 2024-01-31 PROCEDURE — 93005 ELECTROCARDIOGRAM TRACING: CPT

## 2024-01-31 PROCEDURE — 25010000002 DEXAMETHASONE SODIUM PHOSPHATE 10 MG/ML SOLUTION: Performed by: NURSE PRACTITIONER

## 2024-01-31 PROCEDURE — 71250 CT THORAX DX C-: CPT

## 2024-01-31 PROCEDURE — 86140 C-REACTIVE PROTEIN: CPT | Performed by: INTERNAL MEDICINE

## 2024-01-31 PROCEDURE — 84145 PROCALCITONIN (PCT): CPT | Performed by: INTERNAL MEDICINE

## 2024-01-31 PROCEDURE — 84484 ASSAY OF TROPONIN QUANT: CPT | Performed by: EMERGENCY MEDICINE

## 2024-01-31 PROCEDURE — 70450 CT HEAD/BRAIN W/O DYE: CPT

## 2024-01-31 PROCEDURE — P9612 CATHETERIZE FOR URINE SPEC: HCPCS

## 2024-01-31 PROCEDURE — 93306 TTE W/DOPPLER COMPLETE: CPT

## 2024-01-31 PROCEDURE — 87040 BLOOD CULTURE FOR BACTERIA: CPT

## 2024-01-31 PROCEDURE — 71045 X-RAY EXAM CHEST 1 VIEW: CPT

## 2024-01-31 PROCEDURE — 36415 COLL VENOUS BLD VENIPUNCTURE: CPT | Performed by: INTERNAL MEDICINE

## 2024-01-31 RX ORDER — BISACODYL 10 MG
10 SUPPOSITORY, RECTAL RECTAL DAILY PRN
Status: DISCONTINUED | OUTPATIENT
Start: 2024-01-31 | End: 2024-02-10

## 2024-01-31 RX ORDER — HEPARIN SODIUM 5000 [USP'U]/ML
5000 INJECTION, SOLUTION INTRAVENOUS; SUBCUTANEOUS EVERY 8 HOURS SCHEDULED
Status: DISCONTINUED | OUTPATIENT
Start: 2024-01-31 | End: 2024-02-11

## 2024-01-31 RX ORDER — DEXAMETHASONE SODIUM PHOSPHATE 10 MG/ML
8 INJECTION, SOLUTION INTRAMUSCULAR; INTRAVENOUS DAILY
Status: COMPLETED | OUTPATIENT
Start: 2024-01-31 | End: 2024-02-10

## 2024-01-31 RX ORDER — ARFORMOTEROL TARTRATE 15 UG/2ML
15 SOLUTION RESPIRATORY (INHALATION)
Status: DISCONTINUED | OUTPATIENT
Start: 2024-01-31 | End: 2024-02-15 | Stop reason: HOSPADM

## 2024-01-31 RX ORDER — CLOPIDOGREL BISULFATE 75 MG/1
75 TABLET ORAL DAILY
Status: DISCONTINUED | OUTPATIENT
Start: 2024-02-01 | End: 2024-02-15 | Stop reason: HOSPADM

## 2024-01-31 RX ORDER — GABAPENTIN 300 MG/1
300 CAPSULE ORAL 3 TIMES DAILY
Status: DISCONTINUED | OUTPATIENT
Start: 2024-01-31 | End: 2024-02-04

## 2024-01-31 RX ORDER — BISACODYL 5 MG/1
5 TABLET, DELAYED RELEASE ORAL DAILY PRN
Status: DISCONTINUED | OUTPATIENT
Start: 2024-01-31 | End: 2024-02-10

## 2024-01-31 RX ORDER — OLANZAPINE 2.5 MG/1
2.5 TABLET, FILM COATED ORAL DAILY
Status: DISCONTINUED | OUTPATIENT
Start: 2024-01-31 | End: 2024-02-15 | Stop reason: HOSPADM

## 2024-01-31 RX ORDER — DEXTROSE MONOHYDRATE 25 G/50ML
25 INJECTION, SOLUTION INTRAVENOUS
Status: DISCONTINUED | OUTPATIENT
Start: 2024-01-31 | End: 2024-02-15 | Stop reason: HOSPADM

## 2024-01-31 RX ORDER — PRAMIPEXOLE DIHYDROCHLORIDE 0.5 MG/1
0.5 TABLET ORAL NIGHTLY
Status: DISCONTINUED | OUTPATIENT
Start: 2024-01-31 | End: 2024-02-15 | Stop reason: HOSPADM

## 2024-01-31 RX ORDER — ONDANSETRON 2 MG/ML
4 INJECTION INTRAMUSCULAR; INTRAVENOUS EVERY 6 HOURS PRN
Status: DISCONTINUED | OUTPATIENT
Start: 2024-01-31 | End: 2024-02-15 | Stop reason: HOSPADM

## 2024-01-31 RX ORDER — SODIUM CHLORIDE 0.9 % (FLUSH) 0.9 %
10 SYRINGE (ML) INJECTION AS NEEDED
Status: DISCONTINUED | OUTPATIENT
Start: 2024-01-31 | End: 2024-02-15 | Stop reason: HOSPADM

## 2024-01-31 RX ORDER — ASPIRIN 81 MG/1
81 TABLET, CHEWABLE ORAL DAILY
Status: DISCONTINUED | OUTPATIENT
Start: 2024-02-01 | End: 2024-02-15 | Stop reason: HOSPADM

## 2024-01-31 RX ORDER — AMOXICILLIN 250 MG
1 CAPSULE ORAL 2 TIMES DAILY
Status: DISCONTINUED | OUTPATIENT
Start: 2024-01-31 | End: 2024-02-04

## 2024-01-31 RX ORDER — GUAIFENESIN/DEXTROMETHORPHAN 100-10MG/5
5 SYRUP ORAL EVERY 4 HOURS PRN
Status: DISCONTINUED | OUTPATIENT
Start: 2024-01-31 | End: 2024-02-06

## 2024-01-31 RX ORDER — ALBUTEROL SULFATE 2.5 MG/3ML
2.5 SOLUTION RESPIRATORY (INHALATION) EVERY 4 HOURS PRN
Status: DISCONTINUED | OUTPATIENT
Start: 2024-01-31 | End: 2024-02-15 | Stop reason: HOSPADM

## 2024-01-31 RX ORDER — INSULIN LISPRO 100 [IU]/ML
2-7 INJECTION, SOLUTION INTRAVENOUS; SUBCUTANEOUS
Status: DISCONTINUED | OUTPATIENT
Start: 2024-01-31 | End: 2024-02-15 | Stop reason: HOSPADM

## 2024-01-31 RX ORDER — ATORVASTATIN CALCIUM 40 MG/1
40 TABLET, FILM COATED ORAL DAILY
Status: DISCONTINUED | OUTPATIENT
Start: 2024-01-31 | End: 2024-02-15 | Stop reason: HOSPADM

## 2024-01-31 RX ORDER — CYCLOBENZAPRINE HCL 10 MG
10 TABLET ORAL 3 TIMES DAILY PRN
Status: DISCONTINUED | OUTPATIENT
Start: 2024-01-31 | End: 2024-02-15 | Stop reason: HOSPADM

## 2024-01-31 RX ORDER — ACETAMINOPHEN 10 MG/ML
1000 INJECTION, SOLUTION INTRAVENOUS EVERY 8 HOURS PRN
Status: DISCONTINUED | OUTPATIENT
Start: 2024-01-31 | End: 2024-02-02

## 2024-01-31 RX ORDER — ACETAMINOPHEN 325 MG/1
650 TABLET ORAL EVERY 4 HOURS PRN
Status: DISCONTINUED | OUTPATIENT
Start: 2024-01-31 | End: 2024-02-15 | Stop reason: HOSPADM

## 2024-01-31 RX ORDER — PANTOPRAZOLE SODIUM 40 MG/10ML
40 INJECTION, POWDER, LYOPHILIZED, FOR SOLUTION INTRAVENOUS
Status: DISCONTINUED | OUTPATIENT
Start: 2024-01-31 | End: 2024-02-01

## 2024-01-31 RX ORDER — SODIUM CHLORIDE 0.9 % (FLUSH) 0.9 %
10 SYRINGE (ML) INJECTION EVERY 12 HOURS SCHEDULED
Status: DISCONTINUED | OUTPATIENT
Start: 2024-01-31 | End: 2024-02-15 | Stop reason: HOSPADM

## 2024-01-31 RX ORDER — BUDESONIDE 0.5 MG/2ML
0.5 INHALANT ORAL
Status: DISCONTINUED | OUTPATIENT
Start: 2024-01-31 | End: 2024-02-15 | Stop reason: HOSPADM

## 2024-01-31 RX ORDER — IBUPROFEN 600 MG/1
1 TABLET ORAL
Status: DISCONTINUED | OUTPATIENT
Start: 2024-01-31 | End: 2024-02-15 | Stop reason: HOSPADM

## 2024-01-31 RX ORDER — CARVEDILOL 12.5 MG/1
12.5 TABLET ORAL EVERY 12 HOURS
Status: DISCONTINUED | OUTPATIENT
Start: 2024-01-31 | End: 2024-02-04

## 2024-01-31 RX ORDER — BENZONATATE 100 MG/1
100 CAPSULE ORAL 3 TIMES DAILY PRN
Status: DISCONTINUED | OUTPATIENT
Start: 2024-01-31 | End: 2024-02-06

## 2024-01-31 RX ORDER — POLYETHYLENE GLYCOL 3350 17 G/17G
17 POWDER, FOR SOLUTION ORAL DAILY PRN
Status: DISCONTINUED | OUTPATIENT
Start: 2024-01-31 | End: 2024-02-10

## 2024-01-31 RX ORDER — ECHINACEA PURPUREA EXTRACT 125 MG
2 TABLET ORAL AS NEEDED
Status: DISCONTINUED | OUTPATIENT
Start: 2024-01-31 | End: 2024-02-15 | Stop reason: HOSPADM

## 2024-01-31 RX ORDER — FLUOXETINE HYDROCHLORIDE 20 MG/1
40 CAPSULE ORAL 2 TIMES DAILY
Status: DISCONTINUED | OUTPATIENT
Start: 2024-01-31 | End: 2024-02-15 | Stop reason: HOSPADM

## 2024-01-31 RX ORDER — NICOTINE POLACRILEX 4 MG
15 LOZENGE BUCCAL
Status: DISCONTINUED | OUTPATIENT
Start: 2024-01-31 | End: 2024-02-15 | Stop reason: HOSPADM

## 2024-01-31 RX ORDER — FUROSEMIDE 10 MG/ML
40 INJECTION INTRAMUSCULAR; INTRAVENOUS EVERY 8 HOURS
Status: DISCONTINUED | OUTPATIENT
Start: 2024-01-31 | End: 2024-02-06

## 2024-01-31 RX ORDER — LOSARTAN POTASSIUM 25 MG/1
25 TABLET ORAL
Status: DISCONTINUED | OUTPATIENT
Start: 2024-01-31 | End: 2024-02-15 | Stop reason: HOSPADM

## 2024-01-31 RX ORDER — SACCHAROMYCES BOULARDII 250 MG
250 CAPSULE ORAL 2 TIMES DAILY
Status: DISCONTINUED | OUTPATIENT
Start: 2024-01-31 | End: 2024-02-15 | Stop reason: HOSPADM

## 2024-01-31 RX ADMIN — FLUOXETINE HYDROCHLORIDE 40 MG: 20 CAPSULE ORAL at 22:28

## 2024-01-31 RX ADMIN — AZITHROMYCIN 500 MG: 500 INJECTION, POWDER, LYOPHILIZED, FOR SOLUTION INTRAVENOUS at 15:37

## 2024-01-31 RX ADMIN — Medication 250 MG: at 22:28

## 2024-01-31 RX ADMIN — SODIUM CHLORIDE 1000 ML: 9 INJECTION, SOLUTION INTRAVENOUS at 12:05

## 2024-01-31 RX ADMIN — Medication 10 ML: at 15:19

## 2024-01-31 RX ADMIN — ACETAMINOPHEN 650 MG: 325 TABLET ORAL at 16:08

## 2024-01-31 RX ADMIN — Medication 10 ML: at 22:29

## 2024-01-31 RX ADMIN — ARFORMOTEROL TARTRATE 15 MCG: 15 SOLUTION RESPIRATORY (INHALATION) at 22:39

## 2024-01-31 RX ADMIN — DEXAMETHASONE SODIUM PHOSPHATE 8 MG: 10 INJECTION INTRAMUSCULAR; INTRAVENOUS at 15:19

## 2024-01-31 RX ADMIN — OLANZAPINE 2.5 MG: 2.5 TABLET, FILM COATED ORAL at 17:50

## 2024-01-31 RX ADMIN — FUROSEMIDE 40 MG: 10 INJECTION, SOLUTION INTRAMUSCULAR; INTRAVENOUS at 15:19

## 2024-01-31 RX ADMIN — PRAMIPEXOLE DIHYDROCHLORIDE 0.5 MG: 0.5 TABLET ORAL at 22:28

## 2024-01-31 RX ADMIN — HEPARIN SODIUM 5000 UNITS: 5000 INJECTION INTRAVENOUS; SUBCUTANEOUS at 15:19

## 2024-01-31 RX ADMIN — PANTOPRAZOLE SODIUM 40 MG: 40 INJECTION, POWDER, FOR SOLUTION INTRAVENOUS at 15:19

## 2024-01-31 RX ADMIN — HEPARIN SODIUM 5000 UNITS: 5000 INJECTION INTRAVENOUS; SUBCUTANEOUS at 22:28

## 2024-01-31 RX ADMIN — FUROSEMIDE 40 MG: 10 INJECTION, SOLUTION INTRAMUSCULAR; INTRAVENOUS at 22:31

## 2024-01-31 RX ADMIN — CEFTRIAXONE SODIUM 2000 MG: 2 INJECTION, POWDER, FOR SOLUTION INTRAMUSCULAR; INTRAVENOUS at 12:05

## 2024-01-31 RX ADMIN — BUDESONIDE 0.5 MG: 0.5 SUSPENSION RESPIRATORY (INHALATION) at 22:39

## 2024-01-31 NOTE — CONSULTS
Pulmonary / Critical Care Consult Note      Patient Name: Falguni Minaya  : 1960  MRN: [id number]  Primary Care Physician:  Williams Sage MD  Referring Physician: Greg Villeda DO  Date of admission: 2024    Subjective   Subjective     Reason for Consult/ Chief Complaint: Shortness of breath    HPI:  Falguni Minaya is a 64 y.o. female with history of multiple sclerosis, CAD, hypertension, hyperlipidemia, presented to the ED with complaints of shortness of breath.  Patient was diagnosed with COVID as outpatient on  was given course of steroids.  Today patient had worsening shortness of breath, was found to have decreased O2 saturations at home and proceeded to the ED.  Vital signs in the ED showed mild fever of 100.6, /72 she had decreased O2 saturations requiring Airvo with 60 L and 85%.  Laboratory findings showed elevated WBC at 19,000, hemoglobin 10, lactic acid 2.5, Pro-Mark 0.29, CRP 11.5, proBNP greater than 1500, creatinine 1.0, urinalysis did show 4+ bacteria with 20-50 WBCs.  Chest x-ray was done and showed some patchy infiltrates concerning for possible pneumonia.  In the ED she was given IV fluids, and fluid resuscitation.  Our service was consulted for critical care management.  On exam patient is on Airvo, mild increased respiratory rate, she is febrile, on 60 L 85% O2 sat 91%.  Patient cannot provide much history but is able to follow simple commands such as sticking tongue out.  Otherwise not answering questions.      Personal History     Past Medical History:   Diagnosis Date    Abdominal hernia     Acute ST elevation myocardial infarction (STEMI) due to occlusion of right coronary artery 2020    CHF (congestive heart failure)     DENIES CP GETS SOA WITH EXERTION. FOLLOWED BY DR ERIC/KASIA GREY. DECREASED ACTIVITY USES WALKER/ROLLATOR    CKD (chronic kidney disease)     Coronary artery disease     Dyspepsia     GERD (gastroesophageal reflux disease)      Hyperlipidemia     Hypertension     Ischemic cardiomyopathy     Multiple sclerosis        Past Surgical History:   Procedure Laterality Date    APPENDECTOMY      CARDIAC CATHETERIZATION      CARDIAC CATHETERIZATION N/A 2/24/2020    Procedure: Left Heart Cath;  Surgeon: Marlon Zamudio MD;  Location: [facility] CATH INVASIVE LOCATION;  Service: Cardiovascular;  Laterality: N/A;    CARDIAC CATHETERIZATION N/A 2/24/2020    Procedure: Stent LATRICIA coronary;  Surgeon: Marlon Zamudio MD;  Location: [facility] CATH INVASIVE LOCATION;  Service: Cardiovascular;  Laterality: N/A;    CARDIAC CATHETERIZATION  2/24/2020    Procedure: Percutaneous Manual Thrombectomy;  Surgeon: Marlon Zamudio MD;  Location: [facility] CATH INVASIVE LOCATION;  Service: Cardiovascular;;    CARDIAC CATHETERIZATION N/A 2/24/2020    Procedure: Coronary angiography;  Surgeon: Marlon Zamudio MD;  Location: [facility] CATH INVASIVE LOCATION;  Service: Cardiovascular;  Laterality: N/A;    CARDIAC CATHETERIZATION N/A 2/24/2020    Procedure: Left ventriculography;  Surgeon: Marlon Zamudio MD;  Location: [facility] CATH INVASIVE LOCATION;  Service: Cardiovascular;  Laterality: N/A;    CHOLECYSTECTOMY      COLONOSCOPY N/A 2/29/2020    Procedure: COLONOSCOPY to  cecum:;  Surgeon: Krystal Sarabia MD;  Location: [facility] ENDOSCOPY;  Service: Gastroenterology;  Laterality: N/A;  pre:  anemia and abd pain  post:  hemorrhoids, tortuous colon    ENDOSCOPY N/A 2/29/2020    Procedure: ESOPHAGOGASTRODUODENOSCOPY  with biopsies;  Surgeon: Krystal Sarabia MD;  Location: [facility] ENDOSCOPY;  Service: Gastroenterology;  Laterality: N/A;  pre:  anemia and abd pain  post:  mild gastritis,     HYSTERECTOMY      PARATHYROIDECTOMY Bilateral 10/17/2023    Procedure: NECK EXPLORATION WITH PARATHYROID ADENOMA EXCISION AND FROZEN SECTION,, RECURRENT LARYNGEAL NERVE MONITORING, INTRAOPERATIVE INTACT PTH ASSAY;  Surgeon: Ezekiel Giles MD;  Location: [facility];  Service:  ENT;  Laterality: Bilateral;    TONSILLECTOMY         Family History: family history includes Coronary artery disease in her brother; Hypertension in her sister; Thyroid disease in her mother. Otherwise pertinent FHx was reviewed and not pertinent to current issue.    Social History:  reports that she quit smoking about 14 years ago. Her smoking use included cigarettes. She started smoking about 34 years ago. She has a 10.00 pack-year smoking history. She has never used smokeless tobacco. She reports that she does not currently use alcohol. She reports that she does not use drugs.    Home Medications:  FLUoxetine, OLANZapine, albuterol sulfate HFA, aspirin, atorvastatin, carvedilol, clopidogrel, cyclobenzaprine, dexAMETHasone, furosemide, gabapentin, losartan, meloxicam, ondansetron, pantoprazole, pramipexole, and tiZANidine    Allergies:  Allergies   Allergen Reactions    Codeine Hives     Hyperactivity, nausea    Morphine Hives     Hyperactivity, nausea       Objective    Objective     Vitals:   Temp:  [99.3 °F (37.4 °C)-100.6 °F (38.1 °C)] 100.2 °F (37.9 °C)  Heart Rate:  [71-82] 82  Resp:  [22] 22  BP: (105-130)/(72-84) 130/73  Flow (L/min):  [15-60] 60    Physical Exam:  Vital Signs Reviewed   General: Critically ill, somnolent, does wake up on Airvo  HEENT:  PERRL, EOMI.  OP, nares clear  Neck:  Supple, no JVD, no thyromegaly  Chest:  good aeration, coarse crackles to auscultation bilaterally, tympanic to percussion bilaterally,  increased work of breathing noted on Airvo  CV: RRR, no MGR, pulses 2+, equal.  Abd:  Soft, NT, ND, + BS, no HSM  EXT:  no clubbing, no cyanosis, trace BLE edema  Neuro:  A&Ox3, CN grossly intact, no focal deficits.  Skin: No rashes or lesions noted    Result Review    Result Review:  I have personally reviewed the results from the time of this admission to 1/31/2024 13:29 EST and agree with these findings:  [x]  Laboratory  [x]  Microbiology  [x]  Radiology  [x]  EKG/Telemetry   [x]   Cardiology/Vascular   []  Pathology  [x]  Old records  []  Other:  Most notable findings include:   CT Head Without Contrast    Result Date: 1/31/2024    1. Mild to moderate low density in the cerebral white matter could represent small-vessel ischemic disease and or multiple sclerosis. 2. No CT evidence of acute infarction, mass or intracranial hemorrhage. 3. Small right mastoid effusion     Tucker Marcial M.D.       Electronically Signed and Approved By: Tucker Marcial M.D. on 1/31/2024 at 10:55             XR Chest 1 View    Result Date: 1/31/2024    1. Asymmetric perihilar and patchy multifocal opacities which could represent atelectasis, asymmetric edema or pneumonia.       FRANK WHITEHEAD MD       Electronically Signed and Approved By: FRANK WHITEHEAD MD on 1/31/2024 at 10:33                Lab 01/31/24  1017 01/31/24  1016   WBC 19.59*  --    HEMOGLOBIN 10.4*  --    HEMATOCRIT 32.5*  --    PLATELETS 497*  --    SODIUM 141  --    SODIUM, ARTERIAL  --  142.8   POTASSIUM 4.3  --    CHLORIDE 105  --    CO2 20.6*  --    BUN 31*  --    CREATININE 1.03*  --    GLUCOSE 98  --    GLUCOSE, ARTERIAL  --  102*   CALCIUM 8.5*  --    TOTAL PROTEIN 7.3  --    ALBUMIN 2.9*  --    GLOBULIN 4.4  --        Assessment & Plan   Assessment / Plan     Active Hospital Problems:  Active Hospital Problems    Diagnosis     **Acute hypoxemic respiratory failure      Impression:  Acute hypoxic respiratory failure requiring supplemental oxygen  Concern for severe sepsis, present on admission  COVID-pneumonia  Concern for superimposed bacterial pneumonia from unspecified organism  Urinary tract infection unspecified organism  Multiple sclerosis  Clinically significant lactic acidosis  Acute decompensated diastolic congestive heart failure coronary artery disease  Transaminitis  Leukocytosis  Lactic acidosis, clinically significant     Plan:  -Continue supplemental oxygen via Airvo  -Continue nebulizers Brovana, Pulmicort and  DuoNeb  -Initiate bronchopulmonary hygiene/I-S/os  -CT of chest ordered  -Would avoid further IV fluid resuscitation  -Continue IV diuresis 40 mg IV every 8 hours  -Echocardiogram ordered  -Trend lactic acid until clear  -Send respiratory culture if able to produce specimen  -Check MRSA PCR, urine antigens  -Send blood and urine cultures  -Continue Rocephin and azithromycin, de-escalate based on cultures  -Will initiate IV steroids, 8 mg IV Decadron daily  -Add SSI for optimal glucose control  -Periodically trend inflammatory markers  -Could use Tylenol for fever, will add as needed IV Tylenol unable to take p.o. at this time  -Trend LFTs  -Will add Protonix for stress ulcer prophylaxis  -Patient is DNR/DNI     DVT prophylaxis:  Medical DVT prophylaxis orders are present.     Code Status and Medical Interventions:   Ordered at: 01/31/24 1315     Medical Intervention Limits:    NO intubation (DNI)    NO cardioversion     Level Of Support Discussed With:    Patient     Code Status (Patient has no pulse and is not breathing):    No CPR (Do Not Attempt to Resuscitate)     Medical Interventions (Patient has pulse or is breathing):    Limited Support     Comments:    d/w pt and /family          Patient is critically ill with cute hypoxemic respiratory failure requiring Airvo, multifocal pneumonia from unspecified organism, COVID-19 pneumonia. We have personally reviewed all pertinent labs, imaging, microbiology and documentation. We have discussed care with the primary service as well as at multidisciplinary critical care rounds with the bedside nurse, respiratory therapist, pharmacist and all other ancillary services. 48 minutes of critical care time was spent managing this patient, excluding procedures. Of this time, I spent 33 minutes in accordance with split shared billing.    I, WESTLEY Franco, spent 15 minutes critical care time.    Electronically signed by WESTLEY Leblanc, 01/31/24, 1:29 PM  EST.  Electronically signed by Derrell Tomlin MD, 01/31/24, 3:14 PM EST.

## 2024-01-31 NOTE — H&P
[facility]    Date of admission: 1/31/2024  Patient Name: Falguni Minaya   1960  Primary Care Physician:  Williams Sage MD    Subjective     Chief Complaint   Patient presents with    Shortness of Breath    Altered Mental Status        HPI:  64 y.o. with history of MS, recurrent UTIs and systolic CHF with recovered EF following stenting in 2022 who presents with progressively worsening shortness of air.    Patient was diagnosed with COVID as an outpatient on 1/22 had not required any oxygen at that time, was given a course of steroids as she was unable to take Paxlovid with her other medications and they were concerned about further decline given her immunocompromise status in setting of her MS.  States had been slightly better for time while on that and then after stopping seems like her energy declined and she continue to get worse.  Patient on room air baseline no history of COPD or asthma does have a remote history of smoking about 25 years ago.  She is apparently extremely hypoxic at home today and presented for further evaluation.    Patient had UTI about a month ago, had received treatment for that at that time with initial improvement, denies any dysuria currently but apparently has had complaints of incontinence recently.  She is very debilitated at baseline and given her incontinence appears to have recurrent UTI issues due to this.  Discussed with family and patient.    Patient currently on Airvo at 80% FiO2 at time of my evaluation.  Discussed CODE STATUS with patient and family she has been consistent by this in the past and again reiterates that she would not want to be intubated or have CPR and wishes to be DNR/DNI but otherwise full treatment at this time.    In terms of her MS she has been on chronic medication for this follows with a specialist at [facility], was recently planned to switch to a new treatment but given her issues with COVID and other  problems was currently off of any specific treatment for this while pending recovery from her COVID-19 infection.    Has a history of seizures has not had one in about 2 years and has been stable on her outpatient therapy    PFSH notable for:       Active Ambulatory Problems     Diagnosis Date Noted    Multiple sclerosis 02/25/2020    Left sided abdominal pain 02/28/2020    Dyspepsia 02/28/2020    Heme positive stool 02/28/2020    Hypotension 04/09/2020    Seizure disorder 01/08/2016    Pain in lower back 03/21/2017    Headache 01/05/2015    Frequent falls 05/05/2015    Spasm 05/05/2015    Weakness of right leg 03/21/2017    S/P drug eluting coronary stent placement 07/29/2020    Coronary artery disease involving native coronary artery of native heart 07/29/2020    Ischemic cardiomyopathy 07/29/2020    Pleural effusion due to CHF (congestive heart failure) 07/29/2020    Shortness of breath 07/06/2022    Orthopnea 07/06/2022    Hyperlipidemia LDL goal <70 07/06/2022    Anemia due to stage 3 chronic kidney disease 01/11/2023    Iron deficiency 01/11/2023    Anemia in chronic kidney disease 12/22/2022    Arthropathy of lumbar facet joint 09/26/2022    Marie's esophagus 10/02/2023    Benign colonic polyp 10/02/2023    Congestive heart failure 01/18/2021    Degeneration of lumbar intervertebral disc 08/29/2022    Gastroesophageal reflux disease 07/26/2022    Gastroparesis 10/02/2023    Lumbar radiculopathy 11/11/2022    Osteoporosis 03/09/2023    Sacroiliac joint pain 03/06/2023    Serum creatinine raised 01/18/2021    Vitamin D deficiency 05/18/2021     Resolved Ambulatory Problems     Diagnosis Date Noted    Acute ST elevation myocardial infarction (STEMI) due to occlusion of right coronary artery 02/24/2020    MACY (acute kidney injury) 04/09/2020    Hyperparathyroidism 09/18/2023    Hypercalcemia 09/18/2023    Parathyroid adenoma 09/18/2023    Thyroid nodule 09/18/2023    Abdominal hernia 10/02/2023     Hyperparathyroidism 10/17/2023     Past Medical History:   Diagnosis Date    CHF (congestive heart failure)     CKD (chronic kidney disease)     Coronary artery disease     GERD (gastroesophageal reflux disease)     Hyperlipidemia     Hypertension        Past Surgical History:   Procedure Laterality Date    APPENDECTOMY      CARDIAC CATHETERIZATION      CARDIAC CATHETERIZATION N/A 2/24/2020    Procedure: Left Heart Cath;  Surgeon: Marlon Zamudio MD;  [facility];  Service: Cardiovascular;  Laterality: N/A;    CARDIAC CATHETERIZATION N/A 2/24/2020    Procedure: Stent LATRICIA coronary;  Surgeon: Marlon Zamudio MD;  [facility];  Service: Cardiovascular;  Laterality: N/A;    CARDIAC CATHETERIZATION  2/24/2020    Procedure: Percutaneous Manual Thrombectomy;  Surgeon: Marlon Zamudio MD;  [facility];  Service: Cardiovascular;;    CARDIAC CATHETERIZATION N/A 2/24/2020    Procedure: Coronary angiography;  Surgeon: Marlon Zamudio MD;  [facility];  Service: Cardiovascular;  Laterality: N/A;    CARDIAC CATHETERIZATION N/A 2/24/2020    Procedure: Left ventriculography;  Surgeon: Marlon Zamudio MD;  [facility];  Service: Cardiovascular;  Laterality: N/A;    CHOLECYSTECTOMY      COLONOSCOPY N/A 2/29/2020    Procedure: COLONOSCOPY to  cecum:;  Surgeon: Krystal Sarabia MD;  [facility];  Service: Gastroenterology;  Laterality: N/A;  pre:  anemia and abd pain  post:  hemorrhoids, tortuous colon    ENDOSCOPY N/A 2/29/2020    Procedure: ESOPHAGOGASTRODUODENOSCOPY  with biopsies;  Surgeon: Krystal Sarabia MD;  Location: [facility] ENDOSCOPY;  Service: Gastroenterology;  Laterality: N/A;  pre:  anemia and abd pain  post:  mild gastritis,     HYSTERECTOMY      PARATHYROIDECTOMY Bilateral 10/17/2023    Procedure: NECK EXPLORATION WITH PARATHYROID ADENOMA  EXCISION AND FROZEN SECTION,, RECURRENT LARYNGEAL NERVE MONITORING, INTRAOPERATIVE INTACT PTH ASSAY;  Surgeon: Ezekiel Giles MD;  Location: [facility] MAIN OR;  Service: ENT;  Laterality: Bilateral;    TONSILLECTOMY          Family History   Problem Relation Age of Onset    Thyroid disease Mother     Hypertension Sister     Coronary artery disease Brother        Social History     Socioeconomic History    Marital status:    Tobacco Use    Smoking status: Former     Packs/day: 0.50     Years: 20.00     Additional pack years: 0.00     Total pack years: 10.00     Types: Cigarettes     Start date:      Quit date:      Years since quittin.0    Smokeless tobacco: Never    Tobacco comments:     CAFFEINE USE: 2 CUPS COFFEE DAILY   Vaping Use    Vaping Use: Never used   Substance and Sexual Activity    Alcohol use: Not Currently    Drug use: Never    Sexual activity: Defer         Objective     Vitals:   Temp:  [99.3 °F (37.4 °C)-100.6 °F (38.1 °C)] 100.2 °F (37.9 °C)  Heart Rate:  [71-82] 82  Resp:  [22] 22  BP: (105-130)/(72-84) 130/73  Flow (L/min):  [15-60] 60    Physical Exam   Ill-appearing tired in bed family at bedside  Dyspneic with conversation, tachypneic, diminished bases with faint crackles  Elevated rate regular rhythm no lower extremity pitting edema  Abdomen soft nontender nondistended  Generalized weakness  Alert and oriented answering basic questions appropriately, dyspnea and lethargy slightly limiting    Result Review    Vital signs, labs and any relevant imaging reviewed.     Initial ABG 7.5 pCO2 of 23 pO2 65  BNP 1536  Creatinine 1.03  AST 44 ALT 41  Lactic 2.5  White count of 19.59 hemoglobin 10.4 platelets of 497  UA with notable pyuria 4+ bacteria nitrate positive with moderate leuk esterase    ECHO from 22 EF 62% (previously reduced 3/2020 was 30%)    Assessment / Plan     Acute hypoxemic respiratory failure requiring Airvo  Sepsis, present on admission, in setting of UTI and  suspected bacterial pneumonia  CHF previously reduced now with recovered EF of 62% with acute exacerbation  CAD with history of PCI to RCA   COVID-19 infection on 1/22  Immunocompromised in setting of multiple sclerosis currently in limbo between treatments (most recently on Vumerity)   Anion gap metabolic acidosis  Transaminitis  History of hypertension with low blood pressure on arrival  CKD possibly 3 baseline creatinine 1.1-1.3  Seizure disorder  GERD  Anxiety/depression  Hyperlipidemia  Morbid obesity      Admit to ICU given critical hypoxia  Check echo  BNP elevated, appears to have pulmonary edema component on chest x-ray, give IV Lasix, monitor lactic  Is on day 9 from initial COVID-19 positive testing, still having fevers suspect from UTI, likely bacterial pneumonia/superinfection will continue isolation for now did have outpatient dexamethasone course, continue steroids likely based on results of additional testing  Follow-up CT chest  Placed on scheduled nebulizers, respiratory hygiene, blood cultures, empiric ceftriaxone azithromycin for now for pneumonia and UTI  Hold home blood pressure medications  Continue home aspirin and Plavix  Hold home statin for now, LFTs mildly elevated, per labs from December 2023 this was normal AST and ALT but alk phos has been mildly elevated intermittently.  Repeat in am  Cont home seizure meds once reconciled - current list only mentions gabapentin which suspect is more for her neuropathy  Continue additional home medications as appropriate/once reconciled  Check am cbc, bmp, mg, phos lfts  discussed initial management and workup with ED provider and pulmonology   based on the above problems patient warrants hospitalization for continued evaluation, treatment and monitoring as high risk for further decompensation    DVT prophylaxis:  Medical DVT prophylaxis orders are present.      Code: DNR/DNI  Diet: As tolerated    CBC          11/6/2023    11:51 12/24/2023    12:19  1/31/2024    10:17   CBC   WBC 7.54  5.25  19.59    RBC 3.34  3.52  3.33    Hemoglobin 9.4  10.2  10.4    Hematocrit 28.9  32.8  32.5    MCV 86.5  93.2  97.6    MCH 28.1  29.0  31.2    MCHC 32.5  31.1  32.0    RDW 14.1  17.1  18.3    Platelets 450  485  497        CMP          11/6/2023    11:51 12/24/2023    12:19 1/31/2024    10:16 1/31/2024    10:17   CMP   Glucose 117  116  102  98    BUN 8  8   31    Creatinine 0.93  1.12   1.03    EGFR 68.8  55.0   60.5    Sodium 141  142  142.8  141    Potassium 3.4  2.8   4.3    Chloride 104  103   105    Calcium 8.4  8.2   8.5    Total Protein 7.1  7.4   7.3    Albumin 3.1  2.9   2.9    Globulin 4.0  4.5   4.4    Total Bilirubin 0.3  0.3   0.4    Alkaline Phosphatase 147  179   119    AST (SGOT) 14  19   44    ALT (SGPT) 9  9   41    Albumin/Globulin Ratio 0.8  0.6   0.7    BUN/Creatinine Ratio 8.6  7.1   30.1    Anion Gap 12.0  14.5   15.4      Patient is critically ill due to ahrf, sepsis, and mult issues as above.  I have spent >35 minutes of critical care time reviewing documentation, pertinent labs, imaging studies, examining the patient, modifying care plan, and discussing patient's condition and care plan with the patient, nursing family, pulmonology.

## 2024-01-31 NOTE — ED PROVIDER NOTES
Time: 10:21 AM EST  Date of encounter:  1/31/2024  Independent Historian/Clinical History and Information was obtained by:   Family and EMS    History is limited by: Altered Mental Status    Chief Complaint: Short of breath, altered mental status      History of Present Illness:  Patient is a 64 y.o. year old female who presents to the emergency department for evaluation of shortness of breath and altered mental status.'s were called to the home for altered mental status.  Patient has history of multiple sclerosis.  O2 sats per EMS were 78% at home.  Patient was put on nonrebreather prior to arrival.    HPI    Patient Care Team  Primary Care Provider: Williams Sage MD    Past Medical History:     Allergies   Allergen Reactions    Codeine Hives     Hyperactivity, nausea    Morphine Hives     Hyperactivity, nausea     Past Medical History:   Diagnosis Date    Abdominal hernia     Acute ST elevation myocardial infarction (STEMI) due to occlusion of right coronary artery 02/24/2020    CHF (congestive heart failure)     DENIES CP GETS SOA WITH EXERTION. FOLLOWED BY DR ERIC/KASIA GREY. DECREASED ACTIVITY USES WALKER/ROLLATOR    CKD (chronic kidney disease)     Coronary artery disease     Dyspepsia     GERD (gastroesophageal reflux disease)     Hyperlipidemia     Hypertension     Ischemic cardiomyopathy     Multiple sclerosis      Past Surgical History:   Procedure Laterality Date    APPENDECTOMY      CARDIAC CATHETERIZATION      CARDIAC CATHETERIZATION N/A 2/24/2020    Procedure: Left Heart Cath;  Surgeon: Marlon Zamudio MD;  Location: [facility] CATH INVASIVE LOCATION;  Service: Cardiovascular;  Laterality: N/A;    CARDIAC CATHETERIZATION N/A 2/24/2020    Procedure: Stent LATRICIA coronary;  Surgeon: Marlon Zamudio MD;  Location: [facility] CATH INVASIVE LOCATION;  Service: Cardiovascular;  Laterality: N/A;    CARDIAC CATHETERIZATION  2/24/2020    Procedure: Percutaneous Manual Thrombectomy;  Surgeon: Lizz  Marlon MOBLEY MD;  Location: [facility] CATH INVASIVE LOCATION;  Service: Cardiovascular;;    CARDIAC CATHETERIZATION N/A 2/24/2020    Procedure: Coronary angiography;  Surgeon: Marlon Zamudio MD;  Location: [facility] CATH INVASIVE LOCATION;  Service: Cardiovascular;  Laterality: N/A;    CARDIAC CATHETERIZATION N/A 2/24/2020    Procedure: Left ventriculography;  Surgeon: Marlon Zamudio MD;  Location: [facility] CATH INVASIVE LOCATION;  Service: Cardiovascular;  Laterality: N/A;    CHOLECYSTECTOMY      COLONOSCOPY N/A 2/29/2020    Procedure: COLONOSCOPY to  cecum:;  Surgeon: Krystal Sarabia MD;  Location: [facility] ENDOSCOPY;  Service: Gastroenterology;  Laterality: N/A;  pre:  anemia and abd pain  post:  hemorrhoids, tortuous colon    ENDOSCOPY N/A 2/29/2020    Procedure: ESOPHAGOGASTRODUODENOSCOPY  with biopsies;  Surgeon: Krystal Sarabia MD;  Location: [facility] ENDOSCOPY;  Service: Gastroenterology;  Laterality: N/A;  pre:  anemia and abd pain  post:  mild gastritis,     HYSTERECTOMY      PARATHYROIDECTOMY Bilateral 10/17/2023    Procedure: NECK EXPLORATION WITH PARATHYROID ADENOMA EXCISION AND FROZEN SECTION,, RECURRENT LARYNGEAL NERVE MONITORING, INTRAOPERATIVE INTACT PTH ASSAY;  Surgeon: Ezekiel Giles MD;  Location: [facility] (formerly [facility]);  Service: ENT;  Laterality: Bilateral;    TONSILLECTOMY       Family History   Problem Relation Age of Onset    Thyroid disease Mother     Hypertension Sister     Coronary artery disease Brother        Home Medications:  Prior to Admission medications    Medication Sig Start Date End Date Taking? Authorizing Provider   albuterol sulfate  (90 Base) MCG/ACT inhaler Inhale 2 puffs Every 4 (Four) Hours As Needed for Wheezing. 1/22/24   Rob Whitlock DO   aspirin 81 MG chewable tablet Chew 1 tablet Daily. 10/25/23   Ezekiel Giles MD   atorvastatin (LIPITOR) 40 MG tablet TAKE 1 TABLET BY MOUTH  EVERY NIGHT AT BEDTIME  Patient taking differently: Take 1 tablet by mouth Daily. 5/17/23    Keith Riley MD   carvedilol (COREG) 12.5 MG tablet TAKE 1 TABLET BY MOUTH  EVERY 12 HOURS  Patient taking differently: Take 1 tablet by mouth Every 12 (Twelve) Hours. 5/30/23   Shaila Urban APRN   clopidogrel (PLAVIX) 75 MG tablet Take 1 tablet by mouth Daily. 10/23/23   Ezekiel Giles MD   cyclobenzaprine (FLEXERIL) 10 MG tablet Take 1 tablet by mouth 3 (Three) Times a Day As Needed for Muscle Spasms.    ProviderRosas MD   dexAMETHasone (DECADRON) 6 MG tablet Take 1 tablet by mouth Daily. 1/22/24   Rob Whitlock DO   FLUoxetine (PROzac) 40 MG capsule Take 1 capsule by mouth 2 (Two) Times a Day. 7/25/22   Emergency, Nurse Epic, RN   furosemide (LASIX) 40 MG tablet TAKE 1 TABLET BY MOUTH  DAILY 5/17/23   Keith Riley MD   gabapentin (NEURONTIN) 300 MG capsule Take 1 capsule by mouth 4 (Four) Times a Day.    Provider, MD Rosas   losartan (COZAAR) 25 MG tablet TAKE 1 TABLET BY MOUTH  DAILY  Patient taking differently: Take 1 tablet by mouth Daily. 5/8/23   Keith Riley MD   meloxicam (MOBIC) 15 MG tablet Take 1 tablet by mouth Daily As Needed for Moderate Pain. 10/25/23   Ezekiel Giles MD   OLANZapine (zyPREXA) 2.5 MG tablet Take 1 tablet by mouth Daily.    Provider, MD Rosas   ondansetron (ZOFRAN) 4 MG tablet Take 1 tablet by mouth Every 6 (Six) Hours As Needed for Nausea or Vomiting. 7/22/22   Emergency, Nurse SIDNEY Gar   ondansetron ODT (ZOFRAN-ODT) 4 MG disintegrating tablet Place 1 tablet on the tongue Every 8 (Eight) Hours As Needed for Nausea or Vomiting. 12/24/23   Daysi Darnell PA-C   pantoprazole (PROTONIX) 40 MG EC tablet Take 1 tablet by mouth Daily. 7/26/22   Emergency, Nurse Cong RN   pramipexole (MIRAPEX) 0.5 MG tablet Take 1 tablet by mouth every night at bedtime. 9/22/23   Rosas Meier MD   tiZANidine (ZANAFLEX) 2 MG tablet Take 1 tablet by mouth 3 (Three) Times a Day As Needed. for muscle spams 7/22/22   Emergency, Nurse Cong, RN     "    Social History:   Social History     Tobacco Use    Smoking status: Former     Packs/day: 0.50     Years: 20.00     Additional pack years: 0.00     Total pack years: 10.00     Types: Cigarettes     Start date:      Quit date:      Years since quittin.0    Smokeless tobacco: Never    Tobacco comments:     CAFFEINE USE: 2 CUPS COFFEE DAILY   Vaping Use    Vaping Use: Never used   Substance Use Topics    Alcohol use: Not Currently    Drug use: Never         Review of Systems:  Review of Systems   Respiratory:  Positive for shortness of breath.    Psychiatric/Behavioral:  Positive for confusion.         Physical Exam:  /82   Pulse 68   Temp 98.8 °F (37.1 °C) (Oral)   Resp 18   Ht 152.4 cm (60\")   Wt 85 kg (187 lb 6.3 oz)   SpO2 97%   BMI 36.60 kg/m²     Physical Exam  Vitals and nursing note reviewed.   Constitutional:       General: She is not in acute distress.     Comments: Patient is arousable to verbal stimuli.   HENT:      Head: Normocephalic and atraumatic.   Eyes:      Extraocular Movements: Extraocular movements intact.   Cardiovascular:      Rate and Rhythm: Normal rate and regular rhythm.      Heart sounds: Normal heart sounds.   Pulmonary:      Effort: Pulmonary effort is normal. No respiratory distress.      Breath sounds: Normal breath sounds.   Abdominal:      General: Abdomen is flat.      Palpations: Abdomen is soft.      Tenderness: There is no abdominal tenderness.   Musculoskeletal:         General: Normal range of motion.      Cervical back: Normal range of motion and neck supple.   Skin:     General: Skin is warm and dry.   Neurological:      Mental Status: Mental status is at baseline. She is disoriented.                  Procedures:  Procedures      Medical Decision Making:      Comorbidities that affect care:    Multiple sclerosis, chronic kidney disease, Congestive Heart Failure, Hypertension, ischemic cardiomyopathy    External Notes reviewed:    Previous Clinic " Note: Patient seen 1/20/2024 in office after a home COVID test was positive      The following orders were placed and all results were independently analyzed by me:  Orders Placed This Encounter   Procedures    Blood Culture - Blood,    Blood Culture - Blood,    Urine Culture - Urine,    Legionella Antigen, Urine - Urine, Urine, Clean Catch    S. Pneumo Ag Urine or CSF - Urine, Urine, Clean Catch    Respiratory Culture - Sputum, Cough    MRSA Screen, PCR (Inpatient) - Swab, Nares    XR Chest 1 View    CT Head Without Contrast    CT Chest Without Contrast Diagnostic    XR Chest 1 View    [place] Draw    Comprehensive Metabolic Panel    BNP    Single High Sensitivity Troponin T    CBC Auto Differential    Lactic Acid, Plasma    Urinalysis With Culture If Indicated - Straight Cath    ABG with Co-Ox and Electrolytes    Urinalysis, Microscopic Only - Urine, Clean Catch    STAT Lactic Acid, Reflex    Procalcitonin    Sedimentation Rate    C-reactive Protein    Procalcitonin    Magnesium    Phosphorus    Comprehensive Metabolic Panel    STAT Lactic Acid, Reflex    CBC Auto Differential    Bilirubin, Direct    Diet: Regular/House Diet, Cardiac Diets, Fluid Restriction (240 mL/tray) Diets; Low Sodium (2g); 1500 mL/day; Texture: Mechanical Ground (NDD 2); Fluid Consistency: Thin (IDDSI 0)    Undress & Gown    Continuous Pulse Oximetry    Vital Signs    Apply Heat To Affected Area    Vital Signs    Notify Provider (With Default Parameters)    Activity - Ad Sandrine    Up in Chair    Intake & Output    Weigh Patient    Oral Care    Saline Lock & Maintain IV Access    Discontinue Insulin Infusion at Specified Time After Basal Dose Administered    Discontinue Glucommander IV Insulin Order Set After Transition Complete    Discontinue Glucommander After Transition Complete    Up In Chair    Code Status and Medical Interventions:    Inpatient Hospitalist Consult    Inpatient Pulmonology Consult    Inpatient Case Management Social  Services Consult    Inpatient Palliative Care Team Consult    Dietary Nutrition Supplements Boost Plus (Ensure Plus)    OT Consult: Eval & Treat    PT Consult: Eval & Treat Discharge Placement Assessment    Oxygen Therapy- Nasal Cannula; Titrate 1-6 LPM Per SpO2; 90 - 95%    Incentive Spirometry    Oscillating Positive Expiratory Pressure (OPEP)    RT to Initiate Bronchopulmonary Hygiene Protocol    SLP Consult: Eval & Treat RN Dysphagia Screen Failed    SLP Consult: Eval & Treat RN Dysphagia Screen Failed    POC Glucose 4x Daily Before Meals & at Bedtime    POC Glucose Once    POC Glucose Once    ECG 12 Lead ED Triage Standing Order; SOA    Adult Transthoracic Echo Complete W/ Cont if Necessary Per Protocol    Insert Peripheral IV    Insert Peripheral IV    Inpatient Admission    CBC & Differential    Green Top (Gel)    Lavender Top    Gold Top - SST    Light Blue Top    CBC & Differential       Medications Given in the Emergency Department:  Medications   sodium chloride 0.9 % flush 10 mL (has no administration in time range)   cefTRIAXone (ROCEPHIN) 2000 mg/100 mL 0.9% NS IVPB (MBP) (2,000 mg Intravenous New Bag 2/1/24 0925)   furosemide (LASIX) injection 40 mg (40 mg Intravenous Given 2/1/24 1336)   arformoterol (BROVANA) nebulizer solution 15 mcg (15 mcg Nebulization Given 2/1/24 0722)   budesonide (PULMICORT) nebulizer solution 0.5 mg (0.5 mg Nebulization Given 2/1/24 0722)   sodium chloride nasal spray 2 spray (has no administration in time range)   guaiFENesin-dextromethorphan (ROBITUSSIN DM) 100-10 MG/5ML syrup 5 mL (has no administration in time range)   benzonatate (TESSALON) capsule 100 mg (has no administration in time range)   albuterol (PROVENTIL) nebulizer solution 0.083% 2.5 mg/3mL (has no administration in time range)   sodium chloride 0.9 % flush 10 mL (10 mL Intravenous Given 2/1/24 0925)   sodium chloride 0.9 % flush 10 mL (has no administration in time range)   acetaminophen (TYLENOL) tablet  650 mg (650 mg Oral Given 2/1/24 1336)   ondansetron (ZOFRAN) injection 4 mg (has no administration in time range)   heparin (porcine) 5000 UNIT/ML injection 5,000 Units (5,000 Units Subcutaneous Given 2/1/24 1336)   sennosides-docusate (PERICOLACE) 8.6-50 MG per tablet 1 tablet (1 tablet Oral Not Given 2/1/24 0904)     And   polyethylene glycol (MIRALAX) packet 17 g (has no administration in time range)     And   bisacodyl (DULCOLAX) EC tablet 5 mg (has no administration in time range)     And   bisacodyl (DULCOLAX) suppository 10 mg (has no administration in time range)   dexAMETHasone sodium phosphate injection 8 mg (8 mg Intravenous Given 2/1/24 0925)   dextrose (GLUTOSE) oral gel 15 g (has no administration in time range)   dextrose (D50W) (25 g/50 mL) IV injection 25 g (has no administration in time range)   glucagon (GLUCAGEN) injection 1 mg (has no administration in time range)   Insulin Lispro (humaLOG) injection 2-7 Units (2 Units Subcutaneous Given 2/1/24 1753)   pantoprazole (PROTONIX) injection 40 mg (40 mg Intravenous Given 2/1/24 0525)   acetaminophen (OFIRMEV) injection 1,000 mg (1,000 mg Intravenous Given 2/1/24 0330)   clopidogrel (PLAVIX) tablet 75 mg (75 mg Oral Given 2/1/24 0925)   carvedilol (COREG) tablet 12.5 mg ( Oral Dose Auto Held 2/16/24 1715)   atorvastatin (LIPITOR) tablet 40 mg ( Oral Dose Auto Held 2/16/24 0900)   aspirin chewable tablet 81 mg (81 mg Oral Given 2/1/24 0926)   FLUoxetine (PROzac) capsule 40 mg (40 mg Oral Given 2/1/24 0925)   cyclobenzaprine (FLEXERIL) tablet 10 mg ( Oral Held by provider 1/31/24 1627)   gabapentin (NEURONTIN) capsule 300 mg ( Oral Dose Auto Held 2/16/24 2100)   losartan (COZAAR) tablet 25 mg ( Oral Dose Auto Held 2/16/24 0900)   OLANZapine (zyPREXA) tablet 2.5 mg (2.5 mg Oral Given 2/1/24 0926)   pramipexole (MIRAPEX) tablet 0.5 mg (0.5 mg Oral Given 1/31/24 2228)   saccharomyces boulardii (FLORASTOR) capsule 250 mg (250 mg Oral Given 2/1/24 0926)    azithromycin (ZITHROMAX) tablet 500 mg (500 mg Oral Given 2/1/24 0926)   sodium chloride 0.9 % bolus 1,000 mL (1,000 mL Intravenous New Bag 1/31/24 1205)   cefTRIAXone (ROCEPHIN) 2000 mg/100 mL 0.9% NS IVPB (MBP) (2,000 mg Intravenous New Bag 1/31/24 1205)   AZITHROMYCIN 500 MG/250 ML 0.9% NS IVPB (vial-mate) (500 mg Intravenous New Bag 1/31/24 1537)   potassium chloride (MICRO-K/KLOR-CON) CR capsule (40 mEq Oral Given 2/1/24 0751)        ED Course:         Labs:    Lab Results (last 24 hours)       Procedure Component Value Units Date/Time    POC Glucose 4x Daily Before Meals & at Bedtime [[id number]]  (Abnormal) Collected: 01/31/24 2232    Specimen: Blood Updated: 01/31/24 2235     Glucose 117 mg/dL      Comment: Serial Number: [id number]:  [id number]       CBC & Differential [[id number]]  (Abnormal) Collected: 02/01/24 0313    Specimen: Blood from Hand, Left Updated: 02/01/24 0340    Narrative:      The following orders were created for panel order CBC & Differential.  Procedure                               Abnormality         Status                     ---------                               -----------         ------                     CBC Auto Differential[[id number]]        Abnormal            Final result                 Please view results for these tests on the individual orders.    Magnesium [[id number]]  (Normal) Collected: 02/01/24 0313    Specimen: Blood from Hand, Left Updated: 02/01/24 0401     Magnesium 2.0 mg/dL     Phosphorus [[id number]]  (Normal) Collected: 02/01/24 0313    Specimen: Blood from Hand, Left Updated: 02/01/24 0401     Phosphorus 4.2 mg/dL     Comprehensive Metabolic Panel [[id number]]  (Abnormal) Collected: 02/01/24 0313    Specimen: Blood from Hand, Left Updated: 02/01/24 0401     Glucose 121 mg/dL      BUN 30 mg/dL      Creatinine 0.99 mg/dL      Sodium 138 mmol/L      Potassium 3.7 mmol/L      Comment: Slight hemolysis detected by analyzer. Result may be falsely elevated.         Chloride 104 mmol/L      CO2 18.1 mmol/L      Calcium 8.6 mg/dL      Total Protein 7.4 g/dL      Albumin 2.7 g/dL      ALT (SGPT) 36 U/L      AST (SGOT) 35 U/L      Alkaline Phosphatase 114 U/L      Total Bilirubin 0.4 mg/dL      Globulin 4.7 gm/dL      A/G Ratio 0.6 g/dL      BUN/Creatinine Ratio 30.3     Anion Gap 15.9 mmol/L      eGFR 63.4 mL/min/1.73     Narrative:      GFR Normal >60  Chronic Kidney Disease <60  Kidney Failure <15      CBC Auto Differential [[id number]]  (Abnormal) Collected: 02/01/24 0313    Specimen: Blood from Hand, Left Updated: 02/01/24 0340     WBC 13.73 10*3/mm3      RBC 3.19 10*6/mm3      Hemoglobin 9.8 g/dL      Hematocrit 30.7 %      MCV 96.2 fL      MCH 30.7 pg      MCHC 31.9 g/dL      RDW 18.2 %      RDW-SD 64.0 fl      MPV 10.9 fL      Platelets 435 10*3/mm3      Neutrophil % 91.0 %      Lymphocyte % 4.1 %      Monocyte % 4.1 %      Eosinophil % 0.0 %      Basophil % 0.1 %      Immature Grans % 0.7 %      Neutrophils, Absolute 12.50 10*3/mm3      Lymphocytes, Absolute 0.56 10*3/mm3      Monocytes, Absolute 0.56 10*3/mm3      Eosinophils, Absolute 0.00 10*3/mm3      Basophils, Absolute 0.01 10*3/mm3      Immature Grans, Absolute 0.10 10*3/mm3      nRBC 0.0 /100 WBC     Bilirubin, Direct [[id number]]  (Normal) Collected: 02/01/24 0313    Specimen: Blood from Hand, Left Updated: 02/01/24 0401     Bilirubin, Direct <0.2 mg/dL     POC Glucose 4x Daily Before Meals & at Bedtime [[id number]]  (Normal) Collected: 02/01/24 0750    Specimen: Blood Updated: 02/01/24 0752     Glucose 90 mg/dL      Comment: Serial Number: [id number]:  [id number]       POC Glucose 4x Daily Before Meals & at Bedtime [[id number]]  (Abnormal) Collected: 02/01/24 1156    Specimen: Blood Updated: 02/01/24 1158     Glucose 139 mg/dL      Comment: Serial Number: [id number]:  [id number]       POC Glucose Once [[id number]]  (Abnormal) Collected: 02/01/24 1610    Specimen: Blood Updated: 02/01/24 1612      Glucose 160 mg/dL      Comment: Serial Number: [id number]:  [id number]                Imaging:    XR Chest 1 View    Result Date: 2/1/2024  PROCEDURE: XR CHEST 1 VW  COMPARISON: [facility], CT, CT HEAD WO CONTRAST, 1/31/2024, 10:35.  [facility], CR, XR CHEST 1 VW, 12/24/2023, 12:17.  [facility], CR, XR CHEST 1 VW, 1/31/2024, 10:31.  [facility], CT, CT CHEST WO CONTRAST DIAGNOSTIC, 1/31/2024, 13:50.  INDICATIONS: Hypoxia, follow-up lung infiltrates  FINDINGS:  Heart size appears within normal limits.  Mediastinal contour also appears grossly normal.  No significant effusion or pneumothorax.  Patchy bilateral opacities, most prominent in the right lung and peripheral left upper lobe, similar in appearance to the most recent prior chest radiograph and chest CT.  No significant new consolidation.        Similar appearance of patchy bilateral opacities, right greater than left, which may represent pneumonia.       JONI PATEL MD       Electronically Signed and Approved By: JONI PATEL MD on 2/01/2024 at 8:41                Differential Diagnosis and Discussion:    Dyspnea: Differential diagnosis includes but is not limited to metabolic acidosis, neurological disorders, psychogenic, asthma, pneumothorax, upper airway obstruction, COPD, pneumonia, noncardiogenic pulmonary edema, interstitial lung disease, anemia, congestive heart failure, and pulmonary embolism    All labs were reviewed and interpreted by me.  All X-rays impressions were independently interpreted by me.  EKG was interpreted by me.  CT scan radiology impression was interpreted by me.    MDM  Started on IV antibiotics for an acute urinary tract infection.  She will be admitted to the hospital service.      Patient Care Considerations:        Consultants/Shared Management Plan:    Hospitalist: I have discussed the case with  who agrees to accept the patient for admission.    Social  Determinants of Health:    Patient has presented with family members who are responsible, reliable and will ensure follow up care.      Disposition and Care Coordination:    Admit:   Through independent evaluation of the patient's history, physical, and imperical data, the patient meets criteria for inpatient admission to the hospital.        Final diagnoses:   Altered mental status, unspecified altered mental status type   Acute urinary tract infection        ED Disposition       ED Disposition   Decision to Admit    Condition   --    Comment   Level of Care: Critical Care [6]   Diagnosis: Acute hypoxemic respiratory failure [[id number]]   Admitting Physician: CHARU DE OLIVEIRA [[id number]]   Attending Physician: CHARU DE OLIVEIRA [[id number]]   Isolate for COVID?: Yes [1]   Certification: I Certify That Inpatient Hospital Services Are Medically Necessary For Greater Than 2 Midnights                 This medical record created using voice recognition software.             Greg Villeda DO  02/01/24 5537

## 2024-02-01 ENCOUNTER — APPOINTMENT (OUTPATIENT)
Dept: GENERAL RADIOLOGY | Facility: HOSPITAL | Age: 64
End: 2024-02-01
Payer: MEDICARE

## 2024-02-01 LAB
ALBUMIN SERPL-MCNC: 2.7 G/DL (ref 3.5–5.2)
ALBUMIN/GLOB SERPL: 0.6 G/DL
ALP SERPL-CCNC: 114 U/L (ref 39–117)
ALT SERPL W P-5'-P-CCNC: 36 U/L (ref 1–33)
ANION GAP SERPL CALCULATED.3IONS-SCNC: 15.9 MMOL/L (ref 5–15)
AST SERPL-CCNC: 35 U/L (ref 1–32)
BASOPHILS # BLD AUTO: 0.01 10*3/MM3 (ref 0–0.2)
BASOPHILS NFR BLD AUTO: 0.1 % (ref 0–1.5)
BH CV ECHO MEAS - AO ROOT DIAM: 2.9 CM
BH CV ECHO MEAS - EDV(CUBED): 91.3 ML
BH CV ECHO MEAS - ESV(CUBED): 36.3 ML
BH CV ECHO MEAS - FS: 26.5 %
BH CV ECHO MEAS - IVS/LVPW: 0.82 CM
BH CV ECHO MEAS - IVSD: 0.92 CM
BH CV ECHO MEAS - LA DIMENSION: 3.4 CM
BH CV ECHO MEAS - LAT PEAK E' VEL: 7.7 CM/SEC
BH CV ECHO MEAS - LV MASS(C)D: 158 GRAMS
BH CV ECHO MEAS - LVIDD: 4.5 CM
BH CV ECHO MEAS - LVIDS: 3.3 CM
BH CV ECHO MEAS - LVPWD: 1.12 CM
BH CV ECHO MEAS - MED PEAK E' VEL: 4.4 CM/SEC
BH CV ECHO MEAS - MV A MAX VEL: 77.6 CM/SEC
BH CV ECHO MEAS - MV DEC SLOPE: 232.1 CM/SEC2
BH CV ECHO MEAS - MV DEC TIME: 0.19 SEC
BH CV ECHO MEAS - MV E MAX VEL: 44.1 CM/SEC
BH CV ECHO MEAS - MV E/A: 0.57
BH CV ECHO MEAS - RAP SYSTOLE: 5 MMHG
BH CV ECHO MEAS - RVDD: 2.39 CM
BH CV ECHO MEAS - RVSP: 36 MMHG
BH CV ECHO MEAS - TR MAX PG: 31 MMHG
BH CV ECHO MEAS - TR MAX VEL: 278.3 CM/SEC
BH CV ECHO MEASUREMENTS AVERAGE E/E' RATIO: 7.29
BILIRUB CONJ SERPL-MCNC: <0.2 MG/DL (ref 0–0.3)
BILIRUB SERPL-MCNC: 0.4 MG/DL (ref 0–1.2)
BUN SERPL-MCNC: 30 MG/DL (ref 8–23)
BUN/CREAT SERPL: 30.3 (ref 7–25)
CALCIUM SPEC-SCNC: 8.6 MG/DL (ref 8.6–10.5)
CHLORIDE SERPL-SCNC: 104 MMOL/L (ref 98–107)
CO2 SERPL-SCNC: 18.1 MMOL/L (ref 22–29)
CREAT SERPL-MCNC: 0.99 MG/DL (ref 0.57–1)
DEPRECATED RDW RBC AUTO: 64 FL (ref 37–54)
EGFRCR SERPLBLD CKD-EPI 2021: 63.4 ML/MIN/1.73
EOSINOPHIL # BLD AUTO: 0 10*3/MM3 (ref 0–0.4)
EOSINOPHIL NFR BLD AUTO: 0 % (ref 0.3–6.2)
ERYTHROCYTE [DISTWIDTH] IN BLOOD BY AUTOMATED COUNT: 18.2 % (ref 12.3–15.4)
GLOBULIN UR ELPH-MCNC: 4.7 GM/DL
GLUCOSE BLDC GLUCOMTR-MCNC: 139 MG/DL (ref 70–99)
GLUCOSE BLDC GLUCOMTR-MCNC: 158 MG/DL (ref 70–99)
GLUCOSE BLDC GLUCOMTR-MCNC: 160 MG/DL (ref 70–99)
GLUCOSE BLDC GLUCOMTR-MCNC: 90 MG/DL (ref 70–99)
GLUCOSE SERPL-MCNC: 121 MG/DL (ref 65–99)
HCT VFR BLD AUTO: 30.7 % (ref 34–46.6)
HGB BLD-MCNC: 9.8 G/DL (ref 12–15.9)
IMM GRANULOCYTES # BLD AUTO: 0.1 10*3/MM3 (ref 0–0.05)
IMM GRANULOCYTES NFR BLD AUTO: 0.7 % (ref 0–0.5)
LYMPHOCYTES # BLD AUTO: 0.56 10*3/MM3 (ref 0.7–3.1)
LYMPHOCYTES NFR BLD AUTO: 4.1 % (ref 19.6–45.3)
MAGNESIUM SERPL-MCNC: 2 MG/DL (ref 1.6–2.4)
MCH RBC QN AUTO: 30.7 PG (ref 26.6–33)
MCHC RBC AUTO-ENTMCNC: 31.9 G/DL (ref 31.5–35.7)
MCV RBC AUTO: 96.2 FL (ref 79–97)
MONOCYTES # BLD AUTO: 0.56 10*3/MM3 (ref 0.1–0.9)
MONOCYTES NFR BLD AUTO: 4.1 % (ref 5–12)
NEUTROPHILS NFR BLD AUTO: 12.5 10*3/MM3 (ref 1.7–7)
NEUTROPHILS NFR BLD AUTO: 91 % (ref 42.7–76)
NRBC BLD AUTO-RTO: 0 /100 WBC (ref 0–0.2)
PHOSPHATE SERPL-MCNC: 4.2 MG/DL (ref 2.5–4.5)
PLATELET # BLD AUTO: 435 10*3/MM3 (ref 140–450)
PMV BLD AUTO: 10.9 FL (ref 6–12)
POTASSIUM SERPL-SCNC: 3.7 MMOL/L (ref 3.5–5.2)
PROT SERPL-MCNC: 7.4 G/DL (ref 6–8.5)
QT INTERVAL: 478 MS
QTC INTERVAL: 529 MS
RBC # BLD AUTO: 3.19 10*6/MM3 (ref 3.77–5.28)
SODIUM SERPL-SCNC: 138 MMOL/L (ref 136–145)
WBC NRBC COR # BLD AUTO: 13.73 10*3/MM3 (ref 3.4–10.8)

## 2024-02-01 PROCEDURE — 71045 X-RAY EXAM CHEST 1 VIEW: CPT

## 2024-02-01 PROCEDURE — 63710000001 INSULIN LISPRO (HUMAN) PER 5 UNITS: Performed by: NURSE PRACTITIONER

## 2024-02-01 PROCEDURE — 92610 EVALUATE SWALLOWING FUNCTION: CPT

## 2024-02-01 PROCEDURE — 94799 UNLISTED PULMONARY SVC/PX: CPT

## 2024-02-01 PROCEDURE — 82948 REAGENT STRIP/BLOOD GLUCOSE: CPT | Performed by: NURSE PRACTITIONER

## 2024-02-01 PROCEDURE — 99291 CRITICAL CARE FIRST HOUR: CPT | Performed by: INTERNAL MEDICINE

## 2024-02-01 PROCEDURE — 97165 OT EVAL LOW COMPLEX 30 MIN: CPT

## 2024-02-01 PROCEDURE — 25010000002 DEXAMETHASONE SODIUM PHOSPHATE 10 MG/ML SOLUTION: Performed by: NURSE PRACTITIONER

## 2024-02-01 PROCEDURE — 80053 COMPREHEN METABOLIC PANEL: CPT | Performed by: INTERNAL MEDICINE

## 2024-02-01 PROCEDURE — 83735 ASSAY OF MAGNESIUM: CPT | Performed by: INTERNAL MEDICINE

## 2024-02-01 PROCEDURE — 25010000002 HEPARIN (PORCINE) PER 1000 UNITS: Performed by: INTERNAL MEDICINE

## 2024-02-01 PROCEDURE — 84100 ASSAY OF PHOSPHORUS: CPT | Performed by: INTERNAL MEDICINE

## 2024-02-01 PROCEDURE — 25010000002 CEFTRIAXONE PER 250 MG: Performed by: INTERNAL MEDICINE

## 2024-02-01 PROCEDURE — 94664 DEMO&/EVAL PT USE INHALER: CPT

## 2024-02-01 PROCEDURE — 82248 BILIRUBIN DIRECT: CPT | Performed by: NURSE PRACTITIONER

## 2024-02-01 PROCEDURE — 25010000002 ACETAMINOPHEN 10 MG/ML SOLUTION: Performed by: NURSE PRACTITIONER

## 2024-02-01 PROCEDURE — 25010000002 FUROSEMIDE PER 20 MG: Performed by: INTERNAL MEDICINE

## 2024-02-01 PROCEDURE — 82948 REAGENT STRIP/BLOOD GLUCOSE: CPT

## 2024-02-01 PROCEDURE — 97161 PT EVAL LOW COMPLEX 20 MIN: CPT

## 2024-02-01 PROCEDURE — 85025 COMPLETE CBC W/AUTO DIFF WBC: CPT | Performed by: INTERNAL MEDICINE

## 2024-02-01 RX ORDER — POTASSIUM CHLORIDE 750 MG/1
40 CAPSULE, EXTENDED RELEASE ORAL ONCE
Status: COMPLETED | OUTPATIENT
Start: 2024-02-01 | End: 2024-02-01

## 2024-02-01 RX ORDER — PANTOPRAZOLE SODIUM 40 MG/1
40 TABLET, DELAYED RELEASE ORAL
Status: DISCONTINUED | OUTPATIENT
Start: 2024-02-02 | End: 2024-02-06

## 2024-02-01 RX ORDER — AZITHROMYCIN 250 MG/1
500 TABLET, FILM COATED ORAL
Status: DISCONTINUED | OUTPATIENT
Start: 2024-02-01 | End: 2024-02-02

## 2024-02-01 RX ADMIN — BUDESONIDE 0.5 MG: 0.5 SUSPENSION RESPIRATORY (INHALATION) at 07:22

## 2024-02-01 RX ADMIN — DEXAMETHASONE SODIUM PHOSPHATE 8 MG: 10 INJECTION INTRAMUSCULAR; INTRAVENOUS at 09:25

## 2024-02-01 RX ADMIN — ACETAMINOPHEN 650 MG: 325 TABLET ORAL at 13:36

## 2024-02-01 RX ADMIN — HEPARIN SODIUM 5000 UNITS: 5000 INJECTION INTRAVENOUS; SUBCUTANEOUS at 13:36

## 2024-02-01 RX ADMIN — FUROSEMIDE 40 MG: 10 INJECTION, SOLUTION INTRAMUSCULAR; INTRAVENOUS at 05:25

## 2024-02-01 RX ADMIN — Medication 10 ML: at 20:31

## 2024-02-01 RX ADMIN — PANTOPRAZOLE SODIUM 40 MG: 40 INJECTION, POWDER, FOR SOLUTION INTRAVENOUS at 05:25

## 2024-02-01 RX ADMIN — Medication 10 ML: at 09:25

## 2024-02-01 RX ADMIN — PRAMIPEXOLE DIHYDROCHLORIDE 0.5 MG: 0.5 TABLET ORAL at 20:31

## 2024-02-01 RX ADMIN — FLUOXETINE HYDROCHLORIDE 40 MG: 20 CAPSULE ORAL at 20:31

## 2024-02-01 RX ADMIN — FUROSEMIDE 40 MG: 10 INJECTION, SOLUTION INTRAMUSCULAR; INTRAVENOUS at 20:28

## 2024-02-01 RX ADMIN — ARFORMOTEROL TARTRATE 15 MCG: 15 SOLUTION RESPIRATORY (INHALATION) at 19:15

## 2024-02-01 RX ADMIN — CEFTRIAXONE SODIUM 2000 MG: 2 INJECTION, POWDER, FOR SOLUTION INTRAMUSCULAR; INTRAVENOUS at 09:25

## 2024-02-01 RX ADMIN — FUROSEMIDE 40 MG: 10 INJECTION, SOLUTION INTRAMUSCULAR; INTRAVENOUS at 13:36

## 2024-02-01 RX ADMIN — INSULIN LISPRO 2 UNITS: 100 INJECTION, SOLUTION INTRAVENOUS; SUBCUTANEOUS at 20:32

## 2024-02-01 RX ADMIN — BUDESONIDE 0.5 MG: 0.5 SUSPENSION RESPIRATORY (INHALATION) at 19:15

## 2024-02-01 RX ADMIN — HEPARIN SODIUM 5000 UNITS: 5000 INJECTION INTRAVENOUS; SUBCUTANEOUS at 05:24

## 2024-02-01 RX ADMIN — CLOPIDOGREL BISULFATE 75 MG: 75 TABLET, FILM COATED ORAL at 09:25

## 2024-02-01 RX ADMIN — Medication 250 MG: at 09:26

## 2024-02-01 RX ADMIN — POTASSIUM CHLORIDE 40 MEQ: 10 CAPSULE, COATED, EXTENDED RELEASE ORAL at 07:51

## 2024-02-01 RX ADMIN — ARFORMOTEROL TARTRATE 15 MCG: 15 SOLUTION RESPIRATORY (INHALATION) at 07:22

## 2024-02-01 RX ADMIN — FLUOXETINE HYDROCHLORIDE 40 MG: 20 CAPSULE ORAL at 09:25

## 2024-02-01 RX ADMIN — ACETAMINOPHEN 1000 MG: 10 INJECTION INTRAVENOUS at 03:30

## 2024-02-01 RX ADMIN — INSULIN LISPRO 2 UNITS: 100 INJECTION, SOLUTION INTRAVENOUS; SUBCUTANEOUS at 17:53

## 2024-02-01 RX ADMIN — OLANZAPINE 2.5 MG: 2.5 TABLET, FILM COATED ORAL at 09:26

## 2024-02-01 RX ADMIN — HEPARIN SODIUM 5000 UNITS: 5000 INJECTION INTRAVENOUS; SUBCUTANEOUS at 20:32

## 2024-02-01 RX ADMIN — AZITHROMYCIN DIHYDRATE 500 MG: 250 TABLET ORAL at 09:26

## 2024-02-01 RX ADMIN — Medication 250 MG: at 20:31

## 2024-02-01 RX ADMIN — ASPIRIN 81 MG: 81 TABLET, CHEWABLE ORAL at 09:26

## 2024-02-01 NOTE — PROGRESS NOTES
Pulmonary / Critical Care Progress Note      Patient Name: Falguni Minaya  : 1960  MRN: [id number]  Attending:  Reagan Aleman MD   Date of admission: 2024    Subjective   Subjective   Patient critically ill with hypoxemic respiratory failure    Over past 24 hours:  Mental status better today.  Much more awake and following simple commands  Reports ongoing dyspnea and cough  Respiratory status is still tenuous.  Currently on Airvo 50 L / 80%  Echocardiogram pending  Reports that she is in the middle of being switched on MS medications and has been on immune suppressing agents recently  Cultures so far negative other than COVID-19 being positive  Chest CT with diffuse bilateral airspace disease  Did respond well to diuretics.  Potassium low  On antibiotics for UTI, pneumonia  Low-grade temperature noted overnight.  No nausea or chills      Objective   Objective     Vitals:   Vital signs for last 24 hours:  Temp:  [99.3 °F (37.4 °C)-100.9 °F (38.3 °C)] 100.9 °F (38.3 °C)  Heart Rate:  [67-86] 68  Resp:  [17-22] 18  BP: ()/(56-89) 130/79    Intake/Output last 3 shifts:  I/O last 3 completed shifts:  In: 310 [I.V.:310]  Out: 1550 [Urine:1550]  Intake/Output this shift:  No intake/output data recorded.    Vent settings for last 24 hours:       Hemodynamic parameters for last 24 hours:       Physical Exam   Vital Signs Reviewed   General: Critically ill elderly female, much more awake and alert  HEENT:  PERRL, EOMI.  OP, nares clear  Chest:  good aeration, coarse crackles and rhonchi  bilaterally, tympanic to percussion bilaterally, increased work of breathing noted on Airvo  CV: RRR, no MGR, pulses 2+, equal.  Abd:  Soft, NT, ND, + BS, no HSM  EXT:  no clubbing, no cyanosis, no edema  Neuro:  A&Ox3, CN grossly intact, no focal deficits.  Skin: No rashes or lesions noted    Result Review    Result Review:  I have personally reviewed the results from the time of this admission to 2024 07:34 EST and  agree with these findings:  []  Laboratory  []  Microbiology  []  Radiology  []  EKG/Telemetry   []  Cardiology/Vascular   []  Pathology  []  Old records  []  Other:  Most notable findings include:       Lab 02/01/24  0313 01/31/24  1017 01/31/24  1016   WBC 13.73* 19.59*  --    HEMOGLOBIN 9.8* 10.4*  --    HEMATOCRIT 30.7* 32.5*  --    PLATELETS 435 497*  --    SODIUM 138 141  --    SODIUM, ARTERIAL  --   --  142.8   POTASSIUM 3.7 4.3  --    CHLORIDE 104 105  --    CO2 18.1* 20.6*  --    BUN 30* 31*  --    CREATININE 0.99 1.03*  --    GLUCOSE 121* 98  --    GLUCOSE, ARTERIAL  --   --  102*   CALCIUM 8.6 8.5*  --    PHOSPHORUS 4.2  --   --    TOTAL PROTEIN 7.4 7.3  --    ALBUMIN 2.7* 2.9*  --    GLOBULIN 4.7 4.4  --      CT Chest Without Contrast Diagnostic    Result Date: 1/31/2024  PROCEDURE: CT CHEST WO CONTRAST DIAGNOSTIC  COMPARISON: [facility], CR, XR CHEST 1 VW, 12/24/2023, 12:17.  [facility], CR, XR CHEST 1 VW, 1/31/2024, 10:31.  [facility], CT, CT ABDOMEN PELVIS W CONTRAST, 10/11/2023, 11:39.  INDICATIONS: Opacities, sob  TECHNIQUE: CT images were created without the administration of contrast material.   PROTOCOL:   Standard imaging protocol performed    RADIATION:   DLP: 481.3mGy*cm   Automated exposure control was utilized to minimize radiation dose.  FINDINGS:  No pleural or pericardial effusion is seen.  Calcified subcarinal lymph nodes are consistent with previous granulomatous disease.  No noncalcified adenopathy is seen.  Heart size is normal.  There is extensive ground-glass opacity throughout the lung fields bilaterally.  More focal areas of consolidation are also noted in both lung fields.  No pneumothorax is seen.  No significant bronchiectasis is present.  No endobronchial lesion is seen.  A cholecystectomy has been performed.  The upper abdomen appears unremarkable.  Moderate mid thoracic kyphosis is noted.      Impression:   1. Extensive bilateral  ground-glass opacity with smaller foci of consolidation in both lung fields.  Finding is favored to represent pneumonia.  Pulmonary edema and aspiration would also be in the differential.     Tucker Marcial M.D.       Electronically Signed and Approved By: Tucker Marcial M.D. on 1/31/2024 at 14:32              MRSA PCR, Legionella and strep pneumo antigens negative.  Procalcitonin 0.32.  Urine culture with greater than 100,000 gram-negative rods.  Lactic acid has cleared.    Chest x-ray this morning with unchanged bilateral airspace disease right greater than left      Assessment & Plan   Assessment / Plan     Active Hospital Problems:  Active Hospital Problems    Diagnosis     **Acute hypoxemic respiratory failure      Impression:  Acute hypoxic respiratory failure requiring Airvo  Severe sepsis present on admission  COVID-pneumonia  Community-acquired pneumonia from unspecified organism  Gram-negative rory urinary tract infection  Multiple sclerosis  Immunosuppressed status on treatment for MS  Acute decompensated diastolic congestive heart failure  Hypokalemia   Coronary artery disease  Transaminitis  Leukocytosis  Lactic acidosis, clinically significant     Plan:  Mental status has improved.  Respiratory status is however still tenuous.  Continue Airvo.  Wean O2 to keep SPO2 greater than 90%  Continue nebulizers and bronchopulmonary hygiene  Chest CT personally showing diffuse bilateral airspace disease.  Chest x-ray this morning is overall unchanged  Diuresing well.  Continue Lasix 40 mg IV every 8 hours.  Trend renal panel electrolytes.  Replace potassium orally  Echocardiogram pending  Lactic acid cleared  Continue ceftriaxone and azithromycin, day 2.  All cultures and urine antigens negative.  Ceftriaxone also treating gram-negative rory UTI and will de-escalate based off cultures  Continue high-dose Decadron x 10 days given elevated inflammatory markers with significant COVID-19 pneumonia  Trend CRP every few  days  Trend LFTs  Continue sliding scale insulin  Palliative care consult for goals of care discussion.  Patient is DNR/DNI    GI prophylaxis  DVT prophylaxis:  Medical DVT prophylaxis orders are present.    CODE STATUS:   Medical Intervention Limits: NO intubation (DNI); NO cardioversion  Level Of Support Discussed With: Patient  Code Status (Patient has no pulse and is not breathing): No CPR (Do Not Attempt to Resuscitate)  Medical Interventions (Patient has pulse or is breathing): Limited Support  Comments: d/w pt and /family      The patient is critically ill in the ICU with acute hypoxemic respiratory failure requiring Airvo, COVID-19 pneumonia, community-acquired pneumonia, pulmonary edema, decompensated heart failure, hypokalemia, encephalopathy. Multidisciplinary bedside critical care rounds were performed with nursing staff, respiratory therapy, pharmacy, nutritional services, social work. I have personally reviewed the chart, labs and any pertinent imaging available.  I have spent 33 minutes of critical care time, excluding procedures, in the care of this patient.    Electronically signed by Derrell Tomlin MD, 02/01/24, 10:38 AM EST.

## 2024-02-01 NOTE — PROGRESS NOTES
RT EQUIPMENT DEVICE RELATED - SKIN ASSESSMENT    RT Medical Equipment/Device:     High Flow Nasal Cannula:Airvo    Skin Assessment:      Cheek:  Intact  Nares:  Intact  Nose:  Intact  Lips:  Intact    Device Skin Pressure Protection:  Positioning supports utilized    Nurse Notification:  Lizette Lopez, RRT

## 2024-02-01 NOTE — CONSULTS
Purpose of the visit was to evaluate for: goals of care/advanced care planning. Spoke with MD, RN, patient, and family and discussed palliative care, goals of care, resuscitation status, clarify code status, and Pallitus .      Assessment: Patient is currently on sicu. Updated by primary rn regarding patients current condition. Patient on airvo at time of visit- patients daughter and spouse at bedside. Introduced self and explained role and purpose of visit. Emotional support provided. Provided education on Pallitus- patients family request referral. Patients family hopeful patients oxygenation will begin to improve so patient can go home. Provided palliative care contact information and encourage to call with further questions/concerns.    Recommendations/Plan:  Pallitus referral .    Tasks Completed: Emotional Support.    Palliative care will continue to follow to support and assist.    Katlin FANG, RN, [facility]  Palliative Care

## 2024-02-01 NOTE — THERAPY EVALUATION
Acute Care - Physical Therapy Initial Evaluation   [facility]     Patient Name: Falguni Minaya  : 1960  MRN: [id number]  Today's Date: 2024      Visit Dx:     ICD-10-CM ICD-9-CM   1. Altered mental status, unspecified altered mental status type  R41.82 780.97   2. Acute urinary tract infection  N39.0 599.0   3. Oropharyngeal dysphagia  R13.12 787.22   4. Difficulty walking  R26.2 719.7     Patient Active Problem List   Diagnosis    Multiple sclerosis    Left sided abdominal pain    Dyspepsia    Heme positive stool    Hypotension    Seizure disorder    Pain in lower back    Headache    Frequent falls    Spasm    Weakness of right leg    S/P drug eluting coronary stent placement    Coronary artery disease involving native coronary artery of native heart    Ischemic cardiomyopathy    Pleural effusion due to CHF (congestive heart failure)    Shortness of breath    Orthopnea    Hyperlipidemia LDL goal <70    Anemia due to stage 3 chronic kidney disease    Iron deficiency    Anemia in chronic kidney disease    Arthropathy of lumbar facet joint    Marie's esophagus    Benign colonic polyp    Congestive heart failure    Degeneration of lumbar intervertebral disc    Gastroesophageal reflux disease    Gastroparesis    Lumbar radiculopathy    Osteoporosis    Sacroiliac joint pain    Serum creatinine raised    Vitamin D deficiency    Acute hypoxemic respiratory failure     Past Medical History:   Diagnosis Date    Abdominal hernia     Acute ST elevation myocardial infarction (STEMI) due to occlusion of right coronary artery 2020    CHF (congestive heart failure)     DENIES CP GETS SOA WITH EXERTION. FOLLOWED BY DR ERIC/KASIA GREY. DECREASED ACTIVITY USES WALKER/ROLLATOR    CKD (chronic kidney disease)     Coronary artery disease     Dyspepsia     GERD (gastroesophageal reflux disease)     Hyperlipidemia     Hypertension     Ischemic cardiomyopathy     Multiple sclerosis      Past Surgical History:   Procedure  Laterality Date    APPENDECTOMY      CARDIAC CATHETERIZATION      CARDIAC CATHETERIZATION N/A 2/24/2020    Procedure: Left Heart Cath;  Surgeon: Marlon Zamudio MD;  Location: [facility] CATH INVASIVE LOCATION;  Service: Cardiovascular;  Laterality: N/A;    CARDIAC CATHETERIZATION N/A 2/24/2020    Procedure: Stent LATRICIA coronary;  Surgeon: Marlon Zamudio MD;  Location: [facility] CATH INVASIVE LOCATION;  Service: Cardiovascular;  Laterality: N/A;    CARDIAC CATHETERIZATION  2/24/2020    Procedure: Percutaneous Manual Thrombectomy;  Surgeon: Marlon Zamudio MD;  Location: [facility] CATH INVASIVE LOCATION;  Service: Cardiovascular;;    CARDIAC CATHETERIZATION N/A 2/24/2020    Procedure: Coronary angiography;  Surgeon: Marlon Zamudio MD;  Location: [facility] CATH INVASIVE LOCATION;  Service: Cardiovascular;  Laterality: N/A;    CARDIAC CATHETERIZATION N/A 2/24/2020    Procedure: Left ventriculography;  Surgeon: Marlon Zamudio MD;  Location: [facility] CATH INVASIVE LOCATION;  Service: Cardiovascular;  Laterality: N/A;    CHOLECYSTECTOMY      COLONOSCOPY N/A 2/29/2020    Procedure: COLONOSCOPY to  cecum:;  Surgeon: Krystal Sarabia MD;  Location: [facility] ENDOSCOPY;  Service: Gastroenterology;  Laterality: N/A;  pre:  anemia and abd pain  post:  hemorrhoids, tortuous colon    ENDOSCOPY N/A 2/29/2020    Procedure: ESOPHAGOGASTRODUODENOSCOPY  with biopsies;  Surgeon: Krystal Sarabia MD;  Location: [facility] ENDOSCOPY;  Service: Gastroenterology;  Laterality: N/A;  pre:  anemia and abd pain  post:  mild gastritis,     HYSTERECTOMY      PARATHYROIDECTOMY Bilateral 10/17/2023    Procedure: NECK EXPLORATION WITH PARATHYROID ADENOMA EXCISION AND FROZEN SECTION,, RECURRENT LARYNGEAL NERVE MONITORING, INTRAOPERATIVE INTACT PTH ASSAY;  Surgeon: Ezekiel Giles MD;  Location: [facility] OR;  Service: ENT;  Laterality: Bilateral;    TONSILLECTOMY       PT Assessment (last 12 hours)       PT Evaluation and Treatment       Row  Name 02/01/24 1123          Physical Therapy Time and Intention    Subjective Information complains of;weakness;fatigue;dyspnea (P)   -WB     Document Type evaluation (P)   -WB     Mode of Treatment individual therapy;physical therapy (P)   -WB     Patient Effort good (P)   -WB     Symptoms Noted During/After Treatment shortness of breath (P)   -WB       Row Name 02/01/24 1123          General Information    Patient Profile Reviewed yes (P)   -WB     Patient Observations alert;cooperative;agree to therapy (P)   -WB     Prior Level of Function independent:;all household mobility (P)   -WB     Equipment Currently Used at Home none (P)   -WB     Existing Precautions/Restrictions fall;oxygen therapy device and L/min;other (see comments) (P)   hiflow  -WB     Barriers to Rehab medically complex (P)   -WB       Row Name 02/01/24 1123          Living Environment    Current Living Arrangements home (P)   -WB     Home Accessibility wheelchair accessible (P)   -WB     People in Home spouse (P)   -WB     Primary Care Provided by self (P)   -WB       Row Name 02/01/24 1123          Home Use of Assistive/Adaptive Equipment    Equipment Currently Used at Home none (P)   -WB       Row Name 02/01/24 1123          Range of Motion (ROM)    Range of Motion ROM is WFL (P)   -WB       Row Name 02/01/24 1123          Strength (Manual Muscle Testing)    Strength (Manual Muscle Testing) bilateral lower extremities;other (see comments) (P)   BLE 3+/5  -WB       Row Name 02/01/24 1123          Bed Mobility    Bed Mobility supine-sit (P)   -WB     Supine-Sit [place] (Bed Mobility) minimum assist (75% patient effort) (P)   -WB     Bed Mobility, Safety Issues decreased use of arms for pushing/pulling;decreased use of legs for bridging/pushing (P)   -WB     Assistive Device (Bed Mobility) bed rails;draw sheet;head of bed elevated (P)   -WB       Row Name 02/01/24 1123          Transfers    Transfers sit-stand transfer;stand-sit  transfer;bed-chair transfer (P)   -WB       Row Name 02/01/24 1123          Bed-Chair Transfer    Bed-Chair [place] (Transfers) minimum assist (75% patient effort) (P)   -WB     Assistive Device (Bed-Chair Transfers) walker, front-wheeled (P)   -WB       Row Name 02/01/24 1123          Sit-Stand Transfer    Sit-Stand [place] (Transfers) minimum assist (75% patient effort) (P)   -WB     Assistive Device (Sit-Stand Transfers) walker, front-wheeled (P)   -WB       Row Name 02/01/24 1123          Stand-Sit Transfer    Stand-Sit [place] (Transfers) minimum assist (75% patient effort) (P)   -WB     Assistive Device (Stand-Sit Transfers) walker, front-wheeled (P)   -WB       Row Name 02/01/24 1123          Gait/Stairs (Locomotion)    Gait/Stairs Locomotion gait/ambulation assistive device (P)   -WB     [place] Level (Gait) unable to assess;other (see comments) (P)   Pt on hiflow, SpO2 dropped to 82% upon transfer. Nsg aware.  -WB       Row Name 02/01/24 1123          Balance    Balance Assessment standing dynamic balance (P)   -WB     Dynamic Standing Balance minimal assist (P)   -WB     Position/Device Used, Standing Balance walker, front-wheeled (P)   -WB     Balance Interventions standing;sit to stand;supported;dynamic (P)   -WB       Row Name 02/01/24 1123          Plan of Care Review    Plan of Care Reviewed With patient;spouse (P)   -WB     Progress no change (P)   -WB     Outcome Evaluation Pt demonstrated impaired balance, strength, and gait and presents below functional deficits. Skilled PT is warranted to address functional deficits. (P)   -WB       Row Name 02/01/24 1123          Vital Signs    Pre SpO2 (%) 92 (P)   -WB     O2 Delivery Pre Treatment hi-flow (P)   -WB     Intra SpO2 (%) 82 (P)   -WB     O2 Delivery Intra Treatment hi-flow (P)   -WB     Post SpO2 (%) 90 (P)   -WB     O2 Delivery Post Treatment hi-flow (P)   -WB     Pre Patient Position Supine (P)   -WB     Intra Patient  Position Standing (P)   -WB     Post Patient Position Sitting (P)   -WB       Row Name 02/01/24 1123          Positioning and Restraints    Pre-Treatment Position in bed (P)   -WB     Post Treatment Position chair (P)   -WB     In Chair notified nsg;reclined;sitting;call light within reach;encouraged to call for assist;with OT (P)   -WB       Row Name 02/01/24 1123          Therapy Assessment/Plan (PT)    Rehab Potential (PT) good, to achieve stated therapy goals (P)   -WB     Criteria for Skilled Interventions Met (PT) yes;meets criteria;skilled treatment is necessary (P)   -WB     Therapy Frequency (PT) daily (P)   -WB     Predicted Duration of Therapy Intervention (PT) 10 days (P)   -WB     Problem List (PT) problems related to;balance;mobility;strength (P)   -WB     Activity Limitations Related to Problem List (PT) unable to ambulate safely;unable to transfer safely (P)   -WB       Row Name 02/01/24 1123          Therapy Plan Review/Discharge Plan (PT)    Therapy Plan Review (PT) evaluation/treatment results reviewed (P)   -WB       Row Name 02/01/24 1123          Physical Therapy Goals    Bed Mobility Goal Selection (PT) bed mobility, PT goal 1 (P)   -WB     Transfer Goal Selection (PT) transfer, PT goal 1 (P)   -WB     Gait Training Goal Selection (PT) gait training, PT goal 1 (P)   -WB       Row Name 02/01/24 1123          Bed Mobility Goal 1 (PT)    Activity/Assistive Device (Bed Mobility Goal 1, PT) sit to supine/supine to sit (P)   -WB     [place] Level/Cues Needed (Bed Mobility Goal 1, PT) contact guard required (P)   -WB     Time Frame (Bed Mobility Goal 1, PT) long term goal (LTG);10 days (P)   -WB       Row Name 02/01/24 1123          Transfer Goal 1 (PT)    Activity/Assistive Device (Transfer Goal 1, PT) sit-to-stand/stand-to-sit (P)   -WB     [place] Level/Cues Needed (Transfer Goal 1, PT) contact guard required (P)   -WB     Time Frame (Transfer Goal 1, PT) long term goal (LTG);10 days (P)    -WB       Row Name 02/01/24 1123          Gait Training Goal 1 (PT)    Activity/Assistive Device (Gait Training Goal 1, PT) gait (walking locomotion);assistive device use;walker, rolling (P)   -WB     [place] Level (Gait Training Goal 1, PT) contact guard required (P)   -WB     Distance (Gait Training Goal 1, PT) 50 (P)   -WB     Time Frame (Gait Training Goal 1, PT) long term goal (LTG);10 days (P)   -WB               User Key  (r) = Recorded By, (t) = Taken By, (c) = Cosigned By      Initials Name Provider Type    WB Isreal Frye, PT Student PT Student                    Physical Therapy Education       Title: PT OT SLP Therapies (Done)       Topic: Physical Therapy (Done)       Point: Mobility training (Done)       Learning Progress Summary             Patient Acceptance, E,TB, VU by WB at 2/1/2024 1132                         Point: Body mechanics (Done)       Learning Progress Summary             Patient Acceptance, E,TB, VU by WB at 2/1/2024 1132                         Point: Precautions (Done)       Learning Progress Summary             Patient Acceptance, E,TB, VU by WB at 2/1/2024 1132                                         User Key       Initials Effective Dates Name Provider Type Discipline    WB 01/09/24 -  Isreal Frye, PT Student PT Student PT                  PT Recommendation and Plan  Anticipated Discharge Disposition (PT): (P) sub acute care setting  Planned Therapy Interventions (PT): (P) balance training, bed mobility training, gait training, neuromuscular re-education, stair training, strengthening, stretching, transfer training  Therapy Frequency (PT): (P) daily  Plan of Care Reviewed With: (P) patient, spouse  Progress: (P) no change  Outcome Evaluation: (P) Pt demonstrated impaired balance, strength, and gait and presents below functional deficits. Skilled PT is warranted to address functional deficits.   Outcome Measures       Row Name 02/01/24 1100             How much help  from another person do you currently need...    Turning from your back to your side while in flat bed without using bedrails? 3 (P)   -WB      Moving from lying on back to sitting on the side of a flat bed without bedrails? 3 (P)   -WB      Moving to and from a bed to a chair (including a wheelchair)? 3 (P)   -WB      Standing up from a chair using your arms (e.g., wheelchair, bedside chair)? 3 (P)   -WB      Climbing 3-5 steps with a railing? 1 (P)   -WB      To walk in hospital room? 1 (P)   -WB      AM-PAC 6 Clicks Score (PT) 14 (P)   -WB      Highest Level of Mobility Goal 4 --> Transfer to chair/commode (P)   -WB         Functional Assessment    Outcome Measure Options AM-PAC 6 Clicks Basic Mobility (PT) (P)   -WB                User Key  (r) = Recorded By, (t) = Taken By, (c) = Cosigned By      Initials Name Provider Type    WB Isreal Frye, PT Student PT Student                     Time Calculation:    PT Charges       Row Name 02/01/24 1123             Time Calculation    PT Received On 02/01/24 (P)   -WB      PT Goal Re-Cert Due Date 02/10/24 (P)   -WB         Untimed Charges    PT Eval/Re-eval Minutes 35 (P)   -WB         Total Minutes    Untimed Charges Total Minutes 35 (P)   -WB       Total Minutes 35 (P)   -WB                User Key  (r) = Recorded By, (t) = Taken By, (c) = Cosigned By      Initials Name Provider Type    WB Isreal Frye, PT Student PT Student                  Therapy Charges for Today       Code Description Service Date Service Provider Modifiers Qty    06242901211 HC PT EVAL LOW COMPLEXITY 3 2/1/2024 Isreal Frye, PT Student GP 1            PT G-Codes  Outcome Measure Options: (P) AM-PAC 6 Clicks Basic Mobility (PT)  AM-PAC 6 Clicks Score (PT): (P) 14    Isreal Frye, PT Student  2/1/2024

## 2024-02-01 NOTE — PLAN OF CARE
Goal Outcome Evaluation:  Plan of Care Reviewed With: patient      ASSESSMENT/ PLAN OF CARE:  Pt presents with limitations, noted below, that impede her ability to swallow safely and maintain nutrition. The skills of a therapist will be required to safely and effectively implement the following treatment plan to restore maximal level of function.    PROBLEMS:  1.Decreased oral motor strength, swallow delay, decreased mastication, risk of aspiration.                                               TREATMENT: Dysphagia therapy to address swallow function through exercises and education of strategies.     FREQUENCY/DURATION: Once daily 5 times per week    REHAB POTENTIAL:  Pt has fair to good rehab potential.  The following limitations may influence improvement/ length of tx: Medical status.    RECOMMENDATIONS:   1.   DIET: Mechanical soft solids with ground meats and gravies, thin liquid.    2.  POSITION: Positioning fully upright for all p.o. intake and 30 minutes following.    3.  COMPENSATORY STRATEGIES: Alternate small bites and small sips of solids and liquids at a slow rate.  Assist patient with set up.            Anticipated Discharge Disposition (SLP): inpatient rehabilitation facility

## 2024-02-01 NOTE — PROGRESS NOTES
[facility]   Hospitalist Progress Note    Date of admission: 1/31/2024  Patient Name: Falguni Minaya  1960  Date: 2/1/2024      Subjective     Chief Complaint   Patient presents with    Shortness of Breath    Altered Mental Status       Interval Followup: More awake and conversant today, less short of air still easily fatigued with minimal exertion.  Appetite is poor today discussed with family at bedside      Objective     Vitals:   Temp:  [98.3 °F (36.8 °C)-99.6 °F (37.6 °C)] 98.8 °F (37.1 °C)  Heart Rate:  [67-86] 68  Resp:  [17-18] 18  BP: (108-146)/(55-99) 138/82  Flow (L/min):  [50] 50    Physical Exam  Ill-appearing in bed but more conversant, family present  Bilateral rhonchi pain crackles in bases, no acute wheezing on Airvo with conversational dyspnea  RRR no lower extremity pitting edema  Abdomen soft nontender nondistended  Generalized weakness  Answering basic questions appropriately      Result Review:  Vital signs, labs and recent relevant imaging reviewed.        acetaminophen    acetaminophen    albuterol    benzonatate    senna-docusate sodium **AND** polyethylene glycol **AND** bisacodyl **AND** bisacodyl    [Held by provider] cyclobenzaprine    dextrose    dextrose    glucagon (human recombinant)    guaiFENesin-dextromethorphan    ondansetron    sodium chloride    sodium chloride    sodium chloride    arformoterol, 15 mcg, Nebulization, BID - RT  aspirin, 81 mg, Oral, Daily  [Held by provider] atorvastatin, 40 mg, Oral, Daily  azithromycin, 500 mg, Oral, Q24H  budesonide, 0.5 mg, Nebulization, BID - RT  [Held by provider] carvedilol, 12.5 mg, Oral, Q12H  cefTRIAXone, 2,000 mg, Intravenous, Q24H  clopidogrel, 75 mg, Oral, Daily  dexAMETHasone, 8 mg, Intravenous, Daily  FLUoxetine, 40 mg, Oral, BID  furosemide, 40 mg, Intravenous, Q8H  [Held by provider] gabapentin, 300 mg, Oral, TID  heparin (porcine), 5,000 Units, Subcutaneous, Q8H  insulin lispro, 2-7 Units, Subcutaneous, 4x Daily AC &  at Bedtime  [Held by provider] losartan, 25 mg, Oral, Q24H  OLANZapine, 2.5 mg, Oral, Daily  pantoprazole, 40 mg, Intravenous, Q AM  pramipexole, 0.5 mg, Oral, Nightly  saccharomyces boulardii, 250 mg, Oral, BID  senna-docusate sodium, 1 tablet, Oral, BID  sodium chloride, 10 mL, Intravenous, Q12H        XR Chest 1 View    Result Date: 2/1/2024    Similar appearance of patchy bilateral opacities, right greater than left, which may represent pneumonia.       JONI PATEL MD       Electronically Signed and Approved By: JONI PATEL MD on 2/01/2024 at 8:41             CT Chest Without Contrast Diagnostic    Result Date: 1/31/2024    1. Extensive bilateral ground-glass opacity with smaller foci of consolidation in both lung fields.  Finding is favored to represent pneumonia.  Pulmonary edema and aspiration would also be in the differential.     Tucker Marcial M.D.       Electronically Signed and Approved By: Tucker Marcial M.D. on 1/31/2024 at 14:32             CT Head Without Contrast    Result Date: 1/31/2024    1. Mild to moderate low density in the cerebral white matter could represent small-vessel ischemic disease and or multiple sclerosis. 2. No CT evidence of acute infarction, mass or intracranial hemorrhage. 3. Small right mastoid effusion     Tucker Marcial M.D.       Electronically Signed and Approved By: Tucker Marcial M.D. on 1/31/2024 at 10:55             XR Chest 1 View    Result Date: 1/31/2024    1. Asymmetric perihilar and patchy multifocal opacities which could represent atelectasis, asymmetric edema or pneumonia.       FRANK WHITEHEAD MD       Electronically Signed and Approved By: FRANK WHITEHEAD MD on 1/31/2024 at 10:33              Assessment / Plan     Summary: 64-year-old female with a history of MS immunocompromise, recently been transitioning medications for this, recurrent UTIs, diastolic CHF, CAD with prior PCI, CKD, who been diagnosed with COVID-19 on 1/22 and treated with outpatient  dexamethasone, and completed steroids and had progressively worsening symptoms and presented for further evaluation.  Patient with acute approximate respiratory failure requiring Airvo also with UTI    Assessment/Plan (clinically significant if listed here)  Acute hypoxemic respiratory failure requiring Airvo  Sepsis, present on admission, in setting of gram-negative bacilli UTI and suspected bacterial pneumonia in addition to COVID-19 pneumonia  Acute on Chronic dCHF (previously reduced now with recovered EF of 62%)   CAD with history of PCI to RCA   COVID-19 pneumonia with initial testing positive 1/22  Immunocompromised in setting of multiple sclerosis currently in limbo between treatments (most recently on Vumerity)   Anion gap metabolic acidosis  Transaminitis  History of hypertension with low blood pressure on arrival  CKD possibly 3 baseline creatinine 1.1-1.3  Seizure disorder  GERD  Anxiety/depression  Hyperlipidemia  Morbid obesity    Continue to monitor mentation improving some today with holding some of home sedating medications including Flexeril.  Renally adjusted gabapentin.  Will need adjustment in regimen as outpatient  Weaning Airvo/oxygen as able goal SpO2 90%, scheduled nebulizers  CT chest notable for bilateral airspace disease and pneumonia, edema component as well  Continue high-dose IV diuretics, IV Lasix, follow-up echo read most recent echo in the past had been recovered EF electrolytes and renal function  2 g sodium diet 1500 cc restriction  Day 10 from initial COVID-19 diagnosis, did have outpatient Decadron, continuing high-dose steroids here as well  Cont home ppi, avoid home meloxicam/nsaids given ckd, needs to limit nsaids in general  Continue ceftriaxone azithromycin for pneumonia and UTI follow-up infectious studies, procal 0.3 on admission  Will add vaginal estrogen cream given recurrent UTIs for discharge  Holding home antihypertensive medications (losartan 25, corege 12.5) ,  blood pressure improving monitor  Unclear of any home seizure medications apart from gabapentin which she seems to have taken for symptoms with her multiple sclerosis.  Monitor  Continue on aspirin and Plavix, LFTs starting to improve likely can resume statin 2 days  Cont prozac, zyprexa,   Cont pramipexole  Check am cbc, bmp, mg, phos lfts  Continue close ICU monitoring, discussed with pulmonology/Dr. Tomlin and critical care team   PT/OT  Check a.m. CBC, BMP, magnesium, phosphorus, lfts      DVT prophylaxis:  Medical DVT prophylaxis orders are present.        Medical Intervention Limits: NO intubation (DNI); NO cardioversion  Level Of Support Discussed With: Patient  Code Status (Patient has no pulse and is not breathing): No CPR (Do Not Attempt to Resuscitate)  Medical Interventions (Patient has pulse or is breathing): Limited Support  Comments: d/w pt and /family    Patient is critically ill due to acute hypoxemic respiratory failure, pneumonia, CHF, multiple issues as above.  I have spent 33 minutes of critical care time reviewing documentation, pertinent labs, imaging studies, examining the patient, modifying care plan, and discussing patient's condition and care plan with the patient, nursing and pulmonology, family, social work, RN.      CBC          12/24/2023    12:19 1/31/2024    10:17 2/1/2024    03:13   CBC   WBC 5.25  19.59  13.73    RBC 3.52  3.33  3.19    Hemoglobin 10.2  10.4  9.8    Hematocrit 32.8  32.5  30.7    MCV 93.2  97.6  96.2    MCH 29.0  31.2  30.7    MCHC 31.1  32.0  31.9    RDW 17.1  18.3  18.2    Platelets 485  497  435        CMP          12/24/2023    12:19 1/31/2024    10:16 1/31/2024    10:17 2/1/2024    03:13   CMP   Glucose 116  102  98  121    BUN 8   31  30    Creatinine 1.12   1.03  0.99    EGFR 55.0   60.5  63.4    Sodium 142  142.8  141  138    Potassium 2.8   4.3  3.7    Chloride 103   105  104    Calcium 8.2   8.5  8.6    Total Protein 7.4   7.3  7.4    Albumin 2.9    2.9  2.7    Globulin 4.5   4.4  4.7    Total Bilirubin 0.3   0.4  0.4    Alkaline Phosphatase 179   119  114    AST (SGOT) 19   44  35    ALT (SGPT) 9   41  36    Albumin/Globulin Ratio 0.6   0.7  0.6    BUN/Creatinine Ratio 7.1   30.1  30.3    Anion Gap 14.5   15.4  15.9

## 2024-02-01 NOTE — THERAPY EVALUATION
Acute Care - Speech Language Pathology   Swallow Initial Evaluation SHELDON Akers     Patient Name: Falguni Minaya  : 1960  MRN: [id number]  Today's Date: 2024               Admit Date: 2024    Visit Dx:     ICD-10-CM ICD-9-CM   1. Altered mental status, unspecified altered mental status type  R41.82 780.97   2. Acute urinary tract infection  N39.0 599.0   3. Oropharyngeal dysphagia  R13.12 787.22     Patient Active Problem List   Diagnosis    Multiple sclerosis    Left sided abdominal pain    Dyspepsia    Heme positive stool    Hypotension    Seizure disorder    Pain in lower back    Headache    Frequent falls    Spasm    Weakness of right leg    S/P drug eluting coronary stent placement    Coronary artery disease involving native coronary artery of native heart    Ischemic cardiomyopathy    Pleural effusion due to CHF (congestive heart failure)    Shortness of breath    Orthopnea    Hyperlipidemia LDL goal <70    Anemia due to stage 3 chronic kidney disease    Iron deficiency    Anemia in chronic kidney disease    Arthropathy of lumbar facet joint    Marie's esophagus    Benign colonic polyp    Congestive heart failure    Degeneration of lumbar intervertebral disc    Gastroesophageal reflux disease    Gastroparesis    Lumbar radiculopathy    Osteoporosis    Sacroiliac joint pain    Serum creatinine raised    Vitamin D deficiency    Acute hypoxemic respiratory failure     Past Medical History:   Diagnosis Date    Abdominal hernia     Acute ST elevation myocardial infarction (STEMI) due to occlusion of right coronary artery 2020    CHF (congestive heart failure)     DENIES CP GETS SOA WITH EXERTION. FOLLOWED BY DR ERIC/KASIA GREY. DECREASED ACTIVITY USES WALKER/ROLLATOR    CKD (chronic kidney disease)     Coronary artery disease     Dyspepsia     GERD (gastroesophageal reflux disease)     Hyperlipidemia     Hypertension     Ischemic cardiomyopathy     Multiple sclerosis      Past Surgical  History:   Procedure Laterality Date    APPENDECTOMY      CARDIAC CATHETERIZATION      CARDIAC CATHETERIZATION N/A 2/24/2020    Procedure: Left Heart Cath;  Surgeon: Marlon Zamudio MD;  Location: [facility] CATH INVASIVE LOCATION;  Service: Cardiovascular;  Laterality: N/A;    CARDIAC CATHETERIZATION N/A 2/24/2020    Procedure: Stent LATRICIA coronary;  Surgeon: Marlon Zamudio MD;  Location: [facility] CATH INVASIVE LOCATION;  Service: Cardiovascular;  Laterality: N/A;    CARDIAC CATHETERIZATION  2/24/2020    Procedure: Percutaneous Manual Thrombectomy;  Surgeon: Marlon Zamudio MD;  Location: [facility] CATH INVASIVE LOC[facility];  Service: Cardiovascular;;    CARDIAC CATHETERIZATION N/A 2/24/2020    Procedure: Coronary angiography;  Surgeon: Marlon Zamudio MD;  Location: [facility] CATH INVASIVE LOCATION;  Service: Cardiovascular;  Laterality: N/A;    CARDIAC CATHETERIZATION N/A 2/24/2020    Procedure: Left ventriculography;  Surgeon: Marlon Zamudio MD;  Location: [facility] CATH INVASIVE LOCATION;  Service: Cardiovascular;  Laterality: N/A;    CHOLECYSTECTOMY      COLONOSCOPY N/A 2/29/2020    Procedure: COLONOSCOPY to  cecum:;  Surgeon: Krystal Sarabia MD;  Location: [facility] ENDOSCOPY;  Service: Gastroenterology;  Laterality: N/A;  pre:  anemia and abd pain  post:  hemorrhoids, tortuous colon    ENDOSCOPY N/A 2/29/2020    Procedure: ESOPHAGOGASTRODUODENOSCOPY  with biopsies;  Surgeon: Krystal Sarabia MD;  Location: [facility] ENDOSCOPY;  Service: Gastroenterology;  Laterality: N/A;  pre:  anemia and abd pain  post:  mild gastritis,     HYSTERECTOMY      PARATHYROIDECTOMY Bilateral 10/17/2023    Procedure: NECK EXPLORATION WITH PARATHYROID ADENOMA EXCISION AND FROZEN SECTION,, RECURRENT LARYNGEAL NERVE MONITORING, INTRAOPERATIVE INTACT PTH ASSAY;  Surgeon: Ezekiel Giles MD;  Location: [facility];  Service: ENT;  Laterality: Bilateral;    TONSILLECTOMY             Inpatient Speech Pathology Dysphagia  Evaluation        PAIN SCALE: None indicated.    PRECAUTIONS/CONTRAINDICATIONS: Enhanced airborne    SUSPECTED ABUSE/NEGLECT/EXPLOITATION: None indicated.    SOCIAL/PSYCHOLOGICAL NEEDS/BARRIERS: None indicated.    PAST SOCIAL HISTORY: 64-year-old female lives at home with her spouse    PRIOR FUNCTION: On regular diet    PATIENT GOALS/EXPECTATIONS: Continue eating orally    HISTORY: 64-year-old female with the above diagnosis referred for speech therapy evaluation assess for swallowing.  Concern for possible aspiration.  Patient noted with pneumonia.  Patient with history of COVID.  No previous speech pathology services are reported.  Patient denies previous difficulty swallowing.    CURRENT DIET LEVEL: Regular, thin    OBJECTIVE:    TEST ADMINISTERED: Clinical dysphagia evaluation    COGNITION/SAFETY AWARENESS: Patient followed basic directions and responded to questions    BEHAVIORAL OBSERVATIONS: Cooperative    ORAL MOTOR EXAM: Decreased general oral motor strength/range of motion 4/5.  Patient with poor dentition.    VOICE QUALITY: Adequate    REFLEX EXAM: Deferred    POSTURE: Assisted sitting upright in bed    FEEDING/SWALLOWING FUNCTION: Assessed with thin liquids, puréed solids, crunchy solid.    CLINICAL OBSERVATIONS: Thin liquid by cup and by straw with minimal delay, vocal quality remaining clear to cervical auscultation.  Purée solid with swallow completed with laryngeal elevation noted to palpation.  Crunchy solid with extended chewing followed by swallow completed clearing the oral cavity.    DYSPHAGIA CRITERIA: Decreased oral motor strength, swallow delay, decreased mastication, risk of aspiration.    FUNCTIONAL ASSESSMENT INSTRUMENT: Patient currently scored a level 5 of 7 on Functional Communication Measures for swallowing indicating a 20-39% limitation in function.    ASSESSMENT/ PLAN OF CARE:  Pt presents with limitations, noted below, that impede her ability to swallow safely and maintain  nutrition. The skills of a therapist will be required to safely and effectively implement the following treatment plan to restore maximal level of function.    PROBLEMS:  1.Decreased oral motor strength, swallow delay, decreased mastication, risk of aspiration.                         LTG 1: 30 days: Patient will tolerate least restrictive diet utilizing appropriate positioning and strategies with minimal assistance.                       STG 1a: 14 days: Patient will tolerate diet mechanical soft solids and thin liquids with minimal assistance for strategies.                       STG 1b: 14 days: Patient will tolerate 8 of 10 trials of thin liquids with min to no signs or symptoms of aspiration.                       STG 1c: 14 days: Patient/family education.                       TREATMENT: Dysphagia therapy to address swallow function through exercises and education of strategies.     FREQUENCY/DURATION: Once daily 5 times per week    REHAB POTENTIAL:  Pt has fair to good rehab potential.  The following limitations may influence improvement/ length of tx: Medical status.    RECOMMENDATIONS:   1.   DIET: Mechanical soft solids with ground meats and gravies, thin liquid.    2.  POSITION: Positioning fully upright for all p.o. intake and 30 minutes following.    3.  COMPENSATORY STRATEGIES: Alternate small bites and small sips of solids and liquids at a slow rate.  Assist patient with set up.    Pt/responsible party agrees with plan of care and has been informed of all alternatives, risks and benefits.                              Anticipated Discharge Disposition (SLP): inpatient rehabilitation facility (02/01/24 0909)                                                               EDUCATION  The patient has been educated in the following areas:   Modified Diet Instruction.              Time Calculation:    Time Calculation- SLP       Row Name 02/01/24 0909             Time Calculation- SLP    SLP Start Time 0715   -TB      SLP Stop Time 0815  -TB      SLP Time Calculation (min) 60 min  -TB      SLP Received On 02/01/24  -TB         Untimed Charges    SLP Eval/Re-eval  ST Eval Oral Pharyng Swallow - 76439  -TB      10181-RD Eval Oral Pharyng Swallow Minutes 60  -TB         Total Minutes    Untimed Charges Total Minutes 60  -TB       Total Minutes 60  -TB                User Key  (r) = Recorded By, (t) = Taken By, (c) = Cosigned By      Initials Name Provider Type    TB Haily Moore SLP Speech and Language Pathologist                    Therapy Charges for Today       Code Description Service Date Service Provider Modifiers Qty    [id number] HC ST EVAL ORAL PHARYNG SWALLOW 4 2/1/2024 Haily Moore SLP GN 1                 SHANNAN Weaver  2/1/2024

## 2024-02-01 NOTE — PLAN OF CARE
Goal Outcome Evaluation:  Plan of Care Reviewed With: (P) patient, spouse        Progress: (P) no change  Outcome Evaluation: (P) Pt demonstrated impaired balance, strength, and gait and presents below functional deficits. Skilled PT is warranted to address functional deficits.      Anticipated Discharge Disposition (PT): (P) sub acute care setting

## 2024-02-01 NOTE — THERAPY EVALUATION
Patient Name: Falguni Minaya  : 1960    MRN: [id number]                              Today's Date: 2024       Admit Date: 2024    Visit Dx:     ICD-10-CM ICD-9-CM   1. Altered mental status, unspecified altered mental status type  R41.82 780.97   2. Acute urinary tract infection  N39.0 599.0   3. Oropharyngeal dysphagia  R13.12 787.22   4. Difficulty walking  R26.2 719.7   5. Decreased activities of daily living (ADL)  Z78.9 V49.89     Patient Active Problem List   Diagnosis    Multiple sclerosis    Left sided abdominal pain    Dyspepsia    Heme positive stool    Hypotension    Seizure disorder    Pain in lower back    Headache    Frequent falls    Spasm    Weakness of right leg    S/P drug eluting coronary stent placement    Coronary artery disease involving native coronary artery of native heart    Ischemic cardiomyopathy    Pleural effusion due to CHF (congestive heart failure)    Shortness of breath    Orthopnea    Hyperlipidemia LDL goal <70    Anemia due to stage 3 chronic kidney disease    Iron deficiency    Anemia in chronic kidney disease    Arthropathy of lumbar facet joint    Marie's esophagus    Benign colonic polyp    Congestive heart failure    Degeneration of lumbar intervertebral disc    Gastroesophageal reflux disease    Gastroparesis    Lumbar radiculopathy    Osteoporosis    Sacroiliac joint pain    Serum creatinine raised    Vitamin D deficiency    Acute hypoxemic respiratory failure     Past Medical History:   Diagnosis Date    Abdominal hernia     Acute ST elevation myocardial infarction (STEMI) due to occlusion of right coronary artery 2020    CHF (congestive heart failure)     DENIES CP GETS SOA WITH EXERTION. FOLLOWED BY DR ERIC/KASIA GREY. DECREASED ACTIVITY USES WALKER/ROLLATOR    CKD (chronic kidney disease)     Coronary artery disease     Dyspepsia     GERD (gastroesophageal reflux disease)     Hyperlipidemia     Hypertension     Ischemic cardiomyopathy      Multiple sclerosis      Past Surgical History:   Procedure Laterality Date    APPENDECTOMY      CARDIAC CATHETERIZATION      CARDIAC CATHETERIZATION N/A 2/24/2020    Procedure: Left Heart Cath;  Surgeon: Marlon Zamudio MD;  Location: [facility] CATH INVASIVE LOCATION;  Service: Cardiovascular;  Laterality: N/A;    CARDIAC CATHETERIZATION N/A 2/24/2020    Procedure: Stent LATRICIA coronary;  Surgeon: Marlon Zamudio MD;  Location: [facility] CATH INVASIVE LOCATION;  Service: Cardiovascular;  Laterality: N/A;    CARDIAC CATHETERIZATION  2/24/2020    Procedure: Percutaneous Manual Thrombectomy;  Surgeon: Marlon Zamudio MD;  Location: [facility] CATH INVASIVE LOCATION;  Service: Cardiovascular;;    CARDIAC CATHETERIZATION N/A 2/24/2020    Procedure: Coronary angiography;  Surgeon: Marlon Zamudio MD;  Location: [facility] CATH INVASIVE LOCATION;  Service: Cardiovascular;  Laterality: N/A;    CARDIAC CATHETERIZATION N/A 2/24/2020    Procedure: Left ventriculography;  Surgeon: Marlon Zamudio MD;  Location: [facility] CATH INVASIVE LOCATION;  Service: Cardiovascular;  Laterality: N/A;    CHOLECYSTECTOMY      COLONOSCOPY N/A 2/29/2020    Procedure: COLONOSCOPY to  cecum:;  Surgeon: Krystal Sarabia MD;  Location: [facility] ENDOSCOPY;  Service: Gastroenterology;  Laterality: N/A;  pre:  anemia and abd pain  post:  hemorrhoids, tortuous colon    ENDOSCOPY N/A 2/29/2020    Procedure: ESOPHAGOGASTRODUODENOSCOPY  with biopsies;  Surgeon: Krystal Sarabia MD;  Location: [facility] ENDOSCOPY;  Service: Gastroenterology;  Laterality: N/A;  pre:  anemia and abd pain  post:  mild gastritis,     HYSTERECTOMY      PARATHYROIDECTOMY Bilateral 10/17/2023    Procedure: NECK EXPLORATION WITH PARATHYROID ADENOMA EXCISION AND FROZEN SECTION,, RECURRENT LARYNGEAL NERVE MONITORING, INTRAOPERATIVE INTACT PTH ASSAY;  Surgeon: Ezekiel Giles MD;  Location: [facility] OR;  Service: ENT;  Laterality: Bilateral;    TONSILLECTOMY        General  Information       Row Name 02/01/24 1449          OT Time and Intention    Document Type evaluation  -ES     Mode of Treatment individual therapy;occupational therapy  -ES       Row Name 02/01/24 1449          General Information    Patient Profile Reviewed yes  -ES     Prior Level of Function independent:;ADL's;all household mobility  Patient independent with ADLs at baseline. No device for functional mobility. Tub/shower, shower chair. Groom in seated. No home O2 use. Denies recent fall.s  -ES     Existing Precautions/Restrictions fall  -ES       Row Name 02/01/24 1449          Occupational Profile    Reason for Services/Referral (Occupational Profile) Patient is 64 yr old female admitted to [facility] on 1/31/2024 with reports of shortness of breath. Patient COVID positive. OT evaluation and treatment ordered d/t recent decline in ADLs/transfer ability and discharge planning recommendations. No previous OT services for current condition.  -ES       Row Name 02/01/24 1449          Living Environment    People in Home spouse  -ES       Row Name 02/01/24 1449          Cognition    Orientation Status (Cognition) oriented x 3  Patient pleasant and cooperative,  -ES       Row Name 02/01/24 1449          Safety Issues, Functional Mobility    Impairments Affecting Function (Mobility) balance;endurance/activity tolerance;strength;shortness of breath  -ES               User Key  (r) = Recorded By, (t) = Taken By, (c) = Cosigned By      Initials Name Provider Type    ES Johana Guidry, OTR/L, CSRS Occupational Therapist                     Mobility/ADL's       Row Name 02/01/24 1458          Bed Mobility    Bed Mobility supine-sit  -ES     Supine-Sit [place] (Bed Mobility) minimum assist (75% patient effort);1 person assist  -ES     Bed Mobility, Safety Issues decreased use of arms for pushing/pulling;decreased use of legs for bridging/pushing  -ES       Row Name 02/01/24 1450          Transfers    Transfers  sit-stand transfer;bed-chair transfer  -ES       Row Name 02/01/24 1458          Bed-Chair Transfer    Bed-Chair [place] (Transfers) minimum assist (75% patient effort);1 person assist  -ES     Assistive Device (Bed-Chair Transfers) walker, front-wheeled  -ES       Row Name 02/01/24 1458          Sit-Stand Transfer    Sit-Stand [place] (Transfers) minimum assist (75% patient effort);1 person assist  -ES     Assistive Device (Sit-Stand Transfers) walker, front-wheeled  -ES       Row Name 02/01/24 1458          Functional Mobility    Functional Mobility- Ind. Level minimum assist (75% patient effort);1 person  -ES     Functional Mobility- Device walker, front-wheeled  -ES     Functional Mobility- Comment x3 steps from edge of bed to recliner, min A with use of RW. Verbal cueing provided for hand placement on chair rails for descent to chair  -ES       Row Name 02/01/24 1458          Activities of Daily Living    BADL Assessment/Intervention bathing;upper body dressing;lower body dressing;grooming;feeding;toileting  -       Row Name 02/01/24 1458          Bathing Assessment/Intervention    [place] Level (Bathing) bathing skills;moderate assist (50% patient effort)  -       Row Name 02/01/24 1458          Upper Body Dressing Assessment/Training    [place] Level (Upper Body Dressing) upper body dressing skills;set up  -ES       Row Name 02/01/24 1458          Lower Body Dressing Assessment/Training    [place] Level (Lower Body Dressing) lower body dressing skills;moderate assist (50% patient effort)  -       Row Name 02/01/24 1458          Grooming Assessment/Training    [place] Level (Grooming) grooming skills;set up  -ES       Row Name 02/01/24 1458          Self-Feeding Assessment/Training    [place] Level (Feeding) feeding skills;set up  -ES       Row Name 02/01/24 1458          Toileting Assessment/Training    [place] Level (Toileting) toileting skills;maximum assist  (25% patient effort)  -ES               User Key  (r) = Recorded By, (t) = Taken By, (c) = Cosigned By      Initials Name Provider Type    Johana Vaughan OTR/L, LANDY Occupational Therapist                   Obj/Interventions       Row Name 02/01/24 1459          Range of Motion Comprehensive    General Range of Motion bilateral upper extremity ROM WNL  -ES       Row Name 02/01/24 1459          Strength Comprehensive (MMT)    General Manual Muscle Testing (MMT) Assessment upper extremity strength deficits identified;lower extremity strength deficits identified  -ES       Row Name 02/01/24 1459          Upper Extremity (Manual Muscle Testing)    Comment, MMT: Upper Extremity BUEs 4-/5  -ES       Row Name 02/01/24 1459          Motor Skills    Motor Skills functional endurance  -ES     Functional Endurance fair minus. Patient O2 desats to 80% on hi flow with bed<> chair transfer. Patient provided education and training on pursed lip breathing techniques to increase o2 saturations. O2 increased to 90% after ~2 mins.  -ES       Row Name 02/01/24 1459          Balance    Balance Assessment sitting dynamic balance;standing dynamic balance  -ES     Dynamic Sitting Balance standby assist  -ES     Position, Sitting Balance unsupported;sitting edge of bed  -ES     Dynamic Standing Balance minimal assist;1-person assist  -ES     Position/Device Used, Standing Balance supported;walker, front-wheeled  -ES               User Key  (r) = Recorded By, (t) = Taken By, (c) = Cosigned By      Initials Name Provider Type    Johana Vaughan, OTR/L, LANDY Occupational Therapist                   Goals/Plan       Row Name 02/01/24 1505          Bed Mobility Goal 1 (OT)    Activity/Assistive Device (Bed Mobility Goal 1, OT) bed mobility activities, all  -ES     [place] Level/Cues Needed (Bed Mobility Goal 1, OT) modified independence  -ES     Time Frame (Bed Mobility Goal 1, OT) long term goal (LTG);10 days  -ES       Row Name  02/01/24 1505          Transfer Goal 1 (OT)    Activity/Assistive Device (Transfer Goal 1, OT) transfers, all  -ES     [place] Level/Cues Needed (Transfer Goal 1, OT) modified independence  -ES     Time Frame (Transfer Goal 1, OT) long term goal (LTG);10 days  -ES       Row Name 02/01/24 1505          Bathing Goal 1 (OT)    Activity/Device (Bathing Goal 1, OT) bathing skills, all  -ES     [place] Level/Cues Needed (Bathing Goal 1, OT) modified independence  -ES     Time Frame (Bathing Goal 1, OT) long term goal (LTG);10 days  -ES       Row Name 02/01/24 1505          Dressing Goal 1 (OT)    Activity/Device (Dressing Goal 1, OT) dressing skills, all  -ES     [place]/Cues Needed (Dressing Goal 1, OT) modified independence  -ES     Time Frame (Dressing Goal 1, OT) long term goal (LTG);10 days  -ES       Row Name 02/01/24 1505          Toileting Goal 1 (OT)    Activity/Device (Toileting Goal 1, OT) toileting skills, all  -ES     [place] Level/Cues Needed (Toileting Goal 1, OT) modified independence  -ES     Time Frame (Toileting Goal 1, OT) long term goal (LTG);10 days  -ES       Row Name 02/01/24 1505          Grooming Goal 1 (OT)    Activity/Device (Grooming Goal 1, OT) grooming skills, all  -ES     [place] (Grooming Goal 1, OT) modified independence  -ES     Time Frame (Grooming Goal 1, OT) long term goal (LTG);10 days  -ES       Row Name 02/01/24 1505          Strength Goal 1 (OT)    Strength Goal 1 (OT) Pt will improve BUE strength to 4/5 muscle grade for increased UE strength required for ADL transfers and task completion  -ES     Time Frame (Strength Goal 1, OT) long term goal (LTG);10 days  -ES       Row Name 02/01/24 1505          Problem Specific Goal 1 (OT)    Problem Specific Goal 1 (OT) Patient will demonstrate fair plus task tolerance in preperation for independent ADL routine completion at time of discharge  -ES     Time Frame (Problem Specific Goal 1, OT) long term goal (LTG);10  days  -ES       Row Name 02/01/24 1505          Therapy Assessment/Plan (OT)    Planned Therapy Interventions (OT) activity tolerance training;BADL retraining;functional balance retraining;occupation/activity based interventions;patient/caregiver education/training;strengthening exercise;transfer/mobility retraining  -ES               User Key  (r) = Recorded By, (t) = Taken By, (c) = Cosigned By      Initials Name Provider Type    ES Johana Guidry, OTR/L, CSRS Occupational Therapist                   Clinical Impression       Row Name 02/01/24 1502          Plan of Care Review    Plan of Care Reviewed With patient;spouse  -ES     Outcome Evaluation Patient has experienced decline in function from baseline status, presenting w/ deficits related to balance, tolerance, strength, transfers and mobility that impede patient independence with activities of daily living.  Patient would benefit from skilled Occupational Therapy intervention to maxamize patient safety, and promote return to baseline independence.  -ES       Row Name 02/01/24 1502          Therapy Assessment/Plan (OT)    Rehab Potential (OT) good, to achieve stated therapy goals  -ES     Criteria for Skilled Therapeutic Interventions Met (OT) yes;meets criteria;skilled treatment is necessary  -ES     Therapy Frequency (OT) 5 times/wk  -ES       Row Name 02/01/24 1502          Therapy Plan Review/Discharge Plan (OT)    Anticipated Discharge Disposition (OT) inpatient rehabilitation facility  -ES       Row Name 02/01/24 1502          Vital Signs    Pre SpO2 (%) 90  -ES     O2 Delivery Pre Treatment hi-flow  -ES     Intra SpO2 (%) 81  -ES     O2 Delivery Intra Treatment hi-flow  -ES     Post SpO2 (%) 92  -ES     O2 Delivery Post Treatment hi-flow  -ES       Row Name 02/01/24 1502          Positioning and Restraints    Pre-Treatment Position in bed  -ES     Post Treatment Position chair  -ES     In Chair reclined;call light within reach;encouraged to call for  assist;notified nsg  -ES               User Key  (r) = Recorded By, (t) = Taken By, (c) = Cosigned By      Initials Name Provider Type    Johana Vaughan, OTR/L, CSRS Occupational Therapist                   Outcome Measures       Row Name 02/01/24 1506          How much help from another is currently needed...    Putting on and taking off regular lower body clothing? 2  -ES     Bathing (including washing, rinsing, and drying) 2  -ES     Toileting (which includes using toilet bed pan or urinal) 2  -ES     Putting on and taking off regular upper body clothing 3  -ES     Taking care of personal grooming (such as brushing teeth) 3  -ES     Eating meals 4  -ES     AM-PAC 6 Clicks Score (OT) 16  -ES       Row Name 02/01/24 1100 02/01/24 0800       How much help from another person do you currently need...    Turning from your back to your side while in flat bed without using bedrails? 3  -JORGE (r) WB (t) JORGE (c) 3  -IY    Moving from lying on back to sitting on the side of a flat bed without bedrails? 3  -JORGE (r) WB (t) JORGE (c) 2  -IY    Moving to and from a bed to a chair (including a wheelchair)? 3  -JORGE (r) WB (t) JORGE (c) 2  -IY    Standing up from a chair using your arms (e.g., wheelchair, bedside chair)? 3  -JORGE (r) WB (t) JORGE (c) 2  -IY    Climbing 3-5 steps with a railing? 1  -JORGE (r) WB (t) JORGE (c) 1  -IY    To walk in hospital room? 1  -JORGE (r) WB (t) JORGE (c) 1  -IY    AM-PAC 6 Clicks Score (PT) 14  -JORGE (r) WB (t) 11  -IY    Highest Level of Mobility Goal 4 --> Transfer to chair/commode  -JORGE (r) WB (t) 4 --> Transfer to chair/commode  -IY      Row Name 02/01/24 1506 02/01/24 1100       Functional Assessment    Outcome Measure Options AM-PAC 6 Clicks Daily Activity (OT)  -ES AM-PAC 6 Clicks Basic Mobility (PT)  -JORGE (r) WB (t) JORGE (c)              User Key  (r) = Recorded By, (t) = Taken By, (c) = Cosigned By      Initials Name Provider Type    Elmo Day, PT Physical Therapist    Johana Vaughan, OTR/L, CSRS  Occupational Therapist    Valeriy Webster, RN Registered Nurse    Isreal Ventura, PT Student PT Student                      OT Recommendation and Plan  Planned Therapy Interventions (OT): activity tolerance training, BADL retraining, functional balance retraining, occupation/activity based interventions, patient/caregiver education/training, strengthening exercise, transfer/mobility retraining  Therapy Frequency (OT): 5 times/wk  Plan of Care Review  Plan of Care Reviewed With: patient, spouse  Outcome Evaluation: Patient has experienced decline in function from baseline status, presenting w/ deficits related to balance, tolerance, strength, transfers and mobility that impede patient independence with activities of daily living.  Patient would benefit from skilled Occupational Therapy intervention to maxamize patient safety, and promote return to baseline independence.     Time Calculation:   Evaluation Complexity (OT)  Review Occupational Profile/Medical/Therapy History Complexity: brief/low complexity  Assessment, Occupational Performance/Identification of Deficit Complexity: 3-5 performance deficits  Clinical Decision Making Complexity (OT): problem focused assessment/low complexity  Overall Complexity of Evaluation (OT): low complexity     Time Calculation- OT       Row Name 02/01/24 1507             Time Calculation- OT    OT Received On 02/01/24  -ES      OT Goal Re-Cert Due Date 02/10/24  -ES         Untimed Charges    OT Eval/Re-eval Minutes 32  -ES         Total Minutes    Untimed Charges Total Minutes 32  -ES       Total Minutes 32  -ES                User Key  (r) = Recorded By, (t) = Taken By, (c) = Cosigned By      Initials Name Provider Type    ES Johana Guidry, OTR/L, CSRS Occupational Therapist                  Therapy Charges for Today       Code Description Service Date Service Provider Modifiers Qty    [id number]  OT EVAL LOW COMPLEXITY 3 2/1/2024 Johana Guidry, OTR/L, CSRS GO 1                  Johana Guidry, OTR/L, CSRS  2/1/2024

## 2024-02-01 NOTE — PROGRESS NOTES
RT EQUIPMENT DEVICE RELATED - SKIN ASSESSMENT    RT Medical Equipment/Device:     High Flow Nasal Cannula:Airvo    Skin Assessment:      Cheek:  Intact  Ears:  Intact  Nares:  Intact  Nose:  Intact    Device Skin Pressure Protection:  Positioning supports utilized    Nurse Notification:  Lizette Minaya, RRT

## 2024-02-01 NOTE — PLAN OF CARE
Goal Outcome Evaluation:  Plan of Care Reviewed With: patient, spouse           Outcome Evaluation: Patient has experienced decline in function from baseline status, presenting w/ deficits related to balance, tolerance, strength, transfers and mobility that impede patient independence with activities of daily living.  Patient would benefit from skilled Occupational Therapy intervention to maxamize patient safety, and promote return to baseline independence.      Anticipated Discharge Disposition (OT): inpatient rehabilitation facility

## 2024-02-01 NOTE — PLAN OF CARE
Goal Outcome Evaluation:pt remains on Airvo 50L 64%, pt AO to self and place. Tylenol given for severe HA.

## 2024-02-02 LAB
ALBUMIN SERPL-MCNC: 2.9 G/DL (ref 3.5–5.2)
ALBUMIN/GLOB SERPL: 0.7 G/DL
ALP SERPL-CCNC: 117 U/L (ref 39–117)
ALT SERPL W P-5'-P-CCNC: 30 U/L (ref 1–33)
ANION GAP SERPL CALCULATED.3IONS-SCNC: 16.6 MMOL/L (ref 5–15)
AST SERPL-CCNC: 24 U/L (ref 1–32)
BACTERIA SPEC AEROBE CULT: ABNORMAL
BASOPHILS # BLD AUTO: 0.02 10*3/MM3 (ref 0–0.2)
BASOPHILS NFR BLD AUTO: 0.1 % (ref 0–1.5)
BILIRUB SERPL-MCNC: 0.4 MG/DL (ref 0–1.2)
BUN SERPL-MCNC: 36 MG/DL (ref 8–23)
BUN/CREAT SERPL: 34.3 (ref 7–25)
CALCIUM SPEC-SCNC: 8.8 MG/DL (ref 8.6–10.5)
CHLORIDE SERPL-SCNC: 103 MMOL/L (ref 98–107)
CO2 SERPL-SCNC: 18.4 MMOL/L (ref 22–29)
CREAT SERPL-MCNC: 1.05 MG/DL (ref 0.57–1)
CRP SERPL-MCNC: 13.09 MG/DL (ref 0–0.5)
DEPRECATED RDW RBC AUTO: 62.4 FL (ref 37–54)
EGFRCR SERPLBLD CKD-EPI 2021: 59.1 ML/MIN/1.73
EOSINOPHIL # BLD AUTO: 0 10*3/MM3 (ref 0–0.4)
EOSINOPHIL NFR BLD AUTO: 0 % (ref 0.3–6.2)
ERYTHROCYTE [DISTWIDTH] IN BLOOD BY AUTOMATED COUNT: 17.7 % (ref 12.3–15.4)
GLOBULIN UR ELPH-MCNC: 4.4 GM/DL
GLUCOSE BLDC GLUCOMTR-MCNC: 119 MG/DL (ref 70–99)
GLUCOSE BLDC GLUCOMTR-MCNC: 125 MG/DL (ref 70–99)
GLUCOSE BLDC GLUCOMTR-MCNC: 171 MG/DL (ref 70–99)
GLUCOSE BLDC GLUCOMTR-MCNC: 174 MG/DL (ref 70–99)
GLUCOSE SERPL-MCNC: 122 MG/DL (ref 65–99)
HCT VFR BLD AUTO: 31.4 % (ref 34–46.6)
HGB BLD-MCNC: 10 G/DL (ref 12–15.9)
IMM GRANULOCYTES # BLD AUTO: 0.14 10*3/MM3 (ref 0–0.05)
IMM GRANULOCYTES NFR BLD AUTO: 0.9 % (ref 0–0.5)
LYMPHOCYTES # BLD AUTO: 0.64 10*3/MM3 (ref 0.7–3.1)
LYMPHOCYTES NFR BLD AUTO: 4.3 % (ref 19.6–45.3)
MAGNESIUM SERPL-MCNC: 2.2 MG/DL (ref 1.6–2.4)
MCH RBC QN AUTO: 30.5 PG (ref 26.6–33)
MCHC RBC AUTO-ENTMCNC: 31.8 G/DL (ref 31.5–35.7)
MCV RBC AUTO: 95.7 FL (ref 79–97)
MONOCYTES # BLD AUTO: 0.87 10*3/MM3 (ref 0.1–0.9)
MONOCYTES NFR BLD AUTO: 5.8 % (ref 5–12)
NEUTROPHILS NFR BLD AUTO: 13.24 10*3/MM3 (ref 1.7–7)
NEUTROPHILS NFR BLD AUTO: 88.9 % (ref 42.7–76)
NRBC BLD AUTO-RTO: 0 /100 WBC (ref 0–0.2)
PHOSPHATE SERPL-MCNC: 3 MG/DL (ref 2.5–4.5)
PLATELET # BLD AUTO: 449 10*3/MM3 (ref 140–450)
PMV BLD AUTO: 10.8 FL (ref 6–12)
POTASSIUM SERPL-SCNC: 3.7 MMOL/L (ref 3.5–5.2)
PROT SERPL-MCNC: 7.3 G/DL (ref 6–8.5)
RBC # BLD AUTO: 3.28 10*6/MM3 (ref 3.77–5.28)
SODIUM SERPL-SCNC: 138 MMOL/L (ref 136–145)
WBC NRBC COR # BLD AUTO: 14.91 10*3/MM3 (ref 3.4–10.8)

## 2024-02-02 PROCEDURE — 94761 N-INVAS EAR/PLS OXIMETRY MLT: CPT

## 2024-02-02 PROCEDURE — 82948 REAGENT STRIP/BLOOD GLUCOSE: CPT

## 2024-02-02 PROCEDURE — 25010000002 DEXAMETHASONE SODIUM PHOSPHATE 10 MG/ML SOLUTION: Performed by: NURSE PRACTITIONER

## 2024-02-02 PROCEDURE — 80053 COMPREHEN METABOLIC PANEL: CPT | Performed by: INTERNAL MEDICINE

## 2024-02-02 PROCEDURE — 82948 REAGENT STRIP/BLOOD GLUCOSE: CPT | Performed by: NURSE PRACTITIONER

## 2024-02-02 PROCEDURE — 84100 ASSAY OF PHOSPHORUS: CPT | Performed by: INTERNAL MEDICINE

## 2024-02-02 PROCEDURE — 83735 ASSAY OF MAGNESIUM: CPT | Performed by: INTERNAL MEDICINE

## 2024-02-02 PROCEDURE — 63710000001 INSULIN LISPRO (HUMAN) PER 5 UNITS: Performed by: NURSE PRACTITIONER

## 2024-02-02 PROCEDURE — 94799 UNLISTED PULMONARY SVC/PX: CPT

## 2024-02-02 PROCEDURE — 25010000002 FUROSEMIDE PER 20 MG: Performed by: INTERNAL MEDICINE

## 2024-02-02 PROCEDURE — 99291 CRITICAL CARE FIRST HOUR: CPT | Performed by: INTERNAL MEDICINE

## 2024-02-02 PROCEDURE — 94760 N-INVAS EAR/PLS OXIMETRY 1: CPT

## 2024-02-02 PROCEDURE — 25010000002 AMPICILLIN-SULBACTAM PER 1.5 G: Performed by: INTERNAL MEDICINE

## 2024-02-02 PROCEDURE — 36415 COLL VENOUS BLD VENIPUNCTURE: CPT | Performed by: INTERNAL MEDICINE

## 2024-02-02 PROCEDURE — 94664 DEMO&/EVAL PT USE INHALER: CPT

## 2024-02-02 PROCEDURE — 25010000002 HEPARIN (PORCINE) PER 1000 UNITS: Performed by: INTERNAL MEDICINE

## 2024-02-02 PROCEDURE — 86140 C-REACTIVE PROTEIN: CPT | Performed by: INTERNAL MEDICINE

## 2024-02-02 PROCEDURE — 85025 COMPLETE CBC W/AUTO DIFF WBC: CPT | Performed by: INTERNAL MEDICINE

## 2024-02-02 RX ORDER — METOLAZONE 5 MG/1
5 TABLET ORAL ONCE
Status: COMPLETED | OUTPATIENT
Start: 2024-02-02 | End: 2024-02-02

## 2024-02-02 RX ORDER — SODIUM BICARBONATE 650 MG/1
650 TABLET ORAL 3 TIMES DAILY
Status: COMPLETED | OUTPATIENT
Start: 2024-02-02 | End: 2024-02-02

## 2024-02-02 RX ORDER — POTASSIUM CHLORIDE 750 MG/1
40 CAPSULE, EXTENDED RELEASE ORAL ONCE
Status: COMPLETED | OUTPATIENT
Start: 2024-02-02 | End: 2024-02-02

## 2024-02-02 RX ADMIN — FLUOXETINE HYDROCHLORIDE 40 MG: 20 CAPSULE ORAL at 21:55

## 2024-02-02 RX ADMIN — PRAMIPEXOLE DIHYDROCHLORIDE 0.5 MG: 0.5 TABLET ORAL at 21:55

## 2024-02-02 RX ADMIN — INSULIN LISPRO 2 UNITS: 100 INJECTION, SOLUTION INTRAVENOUS; SUBCUTANEOUS at 12:23

## 2024-02-02 RX ADMIN — INSULIN LISPRO 2 UNITS: 100 INJECTION, SOLUTION INTRAVENOUS; SUBCUTANEOUS at 16:49

## 2024-02-02 RX ADMIN — Medication 250 MG: at 21:55

## 2024-02-02 RX ADMIN — PANTOPRAZOLE SODIUM 40 MG: 40 TABLET, DELAYED RELEASE ORAL at 06:10

## 2024-02-02 RX ADMIN — HEPARIN SODIUM 5000 UNITS: 5000 INJECTION INTRAVENOUS; SUBCUTANEOUS at 22:06

## 2024-02-02 RX ADMIN — AMPICILLIN SODIUM AND SULBACTAM SODIUM 3 G: 2; 1 INJECTION, POWDER, FOR SOLUTION INTRAMUSCULAR; INTRAVENOUS at 08:52

## 2024-02-02 RX ADMIN — BUDESONIDE 0.5 MG: 0.5 SUSPENSION RESPIRATORY (INHALATION) at 07:12

## 2024-02-02 RX ADMIN — HEPARIN SODIUM 5000 UNITS: 5000 INJECTION INTRAVENOUS; SUBCUTANEOUS at 06:10

## 2024-02-02 RX ADMIN — POTASSIUM CHLORIDE 40 MEQ: 10 CAPSULE, COATED, EXTENDED RELEASE ORAL at 08:52

## 2024-02-02 RX ADMIN — AMPICILLIN SODIUM AND SULBACTAM SODIUM 3 G: 2; 1 INJECTION, POWDER, FOR SOLUTION INTRAMUSCULAR; INTRAVENOUS at 16:49

## 2024-02-02 RX ADMIN — FLUOXETINE HYDROCHLORIDE 40 MG: 20 CAPSULE ORAL at 08:51

## 2024-02-02 RX ADMIN — SODIUM BICARBONATE 650 MG TABLET 650 MG: at 09:45

## 2024-02-02 RX ADMIN — Medication 10 ML: at 22:09

## 2024-02-02 RX ADMIN — DOCUSATE SODIUM 50MG AND SENNOSIDES 8.6MG 1 TABLET: 8.6; 5 TABLET, FILM COATED ORAL at 21:55

## 2024-02-02 RX ADMIN — FUROSEMIDE 40 MG: 10 INJECTION, SOLUTION INTRAMUSCULAR; INTRAVENOUS at 22:06

## 2024-02-02 RX ADMIN — BUDESONIDE 0.5 MG: 0.5 SUSPENSION RESPIRATORY (INHALATION) at 18:33

## 2024-02-02 RX ADMIN — ASPIRIN 81 MG: 81 TABLET, CHEWABLE ORAL at 08:51

## 2024-02-02 RX ADMIN — SODIUM BICARBONATE 650 MG TABLET 650 MG: at 21:55

## 2024-02-02 RX ADMIN — SODIUM BICARBONATE 650 MG TABLET 650 MG: at 16:49

## 2024-02-02 RX ADMIN — FUROSEMIDE 40 MG: 10 INJECTION, SOLUTION INTRAMUSCULAR; INTRAVENOUS at 12:23

## 2024-02-02 RX ADMIN — Medication 10 ML: at 08:52

## 2024-02-02 RX ADMIN — ARFORMOTEROL TARTRATE 15 MCG: 15 SOLUTION RESPIRATORY (INHALATION) at 18:33

## 2024-02-02 RX ADMIN — ARFORMOTEROL TARTRATE 15 MCG: 15 SOLUTION RESPIRATORY (INHALATION) at 07:12

## 2024-02-02 RX ADMIN — DEXAMETHASONE SODIUM PHOSPHATE 8 MG: 10 INJECTION INTRAMUSCULAR; INTRAVENOUS at 08:52

## 2024-02-02 RX ADMIN — METOLAZONE 5 MG: 5 TABLET ORAL at 08:52

## 2024-02-02 RX ADMIN — HEPARIN SODIUM 5000 UNITS: 5000 INJECTION INTRAVENOUS; SUBCUTANEOUS at 14:15

## 2024-02-02 RX ADMIN — FUROSEMIDE 40 MG: 10 INJECTION, SOLUTION INTRAMUSCULAR; INTRAVENOUS at 06:10

## 2024-02-02 RX ADMIN — OLANZAPINE 2.5 MG: 2.5 TABLET, FILM COATED ORAL at 08:51

## 2024-02-02 RX ADMIN — CLOPIDOGREL BISULFATE 75 MG: 75 TABLET, FILM COATED ORAL at 08:53

## 2024-02-02 RX ADMIN — ACETAMINOPHEN 650 MG: 325 TABLET ORAL at 19:16

## 2024-02-02 RX ADMIN — Medication 250 MG: at 08:52

## 2024-02-02 NOTE — PLAN OF CARE
Goal Outcome Evaluation:            Pt sat in the chair today for several hours. Pt is still requiring high oxygen on airvo. Pt will remain in icu until her oxygen demands are not as demanding.  Ally Costa RN

## 2024-02-02 NOTE — PLAN OF CARE
Goal Outcome Evaluation:         RN took over pt care at 0000 last night. Pt remains on Airvo resting throughout night. VSS. Randi Rosado RN

## 2024-02-02 NOTE — PROGRESS NOTES
Patient complains of pain in her low back and even with light touch of the skin on the left side of her wound.  This is suggestive of neuropathic pain.  Wound continues to make steady progress.  There is no evidence of pressure.  Continue debridement to maintain active phase wound healing.  Continue offloading with low loss air mattress.   RT EQUIPMENT DEVICE RELATED - SKIN ASSESSMENT    RT Medical Equipment/Device:     High Flow Nasal Cannula:Airvo    Skin Assessment:      Cheek:  Intact  Ears:  Intact  Nares:  Intact  Nose:  Intact  Lips:  Intact    Device Skin Pressure Protection:  Skin-to-device areas padded:  None Required    Nurse Notification:  Lizette Moser, RRT

## 2024-02-02 NOTE — PROGRESS NOTES
Pulmonary / Critical Care Progress Note      Patient Name: Falugni Minaya  : 1960  MRN: [id number]  Attending:  Reagan Aleman MD   Date of admission: 2024    Subjective   Subjective   Patient critically ill with hypoxemic respiratory failure    Awake and following commands  Dyspnea and cough improving.  Coughing up thick secretions  Respiratory status is still tenuous.  Currently on Airvo 60 L / 66%  Echocardiogram with diastolic dysfunction  Family reports that she has not been on immunosuppression for MS for at least 3 to 4 months  Cultures so far negative other than COVID-19 being positive  Chest CT with diffuse bilateral airspace disease  Responding well for diuretics.  Potassium low  On antibiotics for UTI, pneumonia  Fevers resolved.  No nausea or chills  Family would like for her to go home with pallitus      Objective   Objective     Vitals:   Vital signs for last 24 hours:  Temp:  [97.9 °F (36.6 °C)-99.6 °F (37.6 °C)] 98.7 °F (37.1 °C)  Heart Rate:  [67-91] 91  Resp:  [16-26] 21  BP: (108-153)/(55-99) 150/84    Intake/Output last 3 shifts:  I/O last 3 completed shifts:  In: 110 [I.V.:110]  Out: 1850 [Urine:1850]  Intake/Output this shift:  No intake/output data recorded.    Vent settings for last 24 hours:       Hemodynamic parameters for last 24 hours:       Physical Exam   Vital Signs Reviewed   General: Critically ill elderly female, much more awake and alert  HEENT:  PERRL, EOMI.  OP, nares clear  Chest:  good aeration, decreasing crackles and rhonchi  bilaterally, tympanic to percussion bilaterally, pursed lip breathing noted on Airvo  CV: RRR, no MGR, pulses 2+, equal.  Abd:  Soft, NT, ND, + BS, no HSM  EXT:  no clubbing, no cyanosis, no edema  Neuro:  A&Ox3, CN grossly intact, no focal deficits.  Skin: No rashes or lesions noted    Result Review    Result Review:  I have personally reviewed the results from the time of this admission to 2024 07:46 EST and agree with these  findings:  [x]  Laboratory  [x]  Microbiology  [x]  Radiology  [x]  EKG/Telemetry   [x]  Cardiology/Vascular   []  Pathology  []  Old records  []  Other:  Most notable findings include:       Lab 02/02/24  0246 02/01/24  0313 01/31/24  1017 01/31/24  1016   WBC 14.91* 13.73* 19.59*  --    HEMOGLOBIN 10.0* 9.8* 10.4*  --    HEMATOCRIT 31.4* 30.7* 32.5*  --    PLATELETS 449 435 497*  --    SODIUM 138 138 141  --    SODIUM, ARTERIAL  --   --   --  142.8   POTASSIUM 3.7 3.7 4.3  --    CHLORIDE 103 104 105  --    CO2 18.4* 18.1* 20.6*  --    BUN 36* 30* 31*  --    CREATININE 1.05* 0.99 1.03*  --    GLUCOSE 122* 121* 98  --    GLUCOSE, ARTERIAL  --   --   --  102*   CALCIUM 8.8 8.6 8.5*  --    PHOSPHORUS 3.0 4.2  --   --    TOTAL PROTEIN 7.3 7.4 7.3  --    ALBUMIN 2.9* 2.7* 2.9*  --    GLOBULIN 4.4 4.7 4.4  --      CT Chest Without Contrast Diagnostic    Result Date: 1/31/2024  PROCEDURE: CT CHEST WO CONTRAST DIAGNOSTIC  COMPARISON: [facility], CR, XR CHEST 1 VW, 12/24/2023, 12:17.  [facility], CR, XR CHEST 1 VW, 1/31/2024, 10:31.  [facility], CT, CT ABDOMEN PELVIS W CONTRAST, 10/11/2023, 11:39.  INDICATIONS: Opacities, sob  TECHNIQUE: CT images were created without the administration of contrast material.   PROTOCOL:   Standard imaging protocol performed    RADIATION:   DLP: 481.3mGy*cm   Automated exposure control was utilized to minimize radiation dose.  FINDINGS:  No pleural or pericardial effusion is seen.  Calcified subcarinal lymph nodes are consistent with previous granulomatous disease.  No noncalcified adenopathy is seen.  Heart size is normal.  There is extensive ground-glass opacity throughout the lung fields bilaterally.  More focal areas of consolidation are also noted in both lung fields.  No pneumothorax is seen.  No significant bronchiectasis is present.  No endobronchial lesion is seen.  A cholecystectomy has been performed.  The upper abdomen appears  unremarkable.  Moderate mid thoracic kyphosis is noted.      Impression:   1. Extensive bilateral ground-glass opacity with smaller foci of consolidation in both lung fields.  Finding is favored to represent pneumonia.  Pulmonary edema and aspiration would also be in the differential.     Tucker Marcial M.D.       Electronically Signed and Approved By: Tucker Marcial M.D. on 1/31/2024 at 14:32              Results for orders placed during the hospital encounter of 01/31/24    Adult Transthoracic Echo Complete W/ Cont if Necessary Per Protocol    Interpretation Summary    Very technically difficult study with limited views.    Left ventricular ejection fraction appears to be 56 - 60%.  No obvious regional wall motion abnormalities.    Left ventricular diastolic function is consistent with (grade I) impaired relaxation and age.    Mild tricuspid regurgitation with estimated right ventricular systolic pressure from tricuspid regurgitation is mildly elevated (35-45 mmHg).    Valves are not well-visualized but no significant valvular disease by Doppler.    Urine culture with E. Coli      Assessment & Plan   Assessment / Plan     Active Hospital Problems:  Active Hospital Problems    Diagnosis     **Acute hypoxemic respiratory failure      Impression:  Acute hypoxic respiratory failure requiring Airvo  Severe sepsis present on admission  COVID-pneumonia  Community-acquired pneumonia from unspecified organism  E. coli urinary tract infection  Multiple sclerosis  Immunosuppressed status on treatment for MS  Acute decompensated diastolic congestive heart failure  Hypokalemia   Coronary artery disease  Transaminitis  Leukocytosis  Lactic acidosis, clinically significant     Plan:  Respiratory status remains tenuous  Continue Airvo.  Wean O2 to keep SPO2 greater than 90%  Continue nebulizers and bronchopulmonary hygiene  Continue Lasix 40 mg IV every 8 hours.  Spot dose metolazone x 1  Trend renal panel electrolytes.  Replace  potassium orally  Echocardiogram with diastolic dysfunction  Lactic acid cleared  Change antibiotics to Unasyn.  Complete 7 days to cover both pneumonia and E. coli UTI  Continue high-dose Decadron x 10 days given elevated inflammatory markers with significant COVID-19 pneumonia  CRP remains elevated  Trend LFTs  Continue sliding scale insulin  Appreciate palliative care assistance.  Patient is DNR/DNI.  Patient's family would like to get patient home with Pallitus if possible    GI prophylaxis  DVT prophylaxis:  Medical DVT prophylaxis orders are present.    CODE STATUS:   Medical Intervention Limits: NO intubation (DNI); NO cardioversion  Level Of Support Discussed With: Patient  Code Status (Patient has no pulse and is not breathing): No CPR (Do Not Attempt to Resuscitate)  Medical Interventions (Patient has pulse or is breathing): Limited Support  Comments: d/w pt and /family      The patient is critically ill in the ICU with acute hypoxemic respiratory failure requiring Airvo, COVID-19 pneumonia, community-acquired pneumonia, pulmonary edema, decompensated heart failure, hypokalemia, encephalopathy. Multidisciplinary bedside critical care rounds were performed with nursing staff, respiratory therapy, pharmacy, nutritional services, social work. I have personally reviewed the chart, labs and any pertinent imaging available.  I have spent 32 minutes of critical care time, excluding procedures, in the care of this patient.    Electronically signed by Derrell Tomlin MD, 02/02/24, 11:32 AM EST.

## 2024-02-03 LAB
ALBUMIN SERPL-MCNC: 3.2 G/DL (ref 3.5–5.2)
ALBUMIN/GLOB SERPL: 0.7 G/DL
ALP SERPL-CCNC: 123 U/L (ref 39–117)
ALT SERPL W P-5'-P-CCNC: 26 U/L (ref 1–33)
ANION GAP SERPL CALCULATED.3IONS-SCNC: 18.8 MMOL/L (ref 5–15)
AST SERPL-CCNC: 21 U/L (ref 1–32)
BASOPHILS # BLD AUTO: 0.03 10*3/MM3 (ref 0–0.2)
BASOPHILS NFR BLD AUTO: 0.2 % (ref 0–1.5)
BILIRUB SERPL-MCNC: 0.5 MG/DL (ref 0–1.2)
BUN SERPL-MCNC: 43 MG/DL (ref 8–23)
BUN/CREAT SERPL: 34.1 (ref 7–25)
CALCIUM SPEC-SCNC: 9.4 MG/DL (ref 8.6–10.5)
CHLORIDE SERPL-SCNC: 100 MMOL/L (ref 98–107)
CO2 SERPL-SCNC: 19.2 MMOL/L (ref 22–29)
CREAT SERPL-MCNC: 1.26 MG/DL (ref 0.57–1)
DEPRECATED RDW RBC AUTO: 61.8 FL (ref 37–54)
EGFRCR SERPLBLD CKD-EPI 2021: 47.5 ML/MIN/1.73
EOSINOPHIL # BLD AUTO: 0 10*3/MM3 (ref 0–0.4)
EOSINOPHIL NFR BLD AUTO: 0 % (ref 0.3–6.2)
ERYTHROCYTE [DISTWIDTH] IN BLOOD BY AUTOMATED COUNT: 17.8 % (ref 12.3–15.4)
GLOBULIN UR ELPH-MCNC: 4.8 GM/DL
GLUCOSE BLDC GLUCOMTR-MCNC: 137 MG/DL (ref 70–99)
GLUCOSE BLDC GLUCOMTR-MCNC: 144 MG/DL (ref 70–99)
GLUCOSE BLDC GLUCOMTR-MCNC: 173 MG/DL (ref 70–99)
GLUCOSE BLDC GLUCOMTR-MCNC: 221 MG/DL (ref 70–99)
GLUCOSE SERPL-MCNC: 131 MG/DL (ref 65–99)
HCT VFR BLD AUTO: 32.6 % (ref 34–46.6)
HGB BLD-MCNC: 10.7 G/DL (ref 12–15.9)
IMM GRANULOCYTES # BLD AUTO: 0.22 10*3/MM3 (ref 0–0.05)
IMM GRANULOCYTES NFR BLD AUTO: 1.4 % (ref 0–0.5)
LYMPHOCYTES # BLD AUTO: 0.71 10*3/MM3 (ref 0.7–3.1)
LYMPHOCYTES NFR BLD AUTO: 4.6 % (ref 19.6–45.3)
MAGNESIUM SERPL-MCNC: 2.2 MG/DL (ref 1.6–2.4)
MCH RBC QN AUTO: 31.4 PG (ref 26.6–33)
MCHC RBC AUTO-ENTMCNC: 32.8 G/DL (ref 31.5–35.7)
MCV RBC AUTO: 95.6 FL (ref 79–97)
MONOCYTES # BLD AUTO: 0.89 10*3/MM3 (ref 0.1–0.9)
MONOCYTES NFR BLD AUTO: 5.7 % (ref 5–12)
NEUTROPHILS NFR BLD AUTO: 13.75 10*3/MM3 (ref 1.7–7)
NEUTROPHILS NFR BLD AUTO: 88.1 % (ref 42.7–76)
NRBC BLD AUTO-RTO: 0.2 /100 WBC (ref 0–0.2)
NT-PROBNP SERPL-MCNC: 1972 PG/ML (ref 0–900)
PHOSPHATE SERPL-MCNC: 3.7 MG/DL (ref 2.5–4.5)
PLATELET # BLD AUTO: 486 10*3/MM3 (ref 140–450)
PMV BLD AUTO: 10.8 FL (ref 6–12)
POTASSIUM SERPL-SCNC: 3.8 MMOL/L (ref 3.5–5.2)
PROCALCITONIN SERPL-MCNC: 0.2 NG/ML (ref 0–0.25)
PROT SERPL-MCNC: 8 G/DL (ref 6–8.5)
RBC # BLD AUTO: 3.41 10*6/MM3 (ref 3.77–5.28)
SODIUM SERPL-SCNC: 138 MMOL/L (ref 136–145)
WBC NRBC COR # BLD AUTO: 15.6 10*3/MM3 (ref 3.4–10.8)

## 2024-02-03 PROCEDURE — 99291 CRITICAL CARE FIRST HOUR: CPT | Performed by: INTERNAL MEDICINE

## 2024-02-03 PROCEDURE — 94760 N-INVAS EAR/PLS OXIMETRY 1: CPT

## 2024-02-03 PROCEDURE — 80053 COMPREHEN METABOLIC PANEL: CPT | Performed by: INTERNAL MEDICINE

## 2024-02-03 PROCEDURE — 25010000002 FUROSEMIDE PER 20 MG: Performed by: INTERNAL MEDICINE

## 2024-02-03 PROCEDURE — 94664 DEMO&/EVAL PT USE INHALER: CPT

## 2024-02-03 PROCEDURE — 83735 ASSAY OF MAGNESIUM: CPT | Performed by: INTERNAL MEDICINE

## 2024-02-03 PROCEDURE — 25010000002 DEXAMETHASONE SODIUM PHOSPHATE 10 MG/ML SOLUTION: Performed by: NURSE PRACTITIONER

## 2024-02-03 PROCEDURE — 84145 PROCALCITONIN (PCT): CPT | Performed by: INTERNAL MEDICINE

## 2024-02-03 PROCEDURE — 25010000002 AMPICILLIN-SULBACTAM PER 1.5 G: Performed by: INTERNAL MEDICINE

## 2024-02-03 PROCEDURE — 25010000002 HEPARIN (PORCINE) PER 1000 UNITS: Performed by: INTERNAL MEDICINE

## 2024-02-03 PROCEDURE — 84100 ASSAY OF PHOSPHORUS: CPT | Performed by: INTERNAL MEDICINE

## 2024-02-03 PROCEDURE — 94799 UNLISTED PULMONARY SVC/PX: CPT

## 2024-02-03 PROCEDURE — 63710000001 INSULIN LISPRO (HUMAN) PER 5 UNITS: Performed by: NURSE PRACTITIONER

## 2024-02-03 PROCEDURE — 83880 ASSAY OF NATRIURETIC PEPTIDE: CPT | Performed by: INTERNAL MEDICINE

## 2024-02-03 PROCEDURE — 82948 REAGENT STRIP/BLOOD GLUCOSE: CPT | Performed by: NURSE PRACTITIONER

## 2024-02-03 PROCEDURE — 82948 REAGENT STRIP/BLOOD GLUCOSE: CPT

## 2024-02-03 PROCEDURE — 85025 COMPLETE CBC W/AUTO DIFF WBC: CPT | Performed by: INTERNAL MEDICINE

## 2024-02-03 PROCEDURE — 94761 N-INVAS EAR/PLS OXIMETRY MLT: CPT

## 2024-02-03 RX ORDER — POTASSIUM CHLORIDE 750 MG/1
40 CAPSULE, EXTENDED RELEASE ORAL ONCE
Status: COMPLETED | OUTPATIENT
Start: 2024-02-03 | End: 2024-02-03

## 2024-02-03 RX ADMIN — HEPARIN SODIUM 5000 UNITS: 5000 INJECTION INTRAVENOUS; SUBCUTANEOUS at 21:09

## 2024-02-03 RX ADMIN — INSULIN LISPRO 3 UNITS: 100 INJECTION, SOLUTION INTRAVENOUS; SUBCUTANEOUS at 16:55

## 2024-02-03 RX ADMIN — POTASSIUM CHLORIDE 40 MEQ: 10 CAPSULE, COATED, EXTENDED RELEASE ORAL at 12:10

## 2024-02-03 RX ADMIN — PRAMIPEXOLE DIHYDROCHLORIDE 0.5 MG: 0.5 TABLET ORAL at 21:10

## 2024-02-03 RX ADMIN — HEPARIN SODIUM 5000 UNITS: 5000 INJECTION INTRAVENOUS; SUBCUTANEOUS at 05:46

## 2024-02-03 RX ADMIN — ARFORMOTEROL TARTRATE 15 MCG: 15 SOLUTION RESPIRATORY (INHALATION) at 19:31

## 2024-02-03 RX ADMIN — FUROSEMIDE 40 MG: 10 INJECTION, SOLUTION INTRAMUSCULAR; INTRAVENOUS at 05:47

## 2024-02-03 RX ADMIN — Medication 10 ML: at 08:33

## 2024-02-03 RX ADMIN — AMPICILLIN SODIUM AND SULBACTAM SODIUM 3 G: 2; 1 INJECTION, POWDER, FOR SOLUTION INTRAMUSCULAR; INTRAVENOUS at 13:53

## 2024-02-03 RX ADMIN — ASPIRIN 81 MG: 81 TABLET, CHEWABLE ORAL at 08:33

## 2024-02-03 RX ADMIN — Medication 250 MG: at 21:10

## 2024-02-03 RX ADMIN — Medication 10 ML: at 21:07

## 2024-02-03 RX ADMIN — PANTOPRAZOLE SODIUM 40 MG: 40 TABLET, DELAYED RELEASE ORAL at 05:46

## 2024-02-03 RX ADMIN — AMPICILLIN SODIUM AND SULBACTAM SODIUM 3 G: 2; 1 INJECTION, POWDER, FOR SOLUTION INTRAMUSCULAR; INTRAVENOUS at 01:07

## 2024-02-03 RX ADMIN — BUDESONIDE 0.5 MG: 0.5 SUSPENSION RESPIRATORY (INHALATION) at 19:31

## 2024-02-03 RX ADMIN — FUROSEMIDE 40 MG: 10 INJECTION, SOLUTION INTRAMUSCULAR; INTRAVENOUS at 13:53

## 2024-02-03 RX ADMIN — BUDESONIDE 0.5 MG: 0.5 SUSPENSION RESPIRATORY (INHALATION) at 07:07

## 2024-02-03 RX ADMIN — FLUOXETINE HYDROCHLORIDE 40 MG: 20 CAPSULE ORAL at 21:10

## 2024-02-03 RX ADMIN — Medication 250 MG: at 08:33

## 2024-02-03 RX ADMIN — ARFORMOTEROL TARTRATE 15 MCG: 15 SOLUTION RESPIRATORY (INHALATION) at 07:07

## 2024-02-03 RX ADMIN — FLUOXETINE HYDROCHLORIDE 40 MG: 20 CAPSULE ORAL at 08:33

## 2024-02-03 RX ADMIN — DEXAMETHASONE SODIUM PHOSPHATE 8 MG: 10 INJECTION INTRAMUSCULAR; INTRAVENOUS at 08:33

## 2024-02-03 RX ADMIN — INSULIN LISPRO 2 UNITS: 100 INJECTION, SOLUTION INTRAVENOUS; SUBCUTANEOUS at 12:09

## 2024-02-03 RX ADMIN — ALBUTEROL SULFATE 2.5 MG: 2.5 SOLUTION RESPIRATORY (INHALATION) at 02:27

## 2024-02-03 RX ADMIN — HEPARIN SODIUM 5000 UNITS: 5000 INJECTION INTRAVENOUS; SUBCUTANEOUS at 13:53

## 2024-02-03 RX ADMIN — CLOPIDOGREL BISULFATE 75 MG: 75 TABLET, FILM COATED ORAL at 08:33

## 2024-02-03 RX ADMIN — OLANZAPINE 2.5 MG: 2.5 TABLET, FILM COATED ORAL at 08:33

## 2024-02-03 RX ADMIN — AMPICILLIN SODIUM AND SULBACTAM SODIUM 3 G: 2; 1 INJECTION, POWDER, FOR SOLUTION INTRAMUSCULAR; INTRAVENOUS at 08:33

## 2024-02-03 RX ADMIN — FUROSEMIDE 40 MG: 10 INJECTION, SOLUTION INTRAMUSCULAR; INTRAVENOUS at 21:05

## 2024-02-03 RX ADMIN — AMPICILLIN SODIUM AND SULBACTAM SODIUM 3 G: 2; 1 INJECTION, POWDER, FOR SOLUTION INTRAMUSCULAR; INTRAVENOUS at 21:08

## 2024-02-03 RX ADMIN — ATORVASTATIN CALCIUM 40 MG: 40 TABLET, FILM COATED ORAL at 08:33

## 2024-02-03 NOTE — PROGRESS NOTES
[facility]   Hospitalist Progress Note    Date of admission: 1/31/2024  Patient Name: Falguni Minaya  1960  Date: 2/3/2024      Subjective     Chief Complaint   Patient presents with    Shortness of Breath    Altered Mental Status       Interval Followup: Airway requirements have increased, patient states shortness of air is similar to yesterday    Objective     Vitals:   Temp:  [98.1 °F (36.7 °C)-98.9 °F (37.2 °C)] 98.9 °F (37.2 °C)  Heart Rate:  [] 96  Resp:  [18-24] 20  BP: ()/() 125/110  Flow (L/min):  [] 6060    Physical Exam  Tired, ill-appearing in chair at bedside family present  Bilateral rhonchi, tachypnea, dyspneic with conversation on Airvo  Mildly elevated rate regular rhythm no lower extremity pitting edema  Abdomen soft nontender nondistended  Generalized weakness  Answering basic questions appropriately      Result Review:  Vital signs, labs and recent relevant imaging reviewed.        acetaminophen    albuterol    benzonatate    senna-docusate sodium **AND** polyethylene glycol **AND** bisacodyl **AND** bisacodyl    [Held by provider] cyclobenzaprine    dextrose    dextrose    glucagon (human recombinant)    guaiFENesin-dextromethorphan    ondansetron    sodium chloride    sodium chloride    sodium chloride    ampicillin-sulbactam, 3 g, Intravenous, Q6H  arformoterol, 15 mcg, Nebulization, BID - RT  aspirin, 81 mg, Oral, Daily  atorvastatin, 40 mg, Oral, Daily  budesonide, 0.5 mg, Nebulization, BID - RT  [Held by provider] carvedilol, 12.5 mg, Oral, Q12H  clopidogrel, 75 mg, Oral, Daily  dexAMETHasone, 8 mg, Intravenous, Daily  FLUoxetine, 40 mg, Oral, BID  furosemide, 40 mg, Intravenous, Q8H  [Held by provider] gabapentin, 300 mg, Oral, TID  heparin (porcine), 5,000 Units, Subcutaneous, Q8H  insulin lispro, 2-7 Units, Subcutaneous, 4x Daily AC & at Bedtime  [Held by provider] losartan, 25 mg, Oral, Q24H  OLANZapine, 2.5 mg, Oral, Daily  pantoprazole, 40 mg, Oral, Q  AM  pramipexole, 0.5 mg, Oral, Nightly  saccharomyces boulardii, 250 mg, Oral, BID  senna-docusate sodium, 1 tablet, Oral, BID  sodium chloride, 10 mL, Intravenous, Q12H        XR Chest 1 View    Result Date: 2/1/2024    Similar appearance of patchy bilateral opacities, right greater than left, which may represent pneumonia.       JONI PATEL MD       Electronically Signed and Approved By: JONI PATEL MD on 2/01/2024 at 8:41             CT Chest Without Contrast Diagnostic    Result Date: 1/31/2024    1. Extensive bilateral ground-glass opacity with smaller foci of consolidation in both lung fields.  Finding is favored to represent pneumonia.  Pulmonary edema and aspiration would also be in the differential.     Tucker Marcial M.D.       Electronically Signed and Approved By: Tucker Marcial M.D. on 1/31/2024 at 14:32             CT Head Without Contrast    Result Date: 1/31/2024    1. Mild to moderate low density in the cerebral white matter could represent small-vessel ischemic disease and or multiple sclerosis. 2. No CT evidence of acute infarction, mass or intracranial hemorrhage. 3. Small right mastoid effusion     Tucker Marcial M.D.       Electronically Signed and Approved By: Tucker Marcial M.D. on 1/31/2024 at 10:55             XR Chest 1 View    Result Date: 1/31/2024    1. Asymmetric perihilar and patchy multifocal opacities which could represent atelectasis, asymmetric edema or pneumonia.       FRANK WHITEHEAD MD       Electronically Signed and Approved By: FRANK WHITEHEAD MD on 1/31/2024 at 10:33              Assessment / Plan     Summary: 64-year-old female with a history of MS immunocompromise, recently been transitioning medications for this, recurrent UTIs, diastolic CHF, CAD with prior PCI, CKD, who been diagnosed with COVID-19 on 1/22 and treated with outpatient dexamethasone, and completed steroids and had progressively worsening symptoms and presented for further evaluation.  Patient with acute  approximate respiratory failure requiring Airvo also with UTI    Assessment/Plan (clinically significant if listed here)  Acute hypoxemic respiratory failure requiring Airvo  Sepsis, present on admission, in setting of gram-negative bacilli UTI and suspected bacterial pneumonia in addition to COVID-19 pneumonia  Acute on Chronic dCHF (previously reduced now with recovered EF of 62%)   CAD with history of PCI to RCA   COVID-19 pneumonia with initial testing positive 1/22  Immunocompromised in setting of multiple sclerosis currently in limbo between treatments (most recently on Vumerity)   Anion gap metabolic acidosis  Transaminitis  History of hypertension with low blood pressure on arrival  CKD possibly 3 baseline creatinine 1.1-1.3  Seizure disorder  GERD  Anxiety/depression  Hyperlipidemia  Morbid obesity    Repeat BNP checked slightly higher than admission, continue on IV Lasix, every 8, creatinine slightly increased at 1.26, baseline is variable between 1.1-1.3  Echo 56% grade 1, mild TR  Monitor renal function and potassium closely  Continue Airvo respiratory hygiene nebulizers, wean as able still with critical hypoxia  Continue high-dose steroids for elevated inflammatory markers with COVID-19 pneumonia  Continue on Unasyn for 7-day treatment for pneumonia and E. coli UTI, urine culture sensitive on review  Repeat Pro-Mark decreasing, white count increased to 15.6 suspect in part from steroids  LFTs improving overall, mild alk phos elevation  2 g sodium diet 1500 cc restriction  added vaginal estrogen cream given recurrent UTIs, will need this at discharge  Continue PPI; avoid home meloxicam/nsaids given ckd, needs to limit nsaids in general  Holding home antihypertensive medications (losartan 25, corege 12.5) , blood pressure reasonable without these, monitor  Unclear of any home seizure medications apart from gabapentin which she seems to have taken for symptoms with her multiple sclerosis.  Monitor  Continue  on aspirin and Plavix,   Lfts normalized, resume statin  Cont prozac, zyprexa,   Cont pramipexole  Check am cbc, bmp, mg, phos lfts  Continue close ICU monitoring, discussed with pulmonology/Dr. Tomlin and critical care team.  Guarded prognosis given continued critical hypoxia.    PT/OT  Check a.m. CBC, BMP, magnesium, phosphorus, lfts      DVT prophylaxis:  Medical DVT prophylaxis orders are present.        Medical Intervention Limits: NO intubation (DNI); NO cardioversion  Level Of Support Discussed With: Patient  Code Status (Patient has no pulse and is not breathing): No CPR (Do Not Attempt to Resuscitate)  Medical Interventions (Patient has pulse or is breathing): Limited Support  Comments: d/w pt and /family    Patient is critically ill due to AHRF requiring airvo, UTI, CAP/covid19, CHF Ex, debility and additional issues as above .  I have spent 31 minutes of critical care time reviewing documentation, pertinent labs, imaging studies, examining the patient, modifying care plan, and discussing patient's condition and care plan with the patient, nursing and family and pulm.      CBC          2/1/2024    03:13 2/2/2024    02:46 2/3/2024    02:44   CBC   WBC 13.73  14.91  15.60    RBC 3.19  3.28  3.41    Hemoglobin 9.8  10.0  10.7    Hematocrit 30.7  31.4  32.6    MCV 96.2  95.7  95.6    MCH 30.7  30.5  31.4    MCHC 31.9  31.8  32.8    RDW 18.2  17.7  17.8    Platelets 435  449  486        CMP          2/1/2024    03:13 2/2/2024    02:46 2/3/2024    02:44   CMP   Glucose 121  122  131    BUN 30  36  43    Creatinine 0.99  1.05  1.26    EGFR 63.4  59.1  47.5    Sodium 138  138  138    Potassium 3.7  3.7  3.8    Chloride 104  103  100    Calcium 8.6  8.8  9.4    Total Protein 7.4  7.3  8.0    Albumin 2.7  2.9  3.2    Globulin 4.7  4.4  4.8    Total Bilirubin 0.4  0.4  0.5    Alkaline Phosphatase 114  117  123    AST (SGOT) 35  24  21    ALT (SGPT) 36  30  26    Albumin/Globulin Ratio 0.6  0.7  0.7     BUN/Creatinine Ratio 30.3  34.3  34.1    Anion Gap 15.9  16.6  18.8

## 2024-02-03 NOTE — PROGRESS NOTES
RT EQUIPMENT DEVICE RELATED - SKIN ASSESSMENT    RT Medical Equipment/Device:     High Flow Nasal Cannula:Airvo    Skin Assessment:      Cheek:  Intact  Ears:  Intact  Nares:  Intact  Nose:  Intact    Device Skin Pressure Protection:  Skin-to-device areas padded:  None Required    Nurse Notification:  Lizette Espinal, RRT

## 2024-02-03 NOTE — PROGRESS NOTES
RT EQUIPMENT DEVICE RELATED - SKIN ASSESSMENT    RT Medical Equipment/Device:     High Flow Nasal Cannula:Airvo    Skin Assessment:      Cheek:  Intact  Ears:  Intact  Nares:  Intact  Nose:  Intact  Lips:  Intact    Device Skin Pressure Protection:  Pressure points protected    Nurse Notification:  Lizette Arita, RRT

## 2024-02-03 NOTE — PROGRESS NOTES
Respiratory Therapist Broncho-Pulmonary Hygiene Progress Note      Patient Name:  Falguni Minaya  YOB: 1960    Falguni Minaya meets the qualification for Level 2 of the Bronco-Pulmonary Hygiene Protocol. This was based on my daily patient assessment and includes review of chest x-ray results, cough ability and quality, oxygenation, secretions or risk for secretion development and patient mobility.     Broncho-Pulmonary Hygiene Assessment:    Level of Movement: Actively changing positions without assistance  Alert/ oriented/ cooperative    Breath Sounds: Diminished and/or coarse rhonchi    Cough: Strong, effective    Chest X-Ray: Mild consolidation and/or atelectasis.    Sputum Productions: None or small amount of thin or watery secretions with effective cough    History and Physical: New onset of bronchitis or existing chronic pulmonary conditions.  **(not in an exacerbation)    SpO2 to Oxygen Need: greater than 90% on  greater than 6L, Vapotherm, oxygen mask greater than 40% or ventilator    Current SpO2 is: 92 on 60L/60%    Based on this information, I have completed the following interventions: PAP with [employer]      Electronically signed by Brittney Arita RRT, 02/03/24, 7:12 AM EST.

## 2024-02-03 NOTE — PROGRESS NOTES
Pulmonary / Critical Care Progress Note      Patient Name: Falguni Minaya  : 1960  MRN: [id number]  Attending:  Reagan Aleman MD   Date of admission: 2024    Subjective   Subjective   Patient critically ill with hypoxemic respiratory failure    Doing well  Slowly improving  Cough and dyspnea improved  Bringing up thick secretions  Respiratory status is still tenuous.  Currently on Airvo 60 L / 65%  Echocardiogram with diastolic dysfunction  Family reports that she has not been on immunosuppression for MS for at least 3 to 4 months  Diuresing well  On antibiotics for UTI, pneumonia  No nausea, fevers or chills  Family would like for her to go home with pallitus      Objective   Objective     Vitals:   Vital signs for last 24 hours:  Temp:  [98.1 °F (36.7 °C)-98.9 °F (37.2 °C)] 98.4 °F (36.9 °C)  Heart Rate:  [] 108  Resp:  [18-24] 20  BP: ()/() 96/42    Intake/Output last 3 shifts:  I/O last 3 completed shifts:  In: -   Out: 1300 [Urine:1300]  Intake/Output this shift:  No intake/output data recorded.    Vent settings for last 24 hours:       Hemodynamic parameters for last 24 hours:       Physical Exam   Vital Signs Reviewed   General: Critically ill elderly female, much more awake and alert  HEENT:  PERRL, EOMI.  OP, nares clear  Chest:  good aeration, decreasing crackles and rhonchi  bilaterally, tympanic to percussion bilaterally, pursed lip breathing noted on Airvo  CV: RRR, no MGR, pulses 2+, equal.  Abd:  Soft, NT, ND, + BS, no HSM  EXT:  no clubbing, no cyanosis, no edema  Neuro:  A&Ox3, CN grossly intact, no focal deficits.  Skin: No rashes or lesions noted    Result Review    Result Review:  I have personally reviewed the results from the time of this admission to 2/3/2024 09:55 EST and agree with these findings:  [x]  Laboratory  [x]  Microbiology  [x]  Radiology  [x]  EKG/Telemetry   [x]  Cardiology/Vascular   []  Pathology  []  Old records  []  Other:  Most notable  findings include:       Lab 02/03/24  0244 02/02/24  0246 02/01/24  0313 01/31/24  1017 01/31/24  1016   WBC 15.60* 14.91* 13.73* 19.59*  --    HEMOGLOBIN 10.7* 10.0* 9.8* 10.4*  --    HEMATOCRIT 32.6* 31.4* 30.7* 32.5*  --    PLATELETS 486* 449 435 497*  --    SODIUM 138 138 138 141  --    SODIUM, ARTERIAL  --   --   --   --  142.8   POTASSIUM 3.8 3.7 3.7 4.3  --    CHLORIDE 100 103 104 105  --    CO2 19.2* 18.4* 18.1* 20.6*  --    BUN 43* 36* 30* 31*  --    CREATININE 1.26* 1.05* 0.99 1.03*  --    GLUCOSE 131* 122* 121* 98  --    GLUCOSE, ARTERIAL  --   --   --   --  102*   CALCIUM 9.4 8.8 8.6 8.5*  --    PHOSPHORUS 3.7 3.0 4.2  --   --    TOTAL PROTEIN 8.0 7.3 7.4 7.3  --    ALBUMIN 3.2* 2.9* 2.7* 2.9*  --    GLOBULIN 4.8 4.4 4.7 4.4  --      CT Chest Without Contrast Diagnostic    Result Date: 1/31/2024  PROCEDURE: CT CHEST WO CONTRAST DIAGNOSTIC  COMPARISON: [facility], CR, XR CHEST 1 VW, 12/24/2023, 12:17.  [facility], CR, XR CHEST 1 VW, 1/31/2024, 10:31.  [facility], CT, CT ABDOMEN PELVIS W CONTRAST, 10/11/2023, 11:39.  INDICATIONS: Opacities, sob  TECHNIQUE: CT images were created without the administration of contrast material.   PROTOCOL:   Standard imaging protocol performed    RADIATION:   DLP: 481.3mGy*cm   Automated exposure control was utilized to minimize radiation dose.  FINDINGS:  No pleural or pericardial effusion is seen.  Calcified subcarinal lymph nodes are consistent with previous granulomatous disease.  No noncalcified adenopathy is seen.  Heart size is normal.  There is extensive ground-glass opacity throughout the lung fields bilaterally.  More focal areas of consolidation are also noted in both lung fields.  No pneumothorax is seen.  No significant bronchiectasis is present.  No endobronchial lesion is seen.  A cholecystectomy has been performed.  The upper abdomen appears unremarkable.  Moderate mid thoracic kyphosis is noted.      Impression:    1. Extensive bilateral ground-glass opacity with smaller foci of consolidation in both lung fields.  Finding is favored to represent pneumonia.  Pulmonary edema and aspiration would also be in the differential.     Tucker Marcial M.D.       Electronically Signed and Approved By: Tucker Marcial M.D. on 1/31/2024 at 14:32              Results for orders placed during the hospital encounter of 01/31/24    Adult Transthoracic Echo Complete W/ Cont if Necessary Per Protocol    Interpretation Summary    Very technically difficult study with limited views.    Left ventricular ejection fraction appears to be 56 - 60%.  No obvious regional wall motion abnormalities.    Left ventricular diastolic function is consistent with (grade I) impaired relaxation and age.    Mild tricuspid regurgitation with estimated right ventricular systolic pressure from tricuspid regurgitation is mildly elevated (35-45 mmHg).    Valves are not well-visualized but no significant valvular disease by Doppler.    Urine culture with E. Coli      Assessment & Plan   Assessment / Plan     Active Hospital Problems:  Active Hospital Problems    Diagnosis     **Acute hypoxemic respiratory failure      Impression:  Acute hypoxic respiratory failure requiring Airvo  Severe sepsis present on admission  COVID-pneumonia  Community-acquired pneumonia from unspecified organism  E. coli urinary tract infection  Multiple sclerosis  Immunosuppressed status on treatment for MS  Acute decompensated diastolic congestive heart failure  Hypokalemia   Coronary artery disease  Transaminitis  Leukocytosis  Lactic acidosis, clinically significant     Plan:  Respiratory status remains tenuous.  Clinically looks better than her oxygenation.  Suspect it will lag behind by couple of days  Continue Airvo.  Wean O2 to keep SPO2 greater than 90%  Continue nebulizers and bronchopulmonary hygiene  Continue Lasix 40 mg IV every 8 hours.  Holding off on further metolazone as creatinine is  beginning to uptick  Trend renal panel electrolytes.  Replace potassium orally  Echocardiogram with diastolic dysfunction  Complete 7 days of Unasyn for pneumonia and E. coli UTI  Continue high-dose Decadron x 10 days given elevated inflammatory markers with significant COVID-19 pneumonia  CRP remains elevated.  Repeat 24 to 48 hours  Trend LFTs  Continue sliding scale insulin  Appreciate palliative care assistance.  Patient is DNR/DNI.  Patient's family would like to get patient home with Pallitus if possible    Discussed with family at bedside    GI prophylaxis  DVT prophylaxis:  Medical DVT prophylaxis orders are present.    CODE STATUS:   Medical Intervention Limits: NO intubation (DNI); NO cardioversion  Level Of Support Discussed With: Patient  Code Status (Patient has no pulse and is not breathing): No CPR (Do Not Attempt to Resuscitate)  Medical Interventions (Patient has pulse or is breathing): Limited Support  Comments: d/w pt and /family      The patient is critically ill in the ICU with acute hypoxemic respiratory failure requiring Airvo, COVID-19 pneumonia, community-acquired pneumonia, pulmonary edema, decompensated heart failure, hypokalemia, encephalopathy. Multidisciplinary bedside critical care rounds were performed with nursing staff, respiratory therapy, pharmacy, nutritional services, social work. I have personally reviewed the chart, labs and any pertinent imaging available.  I have spent 31 minutes of critical care time, excluding procedures, in the care of this patient.    Electronically signed by Derrell Tomlin MD, 02/03/24, 1:38 PM EST.

## 2024-02-03 NOTE — PLAN OF CARE
Goal Outcome Evaluation:           Progress: no change  Outcome Evaluation: pt remains on airvo, given one PRN breathing treatment overnight for SOA complaint

## 2024-02-03 NOTE — PROGRESS NOTES
[facility]   Hospitalist Progress Note    Date of admission: 1/31/2024  Patient Name: Falguni Minaya  1960  Date: 2/2/2024      Subjective     Chief Complaint   Patient presents with    Shortness of Breath    Altered Mental Status       Interval Followup: Little more awake today, shortness of air slowly improving.  Denies fevers.  Diuresing well    Objective     Vitals:   Temp:  [97.9 °F (36.6 °C)-98.9 °F (37.2 °C)] 98.8 °F (37.1 °C)  Heart Rate:  [] 90  Resp:  [16-26] 24  BP: (103-153)/(57-98) 126/87  Flow (L/min):  [60] 60    Physical Exam  Ill-appearing in chair at bedside, awake, family present  Improving aeration, faint crackles in bases, on Airvo decreasing requirements  Mildly elevated rate regular rhythm no lower extremity pitting edema  Abdomen soft nontender nondistended  Generalized weakness  Answering basic questions appropriately      Result Review:  Vital signs, labs and recent relevant imaging reviewed.        acetaminophen    albuterol    benzonatate    senna-docusate sodium **AND** polyethylene glycol **AND** bisacodyl **AND** bisacodyl    [Held by provider] cyclobenzaprine    dextrose    dextrose    glucagon (human recombinant)    guaiFENesin-dextromethorphan    ondansetron    sodium chloride    sodium chloride    sodium chloride    ampicillin-sulbactam, 3 g, Intravenous, Q8H  arformoterol, 15 mcg, Nebulization, BID - RT  aspirin, 81 mg, Oral, Daily  [Held by provider] atorvastatin, 40 mg, Oral, Daily  budesonide, 0.5 mg, Nebulization, BID - RT  [Held by provider] carvedilol, 12.5 mg, Oral, Q12H  clopidogrel, 75 mg, Oral, Daily  dexAMETHasone, 8 mg, Intravenous, Daily  FLUoxetine, 40 mg, Oral, BID  furosemide, 40 mg, Intravenous, Q8H  [Held by provider] gabapentin, 300 mg, Oral, TID  heparin (porcine), 5,000 Units, Subcutaneous, Q8H  insulin lispro, 2-7 Units, Subcutaneous, 4x Daily AC & at Bedtime  [Held by provider] losartan, 25 mg, Oral, Q24H  OLANZapine, 2.5 mg, Oral,  Daily  pantoprazole, 40 mg, Oral, Q AM  pramipexole, 0.5 mg, Oral, Nightly  saccharomyces boulardii, 250 mg, Oral, BID  senna-docusate sodium, 1 tablet, Oral, BID  sodium bicarbonate, 650 mg, Oral, TID  sodium chloride, 10 mL, Intravenous, Q12H        XR Chest 1 View    Result Date: 2/1/2024    Similar appearance of patchy bilateral opacities, right greater than left, which may represent pneumonia.       JONI PATEL MD       Electronically Signed and Approved By: JONI PATEL MD on 2/01/2024 at 8:41             CT Chest Without Contrast Diagnostic    Result Date: 1/31/2024    1. Extensive bilateral ground-glass opacity with smaller foci of consolidation in both lung fields.  Finding is favored to represent pneumonia.  Pulmonary edema and aspiration would also be in the differential.     Tucker Marcial M.D.       Electronically Signed and Approved By: Tucker Marcial M.D. on 1/31/2024 at 14:32             CT Head Without Contrast    Result Date: 1/31/2024    1. Mild to moderate low density in the cerebral white matter could represent small-vessel ischemic disease and or multiple sclerosis. 2. No CT evidence of acute infarction, mass or intracranial hemorrhage. 3. Small right mastoid effusion     Tucker Marcial M.D.       Electronically Signed and Approved By: Tucker Marcial M.D. on 1/31/2024 at 10:55             XR Chest 1 View    Result Date: 1/31/2024    1. Asymmetric perihilar and patchy multifocal opacities which could represent atelectasis, asymmetric edema or pneumonia.       FRANK WHITEHEAD MD       Electronically Signed and Approved By: FRANK WHITEHEAD MD on 1/31/2024 at 10:33              Assessment / Plan     Summary: 64-year-old female with a history of MS immunocompromise, recently been transitioning medications for this, recurrent UTIs, diastolic CHF, CAD with prior PCI, CKD, who been diagnosed with COVID-19 on 1/22 and treated with outpatient dexamethasone, and completed steroids and had progressively  worsening symptoms and presented for further evaluation.  Patient with acute approximate respiratory failure requiring Airvo also with UTI    Assessment/Plan (clinically significant if listed here)  Acute hypoxemic respiratory failure requiring Airvo  Sepsis, present on admission, in setting of gram-negative bacilli UTI and suspected bacterial pneumonia in addition to COVID-19 pneumonia  Acute on Chronic dCHF (previously reduced now with recovered EF of 62%)   CAD with history of PCI to RCA   COVID-19 pneumonia with initial testing positive 1/22  Immunocompromised in setting of multiple sclerosis currently in limbo between treatments (most recently on Vumerity)   Anion gap metabolic acidosis  Transaminitis  History of hypertension with low blood pressure on arrival  CKD possibly 3 baseline creatinine 1.1-1.3  Seizure disorder  GERD  Anxiety/depression  Hyperlipidemia  Morbid obesity    Mentation improving, continue on adjusted regimen from home medications, doing better with holding some of her extra sedating medications including Flexeril and despite not using this is not having any acute symptoms.  Will need to adjust for discharge  Weaning Airvo/FiO2 requirements, still with critical hypoxia requiring close monitoring  IV Lasix, replace potassium, as metabolic acidosis give few doses of p.o. bicarb  Continue on Unasyn for pneumonia, and for E. coli UTI as well, treated with 7-day course  Continue IV steroids for 10 days, notably elevated inflammatory markers, treating for COVID-19 pneumonia as well did have outpatient steroids.  Out of isolation as is 10 days out from initial COVID-19 diagnosis  2 g sodium diet 1500 cc restriction  added vaginal estrogen cream given recurrent UTIs for discharge  Continue PPI; avoid home meloxicam/nsaids given ckd, needs to limit nsaids in general  Holding home antihypertensive medications (losartan 25, corege 12.5) , blood pressure improving monitor  Unclear of any home seizure  medications apart from gabapentin which she seems to have taken for symptoms with her multiple sclerosis.  Monitor  Continue on aspirin and Plavix,   Lfts normalized, resume statin  Cont prozac, zyprexa,   Cont pramipexole  Check am cbc, bmp, mg, phos lfts  Continue close ICU monitoring, discussed with pulmonology/Dr. Tomlin and critical care team   PT/OT  Check a.m. CBC, BMP, magnesium, phosphorus, lfts      DVT prophylaxis:  Medical DVT prophylaxis orders are present.        Medical Intervention Limits: NO intubation (DNI); NO cardioversion  Level Of Support Discussed With: Patient  Code Status (Patient has no pulse and is not breathing): No CPR (Do Not Attempt to Resuscitate)  Medical Interventions (Patient has pulse or is breathing): Limited Support  Comments: d/w pt and /family    Patient is critically ill due to AHRF requiring airvo, UTI, CAP/covid19, CHF Ex, debility and additional issues as above .  I have spent 32 minutes of critical care time reviewing documentation, pertinent labs, imaging studies, examining the patient, modifying care plan, and discussing patient's condition and care plan with the patient, nursing and family and pulm.      CBC          1/31/2024    10:17 2/1/2024    03:13 2/2/2024    02:46   CBC   WBC 19.59  13.73  14.91    RBC 3.33  3.19  3.28    Hemoglobin 10.4  9.8  10.0    Hematocrit 32.5  30.7  31.4    MCV 97.6  96.2  95.7    MCH 31.2  30.7  30.5    MCHC 32.0  31.9  31.8    RDW 18.3  18.2  17.7    Platelets 497  435  449        CMP          1/31/2024    10:16 1/31/2024    10:17 2/1/2024    03:13 2/2/2024    02:46   CMP   Glucose 102  98  121  122    BUN  31  30  36    Creatinine  1.03  0.99  1.05    EGFR  60.5  63.4  59.1    Sodium 142.8  141  138  138    Potassium  4.3  3.7  3.7    Chloride  105  104  103    Calcium  8.5  8.6  8.8    Total Protein  7.3  7.4  7.3    Albumin  2.9  2.7  2.9    Globulin  4.4  4.7  4.4    Total Bilirubin  0.4  0.4  0.4    Alkaline Phosphatase   119  114  117    AST (SGOT)  44  35  24    ALT (SGPT)  41  36  30    Albumin/Globulin Ratio  0.7  0.6  0.7    BUN/Creatinine Ratio  30.1  30.3  34.3    Anion Gap  15.4  15.9  16.6

## 2024-02-04 ENCOUNTER — APPOINTMENT (OUTPATIENT)
Dept: GENERAL RADIOLOGY | Facility: HOSPITAL | Age: 64
End: 2024-02-04
Payer: MEDICARE

## 2024-02-04 LAB
ALBUMIN SERPL-MCNC: 3.2 G/DL (ref 3.5–5.2)
ALBUMIN/GLOB SERPL: 0.6 G/DL
ALP SERPL-CCNC: 128 U/L (ref 39–117)
ALT SERPL W P-5'-P-CCNC: 24 U/L (ref 1–33)
ANION GAP SERPL CALCULATED.3IONS-SCNC: 20.7 MMOL/L (ref 5–15)
AST SERPL-CCNC: 19 U/L (ref 1–32)
BASOPHILS # BLD AUTO: 0.03 10*3/MM3 (ref 0–0.2)
BASOPHILS NFR BLD AUTO: 0.1 % (ref 0–1.5)
BILIRUB SERPL-MCNC: 0.6 MG/DL (ref 0–1.2)
BUN SERPL-MCNC: 57 MG/DL (ref 8–23)
BUN/CREAT SERPL: 33.1 (ref 7–25)
CALCIUM SPEC-SCNC: 9.6 MG/DL (ref 8.6–10.5)
CHLORIDE SERPL-SCNC: 99 MMOL/L (ref 98–107)
CO2 SERPL-SCNC: 18.3 MMOL/L (ref 22–29)
CREAT SERPL-MCNC: 1.72 MG/DL (ref 0.57–1)
DEPRECATED RDW RBC AUTO: 62.5 FL (ref 37–54)
EGFRCR SERPLBLD CKD-EPI 2021: 32.7 ML/MIN/1.73
EOSINOPHIL # BLD AUTO: 0 10*3/MM3 (ref 0–0.4)
EOSINOPHIL NFR BLD AUTO: 0 % (ref 0.3–6.2)
ERYTHROCYTE [DISTWIDTH] IN BLOOD BY AUTOMATED COUNT: 18.1 % (ref 12.3–15.4)
GLOBULIN UR ELPH-MCNC: 5.1 GM/DL
GLUCOSE BLDC GLUCOMTR-MCNC: 128 MG/DL (ref 70–99)
GLUCOSE BLDC GLUCOMTR-MCNC: 136 MG/DL (ref 70–99)
GLUCOSE BLDC GLUCOMTR-MCNC: 165 MG/DL (ref 70–99)
GLUCOSE BLDC GLUCOMTR-MCNC: 276 MG/DL (ref 70–99)
GLUCOSE SERPL-MCNC: 140 MG/DL (ref 65–99)
HCT VFR BLD AUTO: 33.7 % (ref 34–46.6)
HGB BLD-MCNC: 10.9 G/DL (ref 12–15.9)
IMM GRANULOCYTES # BLD AUTO: 0.37 10*3/MM3 (ref 0–0.05)
IMM GRANULOCYTES NFR BLD AUTO: 1.8 % (ref 0–0.5)
LYMPHOCYTES # BLD AUTO: 0.95 10*3/MM3 (ref 0.7–3.1)
LYMPHOCYTES NFR BLD AUTO: 4.7 % (ref 19.6–45.3)
MAGNESIUM SERPL-MCNC: 2.3 MG/DL (ref 1.6–2.4)
MCH RBC QN AUTO: 31 PG (ref 26.6–33)
MCHC RBC AUTO-ENTMCNC: 32.3 G/DL (ref 31.5–35.7)
MCV RBC AUTO: 95.7 FL (ref 79–97)
MONOCYTES # BLD AUTO: 1.31 10*3/MM3 (ref 0.1–0.9)
MONOCYTES NFR BLD AUTO: 6.5 % (ref 5–12)
NEUTROPHILS NFR BLD AUTO: 17.61 10*3/MM3 (ref 1.7–7)
NEUTROPHILS NFR BLD AUTO: 86.9 % (ref 42.7–76)
NRBC BLD AUTO-RTO: 0.1 /100 WBC (ref 0–0.2)
PHOSPHATE SERPL-MCNC: 4.5 MG/DL (ref 2.5–4.5)
PLATELET # BLD AUTO: 497 10*3/MM3 (ref 140–450)
PMV BLD AUTO: 11.1 FL (ref 6–12)
POTASSIUM SERPL-SCNC: 4.4 MMOL/L (ref 3.5–5.2)
PROT SERPL-MCNC: 8.3 G/DL (ref 6–8.5)
RBC # BLD AUTO: 3.52 10*6/MM3 (ref 3.77–5.28)
SODIUM SERPL-SCNC: 138 MMOL/L (ref 136–145)
WBC NRBC COR # BLD AUTO: 20.27 10*3/MM3 (ref 3.4–10.8)

## 2024-02-04 PROCEDURE — 94799 UNLISTED PULMONARY SVC/PX: CPT

## 2024-02-04 PROCEDURE — 25010000002 AMPICILLIN-SULBACTAM PER 1.5 G: Performed by: INTERNAL MEDICINE

## 2024-02-04 PROCEDURE — 63710000001 INSULIN LISPRO (HUMAN) PER 5 UNITS: Performed by: NURSE PRACTITIONER

## 2024-02-04 PROCEDURE — 84100 ASSAY OF PHOSPHORUS: CPT | Performed by: INTERNAL MEDICINE

## 2024-02-04 PROCEDURE — 85025 COMPLETE CBC W/AUTO DIFF WBC: CPT | Performed by: INTERNAL MEDICINE

## 2024-02-04 PROCEDURE — 25010000002 HEPARIN (PORCINE) PER 1000 UNITS: Performed by: INTERNAL MEDICINE

## 2024-02-04 PROCEDURE — 99291 CRITICAL CARE FIRST HOUR: CPT | Performed by: INTERNAL MEDICINE

## 2024-02-04 PROCEDURE — 25010000002 FUROSEMIDE PER 20 MG: Performed by: INTERNAL MEDICINE

## 2024-02-04 PROCEDURE — 94761 N-INVAS EAR/PLS OXIMETRY MLT: CPT

## 2024-02-04 PROCEDURE — 25010000002 DEXAMETHASONE SODIUM PHOSPHATE 10 MG/ML SOLUTION: Performed by: NURSE PRACTITIONER

## 2024-02-04 PROCEDURE — 71045 X-RAY EXAM CHEST 1 VIEW: CPT

## 2024-02-04 PROCEDURE — 80053 COMPREHEN METABOLIC PANEL: CPT | Performed by: INTERNAL MEDICINE

## 2024-02-04 PROCEDURE — 36415 COLL VENOUS BLD VENIPUNCTURE: CPT | Performed by: INTERNAL MEDICINE

## 2024-02-04 PROCEDURE — 83735 ASSAY OF MAGNESIUM: CPT | Performed by: INTERNAL MEDICINE

## 2024-02-04 PROCEDURE — 82948 REAGENT STRIP/BLOOD GLUCOSE: CPT | Performed by: NURSE PRACTITIONER

## 2024-02-04 PROCEDURE — 94664 DEMO&/EVAL PT USE INHALER: CPT

## 2024-02-04 PROCEDURE — 82948 REAGENT STRIP/BLOOD GLUCOSE: CPT

## 2024-02-04 RX ORDER — CARVEDILOL 3.12 MG/1
3.12 TABLET ORAL 2 TIMES DAILY WITH MEALS
Status: DISCONTINUED | OUTPATIENT
Start: 2024-02-04 | End: 2024-02-09

## 2024-02-04 RX ORDER — GABAPENTIN 100 MG/1
100 CAPSULE ORAL 3 TIMES DAILY
Status: DISCONTINUED | OUTPATIENT
Start: 2024-02-04 | End: 2024-02-15 | Stop reason: HOSPADM

## 2024-02-04 RX ADMIN — GABAPENTIN 100 MG: 100 CAPSULE ORAL at 15:17

## 2024-02-04 RX ADMIN — AMPICILLIN SODIUM AND SULBACTAM SODIUM 3 G: 2; 1 INJECTION, POWDER, FOR SOLUTION INTRAMUSCULAR; INTRAVENOUS at 20:35

## 2024-02-04 RX ADMIN — FUROSEMIDE 40 MG: 10 INJECTION, SOLUTION INTRAMUSCULAR; INTRAVENOUS at 06:03

## 2024-02-04 RX ADMIN — ARFORMOTEROL TARTRATE 15 MCG: 15 SOLUTION RESPIRATORY (INHALATION) at 07:15

## 2024-02-04 RX ADMIN — Medication 250 MG: at 20:35

## 2024-02-04 RX ADMIN — PRAMIPEXOLE DIHYDROCHLORIDE 0.5 MG: 0.5 TABLET ORAL at 20:35

## 2024-02-04 RX ADMIN — OLANZAPINE 2.5 MG: 2.5 TABLET, FILM COATED ORAL at 08:18

## 2024-02-04 RX ADMIN — Medication 10 ML: at 08:18

## 2024-02-04 RX ADMIN — GABAPENTIN 100 MG: 100 CAPSULE ORAL at 20:35

## 2024-02-04 RX ADMIN — AMPICILLIN SODIUM AND SULBACTAM SODIUM 3 G: 2; 1 INJECTION, POWDER, FOR SOLUTION INTRAMUSCULAR; INTRAVENOUS at 15:17

## 2024-02-04 RX ADMIN — CARVEDILOL 3.12 MG: 3.12 TABLET, FILM COATED ORAL at 17:06

## 2024-02-04 RX ADMIN — BUDESONIDE 0.5 MG: 0.5 SUSPENSION RESPIRATORY (INHALATION) at 19:59

## 2024-02-04 RX ADMIN — DEXAMETHASONE SODIUM PHOSPHATE 8 MG: 10 INJECTION INTRAMUSCULAR; INTRAVENOUS at 08:18

## 2024-02-04 RX ADMIN — Medication 10 ML: at 20:35

## 2024-02-04 RX ADMIN — ARFORMOTEROL TARTRATE 15 MCG: 15 SOLUTION RESPIRATORY (INHALATION) at 19:59

## 2024-02-04 RX ADMIN — HEPARIN SODIUM 5000 UNITS: 5000 INJECTION INTRAVENOUS; SUBCUTANEOUS at 06:03

## 2024-02-04 RX ADMIN — ASPIRIN 81 MG: 81 TABLET, CHEWABLE ORAL at 08:18

## 2024-02-04 RX ADMIN — INSULIN LISPRO 2 UNITS: 100 INJECTION, SOLUTION INTRAVENOUS; SUBCUTANEOUS at 17:06

## 2024-02-04 RX ADMIN — BUDESONIDE 0.5 MG: 0.5 SUSPENSION RESPIRATORY (INHALATION) at 07:15

## 2024-02-04 RX ADMIN — Medication 250 MG: at 08:18

## 2024-02-04 RX ADMIN — CARVEDILOL 3.12 MG: 3.12 TABLET, FILM COATED ORAL at 11:52

## 2024-02-04 RX ADMIN — INSULIN LISPRO 3 UNITS: 100 INJECTION, SOLUTION INTRAVENOUS; SUBCUTANEOUS at 11:52

## 2024-02-04 RX ADMIN — HEPARIN SODIUM 5000 UNITS: 5000 INJECTION INTRAVENOUS; SUBCUTANEOUS at 21:12

## 2024-02-04 RX ADMIN — PANTOPRAZOLE SODIUM 40 MG: 40 TABLET, DELAYED RELEASE ORAL at 06:02

## 2024-02-04 RX ADMIN — ATORVASTATIN CALCIUM 40 MG: 40 TABLET, FILM COATED ORAL at 08:18

## 2024-02-04 RX ADMIN — CLOPIDOGREL BISULFATE 75 MG: 75 TABLET, FILM COATED ORAL at 08:18

## 2024-02-04 RX ADMIN — AMPICILLIN SODIUM AND SULBACTAM SODIUM 3 G: 2; 1 INJECTION, POWDER, FOR SOLUTION INTRAMUSCULAR; INTRAVENOUS at 08:18

## 2024-02-04 RX ADMIN — FLUOXETINE HYDROCHLORIDE 40 MG: 20 CAPSULE ORAL at 20:35

## 2024-02-04 RX ADMIN — FLUOXETINE HYDROCHLORIDE 40 MG: 20 CAPSULE ORAL at 08:18

## 2024-02-04 RX ADMIN — AMPICILLIN SODIUM AND SULBACTAM SODIUM 3 G: 2; 1 INJECTION, POWDER, FOR SOLUTION INTRAMUSCULAR; INTRAVENOUS at 02:56

## 2024-02-04 NOTE — PROGRESS NOTES
Pulmonary / Critical Care Progress Note      Patient Name: Falguni Minaya  : 1960  MRN: [id number]  Attending:  Reagan Aleman MD   Date of admission: 2024    Subjective   Subjective   Patient critically ill with hypoxemic respiratory failure    Continues to slowly improve  Oxygenation still lagging and respiratory status tenuous on Airvo  50 L / 63%  Did diurese well but now has bump in creatinine  Dyspnea cough both decreasing.  Bringing up thin clear secretions  Did sit in chair yesterday  Echocardiogram with diastolic dysfunction  Family reports that she has not been on immunosuppression for MS for at least 3 to 4 months  On antibiotics for UTI, pneumonia  No nausea, fevers or chills  Family would like for her to go home with pallitus      Objective   Objective     Vitals:   Vital signs for last 24 hours:  Temp:  [98.1 °F (36.7 °C)-99 °F (37.2 °C)] 98.2 °F (36.8 °C)  Heart Rate:  [] 109  Resp:  [20-36] 33  BP: (116-158)/() 130/77    Intake/Output last 3 shifts:  I/O last 3 completed shifts:  In: 225 [IV Piggyback:225]  Out: 800 [Urine:800]  Intake/Output this shift:  No intake/output data recorded.    Vent settings for last 24 hours:       Hemodynamic parameters for last 24 hours:       Physical Exam   Vital Signs Reviewed   General: Critically ill elderly female, much more awake and alert  HEENT:  PERRL, EOMI.  OP, nares clear  Chest: poor aeration, decreasing crackles and rhonchi  bilaterally, tympanic to percussion bilaterally, pursed lip breathing noted on Airvo  CV: RRR, no MGR, pulses 2+, equal.  Abd:  Soft, NT, ND, + BS, no HSM  EXT:  no clubbing, no cyanosis, no edema  Neuro:  A&Ox3, CN grossly intact, no focal deficits.  Skin: No rashes or lesions noted    Result Review    Result Review:  I have personally reviewed the results from the time of this admission to 2024 13:43 EST and agree with these findings:  [x]  Laboratory  [x]  Microbiology  [x]  Radiology  [x]   EKG/Telemetry   [x]  Cardiology/Vascular   []  Pathology  []  Old records  []  Other:  Most notable findings include:       Lab 02/04/24  0249 02/03/24  0244 02/02/24  0246 02/01/24  0313 01/31/24  1017 01/31/24  1016   WBC 20.27* 15.60* 14.91* 13.73* 19.59*  --    HEMOGLOBIN 10.9* 10.7* 10.0* 9.8* 10.4*  --    HEMATOCRIT 33.7* 32.6* 31.4* 30.7* 32.5*  --    PLATELETS 497* 486* 449 435 497*  --    SODIUM 138 138 138 138 141  --    SODIUM, ARTERIAL  --   --   --   --   --  142.8   POTASSIUM 4.4 3.8 3.7 3.7 4.3  --    CHLORIDE 99 100 103 104 105  --    CO2 18.3* 19.2* 18.4* 18.1* 20.6*  --    BUN 57* 43* 36* 30* 31*  --    CREATININE 1.72* 1.26* 1.05* 0.99 1.03*  --    GLUCOSE 140* 131* 122* 121* 98  --    GLUCOSE, ARTERIAL  --   --   --   --   --  102*   CALCIUM 9.6 9.4 8.8 8.6 8.5*  --    PHOSPHORUS 4.5 3.7 3.0 4.2  --   --    TOTAL PROTEIN 8.3 8.0 7.3 7.4 7.3  --    ALBUMIN 3.2* 3.2* 2.9* 2.7* 2.9*  --    GLOBULIN 5.1 4.8 4.4 4.7 4.4  --      CT Chest Without Contrast Diagnostic    Result Date: 1/31/2024  PROCEDURE: CT CHEST WO CONTRAST DIAGNOSTIC  COMPARISON: [facility], CR, XR CHEST 1 VW, 12/24/2023, 12:17.  [facility], CR, XR CHEST 1 VW, 1/31/2024, 10:31.  [facility], CT, CT ABDOMEN PELVIS W CONTRAST, 10/11/2023, 11:39.  INDICATIONS: Opacities, sob  TECHNIQUE: CT images were created without the administration of contrast material.   PROTOCOL:   Standard imaging protocol performed    RADIATION:   DLP: 481.3mGy*cm   Automated exposure control was utilized to minimize radiation dose.  FINDINGS:  No pleural or pericardial effusion is seen.  Calcified subcarinal lymph nodes are consistent with previous granulomatous disease.  No noncalcified adenopathy is seen.  Heart size is normal.  There is extensive ground-glass opacity throughout the lung fields bilaterally.  More focal areas of consolidation are also noted in both lung fields.  No pneumothorax is seen.  No significant  bronchiectasis is present.  No endobronchial lesion is seen.  A cholecystectomy has been performed.  The upper abdomen appears unremarkable.  Moderate mid thoracic kyphosis is noted.      Impression:   1. Extensive bilateral ground-glass opacity with smaller foci of consolidation in both lung fields.  Finding is favored to represent pneumonia.  Pulmonary edema and aspiration would also be in the differential.     Tucker Marcial M.D.       Electronically Signed and Approved By: Tucker Marcial M.D. on 1/31/2024 at 14:32              Results for orders placed during the hospital encounter of 01/31/24    Adult Transthoracic Echo Complete W/ Cont if Necessary Per Protocol    Interpretation Summary    Very technically difficult study with limited views.    Left ventricular ejection fraction appears to be 56 - 60%.  No obvious regional wall motion abnormalities.    Left ventricular diastolic function is consistent with (grade I) impaired relaxation and age.    Mild tricuspid regurgitation with estimated right ventricular systolic pressure from tricuspid regurgitation is mildly elevated (35-45 mmHg).    Valves are not well-visualized but no significant valvular disease by Doppler.    Urine culture with E. Coli    Chest x-ray this morning with low lung volumes but clearing lung infiltrates      Assessment & Plan   Assessment / Plan     Active Hospital Problems:  Active Hospital Problems    Diagnosis     **Acute hypoxemic respiratory failure      Impression:  Acute hypoxic respiratory failure requiring Airvo  Severe sepsis present on admission  COVID-pneumonia  Community-acquired pneumonia from unspecified organism  E. coli urinary tract infection  Multiple sclerosis  Immunosuppressed status on treatment for MS  Acute decompensated diastolic congestive heart failure  Atelectasis, bibasilar  Acute kidney injury secondary to prerenal etiology   Hypokalemia   Coronary artery disease  Transaminitis  Leukocytosis  Lactic acidosis,  clinically significant     Plan:  Respiratory status remains tenuous.  Clinically looks better than her oxygenation.  I personally reviewed her chest x-ray this morning showing low lung volumes and atelectasis  Get patient out of bed and into chair and encourage incentive spirometer use  Continue Airvo.  Wean O2 to keep SPO2 greater than 90%  Continue nebulizers and bronchopulmonary hygiene  Did have bump in creatinine.  Will give diuretic holiday today  Trend renal panel electrolytes.  Replace potassium orally  Echocardiogram with diastolic dysfunction  Complete 7 days of Unasyn for pneumonia and E. coli UTI  Continue high-dose Decadron x 10 days given elevated inflammatory markers with significant COVID-19 pneumonia  CRP remains elevated.  Repeat tomorrow morning  Trend LFTs  Continue sliding scale insulin  Appreciate palliative care assistance.  Patient is DNR/DNI.  Patient's family would like to get patient home with Pallitus if possible    Discussed with family at bedside    GI prophylaxis  DVT prophylaxis:  Medical DVT prophylaxis orders are present.    CODE STATUS:   Medical Intervention Limits: NO intubation (DNI); NO cardioversion  Level Of Support Discussed With: Patient  Code Status (Patient has no pulse and is not breathing): No CPR (Do Not Attempt to Resuscitate)  Medical Interventions (Patient has pulse or is breathing): Limited Support  Comments: d/w pt and /family      The patient is critically ill in the ICU with acute hypoxemic respiratory failure requiring Airvo, COVID-19 pneumonia, community-acquired pneumonia, acute kidney injury, atelectasis, pulmonary edema, decompensated heart failure, hypokalemia, encephalopathy. Multidisciplinary bedside critical care rounds were performed with nursing staff, respiratory therapy, pharmacy, nutritional services, social work. I have personally reviewed the chart, labs and any pertinent imaging available.  I have spent 30 minutes of critical care time,  excluding procedures, in the care of this patient.    Electronically signed by Derrell Tomlin MD, 02/04/24, 1:44 PM EST.

## 2024-02-04 NOTE — PROGRESS NOTES
[facility]   Hospitalist Progress Note    Date of admission: 1/31/2024  Patient Name: Falguni Minaya  1960  Date: 2/4/2024      Subjective     Chief Complaint   Patient presents with    Shortness of Breath    Altered Mental Status       Interval Followup: les ssoa today despite having persistently elevated oxygen/airvo requirements.  No fevers.  Diet remains poor.  Encouraged increased po and different options for supplements/magic cup etc     Objective     Vitals:   Temp:  [98.1 °F (36.7 °C)-99 °F (37.2 °C)] 98.2 °F (36.8 °C)  Heart Rate:  [] 103  Resp:  [23-36] 33  BP: (116-158)/(72-99) 120/97  Flow (L/min):  [50-60] 50    Physical Exam  Tired, ill-appearing in chair at bedside family present at bedside  Bilateral rhonchi, appears more comfortable despite high fio2/o2 requirement, tachypnea  Mildly elevated rate regular rhythm no lower extremity pitting edema  Abdomen soft nontender nondistended  Generalized weakness  Answering basic questions appropriately      Result Review:  Vital signs, labs and recent relevant imaging reviewed.        acetaminophen    albuterol    benzonatate    [DISCONTINUED] senna-docusate sodium **AND** polyethylene glycol **AND** bisacodyl **AND** bisacodyl    [Held by provider] cyclobenzaprine    dextrose    dextrose    glucagon (human recombinant)    guaiFENesin-dextromethorphan    ondansetron    sodium chloride    sodium chloride    sodium chloride    ampicillin-sulbactam, 3 g, Intravenous, Q6H  arformoterol, 15 mcg, Nebulization, BID - RT  aspirin, 81 mg, Oral, Daily  atorvastatin, 40 mg, Oral, Daily  budesonide, 0.5 mg, Nebulization, BID - RT  carvedilol, 3.125 mg, Oral, BID With Meals  clopidogrel, 75 mg, Oral, Daily  dexAMETHasone, 8 mg, Intravenous, Daily  FLUoxetine, 40 mg, Oral, BID  [Held by provider] furosemide, 40 mg, Intravenous, Q8H  gabapentin, 100 mg, Oral, TID  heparin (porcine), 5,000 Units, Subcutaneous, Q8H  insulin lispro, 2-7 Units, Subcutaneous, 4x  Daily AC & at Bedtime  [Held by provider] losartan, 25 mg, Oral, Q24H  OLANZapine, 2.5 mg, Oral, Daily  pantoprazole, 40 mg, Oral, Q AM  pramipexole, 0.5 mg, Oral, Nightly  saccharomyces boulardii, 250 mg, Oral, BID  sodium chloride, 10 mL, Intravenous, Q12H        XR Chest 1 View    Result Date: 2/4/2024   Low lung volumes.  There has been some improvement in appearance of pneumonia on the right, with persistent left-sided infiltrates noted       JAYDON MULLIGAN MD       Electronically Signed and Approved By: JAYDON MULLIGAN MD on 2/04/2024 at 11:09             XR Chest 1 View    Result Date: 2/1/2024    Similar appearance of patchy bilateral opacities, right greater than left, which may represent pneumonia.       JONI PATEL MD       Electronically Signed and Approved By: JONI PATEL MD on 2/01/2024 at 8:41             CT Chest Without Contrast Diagnostic    Result Date: 1/31/2024    1. Extensive bilateral ground-glass opacity with smaller foci of consolidation in both lung fields.  Finding is favored to represent pneumonia.  Pulmonary edema and aspiration would also be in the differential.     Tucker Marcial M.D.       Electronically Signed and Approved By: Tucker Marcial M.D. on 1/31/2024 at 14:32             CT Head Without Contrast    Result Date: 1/31/2024    1. Mild to moderate low density in the cerebral white matter could represent small-vessel ischemic disease and or multiple sclerosis. 2. No CT evidence of acute infarction, mass or intracranial hemorrhage. 3. Small right mastoid effusion     Tucker Marcial M.D.       Electronically Signed and Approved By: Tucker Marcial M.D. on 1/31/2024 at 10:55             XR Chest 1 View    Result Date: 1/31/2024    1. Asymmetric perihilar and patchy multifocal opacities which could represent atelectasis, asymmetric edema or pneumonia.       FRANK WHITEHEAD MD       Electronically Signed and Approved By: FRANK WHITEHEAD MD on 1/31/2024 at 10:33              Assessment  / Plan     Summary: 64-year-old female with a history of MS immunocompromise, recently been transitioning medications for this, recurrent UTIs, diastolic CHF, CAD with prior PCI, CKD, who been diagnosed with COVID-19 on 1/22 and treated with outpatient dexamethasone, and completed steroids and had progressively worsening symptoms and presented for further evaluation.  Patient with acute approximate respiratory failure requiring Airvo also with UTI    Assessment/Plan (clinically significant if listed here)  Acute hypoxemic respiratory failure requiring Airvo  Sepsis, present on admission, in setting of gram-negative bacilli UTI and suspected bacterial pneumonia in addition to COVID-19 pneumonia  Acute on Chronic dCHF (previously reduced now with recovered EF of 62%)   CAD with history of PCI to RCA   COVID-19 pneumonia with initial testing positive 1/22  Immunocompromised in setting of multiple sclerosis currently in limbo between treatments (most recently on Vumerity)   Anion gap metabolic acidosis  Transaminitis  History of hypertension with low blood pressure on arrival  CKD possibly 3 baseline creatinine 1.1-1.3  Seizure disorder  GERD  Anxiety/depression  Hyperlipidemia  Morbid obesity    Creatinine bump today, uop decrease some, suspect closer to euvolemia, d/w pulm - holding additional lasix today, reassess in am   Baseline Cr variable between 1.1-1.3  Echo 56% grade 1, mild TR  Monitor renal function and potassium closely  Continue Airvo respiratory hygiene nebulizers, wean as able still with critical hypoxia  Continue high-dose steroids for elevated inflammatory markers with COVID-19 pneumonia with severe residual symptoms,   Repeat cxr with low lung volume and bibasilar atelectasis on my review   Continue on Unasyn for 7-day treatment for pneumonia and E. coli UTI, urine culture sensitive on review; wbc increased but suspect in part from steroids  2 g sodium diet 1500 cc restriction  added vaginal estrogen  cream given recurrent UTIs, will need this at discharge  Continue PPI; avoid home meloxicam/nsaids given ckd, needs to limit nsaids in general  Holding home antihypertensive medications (losartan 25, coreg 12.5), start lower dose coreg 3 bid today and monitor   Unclear of any home seizure medications apart from gabapentin which she seems to have taken for symptoms with her multiple sclerosis, had initial worse mentation, continue on lower dose gabapentin, monitor with renal fxn closely  Continue on aspirin and Plavix, statin  Cont prozac, zyprexa,   Cont pramipexole  Check am cbc, bmp, mg, phos lfts  Continue close ICU monitoring, discussed with pulmonology/Dr. Tomlin and critical care team.  Guarded prognosis given continued critical hypoxia and comorbidities.  Family discussing home with pallitus possibly    PT/OT      DVT prophylaxis:  Medical DVT prophylaxis orders are present.        Medical Intervention Limits: NO intubation (DNI); NO cardioversion  Level Of Support Discussed With: Patient  Code Status (Patient has no pulse and is not breathing): No CPR (Do Not Attempt to Resuscitate)  Medical Interventions (Patient has pulse or is breathing): Limited Support  Comments: d/w pt and /family    Patient is critically ill due to AHRF requiring airvo, UTI, CAP/covid19, CHF Ex, juan carlos, debility and additional issues as above .  I have spent 32minutes of critical care time reviewing documentation, pertinent labs, imaging studies, examining the patient, modifying care plan, and discussing patient's condition and care plan with the patient, nursing and family and pulm.      CBC          2/2/2024    02:46 2/3/2024    02:44 2/4/2024    02:49   CBC   WBC 14.91  15.60  20.27    RBC 3.28  3.41  3.52    Hemoglobin 10.0  10.7  10.9    Hematocrit 31.4  32.6  33.7    MCV 95.7  95.6  95.7    MCH 30.5  31.4  31.0    MCHC 31.8  32.8  32.3    RDW 17.7  17.8  18.1    Platelets 449  486  497        CMP          2/2/2024    02:46  2/3/2024    02:44 2/4/2024    02:49   CMP   Glucose 122  131  140    BUN 36  43  57    Creatinine 1.05  1.26  1.72    EGFR 59.1  47.5  32.7    Sodium 138  138  138    Potassium 3.7  3.8  4.4    Chloride 103  100  99    Calcium 8.8  9.4  9.6    Total Protein 7.3  8.0  8.3    Albumin 2.9  3.2  3.2    Globulin 4.4  4.8  5.1    Total Bilirubin 0.4  0.5  0.6    Alkaline Phosphatase 117  123  128    AST (SGOT) 24  21  19    ALT (SGPT) 30  26  24    Albumin/Globulin Ratio 0.7  0.7  0.6    BUN/Creatinine Ratio 34.3  34.1  33.1    Anion Gap 16.6  18.8  20.7

## 2024-02-04 NOTE — PROGRESS NOTES
RT EQUIPMENT DEVICE RELATED - SKIN ASSESSMENT    RT Medical Equipment/Device:     High Flow Nasal Cannula:Airvo    Skin Assessment:      Cheek:  Intact  Ears:  Intact    Device Skin Pressure Protection:  Positioning supports utilized    Nurse Notification:  Lizette Stroud, RRT

## 2024-02-04 NOTE — THERAPY EVALUATION
RT EQUIPMENT DEVICE RELATED - SKIN ASSESSMENT    RT Medical Equipment/Device:     High Flow Nasal Cannula:Airvo    Skin Assessment:      Ears, cheeks, neck, nose:  Intact    Device Skin Pressure Protection:  Adhesive use limited    Nurse Notification:  Lizette Beltran, RRT

## 2024-02-04 NOTE — PLAN OF CARE
Goal Outcome Evaluation:                   Pt remains on hi flow nasal cannula resting comfortably in bed. UOP 800mL overnight. No complaints of pain. VSS.

## 2024-02-05 LAB
ALBUMIN SERPL-MCNC: 2.9 G/DL (ref 3.5–5.2)
ALBUMIN/GLOB SERPL: 0.7 G/DL
ALP SERPL-CCNC: 114 U/L (ref 39–117)
ALT SERPL W P-5'-P-CCNC: 19 U/L (ref 1–33)
ANION GAP SERPL CALCULATED.3IONS-SCNC: 18.1 MMOL/L (ref 5–15)
AST SERPL-CCNC: 19 U/L (ref 1–32)
BACTERIA SPEC AEROBE CULT: NORMAL
BACTERIA SPEC AEROBE CULT: NORMAL
BASOPHILS # BLD AUTO: 0.02 10*3/MM3 (ref 0–0.2)
BASOPHILS NFR BLD AUTO: 0.1 % (ref 0–1.5)
BILIRUB SERPL-MCNC: 0.6 MG/DL (ref 0–1.2)
BUN SERPL-MCNC: 64 MG/DL (ref 8–23)
BUN/CREAT SERPL: 30.5 (ref 7–25)
CALCIUM SPEC-SCNC: 9.1 MG/DL (ref 8.6–10.5)
CHLORIDE SERPL-SCNC: 98 MMOL/L (ref 98–107)
CO2 SERPL-SCNC: 18.9 MMOL/L (ref 22–29)
CREAT SERPL-MCNC: 2.1 MG/DL (ref 0.57–1)
CRP SERPL-MCNC: 8.98 MG/DL (ref 0–0.5)
DEPRECATED RDW RBC AUTO: 61.8 FL (ref 37–54)
EGFRCR SERPLBLD CKD-EPI 2021: 25.7 ML/MIN/1.73
EOSINOPHIL # BLD AUTO: 0 10*3/MM3 (ref 0–0.4)
EOSINOPHIL NFR BLD AUTO: 0 % (ref 0.3–6.2)
ERYTHROCYTE [DISTWIDTH] IN BLOOD BY AUTOMATED COUNT: 17.5 % (ref 12.3–15.4)
GLOBULIN UR ELPH-MCNC: 4.4 GM/DL
GLUCOSE BLDC GLUCOMTR-MCNC: 102 MG/DL (ref 70–99)
GLUCOSE BLDC GLUCOMTR-MCNC: 131 MG/DL (ref 70–99)
GLUCOSE BLDC GLUCOMTR-MCNC: 163 MG/DL (ref 70–99)
GLUCOSE BLDC GLUCOMTR-MCNC: 260 MG/DL (ref 70–99)
GLUCOSE SERPL-MCNC: 104 MG/DL (ref 65–99)
HCT VFR BLD AUTO: 30.6 % (ref 34–46.6)
HGB BLD-MCNC: 9.8 G/DL (ref 12–15.9)
IMM GRANULOCYTES # BLD AUTO: 0.35 10*3/MM3 (ref 0–0.05)
IMM GRANULOCYTES NFR BLD AUTO: 1.9 % (ref 0–0.5)
LYMPHOCYTES # BLD AUTO: 0.63 10*3/MM3 (ref 0.7–3.1)
LYMPHOCYTES NFR BLD AUTO: 3.5 % (ref 19.6–45.3)
MAGNESIUM SERPL-MCNC: 2.3 MG/DL (ref 1.6–2.4)
MCH RBC QN AUTO: 30.5 PG (ref 26.6–33)
MCHC RBC AUTO-ENTMCNC: 32 G/DL (ref 31.5–35.7)
MCV RBC AUTO: 95.3 FL (ref 79–97)
MONOCYTES # BLD AUTO: 1.01 10*3/MM3 (ref 0.1–0.9)
MONOCYTES NFR BLD AUTO: 5.5 % (ref 5–12)
NEUTROPHILS NFR BLD AUTO: 16.2 10*3/MM3 (ref 1.7–7)
NEUTROPHILS NFR BLD AUTO: 89 % (ref 42.7–76)
NRBC BLD AUTO-RTO: 0 /100 WBC (ref 0–0.2)
PHOSPHATE SERPL-MCNC: 4.2 MG/DL (ref 2.5–4.5)
PLATELET # BLD AUTO: 386 10*3/MM3 (ref 140–450)
PMV BLD AUTO: 11.2 FL (ref 6–12)
POTASSIUM SERPL-SCNC: 3.5 MMOL/L (ref 3.5–5.2)
PROT SERPL-MCNC: 7.3 G/DL (ref 6–8.5)
RBC # BLD AUTO: 3.21 10*6/MM3 (ref 3.77–5.28)
SODIUM SERPL-SCNC: 135 MMOL/L (ref 136–145)
WBC NRBC COR # BLD AUTO: 18.21 10*3/MM3 (ref 3.4–10.8)

## 2024-02-05 PROCEDURE — 82948 REAGENT STRIP/BLOOD GLUCOSE: CPT | Performed by: NURSE PRACTITIONER

## 2024-02-05 PROCEDURE — 80053 COMPREHEN METABOLIC PANEL: CPT | Performed by: INTERNAL MEDICINE

## 2024-02-05 PROCEDURE — 25010000002 DEXAMETHASONE SODIUM PHOSPHATE 10 MG/ML SOLUTION: Performed by: NURSE PRACTITIONER

## 2024-02-05 PROCEDURE — 94664 DEMO&/EVAL PT USE INHALER: CPT

## 2024-02-05 PROCEDURE — 83735 ASSAY OF MAGNESIUM: CPT | Performed by: INTERNAL MEDICINE

## 2024-02-05 PROCEDURE — 25010000002 HEPARIN (PORCINE) PER 1000 UNITS: Performed by: INTERNAL MEDICINE

## 2024-02-05 PROCEDURE — 25010000002 AMPICILLIN-SULBACTAM PER 1.5 G: Performed by: INTERNAL MEDICINE

## 2024-02-05 PROCEDURE — 94799 UNLISTED PULMONARY SVC/PX: CPT

## 2024-02-05 PROCEDURE — 63710000001 INSULIN LISPRO (HUMAN) PER 5 UNITS: Performed by: NURSE PRACTITIONER

## 2024-02-05 PROCEDURE — 97530 THERAPEUTIC ACTIVITIES: CPT

## 2024-02-05 PROCEDURE — 84100 ASSAY OF PHOSPHORUS: CPT | Performed by: INTERNAL MEDICINE

## 2024-02-05 PROCEDURE — 99291 CRITICAL CARE FIRST HOUR: CPT | Performed by: STUDENT IN AN ORGANIZED HEALTH CARE EDUCATION/TRAINING PROGRAM

## 2024-02-05 PROCEDURE — 86140 C-REACTIVE PROTEIN: CPT | Performed by: INTERNAL MEDICINE

## 2024-02-05 PROCEDURE — 85025 COMPLETE CBC W/AUTO DIFF WBC: CPT | Performed by: INTERNAL MEDICINE

## 2024-02-05 PROCEDURE — 99291 CRITICAL CARE FIRST HOUR: CPT | Performed by: INTERNAL MEDICINE

## 2024-02-05 PROCEDURE — 97110 THERAPEUTIC EXERCISES: CPT

## 2024-02-05 PROCEDURE — 82948 REAGENT STRIP/BLOOD GLUCOSE: CPT

## 2024-02-05 RX ADMIN — PANTOPRAZOLE SODIUM 40 MG: 40 TABLET, DELAYED RELEASE ORAL at 05:10

## 2024-02-05 RX ADMIN — HEPARIN SODIUM 5000 UNITS: 5000 INJECTION INTRAVENOUS; SUBCUTANEOUS at 15:16

## 2024-02-05 RX ADMIN — GABAPENTIN 100 MG: 100 CAPSULE ORAL at 15:16

## 2024-02-05 RX ADMIN — ACETAMINOPHEN 650 MG: 325 TABLET ORAL at 02:07

## 2024-02-05 RX ADMIN — Medication 250 MG: at 20:30

## 2024-02-05 RX ADMIN — HEPARIN SODIUM 5000 UNITS: 5000 INJECTION INTRAVENOUS; SUBCUTANEOUS at 05:10

## 2024-02-05 RX ADMIN — ASPIRIN 81 MG: 81 TABLET, CHEWABLE ORAL at 09:10

## 2024-02-05 RX ADMIN — HEPARIN SODIUM 5000 UNITS: 5000 INJECTION INTRAVENOUS; SUBCUTANEOUS at 21:33

## 2024-02-05 RX ADMIN — INSULIN LISPRO 2 UNITS: 100 INJECTION, SOLUTION INTRAVENOUS; SUBCUTANEOUS at 12:25

## 2024-02-05 RX ADMIN — Medication 250 MG: at 11:07

## 2024-02-05 RX ADMIN — BUDESONIDE 0.5 MG: 0.5 SUSPENSION RESPIRATORY (INHALATION) at 07:10

## 2024-02-05 RX ADMIN — Medication 10 ML: at 09:10

## 2024-02-05 RX ADMIN — ARFORMOTEROL TARTRATE 15 MCG: 15 SOLUTION RESPIRATORY (INHALATION) at 07:10

## 2024-02-05 RX ADMIN — GABAPENTIN 100 MG: 100 CAPSULE ORAL at 20:30

## 2024-02-05 RX ADMIN — GABAPENTIN 100 MG: 100 CAPSULE ORAL at 09:10

## 2024-02-05 RX ADMIN — ARFORMOTEROL TARTRATE 15 MCG: 15 SOLUTION RESPIRATORY (INHALATION) at 20:45

## 2024-02-05 RX ADMIN — PRAMIPEXOLE DIHYDROCHLORIDE 0.5 MG: 0.5 TABLET ORAL at 20:30

## 2024-02-05 RX ADMIN — OLANZAPINE 2.5 MG: 2.5 TABLET, FILM COATED ORAL at 09:09

## 2024-02-05 RX ADMIN — AMPICILLIN SODIUM AND SULBACTAM SODIUM 3 G: 2; 1 INJECTION, POWDER, FOR SOLUTION INTRAMUSCULAR; INTRAVENOUS at 15:17

## 2024-02-05 RX ADMIN — Medication 10 ML: at 20:30

## 2024-02-05 RX ADMIN — CLOPIDOGREL BISULFATE 75 MG: 75 TABLET, FILM COATED ORAL at 09:13

## 2024-02-05 RX ADMIN — ATORVASTATIN CALCIUM 40 MG: 40 TABLET, FILM COATED ORAL at 09:10

## 2024-02-05 RX ADMIN — BUDESONIDE 0.5 MG: 0.5 SUSPENSION RESPIRATORY (INHALATION) at 20:45

## 2024-02-05 RX ADMIN — AMPICILLIN SODIUM AND SULBACTAM SODIUM 3 G: 2; 1 INJECTION, POWDER, FOR SOLUTION INTRAMUSCULAR; INTRAVENOUS at 20:30

## 2024-02-05 RX ADMIN — FLUOXETINE HYDROCHLORIDE 40 MG: 20 CAPSULE ORAL at 11:07

## 2024-02-05 RX ADMIN — DEXAMETHASONE SODIUM PHOSPHATE 8 MG: 10 INJECTION INTRAMUSCULAR; INTRAVENOUS at 09:09

## 2024-02-05 RX ADMIN — AMPICILLIN SODIUM AND SULBACTAM SODIUM 3 G: 2; 1 INJECTION, POWDER, FOR SOLUTION INTRAMUSCULAR; INTRAVENOUS at 02:06

## 2024-02-05 RX ADMIN — CARVEDILOL 3.12 MG: 3.12 TABLET, FILM COATED ORAL at 09:09

## 2024-02-05 RX ADMIN — FLUOXETINE HYDROCHLORIDE 40 MG: 20 CAPSULE ORAL at 20:30

## 2024-02-05 RX ADMIN — AMPICILLIN SODIUM AND SULBACTAM SODIUM 3 G: 2; 1 INJECTION, POWDER, FOR SOLUTION INTRAMUSCULAR; INTRAVENOUS at 09:09

## 2024-02-05 RX ADMIN — INSULIN LISPRO 4 UNITS: 100 INJECTION, SOLUTION INTRAVENOUS; SUBCUTANEOUS at 19:01

## 2024-02-05 RX ADMIN — CARVEDILOL 3.12 MG: 3.12 TABLET, FILM COATED ORAL at 19:45

## 2024-02-05 NOTE — PROGRESS NOTES
Pulmonary / Critical Care Progress Note      Patient Name: Falguni Minaya  : 1960  MRN: [id number]  Attending:  Reagan Aleman MD   Date of admission: 2024    Subjective   Subjective   Patient critically ill with hypoxemic respiratory failure    Continues to slowly improve  Remains on Airvo 50 L / 65% FiO2  Up in chair today   150 cc urine output over the last 24 hours   Echocardiogram with diastolic dysfunction  Family reports that she has not been on immunosuppression for MS for at least 3 to 4 months  No nausea, fevers or chills    Objective   Objective     Vitals:   Vital signs for last 24 hours:  Temp:  [98.1 °F (36.7 °C)-99.4 °F (37.4 °C)] 98.1 °F (36.7 °C)  Heart Rate:  [] 87  Resp:  [18-39] 18  BP: (101-146)/(66-97) 130/82    Intake/Output last 3 shifts:  I/O last 3 completed shifts:  In: 425 [IV Piggyback:425]  Out: 1550 [Urine:1550]  Intake/Output this shift:  No intake/output data recorded.    Vent settings for last 24 hours:       Hemodynamic parameters for last 24 hours:       Physical Exam   Vital Signs Reviewed   General:  Alert, NAD.  Chronically ill-appearing female, sitting up in chair  HEENT:  PERRL, EOMI.    Neck:  No JVD, no thyromegaly  Lymph: no axillary, cervical, supraclavicular lymphadenopathy noted bilaterally  Chest: Diminished to auscultation bilaterally, no work of breathing noted on Airvo  CV: RRR, no M/G/R, pulses 2+  Abd:  Soft, NT, ND, +BS  EXT:  no clubbing, no cyanosis, lower extremity edema noted  Neuro:  A&Ox3, CN grossly intact, no focal deficits.  Skin: No rashes or lesions noted    Result Review    Result Review:  I have personally reviewed the results from the time of this admission to 2024 11:46 EST and agree with these findings:  [x]  Laboratory  [x]  Microbiology  [x]  Radiology  [x]  EKG/Telemetry   [x]  Cardiology/Vascular   []  Pathology  []  Old records  []  Other:  Most notable findings include:       Lab 24  0246 24  0249  02/03/24  0244 02/02/24  0246 02/01/24  0313 01/31/24  1017 01/31/24  1016   WBC 18.21* 20.27* 15.60* 14.91* 13.73* 19.59*  --    HEMOGLOBIN 9.8* 10.9* 10.7* 10.0* 9.8* 10.4*  --    HEMATOCRIT 30.6* 33.7* 32.6* 31.4* 30.7* 32.5*  --    PLATELETS 386 497* 486* 449 435 497*  --    SODIUM 135* 138 138 138 138 141  --    SODIUM, ARTERIAL  --   --   --   --   --   --  142.8   POTASSIUM 3.5 4.4 3.8 3.7 3.7 4.3  --    CHLORIDE 98 99 100 103 104 105  --    CO2 18.9* 18.3* 19.2* 18.4* 18.1* 20.6*  --    BUN 64* 57* 43* 36* 30* 31*  --    CREATININE 2.10* 1.72* 1.26* 1.05* 0.99 1.03*  --    GLUCOSE 104* 140* 131* 122* 121* 98  --    GLUCOSE, ARTERIAL  --   --   --   --   --   --  102*   CALCIUM 9.1 9.6 9.4 8.8 8.6 8.5*  --    PHOSPHORUS 4.2 4.5 3.7 3.0 4.2  --   --    TOTAL PROTEIN 7.3 8.3 8.0 7.3 7.4 7.3  --    ALBUMIN 2.9* 3.2* 3.2* 2.9* 2.7* 2.9*  --    GLOBULIN 4.4 5.1 4.8 4.4 4.7 4.4  --      CT Chest Without Contrast Diagnostic    Result Date: 1/31/2024  PROCEDURE: CT CHEST WO CONTRAST DIAGNOSTIC  COMPARISON: [facility], CR, XR CHEST 1 VW, 12/24/2023, 12:17.  [facility], CR, XR CHEST 1 VW, 1/31/2024, 10:31.  [facility], CT, CT ABDOMEN PELVIS W CONTRAST, 10/11/2023, 11:39.  INDICATIONS: Opacities, sob  TECHNIQUE: CT images were created without the administration of contrast material.   PROTOCOL:   Standard imaging protocol performed    RADIATION:   DLP: 481.3mGy*cm   Automated exposure control was utilized to minimize radiation dose.  FINDINGS:  No pleural or pericardial effusion is seen.  Calcified subcarinal lymph nodes are consistent with previous granulomatous disease.  No noncalcified adenopathy is seen.  Heart size is normal.  There is extensive ground-glass opacity throughout the lung fields bilaterally.  More focal areas of consolidation are also noted in both lung fields.  No pneumothorax is seen.  No significant bronchiectasis is present.  No endobronchial lesion is  seen.  A cholecystectomy has been performed.  The upper abdomen appears unremarkable.  Moderate mid thoracic kyphosis is noted.      Impression:   1. Extensive bilateral ground-glass opacity with smaller foci of consolidation in both lung fields.  Finding is favored to represent pneumonia.  Pulmonary edema and aspiration would also be in the differential.     Tucker Marcial M.D.       Electronically Signed and Approved By: Tucker Marcial M.D. on 1/31/2024 at 14:32              Results for orders placed during the hospital encounter of 01/31/24    Adult Transthoracic Echo Complete W/ Cont if Necessary Per Protocol    Interpretation Summary    Very technically difficult study with limited views.    Left ventricular ejection fraction appears to be 56 - 60%.  No obvious regional wall motion abnormalities.    Left ventricular diastolic function is consistent with (grade I) impaired relaxation and age.    Mild tricuspid regurgitation with estimated right ventricular systolic pressure from tricuspid regurgitation is mildly elevated (35-45 mmHg).    Valves are not well-visualized but no significant valvular disease by Doppler.    Urine culture with E. Coli    Chest x-ray this morning with low lung volumes but clearing lung infiltrates      Assessment & Plan   Assessment / Plan     Active Hospital Problems:  Active Hospital Problems    Diagnosis     **Acute hypoxemic respiratory failure      Impression:  Acute hypoxic respiratory failure requiring Airvo  Severe sepsis present on admission  COVID-pneumonia +1/22  Community-acquired pneumonia from unspecified organism  E. coli urinary tract infection  Multiple sclerosis (patient has not been on immunosuppression for at least 3 to 4 months per family)  ?Immunosuppressed status on treatment for MS  Acute decompensated diastolic congestive heart failure  Atelectasis, bibasilar  Acute kidney injury secondary to prerenal etiology   Coronary artery disease  Lactic acidosis, clinically  significant     Plan:  Respiratory status remains tenuous.  Remains on Airvo 50 L / 65% FiO2.  I personally reviewed her chest x-ray this morning showing low lung volumes and atelectasis  Wean O2 to keep SPO2 greater than 90%  Continue to encourage patient out of bed and into chair and encourage incentive spirometer use  Continue nebulizers and bronchopulmonary hygiene  Creatinine again increased compared to yesterday.  Continue to hold diuretic for now   Trend renal panel electrolytes.   Echocardiogram with diastolic dysfunction  Continue Unasyn for pneumonia and E. coli UTI x7 days total  Continue high-dose Decadron x 10 days given elevated inflammatory markers with significant COVID-19 pneumonia  CRP remains elevated, trending down  Continue sliding scale insulin to keep glucose 1  in the ICU  Appreciate palliative care assistance.  Patient is DNR/DNI.  Patient's family would like to get patient home with Pallitus if possible  Discussed with family at bedside  DVT prophylaxis with heparin  GI prophylaxis with PPI    DVT prophylaxis:  Medical DVT prophylaxis orders are present.    CODE STATUS:   Medical Intervention Limits: NO intubation (DNI); NO cardioversion  Level Of Support Discussed With: Patient  Code Status (Patient has no pulse and is not breathing): No CPR (Do Not Attempt to Resuscitate)  Medical Interventions (Patient has pulse or is breathing): Limited Support  Comments: d/w pt and /family    Patient is critically ill with acute hypoxic respiratory failure on Airvo secondary to COVID-19 pneumonia, MACY, urinary tract infection. 32 minutes of critical care time was spent managing this patient, excluding procedures.  This included personally reviewing all pertinent labs, imaging, microbiology and documentation. Also discussing the case with the patient and any available family, the admitting physician and any available ancillary staff.   Electronically signed by Audelia Duarte MD, 02/05/24,  11:52 AM EST.

## 2024-02-05 NOTE — NURSING NOTE
Palliative care visit for emotional support. Patient up in chair getting bath during visit. Patient remains on airvo. Patients spouse at bedside. Emotional support provided. Patient and patients family hopeful that patient will begin to have some noted improvement. Active listening and goal setting facilitated. Palliative care will continue to follow to support and assist.    Katlin FANG, RN, [facility]  Palliative Care

## 2024-02-05 NOTE — PROGRESS NOTES
RT EQUIPMENT DEVICE RELATED - SKIN ASSESSMENT    RT Medical Equipment/Device:     High Flow Nasal Cannula:Airvo    Skin Assessment:      Cheek:  Redness  Ears:  Intact  Nares:  Intact  Lips:  Redness    Device Skin Pressure Protection:  Positioning supports utilized    Nurse Notification:  No Airvo cannula repositioned at this time    Jaquelin Lopez, RRT

## 2024-02-05 NOTE — THERAPY TREATMENT NOTE
Acute Care - Physical Therapy Treatment Note   [place]     Patient Name: Falguni Minaya  : 1960  MRN: [id number]  Today's Date: 2024      Visit Dx:     ICD-10-CM ICD-9-CM   1. Altered mental status, unspecified altered mental status type  R41.82 780.97   2. Acute urinary tract infection  N39.0 599.0   3. Oropharyngeal dysphagia  R13.12 787.22   4. Difficulty walking  R26.2 719.7   5. Decreased activities of daily living (ADL)  Z78.9 V49.89     Patient Active Problem List   Diagnosis    Multiple sclerosis    Left sided abdominal pain    Dyspepsia    Heme positive stool    Hypotension    Seizure disorder    Pain in lower back    Headache    Frequent falls    Spasm    Weakness of right leg    S/P drug eluting coronary stent placement    Coronary artery disease involving native coronary artery of native heart    Ischemic cardiomyopathy    Pleural effusion due to CHF (congestive heart failure)    Shortness of breath    Orthopnea    Hyperlipidemia LDL goal <70    Anemia due to stage 3 chronic kidney disease    Iron deficiency    Anemia in chronic kidney disease    Arthropathy of lumbar facet joint    Marie's esophagus    Benign colonic polyp    Congestive heart failure    Degeneration of lumbar intervertebral disc    Gastroesophageal reflux disease    Gastroparesis    Lumbar radiculopathy    Osteoporosis    Sacroiliac joint pain    Serum creatinine raised    Vitamin D deficiency    Acute hypoxemic respiratory failure     Past Medical History:   Diagnosis Date    Abdominal hernia     Acute ST elevation myocardial infarction (STEMI) due to occlusion of right coronary artery 2020    CHF (congestive heart failure)     DENIES CP GETS SOA WITH EXERTION. FOLLOWED BY DR ERIC/KASIA GREY. DECREASED ACTIVITY USES WALKER/ROLLATOR    CKD (chronic kidney disease)     Coronary artery disease     Dyspepsia     GERD (gastroesophageal reflux disease)     Hyperlipidemia     Hypertension     Ischemic cardiomyopathy      Multiple sclerosis      Past Surgical History:   Procedure Laterality Date    APPENDECTOMY      CARDIAC CATHETERIZATION      CARDIAC CATHETERIZATION N/A 2/24/2020    Procedure: Left Heart Cath;  Surgeon: Marlon Zamudio MD;  Location: [facility] CATH INVASIVE LOCATION;  Service: Cardiovascular;  Laterality: N/A;    CARDIAC CATHETERIZATION N/A 2/24/2020    Procedure: Stent LATRICIA coronary;  Surgeon: Marlon Zamudio MD;  Location: [facility] CATH INVASIVE LOCATION;  Service: Cardiovascular;  Laterality: N/A;    CARDIAC CATHETERIZATION  2/24/2020    Procedure: Percutaneous Manual Thrombectomy;  Surgeon: Marlon Zamudio MD;  Location: [facility] CATH INVASIVE LOCATION;  Service: Cardiovascular;;    CARDIAC CATHETERIZATION N/A 2/24/2020    Procedure: Coronary angiography;  Surgeon: Marlon Zamudio MD;  Location: [facility] CATH INVASIVE LOCATION;  Service: Cardiovascular;  Laterality: N/A;    CARDIAC CATHETERIZATION N/A 2/24/2020    Procedure: Left ventriculography;  Surgeon: Marlon Zamudio MD;  Location: [facility] CATH INVASIVE LOCATION;  Service: Cardiovascular;  Laterality: N/A;    CHOLECYSTECTOMY      COLONOSCOPY N/A 2/29/2020    Procedure: COLONOSCOPY to  cecum:;  Surgeon: Krystal Sarabia MD;  Location: [facility] ENDOSCOPY;  Service: Gastroenterology;  Laterality: N/A;  pre:  anemia and abd pain  post:  hemorrhoids, tortuous colon    ENDOSCOPY N/A 2/29/2020    Procedure: ESOPHAGOGASTRODUODENOSCOPY  with biopsies;  Surgeon: Krystal Sarabia MD;  Location: [facility] ENDOSCOPY;  Service: Gastroenterology;  Laterality: N/A;  pre:  anemia and abd pain  post:  mild gastritis,     HYSTERECTOMY      PARATHYROIDECTOMY Bilateral 10/17/2023    Procedure: NECK EXPLORATION WITH PARATHYROID ADENOMA EXCISION AND FROZEN SECTION,, RECURRENT LARYNGEAL NERVE MONITORING, INTRAOPERATIVE INTACT PTH ASSAY;  Surgeon: Ezekiel Giles MD;  Location: [facility] OR;  Service: ENT;  Laterality: Bilateral;    TONSILLECTOMY       PT Assessment  (last 12 hours)       PT Evaluation and Treatment       Row Name 02/05/24 1413          Physical Therapy Time and Intention    Subjective Information complains of;weakness;fatigue;dyspnea  -DK     Document Type therapy note (daily note)  -DK     Mode of Treatment individual therapy;physical therapy  -DK     Patient Effort good  -DK     Symptoms Noted During/After Treatment fatigue;shortness of breath  -DK     Comment Pt de-SATs quickly, but also has a fairly quick recovery.  RR up to 50 at times.  -       Row Name 02/05/24 1413          Pain    Pretreatment Pain Rating 2/10  -DK     Posttreatment Pain Rating 2/10  -DK     Pain Location generalized  -DK     Pain Location - buttock;back  -DK     Pain Intervention(s) Distraction;Therapeutic presence  -       Row Name 02/05/24 1413          Cognition    Affect/Mental Status (Cognition) WFL  -DK     Orientation Status (Cognition) oriented to;person  -DK     Follows Commands (Cognition) WFL  -DK     Cognitive Function WFL  -DK     Personal Safety Interventions gait belt;nonskid shoes/slippers when out of bed;supervised activity  -       Row Name 02/05/24 1413          Motor Skills    Motor Skills --  therapeutic exercises  -DK     Therapeutic Exercise hip;knee;ankle  -DK     Additional Documentation --  No transfers due to increased respiratory rate and fatigue.  Multiple rest breaks.  -       Row Name 02/05/24 1413          Hip (Therapeutic Exercise)    Hip (Therapeutic Exercise) AAROM (active assistive range of motion)  -     Hip AAROM (Therapeutic Exercise) bilateral;flexion;extension;aBduction;aDduction;supine;10 repetitions;2 sets  -       Row Name 02/05/24 1413          Knee (Therapeutic Exercise)    Knee (Therapeutic Exercise) AAROM (active assistive range of motion)  -     Knee AAROM (Therapeutic Exercise) bilateral;flexion;extension;supine;10 repetitions;2 sets  -       Row Name 02/05/24 1413          Ankle (Therapeutic Exercise)    Ankle  (Therapeutic Exercise) AAROM (active assistive range of motion)  -DK     Ankle AAROM (Therapeutic Exercise) bilateral;dorsiflexion;plantarflexion;supine;10 repetitions;2 sets  -       Row Name 02/05/24 1413          Plan of Care Review    Plan of Care Reviewed With patient  -DK     Progress no change  -       Row Name 02/05/24 1413          Positioning and Restraints    Pre-Treatment Position sitting in chair/recliner  -DK     Post Treatment Position chair  -DK     In Chair reclined;call light within reach;encouraged to call for assist;legs elevated  -       Row Name 02/05/24 1413          Therapy Assessment/Plan (PT)    Rehab Potential (PT) fair, will monitor progress closely  -     Criteria for Skilled Interventions Met (PT) skilled treatment is necessary  -     Therapy Frequency (PT) daily  -DK     Problem List (PT) problems related to;balance;mobility;strength;pain  breathing issues  -     Activity Limitations Related to Problem List (PT) unable to ambulate safely;unable to transfer safely  -       Row Name 02/05/24 1413          Progress Summary (PT)    Progress Toward Functional Goals (PT) progress toward functional goals is fair  -               User Key  (r) = Recorded By, (t) = Taken By, (c) = Cosigned By      Initials Name Provider Type    Theresa Gonzales PTA Physical Therapist Assistant                    Physical Therapy Education       Title: PT OT SLP Therapies (Done)       Topic: Physical Therapy (Done)       Point: Mobility training (Done)       Learning Progress Summary             Patient Acceptance, E, VU by SR at 2/2/2024 1632    Acceptance, E,TB, VU by WB at 2/1/2024 1132   Family Acceptance, E, VU by SR at 2/2/2024 1632                         Point: Body mechanics (Done)       Learning Progress Summary             Patient Acceptance, E, VU by SR at 2/2/2024 1632    Acceptance, E,TB, VU by WB at 2/1/2024 1132   Family Acceptance, E, VU by SR at 2/2/2024 1632                          Point: Precautions (Done)       Learning Progress Summary             Patient Acceptance, E, VU by SR at 2/2/2024 1632    Acceptance, E,TB, VU by WB at 2/1/2024 1132   Family Acceptance, E, VU by SR at 2/2/2024 1632                                         User Key       Initials Effective Dates Name Provider Type Discipline    SR 06/16/21 -  Ally Costa RN Registered Nurse Nurse    WB 01/09/24 -  Isreal Frye, PT Student PT Student PT                  PT Recommendation and Plan  Planned Therapy Interventions (PT): balance training, bed mobility training, gait training, strengthening, transfer training  Therapy Frequency (PT): daily  Progress Summary (PT)  Progress Toward Functional Goals (PT): progress toward functional goals is fair  Plan of Care Reviewed With: patient  Progress: no change   Outcome Measures       Row Name 02/05/24 1413             How much help from another person do you currently need...    Turning from your back to your side while in flat bed without using bedrails? 3  -DK      Moving from lying on back to sitting on the side of a flat bed without bedrails? 3  -DK      Moving to and from a bed to a chair (including a wheelchair)? 2  -DK      Standing up from a chair using your arms (e.g., wheelchair, bedside chair)? 2  -DK      Climbing 3-5 steps with a railing? 1  -DK      To walk in hospital room? 1  -DK      AM-PAC 6 Clicks Score (PT) 12  -DK      Highest Level of Mobility Goal 4 --> Transfer to chair/commode  -DK         Functional Assessment    Outcome Measure Options AM-PAC 6 Clicks Basic Mobility (PT)  -DK                User Key  (r) = Recorded By, (t) = Taken By, (c) = Cosigned By      Initials Name Provider Type    DK Theresa Cotter PTA Physical Therapist Assistant                     Time Calculation:    PT Charges       Row Name 02/05/24 1417             Time Calculation    PT Received On 02/05/24  -DK      PT Goal Re-Cert Due Date 02/10/24  -DK         Timed Charges     23224 - PT Therapeutic Exercise Minutes 16  -DK      86544 - PT Therapeutic Activity Minutes 7  -DK         Total Minutes    Timed Charges Total Minutes 23  -DK       Total Minutes 23  -DK                User Key  (r) = Recorded By, (t) = Taken By, (c) = Cosigned By      Initials Name Provider Type    Theresa Gonzales PTA Physical Therapist Assistant                  Therapy Charges for Today       Code Description Service Date Service Provider Modifiers Qty    06019678438 HC PT THER PROC EA 15 MIN 2/5/2024 Theresa Cotter PTA GP 1    51625995314 HC PT THERAPEUTIC ACT EA 15 MIN 2/5/2024 Theresa Cotter PTA GP 1            PT G-Codes  Outcome Measure Options: AM-PAC 6 Clicks Basic Mobility (PT)  AM-PAC 6 Clicks Score (PT): 12  AM-PAC 6 Clicks Score (OT): 16    Theresa Cotter PTA  2/5/2024

## 2024-02-05 NOTE — PROGRESS NOTES
RT EQUIPMENT DEVICE RELATED - SKIN ASSESSMENT    RT Medical Equipment/Device:     High Flow Nasal Cannula:Airvo    Skin Assessment:      Cheek:  Intact  Ears:  Intact  Nose:  Intact    Device Skin Pressure Protection:  Positioning supports utilized    Nurse Notification:  Lizette Stroud, RRT

## 2024-02-06 LAB
ALBUMIN SERPL-MCNC: 2.7 G/DL (ref 3.5–5.2)
ALBUMIN/GLOB SERPL: 0.6 G/DL
ALP SERPL-CCNC: 113 U/L (ref 39–117)
ALT SERPL W P-5'-P-CCNC: 16 U/L (ref 1–33)
ANION GAP SERPL CALCULATED.3IONS-SCNC: 17.4 MMOL/L (ref 5–15)
AST SERPL-CCNC: 20 U/L (ref 1–32)
BASOPHILS # BLD AUTO: 0.03 10*3/MM3 (ref 0–0.2)
BASOPHILS NFR BLD AUTO: 0.2 % (ref 0–1.5)
BILIRUB SERPL-MCNC: 0.5 MG/DL (ref 0–1.2)
BUN SERPL-MCNC: 58 MG/DL (ref 8–23)
BUN/CREAT SERPL: 37.2 (ref 7–25)
CALCIUM SPEC-SCNC: 9 MG/DL (ref 8.6–10.5)
CHLORIDE SERPL-SCNC: 97 MMOL/L (ref 98–107)
CO2 SERPL-SCNC: 18.6 MMOL/L (ref 22–29)
CREAT SERPL-MCNC: 1.56 MG/DL (ref 0.57–1)
DEPRECATED RDW RBC AUTO: 60.1 FL (ref 37–54)
EGFRCR SERPLBLD CKD-EPI 2021: 36.7 ML/MIN/1.73
EOSINOPHIL # BLD AUTO: 0.01 10*3/MM3 (ref 0–0.4)
EOSINOPHIL NFR BLD AUTO: 0.1 % (ref 0.3–6.2)
ERYTHROCYTE [DISTWIDTH] IN BLOOD BY AUTOMATED COUNT: 17.2 % (ref 12.3–15.4)
GLOBULIN UR ELPH-MCNC: 4.5 GM/DL
GLUCOSE BLDC GLUCOMTR-MCNC: 144 MG/DL (ref 70–99)
GLUCOSE BLDC GLUCOMTR-MCNC: 178 MG/DL (ref 70–99)
GLUCOSE BLDC GLUCOMTR-MCNC: 282 MG/DL (ref 70–99)
GLUCOSE BLDC GLUCOMTR-MCNC: 87 MG/DL (ref 70–99)
GLUCOSE SERPL-MCNC: 103 MG/DL (ref 65–99)
HCT VFR BLD AUTO: 29.1 % (ref 34–46.6)
HGB BLD-MCNC: 9.4 G/DL (ref 12–15.9)
IMM GRANULOCYTES # BLD AUTO: 0.35 10*3/MM3 (ref 0–0.05)
IMM GRANULOCYTES NFR BLD AUTO: 1.8 % (ref 0–0.5)
LYMPHOCYTES # BLD AUTO: 0.6 10*3/MM3 (ref 0.7–3.1)
LYMPHOCYTES NFR BLD AUTO: 3.1 % (ref 19.6–45.3)
MAGNESIUM SERPL-MCNC: 2.3 MG/DL (ref 1.6–2.4)
MCH RBC QN AUTO: 30.7 PG (ref 26.6–33)
MCHC RBC AUTO-ENTMCNC: 32.3 G/DL (ref 31.5–35.7)
MCV RBC AUTO: 95.1 FL (ref 79–97)
MONOCYTES # BLD AUTO: 0.78 10*3/MM3 (ref 0.1–0.9)
MONOCYTES NFR BLD AUTO: 4 % (ref 5–12)
NEUTROPHILS NFR BLD AUTO: 17.74 10*3/MM3 (ref 1.7–7)
NEUTROPHILS NFR BLD AUTO: 90.8 % (ref 42.7–76)
NRBC BLD AUTO-RTO: 0 /100 WBC (ref 0–0.2)
PHOSPHATE SERPL-MCNC: 4 MG/DL (ref 2.5–4.5)
PLATELET # BLD AUTO: 348 10*3/MM3 (ref 140–450)
PMV BLD AUTO: 11.5 FL (ref 6–12)
POTASSIUM SERPL-SCNC: 3.6 MMOL/L (ref 3.5–5.2)
PROT SERPL-MCNC: 7.2 G/DL (ref 6–8.5)
RBC # BLD AUTO: 3.06 10*6/MM3 (ref 3.77–5.28)
SODIUM SERPL-SCNC: 133 MMOL/L (ref 136–145)
WBC NRBC COR # BLD AUTO: 19.51 10*3/MM3 (ref 3.4–10.8)

## 2024-02-06 PROCEDURE — 82948 REAGENT STRIP/BLOOD GLUCOSE: CPT

## 2024-02-06 PROCEDURE — 25010000002 DEXAMETHASONE SODIUM PHOSPHATE 10 MG/ML SOLUTION: Performed by: NURSE PRACTITIONER

## 2024-02-06 PROCEDURE — 94669 MECHANICAL CHEST WALL OSCILL: CPT

## 2024-02-06 PROCEDURE — 82948 REAGENT STRIP/BLOOD GLUCOSE: CPT | Performed by: NURSE PRACTITIONER

## 2024-02-06 PROCEDURE — 94799 UNLISTED PULMONARY SVC/PX: CPT

## 2024-02-06 PROCEDURE — 25010000002 HEPARIN (PORCINE) PER 1000 UNITS: Performed by: INTERNAL MEDICINE

## 2024-02-06 PROCEDURE — 36415 COLL VENOUS BLD VENIPUNCTURE: CPT | Performed by: INTERNAL MEDICINE

## 2024-02-06 PROCEDURE — 80053 COMPREHEN METABOLIC PANEL: CPT | Performed by: INTERNAL MEDICINE

## 2024-02-06 PROCEDURE — 94664 DEMO&/EVAL PT USE INHALER: CPT

## 2024-02-06 PROCEDURE — 99233 SBSQ HOSP IP/OBS HIGH 50: CPT | Performed by: INTERNAL MEDICINE

## 2024-02-06 PROCEDURE — 63710000001 INSULIN LISPRO (HUMAN) PER 5 UNITS: Performed by: NURSE PRACTITIONER

## 2024-02-06 PROCEDURE — 99291 CRITICAL CARE FIRST HOUR: CPT | Performed by: STUDENT IN AN ORGANIZED HEALTH CARE EDUCATION/TRAINING PROGRAM

## 2024-02-06 PROCEDURE — 85025 COMPLETE CBC W/AUTO DIFF WBC: CPT | Performed by: INTERNAL MEDICINE

## 2024-02-06 PROCEDURE — 84100 ASSAY OF PHOSPHORUS: CPT | Performed by: INTERNAL MEDICINE

## 2024-02-06 PROCEDURE — 83735 ASSAY OF MAGNESIUM: CPT | Performed by: INTERNAL MEDICINE

## 2024-02-06 PROCEDURE — 25010000002 AMPICILLIN-SULBACTAM PER 1.5 G: Performed by: INTERNAL MEDICINE

## 2024-02-06 PROCEDURE — 25010000002 FUROSEMIDE PER 20 MG: Performed by: STUDENT IN AN ORGANIZED HEALTH CARE EDUCATION/TRAINING PROGRAM

## 2024-02-06 RX ORDER — FUROSEMIDE 10 MG/ML
40 INJECTION INTRAMUSCULAR; INTRAVENOUS DAILY
Status: DISCONTINUED | OUTPATIENT
Start: 2024-02-06 | End: 2024-02-14

## 2024-02-06 RX ORDER — FAMOTIDINE 20 MG/1
20 TABLET, FILM COATED ORAL DAILY
Status: DISCONTINUED | OUTPATIENT
Start: 2024-02-06 | End: 2024-02-15 | Stop reason: HOSPADM

## 2024-02-06 RX ORDER — POTASSIUM CHLORIDE 750 MG/1
40 CAPSULE, EXTENDED RELEASE ORAL ONCE
Status: COMPLETED | OUTPATIENT
Start: 2024-02-06 | End: 2024-02-06

## 2024-02-06 RX ADMIN — OLANZAPINE 2.5 MG: 2.5 TABLET, FILM COATED ORAL at 09:20

## 2024-02-06 RX ADMIN — AMPICILLIN SODIUM AND SULBACTAM SODIUM 3 G: 2; 1 INJECTION, POWDER, FOR SOLUTION INTRAMUSCULAR; INTRAVENOUS at 02:44

## 2024-02-06 RX ADMIN — BUDESONIDE 0.5 MG: 0.5 SUSPENSION RESPIRATORY (INHALATION) at 06:54

## 2024-02-06 RX ADMIN — PRAMIPEXOLE DIHYDROCHLORIDE 0.5 MG: 0.5 TABLET ORAL at 21:17

## 2024-02-06 RX ADMIN — ATORVASTATIN CALCIUM 40 MG: 40 TABLET, FILM COATED ORAL at 09:21

## 2024-02-06 RX ADMIN — CARVEDILOL 3.12 MG: 3.12 TABLET, FILM COATED ORAL at 09:21

## 2024-02-06 RX ADMIN — GABAPENTIN 100 MG: 100 CAPSULE ORAL at 14:29

## 2024-02-06 RX ADMIN — FAMOTIDINE 20 MG: 20 TABLET ORAL at 09:21

## 2024-02-06 RX ADMIN — FLUOXETINE HYDROCHLORIDE 40 MG: 20 CAPSULE ORAL at 09:21

## 2024-02-06 RX ADMIN — CLOPIDOGREL BISULFATE 75 MG: 75 TABLET, FILM COATED ORAL at 09:20

## 2024-02-06 RX ADMIN — GABAPENTIN 100 MG: 100 CAPSULE ORAL at 09:21

## 2024-02-06 RX ADMIN — FLUOXETINE HYDROCHLORIDE 40 MG: 20 CAPSULE ORAL at 21:17

## 2024-02-06 RX ADMIN — BUDESONIDE 0.5 MG: 0.5 SUSPENSION RESPIRATORY (INHALATION) at 19:02

## 2024-02-06 RX ADMIN — HEPARIN SODIUM 5000 UNITS: 5000 INJECTION INTRAVENOUS; SUBCUTANEOUS at 14:29

## 2024-02-06 RX ADMIN — AMPICILLIN SODIUM AND SULBACTAM SODIUM 3 G: 2; 1 INJECTION, POWDER, FOR SOLUTION INTRAMUSCULAR; INTRAVENOUS at 09:19

## 2024-02-06 RX ADMIN — DEXAMETHASONE SODIUM PHOSPHATE 8 MG: 10 INJECTION INTRAMUSCULAR; INTRAVENOUS at 09:21

## 2024-02-06 RX ADMIN — HEPARIN SODIUM 5000 UNITS: 5000 INJECTION INTRAVENOUS; SUBCUTANEOUS at 06:05

## 2024-02-06 RX ADMIN — HEPARIN SODIUM 5000 UNITS: 5000 INJECTION INTRAVENOUS; SUBCUTANEOUS at 21:17

## 2024-02-06 RX ADMIN — PANTOPRAZOLE SODIUM 40 MG: 40 TABLET, DELAYED RELEASE ORAL at 06:05

## 2024-02-06 RX ADMIN — FUROSEMIDE 40 MG: 10 INJECTION, SOLUTION INTRAMUSCULAR; INTRAVENOUS at 09:20

## 2024-02-06 RX ADMIN — Medication 250 MG: at 09:21

## 2024-02-06 RX ADMIN — GABAPENTIN 100 MG: 100 CAPSULE ORAL at 21:17

## 2024-02-06 RX ADMIN — INSULIN LISPRO 4 UNITS: 100 INJECTION, SOLUTION INTRAVENOUS; SUBCUTANEOUS at 18:13

## 2024-02-06 RX ADMIN — ASPIRIN 81 MG: 81 TABLET, CHEWABLE ORAL at 09:21

## 2024-02-06 RX ADMIN — Medication 250 MG: at 21:17

## 2024-02-06 RX ADMIN — CARVEDILOL 3.12 MG: 3.12 TABLET, FILM COATED ORAL at 18:13

## 2024-02-06 RX ADMIN — POTASSIUM CHLORIDE 40 MEQ: 10 CAPSULE, COATED, EXTENDED RELEASE ORAL at 09:20

## 2024-02-06 RX ADMIN — Medication 10 ML: at 21:17

## 2024-02-06 RX ADMIN — Medication 10 ML: at 09:20

## 2024-02-06 RX ADMIN — INSULIN LISPRO 2 UNITS: 100 INJECTION, SOLUTION INTRAVENOUS; SUBCUTANEOUS at 21:22

## 2024-02-06 RX ADMIN — ARFORMOTEROL TARTRATE 15 MCG: 15 SOLUTION RESPIRATORY (INHALATION) at 19:02

## 2024-02-06 RX ADMIN — ARFORMOTEROL TARTRATE 15 MCG: 15 SOLUTION RESPIRATORY (INHALATION) at 06:54

## 2024-02-06 NOTE — PROGRESS NOTES
Pulmonary / Critical Care Progress Note      Patient Name: Falguni Minaya  : 1960  MRN: [id number]  Attending:  Portillo Serrato DO   Date of admission: 2024    Subjective   Subjective   Patient critically ill with hypoxemic respiratory failure    No acute events overnight  Remains on Airvo 60 L / 100% FiO2  Has a good cough  Reports feeling better  800 cc urine output made over the last 24 hours    Objective   Objective     Vitals:   Vital signs for last 24 hours:  Temp:  [97.7 °F (36.5 °C)-98.5 °F (36.9 °C)] 98.4 °F (36.9 °C)  Heart Rate:  [] 95  Resp:  [17-28] 28  BP: ()/(50-97) 115/78    Intake/Output last 3 shifts:  I/O last 3 completed shifts:  In: 600 [IV Piggyback:600]  Out: 1550 [Urine:1550]  Intake/Output this shift:  No intake/output data recorded.    Vent settings for last 24 hours:       Hemodynamic parameters for last 24 hours:       Physical Exam   Vital Signs Reviewed   General:  Alert, NAD.  Chronically ill-appearing female, lying in bed  HEENT:  PERRL, EOMI.    Neck:  No JVD, no thyromegaly  Lymph: no axillary, cervical, supraclavicular lymphadenopathy noted bilaterally  Chest: Diminished to auscultation bilaterally, no work of breathing noted on Airvo  CV: RRR, no M/G/R, pulses 2+  Abd:  Soft, NT, ND, +BS  EXT:  no clubbing, no cyanosis, lower extremity edema noted  Neuro:  A&Ox3, CN grossly intact, no focal deficits.  Skin: No rashes or lesions noted    Result Review    Result Review:  I have personally reviewed the results from the time of this admission to 2024 11:43 EST and agree with these findings:  [x]  Laboratory  [x]  Microbiology  [x]  Radiology  [x]  EKG/Telemetry   [x]  Cardiology/Vascular   []  Pathology  []  Old records  []  Other:  Most notable findings include:       Lab 24  0253 24  0252 24  0246 24  0249 24  0244 24  0246 24  0313 24  1017   WBC  --  19.51* 18.21* 20.27* 15.60* 14.91* 13.73* 19.59*    HEMOGLOBIN  --  9.4* 9.8* 10.9* 10.7* 10.0* 9.8* 10.4*   HEMATOCRIT  --  29.1* 30.6* 33.7* 32.6* 31.4* 30.7* 32.5*   PLATELETS  --  348 386 497* 486* 449 435 497*   SODIUM 133*  --  135* 138 138 138 138 141   POTASSIUM 3.6  --  3.5 4.4 3.8 3.7 3.7 4.3   CHLORIDE 97*  --  98 99 100 103 104 105   CO2 18.6*  --  18.9* 18.3* 19.2* 18.4* 18.1* 20.6*   BUN 58*  --  64* 57* 43* 36* 30* 31*   CREATININE 1.56*  --  2.10* 1.72* 1.26* 1.05* 0.99 1.03*   GLUCOSE 103*  --  104* 140* 131* 122* 121* 98   CALCIUM 9.0  --  9.1 9.6 9.4 8.8 8.6 8.5*   PHOSPHORUS 4.0  --  4.2 4.5 3.7 3.0 4.2  --    TOTAL PROTEIN 7.2  --  7.3 8.3 8.0 7.3 7.4 7.3   ALBUMIN 2.7*  --  2.9* 3.2* 3.2* 2.9* 2.7* 2.9*   GLOBULIN 4.5  --  4.4 5.1 4.8 4.4 4.7 4.4     CT Chest Without Contrast Diagnostic    Result Date: 1/31/2024  PROCEDURE: CT CHEST WO CONTRAST DIAGNOSTIC  COMPARISON: [facility], CR, XR CHEST 1 VW, 12/24/2023, 12:17.  [facility], CR, XR CHEST 1 VW, 1/31/2024, 10:31.  [facility], CT, CT ABDOMEN PELVIS W CONTRAST, 10/11/2023, 11:39.  INDICATIONS: Opacities, sob  TECHNIQUE: CT images were created without the administration of contrast material.   PROTOCOL:   Standard imaging protocol performed    RADIATION:   DLP: 481.3mGy*cm   Automated exposure control was utilized to minimize radiation dose.  FINDINGS:  No pleural or pericardial effusion is seen.  Calcified subcarinal lymph nodes are consistent with previous granulomatous disease.  No noncalcified adenopathy is seen.  Heart size is normal.  There is extensive ground-glass opacity throughout the lung fields bilaterally.  More focal areas of consolidation are also noted in both lung fields.  No pneumothorax is seen.  No significant bronchiectasis is present.  No endobronchial lesion is seen.  A cholecystectomy has been performed.  The upper abdomen appears unremarkable.  Moderate mid thoracic kyphosis is noted.      Impression:   1. Extensive bilateral  ground-glass opacity with smaller foci of consolidation in both lung fields.  Finding is favored to represent pneumonia.  Pulmonary edema and aspiration would also be in the differential.     Tucker Marcial M.D.       Electronically Signed and Approved By: Tucker Macrial M.D. on 1/31/2024 at 14:32              Results for orders placed during the hospital encounter of 01/31/24    Adult Transthoracic Echo Complete W/ Cont if Necessary Per Protocol    Interpretation Summary    Very technically difficult study with limited views.    Left ventricular ejection fraction appears to be 56 - 60%.  No obvious regional wall motion abnormalities.    Left ventricular diastolic function is consistent with (grade I) impaired relaxation and age.    Mild tricuspid regurgitation with estimated right ventricular systolic pressure from tricuspid regurgitation is mildly elevated (35-45 mmHg).    Valves are not well-visualized but no significant valvular disease by Doppler.    Urine culture with E. Coli    Chest x-ray this morning with low lung volumes but clearing lung infiltrates      Assessment & Plan   Assessment / Plan     Active Hospital Problems:  Active Hospital Problems    Diagnosis     **Acute hypoxemic respiratory failure      Impression:  Acute hypoxic respiratory failure requiring Airvo  Severe sepsis present on admission  COVID-pneumonia +1/22  Community-acquired pneumonia from unspecified organism  E. coli urinary tract infection  Multiple sclerosis (patient has not been on immunosuppression for at least 3 to 4 months per family)  ?Immunosuppressed status on treatment for MS  Acute decompensated diastolic congestive heart failure  Atelectasis, bibasilar  Acute kidney injury secondary to prerenal etiology   Coronary artery disease  Lactic acidosis, clinically significant     Plan:  Remains on Airvo 60 L / 100% FiO2.  Respiratory status remains tenuous. Wean O2 to keep SPO2 greater than 90%  Continue with aggressive airway  clearance regimen  Continue to encourage patient out of bed and into chair and encourage incentive spirometer use  Continue nebulizers and bronchopulmonary hygiene  Creatinine improved today.  Will resume Lasix 40 mg IV daily   Trend renal panel electrolytes.   Echocardiogram with diastolic dysfunction  Continue Unasyn for pneumonia and E. coli UTI x7 days total  Continue high-dose Decadron x 10 days given elevated inflammatory markers with significant COVID-19 pneumonia  CRP remains elevated, trending down  Continue sliding scale insulin to keep glucose 140-180 in the ICU  Appreciate palliative care assistance.  Patient is DNR/DNI.  Patient's family would like to get patient home with [facility] if possible  Discussed with family at bedside  DVT prophylaxis with heparin  GI prophylaxis with H2 blocker    DVT prophylaxis:  Medical DVT prophylaxis orders are present.    CODE STATUS:   Medical Intervention Limits: NO intubation (DNI); NO cardioversion  Level Of Support Discussed With: Patient  Code Status (Patient has no pulse and is not breathing): No CPR (Do Not Attempt to Resuscitate)  Medical Interventions (Patient has pulse or is breathing): Limited Support  Comments: d/w pt and /family    Patient is critically ill with acute hypoxic respiratory failure on Airvo secondary to COVID-19 pneumonia, MACY, urinary tract infection. 33 minutes of critical care time was spent managing this patient, excluding procedures.  This included personally reviewing all pertinent labs, imaging, microbiology and documentation. Also discussing the case with the patient and any available family, the admitting physician and any available ancillary staff. Electronically signed by Audelia Duarte MD, 02/06/24, 11:47 AM EST.

## 2024-02-06 NOTE — PROGRESS NOTES
[facility]   Hospitalist Progress Note    Date of admission: 1/31/2024  Patient Name: Falguni Minaya  1960  Date: 2/6/2024      Subjective     Chief Complaint   Patient presents with    Shortness of Breath    Altered Mental Status       Interval Followup: no real changes remains short of breath and on precision flow.     Objective     Vitals:   Temp:  [97.9 °F (36.6 °C)-98.7 °F (37.1 °C)] 98.7 °F (37.1 °C)  Heart Rate:  [] 99  Resp:  [17-28] 28  BP: ()/(59-97) 113/78  Flow (L/min):  [] 60    Physical Exam  Vitals reviewed.   Cardiovascular:      Rate and Rhythm: Normal rate.   Pulmonary:      Breath sounds: Decreased air movement present. Decreased breath sounds present.   Abdominal:      General: There is no distension.      Tenderness: There is no abdominal tenderness.   Neurological:      Mental Status: She is alert and oriented to person, place, and time.            Result Review:  Vital signs, labs and recent relevant imaging reviewed.        acetaminophen    albuterol    [DISCONTINUED] senna-docusate sodium **AND** polyethylene glycol **AND** bisacodyl **AND** bisacodyl    [Held by provider] cyclobenzaprine    dextrose    dextrose    glucagon (human recombinant)    ondansetron    sodium chloride    sodium chloride    sodium chloride    arformoterol, 15 mcg, Nebulization, BID - RT  aspirin, 81 mg, Oral, Daily  atorvastatin, 40 mg, Oral, Daily  budesonide, 0.5 mg, Nebulization, BID - RT  carvedilol, 3.125 mg, Oral, BID With Meals  clopidogrel, 75 mg, Oral, Daily  dexAMETHasone, 8 mg, Intravenous, Daily  famotidine, 20 mg, Oral, Daily  FLUoxetine, 40 mg, Oral, BID  furosemide, 40 mg, Intravenous, Daily  gabapentin, 100 mg, Oral, TID  heparin (porcine), 5,000 Units, Subcutaneous, Q8H  insulin lispro, 2-7 Units, Subcutaneous, 4x Daily AC & at Bedtime  [Held by provider] losartan, 25 mg, Oral, Q24H  OLANZapine, 2.5 mg, Oral, Daily  pramipexole, 0.5 mg, Oral, Nightly  saccharomyces  boulardii, 250 mg, Oral, BID  sodium chloride, 10 mL, Intravenous, Q12H        XR Chest 1 View    Result Date: 2/4/2024   Low lung volumes.  There has been some improvement in appearance of pneumonia on the right, with persistent left-sided infiltrates noted       JAYDON MULLIGAN MD       Electronically Signed and Approved By: JAYDON MULLIGAN MD on 2/04/2024 at 11:09             XR Chest 1 View    Result Date: 2/1/2024    Similar appearance of patchy bilateral opacities, right greater than left, which may represent pneumonia.       JONI PATEL MD       Electronically Signed and Approved By: JONI PATEL MD on 2/01/2024 at 8:41             CT Chest Without Contrast Diagnostic    Result Date: 1/31/2024    1. Extensive bilateral ground-glass opacity with smaller foci of consolidation in both lung fields.  Finding is favored to represent pneumonia.  Pulmonary edema and aspiration would also be in the differential.     Tucker Marcial M.D.       Electronically Signed and Approved By: Tucker Marcial M.D. on 1/31/2024 at 14:32             CT Head Without Contrast    Result Date: 1/31/2024    1. Mild to moderate low density in the cerebral white matter could represent small-vessel ischemic disease and or multiple sclerosis. 2. No CT evidence of acute infarction, mass or intracranial hemorrhage. 3. Small right mastoid effusion     Tucker Marcial M.D.       Electronically Signed and Approved By: Tucker Marcial M.D. on 1/31/2024 at 10:55             XR Chest 1 View    Result Date: 1/31/2024    1. Asymmetric perihilar and patchy multifocal opacities which could represent atelectasis, asymmetric edema or pneumonia.       FRANK WHITEHEAD MD       Electronically Signed and Approved By: FRANK WHITEHEAD MD on 1/31/2024 at 10:33              Assessment / Plan     Summary: 64-year-old female with a history of MS immunocompromised, recently been transitioning medications for this, recurrent UTIs, diastolic CHF, CAD with prior PCI, CKD,  who been diagnosed with COVID-19 on 1/22 and treated with outpatient dexamethasone, and completed steroids and had progressively worsening symptoms and presented for further evaluation.  Patient with acute approximate respiratory failure requiring Airvo also with UTI.  Has ARDS/severe covid19 pna appearance, treating with high dose steroids.  Diuresing initially, now with juan carlos most recently.    Assessment/Plan (clinically significant if listed here)  Acute hypoxemic respiratory failure requiring Airvo  Sepsis, present on admission, in setting of gram-negative bacilli UTI and suspected bacterial pneumonia in addition to COVID-19 pneumonia  Acute on Chronic dCHF (previously reduced now with recovered EF of 62%)   CAD with history of PCI to RCA   COVID-19 pneumonia with initial testing positive 1/22  Immunocompromised in setting of multiple sclerosis currently in limbo between treatments (most recently on Vumerity)   Anion gap metabolic acidosis  Transaminitis  History of hypertension with low blood pressure on arrival  CKD possibly 3 baseline creatinine 1.1-1.3  Seizure disorder  GERD  Anxiety/depression  Hyperlipidemia  Morbid obesity  Suspect underlying dementia, intermittent metabolic encephalopthy     Cr stable and getting diuretics again today  Baseline Cr variable between 1.1-1.3  Echo 56% grade 1, mild TR  Monitor renal function and potassium closely  Holding home antihypertensive medications (losartan 25), coreg changed to lower dose 3 bid  Resp status tenuous, Continue Airvo and pulm toilet wean as able   Continue high-dose steroids for elevated inflammatory markers with COVID-19 pneumonia with severe residual symptoms,   Ppi while on steroids   Continue on Unasyn for 7-day treatment for pneumonia and E. coli UTI, urine culture sensitive on review; wbc increased but suspect in part from steroids  2 g sodium diet 1500 cc restriction  added vaginal estrogen cream given recurrent UTIs, will need this at  discharge  Continue PPI; avoid home meloxicam/nsaids given ckd, needs to limit nsaids in general  Unclear of any home seizure medications apart from gabapentin which she seems to have taken for symptoms with her multiple sclerosis, had initial worse mentation, continue on lower dose gabapentin, monitor with renal fxn closely  Continue on aspirin and Plavix, statin  Cont prozac, zyprexa,   Cont pramipexole  Check am cbc, bmp, mg, phos lfts  Guarded prognosis given continued critical hypoxia and comorbidities.  Family discussing home with pallitus possibly    PT/OT      DVT prophylaxis:  Medical DVT prophylaxis orders are present.        Medical Intervention Limits: NO intubation (DNI); NO cardioversion  Level Of Support Discussed With: Patient  Code Status (Patient has no pulse and is not breathing): No CPR (Do Not Attempt to Resuscitate)  Medical Interventions (Patient has pulse or is breathing): Limited Support  Comments: d/w pt and /family    Patient is critically ill due to AHRF requiring airvo, UTI, CAP/covid19, CHF Ex, juan carlos, debility and additional issues as above .  I have spent 31 minutes of critical care time reviewing documentation, pertinent labs, imaging studies, examining the patient, modifying care plan, and discussing patient's condition and care plan with the patient and family at bedside, nursing and and pulm.      CBC          2/4/2024    02:49 2/5/2024    02:46 2/6/2024    02:52   CBC   WBC 20.27  18.21  19.51    RBC 3.52  3.21  3.06    Hemoglobin 10.9  9.8  9.4    Hematocrit 33.7  30.6  29.1    MCV 95.7  95.3  95.1    MCH 31.0  30.5  30.7    MCHC 32.3  32.0  32.3    RDW 18.1  17.5  17.2    Platelets 497  386  348        CMP          2/4/2024    02:49 2/5/2024    02:46 2/6/2024    02:53   CMP   Glucose 140  104  103    BUN 57  64  58    Creatinine 1.72  2.10  1.56    EGFR 32.7  25.7  36.7    Sodium 138  135  133    Potassium 4.4  3.5  3.6    Chloride 99  98  97    Calcium 9.6  9.1  9.0     Total Protein 8.3  7.3  7.2    Albumin 3.2  2.9  2.7    Globulin 5.1  4.4  4.5    Total Bilirubin 0.6  0.6  0.5    Alkaline Phosphatase 128  114  113    AST (SGOT) 19  19  20    ALT (SGPT) 24  19  16    Albumin/Globulin Ratio 0.6  0.7  0.6    BUN/Creatinine Ratio 33.1  30.5  37.2    Anion Gap 20.7  18.1  17.4

## 2024-02-06 NOTE — PLAN OF CARE
Goal Outcome Evaluation:         Pt has been resting in the bed and the chair throughout the day. Pt has been able to titrate down on her airvo today with success. Pt has had no complaints of pain throughout the day. VSS. No new orders at this time. No signs of acute distress at this time. Plan of care ongoing. Call light within reach. Family at the bedside throughout the shift.     Wendy Cameron RN

## 2024-02-06 NOTE — PROGRESS NOTES
[facility]   Hospitalist Progress Note    Date of admission: 1/31/2024  Patient Name: Falguni Minaya  1960  Date: 2/5/2024      Subjective     Chief Complaint   Patient presents with    Shortness of Breath    Altered Mental Status       Interval Followup: soa about the same, still on airvo, was unable to wean to HFNC   Po remains poor    Objective     Vitals:   Temp:  [97.7 °F (36.5 °C)-99.1 °F (37.3 °C)] 98.3 °F (36.8 °C)  Heart Rate:  [] 90  Resp:  [18-32] 27  BP: ()/(50-97) 124/71  Flow (L/min):  [15-50] 50    Physical Exam  Tired, ill-appearing, family present  More dyspneic and labored with breathing today on airvo  Mildly elevated rate regular rhythm no lower extremity pitting edema  Abdomen soft nontender nondistended  Generalized weakness  Answering basic questions appropriately      Result Review:  Vital signs, labs and recent relevant imaging reviewed.        acetaminophen    albuterol    benzonatate    [DISCONTINUED] senna-docusate sodium **AND** polyethylene glycol **AND** bisacodyl **AND** bisacodyl    [Held by provider] cyclobenzaprine    dextrose    dextrose    glucagon (human recombinant)    guaiFENesin-dextromethorphan    ondansetron    sodium chloride    sodium chloride    sodium chloride    ampicillin-sulbactam, 3 g, Intravenous, Q6H  arformoterol, 15 mcg, Nebulization, BID - RT  aspirin, 81 mg, Oral, Daily  atorvastatin, 40 mg, Oral, Daily  budesonide, 0.5 mg, Nebulization, BID - RT  carvedilol, 3.125 mg, Oral, BID With Meals  clopidogrel, 75 mg, Oral, Daily  dexAMETHasone, 8 mg, Intravenous, Daily  FLUoxetine, 40 mg, Oral, BID  [Held by provider] furosemide, 40 mg, Intravenous, Q8H  gabapentin, 100 mg, Oral, TID  heparin (porcine), 5,000 Units, Subcutaneous, Q8H  insulin lispro, 2-7 Units, Subcutaneous, 4x Daily AC & at Bedtime  [Held by provider] losartan, 25 mg, Oral, Q24H  OLANZapine, 2.5 mg, Oral, Daily  pantoprazole, 40 mg, Oral, Q AM  pramipexole, 0.5 mg, Oral,  Nightly  saccharomyces boulardii, 250 mg, Oral, BID  sodium chloride, 10 mL, Intravenous, Q12H        XR Chest 1 View    Result Date: 2/4/2024   Low lung volumes.  There has been some improvement in appearance of pneumonia on the right, with persistent left-sided infiltrates noted       JAYDON MULLIGAN MD       Electronically Signed and Approved By: JAYDON MULLIGAN MD on 2/04/2024 at 11:09             XR Chest 1 View    Result Date: 2/1/2024    Similar appearance of patchy bilateral opacities, right greater than left, which may represent pneumonia.       JONI PATEL MD       Electronically Signed and Approved By: JONI PATEL MD on 2/01/2024 at 8:41             CT Chest Without Contrast Diagnostic    Result Date: 1/31/2024    1. Extensive bilateral ground-glass opacity with smaller foci of consolidation in both lung fields.  Finding is favored to represent pneumonia.  Pulmonary edema and aspiration would also be in the differential.     Tucker Marcial M.D.       Electronically Signed and Approved By: Tucker Marcial M.D. on 1/31/2024 at 14:32             CT Head Without Contrast    Result Date: 1/31/2024    1. Mild to moderate low density in the cerebral white matter could represent small-vessel ischemic disease and or multiple sclerosis. 2. No CT evidence of acute infarction, mass or intracranial hemorrhage. 3. Small right mastoid effusion     Tucker Marcial M.D.       Electronically Signed and Approved By: Tucker Marcial M.D. on 1/31/2024 at 10:55             XR Chest 1 View    Result Date: 1/31/2024    1. Asymmetric perihilar and patchy multifocal opacities which could represent atelectasis, asymmetric edema or pneumonia.       FRANK WHITEHEAD MD       Electronically Signed and Approved By: FRANK WHITEHEAD MD on 1/31/2024 at 10:33              Assessment / Plan     Summary: 64-year-old female with a history of MS immunocompromised, recently been transitioning medications for this, recurrent UTIs, diastolic CHF, CAD  with prior PCI, CKD, who been diagnosed with COVID-19 on 1/22 and treated with outpatient dexamethasone, and completed steroids and had progressively worsening symptoms and presented for further evaluation.  Patient with acute approximate respiratory failure requiring Airvo also with UTI.  Has ARDS/severe covid19 pna appearance, treating with high dose steroids.  Diuresing initially, now with juan carlos most recently.    Assessment/Plan (clinically significant if listed here)  Acute hypoxemic respiratory failure requiring Airvo  Sepsis, present on admission, in setting of gram-negative bacilli UTI and suspected bacterial pneumonia in addition to COVID-19 pneumonia  Acute on Chronic dCHF (previously reduced now with recovered EF of 62%)   CAD with history of PCI to RCA   COVID-19 pneumonia with initial testing positive 1/22  Immunocompromised in setting of multiple sclerosis currently in limbo between treatments (most recently on Vumerity)   Anion gap metabolic acidosis  Transaminitis  History of hypertension with low blood pressure on arrival  CKD possibly 3 baseline creatinine 1.1-1.3  Seizure disorder  GERD  Anxiety/depression  Hyperlipidemia  Morbid obesity  Suspect underlying dementia, intermittent metabolic encephalopthy     Creatinine increased again, suspect from diuresis, holding diuretics again, repeat in am   Baseline Cr variable between 1.1-1.3  Echo 56% grade 1, mild TR  Monitor renal function and potassium closely  Holding home antihypertensive medications (losartan 25), coreg changed to lower dose 3 bid  Continue Airvo respiratory hygiene nebulizers, wean as able still with critical hypoxia  Continue high-dose steroids for elevated inflammatory markers with COVID-19 pneumonia with severe residual symptoms,   Ppi while on steroids   Continue on Unasyn for 7-day treatment for pneumonia and E. coli UTI, urine culture sensitive on review; wbc increased but suspect in part from steroids  2 g sodium diet 1500 cc  restriction  added vaginal estrogen cream given recurrent UTIs, will need this at discharge  Continue PPI; avoid home meloxicam/nsaids given ckd, needs to limit nsaids in general  Unclear of any home seizure medications apart from gabapentin which she seems to have taken for symptoms with her multiple sclerosis, had initial worse mentation, continue on lower dose gabapentin, monitor with renal fxn closely  Continue on aspirin and Plavix, statin  Cont prozac, zyprexa,   Cont pramipexole  Check am cbc, bmp, mg, phos lfts  Continue close ICU monitoring, d/w pulmonology, cont icu monitoring, steroids  Guarded prognosis given continued critical hypoxia and comorbidities.  Family discussing home with pallitus possibly    PT/OT      DVT prophylaxis:  Medical DVT prophylaxis orders are present.        Medical Intervention Limits: NO intubation (DNI); NO cardioversion  Level Of Support Discussed With: Patient  Code Status (Patient has no pulse and is not breathing): No CPR (Do Not Attempt to Resuscitate)  Medical Interventions (Patient has pulse or is breathing): Limited Support  Comments: d/w pt and /family    Patient is critically ill due to AHRF requiring airvo, UTI, CAP/covid19, CHF Ex, juan carlos, debility and additional issues as above .  I have spent 31 minutes of critical care time reviewing documentation, pertinent labs, imaging studies, examining the patient, modifying care plan, and discussing patient's condition and care plan with the patient and family at bedside, nursing and and pulm.      CBC          2/3/2024    02:44 2/4/2024    02:49 2/5/2024    02:46   CBC   WBC 15.60  20.27  18.21    RBC 3.41  3.52  3.21    Hemoglobin 10.7  10.9  9.8    Hematocrit 32.6  33.7  30.6    MCV 95.6  95.7  95.3    MCH 31.4  31.0  30.5    MCHC 32.8  32.3  32.0    RDW 17.8  18.1  17.5    Platelets 486  497  386        CMP          2/3/2024    02:44 2/4/2024    02:49 2/5/2024    02:46   CMP   Glucose 131  140  104    BUN 43  57  64     Creatinine 1.26  1.72  2.10    EGFR 47.5  32.7  25.7    Sodium 138  138  135    Potassium 3.8  4.4  3.5    Chloride 100  99  98    Calcium 9.4  9.6  9.1    Total Protein 8.0  8.3  7.3    Albumin 3.2  3.2  2.9    Globulin 4.8  5.1  4.4    Total Bilirubin 0.5  0.6  0.6    Alkaline Phosphatase 123  128  114    AST (SGOT) 21  19  19    ALT (SGPT) 26  24  19    Albumin/Globulin Ratio 0.7  0.6  0.7    BUN/Creatinine Ratio 34.1  33.1  30.5    Anion Gap 18.8  20.7  18.1

## 2024-02-06 NOTE — PROGRESS NOTES
RT EQUIPMENT DEVICE RELATED - SKIN ASSESSMENT    RT Medical Equipment/Device:     High Flow Nasal Cannula:Airvo    Skin Assessment:      Cheek:  Intact  Nares:  Intact  Nose:  Intact    Device Skin Pressure Protection:  Positioning supports utilized    Nurse Notification:  Lizette Taveras, RRT

## 2024-02-06 NOTE — PROGRESS NOTES
RT EQUIPMENT DEVICE RELATED - SKIN ASSESSMENT    RT Medical Equipment/Device:     High Flow Nasal Cannula:Airvo    Skin Assessment:      Cheek:  Intact  Nares:  Intact  Nose:  Intact    Device Skin Pressure Protection:  Positioning supports utilized    Nurse Notification:  Lizette Cosme, RRT

## 2024-02-07 ENCOUNTER — APPOINTMENT (OUTPATIENT)
Dept: GENERAL RADIOLOGY | Facility: HOSPITAL | Age: 64
End: 2024-02-07
Payer: MEDICARE

## 2024-02-07 LAB
ALBUMIN SERPL-MCNC: 2.9 G/DL (ref 3.5–5.2)
ALBUMIN/GLOB SERPL: 0.7 G/DL
ALP SERPL-CCNC: 131 U/L (ref 39–117)
ALT SERPL W P-5'-P-CCNC: 17 U/L (ref 1–33)
ANION GAP SERPL CALCULATED.3IONS-SCNC: 15.7 MMOL/L (ref 5–15)
AST SERPL-CCNC: 23 U/L (ref 1–32)
BASOPHILS # BLD AUTO: 0.03 10*3/MM3 (ref 0–0.2)
BASOPHILS NFR BLD AUTO: 0.1 % (ref 0–1.5)
BILIRUB SERPL-MCNC: 0.4 MG/DL (ref 0–1.2)
BUN SERPL-MCNC: 55 MG/DL (ref 8–23)
BUN/CREAT SERPL: 37.9 (ref 7–25)
CALCIUM SPEC-SCNC: 9 MG/DL (ref 8.6–10.5)
CHLORIDE SERPL-SCNC: 100 MMOL/L (ref 98–107)
CO2 SERPL-SCNC: 19.3 MMOL/L (ref 22–29)
CREAT SERPL-MCNC: 1.45 MG/DL (ref 0.57–1)
DEPRECATED RDW RBC AUTO: 60.4 FL (ref 37–54)
EGFRCR SERPLBLD CKD-EPI 2021: 40.1 ML/MIN/1.73
EOSINOPHIL # BLD AUTO: 0.01 10*3/MM3 (ref 0–0.4)
EOSINOPHIL NFR BLD AUTO: 0 % (ref 0.3–6.2)
ERYTHROCYTE [DISTWIDTH] IN BLOOD BY AUTOMATED COUNT: 17.2 % (ref 12.3–15.4)
GLOBULIN UR ELPH-MCNC: 4.3 GM/DL
GLUCOSE BLDC GLUCOMTR-MCNC: 104 MG/DL (ref 70–99)
GLUCOSE BLDC GLUCOMTR-MCNC: 153 MG/DL (ref 70–99)
GLUCOSE BLDC GLUCOMTR-MCNC: 190 MG/DL (ref 70–99)
GLUCOSE BLDC GLUCOMTR-MCNC: 236 MG/DL (ref 70–99)
GLUCOSE SERPL-MCNC: 133 MG/DL (ref 65–99)
HCT VFR BLD AUTO: 30.2 % (ref 34–46.6)
HGB BLD-MCNC: 9.7 G/DL (ref 12–15.9)
IMM GRANULOCYTES # BLD AUTO: 0.33 10*3/MM3 (ref 0–0.05)
IMM GRANULOCYTES NFR BLD AUTO: 1.6 % (ref 0–0.5)
LYMPHOCYTES # BLD AUTO: 0.56 10*3/MM3 (ref 0.7–3.1)
LYMPHOCYTES NFR BLD AUTO: 2.7 % (ref 19.6–45.3)
MAGNESIUM SERPL-MCNC: 2.2 MG/DL (ref 1.6–2.4)
MCH RBC QN AUTO: 30.9 PG (ref 26.6–33)
MCHC RBC AUTO-ENTMCNC: 32.1 G/DL (ref 31.5–35.7)
MCV RBC AUTO: 96.2 FL (ref 79–97)
MONOCYTES # BLD AUTO: 0.66 10*3/MM3 (ref 0.1–0.9)
MONOCYTES NFR BLD AUTO: 3.2 % (ref 5–12)
NEUTROPHILS NFR BLD AUTO: 18.79 10*3/MM3 (ref 1.7–7)
NEUTROPHILS NFR BLD AUTO: 92.4 % (ref 42.7–76)
NRBC BLD AUTO-RTO: 0 /100 WBC (ref 0–0.2)
PHOSPHATE SERPL-MCNC: 2.6 MG/DL (ref 2.5–4.5)
PLATELET # BLD AUTO: 349 10*3/MM3 (ref 140–450)
PMV BLD AUTO: 11.9 FL (ref 6–12)
POTASSIUM SERPL-SCNC: 3.8 MMOL/L (ref 3.5–5.2)
PROT SERPL-MCNC: 7.2 G/DL (ref 6–8.5)
RBC # BLD AUTO: 3.14 10*6/MM3 (ref 3.77–5.28)
SODIUM SERPL-SCNC: 135 MMOL/L (ref 136–145)
WBC NRBC COR # BLD AUTO: 20.38 10*3/MM3 (ref 3.4–10.8)

## 2024-02-07 PROCEDURE — 97530 THERAPEUTIC ACTIVITIES: CPT

## 2024-02-07 PROCEDURE — 25010000002 FUROSEMIDE PER 20 MG: Performed by: STUDENT IN AN ORGANIZED HEALTH CARE EDUCATION/TRAINING PROGRAM

## 2024-02-07 PROCEDURE — 94799 UNLISTED PULMONARY SVC/PX: CPT

## 2024-02-07 PROCEDURE — 94761 N-INVAS EAR/PLS OXIMETRY MLT: CPT

## 2024-02-07 PROCEDURE — 84100 ASSAY OF PHOSPHORUS: CPT | Performed by: INTERNAL MEDICINE

## 2024-02-07 PROCEDURE — 80053 COMPREHEN METABOLIC PANEL: CPT | Performed by: INTERNAL MEDICINE

## 2024-02-07 PROCEDURE — 97110 THERAPEUTIC EXERCISES: CPT

## 2024-02-07 PROCEDURE — 63710000001 INSULIN LISPRO (HUMAN) PER 5 UNITS: Performed by: NURSE PRACTITIONER

## 2024-02-07 PROCEDURE — 25010000002 DEXAMETHASONE SODIUM PHOSPHATE 10 MG/ML SOLUTION: Performed by: NURSE PRACTITIONER

## 2024-02-07 PROCEDURE — 99291 CRITICAL CARE FIRST HOUR: CPT | Performed by: STUDENT IN AN ORGANIZED HEALTH CARE EDUCATION/TRAINING PROGRAM

## 2024-02-07 PROCEDURE — 85025 COMPLETE CBC W/AUTO DIFF WBC: CPT | Performed by: INTERNAL MEDICINE

## 2024-02-07 PROCEDURE — 99232 SBSQ HOSP IP/OBS MODERATE 35: CPT | Performed by: INTERNAL MEDICINE

## 2024-02-07 PROCEDURE — 82948 REAGENT STRIP/BLOOD GLUCOSE: CPT | Performed by: NURSE PRACTITIONER

## 2024-02-07 PROCEDURE — 94664 DEMO&/EVAL PT USE INHALER: CPT

## 2024-02-07 PROCEDURE — 71045 X-RAY EXAM CHEST 1 VIEW: CPT

## 2024-02-07 PROCEDURE — 83735 ASSAY OF MAGNESIUM: CPT | Performed by: INTERNAL MEDICINE

## 2024-02-07 PROCEDURE — 25010000002 HEPARIN (PORCINE) PER 1000 UNITS: Performed by: INTERNAL MEDICINE

## 2024-02-07 PROCEDURE — 82948 REAGENT STRIP/BLOOD GLUCOSE: CPT

## 2024-02-07 RX ORDER — DOCUSATE SODIUM 100 MG/1
100 CAPSULE, LIQUID FILLED ORAL 2 TIMES DAILY
Status: DISCONTINUED | OUTPATIENT
Start: 2024-02-07 | End: 2024-02-10

## 2024-02-07 RX ORDER — POTASSIUM CHLORIDE 750 MG/1
40 CAPSULE, EXTENDED RELEASE ORAL ONCE
Status: COMPLETED | OUTPATIENT
Start: 2024-02-07 | End: 2024-02-07

## 2024-02-07 RX ADMIN — GABAPENTIN 100 MG: 100 CAPSULE ORAL at 09:51

## 2024-02-07 RX ADMIN — CARVEDILOL 3.12 MG: 3.12 TABLET, FILM COATED ORAL at 17:36

## 2024-02-07 RX ADMIN — Medication 250 MG: at 09:50

## 2024-02-07 RX ADMIN — OLANZAPINE 2.5 MG: 2.5 TABLET, FILM COATED ORAL at 09:50

## 2024-02-07 RX ADMIN — INSULIN LISPRO 2 UNITS: 100 INJECTION, SOLUTION INTRAVENOUS; SUBCUTANEOUS at 20:03

## 2024-02-07 RX ADMIN — ARFORMOTEROL TARTRATE 15 MCG: 15 SOLUTION RESPIRATORY (INHALATION) at 18:52

## 2024-02-07 RX ADMIN — HEPARIN SODIUM 5000 UNITS: 5000 INJECTION INTRAVENOUS; SUBCUTANEOUS at 14:08

## 2024-02-07 RX ADMIN — POTASSIUM CHLORIDE 40 MEQ: 10 CAPSULE, COATED, EXTENDED RELEASE ORAL at 09:48

## 2024-02-07 RX ADMIN — DOCUSATE SODIUM 100 MG: 100 CAPSULE, LIQUID FILLED ORAL at 14:08

## 2024-02-07 RX ADMIN — BUDESONIDE 0.5 MG: 0.5 SUSPENSION RESPIRATORY (INHALATION) at 06:56

## 2024-02-07 RX ADMIN — FLUOXETINE HYDROCHLORIDE 40 MG: 20 CAPSULE ORAL at 09:50

## 2024-02-07 RX ADMIN — Medication 10 ML: at 20:03

## 2024-02-07 RX ADMIN — Medication 250 MG: at 20:03

## 2024-02-07 RX ADMIN — ATORVASTATIN CALCIUM 40 MG: 40 TABLET, FILM COATED ORAL at 09:50

## 2024-02-07 RX ADMIN — FLUOXETINE HYDROCHLORIDE 40 MG: 20 CAPSULE ORAL at 20:03

## 2024-02-07 RX ADMIN — CLOPIDOGREL BISULFATE 75 MG: 75 TABLET, FILM COATED ORAL at 12:30

## 2024-02-07 RX ADMIN — GABAPENTIN 100 MG: 100 CAPSULE ORAL at 14:08

## 2024-02-07 RX ADMIN — Medication 10 ML: at 09:55

## 2024-02-07 RX ADMIN — HEPARIN SODIUM 5000 UNITS: 5000 INJECTION INTRAVENOUS; SUBCUTANEOUS at 05:16

## 2024-02-07 RX ADMIN — BUDESONIDE 0.5 MG: 0.5 SUSPENSION RESPIRATORY (INHALATION) at 18:52

## 2024-02-07 RX ADMIN — HEPARIN SODIUM 5000 UNITS: 5000 INJECTION INTRAVENOUS; SUBCUTANEOUS at 21:35

## 2024-02-07 RX ADMIN — ASPIRIN 81 MG: 81 TABLET, CHEWABLE ORAL at 09:51

## 2024-02-07 RX ADMIN — FUROSEMIDE 40 MG: 10 INJECTION, SOLUTION INTRAMUSCULAR; INTRAVENOUS at 09:53

## 2024-02-07 RX ADMIN — INSULIN LISPRO 3 UNITS: 100 INJECTION, SOLUTION INTRAVENOUS; SUBCUTANEOUS at 17:35

## 2024-02-07 RX ADMIN — ARFORMOTEROL TARTRATE 15 MCG: 15 SOLUTION RESPIRATORY (INHALATION) at 06:56

## 2024-02-07 RX ADMIN — GABAPENTIN 100 MG: 100 CAPSULE ORAL at 20:03

## 2024-02-07 RX ADMIN — CARVEDILOL 3.12 MG: 3.12 TABLET, FILM COATED ORAL at 09:51

## 2024-02-07 RX ADMIN — PRAMIPEXOLE DIHYDROCHLORIDE 0.5 MG: 0.5 TABLET ORAL at 20:03

## 2024-02-07 RX ADMIN — FAMOTIDINE 20 MG: 20 TABLET ORAL at 09:51

## 2024-02-07 RX ADMIN — DEXAMETHASONE SODIUM PHOSPHATE 8 MG: 10 INJECTION INTRAMUSCULAR; INTRAVENOUS at 09:53

## 2024-02-07 NOTE — PROGRESS NOTES
RT EQUIPMENT DEVICE RELATED - SKIN ASSESSMENT    RT Medical Equipment/Device:     High Flow Nasal Cannula:Airvo    Skin Assessment:      Cheek:  Intact    Device Skin Pressure Protection:  Skin-to-device areas padded:  None Required    Nurse Notification:  Lizette Del Angel, RRT

## 2024-02-07 NOTE — THERAPY TREATMENT NOTE
Acute Care - Physical Therapy Treatment Note   [place]     Patient Name: Falguni Minaya  : 1960  MRN: [id number]  Today's Date: 2024      Visit Dx:     ICD-10-CM ICD-9-CM   1. Altered mental status, unspecified altered mental status type  R41.82 780.97   2. Acute urinary tract infection  N39.0 599.0   3. Oropharyngeal dysphagia  R13.12 787.22   4. Difficulty walking  R26.2 719.7   5. Decreased activities of daily living (ADL)  Z78.9 V49.89     Patient Active Problem List   Diagnosis    Multiple sclerosis    Left sided abdominal pain    Dyspepsia    Heme positive stool    Hypotension    Seizure disorder    Pain in lower back    Headache    Frequent falls    Spasm    Weakness of right leg    S/P drug eluting coronary stent placement    Coronary artery disease involving native coronary artery of native heart    Ischemic cardiomyopathy    Pleural effusion due to CHF (congestive heart failure)    Shortness of breath    Orthopnea    Hyperlipidemia LDL goal <70    Anemia due to stage 3 chronic kidney disease    Iron deficiency    Anemia in chronic kidney disease    Arthropathy of lumbar facet joint    Marie's esophagus    Benign colonic polyp    Congestive heart failure    Degeneration of lumbar intervertebral disc    Gastroesophageal reflux disease    Gastroparesis    Lumbar radiculopathy    Osteoporosis    Sacroiliac joint pain    Serum creatinine raised    Vitamin D deficiency    Acute hypoxemic respiratory failure     Past Medical History:   Diagnosis Date    Abdominal hernia     Acute ST elevation myocardial infarction (STEMI) due to occlusion of right coronary artery 2020    CHF (congestive heart failure)     DENIES CP GETS SOA WITH EXERTION. FOLLOWED BY DR ERIC/KASIA GREY. DECREASED ACTIVITY USES WALKER/ROLLATOR    CKD (chronic kidney disease)     Coronary artery disease     Dyspepsia     GERD (gastroesophageal reflux disease)     Hyperlipidemia     Hypertension     Ischemic cardiomyopathy      Multiple sclerosis      Past Surgical History:   Procedure Laterality Date    APPENDECTOMY      CARDIAC CATHETERIZATION      CARDIAC CATHETERIZATION N/A 2/24/2020    Procedure: Left Heart Cath;  Surgeon: Marlon Zamudio MD;  Location: [facility] CATH INVASIVE LOCATION;  Service: Cardiovascular;  Laterality: N/A;    CARDIAC CATHETERIZATION N/A 2/24/2020    Procedure: Stent LATRICIA coronary;  Surgeon: Maroln Zamudio MD;  Location: [facility] CATH INVASIVE LOCATION;  Service: Cardiovascular;  Laterality: N/A;    CARDIAC CATHETERIZATION  2/24/2020    Procedure: Percutaneous Manual Thrombectomy;  Surgeon: Marlon Zamudio MD;  Location: [facility] CATH INVASIVE LOCATION;  Service: Cardiovascular;;    CARDIAC CATHETERIZATION N/A 2/24/2020    Procedure: Coronary angiography;  Surgeon: Marlon Zamudio MD;  Location: [facility] CATH INVASIVE LOCATION;  Service: Cardiovascular;  Laterality: N/A;    CARDIAC CATHETERIZATION N/A 2/24/2020    Procedure: Left ventriculography;  Surgeon: Marlon Zamudio MD;  Location: [facility] CATH INVASIVE LOCATION;  Service: Cardiovascular;  Laterality: N/A;    CHOLECYSTECTOMY      COLONOSCOPY N/A 2/29/2020    Procedure: COLONOSCOPY to  cecum:;  Surgeon: Krystal Sarabia MD;  Location: [facility] ENDOSCOPY;  Service: Gastroenterology;  Laterality: N/A;  pre:  anemia and abd pain  post:  hemorrhoids, tortuous colon    ENDOSCOPY N/A 2/29/2020    Procedure: ESOPHAGOGASTRODUODENOSCOPY  with biopsies;  Surgeon: Krystal Sarabia MD;  Location: [facility] ENDOSCOPY;  Service: Gastroenterology;  Laterality: N/A;  pre:  anemia and abd pain  post:  mild gastritis,     HYSTERECTOMY      PARATHYROIDECTOMY Bilateral 10/17/2023    Procedure: NECK EXPLORATION WITH PARATHYROID ADENOMA EXCISION AND FROZEN SECTION,, RECURRENT LARYNGEAL NERVE MONITORING, INTRAOPERATIVE INTACT PTH ASSAY;  Surgeon: Ezekiel Giles MD;  Location: [facility] OR;  Service: ENT;  Laterality: Bilateral;    TONSILLECTOMY       PT Assessment  (last 12 hours)       PT Evaluation and Treatment       Row Name 02/07/24 1500          Physical Therapy Time and Intention    Document Type therapy note (daily note) (P)   -WB     Mode of Treatment individual therapy;physical therapy (P)   -WB     Patient Effort good (P)   -WB     Symptoms Noted During/After Treatment fatigue;shortness of breath (P)   -WB       Row Name 02/07/24 1500          General Information    Patient Profile Reviewed yes (P)   -WB     Patient Observations alert;cooperative;agree to therapy (P)   -WB     Comment, General Information Pt on blood thinner and bled through gown twice from stomach injection, needed gown change x2. nsg aware. (P)   -WB       Row Name 02/07/24 1500          Bed Mobility    Bed Mobility bed mobility (all) activities (P)   -WB     All Activities, [place] (Bed Mobility) other (see comments) (P)   Pt up in chair  -WB       Row Name 02/07/24 1500          Transfers    Transfers sit-stand transfer;stand-sit transfer (P)   -WB       Row Name 02/07/24 1500          Sit-Stand Transfer    Sit-Stand [place] (Transfers) minimum assist (75% patient effort) (P)   -WB     Assistive Device (Sit-Stand Transfers) walker, front-wheeled (P)   -WB       Row Name 02/07/24 1500          Stand-Sit Transfer    Stand-Sit [place] (Transfers) minimum assist (75% patient effort) (P)   -WB     Assistive Device (Stand-Sit Transfers) walker, front-wheeled (P)   -WB       Row Name 02/07/24 1500          Gait/Stairs (Locomotion)    Gait/Stairs Locomotion gait/ambulation assistive device (P)   -WB     [place] Level (Gait) not tested (P)   Pt desatted to 80% on STS x3, nsg aware.  -WB       Row Name 02/07/24 1500          Balance    Balance Assessment standing static balance (P)   -WB     Static Standing Balance contact guard (P)   -WB     Position/Device Used, Standing Balance walker, front-wheeled (P)   -WB       Row Name 02/07/24 1500          Hip (Therapeutic Exercise)    Hip  (Therapeutic Exercise) AROM (active range of motion) (P)   -WB     Hip AROM (Therapeutic Exercise) bilateral;other (see comments) (P)   seated marching 1x10  -WB       Row Name 02/07/24 1500          Knee (Therapeutic Exercise)    Knee (Therapeutic Exercise) AROM (active range of motion) (P)   -WB     Knee AROM (Therapeutic Exercise) bilateral;other (see comments) (P)   LAQ, quad set 1x15  -WB       Row Name 02/07/24 1500          Ankle (Therapeutic Exercise)    Ankle (Therapeutic Exercise) AROM (active range of motion) (P)   -WB     Ankle AROM (Therapeutic Exercise) bilateral;dorsiflexion;plantarflexion;20 repititions (P)   -WB       Row Name 02/07/24 1500          Plan of Care Review    Plan of Care Reviewed With patient;spouse (P)   -WB     Progress improving (P)   -WB       Row Name 02/07/24 1500          Vital Signs    Pre SpO2 (%) 90 (P)   -WB     O2 Delivery Pre Treatment hi-flow (P)   -WB     Intra SpO2 (%) 80 (P)   -WB     O2 Delivery Intra Treatment hi-flow (P)   -WB     Post SpO2 (%) 90 (P)   -WB     O2 Delivery Post Treatment hi-flow (P)   -WB     Pre Patient Position Sitting (P)   -WB     Intra Patient Position Standing (P)   -WB     Post Patient Position Sitting (P)   -WB       Row Name 02/07/24 1500          Positioning and Restraints    Pre-Treatment Position sitting in chair/recliner (P)   -WB     Post Treatment Position chair (P)   -WB     In Chair reclined;sitting;call light within reach;encouraged to call for assist;with family/caregiver;notified nsg (P)   -WB       Row Name 02/07/24 1500          Therapy Assessment/Plan (PT)    Rehab Potential (PT) fair, will monitor progress closely (P)   -WB       Row Name 02/07/24 1500          Progress Summary (PT)    Progress Toward Functional Goals (PT) progress toward functional goals is fair (P)   -WB     Daily Progress Summary (PT) Pt demonstrates impaired balance, strength, gait, and SOA and remains below functional baseline. Skilled PT is warranted to  address functional deficits. (P)   -WB       Row Name 02/07/24 1500          Therapy Plan Review/Discharge Plan (PT)    Therapy Plan Review (PT) care plan/treatment goals reviewed (P)   -WB               User Key  (r) = Recorded By, (t) = Taken By, (c) = Cosigned By      Initials Name Provider Type    WB Isreal Frye, PT Student PT Student                    Physical Therapy Education       Title: PT OT SLP Therapies (Done)       Topic: Physical Therapy (Done)       Point: Mobility training (Done)       Learning Progress Summary             Patient Acceptance, E, VU by SR at 2/2/2024 1632    Acceptance, E,TB, VU by WB at 2/1/2024 1132   Family Acceptance, E, VU by SR at 2/2/2024 1632                         Point: Body mechanics (Done)       Learning Progress Summary             Patient Acceptance, E, VU by SR at 2/2/2024 1632    Acceptance, E,TB, VU by WB at 2/1/2024 1132   Family Acceptance, E, VU by SR at 2/2/2024 1632                         Point: Precautions (Done)       Learning Progress Summary             Patient Acceptance, E, VU by SR at 2/2/2024 1632    Acceptance, E,TB, VU by WB at 2/1/2024 1132   Family Acceptance, E, VU by SR at 2/2/2024 1632                                         User Key       Initials Effective Dates Name Provider Type Discipline    SR 06/16/21 -  Ally Costa, RN Registered Nurse Nurse     01/09/24 -  Isreal Frye, PT Student PT Student PT                  PT Recommendation and Plan  Anticipated Discharge Disposition (PT): sub acute care setting  Planned Therapy Interventions (PT): balance training, bed mobility training, gait training, neuromuscular re-education, stair training, strengthening, stretching, transfer training  Therapy Frequency (PT): daily  Progress Summary (PT)  Progress Toward Functional Goals (PT): (P) progress toward functional goals is fair  Daily Progress Summary (PT): (P) Pt demonstrates impaired balance, strength, gait, and SOA and remains below  functional baseline. Skilled PT is warranted to address functional deficits.  Plan of Care Reviewed With: (P) patient, spouse  Progress: (P) improving  Outcome Evaluation: Pt demonstrated impaired balance, strength, and gait and presents below functional deficits. Skilled PT is warranted to address functional deficits.   Outcome Measures       Row Name 02/07/24 1500 02/05/24 1413          How much help from another person do you currently need...    Turning from your back to your side while in flat bed without using bedrails? 4 (P)   -WB 3  -DK     Moving from lying on back to sitting on the side of a flat bed without bedrails? 3 (P)   -WB 3  -DK     Moving to and from a bed to a chair (including a wheelchair)? 3 (P)   -WB 2  -DK     Standing up from a chair using your arms (e.g., wheelchair, bedside chair)? 3 (P)   -WB 2  -DK     Climbing 3-5 steps with a railing? 2 (P)   -WB 1  -DK     To walk in hospital room? 2 (P)   -WB 1  -DK     AM-PAC 6 Clicks Score (PT) 17 (P)   -WB 12  -DK     Highest Level of Mobility Goal 5 --> Static standing (P)   -WB 4 --> Transfer to chair/commode  -DK        Functional Assessment    Outcome Measure Options AM-PAC 6 Clicks Basic Mobility (PT) (P)   -WB AM-PAC 6 Clicks Basic Mobility (PT)  -DK               User Key  (r) = Recorded By, (t) = Taken By, (c) = Cosigned By      Initials Name Provider Type    Theresa Gonzales, PTA Physical Therapist Assistant    Isreal Ventura, PT Student PT Student                     Time Calculation:    PT Charges       Row Name 02/07/24 1514             Time Calculation    PT Received On 02/07/24 (P)   -WB         Timed Charges    45481 - PT Therapeutic Exercise Minutes 15 (P)   -WB      15931 - PT Therapeutic Activity Minutes 23 (P)   -WB         Total Minutes    Timed Charges Total Minutes 38 (P)   -WB       Total Minutes 38 (P)   -WB                User Key  (r) = Recorded By, (t) = Taken By, (c) = Cosigned By      Initials Name Provider Type     WB Isreal Frye, PT Student PT Student                  Therapy Charges for Today       Code Description Service Date Service Provider Modifiers Qty    87934173029 HC PT THER PROC EA 15 MIN 2/7/2024 Isreal Frye, PT Student GP 1    21223723369 HC PT THERAPEUTIC ACT EA 15 MIN 2/7/2024 Isreal Frye, PT Student GP 2            PT G-Codes  Outcome Measure Options: (P) AM-PAC 6 Clicks Basic Mobility (PT)  AM-PAC 6 Clicks Score (PT): (P) 17  AM-PAC 6 Clicks Score (OT): 16    Isreal Frye, PT Student  2/7/2024

## 2024-02-07 NOTE — THERAPY TREATMENT NOTE
Patient Name: Falguni Minaya  : 1960    MRN: [id number]                              Today's Date: 2024       Admit Date: 2024    Visit Dx:     ICD-10-CM ICD-9-CM   1. Altered mental status, unspecified altered mental status type  R41.82 780.97   2. Acute urinary tract infection  N39.0 599.0   3. Oropharyngeal dysphagia  R13.12 787.22   4. Difficulty walking  R26.2 719.7   5. Decreased activities of daily living (ADL)  Z78.9 V49.89     Patient Active Problem List   Diagnosis    Multiple sclerosis    Left sided abdominal pain    Dyspepsia    Heme positive stool    Hypotension    Seizure disorder    Pain in lower back    Headache    Frequent falls    Spasm    Weakness of right leg    S/P drug eluting coronary stent placement    Coronary artery disease involving native coronary artery of native heart    Ischemic cardiomyopathy    Pleural effusion due to CHF (congestive heart failure)    Shortness of breath    Orthopnea    Hyperlipidemia LDL goal <70    Anemia due to stage 3 chronic kidney disease    Iron deficiency    Anemia in chronic kidney disease    Arthropathy of lumbar facet joint    Marie's esophagus    Benign colonic polyp    Congestive heart failure    Degeneration of lumbar intervertebral disc    Gastroesophageal reflux disease    Gastroparesis    Lumbar radiculopathy    Osteoporosis    Sacroiliac joint pain    Serum creatinine raised    Vitamin D deficiency    Acute hypoxemic respiratory failure     Past Medical History:   Diagnosis Date    Abdominal hernia     Acute ST elevation myocardial infarction (STEMI) due to occlusion of right coronary artery 2020    CHF (congestive heart failure)     DENIES CP GETS SOA WITH EXERTION. FOLLOWED BY DR ERIC/KASIA GREY. DECREASED ACTIVITY USES WALKER/ROLLATOR    CKD (chronic kidney disease)     Coronary artery disease     Dyspepsia     GERD (gastroesophageal reflux disease)     Hyperlipidemia     Hypertension     Ischemic cardiomyopathy      Multiple sclerosis      Past Surgical History:   Procedure Laterality Date    APPENDECTOMY      CARDIAC CATHETERIZATION      CARDIAC CATHETERIZATION N/A 2/24/2020    Procedure: Left Heart Cath;  Surgeon: Marlon Zamudio MD;  Location: [facility] CATH INVASIVE LOCATION;  Service: Cardiovascular;  Laterality: N/A;    CARDIAC CATHETERIZATION N/A 2/24/2020    Procedure: Stent LATRICIA coronary;  Surgeon: Marlon Zamudio MD;  Location: [facility] CATH INVASIVE LOCATION;  Service: Cardiovascular;  Laterality: N/A;    CARDIAC CATHETERIZATION  2/24/2020    Procedure: Percutaneous Manual Thrombectomy;  Surgeon: Marlon Zamudio MD;  Location: [facility] CATH INVASIVE LOCATION;  Service: Cardiovascular;;    CARDIAC CATHETERIZATION N/A 2/24/2020    Procedure: Coronary angiography;  Surgeon: Marlon Zamudio MD;  Location: [facility] CATH INVASIVE LOCATION;  Service: Cardiovascular;  Laterality: N/A;    CARDIAC CATHETERIZATION N/A 2/24/2020    Procedure: Left ventriculography;  Surgeon: Marlon Zamudio MD;  Location: [facility] CATH INVASIVE LOCATION;  Service: Cardiovascular;  Laterality: N/A;    CHOLECYSTECTOMY      COLONOSCOPY N/A 2/29/2020    Procedure: COLONOSCOPY to  cecum:;  Surgeon: Krystal Sarabia MD;  Location: [facility] ENDOSCOPY;  Service: Gastroenterology;  Laterality: N/A;  pre:  anemia and abd pain  post:  hemorrhoids, tortuous colon    ENDOSCOPY N/A 2/29/2020    Procedure: ESOPHAGOGASTRODUODENOSCOPY  with biopsies;  Surgeon: Krystal Sarabia MD;  Location: [facility] ENDOSCOPY;  Service: Gastroenterology;  Laterality: N/A;  pre:  anemia and abd pain  post:  mild gastritis,     HYSTERECTOMY      PARATHYROIDECTOMY Bilateral 10/17/2023    Procedure: NECK EXPLORATION WITH PARATHYROID ADENOMA EXCISION AND FROZEN SECTION,, RECURRENT LARYNGEAL NERVE MONITORING, INTRAOPERATIVE INTACT PTH ASSAY;  Surgeon: Ezekiel Giles MD;  Location: [facility] OR;  Service: ENT;  Laterality: Bilateral;    TONSILLECTOMY        General  Information       Row Name 02/07/24 1533          OT Time and Intention    Document Type therapy note (daily note)  -     Mode of Treatment individual therapy;occupational therapy  -               User Key  (r) = Recorded By, (t) = Taken By, (c) = Cosigned By      Initials Name Provider Type     Rachael Jiménez OT Occupational Therapist                     Mobility/ADL's       Row Name 02/07/24 1533          Bed Mobility    Comment, (Bed Mobility) Patient upright and seated in recliner upon OT's arrival.  -[facility] Name 02/07/24 1533          Transfers    Transfers sit-stand transfer;stand-sit transfer  -     Comment, (Transfers) Sit to stand completed x3 reps with O2 sat ranging 82-88% on Airvo. Mod cues throughout for deep breathing and PLB techniques.  -[facility] Name 02/07/24 1533          Sit-Stand Transfer    Sit-Stand [place] (Transfers) contact guard;minimum assist (75% patient effort)  -     Assistive Device (Sit-Stand Transfers) walker, front-wheeled  -[facility] Name 02/07/24 1533          Stand-Sit Transfer    Stand-Sit [place] (Transfers) minimum assist (75% patient effort);contact guard  -     Assistive Device (Stand-Sit Transfers) walker, front-wheeled  -               User Key  (r) = Recorded By, (t) = Taken By, (c) = Cosigned By      Initials Name Provider Type     Rachael Jiménez OT Occupational Therapist                   Obj/Interventions       [facility] Name 02/07/24 1534          Motor Skills    Functional Endurance Fair-, O2 sat ranged 82-88% during functional transfer training. Mod cues throughout on deep breathing and PLB techniques d/t pt performing shallow mouth breathing.  -       Row Name 02/07/24 1534          Balance    Balance Assessment sitting dynamic balance;standing static balance  -     Dynamic Sitting Balance supervision  -     Position, Sitting Balance unsupported;sitting in chair  -LF     Static Standing Balance contact guard;minimal  assist  -LF     Position/Device Used, Standing Balance supported;walker, front-wheeled  -LF     Balance Interventions sitting;standing;sit to stand;supported;static;dynamic;occupation based/functional task  -LF               User Key  (r) = Recorded By, (t) = Taken By, (c) = Cosigned By      Initials Name Provider Type    Rachael Anderson OT Occupational Therapist                   Goals/Plan    No documentation.                  Clinical Impression       Row Name 02/07/24 1536          Pain Assessment    Additional Documentation Pain Scale: FACES Pre/Post-Treatment (Group)  -LF       Row Name 02/07/24 1536          Pain Scale: FACES Pre/Post-Treatment    Pain: FACES Scale, Pretreatment 0-->no hurt  -LF     Posttreatment Pain Rating 0-->no hurt  -LF       Row Name 02/07/24 1536          Plan of Care Review    Plan of Care Reviewed With patient;spouse  -LF     Progress improving  -     Outcome Evaluation Patient now completing sit to stand with CGA/min assist using RW. She often performs mouth breathing with shallow breaths, mod cues throughout for deep breathing and PLB techniques. She would benefit from continued OT services to maximize independence.  -LF       Row Name 02/07/24 1536          Vital Signs    O2 Delivery Pre Treatment other (see comments)  AirVo  -LF     O2 Delivery Intra Treatment other (see comments)  AirVo  -LF     O2 Delivery Post Treatment other (see comments)  AirVo  -LF       Row Name 02/07/24 1536          Positioning and Restraints    Pre-Treatment Position sitting in chair/recliner  -LF     Post Treatment Position chair  -LF     In Chair reclined;call light within reach;encouraged to call for assist;with family/caregiver  -               User Key  (r) = Recorded By, (t) = Taken By, (c) = Cosigned By      Initials Name Provider Type    Rachael Anderson OT Occupational Therapist                   Outcome Measures       Row Name 02/07/24 1539          How much help from another is  currently needed...    Putting on and taking off regular lower body clothing? 2  -LF     Bathing (including washing, rinsing, and drying) 2  -LF     Toileting (which includes using toilet bed pan or urinal) 2  -LF     Putting on and taking off regular upper body clothing 3  -LF     Taking care of personal grooming (such as brushing teeth) 3  -LF     Eating meals 4  -LF     AM-PAC 6 Clicks Score (OT) 16  -LF       Row Name 02/07/24 1500 02/07/24 0800       How much help from another person do you currently need...    Turning from your back to your side while in flat bed without using bedrails? 4  -JORGE (r) WB (t) JORGE (c) 4  -KP    Moving from lying on back to sitting on the side of a flat bed without bedrails? 3  -JORGE (r) WB (t) JORGE (c) 3  -KP    Moving to and from a bed to a chair (including a wheelchair)? 3  -JORGE (r) WB (t) JORGE (c) 3  -KP    Standing up from a chair using your arms (e.g., wheelchair, bedside chair)? 3  -JORGE (r) WB (t) JORGE (c) 3  -KP    Climbing 3-5 steps with a railing? 2  -JORGE (r) WB (t) JORGE (c) 2  -KP    To walk in hospital room? 2  -JORGE (r) WB (t) JORGE (c) 2  -KP    AM-PAC 6 Clicks Score (PT) 17  -JORGE (r) WB (t) 17  -KP    Highest Level of Mobility Goal 5 --> Static standing  -JORGE (r) WB (t) 5 --> Static standing  -KP      Row Name 02/07/24 1539 02/07/24 1500       Functional Assessment    Outcome Measure Options AM-PAC 6 Clicks Daily Activity (OT);Optimal Instrument  -LF AM-PAC 6 Clicks Basic Mobility (PT)  -JORGE (r) WB (t) JORGE (c)      Row Name 02/07/24 1539          Optimal Instrument    Optimal Instrument Optimal - 3  -LF     Bending/Stooping 4  -LF     Standing 2  -LF     Reaching 1  -LF     From the list, choose the 3 activities you would most like to be able to do without any difficulty Bending/stooping;Standing;Reaching  -LF     Total Score Optimal - 3 7  -LF               User Key  (r) = Recorded By, (t) = Taken By, (c) = Cosigned By      Initials Name Provider Type    Leia Zaragoza RN Registered  Nurse    LF Rachael Jiménez OT Occupational Therapist    Elmo Day, PT Physical Therapist    WB Isreal Frye, PT Student PT Student                      OT Recommendation and Plan     Plan of Care Review  Plan of Care Reviewed With: patient, spouse  Progress: improving  Outcome Evaluation: Patient now completing sit to stand with CGA/min assist using RW. She often performs mouth breathing with shallow breaths, mod cues throughout for deep breathing and PLB techniques. She would benefit from continued OT services to maximize independence.     Time Calculation:         Time Calculation- OT       Row Name 02/07/24 1539             Time Calculation- OT    OT Received On 02/07/24  -LF      OT Goal Re-Cert Due Date 02/10/24  -LF         Timed Charges    31340 - OT Therapeutic Activity Minutes 10  -LF         Total Minutes    Timed Charges Total Minutes 10  -LF       Total Minutes 10  -LF                User Key  (r) = Recorded By, (t) = Taken By, (c) = Cosigned By      Initials Name Provider Type     Rachael Jiménez OT Occupational Therapist                  Therapy Charges for Today       Code Description Service Date Service Provider Modifiers Qty    82267213319  OT THERAPEUTIC ACT EA 15 MIN 2/7/2024 Rachael Jiménez OT GO 1                 Rachael Jiménez OT  2/7/2024

## 2024-02-07 NOTE — PROGRESS NOTES
RT EQUIPMENT DEVICE RELATED - SKIN ASSESSMENT    RT Medical Equipment/Device:     High Flow Nasal Cannula:Airvo    Skin Assessment:      Cheek:  Intact  Ears:  Intact  Nares:  Intact  Nose:  Intact  Lips:  Intact    Device Skin Pressure Protection:  Skin-to-device areas padded:  None Required    Nurse Notification:  Lizette Moser, RRT

## 2024-02-07 NOTE — PROGRESS NOTES
Pulmonary / Critical Care Progress Note      Patient Name: Falguni Minaya  : 1960  MRN: [id number]  Attending:  Gus Wesley, *   Date of admission: 2024    Subjective   Subjective   Patient critically ill with hypoxemic respiratory failure    No acute events overnight  Doing well this morning  Remains on Airvo 50 L / 66% FiO2  Alert and warranted  1200 cc urine output over the last 24 hours  Decreased appetite this morning  Renal function mildly improved    Objective   Objective     Vitals:   Vital signs for last 24 hours:  Temp:  [98 °F (36.7 °C)-99 °F (37.2 °C)] 98.5 °F (36.9 °C)  Heart Rate:  [] 94  Resp:  [19-30] 30  BP: ()/(60-93) 117/80    Intake/Output last 3 shifts:  I/O last 3 completed shifts:  In: 483 [I.V.:283; IV Piggyback:200]  Out:  [Urine:]  Intake/Output this shift:  No intake/output data recorded.    Vent settings for last 24 hours:       Hemodynamic parameters for last 24 hours:       Physical Exam   Vital Signs Reviewed   General:  Alert, NAD.  Chronically ill-appearing female, lying in bed  HEENT:  PERRL, EOMI.    Neck:  No JVD, no thyromegaly  Lymph: no axillary, cervical, supraclavicular lymphadenopathy noted bilaterally  Chest: Diminished to auscultation bilaterally, no work of breathing noted on Airvo  CV: RRR, no M/G/R, pulses 2+  Abd:  Soft, NT, ND, +BS  EXT:  no clubbing, no cyanosis, lower extremity edema noted  Neuro:  A&Ox3, CN grossly intact, no focal deficits.  Skin: No rashes or lesions noted    Result Review    Result Review:  I have personally reviewed the results from the time of this admission to 2024 14:00 EST and agree with these findings:  [x]  Laboratory  [x]  Microbiology  [x]  Radiology  [x]  EKG/Telemetry   [x]  Cardiology/Vascular   []  Pathology  []  Old records  []  Other:  Most notable findings include:       Lab 24  0248 24  0253 24  0252 24  0246 24  0249 24  0244 24  0246  02/01/24  0313   WBC 20.38*  --  19.51* 18.21* 20.27* 15.60* 14.91* 13.73*   HEMOGLOBIN 9.7*  --  9.4* 9.8* 10.9* 10.7* 10.0* 9.8*   HEMATOCRIT 30.2*  --  29.1* 30.6* 33.7* 32.6* 31.4* 30.7*   PLATELETS 349  --  348 386 497* 486* 449 435   SODIUM 135* 133*  --  135* 138 138 138 138   POTASSIUM 3.8 3.6  --  3.5 4.4 3.8 3.7 3.7   CHLORIDE 100 97*  --  98 99 100 103 104   CO2 19.3* 18.6*  --  18.9* 18.3* 19.2* 18.4* 18.1*   BUN 55* 58*  --  64* 57* 43* 36* 30*   CREATININE 1.45* 1.56*  --  2.10* 1.72* 1.26* 1.05* 0.99   GLUCOSE 133* 103*  --  104* 140* 131* 122* 121*   CALCIUM 9.0 9.0  --  9.1 9.6 9.4 8.8 8.6   PHOSPHORUS 2.6 4.0  --  4.2 4.5 3.7 3.0 4.2   TOTAL PROTEIN 7.2 7.2  --  7.3 8.3 8.0 7.3 7.4   ALBUMIN 2.9* 2.7*  --  2.9* 3.2* 3.2* 2.9* 2.7*   GLOBULIN 4.3 4.5  --  4.4 5.1 4.8 4.4 4.7     CT Chest Without Contrast Diagnostic    Result Date: 1/31/2024  PROCEDURE: CT CHEST WO CONTRAST DIAGNOSTIC  COMPARISON: [facility], CR, XR CHEST 1 VW, 12/24/2023, 12:17.  [facility], CR, XR CHEST 1 VW, 1/31/2024, 10:31.  [facility], CT, CT ABDOMEN PELVIS W CONTRAST, 10/11/2023, 11:39.  INDICATIONS: Opacities, sob  TECHNIQUE: CT images were created without the administration of contrast material.   PROTOCOL:   Standard imaging protocol performed    RADIATION:   DLP: 481.3mGy*cm   Automated exposure control was utilized to minimize radiation dose.  FINDINGS:  No pleural or pericardial effusion is seen.  Calcified subcarinal lymph nodes are consistent with previous granulomatous disease.  No noncalcified adenopathy is seen.  Heart size is normal.  There is extensive ground-glass opacity throughout the lung fields bilaterally.  More focal areas of consolidation are also noted in both lung fields.  No pneumothorax is seen.  No significant bronchiectasis is present.  No endobronchial lesion is seen.  A cholecystectomy has been performed.  The upper abdomen appears unremarkable.  Moderate  mid thoracic kyphosis is noted.      Impression:   1. Extensive bilateral ground-glass opacity with smaller foci of consolidation in both lung fields.  Finding is favored to represent pneumonia.  Pulmonary edema and aspiration would also be in the differential.     Tucker Marcial M.D.       Electronically Signed and Approved By: Tucker Marcial M.D. on 1/31/2024 at 14:32              Results for orders placed during the hospital encounter of 01/31/24    Adult Transthoracic Echo Complete W/ Cont if Necessary Per Protocol    Interpretation Summary    Very technically difficult study with limited views.    Left ventricular ejection fraction appears to be 56 - 60%.  No obvious regional wall motion abnormalities.    Left ventricular diastolic function is consistent with (grade I) impaired relaxation and age.    Mild tricuspid regurgitation with estimated right ventricular systolic pressure from tricuspid regurgitation is mildly elevated (35-45 mmHg).    Valves are not well-visualized but no significant valvular disease by Doppler.    Urine culture with E. Coli    Chest x-ray this morning with low lung volumes but clearing lung infiltrates      Assessment & Plan   Assessment / Plan     Active Hospital Problems:  Active Hospital Problems    Diagnosis     **Acute hypoxemic respiratory failure      Impression:  Acute hypoxic respiratory failure requiring Airvo  Severe sepsis present on admission  COVID-pneumonia +1/22  Community-acquired pneumonia from unspecified organism  E. coli urinary tract infection  Multiple sclerosis (patient has not been on immunosuppression for at least 3 to 4 months per family)  ?Immunosuppressed status on treatment for MS  Acute decompensated diastolic congestive heart failure  Atelectasis, bibasilar  Acute kidney injury secondary to prerenal etiology   Coronary artery disease  Lactic acidosis, clinically significant     Plan:  Remains on Airvo 50L / 66% FiO2.  Respiratory status remains tenuous.  Wean O2 to keep SPO2 greater than 90%  Will repeat chest x-ray today  Continue with aggressive airway clearance regimen  Continue to encourage patient out of bed and into chair and encourage incentive spirometer use  Continue nebulizers and bronchopulmonary hygiene  Creatinine improved today.  Continue Lasix 40 mg IV daily   Trend renal panel electrolytes.   Echocardiogram with diastolic dysfunction  Continue Unasyn for pneumonia and E. coli UTI x7 days total  Continue high-dose Decadron x 10 days given elevated inflammatory markers with significant COVID-19 pneumonia  CRP remains elevated, trending down  Continue sliding scale insulin to keep glucose 140-180 in the ICU  Appreciate palliative care assistance.  Patient is DNR/DNI.  Patient's family would like to get patient home with [facility] if possible  Continue PT/OT as tolerated  DVT prophylaxis with heparin  GI prophylaxis with H2 blocker    DVT prophylaxis:  Medical DVT prophylaxis orders are present.    CODE STATUS:   Medical Intervention Limits: NO intubation (DNI); NO cardioversion  Level Of Support Discussed With: Patient  Code Status (Patient has no pulse and is not breathing): No CPR (Do Not Attempt to Resuscitate)  Medical Interventions (Patient has pulse or is breathing): Limited Support  Comments: d/w pt and /family    Patient is critically ill with acute hypoxic respiratory failure on Airvo secondary to COVID-19 pneumonia, MACY, urinary tract infection. 32 minutes of critical care time was spent managing this patient, excluding procedures.  This included personally reviewing all pertinent labs, imaging, microbiology and documentation. Also discussing the case with the patient and any available family, the admitting physician and any available ancillary staff.   Electronically signed by Audelia Duarte MD, 02/07/24, 2:01 PM EST.

## 2024-02-07 NOTE — PLAN OF CARE
Goal Outcome Evaluation:                   PATIENT REMAINS ON AIRVO. UP TO CHIR MOST OF BAY. DESATS WITH ACTIVITY BUT RECOVERS WELL. HER APPETITE REMAINS DECREASED. FAMILY WAS ABLE TO GET HER TO EAT A FEW BITES OF SOLID FOOD. SHE DOES DRINK HER SUPPLEMENTS AT MEALS.

## 2024-02-07 NOTE — CONSULTS
"Nutrition Services    Patient Name: Falguni Minaya  YOB: 1960  MRN: [id number]  Admission date: 1/31/2024      CLINICAL NUTRITION ASSESSMENT      Reason for Assessment  LOS   H&P:  Past Medical History:   Diagnosis Date    Abdominal hernia     Acute ST elevation myocardial infarction (STEMI) due to occlusion of right coronary artery 02/24/2020    CHF (congestive heart failure)     DENIES CP GETS SOA WITH EXERTION. FOLLOWED BY DR ERIC/KASIA GREY. DECREASED ACTIVITY USES WALKER/ROLLATOR    CKD (chronic kidney disease)     Coronary artery disease     Dyspepsia     GERD (gastroesophageal reflux disease)     Hyperlipidemia     Hypertension     Ischemic cardiomyopathy     Multiple sclerosis         Current Problems:   Active Hospital Problems    Diagnosis     **Acute hypoxemic respiratory failure         Nutrition/Diet History         Narrative   Patient presented to ED for evaluation of shortness of breath and AMS. Recently dx with COVID/COVID PNA. Admitted to ICU with respiratory failure requiring Airvo 50 L. Primary nurse reports patient doesn't have much of an appetite but is drinking her supplements. Constipation noted today. Scheduled bowel regimen ordered. RD will continue with current nutrition intervention. Anticipate appetite to return as patient gets over COVID PNA.      Anthropometrics        Current Height, Weight Height: 152.4 cm (60\")  Weight: 80.5 kg (177 lb 7.5 oz)   Current BMI Body mass index is 34.66 kg/m².   BMI Classification Obese Class I   % %   Adjusted Body Weight (ABW) 61 kg   Weight Hx  Wt Readings from Last 30 Encounters:   02/07/24 0500 80.5 kg (177 lb 7.5 oz)   02/05/24 0500 82.5 kg (181 lb 13 oz)   01/31/24 1011 85 kg (187 lb 6.3 oz)   01/22/24 1328 84.4 kg (186 lb)   12/24/23 1211 92.4 kg (203 lb 11.2 oz)   10/27/23 1453 96.3 kg (212 lb 6.4 oz)   10/17/23 0725 95.5 kg (210 lb 8.6 oz)   10/11/23 1015 95.6 kg (210 lb 12.2 oz)   10/02/23 1604 97.5 kg (215 lb)   09/21/23 " 1248 98.4 kg (217 lb)   04/10/23 1211 93.9 kg (207 lb)   01/19/23 1323 95.3 kg (210 lb 1.6 oz)   10/06/22 1224 91.3 kg (201 lb 3.2 oz)   08/14/22 1605 93 kg (205 lb)   07/06/22 1309 91.8 kg (202 lb 6.4 oz)   10/21/21 1112 85.7 kg (189 lb)   10/04/21 0826 82.2 kg (181 lb 3.5 oz)   03/23/21 0724 77.1 kg (170 lb)   03/23/21 1046 79.8 kg (176 lb)   01/11/21 1416 77.2 kg (170 lb 3.2 oz)   11/19/20 1335 73.9 kg (163 lb)   08/17/20 1300 72.6 kg (160 lb)   08/17/20 1407 72.2 kg (159 lb 3.2 oz)   07/29/20 1250 72.6 kg (160 lb)   04/16/20 1035 64 kg (141 lb)   03/07/20 0630 76.3 kg (168 lb 3.4 oz)   03/06/20 0500 79.1 kg (174 lb 6.1 oz)   03/04/20 0500 77.1 kg (169 lb 15.6 oz)   03/03/20 0500 78.3 kg (172 lb 9.9 oz)   03/02/20 0500 81.6 kg (180 lb)   03/01/20 0656 80.6 kg (177 lb 11.1 oz)   02/28/20 0500 80.2 kg (176 lb 12.9 oz)   02/27/20 0128 75.4 kg (166 lb 3.6 oz)   02/26/20 0128 72.5 kg (159 lb 13.3 oz)   02/25/20 1133 73 kg (161 lb)   02/25/20 0319 73.3 kg (161 lb 9.6 oz)   02/24/20 1600 74.2 kg (163 lb 9.3 oz)   02/24/20 1442 81.6 kg (180 lb)          Wt Change Observation -4.6% x 2 weeks     Estimated/Assessed Needs  Estimated Needs based on: Adjusted Body Weight 61 kg       Energy Requirements 30-35 kcal/kg    EST Needs (kcal/day) 5575-3806 kcal       Protein Requirements 1.0-1.2 g/kg   EST Daily Needs (g/day) 61-73 g       Fluid Requirements 25 ml/kg    Estimated Needs (mL/day) 1525 ml     Labs/Medications         Pertinent Labs Reviewed.   Results from last 7 days   Lab Units 02/07/24  0248 02/06/24  0253 02/05/24  0246   SODIUM mmol/L 135* 133* 135*   POTASSIUM mmol/L 3.8 3.6 3.5   CHLORIDE mmol/L 100 97* 98   CO2 mmol/L 19.3* 18.6* 18.9*   BUN mg/dL 55* 58* 64*   CREATININE mg/dL 1.45* 1.56* 2.10*   CALCIUM mg/dL 9.0 9.0 9.1   BILIRUBIN mg/dL 0.4 0.5 0.6   ALK PHOS U/L 131* 113 114   ALT (SGPT) U/L 17 16 19   AST (SGOT) U/L 23 20 19   GLUCOSE mg/dL 133* 103* 104*     Results from last 7 days   Lab Units  "02/07/24  0248 02/06/24  0253 02/06/24  0252 02/05/24  0246   MAGNESIUM mg/dL 2.2 2.3  --  2.3   PHOSPHORUS mg/dL 2.6 4.0  --  4.2   HEMOGLOBIN g/dL 9.7*  --    < > 9.8*   HEMATOCRIT % 30.2*  --    < > 30.6*    < > = values in this interval not displayed.     COVID19   Date Value Ref Range Status   01/22/2024 Detected (C) Not Detected - Ref. Range Final     No results found for: \"HGBA1C\"      Pertinent Medications Reviewed.     Malnutrition Severity Assessment              Nutrition Diagnosis         Nutrition Dx Problem 1 Increased nutrient needs related to increased nutrient needs due to catabolic disease as evidenced by  COVID + and acute hypoxemic respiratory failure.     Nutrition Intervention           Current Nutrition Orders & Evaluation of Intake       Current PO Diet Diet: Regular/House Diet, Cardiac Diets, Fluid Restriction (240 mL/tray) Diets; Low Sodium (2g); 1500 mL/day; Texture: Mechanical Ground (NDD 2); Fluid Consistency: Thin (IDDSI 0)   Supplement Orders Placed This Encounter      Dietary Nutrition Supplements Boost Plus (Ensure Plus)           Nutrition Intervention/Prescription        Boost Plus TID   Provides 1080 kcal, 42 g pro/day        Medical Nutrition Therapy/Nutrition Education          Learner     Readiness N/A  N/A     Method     Response N/A  N/A     Monitor/Evaluation        Monitor Per protocol, PO intake, Supplement intake, Weight, Symptoms, POC/GOC     Nutrition Discharge Plan         No nutrition discharge needs identified at this time     Electronically signed by:  Goldie Wesley, PASTOR  02/07/24 14:13 EST    "

## 2024-02-07 NOTE — NURSING NOTE
Palliative care visit for continued emotional support. Patient in chair sleeping at time of visit. Patients spouse at bedside. Allowed space for patients spouse to reflect on the last week- which has been difficult. Patient remains on airvo at this time. Patients spouse is hopeful patient will begin to see some changes and improvement. Emotional support provided. Palliative care will continue to follow to support and assist as needed.    Katlin FANG, RN, PN  Palliative Care

## 2024-02-07 NOTE — PROGRESS NOTES
[facility]   Hospitalist Progress Note    Date of admission: 1/31/2024  Patient Name: Falguni Minaya  1960  Date: 2/7/2024      Subjective     Chief Complaint   Patient presents with    Shortness of Breath    Altered Mental Status       Interval Followup: no real changes. Remains sob with cough but minimally productive of sputum. No fevers. Remains on airvo    Objective     Vitals:   Temp:  [98 °F (36.7 °C)-99 °F (37.2 °C)] 99 °F (37.2 °C)  Heart Rate:  [] 100  Resp:  [19-30] 30  BP: ()/(60-93) 117/82  Flow (L/min):  [50-61] 50    Physical Exam  Vitals reviewed.   Cardiovascular:      Rate and Rhythm: Normal rate.   Pulmonary:      Breath sounds: Decreased air movement present. Decreased breath sounds present.   Abdominal:      General: There is no distension.      Tenderness: There is no abdominal tenderness.   Neurological:      Mental Status: She is alert and oriented to person, place, and time.            Result Review:  Vital signs, labs and recent relevant imaging reviewed.        acetaminophen    albuterol    [DISCONTINUED] senna-docusate sodium **AND** polyethylene glycol **AND** bisacodyl **AND** bisacodyl    [Held by provider] cyclobenzaprine    dextrose    dextrose    glucagon (human recombinant)    ondansetron    sodium chloride    sodium chloride    sodium chloride    arformoterol, 15 mcg, Nebulization, BID - RT  aspirin, 81 mg, Oral, Daily  atorvastatin, 40 mg, Oral, Daily  budesonide, 0.5 mg, Nebulization, BID - RT  carvedilol, 3.125 mg, Oral, BID With Meals  clopidogrel, 75 mg, Oral, Daily  dexAMETHasone, 8 mg, Intravenous, Daily  famotidine, 20 mg, Oral, Daily  FLUoxetine, 40 mg, Oral, BID  furosemide, 40 mg, Intravenous, Daily  gabapentin, 100 mg, Oral, TID  heparin (porcine), 5,000 Units, Subcutaneous, Q8H  insulin lispro, 2-7 Units, Subcutaneous, 4x Daily AC & at Bedtime  [Held by provider] losartan, 25 mg, Oral, Q24H  OLANZapine, 2.5 mg, Oral, Daily  pramipexole, 0.5 mg,  Oral, Nightly  saccharomyces boulardii, 250 mg, Oral, BID  sodium chloride, 10 mL, Intravenous, Q12H        XR Chest 1 View    Result Date: 2/4/2024   Low lung volumes.  There has been some improvement in appearance of pneumonia on the right, with persistent left-sided infiltrates noted       JAYDON MULLIGAN MD       Electronically Signed and Approved By: JAYDON MULLIGAN MD on 2/04/2024 at 11:09             XR Chest 1 View    Result Date: 2/1/2024    Similar appearance of patchy bilateral opacities, right greater than left, which may represent pneumonia.       JONI PATEL MD       Electronically Signed and Approved By: JONI PATEL MD on 2/01/2024 at 8:41             CT Chest Without Contrast Diagnostic    Result Date: 1/31/2024    1. Extensive bilateral ground-glass opacity with smaller foci of consolidation in both lung fields.  Finding is favored to represent pneumonia.  Pulmonary edema and aspiration would also be in the differential.     Tucker Marcial M.D.       Electronically Signed and Approved By: Tucker Marcial M.D. on 1/31/2024 at 14:32             CT Head Without Contrast    Result Date: 1/31/2024    1. Mild to moderate low density in the cerebral white matter could represent small-vessel ischemic disease and or multiple sclerosis. 2. No CT evidence of acute infarction, mass or intracranial hemorrhage. 3. Small right mastoid effusion     Tucker Marcial M.D.       Electronically Signed and Approved By: Tucker Marcial M.D. on 1/31/2024 at 10:55             XR Chest 1 View    Result Date: 1/31/2024    1. Asymmetric perihilar and patchy multifocal opacities which could represent atelectasis, asymmetric edema or pneumonia.       FRANK WHITEHEAD MD       Electronically Signed and Approved By: FRANK WHITEHEAD MD on 1/31/2024 at 10:33              Assessment / Plan     Summary: 64-year-old female with a history of MS immunocompromised, recently been transitioning medications for this, recurrent UTIs, diastolic  CHF, CAD with prior PCI, CKD, who been diagnosed with COVID-19 on 1/22 and treated with outpatient dexamethasone, and completed steroids and had progressively worsening symptoms and presented for further evaluation.  Patient with acute approximate respiratory failure requiring Airvo also with UTI.  Has ARDS/severe covid19 pna appearance, treating with high dose steroids.  Diuresing initially, now with juan carlos most recently.    Assessment/Plan (clinically significant if listed here)  Acute hypoxemic respiratory failure requiring Airvo  Sepsis, present on admission, in setting of gram-negative bacilli UTI and suspected bacterial pneumonia in addition to COVID-19 pneumonia  Acute on Chronic dCHF (previously reduced now with recovered EF of 62%)   CAD with history of PCI to RCA   COVID-19 pneumonia with initial testing positive 1/22  Immunocompromised in setting of multiple sclerosis currently in limbo between treatments (most recently on Vumerity)   Anion gap metabolic acidosis  Transaminitis  History of hypertension with low blood pressure on arrival  CKD possibly 3 baseline creatinine 1.1-1.3  Seizure disorder  GERD  Anxiety/depression  Hyperlipidemia  Morbid obesity  Suspect underlying dementia, intermittent metabolic encephalopthy     Cr stable and getting diuretics again today  Baseline Cr variable between 1.1-1.3  Echo 56% grade 1, mild TR  Monitor renal function and potassium closely  Holding home antihypertensive medications (losartan 25), coreg changed to lower dose 3.125 bid  Resp status tenuous, Continue Airvo and pulm toilet wean as able   Continue high-dose steroids for elevated inflammatory markers with COVID-19 pneumonia with severe residual symptoms,   Ppi while on steroids   Completed 5 days unasyn on 2/6 for pneumonia and E. coli UTI, urine culture sensitive on review; wbc increased but suspect in part from steroids  2 g sodium diet 1500 cc restriction  added vaginal estrogen cream given recurrent UTIs,  will need this at discharge  Continue PPI; avoid home meloxicam/nsaids given ckd, needs to limit nsaids in general  Unclear of any home seizure medications apart from gabapentin which she seems to have taken for symptoms with her multiple sclerosis, had initial worse mentation, continue on lower dose gabapentin, monitor with renal fxn closely  Continue on aspirin and Plavix, statin  Cont prozac, zyprexa,   Cont pramipexole  Check am cbc, bmp, mg, phos lfts  Guarded prognosis given continued critical hypoxia and comorbidities.  Family discussing home with annemarie possibly    PT/OT      DVT prophylaxis:  Medical DVT prophylaxis orders are present.        Medical Intervention Limits: NO intubation (DNI); NO cardioversion  Level Of Support Discussed With: Patient  Code Status (Patient has no pulse and is not breathing): No CPR (Do Not Attempt to Resuscitate)  Medical Interventions (Patient has pulse or is breathing): Limited Support  Comments: d/w pt and /family        CBC          2/5/2024    02:46 2/6/2024    02:52 2/7/2024    02:48   CBC   WBC 18.21  19.51  20.38    RBC 3.21  3.06  3.14    Hemoglobin 9.8  9.4  9.7    Hematocrit 30.6  29.1  30.2    MCV 95.3  95.1  96.2    MCH 30.5  30.7  30.9    MCHC 32.0  32.3  32.1    RDW 17.5  17.2  17.2    Platelets 386  348  349        CMP          2/5/2024    02:46 2/6/2024    02:53 2/7/2024    02:48   CMP   Glucose 104  103  133    BUN 64  58  55    Creatinine 2.10  1.56  1.45    EGFR 25.7  36.7  40.1    Sodium 135  133  135    Potassium 3.5  3.6  3.8    Chloride 98  97  100    Calcium 9.1  9.0  9.0    Total Protein 7.3  7.2  7.2    Albumin 2.9  2.7  2.9    Globulin 4.4  4.5  4.3    Total Bilirubin 0.6  0.5  0.4    Alkaline Phosphatase 114  113  131    AST (SGOT) 19  20  23    ALT (SGPT) 19  16  17    Albumin/Globulin Ratio 0.7  0.6  0.7    BUN/Creatinine Ratio 30.5  37.2  37.9    Anion Gap 18.1  17.4  15.7

## 2024-02-08 LAB
ALBUMIN SERPL-MCNC: 2.8 G/DL (ref 3.5–5.2)
ALBUMIN/GLOB SERPL: 0.6 G/DL
ALP SERPL-CCNC: 135 U/L (ref 39–117)
ALT SERPL W P-5'-P-CCNC: 18 U/L (ref 1–33)
ANION GAP SERPL CALCULATED.3IONS-SCNC: 17.4 MMOL/L (ref 5–15)
AST SERPL-CCNC: 27 U/L (ref 1–32)
BILIRUB SERPL-MCNC: 0.5 MG/DL (ref 0–1.2)
BUN SERPL-MCNC: 48 MG/DL (ref 8–23)
BUN/CREAT SERPL: 32.4 (ref 7–25)
CALCIUM SPEC-SCNC: 8.9 MG/DL (ref 8.6–10.5)
CHLORIDE SERPL-SCNC: 100 MMOL/L (ref 98–107)
CO2 SERPL-SCNC: 16.6 MMOL/L (ref 22–29)
CREAT SERPL-MCNC: 1.48 MG/DL (ref 0.57–1)
DEPRECATED RDW RBC AUTO: 59.8 FL (ref 37–54)
EGFRCR SERPLBLD CKD-EPI 2021: 39.1 ML/MIN/1.73
ERYTHROCYTE [DISTWIDTH] IN BLOOD BY AUTOMATED COUNT: 17.2 % (ref 12.3–15.4)
GLOBULIN UR ELPH-MCNC: 4.4 GM/DL
GLUCOSE BLDC GLUCOMTR-MCNC: 156 MG/DL (ref 70–99)
GLUCOSE BLDC GLUCOMTR-MCNC: 158 MG/DL (ref 70–99)
GLUCOSE BLDC GLUCOMTR-MCNC: 177 MG/DL (ref 70–99)
GLUCOSE BLDC GLUCOMTR-MCNC: 213 MG/DL (ref 70–99)
GLUCOSE BLDC GLUCOMTR-MCNC: 93 MG/DL (ref 70–99)
GLUCOSE SERPL-MCNC: 117 MG/DL (ref 65–99)
HCT VFR BLD AUTO: 30.8 % (ref 34–46.6)
HGB BLD-MCNC: 9.9 G/DL (ref 12–15.9)
MAGNESIUM SERPL-MCNC: 2 MG/DL (ref 1.6–2.4)
MCH RBC QN AUTO: 30.8 PG (ref 26.6–33)
MCHC RBC AUTO-ENTMCNC: 32.1 G/DL (ref 31.5–35.7)
MCV RBC AUTO: 96 FL (ref 79–97)
PHOSPHATE SERPL-MCNC: 3.2 MG/DL (ref 2.5–4.5)
PLATELET # BLD AUTO: 337 10*3/MM3 (ref 140–450)
PMV BLD AUTO: 12.3 FL (ref 6–12)
POTASSIUM SERPL-SCNC: 4.2 MMOL/L (ref 3.5–5.2)
PROT SERPL-MCNC: 7.2 G/DL (ref 6–8.5)
RBC # BLD AUTO: 3.21 10*6/MM3 (ref 3.77–5.28)
SODIUM SERPL-SCNC: 134 MMOL/L (ref 136–145)
WBC NRBC COR # BLD AUTO: 18.05 10*3/MM3 (ref 3.4–10.8)

## 2024-02-08 PROCEDURE — 94799 UNLISTED PULMONARY SVC/PX: CPT

## 2024-02-08 PROCEDURE — 94664 DEMO&/EVAL PT USE INHALER: CPT

## 2024-02-08 PROCEDURE — 84100 ASSAY OF PHOSPHORUS: CPT | Performed by: PHYSICIAN ASSISTANT

## 2024-02-08 PROCEDURE — 25010000002 FUROSEMIDE PER 20 MG: Performed by: STUDENT IN AN ORGANIZED HEALTH CARE EDUCATION/TRAINING PROGRAM

## 2024-02-08 PROCEDURE — 82948 REAGENT STRIP/BLOOD GLUCOSE: CPT | Performed by: NURSE PRACTITIONER

## 2024-02-08 PROCEDURE — 97110 THERAPEUTIC EXERCISES: CPT

## 2024-02-08 PROCEDURE — 97530 THERAPEUTIC ACTIVITIES: CPT

## 2024-02-08 PROCEDURE — 25010000002 HEPARIN (PORCINE) PER 1000 UNITS: Performed by: INTERNAL MEDICINE

## 2024-02-08 PROCEDURE — 36415 COLL VENOUS BLD VENIPUNCTURE: CPT | Performed by: INTERNAL MEDICINE

## 2024-02-08 PROCEDURE — 80053 COMPREHEN METABOLIC PANEL: CPT | Performed by: INTERNAL MEDICINE

## 2024-02-08 PROCEDURE — 83735 ASSAY OF MAGNESIUM: CPT | Performed by: PHYSICIAN ASSISTANT

## 2024-02-08 PROCEDURE — 85027 COMPLETE CBC AUTOMATED: CPT | Performed by: PHYSICIAN ASSISTANT

## 2024-02-08 PROCEDURE — 82948 REAGENT STRIP/BLOOD GLUCOSE: CPT

## 2024-02-08 PROCEDURE — 99232 SBSQ HOSP IP/OBS MODERATE 35: CPT | Performed by: INTERNAL MEDICINE

## 2024-02-08 PROCEDURE — 25010000002 DEXAMETHASONE SODIUM PHOSPHATE 10 MG/ML SOLUTION: Performed by: NURSE PRACTITIONER

## 2024-02-08 PROCEDURE — 63710000001 INSULIN LISPRO (HUMAN) PER 5 UNITS: Performed by: NURSE PRACTITIONER

## 2024-02-08 RX ORDER — SODIUM CHLORIDE/ALOE VERA
1 GEL (GRAM) NASAL
Status: DISCONTINUED | OUTPATIENT
Start: 2024-02-08 | End: 2024-02-15 | Stop reason: HOSPADM

## 2024-02-08 RX ADMIN — CLOPIDOGREL BISULFATE 75 MG: 75 TABLET, FILM COATED ORAL at 08:49

## 2024-02-08 RX ADMIN — HEPARIN SODIUM 5000 UNITS: 5000 INJECTION INTRAVENOUS; SUBCUTANEOUS at 21:00

## 2024-02-08 RX ADMIN — ASPIRIN 81 MG: 81 TABLET, CHEWABLE ORAL at 08:49

## 2024-02-08 RX ADMIN — FAMOTIDINE 20 MG: 20 TABLET ORAL at 08:49

## 2024-02-08 RX ADMIN — GABAPENTIN 100 MG: 100 CAPSULE ORAL at 20:57

## 2024-02-08 RX ADMIN — CARVEDILOL 3.12 MG: 3.12 TABLET, FILM COATED ORAL at 17:49

## 2024-02-08 RX ADMIN — ACETAMINOPHEN 650 MG: 325 TABLET ORAL at 04:28

## 2024-02-08 RX ADMIN — Medication 250 MG: at 20:56

## 2024-02-08 RX ADMIN — FLUOXETINE HYDROCHLORIDE 40 MG: 20 CAPSULE ORAL at 08:49

## 2024-02-08 RX ADMIN — ARFORMOTEROL TARTRATE 15 MCG: 15 SOLUTION RESPIRATORY (INHALATION) at 06:54

## 2024-02-08 RX ADMIN — INSULIN LISPRO 2 UNITS: 100 INJECTION, SOLUTION INTRAVENOUS; SUBCUTANEOUS at 20:55

## 2024-02-08 RX ADMIN — FUROSEMIDE 40 MG: 10 INJECTION, SOLUTION INTRAMUSCULAR; INTRAVENOUS at 08:52

## 2024-02-08 RX ADMIN — Medication 10 ML: at 20:57

## 2024-02-08 RX ADMIN — FLUOXETINE HYDROCHLORIDE 40 MG: 20 CAPSULE ORAL at 20:56

## 2024-02-08 RX ADMIN — BUDESONIDE 0.5 MG: 0.5 SUSPENSION RESPIRATORY (INHALATION) at 06:54

## 2024-02-08 RX ADMIN — ARFORMOTEROL TARTRATE 15 MCG: 15 SOLUTION RESPIRATORY (INHALATION) at 19:13

## 2024-02-08 RX ADMIN — INSULIN LISPRO 2 UNITS: 100 INJECTION, SOLUTION INTRAVENOUS; SUBCUTANEOUS at 17:49

## 2024-02-08 RX ADMIN — PRAMIPEXOLE DIHYDROCHLORIDE 0.5 MG: 0.5 TABLET ORAL at 20:56

## 2024-02-08 RX ADMIN — Medication 10 ML: at 08:53

## 2024-02-08 RX ADMIN — BUDESONIDE 0.5 MG: 0.5 SUSPENSION RESPIRATORY (INHALATION) at 19:13

## 2024-02-08 RX ADMIN — Medication 250 MG: at 08:49

## 2024-02-08 RX ADMIN — GABAPENTIN 100 MG: 100 CAPSULE ORAL at 08:49

## 2024-02-08 RX ADMIN — ATORVASTATIN CALCIUM 40 MG: 40 TABLET, FILM COATED ORAL at 08:49

## 2024-02-08 RX ADMIN — DEXAMETHASONE SODIUM PHOSPHATE 8 MG: 10 INJECTION INTRAMUSCULAR; INTRAVENOUS at 08:52

## 2024-02-08 RX ADMIN — INSULIN LISPRO 3 UNITS: 100 INJECTION, SOLUTION INTRAVENOUS; SUBCUTANEOUS at 12:00

## 2024-02-08 RX ADMIN — HEPARIN SODIUM 5000 UNITS: 5000 INJECTION INTRAVENOUS; SUBCUTANEOUS at 06:29

## 2024-02-08 RX ADMIN — OLANZAPINE 2.5 MG: 2.5 TABLET, FILM COATED ORAL at 08:48

## 2024-02-08 RX ADMIN — GABAPENTIN 100 MG: 100 CAPSULE ORAL at 16:21

## 2024-02-08 RX ADMIN — CARVEDILOL 3.12 MG: 3.12 TABLET, FILM COATED ORAL at 08:49

## 2024-02-08 RX ADMIN — DOCUSATE SODIUM 100 MG: 100 CAPSULE, LIQUID FILLED ORAL at 20:56

## 2024-02-08 NOTE — CONSULTS
02/08/24 1250   Spiritual Care   Spiritual Care Source  initiative   Spiritual Care Follow-Up will follow closely   Response to Spiritual Care receptive of support   Spiritual Care Interventions supportive conversation provided   Spiritual Care Visit Type follow-up   Spiritual Care Request coping/stress of illness support;spiritual/moral support   Receptivity to Spiritual Care visit welcomed   At time of visit pt is in ICU. She is sitting up in her chair on airvo.  She shares that she is feeling much better today and remains hopeful.

## 2024-02-08 NOTE — PROGRESS NOTES
RT EQUIPMENT DEVICE RELATED - SKIN ASSESSMENT    RT Medical Equipment/Device:     High Flow Nasal Cannula:Airvo    Skin Assessment:      Cheek:  Intact  Nares:  Intact  Nose:  Intact    Device Skin Pressure Protection:  Positioning supports utilized and Pressure points protected    Nurse Notification:  Lizette Flores, RRT

## 2024-02-08 NOTE — THERAPY TREATMENT NOTE
Acute Care - Physical Therapy Treatment Note   [place]     Patient Name: Falguni Minaya  : 1960  MRN: [id number]  Today's Date: 2024      Visit Dx:     ICD-10-CM ICD-9-CM   1. Altered mental status, unspecified altered mental status type  R41.82 780.97   2. Acute urinary tract infection  N39.0 599.0   3. Oropharyngeal dysphagia  R13.12 787.22   4. Difficulty walking  R26.2 719.7   5. Decreased activities of daily living (ADL)  Z78.9 V49.89     Patient Active Problem List   Diagnosis    Multiple sclerosis    Left sided abdominal pain    Dyspepsia    Heme positive stool    Hypotension    Seizure disorder    Pain in lower back    Headache    Frequent falls    Spasm    Weakness of right leg    S/P drug eluting coronary stent placement    Coronary artery disease involving native coronary artery of native heart    Ischemic cardiomyopathy    Pleural effusion due to CHF (congestive heart failure)    Shortness of breath    Orthopnea    Hyperlipidemia LDL goal <70    Anemia due to stage 3 chronic kidney disease    Iron deficiency    Anemia in chronic kidney disease    Arthropathy of lumbar facet joint    Marie's esophagus    Benign colonic polyp    Congestive heart failure    Degeneration of lumbar intervertebral disc    Gastroesophageal reflux disease    Gastroparesis    Lumbar radiculopathy    Osteoporosis    Sacroiliac joint pain    Serum creatinine raised    Vitamin D deficiency    Acute hypoxemic respiratory failure     Past Medical History:   Diagnosis Date    Abdominal hernia     Acute ST elevation myocardial infarction (STEMI) due to occlusion of right coronary artery 2020    CHF (congestive heart failure)     DENIES CP GETS SOA WITH EXERTION. FOLLOWED BY DR ERIC/KASIA GREY. DECREASED ACTIVITY USES WALKER/ROLLATOR    CKD (chronic kidney disease)     Coronary artery disease     Dyspepsia     GERD (gastroesophageal reflux disease)     Hyperlipidemia     Hypertension     Ischemic cardiomyopathy      Multiple sclerosis      Past Surgical History:   Procedure Laterality Date    APPENDECTOMY      CARDIAC CATHETERIZATION      CARDIAC CATHETERIZATION N/A 2/24/2020    Procedure: Left Heart Cath;  Surgeon: Marlon Zamudio MD;  Location: [facility] CATH INVASIVE LOCATION;  Service: Cardiovascular;  Laterality: N/A;    CARDIAC CATHETERIZATION N/A 2/24/2020    Procedure: Stent LATRICIA coronary;  Surgeon: Marlon Zamudio MD;  Location: [facility] CATH INVASIVE LOCATION;  Service: Cardiovascular;  Laterality: N/A;    CARDIAC CATHETERIZATION  2/24/2020    Procedure: Percutaneous Manual Thrombectomy;  Surgeon: Marlon Zamudio MD;  Location: [facility] CATH INVASIVE LOCATION;  Service: Cardiovascular;;    CARDIAC CATHETERIZATION N/A 2/24/2020    Procedure: Coronary angiography;  Surgeon: Marlon Zamudio MD;  Location: [facility] CATH INVASIVE LOCATION;  Service: Cardiovascular;  Laterality: N/A;    CARDIAC CATHETERIZATION N/A 2/24/2020    Procedure: Left ventriculography;  Surgeon: Marlon Zamudio MD;  Location: [facility] CATH INVASIVE LOCATION;  Service: Cardiovascular;  Laterality: N/A;    CHOLECYSTECTOMY      COLONOSCOPY N/A 2/29/2020    Procedure: COLONOSCOPY to  cecum:;  Surgeon: Krystal Sarabia MD;  Location: [facility] ENDOSCOPY;  Service: Gastroenterology;  Laterality: N/A;  pre:  anemia and abd pain  post:  hemorrhoids, tortuous colon    ENDOSCOPY N/A 2/29/2020    Procedure: ESOPHAGOGASTRODUODENOSCOPY  with biopsies;  Surgeon: Krystal Sarabia MD;  Location: [facility] ENDOSCOPY;  Service: Gastroenterology;  Laterality: N/A;  pre:  anemia and abd pain  post:  mild gastritis,     HYSTERECTOMY      PARATHYROIDECTOMY Bilateral 10/17/2023    Procedure: NECK EXPLORATION WITH PARATHYROID ADENOMA EXCISION AND FROZEN SECTION,, RECURRENT LARYNGEAL NERVE MONITORING, INTRAOPERATIVE INTACT PTH ASSAY;  Surgeon: Ezekiel Giles MD;  Location: [facility] OR;  Service: ENT;  Laterality: Bilateral;    TONSILLECTOMY       PT Assessment  (last 12 hours)       PT Evaluation and Treatment       Row Name 02/08/24 1100          Physical Therapy Time and Intention    Subjective Information complains of;weakness;fatigue;dyspnea (P)   -WB     Document Type therapy note (daily note) (P)   -WB     Mode of Treatment individual therapy;physical therapy (P)   -WB     Patient Effort excellent (P)   -WB     Symptoms Noted During/After Treatment shortness of breath;fatigue (P)   -WB       Row Name 02/08/24 1100          General Information    Patient Profile Reviewed yes (P)   -WB     Patient Observations alert;cooperative;agree to therapy (P)   -WB       Row Name 02/08/24 1100          Bed Mobility    Bed Mobility supine-sit (P)   -WB     Supine-Sit [place] (Bed Mobility) minimum assist (75% patient effort) (P)   -WB     Bed Mobility, Safety Issues decreased use of arms for pushing/pulling;decreased use of legs for bridging/pushing (P)   -WB     Assistive Device (Bed Mobility) bed rails;draw sheet;head of bed elevated (P)   -WB       Row Name 02/08/24 1100          Transfers    Transfers sit-stand transfer;stand-sit transfer (P)   -WB       Row Name 02/08/24 1100          Sit-Stand Transfer    Sit-Stand [place] (Transfers) minimum assist (75% patient effort) (P)   -WB     Assistive Device (Sit-Stand Transfers) walker, front-wheeled (P)   -WB       Row Name 02/08/24 1100          Stand-Sit Transfer    Stand-Sit [place] (Transfers) minimum assist (75% patient effort) (P)   -WB     Assistive Device (Stand-Sit Transfers) walker, front-wheeled (P)   -WB       Row Name 02/08/24 1100          Gait/Stairs (Locomotion)    Gait/Stairs Locomotion gait/ambulation assistive device (P)   -WB     [place] Level (Gait) contact guard (P)   -WB     Assistive Device (Gait) walker, front-wheeled (P)   -WB     Patient was able to Ambulate yes (P)   -WB     Distance in Feet (Gait) 5 (P)   -WB     Pattern (Gait) 4-point;step-to (P)   -WB     Deviations/Abnormal  Patterns (Gait) base of support, narrow;justin decreased;gait speed decreased;stride length decreased (P)   -WB       Row Name 02/08/24 1100          Balance    Balance Assessment standing dynamic balance (P)   -WB     Dynamic Standing Balance contact guard (P)   -WB     Position/Device Used, Standing Balance supported;walker, front-wheeled (P)   -WB     Balance Interventions standing;sit to stand;supported;dynamic (P)   -WB       Row Name 02/08/24 1100          Hip (Therapeutic Exercise)    Hip (Therapeutic Exercise) AROM (active range of motion) (P)   -WB     Hip AROM (Therapeutic Exercise) bilateral;other (see comments) (P)   LAQ 1x15  -WB       Row Name 02/08/24 1100          Knee (Therapeutic Exercise)    Knee (Therapeutic Exercise) AROM (active range of motion) (P)   -WB     Knee AROM (Therapeutic Exercise) bilateral (P)   quad set 1x15, SLR 1x5  -WB       Row Name 02/08/24 1100          Ankle (Therapeutic Exercise)    Ankle (Therapeutic Exercise) AROM (active range of motion) (P)   -WB     Ankle AROM (Therapeutic Exercise) bilateral;dorsiflexion;plantarflexion (P)   -WB       Row Name 02/08/24 1100          Plan of Care Review    Plan of Care Reviewed With patient;spouse (P)   -WB     Progress improving (P)   -WB       Row Name 02/08/24 1100          Positioning and Restraints    Pre-Treatment Position in bed (P)   -WB     Post Treatment Position chair (P)   -WB     In Chair reclined;sitting;call light within reach;encouraged to call for assist;notified nsg (P)   -WB       Row Name 02/08/24 1100          Progress Summary (PT)    Progress Toward Functional Goals (PT) progress toward functional goals is fair (P)   -WB     Daily Progress Summary (PT) Pt demonstrated impaired balance, strength, gait, and SOA and remains below functional baseline. Skilled PT is warrnted to address functional deficits. (P)   -WB               User Key  (r) = Recorded By, (t) = Taken By, (c) = Cosigned By      Initials Name  Provider Type    WB Isreal Frye, PT Student PT Student                    Physical Therapy Education       Title: PT OT SLP Therapies (Done)       Topic: Physical Therapy (Done)       Point: Mobility training (Done)       Learning Progress Summary             Patient Acceptance, E, VU by SR at 2/2/2024 1632    Acceptance, E,TB, VU by WB at 2/1/2024 1132   Family Acceptance, E, VU by SR at 2/2/2024 1632                         Point: Body mechanics (Done)       Learning Progress Summary             Patient Acceptance, E, VU by SR at 2/2/2024 1632    Acceptance, E,TB, VU by WB at 2/1/2024 1132   Family Acceptance, E, VU by SR at 2/2/2024 1632                         Point: Precautions (Done)       Learning Progress Summary             Patient Acceptance, E, VU by SR at 2/2/2024 1632    Acceptance, E,TB, VU by WB at 2/1/2024 1132   Family Acceptance, E, VU by SR at 2/2/2024 1632                                         User Key       Initials Effective Dates Name Provider Type Discipline     06/16/21 -  Ally Costa, RN Registered Nurse Nurse     01/09/24 -  Isreal Frye, PT Student PT Student PT                  PT Recommendation and Plan  Anticipated Discharge Disposition (PT): sub acute care setting  Planned Therapy Interventions (PT): balance training, bed mobility training, gait training, neuromuscular re-education, stair training, strengthening, stretching, transfer training  Therapy Frequency (PT): daily  Progress Summary (PT)  Progress Toward Functional Goals (PT): (P) progress toward functional goals is fair  Daily Progress Summary (PT): (P) Pt demonstrated impaired balance, strength, gait, and SOA and remains below functional baseline. Skilled PT is warrnted to address functional deficits.  Plan of Care Reviewed With: (P) patient, spouse  Progress: (P) improving  Outcome Evaluation: Pt demonstrated impaired balance, strength, and gait and presents below functional deficits. Skilled PT is  warranted to address functional deficits.   Outcome Measures       Row Name 02/08/24 1100 02/07/24 1500 02/05/24 1413       How much help from another person do you currently need...    Turning from your back to your side while in flat bed without using bedrails? 4 (P)   -WB 4  -JORGE (r) WB (t) JORGE (c) 3  -DK    Moving from lying on back to sitting on the side of a flat bed without bedrails? 3 (P)   -WB 3  -JORGE (r) WB (t) JORGE (c) 3  -DK    Moving to and from a bed to a chair (including a wheelchair)? 3 (P)   -WB 3  -JORGE (r) WB (t) JORGE (c) 2  -DK    Standing up from a chair using your arms (e.g., wheelchair, bedside chair)? 3 (P)   -WB 3  -JORGE (r) WB (t) JORGE (c) 2  -DK    Climbing 3-5 steps with a railing? 2 (P)   -WB 2  -JORGE (r) WB (t) JORGE (c) 1  -DK    To walk in hospital room? 3 (P)   -WB 2  -JORGE (r) WB (t) JORGE (c) 1  -DK    AM-PAC 6 Clicks Score (PT) 18 (P)   -WB 17  -JORGE (r) WB (t) 12  -DK    Highest Level of Mobility Goal 6 --> Walk 10 steps or more (P)   -WB 5 --> Static standing  -JORGE (r) WB (t) 4 --> Transfer to chair/commode  -DK       Functional Assessment    Outcome Measure Options AM-PAC 6 Clicks Basic Mobility (PT) (P)   -WB AM-PAC 6 Clicks Basic Mobility (PT)  -JORGE (r) WB (t) JORGE (c) AM-PAC 6 Clicks Basic Mobility (PT)  -DK              User Key  (r) = Recorded By, (t) = Taken By, (c) = Cosigned By      Initials Name Provider Type    Theresa Gonzales, PTA Physical Therapist Assistant    Elmo Day, PT Physical Therapist    WB Isreal Frye, PT Student PT Student                     Time Calculation:    PT Charges       Row Name 02/08/24 1122             Time Calculation    PT Received On 02/08/24 (P)   -WB         Timed Charges    35984 - PT Therapeutic Exercise Minutes 15 (P)   -WB      45383 - PT Therapeutic Activity Minutes 10 (P)   -WB         Total Minutes    Timed Charges Total Minutes 25 (P)   -WB       Total Minutes 25 (P)   -WB                User Key  (r) = Recorded By, (t) = Taken By, (c) =  Cosigned By      Initials Name Provider Type    WB Isreal Frye, PT Student PT Student                  Therapy Charges for Today       Code Description Service Date Service Provider Modifiers Qty    96551615756 HC PT THER PROC EA 15 MIN 2/7/2024 Isreal Frye, PT Student GP 1    16001997405 HC PT THERAPEUTIC ACT EA 15 MIN 2/7/2024 Isreal Frye, PT Student GP 2    02824003515 HC PT THER PROC EA 15 MIN 2/8/2024 Isreal Frye, PT Student GP 1    51885921658 HC PT THERAPEUTIC ACT EA 15 MIN 2/8/2024 Isreal Frye, PT Student GP 1            PT G-Codes  Outcome Measure Options: (P) AM-PAC 6 Clicks Basic Mobility (PT)  AM-PAC 6 Clicks Score (PT): (P) 18  AM-PAC 6 Clicks Score (OT): 16    Isreal Frye, PT Student  2/8/2024

## 2024-02-08 NOTE — PROGRESS NOTES
[facility]   Hospitalist Progress Note    Date of admission: 1/31/2024  Patient Name: Falguni Minaya  1960  Date: 2/8/2024      Subjective     Chief Complaint   Patient presents with    Shortness of Breath    Altered Mental Status       Interval Followup: no real changes. Remains sob with cough but minimally productive of sputum. No fevers. Remains on airvo. Not much improvement in last 24 hours. Family wanting home with pallitus    Objective     Vitals:   Temp:  [98 °F (36.7 °C)-99 °F (37.2 °C)] 98.4 °F (36.9 °C)  Heart Rate:  [] 102  Resp:  [21-29] 29  BP: ()/(57-90) 138/90  Flow (L/min):  [50] 50    Physical Exam  Vitals reviewed.   Cardiovascular:      Rate and Rhythm: Normal rate.   Pulmonary:      Breath sounds: Decreased air movement present. Decreased breath sounds present.   Abdominal:      General: There is no distension.      Tenderness: There is no abdominal tenderness.   Neurological:      Mental Status: She is alert and oriented to person, place, and time.            Result Review:  Vital signs, labs and recent relevant imaging reviewed.        acetaminophen    albuterol    [DISCONTINUED] senna-docusate sodium **AND** polyethylene glycol **AND** bisacodyl **AND** bisacodyl    [Held by provider] cyclobenzaprine    dextrose    dextrose    glucagon (human recombinant)    ondansetron    sodium chloride    sodium chloride    sodium chloride    arformoterol, 15 mcg, Nebulization, BID - RT  aspirin, 81 mg, Oral, Daily  atorvastatin, 40 mg, Oral, Daily  budesonide, 0.5 mg, Nebulization, BID - RT  carvedilol, 3.125 mg, Oral, BID With Meals  clopidogrel, 75 mg, Oral, Daily  dexAMETHasone, 8 mg, Intravenous, Daily  docusate sodium, 100 mg, Oral, BID  famotidine, 20 mg, Oral, Daily  FLUoxetine, 40 mg, Oral, BID  furosemide, 40 mg, Intravenous, Daily  gabapentin, 100 mg, Oral, TID  heparin (porcine), 5,000 Units, Subcutaneous, Q8H  insulin lispro, 2-7 Units, Subcutaneous, 4x Daily AC & at  Bedtime  [Held by provider] losartan, 25 mg, Oral, Q24H  OLANZapine, 2.5 mg, Oral, Daily  pramipexole, 0.5 mg, Oral, Nightly  saccharomyces boulardii, 250 mg, Oral, BID  sodium chloride, 10 mL, Intravenous, Q12H        XR Chest 1 View    Result Date: 2/7/2024   Worsening radiographic appearance of pneumonia       JAYDON MULLIGAN MD       Electronically Signed and Approved By: JAYDON MULLIGAN MD on 2/07/2024 at 9:24             XR Chest 1 View    Result Date: 2/4/2024   Low lung volumes.  There has been some improvement in appearance of pneumonia on the right, with persistent left-sided infiltrates noted       JAYDON MULLIGAN MD       Electronically Signed and Approved By: JAYDON MULLIGAN MD on 2/04/2024 at 11:09             XR Chest 1 View    Result Date: 2/1/2024    Similar appearance of patchy bilateral opacities, right greater than left, which may represent pneumonia.       JONI PATEL MD       Electronically Signed and Approved By: JONI PATEL MD on 2/01/2024 at 8:41             CT Chest Without Contrast Diagnostic    Result Date: 1/31/2024    1. Extensive bilateral ground-glass opacity with smaller foci of consolidation in both lung fields.  Finding is favored to represent pneumonia.  Pulmonary edema and aspiration would also be in the differential.     Tucker Marcial M.D.       Electronically Signed and Approved By: Tucker Marcial M.D. on 1/31/2024 at 14:32              Assessment / Plan     Summary: 64-year-old female with a history of MS immunocompromised, recently been transitioning medications for this, recurrent UTIs, diastolic CHF, CAD with prior PCI, CKD, who been diagnosed with COVID-19 on 1/22 and treated with outpatient dexamethasone, and completed steroids and had progressively worsening symptoms and presented for further evaluation.  Patient with acute approximate respiratory failure requiring Airvo also with UTI.  Has ARDS/severe covid19 pna appearance, treating with high dose steroids.  Diuresing  initially, now with juan carlos most recently.    Assessment/Plan (clinically significant if listed here)  Acute hypoxemic respiratory failure requiring Airvo  Sepsis, present on admission, in setting of gram-negative bacilli UTI and suspected bacterial pneumonia in addition to COVID-19 pneumonia  Acute on Chronic dCHF (previously reduced now with recovered EF of 62%)   CAD with history of PCI to RCA   COVID-19 pneumonia with initial testing positive 1/22  Immunocompromised in setting of multiple sclerosis currently in limbo between treatments (most recently on Vumerity)   Anion gap metabolic acidosis  Transaminitis  History of hypertension with low blood pressure on arrival  CKD possibly 3 baseline creatinine 1.1-1.3  Seizure disorder  GERD  Anxiety/depression  Hyperlipidemia  Morbid obesity  Suspect underlying dementia, intermittent metabolic encephalopthy     Cr stable and getting diuretics again today  Baseline Cr variable between 1.1-1.3  Echo 56% grade 1, mild TR  Monitor renal function and potassium closely  Holding home antihypertensive medications (losartan 25), coreg changed to lower dose 3.125 bid  Resp status tenuous, Continue Airvo and pulm toilet wean as able   Continue high-dose steroids for elevated inflammatory markers with COVID-19 pneumonia with severe residual symptoms,   Ppi while on steroids   Completed 5 days unasyn on 2/6 for pneumonia and E. coli UTI, urine culture sensitive on review; wbc increased but suspect in part from steroids  2 g sodium diet 1500 cc restriction  added vaginal estrogen cream given recurrent UTIs, will need this at discharge  Continue PPI; avoid home meloxicam/nsaids given ckd, needs to limit nsaids in general  Unclear of any home seizure medications apart from gabapentin which she seems to have taken for symptoms with her multiple sclerosis, had initial worse mentation, continue on lower dose gabapentin, monitor with renal fxn closely  Continue on aspirin and Plavix,  statin  Cont prozac, zyprexa,   Cont pramipexole  Check am cbc, bmp, mg, phos lfts  Guarded prognosis given continued critical hypoxia and comorbidities.  Family discussing home with amiratus possibly    PT/OT      DVT prophylaxis:  Medical DVT prophylaxis orders are present.        Medical Intervention Limits: NO intubation (DNI); NO cardioversion  Level Of Support Discussed With: Patient  Code Status (Patient has no pulse and is not breathing): No CPR (Do Not Attempt to Resuscitate)  Medical Interventions (Patient has pulse or is breathing): Limited Support  Comments: d/w pt and /family        CBC          2/6/2024    02:52 2/7/2024    02:48 2/8/2024    03:07   CBC   WBC 19.51  20.38  18.05    RBC 3.06  3.14  3.21    Hemoglobin 9.4  9.7  9.9    Hematocrit 29.1  30.2  30.8    MCV 95.1  96.2  96.0    MCH 30.7  30.9  30.8    MCHC 32.3  32.1  32.1    RDW 17.2  17.2  17.2    Platelets 348  349  337        CMP          2/6/2024    02:53 2/7/2024    02:48 2/8/2024    03:07   CMP   Glucose 103  133  117    BUN 58  55  48    Creatinine 1.56  1.45  1.48    EGFR 36.7  40.1  39.1    Sodium 133  135  134    Potassium 3.6  3.8  4.2    Chloride 97  100  100    Calcium 9.0  9.0  8.9    Total Protein 7.2  7.2  7.2    Albumin 2.7  2.9  2.8    Globulin 4.5  4.3  4.4    Total Bilirubin 0.5  0.4  0.5    Alkaline Phosphatase 113  131  135    AST (SGOT) 20  23  27    ALT (SGPT) 16  17  18    Albumin/Globulin Ratio 0.6  0.7  0.6    BUN/Creatinine Ratio 37.2  37.9  32.4    Anion Gap 17.4  15.7  17.4

## 2024-02-08 NOTE — PROGRESS NOTES
RT EQUIPMENT DEVICE RELATED - SKIN ASSESSMENT    RT Medical Equipment/Device:     High Flow Nasal Cannula:Airvo    Skin Assessment:      CHEEKS, MOUTH, LIPS, NOSE:  Intact    Device Skin Pressure Protection:  Positioning supports utilized    Nurse Notification:  Lizette Lambert, RRT

## 2024-02-08 NOTE — THERAPY TREATMENT NOTE
Patient Name: Falguni Minaya  : 1960    MRN: [id number]                              Today's Date: 2024       Admit Date: 2024    Visit Dx:     ICD-10-CM ICD-9-CM   1. Altered mental status, unspecified altered mental status type  R41.82 780.97   2. Acute urinary tract infection  N39.0 599.0   3. Oropharyngeal dysphagia  R13.12 787.22   4. Difficulty walking  R26.2 719.7   5. Decreased activities of daily living (ADL)  Z78.9 V49.89     Patient Active Problem List   Diagnosis    Multiple sclerosis    Left sided abdominal pain    Dyspepsia    Heme positive stool    Hypotension    Seizure disorder    Pain in lower back    Headache    Frequent falls    Spasm    Weakness of right leg    S/P drug eluting coronary stent placement    Coronary artery disease involving native coronary artery of native heart    Ischemic cardiomyopathy    Pleural effusion due to CHF (congestive heart failure)    Shortness of breath    Orthopnea    Hyperlipidemia LDL goal <70    Anemia due to stage 3 chronic kidney disease    Iron deficiency    Anemia in chronic kidney disease    Arthropathy of lumbar facet joint    Marie's esophagus    Benign colonic polyp    Congestive heart failure    Degeneration of lumbar intervertebral disc    Gastroesophageal reflux disease    Gastroparesis    Lumbar radiculopathy    Osteoporosis    Sacroiliac joint pain    Serum creatinine raised    Vitamin D deficiency    Acute hypoxemic respiratory failure     Past Medical History:   Diagnosis Date    Abdominal hernia     Acute ST elevation myocardial infarction (STEMI) due to occlusion of right coronary artery 2020    CHF (congestive heart failure)     DENIES CP GETS SOA WITH EXERTION. FOLLOWED BY DR ERIC/KASIA GREY. DECREASED ACTIVITY USES WALKER/ROLLATOR    CKD (chronic kidney disease)     Coronary artery disease     Dyspepsia     GERD (gastroesophageal reflux disease)     Hyperlipidemia     Hypertension     Ischemic cardiomyopathy      Multiple sclerosis      Past Surgical History:   Procedure Laterality Date    APPENDECTOMY      CARDIAC CATHETERIZATION      CARDIAC CATHETERIZATION N/A 2/24/2020    Procedure: Left Heart Cath;  Surgeon: Marlon Zamudio MD;  Location: [facility] CATH INVASIVE LOCATION;  Service: Cardiovascular;  Laterality: N/A;    CARDIAC CATHETERIZATION N/A 2/24/2020    Procedure: Stent LATRICIA coronary;  Surgeon: Marlon Zamudio MD;  Location: [facility] CATH INVASIVE LOCATION;  Service: Cardiovascular;  Laterality: N/A;    CARDIAC CATHETERIZATION  2/24/2020    Procedure: Percutaneous Manual Thrombectomy;  Surgeon: Marlon Zamudio MD;  Location: [facility] CATH INVASIVE LOCATION;  Service: Cardiovascular;;    CARDIAC CATHETERIZATION N/A 2/24/2020    Procedure: Coronary angiography;  Surgeon: Marlon Zamudio MD;  Location: [facility] CATH INVASIVE LOCATION;  Service: Cardiovascular;  Laterality: N/A;    CARDIAC CATHETERIZATION N/A 2/24/2020    Procedure: Left ventriculography;  Surgeon: Marlon Zamudio MD;  Location: [facility] CATH INVASIVE LOCATION;  Service: Cardiovascular;  Laterality: N/A;    CHOLECYSTECTOMY      COLONOSCOPY N/A 2/29/2020    Procedure: COLONOSCOPY to  cecum:;  Surgeon: Krystal Sarabia MD;  Location: [facility] ENDOSCOPY;  Service: Gastroenterology;  Laterality: N/A;  pre:  anemia and abd pain  post:  hemorrhoids, tortuous colon    ENDOSCOPY N/A 2/29/2020    Procedure: ESOPHAGOGASTRODUODENOSCOPY  with biopsies;  Surgeon: Krystal Sarabia MD;  Location: [facility] ENDOSCOPY;  Service: Gastroenterology;  Laterality: N/A;  pre:  anemia and abd pain  post:  mild gastritis,     HYSTERECTOMY      PARATHYROIDECTOMY Bilateral 10/17/2023    Procedure: NECK EXPLORATION WITH PARATHYROID ADENOMA EXCISION AND FROZEN SECTION,, RECURRENT LARYNGEAL NERVE MONITORING, INTRAOPERATIVE INTACT PTH ASSAY;  Surgeon: Ezekiel Giles MD;  Location: [facility] OR;  Service: ENT;  Laterality: Bilateral;    TONSILLECTOMY        General  Information       Row Name 02/08/24 1521          OT Time and Intention    Document Type therapy note (daily note)  -ES     Mode of Treatment individual therapy;occupational therapy  -ES       Row Name 02/08/24 1521          General Information    Existing Precautions/Restrictions fall;oxygen therapy device and L/min  -ES       Row Name 02/08/24 1521          Cognition    Orientation Status (Cognition) oriented x 3  Patient agreeable to therapy participation. Patient spouse present.  -ES       Row Name 02/08/24 1521          Safety Issues, Functional Mobility    Impairments Affecting Function (Mobility) balance;endurance/activity tolerance;strength;shortness of breath  -ES               User Key  (r) = Recorded By, (t) = Taken By, (c) = Cosigned By      Initials Name Provider Type    ES Johana Guidry OTR/L, CSRS Occupational Therapist                     Mobility/ADL's       Row Name 02/08/24 1522          Bed Mobility    Bed Mobility bed mobility (all) activities  -ES     All Activities, [place] (Bed Mobility) not tested  -ES     Comment, (Bed Mobility) not tested. Patient met seated in recliner at therapy arrival to room  -ES       Row Name 02/08/24 1522          Transfers    Transfers sit-stand transfer;stand-sit transfer  -ES     Comment, (Transfers) STS x 6, min A with use of rolling walker.  -ES       Row Name 02/08/24 1522          Sit-Stand Transfer    Sit-Stand [place] (Transfers) minimum assist (75% patient effort);1 person assist  -ES     Assistive Device (Sit-Stand Transfers) walker, front-wheeled  -ES               User Key  (r) = Recorded By, (t) = Taken By, (c) = Cosigned By      Initials Name Provider Type    ES Johana Guidry OTR/L, LANDY Occupational Therapist                   Obj/Interventions       Row Name 02/08/24 1524          Shoulder (Therapeutic Exercise)    Shoulder (Therapeutic Exercise) strengthening exercise  -ES     Shoulder Strengthening (Therapeutic Exercise)  bilateral;flexion;aBduction;aDduction;resistance band;yellow;2 sets;10 repetitions  -ES       Row Name 02/08/24 1524          Elbow/Forearm (Therapeutic Exercise)    Elbow/Forearm (Therapeutic Exercise) strengthening exercise  -ES     Elbow/Forearm Strengthening (Therapeutic Exercise) bilateral;flexion;extension;resistance band;yellow;2 sets;10 repetitions  -ES       Row Name 02/08/24 1524          Motor Skills    Functional Endurance Patient completes therex and STS x6 to increase patient functional tolerance for ADL engagement and transfers. Patient O2 saturating 88% with STS and therex. Provided continued education and training on PLBs to maintain O2 with task engagement.  -ES     Therapeutic Exercise shoulder;elbow/forearm  -ES       Row Name 02/08/24 1524          Balance    Balance Assessment sitting dynamic balance;standing dynamic balance  -ES     Dynamic Sitting Balance supervision  -ES     Position, Sitting Balance unsupported;sitting in chair  -ES     Dynamic Standing Balance minimal assist;1-person assist  -ES     Position/Device Used, Standing Balance supported;walker, front-wheeled  -ES               User Key  (r) = Recorded By, (t) = Taken By, (c) = Cosigned By      Initials Name Provider Type    ES Johana Guidry, OTR/L, CSRS Occupational Therapist                   Goals/Plan    No documentation.                  Clinical Impression       Row Name 02/08/24 1529          Plan of Care Review    Plan of Care Reviewed With patient  -ES     Progress improving  -ES     Outcome Evaluation Patient with good participation in therapy session. Session focused on facilitating increase in patient task tolerance in preperation for independent ADL transfers and mobility. Patient continues to demonstrate decreased task tolerance, impairing patient ability to complete ADLs independently. Patient provided continued educaiton and training on PLBs and energy conservation in efforts to assist with return to baseline.  Continue per plan of care.  -ES       Row Name 02/08/24 1529          Vital Signs    Pre SpO2 (%) 90  -ES     O2 Delivery Pre Treatment hi-flow  -ES     Intra SpO2 (%) 88  -ES     O2 Delivery Intra Treatment hi-flow  -ES     Post SpO2 (%) 91  -ES     O2 Delivery Post Treatment hi-flow  -ES       Row Name 02/08/24 1529          Positioning and Restraints    Pre-Treatment Position sitting in chair/recliner  -ES     Post Treatment Position chair  -ES               User Key  (r) = Recorded By, (t) = Taken By, (c) = Cosigned By      Initials Name Provider Type    Johana Vaughan, OTR/L, CSRS Occupational Therapist                   Outcome Measures       Row Name 02/08/24 1531          How much help from another is currently needed...    Putting on and taking off regular lower body clothing? 2  -ES     Bathing (including washing, rinsing, and drying) 2  -ES     Toileting (which includes using toilet bed pan or urinal) 2  -ES     Putting on and taking off regular upper body clothing 3  -ES     Taking care of personal grooming (such as brushing teeth) 3  -ES     Eating meals 4  -ES     AM-PAC 6 Clicks Score (OT) 16  -ES       Row Name 02/08/24 1100 02/08/24 0755       How much help from another person do you currently need...    Turning from your back to your side while in flat bed without using bedrails? 4  -JORGE (r) WB (t) JORGE (c) 4  -KP    Moving from lying on back to sitting on the side of a flat bed without bedrails? 3  -JORGE (r) WB (t) JORGE (c) 3  -KP    Moving to and from a bed to a chair (including a wheelchair)? 3  -JORGE (r) WB (t) JORGE (c) 3  -KP    Standing up from a chair using your arms (e.g., wheelchair, bedside chair)? 3  -JORGE (r) WB (t) JORGE (c) 3  -KP    Climbing 3-5 steps with a railing? 2  -JORGE (r) WB (t) JORGE (c) 2  -KP    To walk in hospital room? 3  -JORGE (r) WB (t) JORGE (c) 2  -KP    AM-PAC 6 Clicks Score (PT) 18  -JORGE (r) WB (t) 17  -KP    Highest Level of Mobility Goal 6 --> Walk 10 steps or more  -JORGE (r) WB (t) 5 -->  Static standing  -      Row Name 02/08/24 1531 02/08/24 1100       Functional Assessment    Outcome Measure Options AM-PAC 6 Clicks Daily Activity (OT)  -ES AM-PAC 6 Clicks Basic Mobility (PT)  -JORGE (r) WB (t) JORGE (c)              User Key  (r) = Recorded By, (t) = Taken By, (c) = Cosigned By      Initials Name Provider Type    Leia Zaragoza, RN Registered Nurse    Elmo Day, PT Physical Therapist    ES Johana Guidry, OTR/L, CSRS Occupational Therapist    WB Isreal Frye, PT Student PT Student                      OT Recommendation and Plan  Planned Therapy Interventions (OT): activity tolerance training, BADL retraining, functional balance retraining, occupation/activity based interventions, patient/caregiver education/training, strengthening exercise, transfer/mobility retraining  Therapy Frequency (OT): 5 times/wk  Plan of Care Review  Plan of Care Reviewed With: patient  Progress: improving  Outcome Evaluation: Patient with good participation in therapy session. Session focused on facilitating increase in patient task tolerance in preperation for independent ADL transfers and mobility. Patient continues to demonstrate decreased task tolerance, impairing patient ability to complete ADLs independently. Patient provided continued educaiton and training on PLBs and energy conservation in efforts to assist with return to baseline. Continue per plan of care.     Time Calculation:   Evaluation Complexity (OT)  Review Occupational Profile/Medical/Therapy History Complexity: brief/low complexity  Assessment, Occupational Performance/Identification of Deficit Complexity: 3-5 performance deficits  Clinical Decision Making Complexity (OT): problem focused assessment/low complexity  Overall Complexity of Evaluation (OT): low complexity     Time Calculation- OT       Row Name 02/08/24 1532             Time Calculation- OT    OT Received On 02/08/24  -      OT Goal Re-Cert Due Date 02/10/24  -ES          Timed Charges    55107 - OT Therapeutic Exercise Minutes 24  -ES         Total Minutes    Timed Charges Total Minutes 24  -ES       Total Minutes 24  -ES                User Key  (r) = Recorded By, (t) = Taken By, (c) = Cosigned By      Initials Name Provider Type    Johana Vaughan OTR/L, CSRS Occupational Therapist                  Therapy Charges for Today       Code Description Service Date Service Provider Modifiers Qty    [id number] HC OT THER PROC EA 15 MIN 2/8/2024 Johana Guidry OTR/L, ELIANAS GO 2                 GYPSY Mcgovern/L, LANDY  2/8/2024   Home

## 2024-02-08 NOTE — PROGRESS NOTES
Pulmonary / Critical Care Progress Note      Patient Name: Falguni Minaya  : 1960  MRN: [id number]  Attending:  Gus Wesley, *   Date of admission: 2024    Subjective   Subjective   Patient critically ill with hypoxemic respiratory failure    No acute events overnight  Remains on Airvo 50 L 67% FiO2  Decreased p.o. yesterday, tolerating some supplements  Still has a good cough  Family members at bedside  650 cc urine output made over last 24 hours    Objective   Objective     Vitals:   Vital signs for last 24 hours:  Temp:  [98 °F (36.7 °C)-99 °F (37.2 °C)] 98.4 °F (36.9 °C)  Heart Rate:  [] 102  Resp:  [21-29] 29  BP: ()/(57-90) 138/90    Intake/Output last 3 shifts:  I/O last 3 completed shifts:  In: -   Out: 950 [Urine:950]  Intake/Output this shift:  No intake/output data recorded.    Vent settings for last 24 hours:       Hemodynamic parameters for last 24 hours:       Physical Exam   Vital Signs Reviewed   General:  Alert, NAD.  Chronically ill-appearing female, lying in bed  HEENT:  PERRL, EOMI.    Neck:  No JVD, no thyromegaly  Lymph: no axillary, cervical, supraclavicular lymphadenopathy noted bilaterally  Chest: Diminished to auscultation bilaterally, no work of breathing noted on Airvo  CV: RRR, no M/G/R, pulses 2+  Abd:  Soft, NT, ND, +BS  EXT:  no clubbing, no cyanosis, lower extremity edema noted  Neuro:  A&Ox3, CN grossly intact, no focal deficits.  Skin: No rashes or lesions noted    Result Review    Result Review:  I have personally reviewed the results from the time of this admission to 2024 12:31 EST and agree with these findings:  [x]  Laboratory  [x]  Microbiology  [x]  Radiology  [x]  EKG/Telemetry   [x]  Cardiology/Vascular   []  Pathology  []  Old records  []  Other:  Most notable findings include:       Lab 24  0307 24  0248 24  0253 24  0252 24  0246 24  0249 24  0244 24  0246   WBC 18.05* 20.38*  --   19.51* 18.21* 20.27* 15.60* 14.91*   HEMOGLOBIN 9.9* 9.7*  --  9.4* 9.8* 10.9* 10.7* 10.0*   HEMATOCRIT 30.8* 30.2*  --  29.1* 30.6* 33.7* 32.6* 31.4*   PLATELETS 337 349  --  348 386 497* 486* 449   SODIUM 134* 135* 133*  --  135* 138 138 138   POTASSIUM 4.2 3.8 3.6  --  3.5 4.4 3.8 3.7   CHLORIDE 100 100 97*  --  98 99 100 103   CO2 16.6* 19.3* 18.6*  --  18.9* 18.3* 19.2* 18.4*   BUN 48* 55* 58*  --  64* 57* 43* 36*   CREATININE 1.48* 1.45* 1.56*  --  2.10* 1.72* 1.26* 1.05*   GLUCOSE 117* 133* 103*  --  104* 140* 131* 122*   CALCIUM 8.9 9.0 9.0  --  9.1 9.6 9.4 8.8   PHOSPHORUS 3.2 2.6 4.0  --  4.2 4.5 3.7 3.0   TOTAL PROTEIN 7.2 7.2 7.2  --  7.3 8.3 8.0 7.3   ALBUMIN 2.8* 2.9* 2.7*  --  2.9* 3.2* 3.2* 2.9*   GLOBULIN 4.4 4.3 4.5  --  4.4 5.1 4.8 4.4     CT Chest Without Contrast Diagnostic    Result Date: 1/31/2024  PROCEDURE: CT CHEST WO CONTRAST DIAGNOSTIC  COMPARISON: [facility], CR, XR CHEST 1 VW, 12/24/2023, 12:17.  [facility], CR, XR CHEST 1 VW, 1/31/2024, 10:31.  [facility], CT, CT ABDOMEN PELVIS W CONTRAST, 10/11/2023, 11:39.  INDICATIONS: Opacities, sob  TECHNIQUE: CT images were created without the administration of contrast material.   PROTOCOL:   Standard imaging protocol performed    RADIATION:   DLP: 481.3mGy*cm   Automated exposure control was utilized to minimize radiation dose.  FINDINGS:  No pleural or pericardial effusion is seen.  Calcified subcarinal lymph nodes are consistent with previous granulomatous disease.  No noncalcified adenopathy is seen.  Heart size is normal.  There is extensive ground-glass opacity throughout the lung fields bilaterally.  More focal areas of consolidation are also noted in both lung fields.  No pneumothorax is seen.  No significant bronchiectasis is present.  No endobronchial lesion is seen.  A cholecystectomy has been performed.  The upper abdomen appears unremarkable.  Moderate mid thoracic kyphosis is noted.       Impression:   1. Extensive bilateral ground-glass opacity with smaller foci of consolidation in both lung fields.  Finding is favored to represent pneumonia.  Pulmonary edema and aspiration would also be in the differential.     Tucker Marcial M.D.       Electronically Signed and Approved By: Tucker Marcial M.D. on 1/31/2024 at 14:32              Results for orders placed during the hospital encounter of 01/31/24    Adult Transthoracic Echo Complete W/ Cont if Necessary Per Protocol    Interpretation Summary    Very technically difficult study with limited views.    Left ventricular ejection fraction appears to be 56 - 60%.  No obvious regional wall motion abnormalities.    Left ventricular diastolic function is consistent with (grade I) impaired relaxation and age.    Mild tricuspid regurgitation with estimated right ventricular systolic pressure from tricuspid regurgitation is mildly elevated (35-45 mmHg).    Valves are not well-visualized but no significant valvular disease by Doppler.    Urine culture with E. Coli    Chest x-ray this morning with low lung volumes but clearing lung infiltrates      Assessment & Plan   Assessment / Plan     Active Hospital Problems:  Active Hospital Problems    Diagnosis     **Acute hypoxemic respiratory failure      Impression:  Acute hypoxic respiratory failure requiring Airvo  Severe sepsis present on admission  COVID-pneumonia +1/22  Community-acquired pneumonia from unspecified organism  E. coli urinary tract infection  Multiple sclerosis (patient has not been on immunosuppression for at least 3 to 4 months per family)  ?Immunosuppressed status on treatment for MS  Acute decompensated diastolic congestive heart failure  Atelectasis, bibasilar  Acute kidney injury secondary to prerenal etiology   Coronary artery disease  Lactic acidosis, clinically significant     Plan:  Remains on Airvo 50 L / 67% FiO2 FiO2.  Respiratory status remains tenuous. Wean O2 to keep SPO2 greater  than 90%.  Patient will likely have a long recovery.  Continue with aggressive airway clearance regimen  Continue to encourage patient out of bed and into chair and encourage incentive spirometer use  Continue nebulizers and bronchopulmonary hygiene  Creatinine stable today.  Continue Lasix 40 mg IV daily with accurate daily I's and O's.  Trend renal panel electrolytes.   Echocardiogram with diastolic dysfunction  Continue Coreg 3.125 mg twice daily.  Monitor for hypotension  Continue Unasyn for pneumonia and E. coli UTI x7 days total  Continue high-dose Decadron x 10 days given elevated inflammatory markers with significant COVID-19 pneumonia  CRP remains elevated, trending down.  Check inflammatory markers every 72 hours to ensure improvement  Continue sliding scale insulin to keep glucose 140-180 in the ICU  Appreciate palliative care assistance.  Patient is DNR/DNI.  Patient's family would like to get patient home with [facility] if possible  Discussed with family at bedside  DVT prophylaxis with heparin  GI prophylaxis with H2 blocker    DVT prophylaxis:  Medical DVT prophylaxis orders are present.    CODE STATUS:   Medical Intervention Limits: NO intubation (DNI); NO cardioversion  Level Of Support Discussed With: Patient  Code Status (Patient has no pulse and is not breathing): No CPR (Do Not Attempt to Resuscitate)  Medical Interventions (Patient has pulse or is breathing): Limited Support  Comments: d/w pt and /family    Patient is critically ill with acute hypoxic respiratory failure on Airvo secondary to COVID-19 pneumonia, MACY, urinary tract infection. 33 minutes of critical care time was spent managing this patient, excluding procedures.  This included personally reviewing all pertinent labs, imaging, microbiology and documentation. Also discussing the case with the patient and any available family, the admitting physician and any available ancillary staff.   Electronically signed by Audelia Duarte  MD, 02/08/24, 12:33 PM EST.

## 2024-02-09 LAB
ALBUMIN SERPL-MCNC: 2.9 G/DL (ref 3.5–5.2)
ALBUMIN/GLOB SERPL: 0.6 G/DL
ALP SERPL-CCNC: 134 U/L (ref 39–117)
ALT SERPL W P-5'-P-CCNC: 21 U/L (ref 1–33)
ANION GAP SERPL CALCULATED.3IONS-SCNC: 18.3 MMOL/L (ref 5–15)
APTT PPP: 31.2 SECONDS (ref 24.2–34.2)
AST SERPL-CCNC: 27 U/L (ref 1–32)
BILIRUB SERPL-MCNC: 0.5 MG/DL (ref 0–1.2)
BUN SERPL-MCNC: 38 MG/DL (ref 8–23)
BUN/CREAT SERPL: 27 (ref 7–25)
CALCIUM SPEC-SCNC: 9 MG/DL (ref 8.6–10.5)
CHLORIDE SERPL-SCNC: 99 MMOL/L (ref 98–107)
CO2 SERPL-SCNC: 16.7 MMOL/L (ref 22–29)
CREAT SERPL-MCNC: 1.41 MG/DL (ref 0.57–1)
DEPRECATED RDW RBC AUTO: 59.8 FL (ref 37–54)
EGFRCR SERPLBLD CKD-EPI 2021: 41.5 ML/MIN/1.73
ERYTHROCYTE [DISTWIDTH] IN BLOOD BY AUTOMATED COUNT: 17 % (ref 12.3–15.4)
GLOBULIN UR ELPH-MCNC: 4.5 GM/DL
GLUCOSE BLDC GLUCOMTR-MCNC: 181 MG/DL (ref 70–99)
GLUCOSE BLDC GLUCOMTR-MCNC: 190 MG/DL (ref 70–99)
GLUCOSE BLDC GLUCOMTR-MCNC: 275 MG/DL (ref 70–99)
GLUCOSE BLDC GLUCOMTR-MCNC: 98 MG/DL (ref 70–99)
GLUCOSE SERPL-MCNC: 110 MG/DL (ref 65–99)
HCT VFR BLD AUTO: 30.4 % (ref 34–46.6)
HGB BLD-MCNC: 9.9 G/DL (ref 12–15.9)
INR PPP: 0.95 (ref 0.86–1.15)
MAGNESIUM SERPL-MCNC: 2.2 MG/DL (ref 1.6–2.4)
MCH RBC QN AUTO: 31 PG (ref 26.6–33)
MCHC RBC AUTO-ENTMCNC: 32.6 G/DL (ref 31.5–35.7)
MCV RBC AUTO: 95.3 FL (ref 79–97)
PHOSPHATE SERPL-MCNC: 3.8 MG/DL (ref 2.5–4.5)
PLATELET # BLD AUTO: 349 10*3/MM3 (ref 140–450)
PMV BLD AUTO: 12.3 FL (ref 6–12)
POTASSIUM SERPL-SCNC: 3.8 MMOL/L (ref 3.5–5.2)
PROT SERPL-MCNC: 7.4 G/DL (ref 6–8.5)
PROTHROMBIN TIME: 12.9 SECONDS (ref 11.8–14.9)
RBC # BLD AUTO: 3.19 10*6/MM3 (ref 3.77–5.28)
SODIUM SERPL-SCNC: 134 MMOL/L (ref 136–145)
WBC NRBC COR # BLD AUTO: 18.57 10*3/MM3 (ref 3.4–10.8)

## 2024-02-09 PROCEDURE — 63710000001 INSULIN LISPRO (HUMAN) PER 5 UNITS: Performed by: NURSE PRACTITIONER

## 2024-02-09 PROCEDURE — 85027 COMPLETE CBC AUTOMATED: CPT | Performed by: PHYSICIAN ASSISTANT

## 2024-02-09 PROCEDURE — 80053 COMPREHEN METABOLIC PANEL: CPT | Performed by: INTERNAL MEDICINE

## 2024-02-09 PROCEDURE — 94761 N-INVAS EAR/PLS OXIMETRY MLT: CPT

## 2024-02-09 PROCEDURE — 82948 REAGENT STRIP/BLOOD GLUCOSE: CPT | Performed by: NURSE PRACTITIONER

## 2024-02-09 PROCEDURE — 85610 PROTHROMBIN TIME: CPT | Performed by: PHYSICIAN ASSISTANT

## 2024-02-09 PROCEDURE — 94799 UNLISTED PULMONARY SVC/PX: CPT

## 2024-02-09 PROCEDURE — 85730 THROMBOPLASTIN TIME PARTIAL: CPT | Performed by: PHYSICIAN ASSISTANT

## 2024-02-09 PROCEDURE — 25010000002 HEPARIN (PORCINE) PER 1000 UNITS: Performed by: INTERNAL MEDICINE

## 2024-02-09 PROCEDURE — 25010000002 DEXAMETHASONE SODIUM PHOSPHATE 10 MG/ML SOLUTION: Performed by: NURSE PRACTITIONER

## 2024-02-09 PROCEDURE — 84100 ASSAY OF PHOSPHORUS: CPT | Performed by: PHYSICIAN ASSISTANT

## 2024-02-09 PROCEDURE — 82948 REAGENT STRIP/BLOOD GLUCOSE: CPT

## 2024-02-09 PROCEDURE — 99233 SBSQ HOSP IP/OBS HIGH 50: CPT | Performed by: INTERNAL MEDICINE

## 2024-02-09 PROCEDURE — 94664 DEMO&/EVAL PT USE INHALER: CPT

## 2024-02-09 PROCEDURE — 97110 THERAPEUTIC EXERCISES: CPT

## 2024-02-09 PROCEDURE — 83735 ASSAY OF MAGNESIUM: CPT | Performed by: PHYSICIAN ASSISTANT

## 2024-02-09 PROCEDURE — 25010000002 FUROSEMIDE PER 20 MG: Performed by: STUDENT IN AN ORGANIZED HEALTH CARE EDUCATION/TRAINING PROGRAM

## 2024-02-09 RX ORDER — POTASSIUM CHLORIDE 750 MG/1
20 CAPSULE, EXTENDED RELEASE ORAL ONCE
Status: COMPLETED | OUTPATIENT
Start: 2024-02-09 | End: 2024-02-09

## 2024-02-09 RX ORDER — CARVEDILOL 6.25 MG/1
6.25 TABLET ORAL 2 TIMES DAILY WITH MEALS
Status: DISCONTINUED | OUTPATIENT
Start: 2024-02-09 | End: 2024-02-15 | Stop reason: HOSPADM

## 2024-02-09 RX ADMIN — Medication 250 MG: at 08:12

## 2024-02-09 RX ADMIN — CARVEDILOL 6.25 MG: 6.25 TABLET, FILM COATED ORAL at 17:51

## 2024-02-09 RX ADMIN — FLUOXETINE HYDROCHLORIDE 40 MG: 20 CAPSULE ORAL at 20:18

## 2024-02-09 RX ADMIN — INSULIN LISPRO 2 UNITS: 100 INJECTION, SOLUTION INTRAVENOUS; SUBCUTANEOUS at 20:18

## 2024-02-09 RX ADMIN — BUDESONIDE 0.5 MG: 0.5 SUSPENSION RESPIRATORY (INHALATION) at 19:12

## 2024-02-09 RX ADMIN — CLOPIDOGREL BISULFATE 75 MG: 75 TABLET, FILM COATED ORAL at 08:11

## 2024-02-09 RX ADMIN — FLUOXETINE HYDROCHLORIDE 40 MG: 20 CAPSULE ORAL at 08:14

## 2024-02-09 RX ADMIN — ARFORMOTEROL TARTRATE 15 MCG: 15 SOLUTION RESPIRATORY (INHALATION) at 19:12

## 2024-02-09 RX ADMIN — OLANZAPINE 2.5 MG: 2.5 TABLET, FILM COATED ORAL at 08:12

## 2024-02-09 RX ADMIN — DEXAMETHASONE SODIUM PHOSPHATE 8 MG: 10 INJECTION INTRAMUSCULAR; INTRAVENOUS at 08:15

## 2024-02-09 RX ADMIN — HEPARIN SODIUM 5000 UNITS: 5000 INJECTION INTRAVENOUS; SUBCUTANEOUS at 21:18

## 2024-02-09 RX ADMIN — GABAPENTIN 100 MG: 100 CAPSULE ORAL at 20:18

## 2024-02-09 RX ADMIN — PRAMIPEXOLE DIHYDROCHLORIDE 0.5 MG: 0.5 TABLET ORAL at 20:18

## 2024-02-09 RX ADMIN — GABAPENTIN 100 MG: 100 CAPSULE ORAL at 08:14

## 2024-02-09 RX ADMIN — INSULIN LISPRO 2 UNITS: 100 INJECTION, SOLUTION INTRAVENOUS; SUBCUTANEOUS at 17:52

## 2024-02-09 RX ADMIN — HEPARIN SODIUM 5000 UNITS: 5000 INJECTION INTRAVENOUS; SUBCUTANEOUS at 06:13

## 2024-02-09 RX ADMIN — Medication 250 MG: at 20:18

## 2024-02-09 RX ADMIN — CARVEDILOL 3.12 MG: 3.12 TABLET, FILM COATED ORAL at 08:13

## 2024-02-09 RX ADMIN — GABAPENTIN 100 MG: 100 CAPSULE ORAL at 17:51

## 2024-02-09 RX ADMIN — BUDESONIDE 0.5 MG: 0.5 SUSPENSION RESPIRATORY (INHALATION) at 07:10

## 2024-02-09 RX ADMIN — FAMOTIDINE 20 MG: 20 TABLET ORAL at 08:13

## 2024-02-09 RX ADMIN — ASPIRIN 81 MG: 81 TABLET, CHEWABLE ORAL at 08:12

## 2024-02-09 RX ADMIN — FUROSEMIDE 40 MG: 10 INJECTION, SOLUTION INTRAMUSCULAR; INTRAVENOUS at 08:15

## 2024-02-09 RX ADMIN — ARFORMOTEROL TARTRATE 15 MCG: 15 SOLUTION RESPIRATORY (INHALATION) at 07:10

## 2024-02-09 RX ADMIN — INSULIN LISPRO 4 UNITS: 100 INJECTION, SOLUTION INTRAVENOUS; SUBCUTANEOUS at 12:10

## 2024-02-09 RX ADMIN — POTASSIUM CHLORIDE 20 MEQ: 10 CAPSULE, COATED, EXTENDED RELEASE ORAL at 08:13

## 2024-02-09 RX ADMIN — Medication 10 ML: at 20:19

## 2024-02-09 RX ADMIN — ATORVASTATIN CALCIUM 40 MG: 40 TABLET, FILM COATED ORAL at 08:12

## 2024-02-09 RX ADMIN — Medication 10 ML: at 08:14

## 2024-02-09 NOTE — PROGRESS NOTES
RT EQUIPMENT DEVICE RELATED - SKIN ASSESSMENT    RT Medical Equipment/Device:     High Flow Nasal Cannula:Airvo    Skin Assessment:      Cheek:  Intact  Nose:  Intact    Device Skin Pressure Protection:  Positioning supports utilized    Nurse Notification:  Lizette Taveras, RRT

## 2024-02-09 NOTE — THERAPY EVALUATION
RT EQUIPMENT DEVICE RELATED - SKIN ASSESSMENT    RT Medical Equipment/Device:     High Flow Nasal Cannula:Airvo    Skin Assessment:      Cheeks, nose, ears:  Intact    Device Skin Pressure Protection:  Pressure points protected    Nurse Notification:  Lizette Beltran, RRT

## 2024-02-09 NOTE — THERAPY TREATMENT NOTE
Acute Care - Physical Therapy Treatment Note   [place]     Patient Name: Falguni Minaya  : 1960  MRN: [id number]  Today's Date: 2024      Visit Dx:     ICD-10-CM ICD-9-CM   1. Altered mental status, unspecified altered mental status type  R41.82 780.97   2. Acute urinary tract infection  N39.0 599.0   3. Oropharyngeal dysphagia  R13.12 787.22   4. Difficulty walking  R26.2 719.7   5. Decreased activities of daily living (ADL)  Z78.9 V49.89     Patient Active Problem List   Diagnosis    Multiple sclerosis    Left sided abdominal pain    Dyspepsia    Heme positive stool    Hypotension    Seizure disorder    Pain in lower back    Headache    Frequent falls    Spasm    Weakness of right leg    S/P drug eluting coronary stent placement    Coronary artery disease involving native coronary artery of native heart    Ischemic cardiomyopathy    Pleural effusion due to CHF (congestive heart failure)    Shortness of breath    Orthopnea    Hyperlipidemia LDL goal <70    Anemia due to stage 3 chronic kidney disease    Iron deficiency    Anemia in chronic kidney disease    Arthropathy of lumbar facet joint    Marie's esophagus    Benign colonic polyp    Congestive heart failure    Degeneration of lumbar intervertebral disc    Gastroesophageal reflux disease    Gastroparesis    Lumbar radiculopathy    Osteoporosis    Sacroiliac joint pain    Serum creatinine raised    Vitamin D deficiency    Acute hypoxemic respiratory failure     Past Medical History:   Diagnosis Date    Abdominal hernia     Acute ST elevation myocardial infarction (STEMI) due to occlusion of right coronary artery 2020    CHF (congestive heart failure)     DENIES CP GETS SOA WITH EXERTION. FOLLOWED BY DR ERIC/KASIA GREY. DECREASED ACTIVITY USES WALKER/ROLLATOR    CKD (chronic kidney disease)     Coronary artery disease     Dyspepsia     GERD (gastroesophageal reflux disease)     Hyperlipidemia     Hypertension     Ischemic cardiomyopathy      Multiple sclerosis      Past Surgical History:   Procedure Laterality Date    APPENDECTOMY      CARDIAC CATHETERIZATION      CARDIAC CATHETERIZATION N/A 2/24/2020    Procedure: Left Heart Cath;  Surgeon: Marlon Zamudio MD;  Location: [facility] CATH INVASIVE LOCATION;  Service: Cardiovascular;  Laterality: N/A;    CARDIAC CATHETERIZATION N/A 2/24/2020    Procedure: Stent LATRICIA coronary;  Surgeon: Marlon Zamudio MD;  Location: [facility] CATH INVASIVE LOCATION;  Service: Cardiovascular;  Laterality: N/A;    CARDIAC CATHETERIZATION  2/24/2020    Procedure: Percutaneous Manual Thrombectomy;  Surgeon: Marlon Zamudio MD;  Location: [facility] CATH INVASIVE LOCATION;  Service: Cardiovascular;;    CARDIAC CATHETERIZATION N/A 2/24/2020    Procedure: Coronary angiography;  Surgeon: Marlon Zamudio MD;  Location: [facility] CATH INVASIVE LOCATION;  Service: Cardiovascular;  Laterality: N/A;    CARDIAC CATHETERIZATION N/A 2/24/2020    Procedure: Left ventriculography;  Surgeon: Marlon Zamudio MD;  Location: [facility] CATH INVASIVE LOCATION;  Service: Cardiovascular;  Laterality: N/A;    CHOLECYSTECTOMY      COLONOSCOPY N/A 2/29/2020    Procedure: COLONOSCOPY to  cecum:;  Surgeon: Krystal Sarabia MD;  Location: [facility] ENDOSCOPY;  Service: Gastroenterology;  Laterality: N/A;  pre:  anemia and abd pain  post:  hemorrhoids, tortuous colon    ENDOSCOPY N/A 2/29/2020    Procedure: ESOPHAGOGASTRODUODENOSCOPY  with biopsies;  Surgeon: Krystal Sarabia MD;  Location: [facility] ENDOSCOPY;  Service: Gastroenterology;  Laterality: N/A;  pre:  anemia and abd pain  post:  mild gastritis,     HYSTERECTOMY      PARATHYROIDECTOMY Bilateral 10/17/2023    Procedure: NECK EXPLORATION WITH PARATHYROID ADENOMA EXCISION AND FROZEN SECTION,, RECURRENT LARYNGEAL NERVE MONITORING, INTRAOPERATIVE INTACT PTH ASSAY;  Surgeon: Ezekiel Giles MD;  Location: [facility];  Service: ENT;  Laterality: Bilateral;    TONSILLECTOMY       PT Assessment  (last 12 hours)       PT Evaluation and Treatment       Row Name 02/09/24 1000          Physical Therapy Time and Intention    Subjective Information complains of;fatigue;weakness;dyspnea (P)   -WB     Document Type therapy note (daily note) (P)   -WB     Mode of Treatment individual therapy;physical therapy (P)   -WB     Patient Effort excellent (P)   -WB     Symptoms Noted During/After Treatment shortness of breath;fatigue (P)   -WB       Row Name 02/09/24 1000          General Information    Patient Profile Reviewed yes (P)   -WB     Patient Observations alert;cooperative;agree to therapy (P)   -WB       Row Name 02/09/24 1000          Bed Mobility    Comment, (Bed Mobility) Pt up in chair (P)   -WB       Row Name 02/09/24 1000          Transfers    Comment, (Transfers) Pt declined transfer due to fatigue, therex performed in chair (P)   -WB       Row Name 02/09/24 1000          Hip (Therapeutic Exercise)    Hip (Therapeutic Exercise) AROM (active range of motion) (P)   -WB     Hip AROM (Therapeutic Exercise) bilateral;other (see comments) (P)   SLR 2x5  -WB       Row Name 02/09/24 1000          Knee (Therapeutic Exercise)    Knee (Therapeutic Exercise) AROM (active range of motion) (P)   -WB     Knee AROM (Therapeutic Exercise) bilateral (P)   quad set 1x15, heel slide 1x10, LAQ 1x20  -WB       Row Name 02/09/24 1000          Ankle (Therapeutic Exercise)    Ankle (Therapeutic Exercise) AROM (active range of motion) (P)   -WB     Ankle AROM (Therapeutic Exercise) bilateral;dorsiflexion;plantarflexion;20 repititions (P)   -WB       Row Name 02/09/24 1000          Plan of Care Review    Plan of Care Reviewed With patient;spouse (P)   -WB     Progress improving (P)   -WB       Row Name 02/09/24 1000          Positioning and Restraints    Pre-Treatment Position sitting in chair/recliner (P)   -WB     Post Treatment Position chair (P)   -WB     In Chair reclined;sitting;call light within reach;encouraged to call for  assist;with family/caregiver (P)   -WB       Row Name 02/09/24 1000          Progress Summary (PT)    Progress Toward Functional Goals (PT) progress toward functional goals is good (P)   -WB     Daily Progress Summary (PT) Pt demonstrates impaired balance, strength, gait, and SOA and remains below functional baseline. Skilled PT is warranted to address functional deficits. (P)   -WB       Row Name 02/09/24 1000          Therapy Plan Review/Discharge Plan (PT)    Therapy Plan Review (PT) care plan/treatment goals reviewed (P)   -WB               User Key  (r) = Recorded By, (t) = Taken By, (c) = Cosigned By      Initials Name Provider Type    WB Isreal Frye, PT Student PT Student                    Physical Therapy Education       Title: PT OT SLP Therapies (Done)       Topic: Physical Therapy (Done)       Point: Mobility training (Done)       Learning Progress Summary             Patient Acceptance, E, VU by SR at 2/2/2024 1632    Acceptance, E,TB, VU by WB at 2/1/2024 1132   Family Acceptance, E, VU by SR at 2/2/2024 1632                         Point: Body mechanics (Done)       Learning Progress Summary             Patient Acceptance, E, VU by SR at 2/2/2024 1632    Acceptance, E,TB, VU by WB at 2/1/2024 1132   Family Acceptance, E, VU by SR at 2/2/2024 1632                         Point: Precautions (Done)       Learning Progress Summary             Patient Acceptance, E, VU by SR at 2/2/2024 1632    Acceptance, E,TB, VU by WB at 2/1/2024 1132   Family Acceptance, E, VU by SR at 2/2/2024 1632                                         User Key       Initials Effective Dates Name Provider Type Discipline    SR 06/16/21 -  Ally Costa, RN Registered Nurse Nurse     01/09/24 -  Isreal Frye, PT Student PT Student PT                  PT Recommendation and Plan  Anticipated Discharge Disposition (PT): (P) sub acute care setting  Planned Therapy Interventions (PT): balance training, bed mobility training,  gait training, neuromuscular re-education, stair training, strengthening, stretching, transfer training  Therapy Frequency (PT): daily  Progress Summary (PT)  Progress Toward Functional Goals (PT): (P) progress toward functional goals is good  Daily Progress Summary (PT): (P) Pt demonstrates impaired balance, strength, gait, and SOA and remains below functional baseline. Skilled PT is warranted to address functional deficits.  Plan of Care Reviewed With: (P) patient, spouse  Progress: (P) improving  Outcome Evaluation: Pt demonstrated impaired balance, strength, and gait and presents below functional deficits. Skilled PT is warranted to address functional deficits.   Outcome Measures       Row Name 02/09/24 1000 02/08/24 1100 02/07/24 1500       How much help from another person do you currently need...    Turning from your back to your side while in flat bed without using bedrails? 4 (P)   -WB 4  -JORGE (r) WB (t) JORGE (c) 4  -JORGE (r) WB (t) JORGE (c)    Moving from lying on back to sitting on the side of a flat bed without bedrails? 3 (P)   -WB 3  -JORGE (r) WB (t) JORGE (c) 3  -JORGE (r) WB (t) JORGE (c)    Moving to and from a bed to a chair (including a wheelchair)? 3 (P)   -WB 3  -JORGE (r) WB (t) JORGE (c) 3  -JORGE (r) WB (t) JORGE (c)    Standing up from a chair using your arms (e.g., wheelchair, bedside chair)? 3 (P)   -WB 3  -JORGE (r) WB (t) JORGE (c) 3  -JORGE (r) WB (t) JORGE (c)    Climbing 3-5 steps with a railing? 2 (P)   -WB 2  -JORGE (r) WB (t) JORGE (c) 2  -JORGE (r) WB (t) JORGE (c)    To walk in hospital room? 3 (P)   -WB 3  -JORGE (r) WB (t) JORGE (c) 2  -JORGE (r) WB (t) JORGE (c)    AM-PAC 6 Clicks Score (PT) 18 (P)   -WB 18  -JORGE (r) WB (t) 17  -JORGE (r) WB (t)    Highest Level of Mobility Goal 6 --> Walk 10 steps or more (P)   -WB 6 --> Walk 10 steps or more  -JORGE (r) WB (t) 5 --> Static standing  -JORGE (r) WB (t)       Functional Assessment    Outcome Measure Options AM-PAC 6 Clicks Basic Mobility (PT) (P)   -WB AM-PAC 6 Clicks Basic Mobility (PT)  -JORGE (r) WB  (t) JORGE (c) AM-PAC 6 Clicks Basic Mobility (PT)  -JORGE (r) WB (t) JORGE (c)              User Key  (r) = Recorded By, (t) = Taken By, (c) = Cosigned By      Initials Name Provider Type    Elmo Day, PT Physical Therapist    WB Isreal Frye, PT Student PT Student                     Time Calculation:    PT Charges       Row Name 02/09/24 1047             Time Calculation    PT Received On 02/09/24 (P)   -WB         Timed Charges    44817 - PT Therapeutic Exercise Minutes 23 (P)   -WB         Total Minutes    Timed Charges Total Minutes 23 (P)   -WB       Total Minutes 23 (P)   -WB                User Key  (r) = Recorded By, (t) = Taken By, (c) = Cosigned By      Initials Name Provider Type    Isreal Ventura, PT Student PT Student                  Therapy Charges for Today       Code Description Service Date Service Provider Modifiers Qty    [id number]  PT THER PROC EA 15 MIN 2/8/2024 Isreal Frye, PT Student GP 1    [id number]  PT THERAPEUTIC ACT EA 15 MIN 2/8/2024 Isreal Frye, PT Student GP 1            PT G-Codes  Outcome Measure Options: (P) AM-PAC 6 Clicks Basic Mobility (PT)  AM-PAC 6 Clicks Score (PT): (P) 18  AM-PAC 6 Clicks Score (OT): 16    Isreal Frye, PT Student  2/9/2024

## 2024-02-09 NOTE — PLAN OF CARE
Goal Outcome Evaluation:            Pt resting throughout night. Multiple incontinent Bms. Cream applied to domonique area, purewick changed. VSS. Randi Rosado RN

## 2024-02-09 NOTE — THERAPY EVALUATION
Was able to wean patient from airvo this afternoon. She is currently on 6lpm nasal cannula with a saturation of 95%.  She denies any shortness of air and there is no increase in work of breathing. Patient reports being comfortable on the 6l cannula.  Patient continues to receive aggressive airway clearance with ezpap and flutter combination therapy.  She does well with encouragement to cough, and has a very good productive cough and suction set up at bedside. She has been using the yaunker well.

## 2024-02-09 NOTE — PROGRESS NOTES
"Hospital Medicine Team    LOS 9 days      Patient Care Team:  Williams Sage MD as PCP - General (Family Medicine)      Subjective       Chief Complaint:  f/u    Subjective    In bed. Reports brathing is stable. Has cough but no fever or chills  D/w nurse and able to wean down O2 levels slightly     Objective       Vital Signs  Temp:  [97.7 °F (36.5 °C)-98.6 °F (37 °C)] 98.4 °F (36.9 °C)  Heart Rate:  [] 100  Resp:  [23-29] 23  BP: ()/() 114/81  Oxygen Therapy  SpO2: 94 %  Pulse Oximetry Type: Continuous  Device (Oxygen Therapy): humidified, high-flow nasal cannula  $ High Flow Nasal Cannula Set-Up: yes  Flow (L/min): 6  Oxygen Concentration (%): 60  Flowsheet Rows      Flowsheet Row First Filed Value   Admission Height 152.4 cm (60\") Documented at 01/31/2024 1011   Admission Weight 85 kg (187 lb 6.3 oz) Documented at 01/31/2024 1011                Physical Exam:  Physical Exam  Vitals reviewed.   Pulmonary:      Breath sounds: Examination of the right-lower field reveals decreased breath sounds. Examination of the left-lower field reveals decreased breath sounds. Decreased breath sounds present.   Neurological:      Mental Status: She is alert.           Results Review:    I reviewed the patient's new clinical results.    [x]  Laboratory  [x]  Microbiology  []  Radiology  [x]  EKG/Telemetry   []  Cardiology/Vascular   []  Pathology  []  Old records  []  Other:      X-rays, labs reviewed personally by physician.    Medication Review:   I have reviewed the patient's current medication list    Scheduled Meds  arformoterol, 15 mcg, Nebulization, BID - RT  [Held by provider] aspirin, 81 mg, Oral, Daily  atorvastatin, 40 mg, Oral, Daily  budesonide, 0.5 mg, Nebulization, BID - RT  carvedilol, 6.25 mg, Oral, BID With Meals  clopidogrel, 75 mg, Oral, Daily  dexAMETHasone, 8 mg, Intravenous, Daily  docusate sodium, 100 mg, Oral, BID  famotidine, 20 mg, Oral, Daily  FLUoxetine, 40 mg, Oral, " BID  furosemide, 40 mg, Intravenous, Daily  gabapentin, 100 mg, Oral, TID  heparin (porcine), 5,000 Units, Subcutaneous, Q8H  insulin lispro, 2-7 Units, Subcutaneous, 4x Daily AC & at Bedtime  [Held by provider] losartan, 25 mg, Oral, Q24H  OLANZapine, 2.5 mg, Oral, Daily  pramipexole, 0.5 mg, Oral, Nightly  saccharomyces boulardii, 250 mg, Oral, BID  sodium chloride, 10 mL, Intravenous, Q12H        Meds Infusions       Meds PRN    acetaminophen    albuterol    [DISCONTINUED] senna-docusate sodium **AND** polyethylene glycol **AND** bisacodyl **AND** bisacodyl    [Held by provider] cyclobenzaprine    dextrose    dextrose    glucagon (human recombinant)    ondansetron    saline    sodium chloride    sodium chloride    sodium chloride        Assessment / Plan       Active Hospital Problems:  Active Hospital Problems    Diagnosis  POA    **Acute hypoxemic respiratory failure [J96.01]  Yes      Resolved Hospital Problems   No resolved problems to display.     Summary: 64-year-old female with a history of MS immunocompromised, recently been transitioning medications for this, recurrent UTIs, diastolic CHF, CAD with prior PCI, CKD, who been diagnosed with COVID-19 on 1/22 and treated with outpatient dexamethasone, and completed steroids and had progressively worsening symptoms and presented for further evaluation.  Patient with acute approximate respiratory failure requiring Airvo also with UTI.  Has ARDS/severe covid19 pna appearance, treating with high dose steroids.  Diuresing initially, now with juan carlos most recently.     Assessment/Plan (clinically significant if listed here)  Acute hypoxemic respiratory failure requiring Airvo  Sepsis, present on admission, in setting of gram-negative bacilli UTI and suspected bacterial pneumonia in addition to COVID-19 pneumonia  Acute on Chronic dCHF (previously reduced now with recovered EF of 62%)   CAD with history of PCI to RCA   COVID-19 pneumonia with initial testing positive  1/22  Immunocompromised in setting of multiple sclerosis currently in limbo between treatments (most recently on Vumerity)   Anion gap metabolic acidosis  Transaminitis  History of hypertension with low blood pressure on arrival  CKD possibly 3 baseline creatinine 1.1-1.3  Seizure disorder  GERD  Anxiety/depression  Hyperlipidemia  Morbid obesity  Suspect underlying dementia, intermittent metabolic encephalopthy      Continue diuresis as renal function will allow  Baseline Cr variable between 1.1-1.3  Echo 56% grade 1, mild TR  Monitor renal function and potassium closely  Holding home antihypertensive medications (losartan 25), coreg changed to lower dose 3.125 bid  Respiratory status seems to be slowly improving but still somewhat tenuous  Steroids will finish today  Completed 5 days unasyn on 2/6 for pneumonia and E. coli UTI, urine culture sensitive on review; wbc increased but suspect in part from steroids  added vaginal estrogen cream given recurrent UTIs, will need this at discharge  Continue PPI; avoid home meloxicam/nsaids given ckd, needs to limit nsaids in general  Unclear of any home seizure medications apart from gabapentin which she seems to have taken for symptoms with her multiple sclerosis, had initial worse mentation, continue on lower dose gabapentin, monitor with renal fxn closely  Continue on aspirin and Plavix, statin  Cont prozac, zyprexa,   Cont pramipexole  Check am cbc, bmp, mg, phos lfts  Guarded prognosis given continued critical hypoxia and comorbidities.    PT/OT    Remains high risk with oxygen requirement  DVT ppx-subq heparin      Plan for disposition:O2 level will have to improve but Family discussing home with pallitus possibly      Electronically signed by Portillo Serrato DO, 02/09/24, 13:02 EST.      Note disclaimer: At [facility], we believe that sharing information builds trust and better relationships. You are receiving this note because you recently visited [facility].  It is possible you will see health information before a provider has talked with you about it. This kind of information can be easy to misunderstand. To help you fully understand what it means for your health, we urge you to discuss this note with your provider.

## 2024-02-09 NOTE — PLAN OF CARE
Goal Outcome Evaluation:                 VSS, PT REMAINS ON 50LPM 67% AIRVO                              Complex Repair And Flap Additional Text (Will Appearing After The Standard Complex Repair Text): The complex repair was not sufficient to completely close the primary defect. The remaining additional defect was repaired with the flap mentioned below.

## 2024-02-09 NOTE — PROGRESS NOTES
Pulmonary / Critical Care Progress Note      Patient Name: Falguni Minaya  : 1960  MRN: [id number]  Attending:  Portillo Serrato DO   Date of admission: 2024    Subjective   Subjective   Patient critically ill with hypoxemic respiratory failure    No acute events overnight  Remains on Airvo with improvement of her oxygenation  Currently on 40 L / 56%  Lung sounds better on exam  700 urine output made over last 24 hours renal function improving  Having some oozing with subcutaneous insulin    Objective   Objective     Vitals:   Vital signs for last 24 hours:  Temp:  [97.7 °F (36.5 °C)-98.6 °F (37 °C)] 98.4 °F (36.9 °C)  Heart Rate:  [] 97  Resp:  [23-29] 23  BP: ()/() 116/80    Intake/Output last 3 shifts:  I/O last 3 completed shifts:  In: -   Out: 700 [Urine:700]  Intake/Output this shift:  No intake/output data recorded.    Vent settings for last 24 hours:       Hemodynamic parameters for last 24 hours:       Physical Exam   Vital Signs Reviewed   General:  Alert, NAD.  Chronically ill-appearing female, lying in bed  HEENT:  PERRL, EOMI.    Neck:  No JVD, no thyromegaly  Lymph: no axillary, cervical, supraclavicular lymphadenopathy noted bilaterally  Chest: Diminished to auscultation bilaterally, no work of breathing noted on Airvo  CV: RRR, no M/G/R, pulses 2+  Abd:  Soft, NT, ND, +BS  EXT:  no clubbing, no cyanosis, lower extremity edema noted  Neuro:  A&Ox3, CN grossly intact, no focal deficits.  Skin: No rashes or lesions noted    Result Review    Result Review:  I have personally reviewed the results from the time of this admission to 2024 12:23 EST and agree with these findings:  [x]  Laboratory  [x]  Microbiology  [x]  Radiology  [x]  EKG/Telemetry   [x]  Cardiology/Vascular   []  Pathology  []  Old records  []  Other:  Most notable findings include:       Lab 24  0253 24  0307 24  0248 24  0253 24  0252 24  0246 24  0249  02/03/24  0244   WBC 18.57* 18.05* 20.38*  --  19.51* 18.21* 20.27* 15.60*   HEMOGLOBIN 9.9* 9.9* 9.7*  --  9.4* 9.8* 10.9* 10.7*   HEMATOCRIT 30.4* 30.8* 30.2*  --  29.1* 30.6* 33.7* 32.6*   PLATELETS 349 337 349  --  348 386 497* 486*   SODIUM 134* 134* 135* 133*  --  135* 138 138   POTASSIUM 3.8 4.2 3.8 3.6  --  3.5 4.4 3.8   CHLORIDE 99 100 100 97*  --  98 99 100   CO2 16.7* 16.6* 19.3* 18.6*  --  18.9* 18.3* 19.2*   BUN 38* 48* 55* 58*  --  64* 57* 43*   CREATININE 1.41* 1.48* 1.45* 1.56*  --  2.10* 1.72* 1.26*   GLUCOSE 110* 117* 133* 103*  --  104* 140* 131*   CALCIUM 9.0 8.9 9.0 9.0  --  9.1 9.6 9.4   PHOSPHORUS 3.8 3.2 2.6 4.0  --  4.2 4.5 3.7   TOTAL PROTEIN 7.4 7.2 7.2 7.2  --  7.3 8.3 8.0   ALBUMIN 2.9* 2.8* 2.9* 2.7*  --  2.9* 3.2* 3.2*   GLOBULIN 4.5 4.4 4.3 4.5  --  4.4 5.1 4.8     CT Chest Without Contrast Diagnostic    Result Date: 1/31/2024  PROCEDURE: CT CHEST WO CONTRAST DIAGNOSTIC  COMPARISON: [facility], CR, XR CHEST 1 VW, 12/24/2023, 12:17.  [facility], CR, XR CHEST 1 VW, 1/31/2024, 10:31.  [facility], CT, CT ABDOMEN PELVIS W CONTRAST, 10/11/2023, 11:39.  INDICATIONS: Opacities, sob  TECHNIQUE: CT images were created without the administration of contrast material.   PROTOCOL:   Standard imaging protocol performed    RADIATION:   DLP: 481.3mGy*cm   Automated exposure control was utilized to minimize radiation dose.  FINDINGS:  No pleural or pericardial effusion is seen.  Calcified subcarinal lymph nodes are consistent with previous granulomatous disease.  No noncalcified adenopathy is seen.  Heart size is normal.  There is extensive ground-glass opacity throughout the lung fields bilaterally.  More focal areas of consolidation are also noted in both lung fields.  No pneumothorax is seen.  No significant bronchiectasis is present.  No endobronchial lesion is seen.  A cholecystectomy has been performed.  The upper abdomen appears unremarkable.  Moderate  mid thoracic kyphosis is noted.      Impression:   1. Extensive bilateral ground-glass opacity with smaller foci of consolidation in both lung fields.  Finding is favored to represent pneumonia.  Pulmonary edema and aspiration would also be in the differential.     Tucker Marcial M.D.       Electronically Signed and Approved By: Tucker Marcial M.D. on 1/31/2024 at 14:32              Results for orders placed during the hospital encounter of 01/31/24    Adult Transthoracic Echo Complete W/ Cont if Necessary Per Protocol    Interpretation Summary    Very technically difficult study with limited views.    Left ventricular ejection fraction appears to be 56 - 60%.  No obvious regional wall motion abnormalities.    Left ventricular diastolic function is consistent with (grade I) impaired relaxation and age.    Mild tricuspid regurgitation with estimated right ventricular systolic pressure from tricuspid regurgitation is mildly elevated (35-45 mmHg).    Valves are not well-visualized but no significant valvular disease by Doppler.    Urine culture with E. Coli    Chest x-ray this morning with low lung volumes but clearing lung infiltrates      Assessment & Plan   Assessment / Plan     Active Hospital Problems:  Active Hospital Problems    Diagnosis     **Acute hypoxemic respiratory failure      Impression:  Acute hypoxic respiratory failure requiring Airvo  Severe sepsis present on admission  COVID-pneumonia +1/22  Community-acquired pneumonia from unspecified organism  E. coli urinary tract infection  Multiple sclerosis (patient has not been on immunosuppression for at least 3 to 4 months per family)  ?Immunosuppressed status on treatment for MS  Acute decompensated diastolic congestive heart failure  Atelectasis, bibasilar  Acute kidney injury secondary to prerenal etiology   Coronary artery disease  Lactic acidosis, clinically significant     Plan:  Remains on Airvo 40 L / 56% FiO2.  Respiratory status remains tenuous  but slowly improving. Wean O2 to keep SPO2 greater than 90%.  Patient will likely have a long recovery.  Continue with aggressive airway clearance regimen  Continue to encourage patient out of bed and into chair and encourage incentive spirometer use  Continue nebulizers and bronchopulmonary hygiene  Creatinine mildly improved today.  Continue Lasix 40 mg IV daily with accurate daily I's and O's.  Trend renal panel electrolytes.   Echocardiogram with diastolic dysfunction  Increase Coreg to 6.25 mg twice daily.  Monitor for hypotension  Continue Unasyn for pneumonia and E. coli UTI x7 days total  Continue high-dose Decadron x 10 days given elevated inflammatory markers with significant COVID-19 pneumonia  CRP remains elevated, trending down.  Check inflammatory markers every 72 hours to ensure improvement  Continue sliding scale insulin to keep glucose 140-180 in the ICU  Appreciate palliative care assistance.  Patient is DNR/DNI.  Patient's family would like to get patient home with [facility] if possible  Will hold aspirin for now as patient is having some oozing with subcutaneous heparin.  Can continue Plavix for now.  Continue to monitor.  Discussed with family at bedside  DVT prophylaxis with heparin  GI prophylaxis with H2 blocker    DVT prophylaxis:  Medical DVT prophylaxis orders are present.    CODE STATUS:   Medical Intervention Limits: NO intubation (DNI); NO cardioversion  Level Of Support Discussed With: Patient  Code Status (Patient has no pulse and is not breathing): No CPR (Do Not Attempt to Resuscitate)  Medical Interventions (Patient has pulse or is breathing): Limited Support  Comments: d/w pt and /family    Patient is critically ill with acute hypoxic respiratory failure on Airvo secondary to COVID-19 pneumonia, MACY, urinary tract infection. 33 minutes of critical care time was spent managing this patient, excluding procedures.  This included personally reviewing all pertinent labs, imaging,  microbiology and documentation. Also discussing the case with the patient and any available family, the admitting physician and any available ancillary staff.   Electronically signed by Audelia Duarte MD, 02/09/24, 12:27 PM EST.

## 2024-02-10 LAB
ALBUMIN SERPL-MCNC: 2.9 G/DL (ref 3.5–5.2)
ALBUMIN/GLOB SERPL: 0.6 G/DL
ALP SERPL-CCNC: 151 U/L (ref 39–117)
ALT SERPL W P-5'-P-CCNC: 23 U/L (ref 1–33)
ANION GAP SERPL CALCULATED.3IONS-SCNC: 16.2 MMOL/L (ref 5–15)
AST SERPL-CCNC: 26 U/L (ref 1–32)
BILIRUB SERPL-MCNC: 0.5 MG/DL (ref 0–1.2)
BUN SERPL-MCNC: 43 MG/DL (ref 8–23)
BUN/CREAT SERPL: 29.7 (ref 7–25)
CALCIUM SPEC-SCNC: 9.3 MG/DL (ref 8.6–10.5)
CHLORIDE SERPL-SCNC: 102 MMOL/L (ref 98–107)
CO2 SERPL-SCNC: 16.8 MMOL/L (ref 22–29)
CREAT SERPL-MCNC: 1.45 MG/DL (ref 0.57–1)
D-LACTATE SERPL-SCNC: 1.3 MMOL/L (ref 0.5–2)
DEPRECATED RDW RBC AUTO: 59.7 FL (ref 37–54)
EGFRCR SERPLBLD CKD-EPI 2021: 40.1 ML/MIN/1.73
ERYTHROCYTE [DISTWIDTH] IN BLOOD BY AUTOMATED COUNT: 16.8 % (ref 12.3–15.4)
GLOBULIN UR ELPH-MCNC: 4.9 GM/DL
GLUCOSE BLDC GLUCOMTR-MCNC: 103 MG/DL (ref 70–99)
GLUCOSE BLDC GLUCOMTR-MCNC: 134 MG/DL (ref 70–99)
GLUCOSE BLDC GLUCOMTR-MCNC: 156 MG/DL (ref 70–99)
GLUCOSE BLDC GLUCOMTR-MCNC: 166 MG/DL (ref 70–99)
GLUCOSE SERPL-MCNC: 129 MG/DL (ref 65–99)
HCT VFR BLD AUTO: 32.8 % (ref 34–46.6)
HGB BLD-MCNC: 10.1 G/DL (ref 12–15.9)
MAGNESIUM SERPL-MCNC: 2.2 MG/DL (ref 1.6–2.4)
MCH RBC QN AUTO: 29.9 PG (ref 26.6–33)
MCHC RBC AUTO-ENTMCNC: 30.8 G/DL (ref 31.5–35.7)
MCV RBC AUTO: 97 FL (ref 79–97)
PHOSPHATE SERPL-MCNC: 3.5 MG/DL (ref 2.5–4.5)
PLATELET # BLD AUTO: 353 10*3/MM3 (ref 140–450)
PMV BLD AUTO: 12.3 FL (ref 6–12)
POTASSIUM SERPL-SCNC: 3.9 MMOL/L (ref 3.5–5.2)
PROT SERPL-MCNC: 7.8 G/DL (ref 6–8.5)
QT INTERVAL: 398 MS
QTC INTERVAL: 483 MS
RBC # BLD AUTO: 3.38 10*6/MM3 (ref 3.77–5.28)
SODIUM SERPL-SCNC: 135 MMOL/L (ref 136–145)
WBC NRBC COR # BLD AUTO: 17.49 10*3/MM3 (ref 3.4–10.8)

## 2024-02-10 PROCEDURE — 84100 ASSAY OF PHOSPHORUS: CPT | Performed by: PHYSICIAN ASSISTANT

## 2024-02-10 PROCEDURE — 80053 COMPREHEN METABOLIC PANEL: CPT | Performed by: INTERNAL MEDICINE

## 2024-02-10 PROCEDURE — 36415 COLL VENOUS BLD VENIPUNCTURE: CPT | Performed by: INTERNAL MEDICINE

## 2024-02-10 PROCEDURE — 85027 COMPLETE CBC AUTOMATED: CPT | Performed by: PHYSICIAN ASSISTANT

## 2024-02-10 PROCEDURE — 93005 ELECTROCARDIOGRAM TRACING: CPT | Performed by: STUDENT IN AN ORGANIZED HEALTH CARE EDUCATION/TRAINING PROGRAM

## 2024-02-10 PROCEDURE — 25010000002 HEPARIN (PORCINE) PER 1000 UNITS: Performed by: INTERNAL MEDICINE

## 2024-02-10 PROCEDURE — 99232 SBSQ HOSP IP/OBS MODERATE 35: CPT | Performed by: INTERNAL MEDICINE

## 2024-02-10 PROCEDURE — 83735 ASSAY OF MAGNESIUM: CPT | Performed by: PHYSICIAN ASSISTANT

## 2024-02-10 PROCEDURE — 82948 REAGENT STRIP/BLOOD GLUCOSE: CPT | Performed by: NURSE PRACTITIONER

## 2024-02-10 PROCEDURE — 93010 ELECTROCARDIOGRAM REPORT: CPT | Performed by: INTERNAL MEDICINE

## 2024-02-10 PROCEDURE — 94799 UNLISTED PULMONARY SVC/PX: CPT

## 2024-02-10 PROCEDURE — 94664 DEMO&/EVAL PT USE INHALER: CPT

## 2024-02-10 PROCEDURE — 83605 ASSAY OF LACTIC ACID: CPT | Performed by: PHYSICIAN ASSISTANT

## 2024-02-10 PROCEDURE — 82948 REAGENT STRIP/BLOOD GLUCOSE: CPT

## 2024-02-10 PROCEDURE — 25010000002 FUROSEMIDE PER 20 MG: Performed by: STUDENT IN AN ORGANIZED HEALTH CARE EDUCATION/TRAINING PROGRAM

## 2024-02-10 RX ORDER — BISACODYL 5 MG/1
5 TABLET, DELAYED RELEASE ORAL DAILY PRN
Status: DISCONTINUED | OUTPATIENT
Start: 2024-02-10 | End: 2024-02-15 | Stop reason: HOSPADM

## 2024-02-10 RX ORDER — BISACODYL 10 MG
10 SUPPOSITORY, RECTAL RECTAL DAILY PRN
Status: DISCONTINUED | OUTPATIENT
Start: 2024-02-10 | End: 2024-02-15 | Stop reason: HOSPADM

## 2024-02-10 RX ORDER — AMOXICILLIN 250 MG
2 CAPSULE ORAL 2 TIMES DAILY
Status: DISCONTINUED | OUTPATIENT
Start: 2024-02-10 | End: 2024-02-15 | Stop reason: HOSPADM

## 2024-02-10 RX ORDER — POLYETHYLENE GLYCOL 3350 17 G/17G
17 POWDER, FOR SOLUTION ORAL DAILY PRN
Status: DISCONTINUED | OUTPATIENT
Start: 2024-02-10 | End: 2024-02-15 | Stop reason: HOSPADM

## 2024-02-10 RX ADMIN — FUROSEMIDE 40 MG: 10 INJECTION, SOLUTION INTRAMUSCULAR; INTRAVENOUS at 10:13

## 2024-02-10 RX ADMIN — BUDESONIDE 0.5 MG: 0.5 SUSPENSION RESPIRATORY (INHALATION) at 19:32

## 2024-02-10 RX ADMIN — Medication 10 ML: at 10:13

## 2024-02-10 RX ADMIN — Medication 10 ML: at 21:07

## 2024-02-10 RX ADMIN — CLOPIDOGREL BISULFATE 75 MG: 75 TABLET, FILM COATED ORAL at 10:12

## 2024-02-10 RX ADMIN — HEPARIN SODIUM 5000 UNITS: 5000 INJECTION INTRAVENOUS; SUBCUTANEOUS at 05:59

## 2024-02-10 RX ADMIN — FLUOXETINE HYDROCHLORIDE 40 MG: 20 CAPSULE ORAL at 21:07

## 2024-02-10 RX ADMIN — CARVEDILOL 6.25 MG: 6.25 TABLET, FILM COATED ORAL at 10:05

## 2024-02-10 RX ADMIN — PRAMIPEXOLE DIHYDROCHLORIDE 0.5 MG: 0.5 TABLET ORAL at 21:07

## 2024-02-10 RX ADMIN — ARFORMOTEROL TARTRATE 15 MCG: 15 SOLUTION RESPIRATORY (INHALATION) at 07:03

## 2024-02-10 RX ADMIN — FAMOTIDINE 20 MG: 20 TABLET ORAL at 10:13

## 2024-02-10 RX ADMIN — GABAPENTIN 100 MG: 100 CAPSULE ORAL at 21:07

## 2024-02-10 RX ADMIN — HEPARIN SODIUM 5000 UNITS: 5000 INJECTION INTRAVENOUS; SUBCUTANEOUS at 15:55

## 2024-02-10 RX ADMIN — HEPARIN SODIUM 5000 UNITS: 5000 INJECTION INTRAVENOUS; SUBCUTANEOUS at 21:07

## 2024-02-10 RX ADMIN — GABAPENTIN 100 MG: 100 CAPSULE ORAL at 10:12

## 2024-02-10 RX ADMIN — OLANZAPINE 2.5 MG: 2.5 TABLET, FILM COATED ORAL at 10:05

## 2024-02-10 RX ADMIN — Medication 250 MG: at 21:07

## 2024-02-10 RX ADMIN — ARFORMOTEROL TARTRATE 15 MCG: 15 SOLUTION RESPIRATORY (INHALATION) at 19:32

## 2024-02-10 RX ADMIN — GABAPENTIN 100 MG: 100 CAPSULE ORAL at 15:55

## 2024-02-10 RX ADMIN — BUDESONIDE 0.5 MG: 0.5 SUSPENSION RESPIRATORY (INHALATION) at 07:03

## 2024-02-10 RX ADMIN — FLUOXETINE HYDROCHLORIDE 40 MG: 20 CAPSULE ORAL at 10:05

## 2024-02-10 RX ADMIN — ATORVASTATIN CALCIUM 40 MG: 40 TABLET, FILM COATED ORAL at 10:12

## 2024-02-10 RX ADMIN — Medication 250 MG: at 10:05

## 2024-02-10 NOTE — PROGRESS NOTES
Pulmonary / Critical Care Progress Note      Patient Name: Falguni Minaya  : 1960  MRN: [id number]  Attending:  Portillo Serrato DO   Date of admission: 2024    Subjective   Subjective   Patient critically ill with hypoxemic respiratory failure    No acute events overnight  Transition to nasal cannula yesterday  Currently on 3 L nasal cannula, continues to improve  No acute issues at this time    Objective   Objective     Vitals:   Vital signs for last 24 hours:  Temp:  [97.5 °F (36.4 °C)-98.4 °F (36.9 °C)] 98.1 °F (36.7 °C)  Heart Rate:  [] 88  Resp:  [16-26] 25  BP: ()/() 115/98    Intake/Output last 3 shifts:  I/O last 3 completed shifts:  In: -   Out: 700 [Urine:700]  Intake/Output this shift:  I/O this shift:  In: -   Out: 800 [Urine:800]    Vent settings for last 24 hours:       Hemodynamic parameters for last 24 hours:       Physical Exam   Vital Signs Reviewed   General:  Alert, NAD.  Chronically ill-appearing female, lying in bed  HEENT:  PERRL, EOMI.    Neck:  No JVD, no thyromegaly  Lymph: no axillary, cervical, supraclavicular lymphadenopathy noted bilaterally  Chest: Diminished to auscultation bilaterally, no work of breathing noted on 3 L nasal cannula  CV: RRR, no M/G/R, pulses 2+  Abd:  Soft, NT, ND, +BS  EXT:  no clubbing, no cyanosis, lower extremity edema noted  Neuro:  A&Ox3, CN grossly intact, no focal deficits.  Skin: No rashes or lesions noted    Result Review    Result Review:  I have personally reviewed the results from the time of this admission to 2/10/2024 03:51 EST and agree with these findings:  [x]  Laboratory  [x]  Microbiology  [x]  Radiology  [x]  EKG/Telemetry   [x]  Cardiology/Vascular   []  Pathology  []  Old records  []  Other:  Most notable findings include:       Lab 02/10/24  0325 24  0253 24  0307 24  0248 24  0253 24  0252 24  0246 24  0249   WBC 17.49* 18.57* 18.05* 20.38*  --  19.51* 18.21* 20.27*    HEMOGLOBIN 10.1* 9.9* 9.9* 9.7*  --  9.4* 9.8* 10.9*   HEMATOCRIT 32.8* 30.4* 30.8* 30.2*  --  29.1* 30.6* 33.7*   PLATELETS 353 349 337 349  --  348 386 497*   SODIUM  --  134* 134* 135* 133*  --  135* 138   POTASSIUM  --  3.8 4.2 3.8 3.6  --  3.5 4.4   CHLORIDE  --  99 100 100 97*  --  98 99   CO2  --  16.7* 16.6* 19.3* 18.6*  --  18.9* 18.3*   BUN  --  38* 48* 55* 58*  --  64* 57*   CREATININE  --  1.41* 1.48* 1.45* 1.56*  --  2.10* 1.72*   GLUCOSE  --  110* 117* 133* 103*  --  104* 140*   CALCIUM  --  9.0 8.9 9.0 9.0  --  9.1 9.6   PHOSPHORUS  --  3.8 3.2 2.6 4.0  --  4.2 4.5   TOTAL PROTEIN  --  7.4 7.2 7.2 7.2  --  7.3 8.3   ALBUMIN  --  2.9* 2.8* 2.9* 2.7*  --  2.9* 3.2*   GLOBULIN  --  4.5 4.4 4.3 4.5  --  4.4 5.1     CT Chest Without Contrast Diagnostic    Result Date: 1/31/2024  PROCEDURE: CT CHEST WO CONTRAST DIAGNOSTIC  COMPARISON: [facility], CR, XR CHEST 1 VW, 12/24/2023, 12:17.  [facility], CR, XR CHEST 1 VW, 1/31/2024, 10:31.  [facility], CT, CT ABDOMEN PELVIS W CONTRAST, 10/11/2023, 11:39.  INDICATIONS: Opacities, sob  TECHNIQUE: CT images were created without the administration of contrast material.   PROTOCOL:   Standard imaging protocol performed    RADIATION:   DLP: 481.3mGy*cm   Automated exposure control was utilized to minimize radiation dose.  FINDINGS:  No pleural or pericardial effusion is seen.  Calcified subcarinal lymph nodes are consistent with previous granulomatous disease.  No noncalcified adenopathy is seen.  Heart size is normal.  There is extensive ground-glass opacity throughout the lung fields bilaterally.  More focal areas of consolidation are also noted in both lung fields.  No pneumothorax is seen.  No significant bronchiectasis is present.  No endobronchial lesion is seen.  A cholecystectomy has been performed.  The upper abdomen appears unremarkable.  Moderate mid thoracic kyphosis is noted.      Impression:   1. Extensive  bilateral ground-glass opacity with smaller foci of consolidation in both lung fields.  Finding is favored to represent pneumonia.  Pulmonary edema and aspiration would also be in the differential.     Tucker Marcial M.D.       Electronically Signed and Approved By: Tucker Marcial M.D. on 1/31/2024 at 14:32              Results for orders placed during the hospital encounter of 01/31/24    Adult Transthoracic Echo Complete W/ Cont if Necessary Per Protocol    Interpretation Summary    Very technically difficult study with limited views.    Left ventricular ejection fraction appears to be 56 - 60%.  No obvious regional wall motion abnormalities.    Left ventricular diastolic function is consistent with (grade I) impaired relaxation and age.    Mild tricuspid regurgitation with estimated right ventricular systolic pressure from tricuspid regurgitation is mildly elevated (35-45 mmHg).    Valves are not well-visualized but no significant valvular disease by Doppler.    Urine culture with E. Coli    Chest x-ray this morning with low lung volumes but clearing lung infiltrates      Assessment & Plan   Assessment / Plan     Active Hospital Problems:  Active Hospital Problems    Diagnosis     **Acute hypoxemic respiratory failure      Impression:  Acute hypoxic respiratory failure requiring Airvo  Severe sepsis present on admission  COVID-pneumonia +1/22  Community-acquired pneumonia from unspecified organism  E. coli urinary tract infection  Multiple sclerosis (patient has not been on immunosuppression for at least 3 to 4 months per family)  ?Immunosuppressed status on treatment for MS  Acute decompensated diastolic congestive heart failure  Atelectasis, bibasilar  Acute kidney injury secondary to prerenal etiology   Coronary artery disease  Lactic acidosis, clinically significant     Plan:  Currently on 3 L nasal cannula.  Patient is making significant improvement.  Continue to wean as tolerated keep SpO2 greater than 90%.   Continue with aggressive airway clearance regimen  Continue to encourage patient out of bed and into chair and encourage incentive spirometer use  Continue nebulizers and bronchopulmonary hygiene  Continue Lasix 40 mg IV daily with accurate daily I's and O's. Trend renal panel electrolytes.   Echocardiogram with diastolic dysfunction  Continue Coreg to 6.25 mg twice daily.   Continue Unasyn for pneumonia and E. coli UTI x7 days total  Continue high-dose Decadron x 10 days given elevated inflammatory markers with significant COVID-19 pneumonia  Continue to trend inflammatory markers every 72 hours to ensure improvement  Continue sliding scale insulin to keep glucose 140-180 in the ICU  Appreciate palliative care assistance.  Patient is DNR/DNI.  Patient's family would like to get patient home with [facility] if possible  Will hold aspirin for now as patient is having some oozing with subcutaneous heparin.  Can continue Plavix for now.  Continue to monitor.  Discussed with family at bedside  DVT prophylaxis with heparin  GI prophylaxis with H2 blocker    DVT prophylaxis:  Medical DVT prophylaxis orders are present.    CODE STATUS:   Medical Intervention Limits: NO intubation (DNI); NO cardioversion  Level Of Support Discussed With: Patient  Code Status (Patient has no pulse and is not breathing): No CPR (Do Not Attempt to Resuscitate)  Medical Interventions (Patient has pulse or is breathing): Limited Support  Comments: d/w pt and /family    Patient is critically ill with acute hypoxic respiratory failure secondary to COVID-19 pneumonia, MACY, urinary tract infection. 32 minutes of critical care time was spent managing this patient, excluding procedures.  This included personally reviewing all pertinent labs, imaging, microbiology and documentation. Also discussing the case with the patient and any available family, the admitting physician and any available ancillary staff.   Electronically signed by Audelia Duarte  MD, 02/10/24, 3:53 AM EST.

## 2024-02-10 NOTE — PROGRESS NOTES
"Hospital Medicine Team    LOS 10 days      Patient Care Team:  Williams Sage MD as PCP - General (Family Medicine)      Subjective       Chief Complaint:  f/u    Subjective    Setting in chair today and feeling less short of breath.     Objective       Vital Signs  Temp:  [97.5 °F (36.4 °C)-98.3 °F (36.8 °C)] 97.7 °F (36.5 °C)  Heart Rate:  [] 97  Resp:  [16-26] 26  BP: ()/(60-98) 86/71  Oxygen Therapy  SpO2: 94 %  Pulse Oximetry Type: Continuous  Device (Oxygen Therapy): humidified, nasal cannula  $ High Flow Nasal Cannula Set-Up: yes  Flow (L/min): 3  Oxygen Concentration (%): 60  Flowsheet Rows      Flowsheet Row First Filed Value   Admission Height 152.4 cm (60\") Documented at 01/31/2024 1011   Admission Weight 85 kg (187 lb 6.3 oz) Documented at 01/31/2024 1011                Physical Exam:  Physical Exam  Vitals reviewed.   Pulmonary:      Breath sounds: Examination of the right-lower field reveals decreased breath sounds. Examination of the left-lower field reveals decreased breath sounds. Decreased breath sounds present.   Neurological:      Mental Status: She is alert.           Results Review:    I reviewed the patient's new clinical results.    [x]  Laboratory  []  Microbiology  []  Radiology  [x]  EKG/Telemetry   []  Cardiology/Vascular   []  Pathology  []  Old records  []  Other:      X-rays, labs reviewed personally by physician.    Medication Review:   I have reviewed the patient's current medication list    Scheduled Meds  arformoterol, 15 mcg, Nebulization, BID - RT  [Held by provider] aspirin, 81 mg, Oral, Daily  atorvastatin, 40 mg, Oral, Daily  budesonide, 0.5 mg, Nebulization, BID - RT  carvedilol, 6.25 mg, Oral, BID With Meals  clopidogrel, 75 mg, Oral, Daily  famotidine, 20 mg, Oral, Daily  FLUoxetine, 40 mg, Oral, BID  furosemide, 40 mg, Intravenous, Daily  gabapentin, 100 mg, Oral, TID  heparin (porcine), 5,000 Units, Subcutaneous, Q8H  insulin lispro, 2-7 Units, " Subcutaneous, 4x Daily AC & at Bedtime  [Held by provider] losartan, 25 mg, Oral, Q24H  OLANZapine, 2.5 mg, Oral, Daily  pramipexole, 0.5 mg, Oral, Nightly  saccharomyces boulardii, 250 mg, Oral, BID  senna-docusate sodium, 2 tablet, Oral, BID  sodium chloride, 10 mL, Intravenous, Q12H        Meds Infusions       Meds PRN    acetaminophen    albuterol    senna-docusate sodium **AND** polyethylene glycol **AND** bisacodyl **AND** bisacodyl    [Held by provider] cyclobenzaprine    dextrose    dextrose    glucagon (human recombinant)    ondansetron    saline    sodium chloride    sodium chloride    sodium chloride        Assessment / Plan       Active Hospital Problems:  Active Hospital Problems    Diagnosis  POA    **Acute hypoxemic respiratory failure [J96.01]  Yes      Resolved Hospital Problems   No resolved problems to display.     Summary: 64-year-old female with a history of MS immunocompromised, recently been transitioning medications for this, recurrent UTIs, diastolic CHF, CAD with prior PCI, CKD, who been diagnosed with COVID-19 on 1/22 and treated with outpatient dexamethasone, and completed steroids and had progressively worsening symptoms and presented for further evaluation.  Patient with acute approximate respiratory failure requiring Airvo also with UTI.  Has ARDS/severe covid19 pna appearance, treating with high dose steroids.  Diuresing initially, now with juan carlos most recently.     Assessment/Plan (clinically significant if listed here)  Acute hypoxemic respiratory failure requiring Airvo  Sepsis, present on admission, in setting of gram-negative bacilli UTI and suspected bacterial pneumonia in addition to COVID-19 pneumonia  Acute on Chronic dCHF (previously reduced now with recovered EF of 62%)   CAD with history of PCI to RCA   COVID-19 pneumonia with initial testing positive 1/22  Immunocompromised in setting of multiple sclerosis currently in limbo between treatments (most recently on Vumerity)    Anion gap metabolic acidosis  Transaminitis  History of hypertension with low blood pressure on arrival  CKD possibly 3 baseline creatinine 1.1-1.3  Seizure disorder  GERD  Anxiety/depression  Hyperlipidemia  Morbid obesity  Suspect underlying dementia, intermittent metabolic encephalopthy      Continuing daily Lasix IV  Baseline Cr variable between 1.1-1.3  Echo 56% grade 1, mild TR  Monitor renal function and potassium closely  Holding home antihypertensive medications (losartan 25), continuing Coreg and increase to 6.25  Respiratory status stable improving  Completed high-dose steroids  Completed 5 days unasyn on 2/6 for pneumonia and E. coli UTI, urine culture sensitive on review;   Continue PPI; avoid home meloxicam/nsaids given ckd, needs to limit nsaids in general  Unclear of any home seizure medications apart from gabapentin which she seems to have taken for symptoms with her multiple sclerosis, had initial worse mentation, continue on lower dose gabapentin, monitor with renal fxn closely  Continue on aspirin and Plavix, statin  Cont prozac, zyprexa,   Cont pramipexole  Check am cbc, bmp, mg, phos lfts  Guarded prognosis given continued critical hypoxia and comorbidities.    PT/OT      DVT ppx-subq heparin      Plan for disposition: Could consider downgrading to the floor this afternoon or morning depending on status, family discussing home with timus possibly      Electronically signed by Portillo Serrato DO, 02/10/24, 13:46 EST.      Note disclaimer: At [facility], we believe that sharing information builds trust and better relationships. You are receiving this note because you recently visited [facility]. It is possible you will see health information before a provider has talked with you about it. This kind of information can be easy to misunderstand. To help you fully understand what it means for your health, we urge you to discuss this note with your provider.

## 2024-02-10 NOTE — PROGRESS NOTES
RT EQUIPMENT DEVICE RELATED - SKIN ASSESSMENT    RT Medical Equipment/Device:     Nasal Cannula    Skin Assessment:      Cheek:  Intact  Nose:  Intact  Lips:  Redness    Device Skin Pressure Protection:  Positioning supports utilized    Nurse Notification:  Yes, notified RN Leia Stroud, RRT

## 2024-02-10 NOTE — PLAN OF CARE
Goal Outcome Evaluation:   Patient pleasant, alert, and oriented overnight. Incontinent episodes x2. Adequate urine output. O2 weaned to 3L NC. Vital signs stable. Likely appropriate to downgrade today.

## 2024-02-11 ENCOUNTER — APPOINTMENT (OUTPATIENT)
Dept: GENERAL RADIOLOGY | Facility: HOSPITAL | Age: 64
End: 2024-02-11
Payer: MEDICARE

## 2024-02-11 LAB
ALBUMIN SERPL-MCNC: 2.9 G/DL (ref 3.5–5.2)
ALBUMIN/GLOB SERPL: 0.6 G/DL
ALP SERPL-CCNC: 142 U/L (ref 39–117)
ALT SERPL W P-5'-P-CCNC: 24 U/L (ref 1–33)
ANION GAP SERPL CALCULATED.3IONS-SCNC: 17.9 MMOL/L (ref 5–15)
AST SERPL-CCNC: 28 U/L (ref 1–32)
BACTERIA UR QL AUTO: ABNORMAL /HPF
BILIRUB SERPL-MCNC: 0.5 MG/DL (ref 0–1.2)
BILIRUB UR QL STRIP: NEGATIVE
BUN SERPL-MCNC: 39 MG/DL (ref 8–23)
BUN/CREAT SERPL: 25.8 (ref 7–25)
CALCIUM SPEC-SCNC: 9 MG/DL (ref 8.6–10.5)
CHLORIDE SERPL-SCNC: 102 MMOL/L (ref 98–107)
CLARITY UR: CLEAR
CO2 SERPL-SCNC: 16.1 MMOL/L (ref 22–29)
COLOR UR: YELLOW
CREAT SERPL-MCNC: 1.51 MG/DL (ref 0.57–1)
DEPRECATED RDW RBC AUTO: 60.6 FL (ref 37–54)
EGFRCR SERPLBLD CKD-EPI 2021: 38.2 ML/MIN/1.73
ERYTHROCYTE [DISTWIDTH] IN BLOOD BY AUTOMATED COUNT: 17.1 % (ref 12.3–15.4)
GLOBULIN UR ELPH-MCNC: 4.7 GM/DL
GLUCOSE BLDC GLUCOMTR-MCNC: 114 MG/DL (ref 70–99)
GLUCOSE BLDC GLUCOMTR-MCNC: 120 MG/DL (ref 70–99)
GLUCOSE BLDC GLUCOMTR-MCNC: 132 MG/DL (ref 70–99)
GLUCOSE BLDC GLUCOMTR-MCNC: 196 MG/DL (ref 70–99)
GLUCOSE SERPL-MCNC: 120 MG/DL (ref 65–99)
GLUCOSE UR STRIP-MCNC: NEGATIVE MG/DL
HCT VFR BLD AUTO: 33 % (ref 34–46.6)
HGB BLD-MCNC: 10.4 G/DL (ref 12–15.9)
HGB UR QL STRIP.AUTO: NEGATIVE
HYALINE CASTS UR QL AUTO: ABNORMAL /LPF
KETONES UR QL STRIP: NEGATIVE
LEUKOCYTE ESTERASE UR QL STRIP.AUTO: ABNORMAL
MAGNESIUM SERPL-MCNC: 2.2 MG/DL (ref 1.6–2.4)
MCH RBC QN AUTO: 30.5 PG (ref 26.6–33)
MCHC RBC AUTO-ENTMCNC: 31.5 G/DL (ref 31.5–35.7)
MCV RBC AUTO: 96.8 FL (ref 79–97)
NITRITE UR QL STRIP: NEGATIVE
PH UR STRIP.AUTO: <=5 [PH] (ref 5–8)
PHOSPHATE SERPL-MCNC: 3.6 MG/DL (ref 2.5–4.5)
PLATELET # BLD AUTO: 343 10*3/MM3 (ref 140–450)
PMV BLD AUTO: 12.2 FL (ref 6–12)
POTASSIUM SERPL-SCNC: 3.5 MMOL/L (ref 3.5–5.2)
PROT SERPL-MCNC: 7.6 G/DL (ref 6–8.5)
PROT UR QL STRIP: NEGATIVE
RBC # BLD AUTO: 3.41 10*6/MM3 (ref 3.77–5.28)
RBC # UR STRIP: ABNORMAL /HPF
REF LAB TEST METHOD: ABNORMAL
SODIUM SERPL-SCNC: 136 MMOL/L (ref 136–145)
SP GR UR STRIP: 1.01 (ref 1–1.03)
SQUAMOUS #/AREA URNS HPF: ABNORMAL /HPF
UROBILINOGEN UR QL STRIP: ABNORMAL
WBC # UR STRIP: ABNORMAL /HPF
WBC NRBC COR # BLD AUTO: 14.15 10*3/MM3 (ref 3.4–10.8)

## 2024-02-11 PROCEDURE — 94799 UNLISTED PULMONARY SVC/PX: CPT

## 2024-02-11 PROCEDURE — 85027 COMPLETE CBC AUTOMATED: CPT | Performed by: PHYSICIAN ASSISTANT

## 2024-02-11 PROCEDURE — 83735 ASSAY OF MAGNESIUM: CPT | Performed by: PHYSICIAN ASSISTANT

## 2024-02-11 PROCEDURE — 94761 N-INVAS EAR/PLS OXIMETRY MLT: CPT

## 2024-02-11 PROCEDURE — 99232 SBSQ HOSP IP/OBS MODERATE 35: CPT | Performed by: STUDENT IN AN ORGANIZED HEALTH CARE EDUCATION/TRAINING PROGRAM

## 2024-02-11 PROCEDURE — 63710000001 INSULIN LISPRO (HUMAN) PER 5 UNITS: Performed by: NURSE PRACTITIONER

## 2024-02-11 PROCEDURE — 25010000002 HEPARIN (PORCINE) PER 1000 UNITS: Performed by: INTERNAL MEDICINE

## 2024-02-11 PROCEDURE — 82948 REAGENT STRIP/BLOOD GLUCOSE: CPT

## 2024-02-11 PROCEDURE — 71045 X-RAY EXAM CHEST 1 VIEW: CPT

## 2024-02-11 PROCEDURE — 84100 ASSAY OF PHOSPHORUS: CPT | Performed by: PHYSICIAN ASSISTANT

## 2024-02-11 PROCEDURE — 80053 COMPREHEN METABOLIC PANEL: CPT | Performed by: INTERNAL MEDICINE

## 2024-02-11 PROCEDURE — 25010000002 FUROSEMIDE PER 20 MG: Performed by: STUDENT IN AN ORGANIZED HEALTH CARE EDUCATION/TRAINING PROGRAM

## 2024-02-11 PROCEDURE — 81001 URINALYSIS AUTO W/SCOPE: CPT | Performed by: PHYSICIAN ASSISTANT

## 2024-02-11 PROCEDURE — 36415 COLL VENOUS BLD VENIPUNCTURE: CPT | Performed by: INTERNAL MEDICINE

## 2024-02-11 RX ORDER — ENOXAPARIN SODIUM 100 MG/ML
30 INJECTION SUBCUTANEOUS EVERY 24 HOURS
Status: DISCONTINUED | OUTPATIENT
Start: 2024-02-12 | End: 2024-02-15 | Stop reason: HOSPADM

## 2024-02-11 RX ADMIN — OLANZAPINE 2.5 MG: 2.5 TABLET, FILM COATED ORAL at 08:06

## 2024-02-11 RX ADMIN — BUDESONIDE 0.5 MG: 0.5 SUSPENSION RESPIRATORY (INHALATION) at 08:32

## 2024-02-11 RX ADMIN — FAMOTIDINE 20 MG: 20 TABLET ORAL at 08:06

## 2024-02-11 RX ADMIN — CARVEDILOL 6.25 MG: 6.25 TABLET, FILM COATED ORAL at 17:08

## 2024-02-11 RX ADMIN — ATORVASTATIN CALCIUM 40 MG: 40 TABLET, FILM COATED ORAL at 08:06

## 2024-02-11 RX ADMIN — INSULIN LISPRO 2 UNITS: 100 INJECTION, SOLUTION INTRAVENOUS; SUBCUTANEOUS at 17:08

## 2024-02-11 RX ADMIN — Medication 250 MG: at 08:06

## 2024-02-11 RX ADMIN — GABAPENTIN 100 MG: 100 CAPSULE ORAL at 14:44

## 2024-02-11 RX ADMIN — Medication 10 ML: at 08:06

## 2024-02-11 RX ADMIN — GABAPENTIN 100 MG: 100 CAPSULE ORAL at 21:26

## 2024-02-11 RX ADMIN — Medication 10 ML: at 22:55

## 2024-02-11 RX ADMIN — FLUOXETINE HYDROCHLORIDE 40 MG: 20 CAPSULE ORAL at 08:06

## 2024-02-11 RX ADMIN — BUDESONIDE 0.5 MG: 0.5 SUSPENSION RESPIRATORY (INHALATION) at 18:56

## 2024-02-11 RX ADMIN — HEPARIN SODIUM 5000 UNITS: 5000 INJECTION INTRAVENOUS; SUBCUTANEOUS at 05:46

## 2024-02-11 RX ADMIN — ARFORMOTEROL TARTRATE 15 MCG: 15 SOLUTION RESPIRATORY (INHALATION) at 08:32

## 2024-02-11 RX ADMIN — Medication 250 MG: at 21:26

## 2024-02-11 RX ADMIN — FLUOXETINE HYDROCHLORIDE 40 MG: 20 CAPSULE ORAL at 21:25

## 2024-02-11 RX ADMIN — DOCUSATE SODIUM 50MG AND SENNOSIDES 8.6MG 2 TABLET: 8.6; 5 TABLET, FILM COATED ORAL at 08:06

## 2024-02-11 RX ADMIN — FUROSEMIDE 40 MG: 10 INJECTION, SOLUTION INTRAMUSCULAR; INTRAVENOUS at 08:06

## 2024-02-11 RX ADMIN — CLOPIDOGREL BISULFATE 75 MG: 75 TABLET, FILM COATED ORAL at 08:06

## 2024-02-11 RX ADMIN — PRAMIPEXOLE DIHYDROCHLORIDE 0.5 MG: 0.5 TABLET ORAL at 21:26

## 2024-02-11 RX ADMIN — ARFORMOTEROL TARTRATE 15 MCG: 15 SOLUTION RESPIRATORY (INHALATION) at 18:56

## 2024-02-11 RX ADMIN — CARVEDILOL 6.25 MG: 6.25 TABLET, FILM COATED ORAL at 08:06

## 2024-02-11 RX ADMIN — DOCUSATE SODIUM 50MG AND SENNOSIDES 8.6MG 2 TABLET: 8.6; 5 TABLET, FILM COATED ORAL at 21:25

## 2024-02-11 RX ADMIN — GABAPENTIN 100 MG: 100 CAPSULE ORAL at 08:06

## 2024-02-11 NOTE — PROGRESS NOTES
Pulmonary / Critical Care Progress Note      Patient Name: Falguni Minaya  : 1960  MRN: [id number]  Attending:  Teresa Castro*   Date of admission: 2024    Subjective   Subjective   Patient critically ill with hypoxemic respiratory failure    No acute events overnight  Tolerating nasal cannula, currently at 5 L  Reports feeling tired but overall stable  Creatinine with mild bump, 1.51 this morning   at bedside    Objective   Objective     Vitals:   Vital signs for last 24 hours:  Temp:  [97.4 °F (36.3 °C)-99 °F (37.2 °C)] 97.4 °F (36.3 °C)  Heart Rate:  [] 93  Resp:  [24-34] 24  BP: ()/(56-99) 113/80    Intake/Output last 3 shifts:  I/O last 3 completed shifts:  In: 340 [P.O.:340]  Out: 950 [Urine:950]  Intake/Output this shift:  I/O this shift:  In: -   Out: 200 [Urine:200]    Vent settings for last 24 hours:       Hemodynamic parameters for last 24 hours:       Physical Exam   Vital Signs Reviewed   General:  Alert, NAD.  Chronically ill-appearing female, lying in bed  HEENT:  PERRL, EOMI.    Neck:  No JVD, no thyromegaly  Lymph: no axillary, cervical, supraclavicular lymphadenopathy noted bilaterally  Chest: Diminished to auscultation bilaterally, no work of breathing noted on 5 L nasal cannula  CV: RRR, no M/G/R, pulses 2+  Abd:  Soft, NT, ND, +BS  EXT:  no clubbing, no cyanosis, lower extremity edema noted  Neuro:  A&Ox3, CN grossly intact, no focal deficits.  Skin: No rashes or lesions noted    Result Review    Result Review:  I have personally reviewed the results from the time of this admission to 2024 17:23 EST and agree with these findings:  [x]  Laboratory  [x]  Microbiology  [x]  Radiology  [x]  EKG/Telemetry   [x]  Cardiology/Vascular   []  Pathology  []  Old records  []  Other:  Most notable findings include:       Lab 24  0312 02/10/24  0325 24  0253 24  0307 24  0248 24  0253 24  0252 24  0246   WBC 14.15* 17.49*  18.57* 18.05* 20.38*  --  19.51* 18.21*   HEMOGLOBIN 10.4* 10.1* 9.9* 9.9* 9.7*  --  9.4* 9.8*   HEMATOCRIT 33.0* 32.8* 30.4* 30.8* 30.2*  --  29.1* 30.6*   PLATELETS 343 353 349 337 349  --  348 386   SODIUM 136 135* 134* 134* 135* 133*  --  135*   POTASSIUM 3.5 3.9 3.8 4.2 3.8 3.6  --  3.5   CHLORIDE 102 102 99 100 100 97*  --  98   CO2 16.1* 16.8* 16.7* 16.6* 19.3* 18.6*  --  18.9*   BUN 39* 43* 38* 48* 55* 58*  --  64*   CREATININE 1.51* 1.45* 1.41* 1.48* 1.45* 1.56*  --  2.10*   GLUCOSE 120* 129* 110* 117* 133* 103*  --  104*   CALCIUM 9.0 9.3 9.0 8.9 9.0 9.0  --  9.1   PHOSPHORUS 3.6 3.5 3.8 3.2 2.6 4.0  --  4.2   TOTAL PROTEIN 7.6 7.8 7.4 7.2 7.2 7.2  --  7.3   ALBUMIN 2.9* 2.9* 2.9* 2.8* 2.9* 2.7*  --  2.9*   GLOBULIN 4.7 4.9 4.5 4.4 4.3 4.5  --  4.4     CT Chest Without Contrast Diagnostic    Result Date: 1/31/2024  PROCEDURE: CT CHEST WO CONTRAST DIAGNOSTIC  COMPARISON: [facility], CR, XR CHEST 1 VW, 12/24/2023, 12:17.  [facility], CR, XR CHEST 1 VW, 1/31/2024, 10:31.  [facility], CT, CT ABDOMEN PELVIS W CONTRAST, 10/11/2023, 11:39.  INDICATIONS: Opacities, sob  TECHNIQUE: CT images were created without the administration of contrast material.   PROTOCOL:   Standard imaging protocol performed    RADIATION:   DLP: 481.3mGy*cm   Automated exposure control was utilized to minimize radiation dose.  FINDINGS:  No pleural or pericardial effusion is seen.  Calcified subcarinal lymph nodes are consistent with previous granulomatous disease.  No noncalcified adenopathy is seen.  Heart size is normal.  There is extensive ground-glass opacity throughout the lung fields bilaterally.  More focal areas of consolidation are also noted in both lung fields.  No pneumothorax is seen.  No significant bronchiectasis is present.  No endobronchial lesion is seen.  A cholecystectomy has been performed.  The upper abdomen appears unremarkable.  Moderate mid thoracic kyphosis is noted.       Impression:   1. Extensive bilateral ground-glass opacity with smaller foci of consolidation in both lung fields.  Finding is favored to represent pneumonia.  Pulmonary edema and aspiration would also be in the differential.     Tucker Marcial M.D.       Electronically Signed and Approved By: Tucker Marcial M.D. on 1/31/2024 at 14:32              Results for orders placed during the hospital encounter of 01/31/24    Adult Transthoracic Echo Complete W/ Cont if Necessary Per Protocol    Interpretation Summary    Very technically difficult study with limited views.    Left ventricular ejection fraction appears to be 56 - 60%.  No obvious regional wall motion abnormalities.    Left ventricular diastolic function is consistent with (grade I) impaired relaxation and age.    Mild tricuspid regurgitation with estimated right ventricular systolic pressure from tricuspid regurgitation is mildly elevated (35-45 mmHg).    Valves are not well-visualized but no significant valvular disease by Doppler.    Urine culture with E. Coli    Chest x-ray this morning with low lung volumes but clearing lung infiltrates      Assessment & Plan   Assessment / Plan     Active Hospital Problems:  Active Hospital Problems    Diagnosis     **Acute hypoxemic respiratory failure      Impression:  Acute hypoxic respiratory failure requiring Airvo  Severe sepsis present on admission  COVID-pneumonia +1/22  Community-acquired pneumonia from unspecified organism  E. coli urinary tract infection  Multiple sclerosis (patient has not been on immunosuppression for at least 3 to 4 months per family)  ?Immunosuppressed status on treatment for MS  Acute decompensated diastolic congestive heart failure  Atelectasis, bibasilar  Acute kidney injury secondary to prerenal etiology   Coronary artery disease  Lactic acidosis, clinically significant     Plan:  Currently on 5 L nasal cannula.  Patient is making significant improvement.  Continue to wean as  tolerated keep SpO2 greater than 90%.  Continue with aggressive airway clearance regimen  Continue to encourage patient out of bed and into chair and encourage incentive spirometer use  Continue nebulizers and bronchopulmonary hygiene  Holding Lasix 40 mg IV daily given rising creatinine and hypotension.  Continue with accurate daily I's and O's. Trend renal panel electrolytes.   Echocardiogram with diastolic dysfunction  Reduce Coreg to 6.25 mg twice daily   continue Unasyn for pneumonia and E. coli UTI x7 days total  Continue high-dose Decadron x 10 days given elevated inflammatory markers with significant COVID-19 pneumonia  Continue to trend inflammatory markers every 72 hours to ensure improvement  Continue sliding scale insulin to keep glucose 140-180 in the ICU  Appreciate palliative care assistance.  Patient is DNR/DNI.  Patient's family would like to get patient home with [facility] if possible  Will hold aspirin for now as patient is having some oozing with subcutaneous heparin.  Can continue Plavix for now.  Continue to monitor.  Discussed with family at bedside  DVT prophylaxis with Lovenox  GI prophylaxis with H2 blocker    DVT prophylaxis:  Medical DVT prophylaxis orders are present.    CODE STATUS:   Medical Intervention Limits: NO intubation (DNI); NO cardioversion  Level Of Support Discussed With: Patient  Code Status (Patient has no pulse and is not breathing): No CPR (Do Not Attempt to Resuscitate)  Medical Interventions (Patient has pulse or is breathing): Limited Support  Comments: d/w pt and /family    Patient is critically ill with acute hypoxic respiratory failure secondary to COVID-19 pneumonia, MACY, urinary tract infection. 32 minutes of critical care time was spent managing this patient, excluding procedures.  This included personally reviewing all pertinent labs, imaging, microbiology and documentation. Also discussing the case with the patient and any available family, the admitting  physician and any available ancillary staff.   Electronically signed by Audelia Duarte MD, 02/11/24, 5:26 PM EST.

## 2024-02-11 NOTE — PROGRESS NOTES
[facility]   Hospitalist Progress Note  Date: 2024  Patient Name: Falguni Minaya  : 1960  MRN: [id number]  Date of admission: 2024  Room/Bed: [facility]      Subjective   Subjective     Chief Complaint: AMS, SOB    Summary:Falguni Minaya is a 64 y.o. female with a history of MS immunocompromised, recently been transitioning medications for this, recurrent UTIs, diastolic CHF, CAD with prior PCI, CKD, who has been diagnosed with COVID-19 on  and treated with outpatient dexamethasone. She completed steroids and had progressively worsening symptoms and presented for further evaluation.     Interval Followup: No acute overnight events.  No acute distress.  Patient was resting comfortably in bed with her  at bedside.  Nursing reports that she did significantly desaturate with minimal movement earlier and supplemental O2 was increased.  She is currently comfortable in no respiratory distress.  She can complete full sentences.  She denies any chest pain, abdominal pain, or lower extremity swelling.    Review of Systems    All systems reviewed and negative except for what is outlined above.      Objective   Objective     Vitals:   Temp:  [97.5 °F (36.4 °C)-99 °F (37.2 °C)] 98.4 °F (36.9 °C)  Heart Rate:  [] 94  Resp:  [24-34] 24  BP: ()/(56-99) 100/67  Flow (L/min):  [3-5] 4    Physical Exam   Gen: NAD, Alert and Oriented  Cards: RRR, no murmur   Pulm: Decreased air movement bilaterally, no wheezing, bilateral rhonchi, wearing nasal cannula  Abd: soft, nondistended  Extremities: no pitting edema    Result Review    Result Review:  I have personally reviewed these results:  [x]  Laboratory      Lab 24  0312 02/10/24  0610 02/10/24  0325 24  0857 24  0253 24  0307 24  0248 24  0252 24  0246   WBC 14.15*  --  17.49*  --  18.57*   < > 20.38* 19.51* 18.21*   HEMOGLOBIN 10.4*  --  10.1*  --  9.9*   < > 9.7* 9.4* 9.8*   HEMATOCRIT 33.0*  --  32.8*  --   30.4*   < > 30.2* 29.1* 30.6*   PLATELETS 343  --  353  --  349   < > 349 348 386   NEUTROS ABS  --   --   --   --   --   --  18.79* 17.74* 16.20*   IMMATURE GRANS (ABS)  --   --   --   --   --   --  0.33* 0.35* 0.35*   LYMPHS ABS  --   --   --   --   --   --  0.56* 0.60* 0.63*   MONOS ABS  --   --   --   --   --   --  0.66 0.78 1.01*   EOS ABS  --   --   --   --   --   --  0.01 0.01 0.00   MCV 96.8  --  97.0  --  95.3   < > 96.2 95.1 95.3   CRP  --   --   --   --   --   --   --   --  8.98*   LACTATE  --  1.3  --   --   --   --   --   --   --    PROTIME  --   --   --  12.9  --   --   --   --   --    APTT  --   --   --  31.2  --   --   --   --   --     < > = values in this interval not displayed.         Lab 02/11/24  0312 02/10/24  0325 02/09/24  0253   SODIUM 136 135* 134*   POTASSIUM 3.5 3.9 3.8   CHLORIDE 102 102 99   CO2 16.1* 16.8* 16.7*   ANION GAP 17.9* 16.2* 18.3*   BUN 39* 43* 38*   CREATININE 1.51* 1.45* 1.41*   EGFR 38.2* 40.1* 41.5*   GLUCOSE 120* 129* 110*   CALCIUM 9.0 9.3 9.0   MAGNESIUM 2.2 2.2 2.2   PHOSPHORUS 3.6 3.5 3.8         Lab 02/11/24  0312 02/10/24  0325 02/09/24  0253   TOTAL PROTEIN 7.6 7.8 7.4   ALBUMIN 2.9* 2.9* 2.9*   GLOBULIN 4.7 4.9 4.5   ALT (SGPT) 24 23 21   AST (SGOT) 28 26 27   BILIRUBIN 0.5 0.5 0.5   ALK PHOS 142* 151* 134*         Lab 02/09/24  0857   PROTIME 12.9   INR 0.95                 Brief Urine Lab Results  (Last result in the past 365 days)        Color   Clarity   Blood   Leuk Est   Nitrite   Protein   CREAT   Urine HCG        02/11/24 1300 Yellow   Clear   Negative   Trace   Negative   Negative                 [x]  Microbiology   Microbiology Results (last 10 days)       ** No results found for the last 240 hours. **          [x]  Radiology  XR Chest 1 View    Result Date: 2/7/2024   Worsening radiographic appearance of pneumonia       JAYDON MULLIGAN MD       Electronically Signed and Approved By: JAYDON MULLIGAN MD on 2/07/2024 at 9:24             XR Chest 1  View    Result Date: 2/4/2024   Low lung volumes.  There has been some improvement in appearance of pneumonia on the right, with persistent left-sided infiltrates noted       JAYDON MULLIGAN MD       Electronically Signed and Approved By: JAYDON MULLIGAN MD on 2/04/2024 at 11:09            []  EKG/Telemetry   []  Cardiology/Vascular   []  Pathology  []  Old records  []  Other:    Assessment & Plan   Assessment / Plan     Assessment:  Acute hypoxemic respiratory failure requiring Airvo  Sepsis, present on admission, 2/2 UTI and PNA  Gram-negative bacilli UTI, E. Coli  Secondary bacterial pneumonia, unknown organism  Acute on Chronic HFrecEF (previously reduced now with recovered EF of 62%)   CAD, s/p PCI to RCA   COVID-19 pneumonia with initial testing positive 1/22  Immunocompromised in setting of multiple sclerosis currently in limbo between treatments (most recently on Vumerity)   Anion gap metabolic acidosis  Transaminitis  History of hypertension with low blood pressure on arrival  CKD possibly 3 baseline creatinine 1.1-1.3  Seizure disorder  GERD  Anxiety/depression  Hyperlipidemia  Morbid obesity  Suspect underlying dementia, intermittent metabolic encephalopthy     Plan:  Continue inpatient admission and care   COVID 19 - completed 10 days high dose steroids   Bacterial PNA, unknown organism - completed 5 days Unasyn for CAP   CXR 2/7 with worsening infiltrate, has been getting lasix, O2 requirement up today. Check repeat CXR.   E. Coli UTI - completed course of Unasyn  HFrecEF: Losartan on hold due to hypotension, Coreg 6.25mg BID. No spironolactone due to BP. Not on Farxiga.   CHF exac: IV lasix on hold due to BP. Clinically looks euvolemic.   Continue PPI  Continue reduced dose Gabapentin   Continue ASA, plavix, statin   Continue prozac and zyprexa   Continue Brovana, pulmicort, and PRN duonebs   Cardiac diet   3L supplemental O2, wean as tolerated  Patient has significant bruising and some bleeding from her  DVT prophylaxis heparin.  Will transition her to daily IV Lovenox reduced dose.  Creatinine stable at 1.5  AM  labs     Disposition: Palliative care team is following and has given them information on possible transition to palliative/hospice care.  I discussed with them in detail that given her comorbidities this would be appropriate.  However, if she should wish to continue treatment she will need aggressive physical therapy and pulmonary rehab.  She will probably be on home O2 going forward.  They are going to think about their options and speak to palliative care again tomorrow.       Discussed with RN.    DVT prophylaxis:  Medical DVT prophylaxis orders are present.        CODE STATUS:   Medical Intervention Limits: NO intubation (DNI); NO cardioversion  Level Of Support Discussed With: Patient  Code Status (Patient has no pulse and is not breathing): No CPR (Do Not Attempt to Resuscitate)  Medical Interventions (Patient has pulse or is breathing): Limited Support  Comments: d/w pt and /family      Electronically signed by Teresa Castro DO, 2/11/2024, 13:38 EST.

## 2024-02-11 NOTE — PLAN OF CARE
Goal Outcome Evaluation:      Patient has had soft blood pressures with a map greater than 65. Patient is on 4 l humified oxygen sats 94-96%. External catheter placed due to skin break down due to moisture, barrier applied and turned q 2 hours. CM: ST with biphasic p waves

## 2024-02-12 LAB
ALBUMIN SERPL-MCNC: 2.8 G/DL (ref 3.5–5.2)
ALBUMIN/GLOB SERPL: 0.6 G/DL
ALP SERPL-CCNC: 156 U/L (ref 39–117)
ALT SERPL W P-5'-P-CCNC: 23 U/L (ref 1–33)
ANION GAP SERPL CALCULATED.3IONS-SCNC: 16.9 MMOL/L (ref 5–15)
AST SERPL-CCNC: 25 U/L (ref 1–32)
BASOPHILS # BLD AUTO: 0.03 10*3/MM3 (ref 0–0.2)
BASOPHILS NFR BLD AUTO: 0.3 % (ref 0–1.5)
BILIRUB SERPL-MCNC: 0.5 MG/DL (ref 0–1.2)
BUN SERPL-MCNC: 39 MG/DL (ref 8–23)
BUN/CREAT SERPL: 25.8 (ref 7–25)
CALCIUM SPEC-SCNC: 9.3 MG/DL (ref 8.6–10.5)
CHLORIDE SERPL-SCNC: 100 MMOL/L (ref 98–107)
CO2 SERPL-SCNC: 17.1 MMOL/L (ref 22–29)
CREAT SERPL-MCNC: 1.51 MG/DL (ref 0.57–1)
DEPRECATED RDW RBC AUTO: 64.5 FL (ref 37–54)
EGFRCR SERPLBLD CKD-EPI 2021: 38.2 ML/MIN/1.73
EOSINOPHIL # BLD AUTO: 0.26 10*3/MM3 (ref 0–0.4)
EOSINOPHIL NFR BLD AUTO: 2.5 % (ref 0.3–6.2)
ERYTHROCYTE [DISTWIDTH] IN BLOOD BY AUTOMATED COUNT: 17.2 % (ref 12.3–15.4)
GLOBULIN UR ELPH-MCNC: 4.8 GM/DL
GLUCOSE BLDC GLUCOMTR-MCNC: 123 MG/DL (ref 70–99)
GLUCOSE BLDC GLUCOMTR-MCNC: 132 MG/DL (ref 70–99)
GLUCOSE BLDC GLUCOMTR-MCNC: 149 MG/DL (ref 70–99)
GLUCOSE BLDC GLUCOMTR-MCNC: 149 MG/DL (ref 70–99)
GLUCOSE SERPL-MCNC: 137 MG/DL (ref 65–99)
HCT VFR BLD AUTO: 36.4 % (ref 34–46.6)
HGB BLD-MCNC: 11.1 G/DL (ref 12–15.9)
IMM GRANULOCYTES # BLD AUTO: 0.13 10*3/MM3 (ref 0–0.05)
IMM GRANULOCYTES NFR BLD AUTO: 1.3 % (ref 0–0.5)
LYMPHOCYTES # BLD AUTO: 0.61 10*3/MM3 (ref 0.7–3.1)
LYMPHOCYTES NFR BLD AUTO: 5.9 % (ref 19.6–45.3)
MAGNESIUM SERPL-MCNC: 2.2 MG/DL (ref 1.6–2.4)
MCH RBC QN AUTO: 30.7 PG (ref 26.6–33)
MCHC RBC AUTO-ENTMCNC: 30.5 G/DL (ref 31.5–35.7)
MCV RBC AUTO: 100.8 FL (ref 79–97)
MONOCYTES # BLD AUTO: 0.61 10*3/MM3 (ref 0.1–0.9)
MONOCYTES NFR BLD AUTO: 5.9 % (ref 5–12)
NEUTROPHILS NFR BLD AUTO: 8.73 10*3/MM3 (ref 1.7–7)
NEUTROPHILS NFR BLD AUTO: 84.1 % (ref 42.7–76)
NRBC BLD AUTO-RTO: 0.2 /100 WBC (ref 0–0.2)
PHOSPHATE SERPL-MCNC: 3.7 MG/DL (ref 2.5–4.5)
PLATELET # BLD AUTO: 327 10*3/MM3 (ref 140–450)
PMV BLD AUTO: 12 FL (ref 6–12)
POTASSIUM SERPL-SCNC: 3.5 MMOL/L (ref 3.5–5.2)
PROT SERPL-MCNC: 7.6 G/DL (ref 6–8.5)
RBC # BLD AUTO: 3.61 10*6/MM3 (ref 3.77–5.28)
SODIUM SERPL-SCNC: 134 MMOL/L (ref 136–145)
WBC NRBC COR # BLD AUTO: 10.37 10*3/MM3 (ref 3.4–10.8)

## 2024-02-12 PROCEDURE — 80053 COMPREHEN METABOLIC PANEL: CPT | Performed by: INTERNAL MEDICINE

## 2024-02-12 PROCEDURE — 85025 COMPLETE CBC W/AUTO DIFF WBC: CPT | Performed by: STUDENT IN AN ORGANIZED HEALTH CARE EDUCATION/TRAINING PROGRAM

## 2024-02-12 PROCEDURE — 82948 REAGENT STRIP/BLOOD GLUCOSE: CPT | Performed by: NURSE PRACTITIONER

## 2024-02-12 PROCEDURE — 83735 ASSAY OF MAGNESIUM: CPT | Performed by: PHYSICIAN ASSISTANT

## 2024-02-12 PROCEDURE — 94799 UNLISTED PULMONARY SVC/PX: CPT

## 2024-02-12 PROCEDURE — 25010000002 ENOXAPARIN PER 10 MG: Performed by: STUDENT IN AN ORGANIZED HEALTH CARE EDUCATION/TRAINING PROGRAM

## 2024-02-12 PROCEDURE — 82948 REAGENT STRIP/BLOOD GLUCOSE: CPT

## 2024-02-12 PROCEDURE — 36415 COLL VENOUS BLD VENIPUNCTURE: CPT | Performed by: INTERNAL MEDICINE

## 2024-02-12 PROCEDURE — 84100 ASSAY OF PHOSPHORUS: CPT | Performed by: PHYSICIAN ASSISTANT

## 2024-02-12 PROCEDURE — 99232 SBSQ HOSP IP/OBS MODERATE 35: CPT | Performed by: STUDENT IN AN ORGANIZED HEALTH CARE EDUCATION/TRAINING PROGRAM

## 2024-02-12 PROCEDURE — 94761 N-INVAS EAR/PLS OXIMETRY MLT: CPT

## 2024-02-12 PROCEDURE — 94664 DEMO&/EVAL PT USE INHALER: CPT

## 2024-02-12 RX ADMIN — BUDESONIDE 0.5 MG: 0.5 SUSPENSION RESPIRATORY (INHALATION) at 20:00

## 2024-02-12 RX ADMIN — GABAPENTIN 100 MG: 100 CAPSULE ORAL at 09:07

## 2024-02-12 RX ADMIN — FAMOTIDINE 20 MG: 20 TABLET ORAL at 09:07

## 2024-02-12 RX ADMIN — PRAMIPEXOLE DIHYDROCHLORIDE 0.5 MG: 0.5 TABLET ORAL at 20:42

## 2024-02-12 RX ADMIN — HYDROCORTISONE 1 APPLICATION: 1 OINTMENT TOPICAL at 22:47

## 2024-02-12 RX ADMIN — ARFORMOTEROL TARTRATE 15 MCG: 15 SOLUTION RESPIRATORY (INHALATION) at 20:00

## 2024-02-12 RX ADMIN — FLUOXETINE HYDROCHLORIDE 40 MG: 20 CAPSULE ORAL at 09:07

## 2024-02-12 RX ADMIN — ATORVASTATIN CALCIUM 40 MG: 40 TABLET, FILM COATED ORAL at 09:07

## 2024-02-12 RX ADMIN — OLANZAPINE 2.5 MG: 2.5 TABLET, FILM COATED ORAL at 09:07

## 2024-02-12 RX ADMIN — ENOXAPARIN SODIUM 30 MG: 100 INJECTION SUBCUTANEOUS at 15:18

## 2024-02-12 RX ADMIN — GABAPENTIN 100 MG: 100 CAPSULE ORAL at 15:18

## 2024-02-12 RX ADMIN — GABAPENTIN 100 MG: 100 CAPSULE ORAL at 20:42

## 2024-02-12 RX ADMIN — Medication 10 ML: at 09:13

## 2024-02-12 RX ADMIN — DOCUSATE SODIUM 50MG AND SENNOSIDES 8.6MG 2 TABLET: 8.6; 5 TABLET, FILM COATED ORAL at 20:42

## 2024-02-12 RX ADMIN — ARFORMOTEROL TARTRATE 15 MCG: 15 SOLUTION RESPIRATORY (INHALATION) at 07:04

## 2024-02-12 RX ADMIN — Medication 10 ML: at 22:47

## 2024-02-12 RX ADMIN — FLUOXETINE HYDROCHLORIDE 40 MG: 20 CAPSULE ORAL at 20:42

## 2024-02-12 RX ADMIN — CARVEDILOL 6.25 MG: 6.25 TABLET, FILM COATED ORAL at 09:07

## 2024-02-12 RX ADMIN — BUDESONIDE 0.5 MG: 0.5 SUSPENSION RESPIRATORY (INHALATION) at 07:04

## 2024-02-12 RX ADMIN — Medication 250 MG: at 20:42

## 2024-02-12 RX ADMIN — CLOPIDOGREL BISULFATE 75 MG: 75 TABLET, FILM COATED ORAL at 09:08

## 2024-02-12 RX ADMIN — CARVEDILOL 6.25 MG: 6.25 TABLET, FILM COATED ORAL at 18:25

## 2024-02-12 RX ADMIN — Medication 250 MG: at 09:07

## 2024-02-12 NOTE — PLAN OF CARE
Goal Outcome Evaluation:              Outcome Evaluation: No acute changes this shift. Remains on 4LPM O2, has slept well. Incontinent of smear BM this shift. No needs for PRN meds.

## 2024-02-12 NOTE — PROGRESS NOTES
Pulmonary / Critical Care Progress Note      Patient Name: Falguni Minaya  : 1960  MRN: [id number]  Attending:  Teresa Castro*   Date of admission: 2024    Subjective   Subjective   Patient critically ill with hypoxic respiratory failure, COVID-19 infection, E. coli urinary tract infection, multiple sclerosis, immunosuppressed status.    Over last 24 hours,  Patient remained on Brovana, Pulmicort.  Remained on Lasix 40 mg IV daily.  Continued with airway clearance therapies.  Had sudden worsening hypoxia with need of Airvo.    No acute events overnight.  Weaned down on oxygen.    This morning,  Tolerating high flow nasal oxygen.  Has poor cough.  Has some chest discomfort.  No nausea or vomiting.  No fever or chills.  Reports feeling tired but overall stable  Creatinine with mild bump, 1.51 this morning   at bedside    ROS:  General:  Fatigue, No Fever  HEENT: No dysphagia, No Visual Changes, no rhinorrhea  Respiratory:  + cough,+Dyspnea, scant phlegm, No Pleuritic Pain, + wheezing  Cardiovascular: Denies chest pain, denies palpitations,+BARRAZA, No Chest Pressure  Gastrointestinal:  No Abdominal Pain, No Nausea, No Vomiting, No Diarrhea  Genitourinary:  No Dysuria, No Frequency, No Hesitancy  Musculoskeletal: No muscle pain or swelling  Endocrine:  No Heat Intolerance, No Cold Intolerance, Fatigue  Neurologic:  No Confusion, no Dysarthria, No Headaches  Skin:  No Rash, No Open Wounds      Objective   Objective     Vitals:   Vital signs for last 24 hours:  Temp:  [97.4 °F (36.3 °C)-97.9 °F (36.6 °C)] 97.8 °F (36.6 °C)  Heart Rate:  [] 94  Resp:  [22-34] 24  BP: ()/(47-90) 129/90    Intake/Output last 3 shifts:  I/O last 3 completed shifts:  In: 360 [P.O.:360]  Out: 450 [Urine:450]  Intake/Output this shift:  No intake/output data recorded.    Vent settings for last 24 hours:       Hemodynamic parameters for last 24 hours:       Physical Exam   Vital Signs Reviewed   General:   Alert, NAD.  Chronically ill-appearing female, lying in bed  HEENT:  PERRL, EOMI.    Neck:  No JVD, no thyromegaly  Lymph: no axillary, cervical, supraclavicular lymphadenopathy noted bilaterally  Chest: Diminished to auscultation bilaterally, no work of breathing noted on high flow nasal cannula  CV: RRR, no M/G/R, pulses 2+  Abd:  Soft, NT, ND, +BS  EXT:  no clubbing, no cyanosis, lower extremity edema noted  Neuro:  A&Ox3, CN grossly intact, no focal deficits.  Skin: No rashes or lesions noted    Result Review    Result Review:  I have personally reviewed the results from the time of this admission to 2/12/2024 07:44 EST and agree with these findings:  [x]  Laboratory  [x]  Microbiology  [x]  Radiology  [x]  EKG/Telemetry   [x]  Cardiology/Vascular   []  Pathology  []  Old records  []  Other:  Most notable findings include:       Lab 02/12/24  0700 02/12/24  0510 02/11/24  0312 02/10/24  0325 02/09/24  0253 02/08/24  0307 02/07/24  0248 02/06/24  0253 02/06/24  0252   WBC  --  10.37 14.15* 17.49* 18.57* 18.05* 20.38*  --  19.51*   HEMOGLOBIN  --  11.1* 10.4* 10.1* 9.9* 9.9* 9.7*  --  9.4*   HEMATOCRIT  --  36.4 33.0* 32.8* 30.4* 30.8* 30.2*  --  29.1*   PLATELETS  --  327 343 353 349 337 349  --  348   SODIUM 134*  --  136 135* 134* 134* 135* 133*  --    POTASSIUM 3.5  --  3.5 3.9 3.8 4.2 3.8 3.6  --    CHLORIDE 100  --  102 102 99 100 100 97*  --    CO2 17.1*  --  16.1* 16.8* 16.7* 16.6* 19.3* 18.6*  --    BUN 39*  --  39* 43* 38* 48* 55* 58*  --    CREATININE 1.51*  --  1.51* 1.45* 1.41* 1.48* 1.45* 1.56*  --    GLUCOSE 137*  --  120* 129* 110* 117* 133* 103*  --    CALCIUM 9.3  --  9.0 9.3 9.0 8.9 9.0 9.0  --    PHOSPHORUS 3.7  --  3.6 3.5 3.8 3.2 2.6 4.0  --    TOTAL PROTEIN 7.6  --  7.6 7.8 7.4 7.2 7.2 7.2  --    ALBUMIN 2.8*  --  2.9* 2.9* 2.9* 2.8* 2.9* 2.7*  --    GLOBULIN 4.8  --  4.7 4.9 4.5 4.4 4.3 4.5  --      CT Chest Without Contrast Diagnostic    Result Date: 1/31/2024  PROCEDURE: CT CHEST WO  CONTRAST DIAGNOSTIC  COMPARISON: [facility], CR, XR CHEST 1 VW, 12/24/2023, 12:17.  [facility], CR, XR CHEST 1 VW, 1/31/2024, 10:31.  [facility], CT, CT ABDOMEN PELVIS W CONTRAST, 10/11/2023, 11:39.  INDICATIONS: Opacities, sob  TECHNIQUE: CT images were created without the administration of contrast material.   PROTOCOL:   Standard imaging protocol performed    RADIATION:   DLP: 481.3mGy*cm   Automated exposure control was utilized to minimize radiation dose.  FINDINGS:  No pleural or pericardial effusion is seen.  Calcified subcarinal lymph nodes are consistent with previous granulomatous disease.  No noncalcified adenopathy is seen.  Heart size is normal.  There is extensive ground-glass opacity throughout the lung fields bilaterally.  More focal areas of consolidation are also noted in both lung fields.  No pneumothorax is seen.  No significant bronchiectasis is present.  No endobronchial lesion is seen.  A cholecystectomy has been performed.  The upper abdomen appears unremarkable.  Moderate mid thoracic kyphosis is noted.      Impression:   1. Extensive bilateral ground-glass opacity with smaller foci of consolidation in both lung fields.  Finding is favored to represent pneumonia.  Pulmonary edema and aspiration would also be in the differential.     Tucker Marcial M.D.       Electronically Signed and Approved By: Tucker Marcial M.D. on 1/31/2024 at 14:32              Results for orders placed during the hospital encounter of 01/31/24    Adult Transthoracic Echo Complete W/ Cont if Necessary Per Protocol    Interpretation Summary    Very technically difficult study with limited views.    Left ventricular ejection fraction appears to be 56 - 60%.  No obvious regional wall motion abnormalities.    Left ventricular diastolic function is consistent with (grade I) impaired relaxation and age.    Mild tricuspid regurgitation with estimated right ventricular systolic pressure  from tricuspid regurgitation is mildly elevated (35-45 mmHg).    Valves are not well-visualized but no significant valvular disease by Doppler.    Urine culture with E. Coli    Chest x-ray this morning with low lung volumes but clearing lung infiltrates      Assessment & Plan   Assessment / Plan     Active Hospital Problems:  Active Hospital Problems    Diagnosis     **Acute hypoxemic respiratory failure      Impression:  Acute hypoxic respiratory failure requiring Airvo  Severe sepsis present on admission  COVID-pneumonia +1/22  Community-acquired pneumonia from unspecified organism  E. coli urinary tract infection  Multiple sclerosis (patient has not been on immunosuppression for at least 3 to 4 months per family)  ?Immunosuppressed status on treatment for MS  Acute decompensated diastolic congestive heart failure  Atelectasis, bibasilar  Acute kidney injury secondary to prerenal etiology   Coronary artery disease  Lactic acidosis, clinically significant     Plan:  Currently on high flow nasal cannula.  Patient is making significant improvement.  Continue to wean as tolerated keep SpO2 greater than 90%.  Continue with aggressive airway clearance regimen  Continue to encourage patient out of bed and into chair and encourage incentive spirometer use.  Continue nebulizers and bronchopulmonary hygiene  Holding Lasix 40 mg IV daily given rising creatinine and hypotension.  Continue with accurate daily I's and O's. Trend renal panel electrolytes.   Echocardiogram with diastolic dysfunction  Reduce Coreg to 6.25 mg twice daily   continue Unasyn for pneumonia and E. coli UTI.  Continue Decadron x 10 days given elevated inflammatory markers with significant COVID-19 pneumonia.  Continue to trend inflammatory markers every 72 hours to ensure improvement  Continue sliding scale insulin to keep glucose 140-180 in the ICU  Appreciate palliative care assistance.  Patient is DNR/DNI.  Patient's family would like to get patient  home with [facility] if possible.  Will hold aspirin for now as patient is having some oozing with subcutaneous heparin.  Can continue Plavix for now.  Continue to monitor.  Discussed with family at bedside.  DVT prophylaxis with Lovenox..  GI prophylaxis with H2 blocker.    DVT prophylaxis:  Medical DVT prophylaxis orders are present.    CODE STATUS:   Medical Intervention Limits: NO intubation (DNI); NO cardioversion  Level Of Support Discussed With: Patient  Code Status (Patient has no pulse and is not breathing): No CPR (Do Not Attempt to Resuscitate)  Medical Interventions (Patient has pulse or is breathing): Limited Support  Comments: d/w pt and /family    Patient is critically ill with acute hypoxic respiratory failure secondary to COVID-19 pneumonia, MACY, urinary tract infection. 31 minutes of critical care time was spent managing this patient, excluding procedures.  This included personally reviewing all pertinent labs, imaging, microbiology and documentation. Also discussing the case with the patient and any available family, the admitting physician and any available ancillary staff.     Electronically signed by Oli Colon MD, 02/12/24, 7:44 AM EST.

## 2024-02-12 NOTE — PROGRESS NOTES
[facility]   Hospitalist Progress Note  Date: 2024  Patient Name: Falguni Minaya  : 1960  MRN: [id number]  Date of admission: 2024  Room/Bed: [facility]/      Subjective   Subjective     Chief Complaint: AMS, SOB    Summary:Falguni Minaya is a 64 y.o. female with a history of MS immunocompromised, recently been transitioning medications for this, recurrent UTIs, diastolic CHF, CAD with prior PCI, CKD, who has been diagnosed with COVID-19 on  and treated with outpatient dexamethasone. She completed steroids and had progressively worsening symptoms and presented for further evaluation.     Interval Followup: No acute overnight events.  No acute distress.  Patient is resting comfortably in bed.  She is unaware of why her oxygen was turned up to 9 L, she was down to 7 L went into the room and satting 100%.  I titrated her down to 4 L and she was maintaining her saturations at 96%.  I encouraged her to ambulate and get out of bed today.  Nursing is going to try and find her a bedside recliner.      Review of Systems    All systems reviewed and negative except for what is outlined above.      Objective   Objective     Vitals:   Temp:  [97.5 °F (36.4 °C)-97.8 °F (36.6 °C)] 97.7 °F (36.5 °C)  Heart Rate:  [] 91  Resp:  [18-34] 18  BP: ()/(47-90) 100/76  Flow (L/min):  [4-9] 4    Physical Exam   Gen: NAD, Alert and Oriented  Cards: RRR, no murmur   Pulm: Decreased air movement bilaterally, no wheezing, bilateral rhonchi, wearing nasal cannula  Abd: soft, nondistended  Extremities: no pitting edema    Result Review    Result Review:  I have personally reviewed these results:  [x]  Laboratory      Lab 24  0510 24  0312 02/10/24  0610 02/10/24  0325 24  0857 24  0307 24  0248 24  0252   WBC 10.37 14.15*  --  17.49*  --    < > 20.38* 19.51*   HEMOGLOBIN 11.1* 10.4*  --  10.1*  --    < > 9.7* 9.4*   HEMATOCRIT 36.4 33.0*  --  32.8*  --    < > 30.2* 29.1*    PLATELETS 327 343  --  353  --    < > 349 348   NEUTROS ABS 8.73*  --   --   --   --   --  18.79* 17.74*   IMMATURE GRANS (ABS) 0.13*  --   --   --   --   --  0.33* 0.35*   LYMPHS ABS 0.61*  --   --   --   --   --  0.56* 0.60*   MONOS ABS 0.61  --   --   --   --   --  0.66 0.78   EOS ABS 0.26  --   --   --   --   --  0.01 0.01   .8* 96.8  --  97.0  --    < > 96.2 95.1   LACTATE  --   --  1.3  --   --   --   --   --    PROTIME  --   --   --   --  12.9  --   --   --    APTT  --   --   --   --  31.2  --   --   --     < > = values in this interval not displayed.         Lab 02/12/24  0700 02/11/24  0312 02/10/24  0325   SODIUM 134* 136 135*   POTASSIUM 3.5 3.5 3.9   CHLORIDE 100 102 102   CO2 17.1* 16.1* 16.8*   ANION GAP 16.9* 17.9* 16.2*   BUN 39* 39* 43*   CREATININE 1.51* 1.51* 1.45*   EGFR 38.2* 38.2* 40.1*   GLUCOSE 137* 120* 129*   CALCIUM 9.3 9.0 9.3   MAGNESIUM 2.2 2.2 2.2   PHOSPHORUS 3.7 3.6 3.5         Lab 02/12/24  0700 02/11/24  0312 02/10/24  0325   TOTAL PROTEIN 7.6 7.6 7.8   ALBUMIN 2.8* 2.9* 2.9*   GLOBULIN 4.8 4.7 4.9   ALT (SGPT) 23 24 23   AST (SGOT) 25 28 26   BILIRUBIN 0.5 0.5 0.5   ALK PHOS 156* 142* 151*         Lab 02/09/24  0857   PROTIME 12.9   INR 0.95                 Brief Urine Lab Results  (Last result in the past 365 days)        Color   Clarity   Blood   Leuk Est   Nitrite   Protein   CREAT   Urine HCG        02/11/24 1300 Yellow   Clear   Negative   Trace   Negative   Negative                 [x]  Microbiology   Microbiology Results (last 10 days)       ** No results found for the last 240 hours. **          [x]  Radiology  XR Chest 1 View    Result Date: 2/11/2024    No significant change in comparison to 2/7/2024.       FADI BRINK MD       Electronically Signed and Approved By: FADI BRINK MD on 2/11/2024 at 14:16             XR Chest 1 View    Result Date: 2/7/2024   Worsening radiographic appearance of pneumonia       JAYDON MULLIGAN MD       Electronically Signed  and Approved By: JAYDON MULLIGAN MD on 2/07/2024 at 9:24            []  EKG/Telemetry   []  Cardiology/Vascular   []  Pathology  []  Old records  []  Other:    Assessment & Plan   Assessment / Plan     Assessment:  Acute hypoxemic respiratory failure requiring Airvo  Sepsis, present on admission, 2/2 UTI and PNA  Gram-negative bacilli UTI, E. Coli  Secondary bacterial pneumonia, unknown organism  Acute on Chronic HFrecEF (previously reduced now with recovered EF of 62%)   CAD, s/p PCI to RCA   COVID-19 pneumonia with initial testing positive 1/22  Immunocompromised in setting of multiple sclerosis currently in limbo between treatments (most recently on Vumerity)   Anion gap metabolic acidosis  Transaminitis  History of hypertension with low blood pressure on arrival  CKD possibly 3 baseline creatinine 1.1-1.3  Seizure disorder  GERD  Anxiety/depression  Hyperlipidemia  Morbid obesity  Suspect underlying dementia, intermittent metabolic encephalopthy     Plan:  Continue inpatient admission and care   COVID 19 - completed 10 days high dose steroids   Bacterial PNA, unknown organism - completed 5 days Unasyn for CAP   CXR stable.   E. Coli UTI - completed course of Unasyn  HFrecEF: Losartan on hold due to hypotension, Coreg 6.25mg BID. No spironolactone due to BP. Not on Farxiga.   CHF exac: IV lasix on hold due to BP. Clinically looks euvolemic.   Continue PPI  Continue reduced dose Gabapentin   Continue ASA, plavix, statin   Continue prozac and zyprexa   Continue Brovana, pulmicort, and PRN duonebs   Cardiac diet   4L supplemental O2, wean as tolerated, goal SpO2 greater than 90%.  Continue daily IV Lovenox reduced dose.  Creatinine stable at 1.5  AM  labs     Disposition: Palliative care team is following and has given them information on possible transition to palliative/hospice care.  I discussed with them in detail that given her comorbidities this would be appropriate.  However, if she should wish to continue  treatment she will need aggressive physical therapy and pulmonary rehab.  She will probably be on home O2 going forward.  They are going to think about their options and speak to palliative care again tomorrow.       Discussed with RN.    DVT prophylaxis:  Medical DVT prophylaxis orders are present.        CODE STATUS:   Medical Intervention Limits: NO intubation (DNI); NO cardioversion  Level Of Support Discussed With: Patient  Code Status (Patient has no pulse and is not breathing): No CPR (Do Not Attempt to Resuscitate)  Medical Interventions (Patient has pulse or is breathing): Limited Support  Comments: d/w pt and /family      Electronically signed by Teresa Castro DO, 2/12/2024, 12:47 EST.

## 2024-02-12 NOTE — SIGNIFICANT NOTE
02/12/24 0958   Plan   Plan Patient currently down to 9 L NC. Will follow for lowered oxygen and send rehab referrals when appropriate. Will speak with patient and spouse to determine preferred facilities.

## 2024-02-13 LAB
ALBUMIN SERPL-MCNC: 2.7 G/DL (ref 3.5–5.2)
ALBUMIN/GLOB SERPL: 0.6 G/DL
ALP SERPL-CCNC: 154 U/L (ref 39–117)
ALT SERPL W P-5'-P-CCNC: 23 U/L (ref 1–33)
ANION GAP SERPL CALCULATED.3IONS-SCNC: 14.4 MMOL/L (ref 5–15)
AST SERPL-CCNC: 26 U/L (ref 1–32)
BASOPHILS # BLD AUTO: 0.02 10*3/MM3 (ref 0–0.2)
BASOPHILS NFR BLD AUTO: 0.2 % (ref 0–1.5)
BILIRUB SERPL-MCNC: 0.6 MG/DL (ref 0–1.2)
BUN SERPL-MCNC: 36 MG/DL (ref 8–23)
BUN/CREAT SERPL: 25.9 (ref 7–25)
CALCIUM SPEC-SCNC: 9.3 MG/DL (ref 8.6–10.5)
CHLORIDE SERPL-SCNC: 101 MMOL/L (ref 98–107)
CO2 SERPL-SCNC: 17.6 MMOL/L (ref 22–29)
CREAT SERPL-MCNC: 1.39 MG/DL (ref 0.57–1)
DEPRECATED RDW RBC AUTO: 59.2 FL (ref 37–54)
EGFRCR SERPLBLD CKD-EPI 2021: 42.2 ML/MIN/1.73
EOSINOPHIL # BLD AUTO: 0.24 10*3/MM3 (ref 0–0.4)
EOSINOPHIL NFR BLD AUTO: 2.4 % (ref 0.3–6.2)
ERYTHROCYTE [DISTWIDTH] IN BLOOD BY AUTOMATED COUNT: 16.8 % (ref 12.3–15.4)
GLOBULIN UR ELPH-MCNC: 4.7 GM/DL
GLUCOSE BLDC GLUCOMTR-MCNC: 119 MG/DL (ref 70–99)
GLUCOSE BLDC GLUCOMTR-MCNC: 128 MG/DL (ref 70–99)
GLUCOSE BLDC GLUCOMTR-MCNC: 135 MG/DL (ref 70–99)
GLUCOSE BLDC GLUCOMTR-MCNC: 140 MG/DL (ref 70–99)
GLUCOSE SERPL-MCNC: 124 MG/DL (ref 65–99)
HCT VFR BLD AUTO: 33.4 % (ref 34–46.6)
HGB BLD-MCNC: 10.8 G/DL (ref 12–15.9)
IMM GRANULOCYTES # BLD AUTO: 0.16 10*3/MM3 (ref 0–0.05)
IMM GRANULOCYTES NFR BLD AUTO: 1.6 % (ref 0–0.5)
LYMPHOCYTES # BLD AUTO: 0.7 10*3/MM3 (ref 0.7–3.1)
LYMPHOCYTES NFR BLD AUTO: 7.1 % (ref 19.6–45.3)
MAGNESIUM SERPL-MCNC: 2.2 MG/DL (ref 1.6–2.4)
MCH RBC QN AUTO: 31.4 PG (ref 26.6–33)
MCHC RBC AUTO-ENTMCNC: 32.3 G/DL (ref 31.5–35.7)
MCV RBC AUTO: 97.1 FL (ref 79–97)
MONOCYTES # BLD AUTO: 0.59 10*3/MM3 (ref 0.1–0.9)
MONOCYTES NFR BLD AUTO: 6 % (ref 5–12)
NEUTROPHILS NFR BLD AUTO: 8.11 10*3/MM3 (ref 1.7–7)
NEUTROPHILS NFR BLD AUTO: 82.7 % (ref 42.7–76)
NRBC BLD AUTO-RTO: 0.3 /100 WBC (ref 0–0.2)
PHOSPHATE SERPL-MCNC: 3.6 MG/DL (ref 2.5–4.5)
PLATELET # BLD AUTO: 329 10*3/MM3 (ref 140–450)
PMV BLD AUTO: 12 FL (ref 6–12)
POTASSIUM SERPL-SCNC: 3.5 MMOL/L (ref 3.5–5.2)
PROT SERPL-MCNC: 7.4 G/DL (ref 6–8.5)
RBC # BLD AUTO: 3.44 10*6/MM3 (ref 3.77–5.28)
SODIUM SERPL-SCNC: 133 MMOL/L (ref 136–145)
WBC NRBC COR # BLD AUTO: 9.82 10*3/MM3 (ref 3.4–10.8)

## 2024-02-13 PROCEDURE — 94761 N-INVAS EAR/PLS OXIMETRY MLT: CPT

## 2024-02-13 PROCEDURE — 85025 COMPLETE CBC W/AUTO DIFF WBC: CPT | Performed by: INTERNAL MEDICINE

## 2024-02-13 PROCEDURE — 25010000002 ENOXAPARIN PER 10 MG: Performed by: STUDENT IN AN ORGANIZED HEALTH CARE EDUCATION/TRAINING PROGRAM

## 2024-02-13 PROCEDURE — 36415 COLL VENOUS BLD VENIPUNCTURE: CPT | Performed by: INTERNAL MEDICINE

## 2024-02-13 PROCEDURE — 94799 UNLISTED PULMONARY SVC/PX: CPT

## 2024-02-13 PROCEDURE — 99232 SBSQ HOSP IP/OBS MODERATE 35: CPT | Performed by: STUDENT IN AN ORGANIZED HEALTH CARE EDUCATION/TRAINING PROGRAM

## 2024-02-13 PROCEDURE — 82948 REAGENT STRIP/BLOOD GLUCOSE: CPT

## 2024-02-13 PROCEDURE — 83735 ASSAY OF MAGNESIUM: CPT | Performed by: PHYSICIAN ASSISTANT

## 2024-02-13 PROCEDURE — 97164 PT RE-EVAL EST PLAN CARE: CPT

## 2024-02-13 PROCEDURE — 94664 DEMO&/EVAL PT USE INHALER: CPT

## 2024-02-13 PROCEDURE — 84100 ASSAY OF PHOSPHORUS: CPT | Performed by: PHYSICIAN ASSISTANT

## 2024-02-13 PROCEDURE — 80053 COMPREHEN METABOLIC PANEL: CPT | Performed by: INTERNAL MEDICINE

## 2024-02-13 RX ORDER — CLOPIDOGREL BISULFATE 75 MG/1
75 TABLET ORAL DAILY
Qty: 90 TABLET | Refills: 3 | Status: ON HOLD | OUTPATIENT
Start: 2024-02-13

## 2024-02-13 RX ORDER — LOSARTAN POTASSIUM 25 MG/1
25 TABLET ORAL
Qty: 90 TABLET | Refills: 3 | OUTPATIENT
Start: 2024-02-13 | End: 2024-02-15 | Stop reason: HOSPADM

## 2024-02-13 RX ADMIN — FAMOTIDINE 20 MG: 20 TABLET ORAL at 08:13

## 2024-02-13 RX ADMIN — BUDESONIDE 0.5 MG: 0.5 SUSPENSION RESPIRATORY (INHALATION) at 08:05

## 2024-02-13 RX ADMIN — Medication 10 ML: at 08:13

## 2024-02-13 RX ADMIN — HYDROCORTISONE 1 APPLICATION: 1 OINTMENT TOPICAL at 20:58

## 2024-02-13 RX ADMIN — GABAPENTIN 100 MG: 100 CAPSULE ORAL at 20:56

## 2024-02-13 RX ADMIN — ENOXAPARIN SODIUM 30 MG: 100 INJECTION SUBCUTANEOUS at 14:33

## 2024-02-13 RX ADMIN — OLANZAPINE 2.5 MG: 2.5 TABLET, FILM COATED ORAL at 08:14

## 2024-02-13 RX ADMIN — CARVEDILOL 6.25 MG: 6.25 TABLET, FILM COATED ORAL at 18:02

## 2024-02-13 RX ADMIN — Medication 250 MG: at 08:13

## 2024-02-13 RX ADMIN — CLOPIDOGREL BISULFATE 75 MG: 75 TABLET, FILM COATED ORAL at 08:14

## 2024-02-13 RX ADMIN — ATORVASTATIN CALCIUM 40 MG: 40 TABLET, FILM COATED ORAL at 08:14

## 2024-02-13 RX ADMIN — GABAPENTIN 100 MG: 100 CAPSULE ORAL at 14:32

## 2024-02-13 RX ADMIN — PRAMIPEXOLE DIHYDROCHLORIDE 0.5 MG: 0.5 TABLET ORAL at 20:56

## 2024-02-13 RX ADMIN — CARVEDILOL 6.25 MG: 6.25 TABLET, FILM COATED ORAL at 08:14

## 2024-02-13 RX ADMIN — FLUOXETINE HYDROCHLORIDE 40 MG: 20 CAPSULE ORAL at 08:14

## 2024-02-13 RX ADMIN — HYDROCORTISONE 1 APPLICATION: 1 OINTMENT TOPICAL at 15:13

## 2024-02-13 RX ADMIN — HYDROCORTISONE 1 APPLICATION: 1 OINTMENT TOPICAL at 04:04

## 2024-02-13 RX ADMIN — Medication 250 MG: at 20:56

## 2024-02-13 RX ADMIN — HYDROCORTISONE 1 APPLICATION: 1 OINTMENT TOPICAL at 09:24

## 2024-02-13 RX ADMIN — FLUOXETINE HYDROCHLORIDE 40 MG: 20 CAPSULE ORAL at 20:56

## 2024-02-13 RX ADMIN — GABAPENTIN 100 MG: 100 CAPSULE ORAL at 08:14

## 2024-02-13 RX ADMIN — ARFORMOTEROL TARTRATE 15 MCG: 15 SOLUTION RESPIRATORY (INHALATION) at 08:05

## 2024-02-13 RX ADMIN — Medication 10 ML: at 20:57

## 2024-02-13 NOTE — NURSING NOTE
Provider notes from yesterday reviewed. Palliative care visit to further discuss goals of care. Updated by primary rn. Patient on 2.5 l o2 via nasal canula at time of visit. Patient sitting up in chair, spouse at bedside. Recalled previous palliative care visit. Discussed goals of care and quality of life. Patient reports she is hopeful she can do physical therapy with the goal of getting stronger and returning home. [facility] referral was placed on 2/1/24. Provided education on [facility] versus [facility]- patient and family v/u. Emotional support provided. Palliative care will continue to follow up as needed.    Katlin FANG, RN, PN  Palliative Care

## 2024-02-13 NOTE — SIGNIFICANT NOTE
02/13/24 1532   Plan   Plan Patient down to 3 L NC. Spoke with spouse, Jarett. He is agreeable to rehab facilities in [facility], to include [facility]. Referrals have been sent.

## 2024-02-13 NOTE — THERAPY RE-EVALUATION
Acute Care - Physical Therapy Re-Evaluation   [facility]     Patient Name: Falguni Minaya  : 1960  MRN: [id number]  Today's Date: 2024      Visit Dx:     ICD-10-CM ICD-9-CM   1. Altered mental status, unspecified altered mental status type  R41.82 780.97   2. Acute urinary tract infection  N39.0 599.0   3. Oropharyngeal dysphagia  R13.12 787.22   4. Difficulty walking  R26.2 719.7   5. Decreased activities of daily living (ADL)  Z78.9 V49.89     Patient Active Problem List   Diagnosis    Multiple sclerosis    Left sided abdominal pain    Dyspepsia    Heme positive stool    Hypotension    Seizure disorder    Pain in lower back    Headache    Frequent falls    Spasm    Weakness of right leg    S/P drug eluting coronary stent placement    Coronary artery disease involving native coronary artery of native heart    Ischemic cardiomyopathy    Pleural effusion due to CHF (congestive heart failure)    Shortness of breath    Orthopnea    Hyperlipidemia LDL goal <70    Anemia due to stage 3 chronic kidney disease    Iron deficiency    Anemia in chronic kidney disease    Arthropathy of lumbar facet joint    Marie's esophagus    Benign colonic polyp    Congestive heart failure    Degeneration of lumbar intervertebral disc    Gastroesophageal reflux disease    Gastroparesis    Lumbar radiculopathy    Osteoporosis    Sacroiliac joint pain    Serum creatinine raised    Vitamin D deficiency    Acute hypoxemic respiratory failure     Past Medical History:   Diagnosis Date    Abdominal hernia     Acute ST elevation myocardial infarction (STEMI) due to occlusion of right coronary artery 2020    CHF (congestive heart failure)     DENIES CP GETS SOA WITH EXERTION. FOLLOWED BY DR ERIC/KASIA GREY. DECREASED ACTIVITY USES WALKER/ROLLATOR    CKD (chronic kidney disease)     Coronary artery disease     Dyspepsia     GERD (gastroesophageal reflux disease)     Hyperlipidemia     Hypertension     Ischemic cardiomyopathy      Multiple sclerosis      Past Surgical History:   Procedure Laterality Date    APPENDECTOMY      CARDIAC CATHETERIZATION      CARDIAC CATHETERIZATION N/A 2/24/2020    Procedure: Left Heart Cath;  Surgeon: Marlon Zamudio MD;  Location: [facility] CATH INVASIVE LOCATION;  Service: Cardiovascular;  Laterality: N/A;    CARDIAC CATHETERIZATION N/A 2/24/2020    Procedure: Stent LATRICIA coronary;  Surgeon: Marlon Zamudio MD;  Location: [facility] CATH INVASIVE LOCATION;  Service: Cardiovascular;  Laterality: N/A;    CARDIAC CATHETERIZATION  2/24/2020    Procedure: Percutaneous Manual Thrombectomy;  Surgeon: Marlon Zamudio MD;  Location: [facility] CATH INVASIVE LOCATION;  Service: Cardiovascular;;    CARDIAC CATHETERIZATION N/A 2/24/2020    Procedure: Coronary angiography;  Surgeon: Marlon Zamudio MD;  Location: [facility] CATH INVASIVE LOCATION;  Service: Cardiovascular;  Laterality: N/A;    CARDIAC CATHETERIZATION N/A 2/24/2020    Procedure: Left ventriculography;  Surgeon: Marlon Zamudio MD;  Location: [facility] CATH INVASIVE LOCATION;  Service: Cardiovascular;  Laterality: N/A;    CHOLECYSTECTOMY      COLONOSCOPY N/A 2/29/2020    Procedure: COLONOSCOPY to  cecum:;  Surgeon: Krystal Sarabia MD;  Location: [facility] ENDOSCOPY;  Service: Gastroenterology;  Laterality: N/A;  pre:  anemia and abd pain  post:  hemorrhoids, tortuous colon    ENDOSCOPY N/A 2/29/2020    Procedure: ESOPHAGOGASTRODUODENOSCOPY  with biopsies;  Surgeon: Krystal Sarabia MD;  Location: [facility] ENDOSCOPY;  Service: Gastroenterology;  Laterality: N/A;  pre:  anemia and abd pain  post:  mild gastritis,     HYSTERECTOMY      PARATHYROIDECTOMY Bilateral 10/17/2023    Procedure: NECK EXPLORATION WITH PARATHYROID ADENOMA EXCISION AND FROZEN SECTION,, RECURRENT LARYNGEAL NERVE MONITORING, INTRAOPERATIVE INTACT PTH ASSAY;  Surgeon: Ezekiel Giles MD;  Location: [facility] OR;  Service: ENT;  Laterality: Bilateral;    TONSILLECTOMY       PT Assessment  (last 12 hours)       PT Evaluation and Treatment       Row Name 02/13/24 1404          Physical Therapy Time and Intention    Document Type re-evaluation;therapy note (daily note)  -AV     Mode of Treatment individual therapy;physical therapy  -AV       Row Name 02/13/24 1404          General Information    Patient Profile Reviewed yes  -AV       Row Name 02/13/24 1404          Bed Mobility    Bed Mobility supine-sit  -AV     Supine-Sit [place] (Bed Mobility) minimum assist (75% patient effort)  -AV       Row Name 02/13/24 1404          Transfers    Transfers sit-stand transfer;stand-sit transfer  -AV       Row Name 02/13/24 1404          Sit-Stand Transfer    Sit-Stand [place]ran (Transfers) contact guard  -AV     Assistive Device (Sit-Stand Transfers) walker, front-wheeled  -AV       Row Name 02/13/24 1404          Stand-Sit Transfer    Stand-Sit [place]ran (Transfers) contact guard  -AV     Assistive Device (Stand-Sit Transfers) walker, front-wheeled  -AV       Row Name 02/13/24 1404          Gait/Stairs (Locomotion)    Gait/Stairs Locomotion gait/ambulation independence;gait/ambulation assistive device;distance ambulated  -AV     [place] Level (Gait) contact guard  -AV     Assistive Device (Gait) walker, front-wheeled  -AV     Distance in Feet (Gait) 5  bed to recliner  -AV     Comment, (Gait/Stairs) Nurse in room, titrated O2 up for activity. Lowered back down once in chair.  -AV       Row Name 02/13/24 1404          Safety Issues, Functional Mobility    Impairments Affecting Function (Mobility) balance;endurance/activity tolerance;strength;shortness of breath  -AV       Row Name 02/13/24 1404          Balance    Balance Assessment standing dynamic balance  -AV     Dynamic Standing Balance contact guard  -AV     Position/Device Used, Standing Balance supported;walker, front-wheeled  -AV       Row Name             Wound 02/10/24 0816 Bilateral upper thigh MASD (Moisture associated skin damage)     Wound - Properties Group Placement Date: 02/10/24  -KP Placement Time: 0816  -KP Present on Original Admission: N  -KP Side: Bilateral  -KP Orientation: upper  -KP Location: thigh  -KP Primary Wound Type: MASD  -KP    Retired Wound - Properties Group Placement Date: 02/10/24  -KP Placement Time: 0816  -KP Present on Original Admission: N  -KP Side: Bilateral  -KP Orientation: upper  -KP Location: thigh  -KP Primary Wound Type: MASD  -KP    Retired Wound - Properties Group Date first assessed: 02/10/24  -KP Time first assessed: 0816  -KP Present on Original Admission: N  -KP Side: Bilateral  -KP Location: thigh  -KP Primary Wound Type: MASD  -KP      Row Name             Wound 02/12/24 1405 Bilateral medial lip Other (comment)    Wound - Properties Group Placement Date: 02/12/24  -FP Placement Time: 1405  -FP Side: Bilateral  -FP Orientation: medial  -FP Location: lip  -FP Primary Wound Type: Other  -FP, extensive dryness / crusting     Retired Wound - Properties Group Placement Date: 02/12/24  -FP Placement Time: 1405  -FP Side: Bilateral  -FP Orientation: medial  -FP Location: lip  -FP Primary Wound Type: Other  -FP, extensive dryness / crusting     Retired Wound - Properties Group Date first assessed: 02/12/24  -FP Time first assessed: 1405  -FP Side: Bilateral  -FP Location: lip  -FP Primary Wound Type: Other  -FP, extensive dryness / crusting       Row Name 02/13/24 1404          Plan of Care Review    Plan of Care Reviewed With patient  -AV     Outcome Evaluation Patient continues to present with deficits in balance, endurance, transfers, and ambulation. Patient will continue to benefit from skilled PT services to address functional mobility deficits and decrease risk of falls. Transfer goal has been updated and all other goals remain appropriate. Continue plan of care for an additional 10 days.  -AV       Row Name 02/13/24 1404          Positioning and Restraints    Pre-Treatment Position in bed  -AV     Post  Treatment Position chair  -AV     In Chair reclined;call light within reach;encouraged to call for assist;exit alarm on;with family/caregiver  -AV       Row Name 02/13/24 1404          Therapy Assessment/Plan (PT)    Criteria for Skilled Interventions Met (PT) yes;meets criteria  -AV     Therapy Frequency (PT) daily  -AV     Predicted Duration of Therapy Intervention (PT) 10 days  -AV     Problem List (PT) problems related to;balance;mobility;strength  -AV     Activity Limitations Related to Problem List (PT) unable to transfer safely;unable to ambulate safely  -AV       Row Name 02/13/24 1404          Therapy Plan Review/Discharge Plan (PT)    Therapy Plan Review (PT) evaluation/treatment results reviewed;patient  -AV       Row Name 02/13/24 1404          Physical Therapy Goals    Bed Mobility Goal Selection (PT) bed mobility, PT goal 1  -AV     Transfer Goal Selection (PT) transfer, PT goal 1  -AV     Gait Training Goal Selection (PT) gait training, PT goal 1  -AV       Row Name 02/13/24 1404          Bed Mobility Goal 1 (PT)    Activity/Assistive Device (Bed Mobility Goal 1, PT) sit to supine/supine to sit  -AV     [place] Level/Cues Needed (Bed Mobility Goal 1, PT) contact guard required  -AV     Time Frame (Bed Mobility Goal 1, PT) long term goal (LTG);10 days  -AV     Progress/Outcomes (Bed Mobility Goal 1, PT) goal ongoing;continuing progress toward goal  -AV       Row Name 02/13/24 1448 02/13/24 1404       Transfer Goal 1 (PT)    Activity/Assistive Device (Transfer Goal 1, PT) sit-to-stand/stand-to-sit;bed-to-chair/chair-to-bed;walker, rolling  -AV sit-to-stand/stand-to-sit  -AV    [place] Level/Cues Needed (Transfer Goal 1, PT) modified independence  -AV contact guard required  -AV    Time Frame (Transfer Goal 1, PT) long term goal (LTG);10 days  -AV long term goal (LTG);10 days  -AV    Progress/Outcome (Transfer Goal 1, PT) new goal  -AV goal met  -AV      Row Name 02/13/24 1404          Gait  Training Goal 1 (PT)    Activity/Assistive Device (Gait Training Goal 1, PT) gait (walking locomotion);assistive device use;walker, rolling  -AV     [place] Level (Gait Training Goal 1, PT) contact guard required  -AV     Distance (Gait Training Goal 1, PT) 50  -AV     Time Frame (Gait Training Goal 1, PT) long term goal (LTG);10 days  -AV     Progress/Outcome (Gait Training Goal 1, PT) goal ongoing;continuing progress toward goal  -AV               User Key  (r) = Recorded By, (t) = Taken By, (c) = Cosigned By      Initials Name Provider Type    Leia Zaragoza, RN Registered Nurse    Claudette Guaman RN Registered Nurse    Jose Vidales, PT Physical Therapist                    Physical Therapy Education       Title: PT OT SLP Therapies (Done)       Topic: Physical Therapy (Done)       Point: Mobility training (Done)       Learning Progress Summary             Patient Acceptance, E, VU by  at 2/11/2024 1519    Acceptance, E, VU by SR at 2/2/2024 1632    Acceptance, E,TB, VU by WB at 2/1/2024 1132   Family Acceptance, E, VU by SR at 2/2/2024 1632                         Point: Body mechanics (Done)       Learning Progress Summary             Patient Acceptance, E, VU by JS at 2/11/2024 1519    Acceptance, E, VU by SR at 2/2/2024 1632    Acceptance, E,TB, VU by WB at 2/1/2024 1132   Family Acceptance, E, VU by SR at 2/2/2024 1632                         Point: Precautions (Done)       Learning Progress Summary             Patient Acceptance, E, VU by JS at 2/11/2024 1519    Acceptance, E, VU by SR at 2/2/2024 1632    Acceptance, E,TB, VU by WB at 2/1/2024 1132   Family Acceptance, E, VU by SR at 2/2/2024 1632                                         User Key       Initials Effective Dates Name Provider Type Discipline     09/21/21 -  Goldie Gipson, RN Registered Nurse Nurse    SR 06/16/21 -  Ally Costa RN Registered Nurse Nurse    WB 01/09/24 -  Isreal Frye, PT Student PT Student  PT                  PT Recommendation and Plan  Anticipated Discharge Disposition (PT): sub acute care setting  Planned Therapy Interventions (PT): balance training, bed mobility training, gait training, home exercise program, neuromuscular re-education, strengthening, transfer training  Therapy Frequency (PT): daily  Plan of Care Reviewed With: patient  Outcome Evaluation: Patient continues to present with deficits in balance, endurance, transfers, and ambulation. Patient will continue to benefit from skilled PT services to address functional mobility deficits and decrease risk of falls. Transfer goal has been updated and all other goals remain appropriate. Continue plan of care for an additional 10 days.   Outcome Measures       Row Name 02/13/24 1400             How much help from another person do you currently need...    Turning from your back to your side while in flat bed without using bedrails? 3  -AV      Moving from lying on back to sitting on the side of a flat bed without bedrails? 3  -AV      Moving to and from a bed to a chair (including a wheelchair)? 3  -AV      Standing up from a chair using your arms (e.g., wheelchair, bedside chair)? 3  -AV      Climbing 3-5 steps with a railing? 2  -AV      To walk in hospital room? 3  -AV      AM-PAC 6 Clicks Score (PT) 17  -AV      Highest Level of Mobility Goal 5 --> Static standing  -AV         Functional Assessment    Outcome Measure Options AM-PAC 6 Clicks Basic Mobility (PT)  -AV                User Key  (r) = Recorded By, (t) = Taken By, (c) = Cosigned By      Initials Name Provider Type    AV Jose Pedersen, PT Physical Therapist                     Time Calculation:    PT Charges       Row Name 02/13/24 1450             Time Calculation    PT Received On 02/13/24  -AV      PT Goal Re-Cert Due Date 02/22/24  -AV         Untimed Charges    PT Eval/Re-eval Minutes 15  -AV         Total Minutes    Untimed Charges Total Minutes 15  -AV       Total Minutes  15  -AV                User Key  (r) = Recorded By, (t) = Taken By, (c) = Cosigned By      Initials Name Provider Type    AV Jose Pedersen, PT Physical Therapist                  Therapy Charges for Today       Code Description Service Date Service Provider Modifiers Qty    [id number]  PT RE-EVAL ESTABLISHED PLAN 2 2/13/2024 Jose Pedersen, PT GP 1            PT G-Codes  Outcome Measure Options: AM-PAC 6 Clicks Basic Mobility (PT)  AM-PAC 6 Clicks Score (PT): 17  AM-PAC 6 Clicks Score (OT): 16    Jose Pedersen PT  2/13/2024

## 2024-02-13 NOTE — SIGNIFICANT NOTE
Wound Eval / Progress Noted    [facility]     Patient Name: Falguni Minaya  : 1960  MRN: [id number]  Today's Date: 2024                 Admit Date: 2024    Visit Dx:    ICD-10-CM ICD-9-CM   1. Altered mental status, unspecified altered mental status type  R41.82 780.97   2. Acute urinary tract infection  N39.0 599.0   3. Oropharyngeal dysphagia  R13.12 787.22   4. Difficulty walking  R26.2 719.7   5. Decreased activities of daily living (ADL)  Z78.9 V49.89         Acute hypoxemic respiratory failure        Past Medical History:   Diagnosis Date    Abdominal hernia     Acute ST elevation myocardial infarction (STEMI) due to occlusion of right coronary artery 2020    CHF (congestive heart failure)     DENIES CP GETS SOA WITH EXERTION. FOLLOWED BY DR ERIC/KASIA GREY. DECREASED ACTIVITY USES WALKER/ROLLATOR    CKD (chronic kidney disease)     Coronary artery disease     Dyspepsia     GERD (gastroesophageal reflux disease)     Hyperlipidemia     Hypertension     Ischemic cardiomyopathy     Multiple sclerosis         Past Surgical History:   Procedure Laterality Date    APPENDECTOMY      CARDIAC CATHETERIZATION      CARDIAC CATHETERIZATION N/A 2020    Procedure: Left Heart Cath;  Surgeon: Marlon Zamudio MD;  Location: [facility] INVASIVE LOCATION;  Service: Cardiovascular;  Laterality: N/A;    CARDIAC CATHETERIZATION N/A 2020    Procedure: Stent LATRICIA coronary;  Surgeon: Marlon Zamudio MD;  Location: [facility] CATH INVASIVE LOCATION;  Service: Cardiovascular;  Laterality: N/A;    CARDIAC CATHETERIZATION  2020    Procedure: Percutaneous Manual Thrombectomy;  Surgeon: Marlon Zamudio MD;  Location: [facility] CATH INVASIVE LOCATION;  Service: Cardiovascular;;    CARDIAC CATHETERIZATION N/A 2020    Procedure: Coronary angiography;  Surgeon: Marlon Zamudio MD;  Location: [facility] CATH INVASIVE LOCATION;  Service: Cardiovascular;  Laterality: N/A;    CARDIAC  CATHETERIZATION N/A 2/24/2020    Procedure: Left ventriculography;  Surgeon: Marlon Zamudio MD;  Location: [facility] CATH INVASIVE LOCATION;  Service: Cardiovascular;  Laterality: N/A;    CHOLECYSTECTOMY      COLONOSCOPY N/A 2/29/2020    Procedure: COLONOSCOPY to  cecum:;  Surgeon: Krystal Sarabia MD;  Location: [facility] ENDOSCOPY;  Service: Gastroenterology;  Laterality: N/A;  pre:  anemia and abd pain  post:  hemorrhoids, tortuous colon    ENDOSCOPY N/A 2/29/2020    Procedure: ESOPHAGOGASTRODUODENOSCOPY  with biopsies;  Surgeon: Krystal Sarabia MD;  Location: [facility] ENDOSCOPY;  Service: Gastroenterology;  Laterality: N/A;  pre:  anemia and abd pain  post:  mild gastritis,     HYSTERECTOMY      PARATHYROIDECTOMY Bilateral 10/17/2023    Procedure: NECK EXPLORATION WITH PARATHYROID ADENOMA EXCISION AND FROZEN SECTION,, RECURRENT LARYNGEAL NERVE MONITORING, INTRAOPERATIVE INTACT PTH ASSAY;  Surgeon: Ezekiel Giles MD;  Location: [facility];  Service: ENT;  Laterality: Bilateral;    TONSILLECTOMY           Physical Assessment:     02/12/24 1405   Wound 02/10/24 0816 Bilateral upper thigh MASD (Moisture associated skin damage)   Placement Date/Time: 02/10/24 0816   Present on Original Admission: No  Side: Bilateral  Orientation: upper  Location: thigh  Primary Wound Type: MASD (Moisture associated skin damage)   Dressing Appearance open to air   Closure None   Base moist;red   Periwound redness;warm;macerated   Periwound Temperature warm   Periwound Skin Turgor soft   Edges open;rolled/closed   Dressing Care open to air   Wound 02/12/24 1405 Bilateral medial lip Other (comment)   Placement Date/Time: 02/12/24 1405   Side: Bilateral  Orientation: medial  Location: lip  Primary Wound Type: (c) Other (comment)   Wound Image    Dressing Appearance open to air   Closure None   Base dry;red;scab   Periwound intact;dry;pink   Periwound Temperature warm   Periwound Skin Turgor soft   Edges rolled/closed;open   Drainage  Amount none          Wound Check / Follow-up:  Patient seen today for wound consult. Patient is sitting up in chair with spouse by her side. She is ill appearing and on continuous oxygen at present time.     Patient states she is normally not on oxygen but she is currently. She has extensive crusting and dryness noted to bilateral upper lip and bilateral lower nares. Due to current oxygen use, will recommend water soluble moisturizer frequently to assist with skin care / hydration. Once oxygen is no longer in use, may consider topical treatment with petroleum based ointment.     There is MASD to abdominal fold within crease, bilateral groin, perineum and extensive presentation to bilateral gluteal aspects, resulting in tenderness and rolling of skin. Will recommend topical treatment with Magic Barrier ointment for skin protection, treatment as well as moisture prevention measures.        Impression: Extensive crusting to upper lip and bilateral lower nares. MASD to perineum, gluteal aspects, abdominal fold and groin    Short term goals:  Regain skin integrity. Topical treatments. Moisture prevention, skin protection, pressure reduction.     Claudette Figueroa RN    2/13/2024    09:17 EST

## 2024-02-13 NOTE — PROGRESS NOTES
Pulmonary / Critical Care Progress Note      Patient Name: Falguni Minaya  : 1960  MRN: [id number]  Attending:  Teresa Castro*   Date of admission: 2024    Subjective   Subjective   Follow-up for hypoxic respiratory failure, COVID-19 infection, E. coli urinary tract infection, multiple sclerosis, immunosuppressed status.    Over last 24 hours,  Patient remained on Brovana, Pulmicort.  Remained on Lasix 40 mg IV daily.  Continued with airway clearance therapies.  Remained on hyponasal oxygen.    No acute events overnight.      This morning,  Tolerating high flow nasal oxygen.  Weaning down on oxygen.  Has poor cough.  No significant secretions.  Has some chest discomfort.  No nausea or vomiting.  No fever or chills.  Reports feeling tired but overall stable  Creatinine with mild bump, 1.31 this morning.   at bedside    ROS:  General:  Fatigue, No Fever  HEENT: No dysphagia, No Visual Changes, no rhinorrhea  Respiratory:  + cough,+Dyspnea, scant phlegm, No Pleuritic Pain, + wheezing  Cardiovascular: Denies chest pain, denies palpitations,+BARRAZA, No Chest Pressure  Gastrointestinal:  No Abdominal Pain, No Nausea, No Vomiting, No Diarrhea  Genitourinary:  No Dysuria, No Frequency, No Hesitancy  Musculoskeletal: No muscle pain or swelling  Endocrine:  No Heat Intolerance, No Cold Intolerance, Fatigue  Neurologic:  No Confusion, no Dysarthria, No Headaches  Skin:  No Rash, No Open Wounds      Objective   Objective     Vitals:   Vital signs for last 24 hours:  Temp:  [97.7 °F (36.5 °C)-99 °F (37.2 °C)] 97.8 °F (36.6 °C)  Heart Rate:  [83-98] 89  Resp:  [16-30] 27  BP: ()/(58-97) 114/82    Intake/Output last 3 shifts:  I/O last 3 completed shifts:  In: 840 [P.O.:840]  Out: 625 [Urine:625]  Intake/Output this shift:  I/O this shift:  In: 200 [P.O.:200]  Out: 150 [Urine:150]    Vent settings for last 24 hours:       Hemodynamic parameters for last 24 hours:       Physical Exam   Vital Signs  Reviewed   General:  Alert, NAD.  Chronically ill-appearing female, lying in bed  HEENT:  PERRL, EOMI.    Neck:  No JVD, no thyromegaly  Lymph: no axillary, cervical, supraclavicular lymphadenopathy noted bilaterally  Chest: Diminished to auscultation bilaterally, no work of breathing noted on high flow nasal cannula  CV: RRR, no M/G/R, pulses 2+  Abd:  Soft, NT, ND, +BS  EXT:  no clubbing, no cyanosis, lower extremity edema noted  Neuro:  A&Ox3, CN grossly intact, no focal deficits.  Skin: No rashes or lesions noted    Result Review    Result Review:  I have personally reviewed the results from the time of this admission to 2/13/2024 06:51 EST and agree with these findings:  [x]  Laboratory  [x]  Microbiology  [x]  Radiology  [x]  EKG/Telemetry   [x]  Cardiology/Vascular   []  Pathology  []  Old records  []  Other:  Most notable findings include:       Lab 02/13/24  0523 02/12/24  0700 02/12/24  0510 02/11/24  0312 02/10/24  0325 02/09/24  0253 02/08/24  0307 02/07/24  0248   WBC 9.82  --  10.37 14.15* 17.49* 18.57* 18.05* 20.38*   HEMOGLOBIN 10.8*  --  11.1* 10.4* 10.1* 9.9* 9.9* 9.7*   HEMATOCRIT 33.4*  --  36.4 33.0* 32.8* 30.4* 30.8* 30.2*   PLATELETS 329  --  327 343 353 349 337 349   SODIUM 133* 134*  --  136 135* 134* 134* 135*   POTASSIUM 3.5 3.5  --  3.5 3.9 3.8 4.2 3.8   CHLORIDE 101 100  --  102 102 99 100 100   CO2 17.6* 17.1*  --  16.1* 16.8* 16.7* 16.6* 19.3*   BUN 36* 39*  --  39* 43* 38* 48* 55*   CREATININE 1.39* 1.51*  --  1.51* 1.45* 1.41* 1.48* 1.45*   GLUCOSE 124* 137*  --  120* 129* 110* 117* 133*   CALCIUM 9.3 9.3  --  9.0 9.3 9.0 8.9 9.0   PHOSPHORUS 3.6 3.7  --  3.6 3.5 3.8 3.2 2.6   TOTAL PROTEIN 7.4 7.6  --  7.6 7.8 7.4 7.2 7.2   ALBUMIN 2.7* 2.8*  --  2.9* 2.9* 2.9* 2.8* 2.9*   GLOBULIN 4.7 4.8  --  4.7 4.9 4.5 4.4 4.3     CT Chest Without Contrast Diagnostic    Result Date: 1/31/2024  PROCEDURE: CT CHEST WO CONTRAST DIAGNOSTIC  COMPARISON: [facility], CR, XR CHEST 1 VW,  12/24/2023, 12:17.  [facility], CR, XR CHEST 1 VW, 1/31/2024, 10:31.  [facility], CT, CT ABDOMEN PELVIS W CONTRAST, 10/11/2023, 11:39.  INDICATIONS: Opacities, sob  TECHNIQUE: CT images were created without the administration of contrast material.   PROTOCOL:   Standard imaging protocol performed    RADIATION:   DLP: 481.3mGy*cm   Automated exposure control was utilized to minimize radiation dose.  FINDINGS:  No pleural or pericardial effusion is seen.  Calcified subcarinal lymph nodes are consistent with previous granulomatous disease.  No noncalcified adenopathy is seen.  Heart size is normal.  There is extensive ground-glass opacity throughout the lung fields bilaterally.  More focal areas of consolidation are also noted in both lung fields.  No pneumothorax is seen.  No significant bronchiectasis is present.  No endobronchial lesion is seen.  A cholecystectomy has been performed.  The upper abdomen appears unremarkable.  Moderate mid thoracic kyphosis is noted.      Impression:   1. Extensive bilateral ground-glass opacity with smaller foci of consolidation in both lung fields.  Finding is favored to represent pneumonia.  Pulmonary edema and aspiration would also be in the differential.     Tucker Marcial M.D.       Electronically Signed and Approved By: Tucker Marcial M.D. on 1/31/2024 at 14:32              Results for orders placed during the hospital encounter of 01/31/24    Adult Transthoracic Echo Complete W/ Cont if Necessary Per Protocol    Interpretation Summary    Very technically difficult study with limited views.    Left ventricular ejection fraction appears to be 56 - 60%.  No obvious regional wall motion abnormalities.    Left ventricular diastolic function is consistent with (grade I) impaired relaxation and age.    Mild tricuspid regurgitation with estimated right ventricular systolic pressure from tricuspid regurgitation is mildly elevated (35-45 mmHg).    Valves are  not well-visualized but no significant valvular disease by Doppler.    Urine culture with E. Coli    Chest x-ray this morning with low lung volumes but clearing lung infiltrates      Assessment & Plan   Assessment / Plan     Active Hospital Problems:  Active Hospital Problems    Diagnosis     **Acute hypoxemic respiratory failure      Impression:  Acute hypoxic respiratory failure requiring Airvo  Severe sepsis present on admission  COVID-pneumonia +1/22  Community-acquired pneumonia from unspecified organism  E. coli urinary tract infection  Multiple sclerosis (patient has not been on immunosuppression for at least 3 to 4 months per family)  ?Immunosuppressed status on treatment for MS  Acute decompensated diastolic congestive heart failure  Atelectasis, bibasilar  Acute kidney injury secondary to prerenal etiology   Coronary artery disease  Lactic acidosis, clinically significant     Plan:  Currently on high flow nasal cannula.  Patient is making significant improvement.  Continue to wean as tolerated keep SpO2 greater than 90%.  Continue with aggressive airway clearance regimen  Continue to encourage patient out of bed and into chair and encourage incentive spirometer use.  Continue nebulizers and bronchopulmonary hygiene  Holding Lasix 40 mg IV daily given rising creatinine and hypotension.  Continue with accurate daily I's and O's. Trend renal panel electrolytes.   Echocardiogram with diastolic dysfunction  Continue Coreg 6.25 mg twice daily   Continue Unasyn for pneumonia and E. coli UTI.  Continue Decadron x 10 days given elevated inflammatory markers with significant COVID-19 pneumonia.  Continue to trend inflammatory markers every 72 hours to ensure improvement  Continue sliding scale insulin to keep glucose 140-180 in the ICU  Appreciate palliative care assistance.  Patient is DNR/DNI.  Patient's family would like to get patient home with [facility] if possible.  Will hold aspirin for now as patient is  having some oozing with subcutaneous heparin.  Can continue Plavix for now.  Continue to monitor.  Can be transferred out of ICU.    DVT prophylaxis:  Medical DVT prophylaxis orders are present.    CODE STATUS:   Medical Intervention Limits: NO intubation (DNI); NO cardioversion  Level Of Support Discussed With: Patient  Code Status (Patient has no pulse and is not breathing): No CPR (Do Not Attempt to Resuscitate)  Medical Interventions (Patient has pulse or is breathing): Limited Support  Comments: d/w pt and /family    I have reviewed labs, imaging, pertinent clinical data and provider notes.   I have discussed with bedside nurse and primary service.     Electronically signed by Oli Colon MD, 02/13/24, 1:33 PM EST.

## 2024-02-13 NOTE — PLAN OF CARE
Goal Outcome Evaluation:  Plan of Care Reviewed With: patient           Outcome Evaluation: Patient continues to present with deficits in balance, endurance, transfers, and ambulation. Patient will continue to benefit from skilled PT services to address functional mobility deficits and decrease risk of falls. Transfer goal has been updated and all other goals remain appropriate. Continue plan of care for an additional 10 days.      Anticipated Discharge Disposition (PT): sub acute care setting

## 2024-02-13 NOTE — SIGNIFICANT NOTE
02/13/24 1532   Plan   Plan Patient down to 3 L NC. Spoke with spouse, Jarett. He is aware that sub-acute rehab is recommended for patient at discharge. He is agreeable to rehab facilities in [facility], to include [facility]. Referrals have been sent.

## 2024-02-13 NOTE — PROGRESS NOTES
[facility]   Hospitalist Progress Note  Date: 2024  Patient Name: Falguni Minaya  : 1960  MRN: [id number]  Date of admission: 2024  Room/Bed: [facility]/      Subjective   Subjective     Chief Complaint: AMS, SOB    Summary:Falguni Minaya is a 64 y.o. female with a history of MS immunocompromised, recently been transitioning medications for this, recurrent UTIs, diastolic CHF, CAD with prior PCI, CKD, who has been diagnosed with COVID-19 on  and treated with outpatient dexamethasone. She completed steroids and had progressively worsening symptoms and presented for further evaluation.     Interval Followup: No acute overnight events.  No acute distress.  Patient's O2 continues to improve.  She is doing well on 3 L.  She was able to get up and sit at the bedside chair yesterday, I encouraged her to do the same today.   was at bedside, discussed care plan.  Answered all questions or concerns.    Review of Systems    All systems reviewed and negative except for what is outlined above.      Objective   Objective     Vitals:   Temp:  [97.2 °F (36.2 °C)-99 °F (37.2 °C)] 97.7 °F (36.5 °C)  Heart Rate:  [] 93  Resp:  [18-30] 28  BP: ()/(58-97) 114/81  Flow (L/min):  [2-4] 3    Physical Exam   Gen: NAD, Alert and Oriented  Cards: RRR, no murmur   Pulm: Improved aeration, no wheezing, wearing nasal cannula  Abd: soft, nondistended  Extremities: no pitting edema    Result Review    Result Review:  I have personally reviewed these results:  [x]  Laboratory      Lab 24  0523 24  0510 24  0312 02/10/24  0610 02/10/24  0325 24  0857 24  0307 24  0248   WBC 9.82 10.37 14.15*  --    < >  --    < > 20.38*   HEMOGLOBIN 10.8* 11.1* 10.4*  --    < >  --    < > 9.7*   HEMATOCRIT 33.4* 36.4 33.0*  --    < >  --    < > 30.2*   PLATELETS 329 327 343  --    < >  --    < > 349   NEUTROS ABS 8.11* 8.73*  --   --   --   --   --  18.79*   IMMATURE GRANS (ABS) 0.16* 0.13*   --   --   --   --   --  0.33*   LYMPHS ABS 0.70 0.61*  --   --   --   --   --  0.56*   MONOS ABS 0.59 0.61  --   --   --   --   --  0.66   EOS ABS 0.24 0.26  --   --   --   --   --  0.01   MCV 97.1* 100.8* 96.8  --    < >  --    < > 96.2   LACTATE  --   --   --  1.3  --   --   --   --    PROTIME  --   --   --   --   --  12.9  --   --    APTT  --   --   --   --   --  31.2  --   --     < > = values in this interval not displayed.         Lab 02/13/24  0523 02/12/24  0700 02/11/24  0312   SODIUM 133* 134* 136   POTASSIUM 3.5 3.5 3.5   CHLORIDE 101 100 102   CO2 17.6* 17.1* 16.1*   ANION GAP 14.4 16.9* 17.9*   BUN 36* 39* 39*   CREATININE 1.39* 1.51* 1.51*   EGFR 42.2* 38.2* 38.2*   GLUCOSE 124* 137* 120*   CALCIUM 9.3 9.3 9.0   MAGNESIUM 2.2 2.2 2.2   PHOSPHORUS 3.6 3.7 3.6         Lab 02/13/24  0523 02/12/24  0700 02/11/24  0312   TOTAL PROTEIN 7.4 7.6 7.6   ALBUMIN 2.7* 2.8* 2.9*   GLOBULIN 4.7 4.8 4.7   ALT (SGPT) 23 23 24   AST (SGOT) 26 25 28   BILIRUBIN 0.6 0.5 0.5   ALK PHOS 154* 156* 142*         Lab 02/09/24  0857   PROTIME 12.9   INR 0.95                 Brief Urine Lab Results  (Last result in the past 365 days)        Color   Clarity   Blood   Leuk Est   Nitrite   Protein   CREAT   Urine HCG        02/11/24 1300 Yellow   Clear   Negative   Trace   Negative   Negative                 [x]  Microbiology   Microbiology Results (last 10 days)       ** No results found for the last 240 hours. **          [x]  Radiology  XR Chest 1 View    Result Date: 2/11/2024    No significant change in comparison to 2/7/2024.       FADI BRINK MD       Electronically Signed and Approved By: FADI BRINK MD on 2/11/2024 at 14:16             XR Chest 1 View    Result Date: 2/7/2024   Worsening radiographic appearance of pneumonia       JAYDON MULLIGAN MD       Electronically Signed and Approved By: JAYDON MULLIGAN MD on 2/07/2024 at 9:24            []  EKG/Telemetry   []  Cardiology/Vascular   []  Pathology  []  Old  records  []  Other:    Assessment & Plan   Assessment / Plan     Assessment:  Acute hypoxemic respiratory failure requiring Airvo  Sepsis, present on admission, 2/2 UTI and PNA  Gram-negative bacilli UTI, E. Coli  Secondary bacterial pneumonia, unknown organism  Acute on Chronic HFrecEF (previously reduced now with recovered EF of 62%)   CAD, s/p PCI to RCA   COVID-19 pneumonia with initial testing positive 1/22  Immunocompromised in setting of multiple sclerosis currently in limbo between treatments (most recently on Vumerity)   Anion gap metabolic acidosis  Transaminitis  History of hypertension with low blood pressure on arrival  CKD possibly 3 baseline creatinine 1.1-1.3  Seizure disorder  GERD  Anxiety/depression  Hyperlipidemia  Morbid obesity  Suspect underlying dementia, intermittent metabolic encephalopthy     Plan:  Continue inpatient admission and care   COVID 19 - completed 10 days high dose steroids   Bacterial PNA, unknown organism - completed 5 days Unasyn for CAP   CXR stable.   E. Coli UTI - completed course of Unasyn  HFrecEF: Losartan on hold due to hypotension, Coreg 6.25mg BID. No spironolactone due to BP. Not on Farxiga.   CHF exac: IV lasix on hold due to BP. Clinically looks euvolemic.   Continue PPI  Continue reduced dose Gabapentin   Continue ASA, plavix, statin   Continue prozac and zyprexa   Continue Brovana, pulmicort, and PRN duonebs   Cardiac diet   3L supplemental O2, wean as tolerated, goal SpO2 greater than 90%.  Continue daily IV Lovenox reduced dose.  Creatinine stable at 1.39  AM  labs     Disposition: I discussed with the patient and her  at bedside continuing care versus transitioning to palliative.  With her improvement in her respiratory status they would like to pursue more aggressive measures and rehab placement for now.  If in 4 to 6 weeks she does not have significant improvement they will reconsider transitioning over to palliative care.  She will likely need home  oxygen at discharge.       Discussed with RN.    DVT prophylaxis:  Medical DVT prophylaxis orders are present.        CODE STATUS:   Medical Intervention Limits: NO intubation (DNI); NO cardioversion  Level Of Support Discussed With: Patient  Code Status (Patient has no pulse and is not breathing): No CPR (Do Not Attempt to Resuscitate)  Medical Interventions (Patient has pulse or is breathing): Limited Support  Comments: d/w pt and /family      Electronically signed by Teresa Castro DO, 2/13/2024, 14:47 EST.

## 2024-02-14 VITALS
RESPIRATION RATE: 18 BRPM | HEART RATE: 105 BPM | DIASTOLIC BLOOD PRESSURE: 72 MMHG | BODY MASS INDEX: 32.76 KG/M2 | SYSTOLIC BLOOD PRESSURE: 93 MMHG | WEIGHT: 166.89 LBS | OXYGEN SATURATION: 90 % | HEIGHT: 60 IN | TEMPERATURE: 98.5 F

## 2024-02-14 LAB
ALBUMIN SERPL-MCNC: 2.7 G/DL (ref 3.5–5.2)
ALBUMIN/GLOB SERPL: 0.6 G/DL
ALP SERPL-CCNC: 160 U/L (ref 39–117)
ALT SERPL W P-5'-P-CCNC: 24 U/L (ref 1–33)
ANION GAP SERPL CALCULATED.3IONS-SCNC: 15.7 MMOL/L (ref 5–15)
AST SERPL-CCNC: 35 U/L (ref 1–32)
BASOPHILS # BLD AUTO: 0.02 10*3/MM3 (ref 0–0.2)
BASOPHILS NFR BLD AUTO: 0.2 % (ref 0–1.5)
BILIRUB SERPL-MCNC: 0.6 MG/DL (ref 0–1.2)
BUN SERPL-MCNC: 31 MG/DL (ref 8–23)
BUN/CREAT SERPL: 23.7 (ref 7–25)
CALCIUM SPEC-SCNC: 9.3 MG/DL (ref 8.6–10.5)
CHLORIDE SERPL-SCNC: 99 MMOL/L (ref 98–107)
CO2 SERPL-SCNC: 17.3 MMOL/L (ref 22–29)
CREAT SERPL-MCNC: 1.31 MG/DL (ref 0.57–1)
DEPRECATED RDW RBC AUTO: 59.2 FL (ref 37–54)
EGFRCR SERPLBLD CKD-EPI 2021: 45.3 ML/MIN/1.73
EOSINOPHIL # BLD AUTO: 0.23 10*3/MM3 (ref 0–0.4)
EOSINOPHIL NFR BLD AUTO: 2.4 % (ref 0.3–6.2)
ERYTHROCYTE [DISTWIDTH] IN BLOOD BY AUTOMATED COUNT: 16.8 % (ref 12.3–15.4)
GLOBULIN UR ELPH-MCNC: 4.7 GM/DL
GLUCOSE BLDC GLUCOMTR-MCNC: 122 MG/DL (ref 70–99)
GLUCOSE BLDC GLUCOMTR-MCNC: 122 MG/DL (ref 70–99)
GLUCOSE BLDC GLUCOMTR-MCNC: 129 MG/DL (ref 70–99)
GLUCOSE BLDC GLUCOMTR-MCNC: 202 MG/DL (ref 70–99)
GLUCOSE SERPL-MCNC: 117 MG/DL (ref 65–99)
HCT VFR BLD AUTO: 32.7 % (ref 34–46.6)
HGB BLD-MCNC: 10.5 G/DL (ref 12–15.9)
IMM GRANULOCYTES # BLD AUTO: 0.16 10*3/MM3 (ref 0–0.05)
IMM GRANULOCYTES NFR BLD AUTO: 1.7 % (ref 0–0.5)
LYMPHOCYTES # BLD AUTO: 0.64 10*3/MM3 (ref 0.7–3.1)
LYMPHOCYTES NFR BLD AUTO: 6.8 % (ref 19.6–45.3)
MCH RBC QN AUTO: 31 PG (ref 26.6–33)
MCHC RBC AUTO-ENTMCNC: 32.1 G/DL (ref 31.5–35.7)
MCV RBC AUTO: 96.5 FL (ref 79–97)
MONOCYTES # BLD AUTO: 0.62 10*3/MM3 (ref 0.1–0.9)
MONOCYTES NFR BLD AUTO: 6.5 % (ref 5–12)
NEUTROPHILS NFR BLD AUTO: 7.8 10*3/MM3 (ref 1.7–7)
NEUTROPHILS NFR BLD AUTO: 82.4 % (ref 42.7–76)
NRBC BLD AUTO-RTO: 0.2 /100 WBC (ref 0–0.2)
PLATELET # BLD AUTO: 320 10*3/MM3 (ref 140–450)
PMV BLD AUTO: 11.9 FL (ref 6–12)
POTASSIUM SERPL-SCNC: 3.9 MMOL/L (ref 3.5–5.2)
PROT SERPL-MCNC: 7.4 G/DL (ref 6–8.5)
RBC # BLD AUTO: 3.39 10*6/MM3 (ref 3.77–5.28)
SODIUM SERPL-SCNC: 132 MMOL/L (ref 136–145)
WBC NRBC COR # BLD AUTO: 9.47 10*3/MM3 (ref 3.4–10.8)

## 2024-02-14 PROCEDURE — 63710000001 INSULIN LISPRO (HUMAN) PER 5 UNITS: Performed by: NURSE PRACTITIONER

## 2024-02-14 PROCEDURE — 94799 UNLISTED PULMONARY SVC/PX: CPT

## 2024-02-14 PROCEDURE — 36415 COLL VENOUS BLD VENIPUNCTURE: CPT | Performed by: INTERNAL MEDICINE

## 2024-02-14 PROCEDURE — 94664 DEMO&/EVAL PT USE INHALER: CPT

## 2024-02-14 PROCEDURE — 82948 REAGENT STRIP/BLOOD GLUCOSE: CPT

## 2024-02-14 PROCEDURE — 82948 REAGENT STRIP/BLOOD GLUCOSE: CPT | Performed by: NURSE PRACTITIONER

## 2024-02-14 PROCEDURE — 92526 ORAL FUNCTION THERAPY: CPT

## 2024-02-14 PROCEDURE — 97110 THERAPEUTIC EXERCISES: CPT

## 2024-02-14 PROCEDURE — 97168 OT RE-EVAL EST PLAN CARE: CPT

## 2024-02-14 PROCEDURE — 25010000002 ENOXAPARIN PER 10 MG: Performed by: STUDENT IN AN ORGANIZED HEALTH CARE EDUCATION/TRAINING PROGRAM

## 2024-02-14 PROCEDURE — 85025 COMPLETE CBC W/AUTO DIFF WBC: CPT | Performed by: STUDENT IN AN ORGANIZED HEALTH CARE EDUCATION/TRAINING PROGRAM

## 2024-02-14 PROCEDURE — 80053 COMPREHEN METABOLIC PANEL: CPT | Performed by: INTERNAL MEDICINE

## 2024-02-14 PROCEDURE — 99239 HOSP IP/OBS DSCHRG MGMT >30: CPT | Performed by: STUDENT IN AN ORGANIZED HEALTH CARE EDUCATION/TRAINING PROGRAM

## 2024-02-14 RX ORDER — FUROSEMIDE 40 MG/1
40 TABLET ORAL DAILY
Status: DISCONTINUED | OUTPATIENT
Start: 2024-02-14 | End: 2024-02-15 | Stop reason: HOSPADM

## 2024-02-14 RX ORDER — BUDESONIDE 0.5 MG/2ML
0.5 INHALANT ORAL
Status: ON HOLD
Start: 2024-02-14

## 2024-02-14 RX ORDER — ARFORMOTEROL TARTRATE 15 UG/2ML
15 SOLUTION RESPIRATORY (INHALATION)
Status: ON HOLD
Start: 2024-02-14

## 2024-02-14 RX ADMIN — Medication 250 MG: at 21:34

## 2024-02-14 RX ADMIN — GABAPENTIN 100 MG: 100 CAPSULE ORAL at 21:34

## 2024-02-14 RX ADMIN — ARFORMOTEROL TARTRATE 15 MCG: 15 SOLUTION RESPIRATORY (INHALATION) at 18:32

## 2024-02-14 RX ADMIN — OLANZAPINE 2.5 MG: 2.5 TABLET, FILM COATED ORAL at 09:22

## 2024-02-14 RX ADMIN — FUROSEMIDE 40 MG: 40 TABLET ORAL at 09:22

## 2024-02-14 RX ADMIN — BUDESONIDE 0.5 MG: 0.5 SUSPENSION RESPIRATORY (INHALATION) at 18:32

## 2024-02-14 RX ADMIN — Medication 250 MG: at 09:21

## 2024-02-14 RX ADMIN — FLUOXETINE HYDROCHLORIDE 40 MG: 20 CAPSULE ORAL at 09:22

## 2024-02-14 RX ADMIN — HYDROCORTISONE 1 APPLICATION: 1 OINTMENT TOPICAL at 09:22

## 2024-02-14 RX ADMIN — CARVEDILOL 6.25 MG: 6.25 TABLET, FILM COATED ORAL at 09:21

## 2024-02-14 RX ADMIN — FLUOXETINE HYDROCHLORIDE 40 MG: 20 CAPSULE ORAL at 21:34

## 2024-02-14 RX ADMIN — GABAPENTIN 100 MG: 100 CAPSULE ORAL at 09:21

## 2024-02-14 RX ADMIN — CARVEDILOL 6.25 MG: 6.25 TABLET, FILM COATED ORAL at 17:11

## 2024-02-14 RX ADMIN — ARFORMOTEROL TARTRATE 15 MCG: 15 SOLUTION RESPIRATORY (INHALATION) at 08:17

## 2024-02-14 RX ADMIN — FAMOTIDINE 20 MG: 20 TABLET ORAL at 09:21

## 2024-02-14 RX ADMIN — Medication 10 ML: at 09:22

## 2024-02-14 RX ADMIN — INSULIN LISPRO 3 UNITS: 100 INJECTION, SOLUTION INTRAVENOUS; SUBCUTANEOUS at 17:10

## 2024-02-14 RX ADMIN — ATORVASTATIN CALCIUM 40 MG: 40 TABLET, FILM COATED ORAL at 09:21

## 2024-02-14 RX ADMIN — HYDROCORTISONE 1 APPLICATION: 1 OINTMENT TOPICAL at 15:28

## 2024-02-14 RX ADMIN — GABAPENTIN 100 MG: 100 CAPSULE ORAL at 15:29

## 2024-02-14 RX ADMIN — ENOXAPARIN SODIUM 30 MG: 100 INJECTION SUBCUTANEOUS at 15:29

## 2024-02-14 RX ADMIN — PRAMIPEXOLE DIHYDROCHLORIDE 0.5 MG: 0.5 TABLET ORAL at 21:34

## 2024-02-14 RX ADMIN — HYDROCORTISONE 1 APPLICATION: 1 OINTMENT TOPICAL at 04:21

## 2024-02-14 RX ADMIN — CLOPIDOGREL BISULFATE 75 MG: 75 TABLET, FILM COATED ORAL at 09:21

## 2024-02-14 RX ADMIN — BUDESONIDE 0.5 MG: 0.5 SUSPENSION RESPIRATORY (INHALATION) at 08:17

## 2024-02-14 NOTE — CONSULTS
"Nutrition Services    Patient Name: Falguni Minaya  YOB: 1960  MRN: [id number]  Admission date: 1/31/2024      CLINICAL NUTRITION ASSESSMENT      Reason for Assessment  Follow Up   H&P:  Past Medical History:   Diagnosis Date    Abdominal hernia     Acute ST elevation myocardial infarction (STEMI) due to occlusion of right coronary artery 02/24/2020    CHF (congestive heart failure)     DENIES CP GETS SOA WITH EXERTION. FOLLOWED BY DR ERIC/KASIA GREY. DECREASED ACTIVITY USES WALKER/ROLLATOR    CKD (chronic kidney disease)     Coronary artery disease     Dyspepsia     GERD (gastroesophageal reflux disease)     Hyperlipidemia     Hypertension     Ischemic cardiomyopathy     Multiple sclerosis         Current Problems:   Active Hospital Problems    Diagnosis     **Acute hypoxemic respiratory failure         Nutrition/Diet History         Narrative   Pt was working with OT at my attempted visit. Per graphics, pt consumed 0-25% of meals today. Will f/u to determine supplement acceptance. Weight has decreased during admission although pt has been on IV lasix.      Anthropometrics        Current Height, Weight Height: 152.4 cm (60\")  Weight: 75.7 kg (166 lb 14.2 oz)   Current BMI Body mass index is 32.59 kg/m².   BMI Classification Obese Class I   % %   Adjusted Body Weight (ABW) 61 kg   Weight Hx  Wt Readings from Last 30 Encounters:   02/14/24 0528 75.7 kg (166 lb 14.2 oz)   02/13/24 0600 76.6 kg (168 lb 14 oz)   02/12/24 0600 76.4 kg (168 lb 6.9 oz)   02/10/24 0600 81.8 kg (180 lb 5.4 oz)   02/07/24 0500 80.5 kg (177 lb 7.5 oz)   02/05/24 0500 82.5 kg (181 lb 13 oz)   01/31/24 1011 85 kg (187 lb 6.3 oz)   01/22/24 1328 84.4 kg (186 lb)   12/24/23 1211 92.4 kg (203 lb 11.2 oz)   10/27/23 1453 96.3 kg (212 lb 6.4 oz)   10/17/23 0725 95.5 kg (210 lb 8.6 oz)   10/11/23 1015 95.6 kg (210 lb 12.2 oz)   10/02/23 1604 97.5 kg (215 lb)   09/21/23 1248 98.4 kg (217 lb)   04/10/23 1211 93.9 kg (207 lb) "   01/19/23 1323 95.3 kg (210 lb 1.6 oz)   10/06/22 1224 91.3 kg (201 lb 3.2 oz)   08/14/22 1605 93 kg (205 lb)   07/06/22 1309 91.8 kg (202 lb 6.4 oz)   10/21/21 1112 85.7 kg (189 lb)   10/04/21 0826 82.2 kg (181 lb 3.5 oz)   03/23/21 0724 77.1 kg (170 lb)   03/23/21 1046 79.8 kg (176 lb)   01/11/21 1416 77.2 kg (170 lb 3.2 oz)   11/19/20 1335 73.9 kg (163 lb)   08/17/20 1300 72.6 kg (160 lb)   08/17/20 1407 72.2 kg (159 lb 3.2 oz)   07/29/20 1250 72.6 kg (160 lb)   04/16/20 1035 64 kg (141 lb)   03/07/20 0630 76.3 kg (168 lb 3.4 oz)   03/06/20 0500 79.1 kg (174 lb 6.1 oz)   03/04/20 0500 77.1 kg (169 lb 15.6 oz)   03/03/20 0500 78.3 kg (172 lb 9.9 oz)   03/02/20 0500 81.6 kg (180 lb)   03/01/20 0656 80.6 kg (177 lb 11.1 oz)   02/28/20 0500 80.2 kg (176 lb 12.9 oz)   02/27/20 0128 75.4 kg (166 lb 3.6 oz)   02/26/20 0128 72.5 kg (159 lb 13.3 oz)   02/25/20 1133 73 kg (161 lb)   02/25/20 0319 73.3 kg (161 lb 9.6 oz)   02/24/20 1600 74.2 kg (163 lb 9.3 oz)   02/24/20 1442 81.6 kg (180 lb)          Wt Change Observation -4.6% x 2 weeks     Estimated/Assessed Needs  Estimated Needs based on: Adjusted Body Weight 61 kg       Energy Requirements 30-35 kcal/kg    EST Needs (kcal/day) 7408-4334 kcal       Protein Requirements 1.0-1.2 g/kg   EST Daily Needs (g/day) 61-73 g       Fluid Requirements 25 ml/kg    Estimated Needs (mL/day) 1525 ml     Labs/Medications         Pertinent Labs Reviewed.   Results from last 7 days   Lab Units 02/14/24  0536 02/13/24  0523 02/12/24  0700   SODIUM mmol/L 132* 133* 134*   POTASSIUM mmol/L 3.9 3.5 3.5   CHLORIDE mmol/L 99 101 100   CO2 mmol/L 17.3* 17.6* 17.1*   BUN mg/dL 31* 36* 39*   CREATININE mg/dL 1.31* 1.39* 1.51*   CALCIUM mg/dL 9.3 9.3 9.3   BILIRUBIN mg/dL 0.6 0.6 0.5   ALK PHOS U/L 160* 154* 156*   ALT (SGPT) U/L 24 23 23   AST (SGOT) U/L 35* 26 25   GLUCOSE mg/dL 117* 124* 137*     Results from last 7 days   Lab Units 02/14/24  0536 02/13/24  0523 02/12/24  0700 02/12/24  0510  "02/11/24  0312   MAGNESIUM mg/dL  --  2.2 2.2  --  2.2   PHOSPHORUS mg/dL  --  3.6 3.7  --  3.6   HEMOGLOBIN g/dL 10.5* 10.8*  --    < > 10.4*   HEMATOCRIT % 32.7* 33.4*  --    < > 33.0*    < > = values in this interval not displayed.     COVID19   Date Value Ref Range Status   01/22/2024 Detected (C) Not Detected - Ref. Range Final     No results found for: \"HGBA1C\"      Pertinent Medications Reviewed.     Malnutrition Severity Assessment              Nutrition Diagnosis         Nutrition Dx Problem 1 Inadequate oral Intake related to decreased appetite as evidenced by  consuming 0-25% of meals.     Nutrition Intervention           Current Nutrition Orders & Evaluation of Intake       Current PO Diet Diet: Regular/House Diet, Cardiac Diets, Fluid Restriction (240 mL/tray) Diets; Low Sodium (2g); 1500 mL/day; Texture: Mechanical Ground (NDD 2); Fluid Consistency: Thin (IDDSI 0)   Supplement Orders Placed This Encounter      Dietary Nutrition Supplements Boost Glucose Control (Glucerna Shake); chocolate           Nutrition Intervention/Prescription        Boost Plus TID   Provides 1080 kcal, 42 g pro/day        Medical Nutrition Therapy/Nutrition Education          Learner     Readiness N/AUnable to assess   N/A     Method     Response N/A  N/A     Monitor/Evaluation        Monitor Per protocol, PO intake, Supplement intake, POC/GOC     Nutrition Discharge Plan         Recommend to continue oral nutrition supplements on discharge.      Electronically signed by:  Homa Rodríguez RD  02/14/24 12:59 EST    "

## 2024-02-14 NOTE — DISCHARGE SUMMARY
[facility]         HOSPITALIST  DISCHARGE SUMMARY    Patient Name: Falguni Minaya  : 1960  MRN: [id number]    Date of Admission: 2024  Date of Discharge:  2024    Primary Care Physician: Williams Sage MD    Consults       Date and Time Order Name Status Description    2024 12:58 PM Inpatient Pulmonology Consult Completed     2024 11:33 AM Inpatient Hospitalist Consult              Active and Resolved Hospital Problems:  Active Hospital Problems    Diagnosis POA    **Acute hypoxemic respiratory failure [J96.01] Yes      Resolved Hospital Problems   No resolved problems to display.       Hospital Course     Hospital Course:  Falguni Minaya is a 64 y.o. female with a history of MS immunocompromised, recently been transitioning medications for this, recurrent UTIs, diastolic CHF, CAD with prior PCI, CKD, who has been diagnosed with COVID-19 on  and treated with outpatient dexamethasone. She completed steroids and had progressively worsening symptoms and presented for further evaluation.     Patient with acute approximate respiratory failure requiring Airvo also with UTI.  Has ARDS/severe covid19 pna appearance, treating with high dose steroids.  Patient completed 10 days of high-dose steroids for COVID-19.  She was suspected to have a secondary bacterial pneumonia, unknown organism, which she completed 5 days of IV Unasyn for.  She was also noted to have UTI secondary to E. coli which was covered by IV Unasyn.  She had an acute CHF exacerbation which was treated with IV Lasix.  Due to aggressive diuresis she did have some hypotension, her losartan was held.  She was continued on a beta-blocker.  Creatinine improved with diuresis, MACY was most likely acute on chronic.  Oxygen was slowly weaned down and she remained stable on 3 L supplemental O2 for over 24 hours.  She was able to get up to the bedside chair and was working well with physical therapy.    [facility] now with recovered [facility] of 62[facility]  GDMT was very limited due to hypotension and renal function.  Losartan held due to BP.  Continue carvedilol 6.25 mg twice daily.  No spironolactone due to BP.  Not on Farxiga due to kidney function.    I had a long discussion with the patient and her  at bedside.  Given her chronic medical issues and COVID/bacterial pneumonia I do feel that she is can have a very long recovery.  She will likely need long-term and possibly permanent supplemental O2.  We talked about palliative care and the patient would like to try aggressive rehab/pulmonary rehab first to see how much strength she can recover.  If after 1 to 2 months she still is having significant issues and desaturation she would be agreeable to palliative care.  Due to her tenuous respiratory status there is moderately high risk for readmission.    Patient discharged in stable condition.    DISCHARGE Follow Up Recommendations for labs and diagnostics: Follow-up with PCP in 1 week.  Follow-up with pulmonology in 2 to 4 weeks.      Day of Discharge     Vital Signs:  Temp:  [97.3 °F (36.3 °C)-98.6 °F (37 °C)] 97.5 °F (36.4 °C)  Heart Rate:  [] 100  Resp:  [18-22] 22  BP: (102-133)/(72-79) 104/76  Flow (L/min):  [3] 3    Physical Exam:   Gen: NAD, Alert and Oriented  Cards: RRR, no murmur   Pulm: CTA b/l, no wheezing  Abd: soft, nondistended  Extremities: no pitting edema      Discharge Details        Discharge Medications        New Medications        Instructions Start Date   arformoterol 15 MCG/2ML nebulizer solution  Commonly known as: BROVANA   15 mcg, Nebulization, 2 Times Daily - RT      budesonide 0.5 MG/2ML nebulizer solution  Commonly known as: PULMICORT   0.5 mg, Nebulization, 2 Times Daily - RT      hydrocortisone-bacitracin-zinc oxide-nystatin  Commonly known as: MAGIC BARRIER   1 Application, Topical, Every 4 Hours PRN             Changes to Medications        Instructions Start Date    atorvastatin 40 MG tablet  Commonly known as: LIPITOR  What changed: when to take this   40 mg, Oral, Every Night at Bedtime             Continue These Medications        Instructions Start Date   albuterol sulfate  (90 Base) MCG/ACT inhaler  Commonly known as: PROVENTIL HFA;VENTOLIN HFA;PROAIR HFA   2 puffs, Inhalation, Every 4 Hours PRN      aspirin 81 MG chewable tablet   81 mg, Oral, Daily      carvedilol 12.5 MG tablet  Commonly known as: COREG   TAKE 1 TABLET BY MOUTH  EVERY 12 HOURS      clopidogrel 75 MG tablet  Commonly known as: PLAVIX   75 mg, Oral, Daily      FLUoxetine 40 MG capsule  Commonly known as: PROzac   40 mg, Oral, 2 Times Daily      furosemide 40 MG tablet  Commonly known as: LASIX   40 mg, Oral, Daily      gabapentin 300 MG capsule  Commonly known as: NEURONTIN   300 mg, Oral, 4 Times Daily      OLANZapine 2.5 MG tablet  Commonly known as: zyPREXA   2.5 mg, Oral, Daily      ondansetron 4 MG tablet  Commonly known as: ZOFRAN   1 tablet, Oral, Every 8 Hours PRN      pantoprazole 40 MG EC tablet  Commonly known as: PROTONIX   40 mg, Oral, Daily      pramipexole 0.5 MG tablet  Commonly known as: MIRAPEX   0.5 mg, Oral, Every Night at Bedtime             Stop These Medications      cyclobenzaprine 10 MG tablet  Commonly known as: FLEXERIL     dexAMETHasone 6 MG tablet  Commonly known as: DECADRON     losartan 25 MG tablet  Commonly known as: COZAAR     meloxicam 15 MG tablet  Commonly known as: MOBIC     tiZANidine 2 MG tablet  Commonly known as: ZANAFLEX              Allergies   Allergen Reactions    Codeine Hives     Hyperactivity, nausea    Morphine Hives     Hyperactivity, nausea       Discharge Disposition:  Home or Self Care    Diet:  Hospital:  Diet Order   Procedures    Diet: Regular/House Diet, Cardiac Diets, Fluid Restriction (240 mL/tray) Diets; Low Sodium (2g); 1500 mL/day; Texture: Mechanical Ground (NDD 2); Fluid Consistency: Thin (IDDSI 0)       Discharge Activity:        CODE STATUS:  Code Status and Medical Interventions:   Ordered at: 01/31/24 1315     Medical Intervention Limits:    NO intubation (DNI)    NO cardioversion     Level Of Support Discussed With:    Patient     Code Status (Patient has no pulse and is not breathing):    No CPR (Do Not Attempt to Resuscitate)     Medical Interventions (Patient has pulse or is breathing):    Limited Support     Comments:    d/w pt and /family         Future Appointments   Date Time Provider Department Center   4/29/2024  2:30 PM Keith Riley MD [facility]       [unfilled]    Pertinent  and/or Most Recent Results         LAB RESULTS:      Lab 02/14/24  0536 02/13/24  0523 02/12/24  0510 02/11/24  0312 02/10/24  0610 02/10/24  0325 02/09/24  0857   WBC 9.47 9.82 10.37 14.15*  --  17.49*  --    HEMOGLOBIN 10.5* 10.8* 11.1* 10.4*  --  10.1*  --    HEMATOCRIT 32.7* 33.4* 36.4 33.0*  --  32.8*  --    PLATELETS 320 329 327 343  --  353  --    NEUTROS ABS 7.80* 8.11* 8.73*  --   --   --   --    IMMATURE GRANS (ABS) 0.16* 0.16* 0.13*  --   --   --   --    LYMPHS ABS 0.64* 0.70 0.61*  --   --   --   --    MONOS ABS 0.62 0.59 0.61  --   --   --   --    EOS ABS 0.23 0.24 0.26  --   --   --   --    MCV 96.5 97.1* 100.8* 96.8  --  97.0  --    LACTATE  --   --   --   --  1.3  --   --    PROTIME  --   --   --   --   --   --  12.9   APTT  --   --   --   --   --   --  31.2         Lab 02/14/24  0536 02/13/24  0523 02/12/24  0700 02/11/24  0312 02/10/24  0325 02/09/24  0253   SODIUM 132* 133* 134* 136 135* 134*   POTASSIUM 3.9 3.5 3.5 3.5 3.9 3.8   CHLORIDE 99 101 100 102 102 99   CO2 17.3* 17.6* 17.1* 16.1* 16.8* 16.7*   ANION GAP 15.7* 14.4 16.9* 17.9* 16.2* 18.3*   BUN 31* 36* 39* 39* 43* 38*   CREATININE 1.31* 1.39* 1.51* 1.51* 1.45* 1.41*   EGFR 45.3* 42.2* 38.2* 38.2* 40.1* 41.5*   GLUCOSE 117* 124* 137* 120* 129* 110*   CALCIUM 9.3 9.3 9.3 9.0 9.3 9.0   MAGNESIUM  --  2.2 2.2 2.2 2.2 2.2   PHOSPHORUS  --  3.6 3.7 3.6 3.5  3.8         Lab 02/14/24  0536 02/13/24  0523 02/12/24  0700 02/11/24  0312 02/10/24  0325   TOTAL PROTEIN 7.4 7.4 7.6 7.6 7.8   ALBUMIN 2.7* 2.7* 2.8* 2.9* 2.9*   GLOBULIN 4.7 4.7 4.8 4.7 4.9   ALT (SGPT) 24 23 23 24 23   AST (SGOT) 35* 26 25 28 26   BILIRUBIN 0.6 0.6 0.5 0.5 0.5   ALK PHOS 160* 154* 156* 142* 151*         Lab 02/09/24  0857   PROTIME 12.9   INR 0.95                 Brief Urine Lab Results  (Last result in the past 365 days)        Color   Clarity   Blood   Leuk Est   Nitrite   Protein   CREAT   Urine HCG        02/11/24 1300 Yellow   Clear   Negative   Trace   Negative   Negative                 Microbiology Results (last 10 days)       ** No results found for the last 240 hours. **            XR Chest 1 View    Result Date: 2/11/2024    No significant change in comparison to 2/7/2024.       FADI BRINK MD       Electronically Signed and Approved By: FADI BRINK MD on 2/11/2024 at 14:16             XR Chest 1 View    Result Date: 2/7/2024   Worsening radiographic appearance of pneumonia       JAYDON MULLIGAN MD       Electronically Signed and Approved By: JAYDON MULLIGAN MD on 2/07/2024 at 9:24                     Results for orders placed during the hospital encounter of 01/31/24    Adult Transthoracic Echo Complete W/ Cont if Necessary Per Protocol    Interpretation Summary    Very technically difficult study with limited views.    Left ventricular ejection fraction appears to be 56 - 60%.  No obvious regional wall motion abnormalities.    Left ventricular diastolic function is consistent with (grade I) impaired relaxation and age.    Mild tricuspid regurgitation with estimated right ventricular systolic pressure from tricuspid regurgitation is mildly elevated (35-45 mmHg).    Valves are not well-visualized but no significant valvular disease by Doppler.          Time spent on Discharge including face to face service:  >30 minutes    Electronically signed by Teresa Castro DO, 02/14/24,  3:39 PM EST.

## 2024-02-14 NOTE — SIGNIFICANT NOTE
02/14/24 1235   Plan   Plan Patient is not agreeable to [facility] at this time. Patient prefers [facility].  emailed facility liaisons to check on status of referral.

## 2024-02-14 NOTE — SIGNIFICANT NOTE
02/14/24 1417   Plan   Plan [facility] can offer a bed. They are able to admit patient today. The patient and spouse are agreeable to [facility]. Hospitalist notified. Patient aware of discharge today as well.   Patient/Family in Agreement with Plan yes   Final Discharge Disposition Code 62 - inpatient rehab facility

## 2024-02-14 NOTE — THERAPY PROGRESS REPORT/RE-CERT
Patient Name: Falguni Minaya  : 1960    MRN: [id number]                              Today's Date: 2024       Admit Date: 2024    Visit Dx:     ICD-10-CM ICD-9-CM   1. Altered mental status, unspecified altered mental status type  R41.82 780.97   2. Acute urinary tract infection  N39.0 599.0   3. Oropharyngeal dysphagia  R13.12 787.22   4. Difficulty walking  R26.2 719.7   5. Decreased activities of daily living (ADL)  Z78.9 V49.89     Patient Active Problem List   Diagnosis    Multiple sclerosis    Left sided abdominal pain    Dyspepsia    Heme positive stool    Hypotension    Seizure disorder    Pain in lower back    Headache    Frequent falls    Spasm    Weakness of right leg    S/P drug eluting coronary stent placement    Coronary artery disease involving native coronary artery of native heart    Ischemic cardiomyopathy    Pleural effusion due to CHF (congestive heart failure)    Shortness of breath    Orthopnea    Hyperlipidemia LDL goal <70    Anemia due to stage 3 chronic kidney disease    Iron deficiency    Anemia in chronic kidney disease    Arthropathy of lumbar facet joint    Marie's esophagus    Benign colonic polyp    Congestive heart failure    Degeneration of lumbar intervertebral disc    Gastroesophageal reflux disease    Gastroparesis    Lumbar radiculopathy    Osteoporosis    Sacroiliac joint pain    Serum creatinine raised    Vitamin D deficiency    Acute hypoxemic respiratory failure     Past Medical History:   Diagnosis Date    Abdominal hernia     Acute ST elevation myocardial infarction (STEMI) due to occlusion of right coronary artery 2020    CHF (congestive heart failure)     DENIES CP GETS SOA WITH EXERTION. FOLLOWED BY DR ERIC/KASIA GREY. DECREASED ACTIVITY USES WALKER/ROLLATOR    CKD (chronic kidney disease)     Coronary artery disease     Dyspepsia     GERD (gastroesophageal reflux disease)     Hyperlipidemia     Hypertension     Ischemic cardiomyopathy      Multiple sclerosis      Past Surgical History:   Procedure Laterality Date    APPENDECTOMY      CARDIAC CATHETERIZATION      CARDIAC CATHETERIZATION N/A 2/24/2020    Procedure: Left Heart Cath;  Surgeon: Marlon Zamudio MD;  Location: [facility] CATH INVASIVE LOCATION;  Service: Cardiovascular;  Laterality: N/A;    CARDIAC CATHETERIZATION N/A 2/24/2020    Procedure: Stent LATRICIA coronary;  Surgeon: Marlon Zamudio MD;  Location: [facility] CATH INVASIVE LOCATION;  Service: Cardiovascular;  Laterality: N/A;    CARDIAC CATHETERIZATION  2/24/2020    Procedure: Percutaneous Manual Thrombectomy;  Surgeon: Marlon Zamudio MD;  Location: [facility] CATH INVASIVE LOCATION;  Service: Cardiovascular;;    CARDIAC CATHETERIZATION N/A 2/24/2020    Procedure: Coronary angiography;  Surgeon: Marlon Zamudio MD;  Location: [facility] CATH INVASIVE LOCATION;  Service: Cardiovascular;  Laterality: N/A;    CARDIAC CATHETERIZATION N/A 2/24/2020    Procedure: Left ventriculography;  Surgeon: Marlon Zamudio MD;  Location: [facility] CATH INVASIVE LOCATION;  Service: Cardiovascular;  Laterality: N/A;    CHOLECYSTECTOMY      COLONOSCOPY N/A 2/29/2020    Procedure: COLONOSCOPY to  cecum:;  Surgeon: Krystal Sarabia MD;  Location: [facility] ENDOSCOPY;  Service: Gastroenterology;  Laterality: N/A;  pre:  anemia and abd pain  post:  hemorrhoids, tortuous colon    ENDOSCOPY N/A 2/29/2020    Procedure: ESOPHAGOGASTRODUODENOSCOPY  with biopsies;  Surgeon: Krystal Sarabia MD;  Location: [facility] ENDOSCOPY;  Service: Gastroenterology;  Laterality: N/A;  pre:  anemia and abd pain  post:  mild gastritis,     HYSTERECTOMY      PARATHYROIDECTOMY Bilateral 10/17/2023    Procedure: NECK EXPLORATION WITH PARATHYROID ADENOMA EXCISION AND FROZEN SECTION,, RECURRENT LARYNGEAL NERVE MONITORING, INTRAOPERATIVE INTACT PTH ASSAY;  Surgeon: Ezekiel Giles MD;  Location: [facility] OR;  Service: ENT;  Laterality: Bilateral;    TONSILLECTOMY        General  Information       Row Name 02/14/24 1040          OT Time and Intention    Document Type progress note/recertification  -AV     Mode of Treatment individual therapy;occupational therapy  -AV       Row Name 02/14/24 1040          General Information    Patient Profile Reviewed yes  -AV     Prior Level of Function independent:;ADL's;transfer  ambulates without device. no home O2.  -AV     Existing Precautions/Restrictions fall;oxygen therapy device and L/min  DNR  -AV     Barriers to Rehab none identified  -AV       Row Name 02/14/24 1040          Occupational Profile    Reason for Services/Referral (Occupational Profile) OT re-certification as patient remains hospitalized and continues to present with limitations impacting ADL/ transfer independence.  -AV       Row Name 02/14/24 1040          Living Environment    People in Home spouse  -AV       Row Name 02/14/24 1040          Cognition    Orientation Status (Cognition) --  alert, pleasant and cooperative. able to retain information and follow commands.  -AV       Row Name 02/14/24 1040          Safety Issues, Functional Mobility    Impairments Affecting Function (Mobility) balance;endurance/activity tolerance;strength;shortness of breath  -AV               User Key  (r) = Recorded By, (t) = Taken By, (c) = Cosigned By      Initials Name Provider Type    AV Quique Marrero, OT Occupational Therapist                     Mobility/ADL's       Row Name 02/14/24 1042          Bed Mobility    Supine-Sit Rampart (Bed Mobility) minimum assist (75% patient effort)  -AV       Row Name 02/14/24 1042          Sit-Stand Transfer    Sit-Stand Rampart (Transfers) contact guard  -AV     Assistive Device (Sit-Stand Transfers) walker, front-wheeled  -AV       Row Name 02/14/24 1042          Activities of Daily Living    BADL Assessment/Intervention --  Independent feeding and grooming with setup while seated. mod assist bathing/ dressing. dependent toilet hygiene: purewick/  bedpan.  -AV               User Key  (r) = Recorded By, (t) = Taken By, (c) = Cosigned By      Initials Name Provider Type    Quique Moseley OT Occupational Therapist                   Obj/Interventions       Row Name 02/14/24 1044          Sensory Assessment (Somatosensory)    Sensory Assessment (Somatosensory) UE sensation intact  -AV       Row Name 02/14/24 1044          Vision Assessment/Intervention    Visual Impairment/Limitations WFL;corrective lenses full-time  -AV       Row Name 02/14/24 1044          Range of Motion Comprehensive    General Range of Motion bilateral upper extremity ROM WFL  -AV     Comment, General Range of Motion AROM  -AV       Row Name 02/14/24 1044          Upper Extremity (Manual Muscle Testing)    Comment, MMT: Upper Extremity 4(-)/5 bilateral biceps, triceps and   -AV       Row Name 02/14/24 1044          Motor Skills    Motor Skills coordination;functional endurance  -AV     Coordination WFL  right dominant  -AV     Functional Endurance fair minus  -AV       Row Name 02/14/24 1044          Balance    Comment, Balance impaired  -AV               User Key  (r) = Recorded By, (t) = Taken By, (c) = Cosigned By      Initials Name Provider Type    Quique Moseley OT Occupational Therapist                   Goals/Plan       Row Name 02/14/24 1047          Transfer Goal 1 (OT)    Activity/Assistive Device (Transfer Goal 1, OT) transfers, all  -AV     [place] Level/Cues Needed (Transfer Goal 1, OT) modified independence  -AV     Time Frame (Transfer Goal 1, OT) long term goal (LTG);10 days  -AV     Progress/Outcome (Transfer Goal 1, OT) goal ongoing  -AV       Row Name 02/14/24 1047          Bathing Goal 1 (OT)    Activity/Device (Bathing Goal 1, OT) bathing skills, all  -AV     [place] Level/Cues Needed (Bathing Goal 1, OT) modified independence  -AV     Time Frame (Bathing Goal 1, OT) long term goal (LTG);10 days  -AV     Progress/Outcomes (Bathing Goal 1, OT) goal  ongoing  -AV       Row Name 02/14/24 1047          Dressing Goal 1 (OT)    Activity/Device (Dressing Goal 1, OT) dressing skills, all  -AV     [place]/Cues Needed (Dressing Goal 1, OT) modified independence  -AV     Time Frame (Dressing Goal 1, OT) long term goal (LTG);10 days  -AV     Progress/Outcome (Dressing Goal 1, OT) goal ongoing  -AV       Row Name 02/14/24 1047          Toileting Goal 1 (OT)    Activity/Device (Toileting Goal 1, OT) toileting skills, all  -AV     [place] Level/Cues Needed (Toileting Goal 1, OT) modified independence  -AV     Time Frame (Toileting Goal 1, OT) long term goal (LTG);10 days  -AV     Progress/Outcome (Toileting Goal 1, OT) goal ongoing  -AV       Row Name 02/14/24 1047          Grooming Goal 1 (OT)    Activity/Device (Grooming Goal 1, OT) grooming skills, all  -AV     [place] (Grooming Goal 1, OT) modified independence  -AV     Time Frame (Grooming Goal 1, OT) long term goal (LTG);10 days  -AV     Progress/Outcome (Grooming Goal 1, OT) goal ongoing  -AV       Row Name 02/14/24 1047          Strength Goal 1 (OT)    Strength Goal 1 (OT) Pt will improve BUE strength to 4/5 muscle grade for increased UE strength required for ADL transfers and task completion  -AV     Time Frame (Strength Goal 1, OT) long term goal (LTG);10 days  -AV     Progress/Outcome (Strength Goal 1, OT) goal ongoing  -AV       Row Name 02/14/24 1047          Problem Specific Goal 1 (OT)    Problem Specific Goal 1 (OT) Patient will demonstrate fair plus task tolerance in preperation for independent ADL routine completion at time of discharge  -AV     Time Frame (Problem Specific Goal 1, OT) long term goal (LTG);10 days  -AV     Progress/Outcome (Problem Specific Goal 1, OT) goal ongoing  -AV       Row Name 02/14/24 1047          Therapy Assessment/Plan (OT)    Planned Therapy Interventions (OT) activity tolerance training;BADL retraining;functional balance retraining;occupation/activity based  interventions;patient/caregiver education/training;strengthening exercise;transfer/mobility retraining  -AV               User Key  (r) = Recorded By, (t) = Taken By, (c) = Cosigned By      Initials Name Provider Type    AV Quique Marrero, [place] Occupational Therapist                   Clinical Impression       Row Name 02/14/24 1045          Pain Scale: FACES Pre/Post-Treatment    Pain: FACES Scale, Pretreatment 0-->no hurt  -AV     Posttreatment Pain Rating 0-->no hurt  -AV       Row Name 02/14/24 1045          Plan of Care Review    Plan of Care Reviewed With patient;family  -AV     Progress improving  -AV     Outcome Evaluation Patient continues to present with limitations of balance, strength and endurance/ activity tolerance which are impacting ADL/ transfer independence. Continued skilled OT services are indicated to remediate/ compensate for deficits to maximize independence and safety with functional tasks.  -AV       Row Name 02/14/24 1045          Therapy Assessment/Plan (OT)    Patient/Family Therapy Goal Statement (OT) regain independence  -AV     Rehab Potential (OT) good, to achieve stated therapy goals  -AV     Criteria for Skilled Therapeutic Interventions Met (OT) yes;meets criteria;skilled treatment is necessary  -AV     Therapy Frequency (OT) 5 times/wk  -AV       Row Name 02/14/24 1048          Therapy Plan Review/Discharge Plan (OT)    Equipment Needs Upon Discharge (OT) walker, rolling;commode chair  -AV     Anticipated Discharge Disposition (OT) sub acute care setting;inpatient rehabilitation facility  -AV       Row Name 02/14/24 1041          Vital Signs    O2 Delivery Pre Treatment nasal cannula  3  -AV     O2 Delivery Intra Treatment nasal cannula  3  -AV     O2 Delivery Post Treatment nasal cannula  3  -AV       Row Name 02/14/24 1047          Positioning and Restraints    Pre-Treatment Position in bed  -AV     Post Treatment Position bed  -AV     In Bed call light within reach;encouraged to  call for assist;with family/caregiver  -AV               User Key  (r) = Recorded By, (t) = Taken By, (c) = Cosigned By      Initials Name Provider Type    Quique Moseley OT Occupational Therapist                   Outcome Measures       Row Name 02/14/24 1048          How much help from another is currently needed...    Putting on and taking off regular lower body clothing? 2  -AV     Bathing (including washing, rinsing, and drying) 2  -AV     Toileting (which includes using toilet bed pan or urinal) 1  -AV     Putting on and taking off regular upper body clothing 3  -AV     Taking care of personal grooming (such as brushing teeth) 4  -AV     Eating meals 4  -AV     AM-PAC 6 Clicks Score (OT) 16  -AV       Row Name 02/14/24 0737          How much help from another person do you currently need...    Turning from your back to your side while in flat bed without using bedrails? 3  -MF     Moving from lying on back to sitting on the side of a flat bed without bedrails? 3  -MF     Moving to and from a bed to a chair (including a wheelchair)? 3  -MF     Standing up from a chair using your arms (e.g., wheelchair, bedside chair)? 3  -MF     Climbing 3-5 steps with a railing? 2  -MF     To walk in hospital room? 3  -MF     AM-PAC 6 Clicks Score (PT) 17  -MF     Highest Level of Mobility Goal 5 --> Static standing  -       Row Name 02/14/24 1048          Optimal Instrument    Bending/Stooping 2  -AV     Standing 2  -AV     Reaching 1  -AV               User Key  (r) = Recorded By, (t) = Taken By, (c) = Cosigned By      Initials Name Provider Type    Jana Ralph, RN Registered Nurse    Quique Moseley OT Occupational Therapist                      OT Recommendation and Plan  Planned Therapy Interventions (OT): activity tolerance training, BADL retraining, functional balance retraining, occupation/activity based interventions, patient/caregiver education/training, strengthening exercise, transfer/mobility  retraining  Therapy Frequency (OT): 5 times/wk  Plan of Care Review  Plan of Care Reviewed With: patient, family  Progress: improving  Outcome Evaluation: Patient continues to present with limitations of balance, strength and endurance/ activity tolerance which are impacting ADL/ transfer independence. Continued skilled OT services are indicated to remediate/ compensate for deficits to maximize independence and safety with functional tasks.     Time Calculation:         Time Calculation- OT       Row Name 02/14/24 1050             Time Calculation- OT    OT Received On 02/14/24  -AV      OT Goal Re-Cert Due Date 02/23/24  -AV         Untimed Charges    OT Eval/Re-eval Minutes 25  -AV         Total Minutes    Untimed Charges Total Minutes 25  -AV       Total Minutes 25  -AV                User Key  (r) = Recorded By, (t) = Taken By, (c) = Cosigned By      Initials Name Provider Type    Quique Moseley OT Occupational Therapist                  Therapy Charges for Today       Code Description Service Date Service Provider Modifiers Qty    [id number] HC OT RE-EVAL 2 2/14/2024 Quique Marrero OT GO 1                 Quique Marrero OT  2/14/2024

## 2024-02-14 NOTE — SIGNIFICANT NOTE
02/14/24 1614   Plan   Plan EMS DNR signed by patient, who is alert and oriented.  notarized. Original given to SIDNEY Bates, for EMS transport.

## 2024-02-14 NOTE — PROGRESS NOTES
Pulmonary / Critical Care Progress Note      Patient Name: Falguni Minaya  : 1960  MRN: [id number]  Primary Care Physician:  Williams Sage MD  Date of admission: 2024    Subjective   Subjective   Follow-up for hypoxic respiratory failure, COVID-19 infection, E. coli urinary tract infection, multiple sclerosis, immunosuppressed status.     Over past 24 hours: Out of ICU    No acute events overnight.    This morning,  On 3 L nasal cannula  Lying in bed  Reports feeling well  No fever or chills  Denies chest pain chest tightness  Denies cough  Diuresing well    Objective   Objective     Vitals:   Temp:  [97.3 °F (36.3 °C)-98.6 °F (37 °C)] 97.9 °F (36.6 °C)  Heart Rate:  [] 101  Resp:  [18-28] 18  BP: ()/(72-81) 120/77  Flow (L/min):  [3] 3  Physical Exam   Vital Signs Reviewed   General: WDWN, Alert, NAD.  Chronically ill appearing female, lying in bed  HEENT:  PERRL, EOMI.  OP, nares clear, no sinus tenderness  Neck:  Supple, no JVD, no thyromegaly  Chest:  good aeration, clear to auscultation bilaterally,no work of breathing noted on 3 L  CV: RRR, no MGR, pulses 2+, equal.  Abd:  Soft, NT, ND, + BS, no HSM  EXT:  no clubbing, no cyanosis, no edema  Neuro:  A&Ox3, CN grossly intact, no focal deficits.  Skin: No rashes or lesions noted    Result Review    Result Review:  I have personally reviewed the results from the time of this admission to 2024 12:55 EST and agree with these findings:  [x]  Laboratory  [x]  Microbiology  [x]  Radiology  []  EKG/Telemetry   []  Cardiology/Vascular   []  Pathology  []  Old records  []  Other:  Most notable findings include:         Lab 24  0536 24  0523 24  0700 24  0510 24  0312 02/10/24  0325 24  0253 24  0307   WBC 9.47 9.82  --  10.37 14.15* 17.49* 18.57* 18.05*   HEMOGLOBIN 10.5* 10.8*  --  11.1* 10.4* 10.1* 9.9* 9.9*   HEMATOCRIT 32.7* 33.4*  --  36.4 33.0* 32.8* 30.4* 30.8*   PLATELETS 320 329  --   327 343 353 349 337   SODIUM 132* 133* 134*  --  136 135* 134* 134*   POTASSIUM 3.9 3.5 3.5  --  3.5 3.9 3.8 4.2   CHLORIDE 99 101 100  --  102 102 99 100   CO2 17.3* 17.6* 17.1*  --  16.1* 16.8* 16.7* 16.6*   BUN 31* 36* 39*  --  39* 43* 38* 48*   CREATININE 1.31* 1.39* 1.51*  --  1.51* 1.45* 1.41* 1.48*   GLUCOSE 117* 124* 137*  --  120* 129* 110* 117*   CALCIUM 9.3 9.3 9.3  --  9.0 9.3 9.0 8.9   PHOSPHORUS  --  3.6 3.7  --  3.6 3.5 3.8 3.2   TOTAL PROTEIN 7.4 7.4 7.6  --  7.6 7.8 7.4 7.2   ALBUMIN 2.7* 2.7* 2.8*  --  2.9* 2.9* 2.9* 2.8*   GLOBULIN 4.7 4.7 4.8  --  4.7 4.9 4.5 4.4     Assessment & Plan   Assessment / Plan     Active Hospital Problems:  Active Hospital Problems    Diagnosis     **Acute hypoxemic respiratory failure      Impression:  Acute hypoxic respiratory failure requiring Airvo  Severe sepsis present on admission  COVID-pneumonia +1/22  Community-acquired pneumonia from unspecified organism  E. coli urinary tract infection  Multiple sclerosis (patient has not been on immunosuppression for at least 3 to 4 months per family)  ?Immunosuppressed status on treatment for MS  Acute decompensated diastolic congestive heart failure  Atelectasis, bibasilar  Acute kidney injury secondary to prerenal etiology   Coronary artery disease  Lactic acidosis, clinically significant    Plan:  -On 3 L nasal cannula.  Continue to wean supplemental oxygen to maintain SpO2 greater than 90%.  Patient does not wear any home O2 at baseline  -Completed Unasyn course and Decadron course  -Continue Brovana, Pulmicort, and DuoNebs  -Continue Lasix 40 mg oral daily  -Continue to monitor renal panel and electrolytes.  Replace electrolytes as necessary  -Continue bronchopulmonary hygiene.  Encourage I-S  -Encourage activity as tolerated.  Out of bed to chair  -Palliative following, appreciate input  -Rehab placement pending  -Rest of care per primary    DVT prophylaxis:  Medical DVT prophylaxis orders are present.    CODE  STATUS:   Medical Intervention Limits: NO intubation (DNI); NO cardioversion  Level Of Support Discussed With: Patient  Code Status (Patient has no pulse and is not breathing): No CPR (Do Not Attempt to Resuscitate)  Medical Interventions (Patient has pulse or is breathing): Limited Support  Comments: d/w pt and /family    Electronically signed by WESTLEY Giron, 02/14/24, 12:55 PM EST.    This visit was performed by BOTH a physician and an APC.  I spent more than 51% of time caring for this patient I personally evaluated and examined the patient. I performed all aspects of MDM as documented. , I have reviewed and confirmed the accuracy of the patient's history as documented in this note. and I have reexamined the patient and the results are consistent with the previously documented exam. I have updated the documentation as necessary    Electronically signed by Isreal Pedraza DO, 02/14/24, 1:21 PM EST.

## 2024-02-14 NOTE — THERAPY TREATMENT NOTE
Acute Care - Speech Language Pathology   Swallow Treatment Note SHELDON Akers     Patient Name: Falguni Minaya  : 1960  MRN: [id number]  Today's Date: 2024               Admit Date: 2024    Visit Dx:     ICD-10-CM ICD-9-CM   1. Altered mental status, unspecified altered mental status type  R41.82 780.97   2. Acute urinary tract infection  N39.0 599.0   3. Oropharyngeal dysphagia  R13.12 787.22   4. Difficulty walking  R26.2 719.7   5. Decreased activities of daily living (ADL)  Z78.9 V49.89     Patient Active Problem List   Diagnosis    Multiple sclerosis    Left sided abdominal pain    Dyspepsia    Heme positive stool    Hypotension    Seizure disorder    Pain in lower back    Headache    Frequent falls    Spasm    Weakness of right leg    S/P drug eluting coronary stent placement    Coronary artery disease involving native coronary artery of native heart    Ischemic cardiomyopathy    Pleural effusion due to CHF (congestive heart failure)    Shortness of breath    Orthopnea    Hyperlipidemia LDL goal <70    Anemia due to stage 3 chronic kidney disease    Iron deficiency    Anemia in chronic kidney disease    Arthropathy of lumbar facet joint    Marie's esophagus    Benign colonic polyp    Congestive heart failure    Degeneration of lumbar intervertebral disc    Gastroesophageal reflux disease    Gastroparesis    Lumbar radiculopathy    Osteoporosis    Sacroiliac joint pain    Serum creatinine raised    Vitamin D deficiency    Acute hypoxemic respiratory failure     Past Medical History:   Diagnosis Date    Abdominal hernia     Acute ST elevation myocardial infarction (STEMI) due to occlusion of right coronary artery 2020    CHF (congestive heart failure)     DENIES CP GETS SOA WITH EXERTION. FOLLOWED BY DR ERIC/KASIA GREY. DECREASED ACTIVITY USES WALKER/ROLLATOR    CKD (chronic kidney disease)     Coronary artery disease     Dyspepsia     GERD (gastroesophageal reflux disease)      Hyperlipidemia     Hypertension     Ischemic cardiomyopathy     Multiple sclerosis      Past Surgical History:   Procedure Laterality Date    APPENDECTOMY      CARDIAC CATHETERIZATION      CARDIAC CATHETERIZATION N/A 2/24/2020    Procedure: Left Heart Cath;  Surgeon: Marlon Zamudio MD;  Location: [facility] CATH INVASIVE LOCATION;  Service: Cardiovascular;  Laterality: N/A;    CARDIAC CATHETERIZATION N/A 2/24/2020    Procedure: Stent LATRICIA coronary;  Surgeon: Marlon Zamudio MD;  Location: [facility] CATH INVASIVE LOCATION;  Service: Cardiovascular;  Laterality: N/A;    CARDIAC CATHETERIZATION  2/24/2020    Procedure: Percutaneous Manual Thrombectomy;  Surgeon: Marlon Zamudio MD;  Location: [facility] CATH INVASIVE LOCATION;  Service: Cardiovascular;;    CARDIAC CATHETERIZATION N/A 2/24/2020    Procedure: Coronary angiography;  Surgeon: Marlon Zamudio MD;  Location: [facility] CATH INVASIVE LOCATION;  Service: Cardiovascular;  Laterality: N/A;    CARDIAC CATHETERIZATION N/A 2/24/2020    Procedure: Left ventriculography;  Surgeon: Marlon Zamudio MD;  Location: [facility] CATH INVASIVE LOCATION;  Service: Cardiovascular;  Laterality: N/A;    CHOLECYSTECTOMY      COLONOSCOPY N/A 2/29/2020    Procedure: COLONOSCOPY to  cecum:;  Surgeon: Krystal Sarabia MD;  Location: [facility] ENDOSCOPY;  Service: Gastroenterology;  Laterality: N/A;  pre:  anemia and abd pain  post:  hemorrhoids, tortuous colon    ENDOSCOPY N/A 2/29/2020    Procedure: ESOPHAGOGASTRODUODENOSCOPY  with biopsies;  Surgeon: Krystal Sarabia MD;  Location: [facility] ENDOSCOPY;  Service: Gastroenterology;  Laterality: N/A;  pre:  anemia and abd pain  post:  mild gastritis,     HYSTERECTOMY      PARATHYROIDECTOMY Bilateral 10/17/2023    Procedure: NECK EXPLORATION WITH PARATHYROID ADENOMA EXCISION AND FROZEN SECTION,, RECURRENT LARYNGEAL NERVE MONITORING, INTRAOPERATIVE INTACT PTH ASSAY;  Surgeon: Ezekiel Giles MD;  Location: [facility] [place];  Service: ENT;   Laterality: Bilateral;    TONSILLECTOMY           SPEECH PATHOLOGY DYSPHAGIA TREATMENT    Subjective/Behavioral Observations: Alert and cooperative, required assistance for positioning      Day/time of Treatment: 2/14/2024      Current Diet: Mechanical soft, thin      Current Strategies: Alternate small bites and small sips of solids and liquids at a slow rate.  Assist patient with set up.       Treatment received: Dysphagia therapy to address swallow function through exercises and education of strategies.      Results of treatment: Patient taking small bites of soft solids, no difficulty noted with chewing and swallowing.  Sips of thin liquid with no overt clinical signs or symptoms of aspiration noted.  Patient was noted with slight increased respiratory rate throughout session.      Progress toward goals: Adequate      Barriers to Achieving goals: Medical status      Plan of care:/changes in plan: Continue per current plan.  Encourage p.o. intake.                                                                                           Plan of Care Reviewed With: patient          EDUCATION  The patient has been educated in the following areas:   Modified Diet Instruction.              Time Calculation:    Time Calculation- SLP       Row Name 02/14/24 0908             Time Calculation- SLP    SLP Stop Time 0800  -TB      SLP Received On 02/14/24  -TB         Untimed Charges    48310-DN Treatment Swallow Minutes 40  -TB         Total Minutes    Untimed Charges Total Minutes 40  -TB       Total Minutes 40  -TB                User Key  (r) = Recorded By, (t) = Taken By, (c) = Cosigned By      Initials Name Provider Type    TB Haily Moore SLP Speech and Language Pathologist                    Therapy Charges for Today       Code Description Service Date Service Provider Modifiers Qty    [id number]  ST TREATMENT SWALLOW 3 2/14/2024 Haily Moore SLP GN 1                 SHANNAN Weaver  2/14/2024

## 2024-02-14 NOTE — PLAN OF CARE
Goal Outcome Evaluation:                      Patient is alert and oriented x4.  Vital signs stable.  No complaints of pain this shift.  Patient refused wound care; skin care completed per orders.  Patient to discharge to [facility].  Report called to Kandy at [facility].  Copy of AVS provided to family.  Ivs removed with tips intact.

## 2024-02-14 NOTE — PLAN OF CARE
Goal Outcome Evaluation:      No significant change in patient's status on shift. A&O x4. VSS. No complaints from patient. Plan of care continue.

## 2024-02-15 ENCOUNTER — READMISSION MANAGEMENT (OUTPATIENT)
Dept: CALL CENTER | Facility: HOSPITAL | Age: 64
End: 2024-02-15
Payer: MEDICARE

## 2024-02-19 PROBLEM — J96.21 ACUTE ON CHRONIC RESPIRATORY FAILURE WITH HYPOXIA: Status: ACTIVE | Noted: 2024-02-19

## 2024-02-19 PROBLEM — J96.21 ACUTE ON CHRONIC HYPOXIC RESPIRATORY FAILURE: Status: ACTIVE | Noted: 2024-02-19

## 2024-02-19 PROBLEM — I50.33 DIASTOLIC CHF, ACUTE ON CHRONIC: Status: ACTIVE | Noted: 2024-02-19

## 2024-02-19 NOTE — CONSULTS
Pulmonary / Critical Care Consult Note      Patient Name: Falguni Minaya  : 1960  MRN: [id number]  Primary Care Physician:  Williams Sage MD  Referring Physician: Rk Franco MD  Date of admission: 2024    Subjective   Subjective     Reason for Consult/ Chief Complaint:   Acute on chronic hypoxic respiratory failure requiring high flow    HPI:  Falguni Minyaa is a 64 y.o. female with past medical history of multiple sclerosis, CAD, hypertension, hyperlipidemia, presented to the ED for evaluation of shortness of breath requiring high flow nasal.  In the ED, ABG was 7.5 1953, troponin 18, proBNP 2438, and lactate 2.8.   chest CT obstructive inflammatory infectious process that could be related to post-COVID pneumonia.  The above reasons, the service was consulted to assist in the management treatment of patient's acute issues.  Upon assessment, patient lying in bed on flow nasal cannula in moderate respiratory distress.  Findings and plan were discussed with the patient.  Patient was working with physical therapy and was desaturating with exertion.  She reports feeling better and denies any chest pain or chest tightness.  She denies any fever, chills, hemoptysis, nausea, vomiting, or diarrhea.  He denies being around any sick contacts    Review of Systems  Constitutional symptoms: Fatigue, otherwise denied complaints   Ear, nose, throat: Denied complaints  Cardiovascular:  Denied complaints  Respiratory: Dyspnea, cough, otherwise denied complaints  Gastrointestinal: Denied complaints  Musculoskeletal: Denied complaints  Genitourinary: Denied complaints  Allergy / Immunology: Denied complaints  Hematologic: Denied complaints  Neurologic: Denied complaints  Skin: Denied complaints  Endocrine: Denied complaints  Psychiatric: Denied complaints      Personal History     Past Medical History:   Diagnosis Date    Abdominal hernia     Acute ST elevation myocardial infarction (STEMI) due to  occlusion of right coronary artery 02/24/2020    CHF (congestive heart failure)     DENIES CP GETS SOA WITH EXERTION. FOLLOWED BY DR ERIC/KASIA GREY. DECREASED ACTIVITY USES WALKER/ROLLATOR    CKD (chronic kidney disease)     Coronary artery disease     Dyspepsia     GERD (gastroesophageal reflux disease)     Hyperlipidemia     Hypertension     Ischemic cardiomyopathy     Multiple sclerosis        Past Surgical History:   Procedure Laterality Date    APPENDECTOMY      CARDIAC CATHETERIZATION      CARDIAC CATHETERIZATION N/A 2/24/2020    Procedure: Left Heart Cath;  Surgeon: Marlon Zamudio MD;  [facility];  Service: Cardiovascular;  Laterality: N/A;    CARDIAC CATHETERIZATION N/A 2/24/2020    Procedure: Stent LATRICIA coronary;  Surgeon: Marlon Zamudio MD;  [facility];  Service: Cardiovascular;  Laterality: N/A;    CARDIAC CATHETERIZATION  2/24/2020    Procedure: Percutaneous Manual Thrombectomy;  Surgeon: Marlon Zamudio MD;  [facility];  Service: Cardiovascular;;    CARDIAC CATHETERIZATION N/A 2/24/2020    Procedure: Coronary angiography;  Surgeon: Marlon Zaumdio MD;  [facility];  Service: Cardiovascular;  Laterality: N/A;    CARDIAC CATHETERIZATION N/A 2/24/2020    Procedure: Left ventriculography;  Surgeon: Marlon Zamudio MD;  [facility];  Service: Cardiovascular;  Laterality: N/A;    CHOLECYSTECTOMY      COLONOSCOPY N/A 2/29/2020    Procedure: COLONOSCOPY to  cecum:;  Surgeon: Krystal Sarabia MD;  Location: [facility] ENDOSCOPY;  Service: Gastroenterology;  Laterality: N/A;  pre:  anemia and abd pain  post:  hemorrhoids, tortuous colon    ENDOSCOPY N/A 2/29/2020    Procedure: ESOPHAGOGASTRODUODENOSCOPY  with biopsies;  Surgeon: Krystal Sarabia MD;  Location: [facility] ENDOSCOPY;  Service: Gastroenterology;  Laterality: N/A;  pre:  anemia and abd  pain  post:  mild gastritis,     HYSTERECTOMY      PARATHYROIDECTOMY Bilateral 10/17/2023    Procedure: NECK EXPLORATION WITH PARATHYROID ADENOMA EXCISION AND FROZEN SECTION,, RECURRENT LARYNGEAL NERVE MONITORING, INTRAOPERATIVE INTACT PTH ASSAY;  Surgeon: Ezekiel Giles MD;  Location: [facility] OR;  Service: ENT;  Laterality: Bilateral;    TONSILLECTOMY         Family History: family history includes Coronary artery disease in her brother; Hypertension in her sister; Thyroid disease in her mother. Otherwise pertinent FHx was reviewed and not pertinent to current issue.    Social History:  reports that she quit smoking about 14 years ago. Her smoking use included cigarettes. She started smoking about 34 years ago. She has a 10.00 pack-year smoking history. She has never used smokeless tobacco. She reports that she does not currently use alcohol. She reports that she does not use drugs.    Home Medications:  FLUoxetine, OLANZapine, albuterol sulfate HFA, arformoterol, aspirin, atorvastatin, budesonide, carvedilol, clopidogrel, furosemide, gabapentin, hydrocortisone-bacitracin-zinc oxide-nystatin, ondansetron, pantoprazole, and pramipexole    Allergies:  Allergies   Allergen Reactions    Codeine Hives     Hyperactivity, nausea    Morphine Hives     Hyperactivity, nausea       Objective    Objective     Vitals:   Temp:  [97.7 °F (36.5 °C)-99.3 °F (37.4 °C)] 98.8 °F (37.1 °C)  Heart Rate:  [78-92] 83  Resp:  [24-34] 31  BP: ()/(46-94) 98/68  Flow (L/min):  [2-6] 6    Physical Exam:  Vital Signs Reviewed   General:  WDWN, Alert, NAD.  Chronically ill-appearing female lying in bed  HEENT:  PERRL, EOMI.  OP, nares clear, no sinus tenderness  Neck:  Supple, no JVD, no thyromegaly  Lymph: no axillary, cervical, supraclavicular lymphadenopathy noted bilaterally  Chest: Poor aeration with rhonchi on auscultation, increased work of breathing noted on high flow nasal cannula  CV: RRR, no MGR, pulses 2+, equal.  Abd:   Soft, NT, ND, + BS, no HSM, obese  EXT:  no clubbing, no cyanosis, no edema, no joint tenderness  Neuro:  A&Ox3, CN grossly intact, no focal deficits.  Skin: No rashes or lesions noted    Result Review    Result Review:  I have personally reviewed the results from the time of this admission to 2024 16:27 EST and agree with these findings:  [x]  Laboratory  [x]  Microbiology  [x]  Radiology  []  EKG/Telemetry   []  Cardiology/Vascular   []  Pathology  []  Old records  []  Other:  Most notable findings include:         Lab 24  0407 24  1247 24  0922 24  0330 24  0300 02/15/24  0300 24  0536   WBC 8.18  --  9.20 8.73  --  9.00 9.47   HEMOGLOBIN 8.1*  --  9.0* 8.9*  --  10.1* 10.5*   HEMATOCRIT 25.5*  --  28.2* 28.1*  --  31.2* 32.7*   PLATELETS 223  --  245 232  --  331 320   SODIUM 136  --  136  --  131* 133* 132*   SODIUM, ARTERIAL  --  138.3  --   --   --   --   --    POTASSIUM 3.2*  --  3.7  --  3.5 3.4* 3.9   CHLORIDE 99  --  98  --  97* 98 99   CO2 23.6  --  24.1  --  19.0* 17.7* 17.3*   BUN 24*  --  22  --  45* 35* 31*   CREATININE 1.27*  --  1.29*  --  1.95* 1.51* 1.31*   GLUCOSE 160*  --  193*  --  109* 125* 117*   GLUCOSE, ARTERIAL  --  125*  --   --   --   --   --    CALCIUM 8.3*  --  8.6  --  8.8 9.0 9.3   PHOSPHORUS 4.0  --   --   --   --   --   --    TOTAL PROTEIN  --   --  6.4  --  7.0 7.2 7.4   ALBUMIN 2.2*  --  2.4*  --  2.6* 2.8* 2.7*   GLOBULIN  --   --  4.0  --  4.4 4.4 4.7       Assessment & Plan   Assessment / Plan     Active Hospital Problems:  Active Hospital Problems    Diagnosis     **Acute on chronic hypoxic respiratory failure     Diastolic CHF, acute on chronic      Expand All Collapse All    Pulmonary / Critical Care Consult Note        Patient Name: Falguni Minaya  : 1960  MRN: [id number]  Primary Care Physician:  Williams Sage MD  Referring Physician: Rk Franco MD  Date of admission: 2024        Subjective   Subjective       Reason for Consult/ Chief Complaint:   Acute on chronic hypoxic respiratory failure requiring high flow     HPI:  Falguni Minaya is a 64 y.o. female with past medical history of multiple sclerosis, CAD, hypertension, hyperlipidemia, presented to the ED for evaluation of shortness of breath requiring high flow nasal.  In the ED, ABG was 7.5 [birth year]28/1954, troponin 18, proBNP 2438, and lactate 2.8.  2/19 chest CT obstructive inflammatory infectious process that could be related to post-COVID pneumonia.  The above reasons, the service was consulted to assist in the management treatment of patient's acute issues.  Upon assessment, patient lying in bed on flow nasal cannula in moderate respiratory distress.  Findings and plan were discussed with the patient.  Patient was working with physical therapy and was desaturating with exertion.  She reports feeling better and denies any chest pain or chest tightness.  She denies any fever, chills, hemoptysis, nausea, vomiting, or diarrhea.  He denies being around any sick contacts     Review of Systems  Constitutional symptoms: Fatigue, otherwise denied complaints   Ear, nose, throat: Denied complaints  Cardiovascular:  Denied complaints  Respiratory: Dyspnea, cough, otherwise denied complaints  Gastrointestinal: Denied complaints  Musculoskeletal: Denied complaints  Genitourinary: Denied complaints  Allergy / Immunology: Denied complaints  Hematologic: Denied complaints  Neurologic: Denied complaints  Skin: Denied complaints  Endocrine: Denied complaints  Psychiatric: Denied complaints        Personal History      Medical History        Past Medical History:   Diagnosis Date    Abdominal hernia      Acute ST elevation myocardial infarction (STEMI) due to occlusion of right coronary artery 02/24/2020    CHF (congestive heart failure)       DENIES CP GETS SOA WITH EXERTION. FOLLOWED BY DR ERIC/KASIA GREY. DECREASED ACTIVITY USES WALKER/ROLLATOR    CKD (chronic kidney disease)       Coronary artery disease      Dyspepsia      GERD (gastroesophageal reflux disease)      Hyperlipidemia      Hypertension      Ischemic cardiomyopathy      Multiple sclerosis              Surgical History         Past Surgical History:   Procedure Laterality Date    APPENDECTOMY        CARDIAC CATHETERIZATION        CARDIAC CATHETERIZATION N/A 2/24/2020     Procedure: Left Heart Cath;  Surgeon: Marlon Zamudio MD;  [facility];  Service: Cardiovascular;  Laterality: N/A;    CARDIAC CATHETERIZATION N/A 2/24/2020     Procedure: Stent LATRICIA coronary;  Surgeon: Marlon Zamudio MD;  [facility];  Service: Cardiovascular;  Laterality: N/A;    CARDIAC CATHETERIZATION   2/24/2020     Procedure: Percutaneous Manual Thrombectomy;  Surgeon: Marlon Zamudio MD;  Location:  [facility] CATH INVASIVE LOCATION;  Service: Cardiovascular;;    CARDIAC CATHETERIZATION N/A 2/24/2020     Procedure: Coronary angiography;  Surgeon: Marlon Zamudio MD;  [facility];  Service: Cardiovascular;  Laterality: N/A;    CARDIAC CATHETERIZATION N/A 2/24/2020     Procedure: Left ventriculography;  Surgeon: Marlon Zamudio MD;  [facility];  Service: Cardiovascular;  Laterality: N/A;    CHOLECYSTECTOMY        COLONOSCOPY N/A 2/29/2020     Procedure: COLONOSCOPY to  cecum:;  Surgeon: Krystal Sarabia MD;  Location: [facility] ENDOSCOPY;  Service: Gastroenterology;  Laterality: N/A;  pre:  anemia and abd pain  post:  hemorrhoids, tortuous colon    ENDOSCOPY N/A 2/29/2020     Procedure: ESOPHAGOGASTRODUODENOSCOPY  with biopsies;  Surgeon: Krystal Sarabia MD;  Location: [facility] ENDOSCOPY;  Service: Gastroenterology;  Laterality: N/A;  pre:  anemia and abd pain  post:  mild gastritis,     HYSTERECTOMY        PARATHYROIDECTOMY Bilateral 10/17/2023     Procedure: NECK EXPLORATION WITH PARATHYROID ADENOMA EXCISION AND FROZEN SECTION,,  RECURRENT LARYNGEAL NERVE MONITORING, INTRAOPERATIVE INTACT PTH ASSAY;  Surgeon: Ezekiel Giles MD;  Location: [facility] OR;  Service: ENT;  Laterality: Bilateral;    TONSILLECTOMY                Family History: family history includes Coronary artery disease in her brother; Hypertension in her sister; Thyroid disease in her mother. Otherwise pertinent FHx was reviewed and not pertinent to current issue.     Social History:  reports that she quit smoking about 14 years ago. Her smoking use included cigarettes. She started smoking about 34 years ago. She has a 10.00 pack-year smoking history. She has never used smokeless tobacco. She reports that she does not currently use alcohol. She reports that she does not use drugs.     Home Medications:  FLUoxetine, OLANZapine, albuterol sulfate HFA, arformoterol, aspirin, atorvastatin, budesonide, carvedilol, clopidogrel, furosemide, gabapentin, hydrocortisone-bacitracin-zinc oxide-nystatin, ondansetron, pantoprazole, and pramipexole     Allergies:        Allergies   Allergen Reactions    Codeine Hives       Hyperactivity, nausea    Morphine Hives       Hyperactivity, nausea               Objective   Objective      Vitals:   Temp:  [97.7 °F (36.5 °C)-99.3 °F (37.4 °C)] 98.8 °F (37.1 °C)  Heart Rate:  [78-92] 83  Resp:  [24-34] 31  BP: ()/(46-94) 98/68  Flow (L/min):  [2-6] 6     Physical Exam:  Vital Signs Reviewed   General:  WDWN, Alert, NAD.  Chronically ill-appearing female lying in bed  HEENT:  PERRL, EOMI.  OP, nares clear, no sinus tenderness  Neck:  Supple, no JVD, no thyromegaly  Lymph: no axillary, cervical, supraclavicular lymphadenopathy noted bilaterally  Chest: Poor aeration with rhonchi on auscultation, increased work of breathing noted on high flow nasal cannula  CV: RRR, no MGR, pulses 2+, equal.  Abd:  Soft, NT, ND, + BS, no HSM, obese  EXT:  no clubbing, no cyanosis, no edema, no joint tenderness  Neuro:  A&Ox3, CN grossly intact, no focal  deficits.  Skin: No rashes or lesions noted           Result Review   Result Review:  I have personally reviewed the results from the time of this admission to 2/19/2024 16:27 EST and agree with these findings:  [x]  Laboratory  [x]  Microbiology  [x]  Radiology  []  EKG/Telemetry   []  Cardiology/Vascular   []  Pathology  []  Old records  []  Other:  Most notable findings include:                    Lab 02/20/24  0407 02/19/24  1247 02/19/24  0922 02/19/24  0330 02/16/24  0300 02/15/24  0300 02/14/24  0536   WBC 8.18  --  9.20 8.73  --  9.00 9.47   HEMOGLOBIN 8.1*  --  9.0* 8.9*  --  10.1* 10.5*   HEMATOCRIT 25.5*  --  28.2* 28.1*  --  31.2* 32.7*   PLATELETS 223  --  245 232  --  331 320   SODIUM 136  --  136  --  131* 133* 132*   SODIUM, ARTERIAL  --  138.3  --   --   --   --   --    POTASSIUM 3.2*  --  3.7  --  3.5 3.4* 3.9   CHLORIDE 99  --  98  --  97* 98 99   CO2 23.6  --  24.1  --  19.0* 17.7* 17.3*   BUN 24*  --  22  --  45* 35* 31*   CREATININE 1.27*  --  1.29*  --  1.95* 1.51* 1.31*   GLUCOSE 160*  --  193*  --  109* 125* 117*   GLUCOSE, ARTERIAL  --  125*  --   --   --   --   --    CALCIUM 8.3*  --  8.6  --  8.8 9.0 9.3   PHOSPHORUS 4.0  --   --   --   --   --   --    TOTAL PROTEIN  --   --  6.4  --  7.0 7.2 7.4   ALBUMIN 2.2*  --  2.4*  --  2.6* 2.8* 2.7*   GLOBULIN  --   --  4.0  --  4.4 4.4 4.7               Assessment & Plan  Assessment / Plan      Active Hospital Problems:       Active Hospital Problems     Diagnosis      **Acute on chronic hypoxic respiratory failure      Diastolic CHF, acute on chronic        Impression:  Acute hypoxic respiratory failure requiring high flow nasal cannula  Acute decompensated congestive diastolic heart failure  NSTEMI, type II from demand ischemia  Acute cardiogenic pulmonary edema  Volume overload  T2DM with hyperglycemia  Continue acidosis, clinically significant  Hypokalemia  Hypocalcemia  History of multiple sclerosis  Immunocompromised status     Plan:  -On  10 L high flow nasal cannula.  Continue to wean supplemental oxygen to maintain SpO2 greater than 90%  -2/19 chest CT reviewed demonstrating inflammatory process likely related to post-COVID syndrome  -Check CRP, 22 and sed rate, 76.  Initiated ILD workup,   -Continue cefepime for pneumonia for now.  Micro negative today  -Continue Brovana, Pulmicort, DuoNebs  -Continue Lasix 40 mg IV twice daily  -Continue to monitor renal panel and electrolytes.  Replace calcium intravenously and potassium orally  -Give Solu-Medrol 40 mg IV every 8 hours  -Continue bronchopulmonary hygiene.  Encourage I-S and flutter  -Continue MetaNebs and chest physiotherapy  -Encourage activity as tolerated.  Out of bed to chair  -ED/OT on board.  Appreciate assistance  -Palliative care following, appreciate input          DVT prophylaxis:  No DVT prophylaxis order currently exists.    Code Status and Medical Interventions:   Ordered at: 02/19/24 1537     Code Status (Patient has no pulse and is not breathing):    CPR (Attempt to Resuscitate)     Medical Interventions (Patient has pulse or is breathing):    Full Support        Labs, imaging, microbiology, notes and medications personally reviewed  Discussed with primary     I, Dr. Derrell Tomlin, have spent more than 50% of the total time managing the patient in this encounter today.  This included personally reviewing all pertinent labs, imaging, microbiology and documentation. Also discussing the case with the patient and any available family, the admitting physician and any available ancillary staff.    Electronically signed by WESTLEY Giron, 02/20/24, 4:10 PM EST.  Electronically signed by Derrell Tomlin MD, 02/20/24, 4:23 PM EST.

## 2024-02-19 NOTE — ED PROVIDER NOTES
Time: 10:14 AM EST  Date of encounter:  2/19/2024  Independent Historian/Clinical History and Information was obtained by:   Patient    History is limited by: N/A    Chief Complaint: Shortness of breath      History of Present Illness:  Patient is a 64 y.o. year old female who presents to the emergency department for evaluation of shortness of breath that began after waking up this morning. She is having trouble taking a deep breath. Pt is on any oxygen at her facility. She does feel really tired this morning as well. She denies cough, congestion, fever, leg swelling, chest pain. Denies orthopnea. Denies known sick contacts. Pt reports she has been compliant with all her medications and took all her medications this morning.     HPI    Patient Care Team  Primary Care Provider: Williams Sage MD    Past Medical History:     Allergies   Allergen Reactions    Codeine Hives     Hyperactivity, nausea    Morphine Hives     Hyperactivity, nausea     Past Medical History:   Diagnosis Date    Abdominal hernia     Acute ST elevation myocardial infarction (STEMI) due to occlusion of right coronary artery 02/24/2020    CHF (congestive heart failure)     DENIES CP GETS SOA WITH EXERTION. FOLLOWED BY DR ERIC/KASIA GREY. DECREASED ACTIVITY USES WALKER/ROLLATOR    CKD (chronic kidney disease)     Coronary artery disease     Dyspepsia     GERD (gastroesophageal reflux disease)     Hyperlipidemia     Hypertension     Ischemic cardiomyopathy     Multiple sclerosis      Past Surgical History:   Procedure Laterality Date    APPENDECTOMY      CARDIAC CATHETERIZATION      CARDIAC CATHETERIZATION N/A 2/24/2020    Procedure: Left Heart Cath;  Surgeon: Marlon Zamudio MD;  Location: [facility] INVASIVE LOCATION;  Service: Cardiovascular;  Laterality: N/A;    CARDIAC CATHETERIZATION N/A 2/24/2020    Procedure: Stent LATRICIA coronary;  Surgeon: Marlon Zamudio MD;  Location: [facility] CATH INVASIVE LOCATION;  Service:  Cardiovascular;  Laterality: N/A;    CARDIAC CATHETERIZATION  2/24/2020    Procedure: Percutaneous Manual Thrombectomy;  Surgeon: Marlon Zamudio MD;  Location: [facility] CATH INVASIVE LOCATION;  Service: Cardiovascular;;    CARDIAC CATHETERIZATION N/A 2/24/2020    Procedure: Coronary angiography;  Surgeon: Marlon Zamudio MD;  Location: [facility] CATH INVASIVE LOCATION;  Service: Cardiovascular;  Laterality: N/A;    CARDIAC CATHETERIZATION N/A 2/24/2020    Procedure: Left ventriculography;  Surgeon: Marlon Zamudio MD;  Location: [facility] CATH INVASIVE LOCATION;  Service: Cardiovascular;  Laterality: N/A;    CHOLECYSTECTOMY      COLONOSCOPY N/A 2/29/2020    Procedure: COLONOSCOPY to  cecum:;  Surgeon: Krystal Sarabia MD;  Location: [facility] ENDOSCOPY;  Service: Gastroenterology;  Laterality: N/A;  pre:  anemia and abd pain  post:  hemorrhoids, tortuous colon    ENDOSCOPY N/A 2/29/2020    Procedure: ESOPHAGOGASTRODUODENOSCOPY  with biopsies;  Surgeon: Krystal Sarabia MD;  Location: [facility] ENDOSCOPY;  Service: Gastroenterology;  Laterality: N/A;  pre:  anemia and abd pain  post:  mild gastritis,     HYSTERECTOMY      PARATHYROIDECTOMY Bilateral 10/17/2023    Procedure: NECK EXPLORATION WITH PARATHYROID ADENOMA EXCISION AND FROZEN SECTION,, RECURRENT LARYNGEAL NERVE MONITORING, INTRAOPERATIVE INTACT PTH ASSAY;  Surgeon: Ezekiel Giles MD;  Location: [facility];  Service: ENT;  Laterality: Bilateral;    TONSILLECTOMY       Family History   Problem Relation Age of Onset    Thyroid disease Mother     Hypertension Sister     Coronary artery disease Brother        Home Medications:  Prior to Admission medications    Medication Sig Start Date End Date Taking? Authorizing Provider   albuterol sulfate  (90 Base) MCG/ACT inhaler Inhale 2 puffs Every 4 (Four) Hours As Needed for Wheezing. 1/22/24   Madut, Nyebol, DO   arformoterol (BROVANA) 15 MCG/2ML nebulizer solution Take 2 mL by nebulization 2 (Two) Times  a Day. 2/14/24   Teresa Castro DO   aspirin 81 MG chewable tablet Chew 1 tablet Daily. 10/25/23   Ezekiel Giles MD   atorvastatin (LIPITOR) 40 MG tablet TAKE 1 TABLET BY MOUTH  EVERY NIGHT AT BEDTIME  Patient taking differently: Take 1 tablet by mouth Daily. 5/17/23   Keith Riley MD   budesonide (PULMICORT) 0.5 MG/2ML nebulizer solution Take 2 mL by nebulization 2 (Two) Times a Day. 2/14/24   Teresa Castro DO   carvedilol (COREG) 12.5 MG tablet TAKE 1 TABLET BY MOUTH  EVERY 12 HOURS  Patient taking differently: Take 1 tablet by mouth Every 12 (Twelve) Hours. 5/30/23   Shaila Urban APRN   clopidogrel (PLAVIX) 75 MG tablet TAKE 1 TABLET BY MOUTH DAILY 2/13/24   Keith Riley MD   FLUoxetine (PROzac) 40 MG capsule Take 1 capsule by mouth 2 (Two) Times a Day. 7/25/22   Emergency, Nurse Cong RN   furosemide (LASIX) 40 MG tablet TAKE 1 TABLET BY MOUTH  DAILY 5/17/23   Keith Riley MD   gabapentin (NEURONTIN) 300 MG capsule Take 1 capsule by mouth 4 (Four) Times a Day.    ProviderRosas MD   hydrocortisone-bacitracin-zinc oxide-nystatin (MAGIC BARRIER) Apply 1 Application topically to the appropriate area as directed Every 4 (Four) Hours As Needed (rash). 2/14/24   Teresa Castro DO   OLANZapine (zyPREXA) 2.5 MG tablet Take 1 tablet by mouth Daily.    ProviderRosas MD   ondansetron (ZOFRAN) 4 MG tablet Take 1 tablet by mouth Every 8 (Eight) Hours As Needed for Nausea or Vomiting. 7/22/22   Emergency, Nurse Cong RN   pantoprazole (PROTONIX) 40 MG EC tablet Take 1 tablet by mouth Daily. 7/26/22   Emergency, Nurse Cong RN   pramipexole (MIRAPEX) 0.5 MG tablet Take 1 tablet by mouth every night at bedtime. 9/22/23   ProviderRosas MD        Social History:   Social History     Tobacco Use    Smoking status: Former     Packs/day: 0.50     Years: 20.00     Additional pack years: 0.00     Total pack years: 10.00     Types: Cigarettes     Start  "date:      Quit date:      Years since quittin.1    Smokeless tobacco: Never    Tobacco comments:     CAFFEINE USE: 2 CUPS COFFEE DAILY   Vaping Use    Vaping Use: Never used   Substance Use Topics    Alcohol use: Not Currently    Drug use: Never         Review of Systems:  Review of Systems   Constitutional:  Negative for fever.   Respiratory:  Positive for shortness of breath. Negative for cough and wheezing.    Cardiovascular:  Negative for chest pain and leg swelling.        Physical Exam:  BP (!) 87/63 (BP Location: Right arm, Patient Position: Lying)   Pulse 84   Temp 98.8 °F (37.1 °C) (Oral)   Resp (!) 31   Ht 152.4 cm (60\")   Wt 82.6 kg (182 lb 1.6 oz)   SpO2 91%   BMI 35.56 kg/m²     Physical Exam  Vitals and nursing note reviewed.   Constitutional:       Appearance: Normal appearance.   HENT:      Head: Normocephalic and atraumatic.   Eyes:      General: No scleral icterus.  Cardiovascular:      Rate and Rhythm: Normal rate and regular rhythm.      Heart sounds: Normal heart sounds.   Pulmonary:      Effort: Pulmonary effort is normal.      Comments: Coarse breath sounds bilaterally  Abdominal:      Palpations: Abdomen is soft.      Tenderness: There is no abdominal tenderness.   Musculoskeletal:         General: Normal range of motion.      Cervical back: Normal range of motion.   Skin:     Findings: No rash.   Neurological:      General: No focal deficit present.      Mental Status: She is alert.                  Procedures:  Procedures      Medical Decision Making:      Comorbidities that affect care:    Chronic Lung Disease, Chronic Kidney Disease    External Notes reviewed:    Reviewed admission from 2024      The following orders were placed and all results were independently analyzed by me:  Orders Placed This Encounter   Procedures    COVID-19, FLU A/B, RSV PCR 1 HR TAT - Swab, Nasopharynx    Blood Culture - Blood,    Blood Culture - Blood,    XR Chest 1 View    CT Chest " Without Contrast Diagnostic    [place] Draw    Comprehensive Metabolic Panel    BNP    Single High Sensitivity Troponin T    CBC Auto Differential    Lactic Acid, Plasma    STAT Lactic Acid, Reflex    ABG with Co-Ox and Electrolytes    NPO Diet NPO Type: Strict NPO    Undress & Gown    Continuous Pulse Oximetry    Vital Signs    Inpatient Hospitalist Consult    Oxygen Therapy- Nasal Cannula; Titrate 1-6 LPM Per SpO2; 90 - 95%    ECG 12 Lead ED Triage Standing Order; SOA    Insert Peripheral IV    CBC & Differential    Green Top (Gel)    Lavender Top    Gold Top - SST    Light Blue Top       Medications Given in the Emergency Department:  Medications   sodium chloride 0.9 % flush 10 mL (has no administration in time range)   furosemide (LASIX) injection 40 mg (40 mg Intravenous Given 2/19/24 1100)   ampicillin-sulbactam 3 g/100 mL 0.9% NS IVPB (0 g Intravenous Stopped 2/19/24 1521)   AZITHROMYCIN 500 MG/250 ML 0.9% NS IVPB (vial-mate) (0 mg Intravenous Stopped 2/19/24 1435)        ED Course:    ED Course as of 02/19/24 1532   Mon Feb 19, 2024   1017 EKG interpreted by me neck time: 910  Heart rate 86  Sinus, borderline LVH, inferior Q waves, no acute ischemia [MA]   1525 Spoke with Dr. Franco who agrees to admit [MA]      ED Course User Index  [MA] Soham Perales MD       Labs:    Lab Results (last 24 hours)       Procedure Component Value Units Date/Time    CBC & Differential [[id number]]  (Abnormal) Collected: 02/19/24 0330    Specimen: Blood Updated: 02/19/24 0411    Narrative:      The following orders were created for panel order CBC & Differential.  Procedure                               Abnormality         Status                     ---------                               -----------         ------                     CBC Auto Differential[[id number]]        Abnormal            Final result                 Please view results for these tests on the individual orders.    BNP [[id number]]  (Abnormal)  Collected: 02/19/24 0330    Specimen: Blood Updated: 02/19/24 0421     proBNP 1,983.0 pg/mL     Narrative:      This assay is used as an aid in the diagnosis of individuals suspected of having heart failure. It can be used as an aid in the diagnosis of acute decompensated heart failure (ADHF) in patients presenting with signs and symptoms of ADHF to the emergency department (ED). In addition, NT-proBNP of <300 pg/mL indicates ADHF is not likely.    Age Range Result Interpretation  NT-proBNP Concentration (pg/mL:      <50             Positive            >450                   Gray                 300-450                    Negative             <300    50-75           Positive            >900                  Gray                300-900                  Negative            <300      >75             Positive            >1800                  Gray                300-1800                  Negative            <300    CBC Auto Differential [[id number]]  (Abnormal) Collected: 02/19/24 0330    Specimen: Blood Updated: 02/19/24 0411     WBC 8.73 10*3/mm3      RBC 2.90 10*6/mm3      Hemoglobin 8.9 g/dL      Hematocrit 28.1 %      MCV 96.9 fL      MCH 30.7 pg      MCHC 31.7 g/dL      RDW 17.2 %      RDW-SD 60.2 fl      MPV 11.6 fL      Platelets 232 10*3/mm3      Neutrophil % 79.6 %      Lymphocyte % 10.2 %      Monocyte % 8.5 %      Eosinophil % 0.7 %      Basophil % 0.2 %      Immature Grans % 0.8 %      Neutrophils, Absolute 6.95 10*3/mm3      Lymphocytes, Absolute 0.89 10*3/mm3      Monocytes, Absolute 0.74 10*3/mm3      Eosinophils, Absolute 0.06 10*3/mm3      Basophils, Absolute 0.02 10*3/mm3      Immature Grans, Absolute 0.07 10*3/mm3      nRBC 0.0 /100 WBC     CBC & Differential [[id number]]  (Abnormal) Collected: 02/19/24 0922    Specimen: Blood Updated: 02/19/24 0942    Narrative:      The following orders were created for panel order CBC & Differential.  Procedure                               Abnormality          Status                     ---------                               -----------         ------                     CBC Auto Differential[[id number]]        Abnormal            Final result                 Please view results for these tests on the individual orders.    Comprehensive Metabolic Panel [[id number]]  (Abnormal) Collected: 02/19/24 0922    Specimen: Blood Updated: 02/19/24 1009     Glucose 193 mg/dL      BUN 22 mg/dL      Creatinine 1.29 mg/dL      Sodium 136 mmol/L      Potassium 3.7 mmol/L      Chloride 98 mmol/L      CO2 24.1 mmol/L      Calcium 8.6 mg/dL      Total Protein 6.4 g/dL      Albumin 2.4 g/dL      ALT (SGPT) 29 U/L      AST (SGOT) 39 U/L      Alkaline Phosphatase 161 U/L      Total Bilirubin 0.7 mg/dL      Globulin 4.0 gm/dL      A/G Ratio 0.6 g/dL      BUN/Creatinine Ratio 17.1     Anion Gap 13.9 mmol/L      eGFR 46.2 mL/min/1.73     Narrative:      GFR Normal >60  Chronic Kidney Disease <60  Kidney Failure <15      BNP [[id number]]  (Abnormal) Collected: 02/19/24 0922    Specimen: Blood Updated: 02/19/24 1003     proBNP 2,438.0 pg/mL     Narrative:      This assay is used as an aid in the diagnosis of individuals suspected of having heart failure. It can be used as an aid in the diagnosis of acute decompensated heart failure (ADHF) in patients presenting with signs and symptoms of ADHF to the emergency department (ED). In addition, NT-proBNP of <300 pg/mL indicates ADHF is not likely.    Age Range Result Interpretation  NT-proBNP Concentration (pg/mL:      <50             Positive            >450                   Gray                 300-450                    Negative             <300    50-75           Positive            >900                  Gray                300-900                  Negative            <300      >75             Positive            >1800                  Gray                300-1800                  Negative            <300    Single High Sensitivity Troponin T  [[id number]]  (Abnormal) Collected: 02/19/24 0922    Specimen: Blood Updated: 02/19/24 1005     HS Troponin T 18 ng/L     Narrative:      High Sensitive Troponin T Reference Range:  <14.0 ng/L- Negative Female for AMI  <22.0 ng/L- Negative Male for AMI  >=14 - Abnormal Female indicating possible myocardial injury.  >=22 - Abnormal Male indicating possible myocardial injury.   Clinicians would have to utilize clinical acumen, EKG, Troponin, and serial changes to determine if it is an Acute Myocardial Infarction or myocardial injury due to an underlying chronic condition.         CBC Auto Differential [[id number]]  (Abnormal) Collected: 02/19/24 0922    Specimen: Blood Updated: 02/19/24 0942     WBC 9.20 10*3/mm3      RBC 2.89 10*6/mm3      Hemoglobin 9.0 g/dL      Hematocrit 28.2 %      MCV 97.6 fL      MCH 31.1 pg      MCHC 31.9 g/dL      RDW 17.2 %      RDW-SD 61.4 fl      MPV 11.3 fL      Platelets 245 10*3/mm3      Neutrophil % 82.5 %      Lymphocyte % 7.6 %      Monocyte % 8.3 %      Eosinophil % 0.3 %      Basophil % 0.2 %      Immature Grans % 1.1 %      Neutrophils, Absolute 7.59 10*3/mm3      Lymphocytes, Absolute 0.70 10*3/mm3      Monocytes, Absolute 0.76 10*3/mm3      Eosinophils, Absolute 0.03 10*3/mm3      Basophils, Absolute 0.02 10*3/mm3      Immature Grans, Absolute 0.10 10*3/mm3      nRBC 0.0 /100 WBC     COVID-19, FLU A/B, RSV PCR 1 HR TAT - Swab, Nasopharynx [[id number]]  (Normal) Collected: 02/19/24 0930    Specimen: Swab from Nasopharynx Updated: 02/19/24 1020     COVID19 Not Detected     Influenza A PCR Not Detected     Influenza B PCR Not Detected     RSV, PCR Not Detected    Narrative:      Fact sheet for providers: https://www.fda.gov/media/529746/download    Fact sheet for patients: https://www.fda.gov/media/417391/download    Test performed by PCR.    Blood Culture - Blood, Arm, Left [[id number]] Collected: 02/19/24 1053    Specimen: Blood from Arm, Left Updated: 02/19/24 1103    Blood Culture  - Blood, Arm, Left [[id number]] Collected: 02/19/24 1053    Specimen: Blood from Arm, Left Updated: 02/19/24 1104    Lactic Acid, Plasma [[id number]]  (Abnormal) Collected: 02/19/24 1053    Specimen: Blood Updated: 02/19/24 1144     Lactate 2.8 mmol/L     ABG with Co-Ox and Electrolytes [[id number]]  (Abnormal) Collected: 02/19/24 1247    Specimen: Arterial Blood Updated: 02/19/24 1254     pH, Arterial 7.590 pH units      pCO2, Arterial 28.5 mm Hg      pO2, Arterial 53.8 mm Hg      HCO3, Arterial 26.7 mmol/L      Base Excess, Arterial 5.3 mmol/L      O2 Saturation, Arterial 91.2 %      Hemoglobin, Blood Gas 10.2 g/dL      Carboxyhemoglobin 0.4 %      Methemoglobin 0.00 %      Oxyhemoglobin 90.8 %      FHHB 8.8 %      Demetrio's Test Positive     Note --     Site Left Radial     Modality Cannula - Nasal     FIO2 44 %      Flow Rate 6 lpm      Sodium, Arterial 138.3 mmol/L      Potassium, Arterial 2.91 mmol/L      Ionized Calcium, Arterial 1.09 mmol/L      Chloride, Arterial 101 mmol/L      Glucose, Arterial 125 mg/dL      Lactate, Arterial 2.37 mmol/L      PO2/FIO2 122    STAT Lactic Acid, Reflex [[id number]]  (Normal) Collected: 02/19/24 1436    Specimen: Blood Updated: 02/19/24 1509     Lactate 1.7 mmol/L              Imaging:    CT Chest Without Contrast Diagnostic    Result Date: 2/19/2024  PROCEDURE: CT CHEST WO CONTRAST DIAGNOSTIC  COMPARISON: [facility], CT, CT CHEST WO CONTRAST DIAGNOSTIC, 1/31/2024, 13:50.  INDICATIONS: Shortness of breath, worsening xray  TECHNIQUE: CT images were created without the administration of contrast material.   PROTOCOL:   Standard imaging protocol performed    RADIATION:   DLP: 252.3 mGy*cm   Automated exposure control was utilized to minimize radiation dose.  FINDINGS:  There is some coronary artery calcification.  It looks like there is a stent in the right coronary artery.  There are ground-glass areas of density associated with bronchiectasis with more pronounced  consolidation in the basilar areas.  The bronchiectasis has developed.  Additionally the pattern to the pulmonary infiltrates has changed.  This might reflect sequela to Covid 19 pneumonia.  There are no large pleural effusions.        1. There has been a change in the pattern of the pulmonary densities involving both lungs that may reflect sequela of more chronic inflammatory infectious process.  This could be seen with Covid 19 pneumonia.  Correlation with patient's history would be recommended. 2. There is some coronary artery calcification.     LUKE CLINE MD       Electronically Signed and Approved By: LUKE CLINE MD on 2/19/2024 at 12:29             XR Chest 1 View    Result Date: 2/19/2024  PROCEDURE: XR CHEST 1 VW  COMPARISON: [facility], CR, XR CHEST 1 VW, 2/01/2024, 8:01.  [facility], CR, XR CHEST 1 VW, 2/11/2024, 13:55.  INDICATIONS: SOA  FINDINGS:  Multifocal lung opacities are again demonstrated.  Opacity in the peripheral left upper lobe is slightly more dense on today's exam, as is the opacity in the lower right lung.  Heart size is stable       Persistent multifocal infiltrates, slightly worsened in the left upper lung and right lower lung       JAYDON MULLIGAN MD       Electronically Signed and Approved By: JAYDON MULLIGAN MD on 2/19/2024 at 9:26                Differential Diagnosis and Discussion:    Dyspnea: Differential diagnosis includes but is not limited to metabolic acidosis, neurological disorders, psychogenic, asthma, pneumothorax, upper airway obstruction, COPD, pneumonia, noncardiogenic pulmonary edema, interstitial lung disease, anemia, congestive heart failure, and pulmonary embolism    All labs were reviewed and interpreted by me.  All X-rays impressions were independently interpreted by me.  EKG was interpreted by me.  CT scan radiology impression was interpreted by me.    MDM     Amount and/or Complexity of Data Reviewed  Decide to obtain previous medical  records or to obtain history from someone other than the patient: yes       Patient is a 64-year-old female who has a history of chronic lung disease who is on oxygen who presents with worsening shortness of breath.  Has had to have an increase of her oxygen to 6 L.  X-ray shows worsening infiltrates noted on x-ray.  CT confirms worsening infiltrates.  Has had worsening hypoxia at the facility and is now requiring 6 L.  Will cover with antibiotics due to worsening infiltrates noted on CT scan and also gave Lasix due to possible fluid.  Will need admission to the hospital for further workup management due to her worsening respiratory status.  She has had some improvement here in the emergency room.  Currently on 6 L at this time.  Will admit for further workup.          Patient Care Considerations:          Consultants/Shared Management Plan:    Hospitalist: I have discussed the case with Dr. ornelas who agrees to accept the patient for admission.    Social Determinants of Health:          Disposition and Care Coordination:    Admit:   Through independent evaluation of the patient's history, physical, and imperical data, the patient meets criteria for inpatient admission to the hospital.        Final diagnoses:   Acute respiratory failure with hypoxia   Pneumonia due to infectious organism, unspecified laterality, unspecified part of lung        ED Disposition       ED Disposition   Decision to Admit    Condition   --    Comment   --               This medical record created using voice recognition software.             Soham Perales MD  02/19/24 4222

## 2024-02-19 NOTE — H&P
[facility]   HOSPITALIST HISTORY AND PHYSICAL  Date: 2024   Patient Name: Falguni Minaya  : 1960  MRN: [id number]  Primary Care Physician:  Williams Sage MD  Date of admission: 2024    Subjective   Subjective     Chief Complaint: Shortness of breath started today    HPI:    Falguni Minaya is a 64 y.o. female with past medical history of MS of medication since October of last year, recent COVID last month, hyperlipidemia, hypertension, coronary disease, diastolic CHF, CKD, dysphagia and GERD  Patient was recently discharged on  to rehab because of UTI and acute respiratory failure requiring Airvo and pulmonary intervention suspected to be due to acute diastolic CHF and unspecified bacterial pathogen along with  recent COVID infection complication.    Patient today noticed increasing shortness of air.  Denies any chest pain, fever, chills, any new cough or phlegm.  No vomiting or diarrhea no choking or aspiration.  Workup in the ED showed O2 sat to be 85% on 3 L.  With 6 L she is up to 90%.  VBG showed pH of 7.6.  pCO2 28.  pO2 54 and bicarb of 26 with 91% saturation on 6 L.  Blood pressure is soft which is baseline.  Patient is tachypneic rate of 34.  BNP is elevated at 2438.  Creatinine is 1.2 better than her baseline.  Glucose is elevated.  Lactic acid of 2.8.  EKG showed sinus rhythm.  CT chest showed groundglass bilateral opacities with bronchiectasis likely sequela to her COVID infection.  There are consolidation in lung bases.  Not much pleural effusion.  Her repeat COVID, flu and RSV swab is negative ED.  Patient given IV steroids and Lasix along with antibiotics.  Hospitalist has been called to admit her for further evaluation.  Per patient she has been ambulatory at rehab and her MS has not bothered her much.    Personal History     Past Medical History:  Past Medical History:   Diagnosis Date    Abdominal hernia     Acute ST elevation myocardial infarction (STEMI) due  to occlusion of right coronary artery 02/24/2020    CHF (congestive heart failure)     DENIES CP GETS SOA WITH EXERTION. FOLLOWED BY DR ERIC/KASIA GREY. DECREASED ACTIVITY USES WALKER/ROLLATOR    CKD (chronic kidney disease)     Coronary artery disease     Dyspepsia     GERD (gastroesophageal reflux disease)     Hyperlipidemia     Hypertension     Ischemic cardiomyopathy     Multiple sclerosis        Past Surgical History:  Past Surgical History:   Procedure Laterality Date    APPENDECTOMY      CARDIAC CATHETERIZATION      CARDIAC CATHETERIZATION N/A 2/24/2020    Procedure: Left Heart Cath;  Surgeon: Marlon Zamudio MD;  Location: [facility] CATH INVASIVE LOCATION;  Service: Cardiovascular;  Laterality: N/A;    CARDIAC CATHETERIZATION N/A 2/24/2020    Procedure: Stent LATRICIA coronary;  Surgeon: Marlon Zamudio MD;  Location[facility] CATH INVASIVE LOC[facility];  Service: Cardiovascular;  Laterality: N/A;    CARDIAC CATHETERIZATION  2/24/2020    Procedure: Percutaneous Manual Thrombectomy;  Surgeon: Marlon Zamudio MD;  Location: [facility] CATH INVASIVE LOCATION;  Service: Cardiovascular;;    CARDIAC CATHETERIZATION N/A 2/24/2020    Procedure: Coronary angiography;  Surgeon: Marlon Zamudio MD;  Location: [facility] CATH INVASIVE LOCATION;  Service: Cardiovascular;  Laterality: N/A;    CARDIAC CATHETERIZATION N/A 2/24/2020    Procedure: Left ventriculography;  Surgeon: Marlon Zamudio MD;  Location: [facility] CATH INVASIVE LOCATION;  Service: Cardiovascular;  Laterality: N/A;    CHOLECYSTECTOMY      COLONOSCOPY N/A 2/29/2020    Procedure: COLONOSCOPY to  cecum:;  Surgeon: Krystal Sarabia MD;  Location: [facility] ENDOSCOPY;  Service: Gastroenterology;  Laterality: N/A;  pre:  anemia and abd pain  post:  hemorrhoids, tortuous colon    ENDOSCOPY N/A 2/29/2020    Procedure: ESOPHAGOGASTRODUODENOSCOPY  with biopsies;  Surgeon: Krystal Sarabia MD;  Location: [facility] ENDOSCOPY;  Service: Gastroenterology;  Laterality:  N/A;  pre:  anemia and abd pain  post:  mild gastritis,     HYSTERECTOMY      PARATHYROIDECTOMY Bilateral 10/17/2023    Procedure: NECK EXPLORATION WITH PARATHYROID ADENOMA EXCISION AND FROZEN SECTION,, RECURRENT LARYNGEAL NERVE MONITORING, INTRAOPERATIVE INTACT PTH ASSAY;  Surgeon: Ezekiel Giles MD;  Location: [facility] OR;  Service: ENT;  Laterality: Bilateral;    TONSILLECTOMY         Family History:   family history includes Coronary artery disease in her brother; Hypertension in her sister; Thyroid disease in her mother.    Social History:    reports that she quit smoking about 14 years ago. Her smoking use included cigarettes. She started smoking about 34 years ago. She has a 10.00 pack-year smoking history. She has never used smokeless tobacco. She reports that she does not currently use alcohol. She reports that she does not use drugs.    Home Medications:  FLUoxetine, OLANZapine, albuterol sulfate HFA, arformoterol, aspirin, atorvastatin, budesonide, carvedilol, clopidogrel, gabapentin, heparin (porcine), hydrocortisone-bacitracin-zinc oxide-nystatin, nystatin, ondansetron, pantoprazole, pramipexole, sodium bicarbonate, and topiramate    Allergies:  Allergies   Allergen Reactions    Codeine Hives     Hyperactivity, nausea    Morphine Hives     Hyperactivity, nausea       Review of Systems   All systems were reviewed and negative except for: H&P    Objective   Objective     Vitals:   Temp:  [97.7 °F (36.5 °C)-99.3 °F (37.4 °C)] 97.9 °F (36.6 °C)  Heart Rate:  [78-94] 84  Resp:  [24-34] 25  BP: ()/(46-94) 81/63  Flow (L/min):  [2-6] 6    Physical Exam    Constitutional: Awake, alert, able to converse in full sentences   Eyes: Pupils equal, sclerae anicteric, no conjunctival injection   HENT: NCAT, mucous membranes moist   Neck: Supple, no thyromegaly, no lymphadenopathy, trachea midline   Respiratory: Decreased to auscultation bilaterally, tachypneic   Cardiovascular: RRR, no murmurs, rubs, or  gallops, palpable pedal pulses bilaterally   Gastrointestinal: Positive bowel sounds, soft, nontender, nondistended   Musculoskeletal: No bilateral ankle edema, no clubbing or cyanosis to extremities   Psychiatric: Appropriate affect, cooperative   Neurologic: Oriented x 3, strength symmetric in all extremities, Cranial Nerves grossly intact to confrontation, speech clear   Skin: Erythematous rash on upper lip improving as per patient got admitted to Airvo nasal cannula    Result Review    Result Review:  I have personally reviewed the results from the time of this admission to 2/19/2024 17:15 EST and agree with these findings:  [x]  Laboratory  [x]  Microbiology  [x]  Radiology  [x]  EKG/Telemetry normal sinus rhythm 86, LVH.  QT interval 0.43  []  Cardiology/Vascular   []  Pathology  [x]  Old records  [x]  Other: Medications      Assessment & Plan   Assessment / Plan     Assessment:  Acute on chronic hypoxic respiratory failure patient now needing 11 L high flow nasal cannula.   acute exacerbation of bronchiectasis  Possible sequelae to recent COVID infection  Rule out healthcare associate pneumonia.  Acute on chronic diastolic CHF.  MS.  Off medication since October stable.  Hypertension.  Chronic kidney disease.  Lactic acidosis.  Not significant.  Improved.  Anemia.  Hypertensive heart disease with LVH.  Hyperglycemia.  Likely steroid-induced  Hypotension.  Per history chronic.    Plan:   Supplemental oxygen to keep sats more than 90%.  Use NIPPV as needed.  IV Vanco and cefepime.  IV steroids.  Pulmicort, brovana and DuoNeb.  Bronchodilator and bronchopulmonary team protocol.  MetaNeb and hypertonic saline neb.  Incentive spirometry and flutter valve.  Sputum culture.  Check procalcitonin.  MRSA PCR, Legionella and strep pneumonia urine antigen.  Check respiratory panel PCR.  Check Fungitell.  Discussed with pulmonary to evaluate patient.  IV Lasix.  Hold home beta-blockers.  Palliative care consulted for  goals of care discussions.  PT OT.  Telemetry.  Discussed with ED physician           DVT prophylaxis:  Medical DVT prophylaxis orders are present.         CODE STATUS:    Code Status (Patient has no pulse and is not breathing): CPR (Attempt to Resuscitate)  Medical Interventions (Patient has pulse or is breathing): Full Support      Admission Status:  I believe this patient meets inpatient status.    Part of this note may be an electronic transcription/translation of spoken language to printed text using the Dragon Dictation System.     Electronically signed by Rk Franco MD, 02/19/24, 5:15 PM EST.

## 2024-02-20 NOTE — PROGRESS NOTES
Pulmonary / Critical Care Progress Note      Patient Name: Falguni Minaya  : 1960  MRN: [id number]  Attending:  Rk Franco MD  Date of admission: 2024    Subjective   Subjective   Follow-up for acute on chronic hypoxic respiratory failure requiring high flow nasal cannula     Over past 24 hours: Remains on Brovana, Pulmicort, DuoNebs.  Continues cefepime course.  Remains on Lasix 40 mg IV twice daily.  Remains on Solu-Medrol     No acute events overnight     This morning,  On 11 L high flow nasal cannula  Working with physical therapy  Feels about the same  Dyspnea remains with little activity  No fever or chills  BP remains labile  Productive cough present  Denies any chest pain or chest tightness    Objective   Objective     Vitals:   Temp:  [97.7 °F (36.5 °C)-99.3 °F (37.4 °C)] 98.1 °F (36.7 °C)  Heart Rate:  [69-94] 69  Resp:  [20-34] 20  BP: ()/(46-94) 96/60  Flow (L/min):  [2-11] 10    Physical Exam   Vital Signs Reviewed   General:  WDWN, Alert, NAD.  Chronically ill-appearing female sitting on the edge of the bed  HEENT:  PERRL, EOMI.  OP, nares clear  Chest: More aeration with rhonchi on auscultation, pursed lip breathing, increased work of breathing noted on 11 L  CV: RRR, no MGR, pulses 2+, equal.  Abd:  Soft, NT, ND, + BS, no HSM, obese  EXT:  no clubbing, no cyanosis, no edema  Neuro:  A&Ox3, CN grossly intact, no focal deficits.  Skin: No rashes or lesions noted    Result Review    Result Review:  I have personally reviewed the results from the time of this admission to 2024 08:06 EST and agree with these findings:  [x]  Laboratory  [x]  Microbiology  [x]  Radiology  []  EKG/Telemetry   []  Cardiology/Vascular   []  Pathology  []  Old records  []  Other:  Most notable findings include:         Lab 24  0407 24  1247 24  0922 24  0330 24  0300 02/15/24  0300 24  0536   WBC 8.18  --  9.20 8.73  --  9.00 9.47   HEMOGLOBIN 8.1*  --  9.0* 8.9*   --  10.1* 10.5*   HEMATOCRIT 25.5*  --  28.2* 28.1*  --  31.2* 32.7*   PLATELETS 223  --  245 232  --  331 320   SODIUM 136  --  136  --  131* 133* 132*   SODIUM, ARTERIAL  --  138.3  --   --   --   --   --    POTASSIUM 3.2*  --  3.7  --  3.5 3.4* 3.9   CHLORIDE 99  --  98  --  97* 98 99   CO2 23.6  --  24.1  --  19.0* 17.7* 17.3*   BUN 24*  --  22  --  45* 35* 31*   CREATININE 1.27*  --  1.29*  --  1.95* 1.51* 1.31*   GLUCOSE 160*  --  193*  --  109* 125* 117*   GLUCOSE, ARTERIAL  --  125*  --   --   --   --   --    CALCIUM 8.3*  --  8.6  --  8.8 9.0 9.3   PHOSPHORUS 4.0  --   --   --   --   --   --    TOTAL PROTEIN  --   --  6.4  --  7.0 7.2 7.4   ALBUMIN 2.2*  --  2.4*  --  2.6* 2.8* 2.7*   GLOBULIN  --   --  4.0  --  4.4 4.4 4.7         Assessment & Plan   Assessment / Plan     Active Hospital Problems:  Active Hospital Problems    Diagnosis     **Acute on chronic hypoxic respiratory failure     Diastolic CHF, acute on chronic     Acute on chronic respiratory failure with hypoxia      Impression:  Acute hypoxic respiratory failure requiring high flow nasal cannula  Acute decompensated congestive diastolic heart failure  NSTEMI likely type II  Acute cardiogenic pulmonary edema  Volume overload  T2DM with hyperglycemia  Continue acidosis, clinically significant  Hypokalemia  Hypocalcemia  History of multiple sclerosis  Immunocompromised status     Plan:  -On 10 L high flow nasal cannula.  Continue to wean supplemental oxygen to maintain SpO2 greater than 90%  -2/19 chest CT reviewed demonstrating inflammatory process likely related to post-COVID syndrome  -Check CRP, 22 and sed rate, 76.  Initiated ILD workup,   -Continue cefepime for pneumonia for now.  Micro negative today  -Continue Brovana, Pulmicort, DuoNebs  -Continue Lasix 40 mg IV twice daily  -Continue to monitor renal panel and electrolytes.  Replace calcium intravenously and potassium orally  -Increase Solu-Medrol from 62 daily to  Solu-Medrol 40 mg IV  every 8 hours  -Continue bronchopulmonary hygiene.  Encourage I-S and flutter  -Continue MetaNebs and chest physiotherapy  -Encourage activity as tolerated.  Out of bed to chair  -ED/OT on board.  Appreciate assistance  -Palliative care following, appreciate input    DVT prophylaxis:  Medical DVT prophylaxis orders are present.    CODE STATUS:   Code Status (Patient has no pulse and is not breathing): CPR (Attempt to Resuscitate)  Medical Interventions (Patient has pulse or is breathing): Full Support      Labs, imaging, microbiology, notes and medications personally reviewed  Discussed with primary    I, Dr. Derrell Tomlin, have spent more than 50% of the total time managing the patient in this encounter today.  This included personally reviewing all pertinent labs, imaging, microbiology and documentation. Also discussing the case with the patient and any available family, the admitting physician and any available ancillary staff.    Electronically signed by WESTLEY Giron, 02/20/24, 4:19 PM EST.  Electronically signed by Derrell Tomlin MD, 02/20/24, 4:22 PM EST.

## 2024-02-20 NOTE — THERAPY EVALUATION
Patient Name: Falguni Minaya  : 1960    MRN: [id number]                              Today's Date: 2024       Admit Date: 2024    Visit Dx:     ICD-10-CM ICD-9-CM   1. Acute respiratory failure with hypoxia  J96.01 518.81   2. Pneumonia due to infectious organism, unspecified laterality, unspecified part of lung  J18.9 486   3. Difficulty in walking  R26.2 719.7   4. Decreased activities of daily living (ADL)  Z78.9 V49.89     Patient Active Problem List   Diagnosis    Multiple sclerosis    Left sided abdominal pain    Dyspepsia    Heme positive stool    Hypotension    Seizure disorder    Pain in lower back    Headache    Frequent falls    Spasm    Weakness of right leg    S/P drug eluting coronary stent placement    Coronary artery disease involving native coronary artery of native heart    Ischemic cardiomyopathy    Pleural effusion due to CHF (congestive heart failure)    Shortness of breath    Orthopnea    Hyperlipidemia LDL goal <70    Anemia due to stage 3 chronic kidney disease    Iron deficiency    Anemia in chronic kidney disease    Arthropathy of lumbar facet joint    Marie's esophagus    Benign colonic polyp    Congestive heart failure    Degeneration of lumbar intervertebral disc    Gastroesophageal reflux disease    Gastroparesis    Lumbar radiculopathy    Osteoporosis    Sacroiliac joint pain    Serum creatinine raised    Vitamin D deficiency    Acute hypoxemic respiratory failure    Acute on chronic hypoxic respiratory failure    Diastolic CHF, acute on chronic    Acute on chronic respiratory failure with hypoxia     Past Medical History:   Diagnosis Date    Abdominal hernia     Acute ST elevation myocardial infarction (STEMI) due to occlusion of right coronary artery 2020    CHF (congestive heart failure)     DENIES CP GETS SOA WITH EXERTION. FOLLOWED BY DR ERIC/KASIA GREY. DECREASED ACTIVITY USES WALKER/ROLLATOR    CKD (chronic kidney disease)     Coronary artery disease      Dyspepsia     GERD (gastroesophageal reflux disease)     Hyperlipidemia     Hypertension     Ischemic cardiomyopathy     Multiple sclerosis      Past Surgical History:   Procedure Laterality Date    APPENDECTOMY      CARDIAC CATHETERIZATION      CARDIAC CATHETERIZATION N/A 2/24/2020    Procedure: Left Heart Cath;  Surgeon: Marlon Zamudio MD;  Location:  [facility] CATH INVASIVE LOCATION;  Service: Cardiovascular;  Laterality: N/A;    CARDIAC CATHETERIZATION N/A 2/24/2020    Procedure: Stent LATRICIA coronary;  Surgeon: Marlon Zamudio MD;  Location: [facility];  Service: Cardiovascular;  Laterality: N/A;    CARDIAC CATHETERIZATION  2/24/2020    Procedure: Percutaneous Manual Thrombectomy;  Surgeon: Marlon Zamudio MD;  Location:  [facility] CATH INVASIVE LOCATION;  Service: Cardiovascular;;    CARDIAC CATHETERIZATION N/A 2/24/2020    Procedure: Coronary angiography;  Surgeon: Marlon Zamudio MD;  [facility];  Service: Cardiovascular;  Laterality: N/A;    CARDIAC CATHETERIZATION N/A 2/24/2020    Procedure: Left ventriculography;  Surgeon: Marlon Zamudio MD;  [facility]: [facility];  Service: Cardiovascular;  Laterality: N/A;    CHOLECYSTECTOMY      COLONOSCOPY N/A 2/29/2020    Procedure: COLONOSCOPY to  cecum:;  Surgeon: Krystal Sarabia MD;  Location: [facility] ENDOSCOPY;  Service: Gastroenterology;  Laterality: N/A;  pre:  anemia and abd pain  post:  hemorrhoids, tortuous colon    ENDOSCOPY N/A 2/29/2020    Procedure: ESOPHAGOGASTRODUODENOSCOPY  with biopsies;  Surgeon: Krystal Sarabia MD;  Location: [facility] ENDOSCOPY;  Service: Gastroenterology;  Laterality: N/A;  pre:  anemia and abd pain  post:  mild gastritis,     HYSTERECTOMY      PARATHYROIDECTOMY Bilateral 10/17/2023    Procedure: NECK EXPLORATION WITH PARATHYROID ADENOMA EXCISION AND FROZEN SECTION,, RECURRENT LARYNGEAL NERVE MONITORING, INTRAOPERATIVE INTACT PTH ASSAY;  Surgeon:  Ezekiel Giles MD;  Location: [facility] MAIN OR;  Service: ENT;  Laterality: Bilateral;    TONSILLECTOMY        General Information       Row Name 02/20/24 3829          OT Time and Intention    Document Type evaluation (P)   -     Mode of Treatment individual therapy;occupational therapy (P)   -       Row Name 02/20/24 9887          General Information    Patient Profile Reviewed yes (P)   -     Prior Level of Function -- (P)   Recently at [facility]. Typically (I) with ADLs, ambulated without a device. Has a walk-in shower that has a shower chair, grab bars and a handheld showerhead. Stands to groom. While at [facility], Wears 2L home O2 typically no home O2.  -     Existing Precautions/Restrictions fall (P)   -     Barriers to Rehab none identified (P)   -       Row Name 02/20/24 2452          Occupational Profile    Reason for Services/Referral (Occupational Profile) Patient is a 64 year old female admitted to [facility] on February 19th, 2024 for shortness of breath. Occupational therapy consulted due to decline in ADLs and functional transfers. (P)   -       Row Name 02/20/24 1334          Living Environment    People in Home spouse (P)   Currently at [facility].  -       Row Name 02/20/24 1333          Home Main Entrance    Number of Stairs, Main Entrance other (see comments) (P)   Ramp getting into house.  -       Row Name 02/20/24 5439          Cognition    Orientation Status (Cognition) oriented to;person (P)   -       Row Name 02/20/24 2439          Safety Issues, Functional Mobility    Impairments Affecting Function (Mobility) endurance/activity tolerance;shortness of breath;strength;balance (P)   -               User Key  (r) = Recorded By, (t) = Taken By, (c) = Cosigned By      Initials Name Provider Type     Navarro Montoya, OT Student OT Student                     Mobility/ADL's       Row Name 02/20/24 7547          Bed Mobility    Bed Mobility  supine-sit;sit-supine (P)   -     Supine-Sit Cabins (Bed Mobility) minimum assist (75% patient effort) (P)   -     Sit-Supine Cabins (Bed Mobility) minimum assist (75% patient effort) (P)   -     Bed Mobility, Safety Issues decreased use of arms for pushing/pulling;decreased use of legs for bridging/pushing (P)   -     Assistive Device (Bed Mobility) bed rails;head of bed elevated;draw sheet (P)   -MC       Row Name 02/20/24 1344          Transfers    Transfers sit-stand transfer;stand-sit transfer (P)   -[facility] 02/20/24 1344          Sit-Stand Transfer    Sit-Stand Cabins (Transfers) contact guard (P)   -     Assistive Device (Sit-Stand Transfers) walker, front-wheeled (P)   -[facility] 02/20/24 1344          Stand-Sit Transfer    Stand-Sit [place]ins (Transfers) contact guard (P)   -     Assistive Device (Stand-Sit Transfers) walker, front-wheeled (P)   -[facility] 02/20/24 1344          Activities of Daily Living    BADL Assessment/Intervention bathing;upper body dressing;lower body dressing;grooming;feeding;toileting (P)   -MC       Row Name 02/20/24 1344          Bathing Assessment/Intervention    [place] Level (Bathing) bathing skills;upper body;set up;lower body;moderate assist (50% patient effort) (P)   -MC       Row Name 02/20/24 1344          Upper Body Dressing Assessment/Training    [place] Level (Upper Body Dressing) upper body dressing skills;set up (P)   -[facility] 02/20/24 1344          Lower Body Dressing Assessment/Training    [place] Level (Lower Body Dressing) lower body dressing skills;moderate assist (50% patient effort) (P)   -MC       Row Name 02/20/24 1344          Grooming Assessment/Training    [place] Level (Grooming) grooming skills;set up (P)   -[facility] 02/20/24 1344          Self-Feeding Assessment/Training    [place] Level (Feeding) feeding skills;set up (P)   -MC       Row Name  02/20/24 1344          Toileting Assessment/Training    [place] Level (Toileting) toileting skills;dependent (less than 25% patient effort) (P)   -     Comment, (Toileting) Female purewick currently in place (P)   -               User Key  (r) = Recorded By, (t) = Taken By, (c) = Cosigned By      Initials Name Provider Type     Navarro Montoya, OT Student OT Student                   Obj/Interventions       [facility] Name 02/20/24 1349          Sensory Assessment (Somatosensory)    Sensory Assessment (Somatosensory) UE sensation intact (P)   -[facility] Name 02/20/24 1349          Vision Assessment/Intervention    Visual Impairment/Limitations WFL;corrective lenses full-time (P)   -[facility] Name 02/20/24 1349          Range of Motion Comprehensive    General Range of Motion bilateral upper extremity ROM WFL (P)   -[facility] Name 02/20/24 1349          Strength Comprehensive (MMT)    General Manual Muscle Testing (MMT) Assessment upper extremity strength deficits identified (P)   -     Comment, General Manual Muscle Testing (MMT) Assessment BUE 4-/5 (P)   -[facility] Name 02/20/24 1349          Motor Skills    Motor Skills coordination;functional endurance (P)   -     Coordination WFL (P)   -     Functional Endurance Fair-. Patient educated on deep breathing and pursed lip breathing techniques. (P)   -[facility] Name 02/20/24 1349          Balance    Balance Assessment sitting dynamic balance;standing dynamic balance (P)   -     Dynamic Sitting Balance standby assist (P)   -     Position, Sitting Balance unsupported;sitting edge of bed (P)   -     Dynamic Standing Balance contact guard (P)   -     Position/Device Used, Standing Balance walker, front-wheeled (P)   -               User Key  (r) = Recorded By, (t) = Taken By, (c) = Cosigned By      Initials Name Provider Type     Navarro Montoya, OT Student OT Student                   Goals/Plan       Row Name 02/20/24 2121           Bed Mobility Goal 1 (OT)    Activity/Assistive Device (Bed Mobility Goal 1, OT) bed mobility activities, all (P)   -MC     [place] Level/Cues Needed (Bed Mobility Goal 1, OT) modified independence (P)   -MC     Time Frame (Bed Mobility Goal 1, OT) long term goal (LTG);10 days (P)   -       Row Name 02/20/24 3951          Transfer Goal 1 (OT)    Activity/Assistive Device (Transfer Goal 1, OT) transfers, all (P)   -MC     [place] Level/Cues Needed (Transfer Goal 1, OT) modified independence (P)   -MC     Time Frame (Transfer Goal 1, OT) long term goal (LTG);10 days (P)   -       Row Name 02/20/24 1353          Bathing Goal 1 (OT)    Activity/Device (Bathing Goal 1, OT) bathing skills, all (P)   -MC     [place] Level/Cues Needed (Bathing Goal 1, OT) modified independence (P)   -MC     Time Frame (Bathing Goal 1, OT) long term goal (LTG);10 days (P)   -       Row Name 02/20/24 4770          Dressing Goal 1 (OT)    Activity/Device (Dressing Goal 1, OT) dressing skills, all (P)   -MC     [place]/Cues Needed (Dressing Goal 1, OT) modified independence (P)   -MC     Time Frame (Dressing Goal 1, OT) long term goal (LTG);10 days (P)   -       Row Name 02/20/24 0264          Toileting Goal 1 (OT)    Activity/Device (Toileting Goal 1, OT) toileting skills, all (P)   -MC     [place] Level/Cues Needed (Toileting Goal 1, OT) modified independence (P)   -MC     Time Frame (Toileting Goal 1, OT) long term goal (LTG);10 days (P)   -       Row Name 02/20/24 1351          Strength Goal 1 (OT)    Strength Goal 1 (OT) Patient will increase BUE strength by 1/2 grades to support independence in ADLs and functional transfers. (P)   -MC     Time Frame (Strength Goal 1, OT) long term goal (LTG);10 days (P)   -       Row Name 02/20/24 4160          Problem Specific Goal 1 (OT)    Problem Specific Goal 1 (OT) Patient will demonstrate fair+ endurance to support independence in ADLs and functional transfers.  (P)   -     Time Frame (Problem Specific Goal 1, OT) long term goal (LTG);10 days (P)   -       Row Name 02/20/24 6611          Therapy Assessment/Plan (OT)    Planned Therapy Interventions (OT) activity tolerance training;BADL retraining;functional balance retraining;occupation/activity based interventions;patient/caregiver education/training;strengthening exercise;transfer/mobility retraining (P)   -               User Key  (r) = Recorded By, (t) = Taken By, (c) = Cosigned By      Initials Name Provider Type     Navarro Montoya, OT Student OT Student                   Clinical Impression       Row Name 02/20/24 1357          Pain Assessment    Additional Documentation Pain Scale: FACES Pre/Post-Treatment (Group) (P)   -       Row Name 02/20/24 4825          Pain Scale: FACES Pre/Post-Treatment    Pain: FACES Scale, Pretreatment 0-->no hurt (P)   -     Posttreatment Pain Rating 0-->no hurt (P)   -       Row Name 02/20/24 4029          Plan of Care Review    Plan of Care Reviewed With patient (P)   -     Progress no change (P)   -     Outcome Evaluation Patient presents with deficits in self-care, functional endurance, balance and strength. She would benefit from continued skilled OT services to maximize independence in ADLs and functional transfers. (P)   -       Row Name 02/20/24 6840          Therapy Assessment/Plan (OT)    Patient/Family Therapy Goal Statement (OT) Maximize independence (P)   -     Rehab Potential (OT) good, to achieve stated therapy goals (P)   -     Criteria for Skilled Therapeutic Interventions Met (OT) yes;skilled treatment is necessary (P)   -     Therapy Frequency (OT) 5 times/wk (P)   -       Row Name 02/20/24 1335          Therapy Plan Review/Discharge Plan (OT)    Anticipated Discharge Disposition (OT) inpatient rehabilitation facility (P)   -       Row Name 02/20/24 1354          Vital Signs    O2 Delivery Pre Treatment hi-flow (P)   -     O2 Delivery  Intra Treatment hi-flow (P)   -MC     O2 Delivery Post Treatment hi-flow (P)   -MC       Row Name 02/20/24 1356          Positioning and Restraints    Pre-Treatment Position in bed (P)   -MC     Post Treatment Position bed (P)   -MC     In Bed fowlers;call light within reach;encouraged to call for assist;exit alarm on;with family/caregiver (P)   -MC               User Key  (r) = Recorded By, (t) = Taken By, (c) = Cosigned By      Initials Name Provider Type    Navarro Tavera, OT Student OT Student                   Outcome Measures       Row Name 02/20/24 1400          How much help from another is currently needed...    Putting on and taking off regular lower body clothing? 2 (P)   -MC     Bathing (including washing, rinsing, and drying) 2 (P)   -MC     Toileting (which includes using toilet bed pan or urinal) 1 (P)   -MC     Putting on and taking off regular upper body clothing 3 (P)   -MC     Taking care of personal grooming (such as brushing teeth) 4 (P)   -MC     Eating meals 4 (P)   -MC     AM-PAC 6 Clicks Score (OT) 16 (P)   -MC       Row Name 02/20/24 1100          How much help from another person do you currently need...    Turning from your back to your side while in flat bed without using bedrails? 4  -DP (r) DG (t) DP (c)     Moving from lying on back to sitting on the side of a flat bed without bedrails? 3  -DP (r) DG (t) DP (c)     Moving to and from a bed to a chair (including a wheelchair)? 3  -DP (r) DG (t) DP (c)     Standing up from a chair using your arms (e.g., wheelchair, bedside chair)? 3  -DP (r) DG (t) DP (c)     Climbing 3-5 steps with a railing? 2  -DP (r) DG (t) DP (c)     To walk in hospital room? 2  -DP (r) DG (t) DP (c)     AM-PAC 6 Clicks Score (PT) 17  -DP (r) DG (t)     Highest Level of Mobility Goal 5 --> Static standing  -DP (r) DG (t)       Row Name 02/20/24 1400 02/20/24 1100       Functional Assessment    Outcome Measure Options AM-PAC 6 Clicks Daily Activity (OT);Optimal  Instrument (P)   - AM-PAC 6 Clicks Basic Mobility (PT)  -DP (r) DG (t) DP (c)      Row Name 02/20/24 1400          Optimal Instrument    Optimal Instrument Optimal - 3 (P)   -     Bending/Stooping 2 (P)   -     Standing 1 (P)   -     Reaching 1 (P)   -     From the list, choose the 3 activities you would most like to be able to do without any difficulty Bending/stooping;Standing;Reaching (P)   -     Total Score Optimal - 3 4 (P)   -               User Key  (r) = Recorded By, (t) = Taken By, (c) = Cosigned By      Initials Name Provider Type    DP Micheline Shaffer, PT Physical Therapist    Mirian June, PT Student PT Student    Navarro Tavera, OT Student OT Student                      OT Recommendation and Plan  Planned Therapy Interventions (OT): (P) activity tolerance training, BADL retraining, functional balance retraining, occupation/activity based interventions, patient/caregiver education/training, strengthening exercise, transfer/mobility retraining  Therapy Frequency (OT): (P) 5 times/wk  Plan of Care Review  Plan of Care Reviewed With: (P) patient  Progress: (P) no change  Outcome Evaluation: (P) Patient presents with deficits in self-care, functional endurance, balance and strength. She would benefit from continued skilled OT services to maximize independence in ADLs and functional transfers.     Time Calculation:   Evaluation Complexity (OT)  Review Occupational Profile/Medical/Therapy History Complexity: (P) brief/low complexity  Assessment, Occupational Performance/Identification of Deficit Complexity: (P) 3-5 performance deficits  Clinical Decision Making Complexity (OT): (P) problem focused assessment/low complexity  Overall Complexity of Evaluation (OT): (P) low complexity     Time Calculation- OT       Row Name 02/20/24 1402             Time Calculation- OT    OT Received On 02/20/24 (P)   -      OT Goal Re-Cert Due Date 02/29/24 (P)   -         Untimed Charges    OT  Eval/Re-eval Minutes 36 (P)   -MC         Total Minutes    Untimed Charges Total Minutes 36 (P)   -MC       Total Minutes 36 (P)   -MC                User Key  (r) = Recorded By, (t) = Taken By, (c) = Cosigned By      Initials Name Provider Type    Navarro Tavera OT Student OT Student                  Therapy Charges for Today       Code Description Service Date Service Provider Modifiers Qty    [id number] HC OT EVAL LOW COMPLEXITY 3 2/20/2024 Navarro Montoya OT Student GO 1                 LISSETT Landrum  2/20/2024

## 2024-02-20 NOTE — PROGRESS NOTES
RT EQUIPMENT DEVICE RELATED - SKIN ASSESSMENT    RT Medical Equipment/Device:     NIV Mask:  Under-the-nose   size:  B    Skin Assessment:      Cheek:  Intact  Nose:  Intact    Device Skin Pressure Protection:  Skin-to-device areas padded:  None Required    Nurse Notification:  Lizette Del Angel, RRT

## 2024-02-20 NOTE — PROGRESS NOTES
"[facility] Clinical Pharmacy Services: Vancomycin Pharmacokinetic Initial Consult Note    Falguni Minaya is a 64 y.o. female who is on day 2 of pharmacy to dose vancomycin for Pneumonia.    Consult Information  Consulting Provider: Rk Franco  Planned Duration of Therapy: 7 days (through )  Was Patient Receiving Prior to Admission/Consult?: No  Loading Dose Ordered or Given: 1750 mg on  at 2130  PK/PD Target: -600 mg/L.hr   Other Antimicrobials: Cefepime    Imaging Reviewed?: Yes    Microbiology Data  MRSA PCR performed:   ; Result: Negative  Culture/Source:    Respiratory cx: none detected   COVID-, flu, RSV swab: negative   Blood cx: in process    Vitals/Labs  Ht: 152.4 cm (60\"); Wt: 82.6 kg (182 lb 1.6 oz)  Temp (24hrs), Av.2 °F (36.8 °C), Min:97.7 °F (36.5 °C), Max:99.3 °F (37.4 °C)   Estimated Creatinine Clearance: 42 mL/min (A) (by C-G formula based on SCr of 1.27 mg/dL (H)).     Results from last 7 days   Lab Units 24  0407 24  0922 24  0330 24  0300   CREATININE mg/dL 1.27* 1.29*  --  1.95*   WBC 10*3/mm3 8.18 9.20 8.73  --      Assessment/Plan:    Vancomycin Dose:  1500 mg IV every 24 hours; which provides the following predicted parameters:  AUC24,ss: 580 mg/L.hr  PAUC*: 86 %  Ctrough,ss: 17.6 mg/L  Pconc*: 39 %  Tox.: 13 %  Vanc Random ordered for  at AM labs  Patient has order for Renal Function Panel    Pharmacy will follow patient's kidney function and will adjust doses and obtain levels as necessary. Thank you for involving pharmacy in this patient's care. Please contact pharmacy with any questions or concerns.                           Gretta Caputo [facility]  Clinical Pharmacist  "

## 2024-02-20 NOTE — PLAN OF CARE
Goal Outcome Evaluation:  Plan of Care Reviewed With: (P) patient           Outcome Evaluation: (P) Patient presents with deficits in strength, balance, activity tolerance/endurance, and functional mobility with dyspnea as the main limiting factor. Skilled PT services are required to address these areas and return to prior level of function.      Anticipated Discharge Disposition (PT): (P) sub acute care setting

## 2024-02-20 NOTE — SIGNIFICANT NOTE
Wound Eval / Progress Noted    [facility]     Patient Name: Falguni Minaya  : 1960  MRN: [id number]  Today's Date: 2024                 Admit Date: 2024    Visit Dx:    ICD-10-CM ICD-9-CM   1. Acute respiratory failure with hypoxia  J96.01 518.81   2. Pneumonia due to infectious organism, unspecified laterality, unspecified part of lung  J18.9 486   3. Difficulty in walking  R26.2 719.7         Acute on chronic hypoxic respiratory failure    Diastolic CHF, acute on chronic    Acute on chronic respiratory failure with hypoxia        Past Medical History:   Diagnosis Date    Abdominal hernia     Acute ST elevation myocardial infarction (STEMI) due to occlusion of right coronary artery 2020    CHF (congestive heart failure)     DENIES CP GETS SOA WITH EXERTION. FOLLOWED BY DR ERIC/KASIA GREY. DECREASED ACTIVITY USES WALKER/ROLLATOR    CKD (chronic kidney disease)     Coronary artery disease     Dyspepsia     GERD (gastroesophageal reflux disease)     Hyperlipidemia     Hypertension     Ischemic cardiomyopathy     Multiple sclerosis         Past Surgical History:   Procedure Laterality Date    APPENDECTOMY      CARDIAC CATHETERIZATION      CARDIAC CATHETERIZATION N/A 2020    Procedure: Left Heart Cath;  Surgeon: Marlon Zamudio MD;  Location: [facility] INVASIVE LOCATION;  Service: Cardiovascular;  Laterality: N/A;    CARDIAC CATHETERIZATION N/A 2020    Procedure: Stent LATRICIA coronary;  Surgeon: Marlon Zamudio MD;  Location: [facility] INVASIVE LOCATION;  Service: Cardiovascular;  Laterality: N/A;    CARDIAC CATHETERIZATION  2020    Procedure: Percutaneous Manual Thrombectomy;  Surgeon: Marlon Zamudio MD;  Location: [facility] INVASIVE LOCATION;  Service: Cardiovascular;;    CARDIAC CATHETERIZATION N/A 2020    Procedure: Coronary angiography;  Surgeon: Marlon Zamudio MD;  Location: [facility] INVASIVE LOCATION;  Service: Cardiovascular;  Laterality:  N/A;    CARDIAC CATHETERIZATION N/A 2/24/2020    Procedure: Left ventriculography;  Surgeon: Marlon Zamudio MD;  Location: [facility] CATH INVASIVE LOCATION;  Service: Cardiovascular;  Laterality: N/A;    CHOLECYSTECTOMY      COLONOSCOPY N/A 2/29/2020    Procedure: COLONOSCOPY to  cecum:;  Surgeon: Krystal Sarabia MD;  Location: [facility] ENDOSCOPY;  Service: Gastroenterology;  Laterality: N/A;  pre:  anemia and abd pain  post:  hemorrhoids, tortuous colon    ENDOSCOPY N/A 2/29/2020    Procedure: ESOPHAGOGASTRODUODENOSCOPY  with biopsies;  Surgeon: Krystal Sarabia MD;  Location: [facility] ENDOSCOPY;  Service: Gastroenterology;  Laterality: N/A;  pre:  anemia and abd pain  post:  mild gastritis,     HYSTERECTOMY      PARATHYROIDECTOMY Bilateral 10/17/2023    Procedure: NECK EXPLORATION WITH PARATHYROID ADENOMA EXCISION AND FROZEN SECTION,, RECURRENT LARYNGEAL NERVE MONITORING, INTRAOPERATIVE INTACT PTH ASSAY;  Surgeon: Ezekiel Giles MD;  Location: [facility];  Service: ENT;  Laterality: Bilateral;    TONSILLECTOMY           Physical Assessment:  Wound 02/10/24 0816 Bilateral upper thigh MASD (Moisture associated skin damage) (Active)   Dressing Appearance open to air 02/20/24 1100   Closure None 02/20/24 1100   Base red;pink;moist;yeast 02/20/24 1100   Periwound redness;moist;yeast 02/20/24 1100   Periwound Temperature warm 02/20/24 1100   Periwound Skin Turgor soft 02/20/24 1100   Edges open;rolled/closed 02/20/24 1100   Drainage Characteristics/Odor yellow 02/20/24 1100   Drainage Amount scant 02/20/24 1100   Care, Wound cleansed with;soap and water 02/20/24 1100   Dressing Care open to air;skin barrier agent applied 02/20/24 1100   Periwound Care barrier ointment applied 02/20/24 1100       Wound 02/20/24 1405 gluteal MASD (Moisture associated skin damage) (Active)   Wound Image   02/20/24 1100   Dressing Appearance open to air 02/20/24 1100   Closure None 02/20/24 1100   Base blanchable;red;moist;yeast  02/20/24 1100   Periwound redness;moist;blanchable 02/20/24 1100   Periwound Temperature warm 02/20/24 1100   Periwound Skin Turgor soft 02/20/24 1100   Edges open;rolled/closed 02/20/24 1100   Drainage Characteristics/Odor serosanguineous 02/20/24 1100   Drainage Amount small 02/20/24 1100   Care, Wound cleansed with;soap and water 02/20/24 1100   Dressing Care open to air;skin barrier agent applied 02/20/24 1100   Periwound Care barrier ointment applied 02/20/24 1100        Wound Check / Follow-up:  Patient seen today for a wound consult. Patient is sitting up in bed watching television. Patient with a pure wick in place.    Significant skin break down to the groin and buttocks with fungal presentation and satellite lesions noted to the groin and buttocks. Cleansed all areas with soap and water.  Buttocks with significant superficial tissue loss; all tissue remains blanchable at this time. Recommending QID application of magic barrier to the buttocks and posterior thighs; BID cleansing with soap and water and application of miconazole powder to the groin / abdominal folds. Keep the patient clean and free from all moisture. Implement Q2H turns and offload at all times.     Impression: fungal presentation to the groin and buttocks with superficial tissue loss present    Short term goals:  regain skin integrity, skin protection, topical treatment, pressure reduction, moisture managment    Ruby Bond RN    2/20/2024    14:06 EST

## 2024-02-20 NOTE — PROGRESS NOTES
[facility]   Hospitalist Progress Note  Date: 2024  Patient Name: Falguni Minaya  : 1960  MRN: [id number]  Date of admission: 2024      Subjective   Subjective     Chief Complaint: Shortness of breath started on the day of admission    Summary:   Falguni Minaya is a 64 y.o. female with past medical history of MS of medication since October of last year, recent COVID last month, hyperlipidemia, hypertension, coronary disease, diastolic CHF, CKD, dysphagia and GERD  Patient was recently discharged on  to rehab because of UTI and acute respiratory failure requiring Airvo and pulmonary intervention suspected to be due to acute diastolic CHF and unspecified bacterial pathogen along with  recent COVID infection complication.    Patient today noticed increasing shortness of air.  Denies any chest pain, fever, chills, any new cough or phlegm.  No vomiting or diarrhea no choking or aspiration.  Workup in the ED showed O2 sat to be 85% on 3 L.  With 6 L she is up to 90%.  VBG showed pH of 7.6.  pCO2 28.  pO2 54 and bicarb of 26 with 91% saturation on 6 L.  Blood pressure is soft which is baseline.  Patient is tachypneic rate of 34.  BNP is elevated at 2438.  Creatinine is 1.2 better than her baseline.  Glucose is elevated.  Lactic acid of 2.8.  EKG showed sinus rhythm.  CT chest showed groundglass bilateral opacities with bronchiectasis likely sequela to her COVID infection.  There are consolidation in lung bases.  Not much pleural effusion.  Her repeat COVID, flu and RSV swab is negative ED.  Patient given IV steroids and Lasix along with antibiotics.  Hospitalist has been called to admit her for further evaluation.  Per patient she has been ambulatory at rehab and her MS has not bothered her much.  Patient waited by pulmonary and IV steroid dose increased.    Interval Followup:   Remains on 10 L high flow nasal cannula.  Blood pressure stable on lower side.  Denies any chest pain, cough or  phlegm.  No fever or chills.  Remains weak.    Review of Systems   All systems were reviewed and negative except for: Summary and interval follow-up    Objective   Objective     Vitals:   Temp:  [97.9 °F (36.6 °C)-98.5 °F (36.9 °C)] 98.5 °F (36.9 °C)  Heart Rate:  [69-85] 75  Resp:  [20-32] 21  BP: ()/(49-83) 101/67  Flow (L/min):  [10-11] 10  Physical Exam    Constitutional: Awake, alert, able to converse in full sentences              Eyes: Pupils equal, sclerae anicteric, no conjunctival injection              HENT: NCAT, mucous membranes moist              Neck: Supple, no thyromegaly, no lymphadenopathy, trachea midline              Respiratory: Decreased to auscultation bilaterally, tachypneic              Cardiovascular: RRR, no murmurs, rubs, or gallops, palpable pedal pulses bilaterally              Gastrointestinal: Positive bowel sounds, soft, nontender, nondistended              Musculoskeletal: No bilateral ankle edema, no clubbing or cyanosis to extremities              Psychiatric: Appropriate affect, cooperative              Neurologic: Oriented x 3, strength symmetric in all extremities, Cranial Nerves grossly intact to confrontation, speech clear              Skin: Erythematous rash on upper lip improving as per patient got it due to Airvo nasal cannula from previous admission       Result Review    Result Review:  I have personally reviewed the results for the past 24 hours and agree with these findings:  [x]  Laboratory  [x]  Microbiology  [x]  Radiology  [x]  EKG/Telemetry   []  Cardiology/Vascular   []  Pathology  [x]  Old records  [x]  Other: Medications    Assessment & Plan   Assessment / Plan     Assessment:    Acute on chronic hypoxic respiratory failure patient now needing 11 L high flow nasal cannula.   acute exacerbation of bronchiectasis  Possible sequelae to recent COVID infection  Rule out healthcare associate pneumonia.  Acute on chronic diastolic CHF.  MS.  Off medication  since October last year.  Stable.  Hypertension.  Chronic kidney disease.  Stable  Lactic acidosis.  Not significant.  Improved.  Anemia.  Stable  Hypertensive heart disease with LVH.  Prediabetes.  Likely steroid-induced.  Hemoglobin A1c of 6.4%  Hypotension.  Per history chronic.  Stable     Plan:   Supplemental oxygen to keep sats more than 90%.  Use NIPPV as needed.  IV cefepime.  MRSA PCR negative.  Vanco DC'd  IV steroids.  Dose titrated up by pulmonary  Pulmicort, brovana and DuoNeb.  Bronchodilator and bronchopulmonary team protocol.  MetaNeb and hypertonic saline neb.  Incentive spirometry and flutter valve.  Sputum culture.  Not available in the mucus  procalcitonin.  Noted.  Negative  MRSA PCR, Legionella and strep pneumonia urine antigen.  All negative  Respiratory panel PCR.  Negative  Check Fungitell.  Pending  Discussed with pulmonary appreciate input.  Monitoring inflammatory markers.  ESR 76 and CRP 22 on admission may need bronchoscopy  IV Lasix.  Monitor electrolytes and replace as needed  Monitor BMP  Hold home beta-blockers.  Started on home Prozac, Neurontin and Requip  Palliative care consulted for goals of care discussions..  Patient DNR/DNI  PT OT.  Telemetry.      Discussed plan with RN and .  Return to rehab when  stable    DVT prophylaxis:  Medical DVT prophylaxis orders are present.        CODE STATUS:   Medical Intervention Limits: NO intubation (DNI)  Level Of Support Discussed With: Patient  Code Status (Patient has no pulse and is not breathing): No CPR (Do Not Attempt to Resuscitate)  Medical Interventions (Patient has pulse or is breathing): Limited Support      Part of this note may be an electronic transcription/translation of spoken language to printed text using the Dragon Dictation System.     Electronically signed by Rk Franco MD, 02/20/24, 5:17 PM EST.

## 2024-02-20 NOTE — PLAN OF CARE
Goal Outcome Evaluation:  Plan of Care Reviewed With: (P) patient        Progress: (P) no change  Outcome Evaluation: (P) Patient presents with deficits in self-care, functional endurance, balance and strength. She would benefit from continued skilled OT services to maximize independence in ADLs and functional transfers.      Anticipated Discharge Disposition (OT): (P) inpatient rehabilitation facility

## 2024-02-20 NOTE — THERAPY EVALUATION
Acute Care - Physical Therapy Initial Evaluation   [facility]     Patient Name: Falguni Minaya  : 1960  MRN: [id number]  Today's Date: 2024      Visit Dx:     ICD-10-CM ICD-9-CM   1. Acute respiratory failure with hypoxia  J96.01 518.81   2. Pneumonia due to infectious organism, unspecified laterality, unspecified part of lung  J18.9 486   3. Difficulty in walking  R26.2 719.7     Patient Active Problem List   Diagnosis    Multiple sclerosis    Left sided abdominal pain    Dyspepsia    Heme positive stool    Hypotension    Seizure disorder    Pain in lower back    Headache    Frequent falls    Spasm    Weakness of right leg    S/P drug eluting coronary stent placement    Coronary artery disease involving native coronary artery of native heart    Ischemic cardiomyopathy    Pleural effusion due to CHF (congestive heart failure)    Shortness of breath    Orthopnea    Hyperlipidemia LDL goal <70    Anemia due to stage 3 chronic kidney disease    Iron deficiency    Anemia in chronic kidney disease    Arthropathy of lumbar facet joint    Marie's esophagus    Benign colonic polyp    Congestive heart failure    Degeneration of lumbar intervertebral disc    Gastroesophageal reflux disease    Gastroparesis    Lumbar radiculopathy    Osteoporosis    Sacroiliac joint pain    Serum creatinine raised    Vitamin D deficiency    Acute hypoxemic respiratory failure    Acute on chronic hypoxic respiratory failure    Diastolic CHF, acute on chronic    Acute on chronic respiratory failure with hypoxia     Past Medical History:   Diagnosis Date    Abdominal hernia     Acute ST elevation myocardial infarction (STEMI) due to occlusion of right coronary artery 2020    CHF (congestive heart failure)     DENIES CP GETS SOA WITH EXERTION. FOLLOWED BY DR ERIC/KASIA GREY. DECREASED ACTIVITY USES WALKER/ROLLATOR    CKD (chronic kidney disease)     Coronary artery disease     Dyspepsia     GERD (gastroesophageal reflux  disease)     Hyperlipidemia     Hypertension     Ischemic cardiomyopathy     Multiple sclerosis      Past Surgical History:   Procedure Laterality Date    APPENDECTOMY      CARDIAC CATHETERIZATION      CARDIAC CATHETERIZATION N/A 2/24/2020    Procedure: Left Heart Cath;  Surgeon: Marlon Zamudio MD;  Location: [facility] CATH INVASIVE LOCATION;  Service: Cardiovascular;  Laterality: N/A;    CARDIAC CATHETERIZATION N/A 2/24/2020    Procedure: Stent LATRICIA coronary;  Surgeon: Maroln Zamudio MD;  Location: [facility] CATH INVASIVE LOCATION;  Service: Cardiovascular;  Laterality: N/A;    CARDIAC CATHETERIZATION  2/24/2020    Procedure: Percutaneous Manual Thrombectomy;  Surgeon: Marlon Zamudio MD;  Location: [facility] CATH INVASIVE LOCATION;  Service: Cardiovascular;;    CARDIAC CATHETERIZATION N/A 2/24/2020    Procedure: Coronary angiography;  Surgeon: Marlon Zamudio MD;  Location: [facility] CATH INVASIVE LOCATION;  Service: Cardiovascular;  Laterality: N/A;    CARDIAC CATHETERIZATION N/A 2/24/2020    Procedure: Left ventriculography;  Surgeon: Marlon Zamudio MD;  Location: [facility] CATH INVASIVE LOCATION;  Service: Cardiovascular;  Laterality: N/A;    CHOLECYSTECTOMY      COLONOSCOPY N/A 2/29/2020    Procedure: COLONOSCOPY to  cecum:;  Surgeon: Krystal Sarabia MD;  Location: [facility] ENDOSCOPY;  Service: Gastroenterology;  Laterality: N/A;  pre:  anemia and abd pain  post:  hemorrhoids, tortuous colon    ENDOSCOPY N/A 2/29/2020    Procedure: ESOPHAGOGASTRODUODENOSCOPY  with biopsies;  Surgeon: Krystal Sarabia MD;  Location: [facility] ENDOSCOPY;  Service: Gastroenterology;  Laterality: N/A;  pre:  anemia and abd pain  post:  mild gastritis,     HYSTERECTOMY      PARATHYROIDECTOMY Bilateral 10/17/2023    Procedure: NECK EXPLORATION WITH PARATHYROID ADENOMA EXCISION AND FROZEN SECTION,, RECURRENT LARYNGEAL NERVE MONITORING, INTRAOPERATIVE INTACT PTH ASSAY;  Surgeon: Ezekiel Giles MD;  Location: [facility];   Service: ENT;  Laterality: Bilateral;    TONSILLECTOMY       PT Assessment (last 12 hours)       PT Evaluation and Treatment       Row Name 02/20/24 1100          Physical Therapy Time and Intention    Subjective Information complains of;weakness;dyspnea (P)   -DG     Document Type evaluation (P)   -DG     Mode of Treatment individual therapy;physical therapy (P)   -DG     Patient Effort good (P)   -DG     Symptoms Noted During/After Treatment shortness of breath;dizziness;significant change in vital signs (P)   After sup>sit pt O2 dropped to 75 and BP to 85/54. Notified nsg and RT.  -DG       Row Name 02/20/24 1100          General Information    Patient Profile Reviewed yes (P)   -DG     Patient Observations alert;cooperative;agree to therapy (P)   -DG     Prior Level of Function independent:;all household mobility (P)   -DG     Equipment Currently Used at Home none (P)   -DG     Existing Precautions/Restrictions fall;oxygen therapy device and L/min (P)   Pt has front-wheeled walker at home but does not use  -DG     Barriers to Rehab none identified (P)   -DG       Row Name 02/20/24 1100          Living Environment    Current Living Arrangements home (P)   -DG     People in Home spouse (P)   -DG     Primary Care Provided by self (P)   -DG       Row Name 02/20/24 1100          Home Use of Assistive/Adaptive Equipment    Equipment Currently Used at Home none (P)   -DG       Row Name 02/20/24 1100          Cognition    Cognitive Function WFL (P)   -DG       Row Name 02/20/24 1100          Range of Motion (ROM)    Range of Motion bilateral lower extremities;ROM is WFL (P)   -DG       Row Name 02/20/24 1100          Strength (Manual Muscle Testing)    Strength (Manual Muscle Testing) bilateral lower extremities (P)   Knee flex/ext 3+/5, unable to test the rest d/t vitals.  -DG       Row Name 02/20/24 1100          Bed Mobility    Bed Mobility supine-sit-supine (P)   -DG     Supine-Sit-Supine [place] (Bed Mobility)  minimum assist (75% patient effort) (P)   -DG     Bed Mobility, Safety Issues decreased use of arms for pushing/pulling;decreased use of legs for bridging/pushing (P)   -DG     Assistive Device (Bed Mobility) bed rails;draw sheet;head of bed elevated (P)   -DG       Row Name 02/20/24 1100          Transfers    Transfers sit-stand transfer (P)   -DG     Comment, (Transfers) Unable to attempt d/t significant change in O2/BP. (P)   -DG       Row Name 02/20/24 1100          Sit-Stand Transfer    Comment, (Sit-Stand Transfer) Unable to attempt d/t significant change in O2/BP. (P)   -DG       Row Name 02/20/24 1100          Gait/Stairs (Locomotion)    Patient was able to Ambulate no, other medical factors prevent ambulation (P)   -DG     Reason Patient was unable to Ambulate Hypoxia/Respiratory Distress;Hypotension (P)   -DG       Row Name 02/20/24 1100          Safety Issues, Functional Mobility    Impairments Affecting Function (Mobility) balance;endurance/activity tolerance;shortness of breath;strength (P)   -DG       Row Name 02/20/24 1100          Balance    Balance Assessment sitting static balance (P)   -DG     Static Sitting Balance minimal assist (P)   -DG     Comment, Balance Pt appeared to be independent with sitting balance but after sitting for 5 minutes to test vitals, pt had inc. dyspnea and dizziness and required Monica. (P)   -DG       Row Name             Wound 02/10/24 0816 Bilateral upper thigh MASD (Moisture associated skin damage)    Wound - Properties Group Placement Date: 02/10/24  -KP Placement Time: 0816  -KP Present on Original Admission: N  -KP Side: Bilateral  -KP Orientation: upper  -KP Location: thigh  -KP Primary Wound Type: MASD  -KP    Retired Wound - Properties Group Placement Date: 02/10/24  -KP Placement Time: 0816  -KP Present on Original Admission: N  -KP Side: Bilateral  -KP Orientation: upper  -KP Location: thigh  -KP Primary Wound Type: MASD  -KP    Retired Wound - Properties Group  Date first assessed: 02/10/24  - Time first assessed: 0816 -KP Present on Original Admission: N  -KP Side: Bilateral  -KP Location: thigh  -KP Primary Wound Type: MASD  -KP      Row Name 02/20/24 1100          Plan of Care Review    Plan of Care Reviewed With patient (P)   -DG     Outcome Evaluation Patient presents with deficits in strength, balance, activity tolerance/endurance, and functional mobility with dyspnea as the main limiting factor. Skilled PT services are required to address these areas and return to prior level of function. (P)   -DG       Row Name 02/20/24 1100          Positioning and Restraints    Pre-Treatment Position in bed (P)   -DG     Post Treatment Position bed (P)   -DG     In Bed with other staff;call light within reach;encouraged to call for assist (P)   -DG       Row Name 02/20/24 1100          Therapy Assessment/Plan (PT)    Rehab Potential (PT) good, to achieve stated therapy goals (P)   -DG     Criteria for Skilled Interventions Met (PT) yes;skilled treatment is necessary (P)   -DG     Therapy Frequency (PT) daily (P)   -DG     Predicted Duration of Therapy Intervention (PT) 10 days (P)   -DG     Problem List (PT) problems related to;balance;mobility;strength (P)   -DG     Activity Limitations Related to Problem List (PT) unable to ambulate safely;unable to transfer safely (P)   -DG       Row Name 02/20/24 1100          PT Evaluation Complexity    History, PT Evaluation Complexity 1-2 personal factors and/or comorbidities (P)   -DG     Examination of Body Systems (PT Eval Complexity) total of 4 or more elements (P)   -DG     Clinical Presentation (PT Evaluation Complexity) evolving (P)   -DG     Clinical Decision Making (PT Evaluation Complexity) moderate complexity (P)   -DG     Overall Complexity (PT Evaluation Complexity) moderate complexity (P)   -DG       Row Name 02/20/24 1100          Therapy Plan Review/Discharge Plan (PT)    Therapy Plan Review (PT) patient (P)   -DG        Row Name 02/20/24 1100          Physical Therapy Goals    Bed Mobility Goal Selection (PT) bed mobility, PT goal 1 (P)   -DG     Transfer Goal Selection (PT) transfer, PT goal 1 (P)   -DG     Gait Training Goal Selection (PT) gait training, PT goal 1 (P)   -DG       Row Name 02/20/24 1100          Bed Mobility Goal 1 (PT)    Activity/Assistive Device (Bed Mobility Goal 1, PT) sit to supine/supine to sit (P)   -DG     [place] Level/Cues Needed (Bed Mobility Goal 1, PT) independent (P)   -DG     Time Frame (Bed Mobility Goal 1, PT) 10 days (P)   -DG       Row Name 02/20/24 1100          Transfer Goal 1 (PT)    Activity/Assistive Device (Transfer Goal 1, PT) sit-to-stand/stand-to-sit;walker, rolling (P)   -DG     [place] Level/Cues Needed (Transfer Goal 1, PT) modified independence (P)   -DG     Time Frame (Transfer Goal 1, PT) 10 days (P)   -DG       Row Name 02/20/24 1100          Gait Training Goal 1 (PT)    Activity/Assistive Device (Gait Training Goal 1, PT) increase endurance/gait distance;walker, rolling (P)   -DG     [place] Level (Gait Training Goal 1, PT) supervision required (P)   -DG     Distance (Gait Training Goal 1, PT) 200 (P)   -DG     Time Frame (Gait Training Goal 1, PT) 10 days (P)   -DG               User Key  (r) = Recorded By, (t) = Taken By, (c) = Cosigned By      Initials Name Provider Type    Leia Zaragoza, RN Registered Nurse    Mirian June, PT Student PT Student                      PT Recommendation and Plan  Anticipated Discharge Disposition (PT): (P) sub acute care setting  Planned Therapy Interventions (PT): (P) balance training, bed mobility training, gait training, patient/family education, strengthening, transfer training  Therapy Frequency (PT): (P) daily  Plan of Care Reviewed With: (P) patient  Outcome Evaluation: (P) Patient presents with deficits in strength, balance, activity tolerance/endurance, and functional mobility with dyspnea as the main limiting  factor. Skilled PT services are required to address these areas and return to prior level of function.   Outcome Measures       Row Name 02/20/24 1100             How much help from another person do you currently need...    Turning from your back to your side while in flat bed without using bedrails? 4 (P)   -DG      Moving from lying on back to sitting on the side of a flat bed without bedrails? 3 (P)   -DG      Moving to and from a bed to a chair (including a wheelchair)? 3 (P)   -DG      Standing up from a chair using your arms (e.g., wheelchair, bedside chair)? 3 (P)   -DG      Climbing 3-5 steps with a railing? 2 (P)   -DG      To walk in hospital room? 2 (P)   -DG      AM-PAC 6 Clicks Score (PT) 17 (P)   -DG      Highest Level of Mobility Goal 5 --> Static standing (P)   -DG         Functional Assessment    Outcome Measure Options AM-PAC 6 Clicks Basic Mobility (PT) (P)   -DG                User Key  (r) = Recorded By, (t) = Taken By, (c) = Cosigned By      Initials Name Provider Type    Mirian June, PT Student PT Student                     Time Calculation:    PT Charges       Row Name 02/20/24 1136             Time Calculation    PT Received On 02/20/24 (P)   -DG      PT Goal Re-Cert Due Date 02/29/24 (P)   -DG         Untimed Charges    PT Eval/Re-eval Minutes 48 (P)   -DG         Total Minutes    Untimed Charges Total Minutes 48 (P)   -DG       Total Minutes 48 (P)   -DG                User Key  (r) = Recorded By, (t) = Taken By, (c) = Cosigned By      Initials Name Provider Type    Mirian June, PT Student PT Student                  Therapy Charges for Today       Code Description Service Date Service Provider Modifiers Qty    [id number] HC PT EVAL MOD COMPLEXITY 4 2/20/2024 Mirian Larkin, PT Student GP 1            PT G-Codes  Outcome Measure Options: (P) AM-PAC 6 Clicks Basic Mobility (PT)  AM-PAC 6 Clicks Score (PT): (P) 17    GEOVANY Diamond  2/20/2024

## 2024-02-20 NOTE — CASE MANAGEMENT/SOCIAL WORK
Discharge Planning Assessment   [facility]     Patient Name: Falguni Minaya  MRN: [id number]  Today's Date: 2/20/2024    Admit Date: 2/19/2024    Plan: Pt admited from [facility] rehab. Pt is a re-admission. PCP: MARYBETH Sage, Pharm: [facility] in [place], Pt would like to return to [facility] at discharge. Pt denies any fin. stressors at this time. Pt has rollator at home, Pt does not have home oxygen at this time. SW will continue to follow as needed.   Discharge Needs Assessment       Row Name 02/20/24 1158       Living Environment    People in Home spouse    Current Living Arrangements other (see comments)    Duration at Residence Pt admitted from [facility] rehab.    Potentially Unsafe Housing Conditions none    In the past 12 months has the electric, gas, oil, or water company threatened to shut off services in your home? No    Primary Care Provided by self    Provides Primary Care For no one    Family Caregiver if Needed spouse    Quality of Family Relationships involved;helpful    Able to Return to Prior Arrangements yes       Resource/Environmental Concerns    Resource/Environmental Concerns none    Transportation Concerns none       Transportation Needs    In the past 12 months, has lack of transportation kept you from medical appointments or from getting medications? no    In the past 12 months, has lack of transportation kept you from meetings, work, or from getting things needed for daily living? No       Food Insecurity    Within the past 12 months, you worried that your food would run out before you got the money to buy more. Never true    Within the past 12 months, the food you bought just didn't last and you didn't have money to get more. Never true       Transition Planning    Patient/Family Anticipates Transition to home with family    Patient/Family Anticipated Services at Transition rehabilitation services    Transportation Anticipated family or friend will provide       Discharge Needs Assessment     Equipment Currently Used at Home rollator    Concerns to be Addressed discharge planning    Anticipated Changes Related to Illness none    Equipment Needed After Discharge none    Discharge Coordination/Progress Pt admited from [facility] rehab. Pt is a re-admission. PCP: MARYBETH Sage, Pharm: [facility] in [place], Pt would like to return to [facility] at discharge. Pt denies any fin. stressors at this time. Pt has rollator at home, Pt does not have home oxygen at this time. SW will continue to follow as needed.                   Discharge Plan       Row Name 02/20/24 1202       Plan    Plan Pt admited from [facility] rehab. Pt is a re-admission. PCP: MARYBETH Sage, Pharm: [facility] in [place], Pt would like to return to [facility] at discharge. Pt denies any fin. stressors at this time. Pt has rollator at home, Pt does not have home oxygen at this time. SW will continue to follow as needed.                  Continued Care and Services - Admitted Since 2/19/2024    Coordination has not been started for this encounter.          Demographic Summary       Row Name 02/20/24 1156       General Information    Admission Type inpatient    Arrived From emergency department    Referral Source admission list    Reason for Consult discharge planning    Preferred Language English       Contact Information    Permission Granted to Share Info With permission denied                   Functional Status       Row Name 02/20/24 1157       Functional Status    Usual Activity Tolerance good    Current Activity Tolerance good       Physical Activity    On average, how many days per week do you engage in moderate to strenuous exercise (like a brisk walk)? 5 days    On average, how many minutes do you engage in exercise at this level? 60 min    Number of minutes of exercise per week 300       Assessment of Health Literacy    How often do you have someone help you read hospital materials? Never    How often do you have problems learning about  your medical condition because of difficulty understanding written information? Never    How often do you have a problem understanding what is told to you about your medical condition? Never    How confident are you filling out medical forms by yourself? Extremely    Health Literacy Excellent       Functional Status, IADL    Medications independent    Meal Preparation independent    Housekeeping independent    Laundry independent    Shopping independent       Mental Status    General Appearance WDL WDL       Mental Status Summary    Recent Changes in Mental Status/Cognitive Functioning no changes       Employment/    Employment Status retired                   Psychosocial    No documentation.                  Abuse/Neglect    No documentation.                  Legal       Row Name 02/20/24 0920       Financial Resource Strain    How hard is it for you to pay for the very basics like food, housing, medical care, and heating? Not hard       Financial/Legal    Source of Income social security    Application for Public Assistance not applied       Legal    Criminal Activity/Legal Involvement none                   Substance Abuse    No documentation.                  Patient Forms    No documentation.                     Kasia Atkins

## 2024-02-20 NOTE — PAYOR COMM NOTE
"Federico Minaya W (64 y.o. Female)       Date of Birth   1960    Social Security Number       Address   [address] [place] [address]    Home Phone   [phone]    MRN   [id number]       Holiness   Orthodox    Marital Status                               Admission Date   2/19/24    Admission Type   Emergency    Admitting Provider   Rk Franco MD    Attending Provider   Rk Franco MD    Department, Room/Bed   [address], 259/1       Discharge Date       Discharge Disposition       Discharge Destination                                 Attending Provider: Rk Franco MD    Allergies: Codeine, Morphine    Isolation: None   Infection: COVID (History) (02/01/24), COVID (rule out) (02/19/24)   Code Status: CPR    Ht: 152.4 cm (60\")   Wt: 82.6 kg (182 lb 1.6 oz)    Admission Cmt: None   Principal Problem: Acute on chronic hypoxic respiratory failure [J96.21]                   Active Insurance as of 2/19/2024       Primary Coverage       Payor Plan Insurance Group Employer/Plan Group    HUMANA MEDICARE REPLACEMENT HUMANA MED ADV GROUP [id number]       Payor Plan Address Payor Plan Phone Number Payor Plan Fax Number Effective Dates    [address] [address] [phone]  2/1/2024 - None Entered    formerly [facility] [phone]         Subscriber Name Subscriber Birth Date Member ID       FEDERICO MINAYA W 1960 [id number]               Secondary Coverage       Payor Plan Insurance Group Employer/Plan Group    [facility] SUPP [id number]       Payor Plan Address Payor Plan Phone Number Payor Plan Fax Number Effective Dates    [address]   1/1/2024 - None Entered    [facility] [address]         Subscriber Name Subscriber Birth Date Member ID       FEDERICO MINAYA W 1960 [id number]                     Emergency Contacts        (Rel.) Home Phone Work Phone Mobile Phone    BRETT MINAYA (Spouse) [phone] -- [phone]    Krystal Minaya (Daughter) -- -- " [phone]          #[id number] [P]-      Mari BERRIOS  [facility] ,[facility] [phone]  F [phone]         Respiratory Failure GRG Clinical Indications for Admission to Inpatient Care       Indications Met   Last updated by Halley Sawyer on 2024 1630     Review Status Created By   Primary Completed Halley Sawyer      Criteria Review   Respiratory Failure GRG Clinical Indications for Admission to Inpatient Care     Overall Determination: Indications Met     Criteria:  [×] Hospital admission is needed for appropriate care of the patient because of  1 or more  of the following  (1) (2) (3) (4) (5) (6) (7) (8) (9) (10):      [×] Severe respiratory distress, as indicated by  1 or more  of the following :          [×] Severe tachypnea (respiratory rate greater than 30 breaths per minute, greater than 45 for child 6 months of age, greater than 60 for ) [A]              2024  4:30 PM                  -- 2024  4:30 PM by Halley Sawyer --                      RR 34, 29, 24, 31          [×] Severe hypoxemia (partial pressure of oxygen less than 50 mm Hg (6.7 kPa) on greater than 50% oxygen, or partial pressure of oxygen to FIO2 ratio less than 200) (20) (21)              2024  4:30 PM                  -- 2024  4:30 PM by Halley Sawyer --                      122.3 mmHg                      Josefina Index (P/F ratio)     Notes:  -- 2024  4:30 PM by Halley Sawyer --            *       SOB with difficulty taking a deep breath.      *       on oxygen at facility      *       RR > 30      *       BP sustained low 84/66      *       O2 currently at 6L high humidity and satting 86%            Respiratory Failure GRG Care Day 1       Guideline Day Complete   Last updated by Halley Sawyer on 2024     Review Status Created By   Primary Completed Halley Sawyer      Criteria Review   Respiratory Failure GRG Care Day 1     Overall Determination:  Guideline Day Complete     Progression:  Day of Care: 1  Progression Day: 1  Review Date: 2/19/2024  Expected Length of Stay: 2 days     Criteria:  [×] ICU or ventilator-capable area  [×] Weaning assessment performed                Soham Perales MD   Physician  Emergency Medicine     ED Provider Notes     Signed     Date of Service: 02/19/24 1014  Creation Time: 02/19/24 1014     Signed       Expand All Collapse All    Time: 10:14 AM EST  Date of encounter:  2/19/2024  Independent Historian/Clinical History and Information was obtained by:   Patient     History is limited by: N/A     Chief Complaint: Shortness of breath        History of Present Illness:  Patient is a 64 y.o. year old female who presents to the emergency department for evaluation of shortness of breath that began after waking up this morning. She is having trouble taking a deep breath. Pt is on any oxygen at her facility. She does feel really tired this morning as well. She denies cough, congestion, fever, leg swelling, chest pain. Denies orthopnea. Denies known sick contacts. Pt reports she has been compliant with all her medications and took all her medications this morning.      HPI     Patient Care Team  Primary Care Provider: Williams Sage MD     Past Medical History:            Allergies   Allergen Reactions    Codeine Hives       Hyperactivity, nausea    Morphine Hives       Hyperactivity, nausea      Medical History        Past Medical History:   Diagnosis Date    Abdominal hernia      Acute ST elevation myocardial infarction (STEMI) due to occlusion of right coronary artery 02/24/2020    CHF (congestive heart failure)       DENIES CP GETS SOA WITH EXERTION. FOLLOWED BY DR ERIC/KASIA GREY. DECREASED ACTIVITY USES WALKER/ROLLATOR    CKD (chronic kidney disease)      Coronary artery disease      Dyspepsia      GERD (gastroesophageal reflux disease)      Hyperlipidemia      Hypertension      Ischemic cardiomyopathy      Multiple  sclerosis           Surgical History         Past Surgical History:   Procedure Laterality Date    APPENDECTOMY        CARDIAC CATHETERIZATION        CARDIAC CATHETERIZATION N/A 2/24/2020     Procedure: Left Heart Cath;  Surgeon: Marlon Zamudio MD;  Location: [facility] CATH INVASIVE LOCATION;  Service: Cardiovascular;  Laterality: N/A;    CARDIAC CATHETERIZATION N/A 2/24/2020     Procedure: Stent LATRICIA coronary;  Surgeon: Marlon Zamudio MD;  Location: [facility] CATH INVASIVE LOCATION;  Service: Cardiovascular;  Laterality: N/A;    CARDIAC CATHETERIZATION   2/24/2020     Procedure: Percutaneous Manual Thrombectomy;  Surgeon: Marlon Zamudio MD;  Location: [facility] CATH INVASIVE LOCATION;  Service: Cardiovascular;;    CARDIAC CATHETERIZATION N/A 2/24/2020     Procedure: Coronary angiography;  Surgeon: Marlon Zamudio MD;  Location: [facility] CATH INVASIVE LOCATION;  Service: Cardiovascular;  Laterality: N/A;    CARDIAC CATHETERIZATION N/A 2/24/2020     Procedure: Left ventriculography;  Surgeon: Marlon Zamudio MD;  Location: [facility] INVASIVE LOCATION;  Service: Cardiovascular;  Laterality: N/A;    CHOLECYSTECTOMY        COLONOSCOPY N/A 2/29/2020     Procedure: COLONOSCOPY to  cecum:;  Surgeon: Krystal Sarabia MD;  Location: [facility] ENDOSCOPY;  Service: Gastroenterology;  Laterality: N/A;  pre:  anemia and abd pain  post:  hemorrhoids, tortuous colon    ENDOSCOPY N/A 2/29/2020     Procedure: ESOPHAGOGASTRODUODENOSCOPY  with biopsies;  Surgeon: Krystal Sarabia MD;  Location: [facility] ENDOSCOPY;  Service: Gastroenterology;  Laterality: N/A;  pre:  anemia and abd pain  post:  mild gastritis,     HYSTERECTOMY        PARATHYROIDECTOMY Bilateral 10/17/2023     Procedure: NECK EXPLORATION WITH PARATHYROID ADENOMA EXCISION AND FROZEN SECTION,, RECURRENT LARYNGEAL NERVE MONITORING, INTRAOPERATIVE INTACT PTH ASSAY;  Surgeon: Ezekiel Giles MD;  Location: [facility];  Service: ENT;  Laterality: Bilateral;     TONSILLECTOMY                   Family History   Problem Relation Age of Onset    Thyroid disease Mother      Hypertension Sister      Coronary artery disease Brother           Home Medications:          Prior to Admission medications    Medication Sig Start Date End Date Taking? Authorizing Provider   albuterol sulfate  (90 Base) MCG/ACT inhaler Inhale 2 puffs Every 4 (Four) Hours As Needed for Wheezing. 1/22/24     Rob Whitlock,    arformoterol (BROVANA) 15 MCG/2ML nebulizer solution Take 2 mL by nebulization 2 (Two) Times a Day. 2/14/24     Teresa Castro DO   aspirin 81 MG chewable tablet Chew 1 tablet Daily. 10/25/23     Ezekiel Giles MD   atorvastatin (LIPITOR) 40 MG tablet TAKE 1 TABLET BY MOUTH  EVERY NIGHT AT BEDTIME  Patient taking differently: Take 1 tablet by mouth Daily. 5/17/23     Keith Riley MD   budesonide (PULMICORT) 0.5 MG/2ML nebulizer solution Take 2 mL by nebulization 2 (Two) Times a Day. 2/14/24     Teresa Castro DO   carvedilol (COREG) 12.5 MG tablet TAKE 1 TABLET BY MOUTH  EVERY 12 HOURS  Patient taking differently: Take 1 tablet by mouth Every 12 (Twelve) Hours. 5/30/23     Shaila Urban APRN   clopidogrel (PLAVIX) 75 MG tablet TAKE 1 TABLET BY MOUTH DAILY 2/13/24     Keith Riley MD   FLUoxetine (PROzac) 40 MG capsule Take 1 capsule by mouth 2 (Two) Times a Day. 7/25/22     Emergency, Nurse Epic, RN   furosemide (LASIX) 40 MG tablet TAKE 1 TABLET BY MOUTH  DAILY 5/17/23     Keith Riley MD   gabapentin (NEURONTIN) 300 MG capsule Take 1 capsule by mouth 4 (Four) Times a Day.       Provider, MD Rosas   hydrocortisone-bacitracin-zinc oxide-nystatin (MAGIC BARRIER) Apply 1 Application topically to the appropriate area as directed Every 4 (Four) Hours As Needed (rash). 2/14/24     Teresa Castro DO   OLANZapine (zyPREXA) 2.5 MG tablet Take 1 tablet by mouth Daily.       ProviderRosas MD   ondansetron  "(ZOFRAN) 4 MG tablet Take 1 tablet by mouth Every 8 (Eight) Hours As Needed for Nausea or Vomiting. 22     Emergency, Nurse Cong RN   pantoprazole (PROTONIX) 40 MG EC tablet Take 1 tablet by mouth Daily. 22     Emergency, Nurse Cong RN   pramipexole (MIRAPEX) 0.5 MG tablet Take 1 tablet by mouth every night at bedtime. 23     Provider, MD Rosas         Social History:   Social History            Tobacco Use    Smoking status: Former       Packs/day: 0.50       Years: 20.00       Additional pack years: 0.00       Total pack years: 10.00       Types: Cigarettes       Start date:        Quit date:        Years since quittin.1    Smokeless tobacco: Never    Tobacco comments:       CAFFEINE USE: 2 CUPS COFFEE DAILY   Vaping Use    Vaping Use: Never used   Substance Use Topics    Alcohol use: Not Currently    Drug use: Never            Review of Systems:  Review of Systems   Constitutional:  Negative for fever.   Respiratory:  Positive for shortness of breath. Negative for cough and wheezing.    Cardiovascular:  Negative for chest pain and leg swelling.         Physical Exam:  BP (!) 87/63 (BP Location: Right arm, Patient Position: Lying)   Pulse 84   Temp 98.8 °F (37.1 °C) (Oral)   Resp (!) 31   Ht 152.4 cm (60\")   Wt 82.6 kg (182 lb 1.6 oz)   SpO2 91%   BMI 35.56 kg/m²      Physical Exam  Vitals and nursing note reviewed.   Constitutional:       Appearance: Normal appearance.   HENT:      Head: Normocephalic and atraumatic.   Eyes:      General: No scleral icterus.  Cardiovascular:      Rate and Rhythm: Normal rate and regular rhythm.      Heart sounds: Normal heart sounds.   Pulmonary:      Effort: Pulmonary effort is normal.      Comments: Coarse breath sounds bilaterally  Abdominal:      Palpations: Abdomen is soft.      Tenderness: There is no abdominal tenderness.   Musculoskeletal:         General: Normal range of motion.      Cervical back: Normal range of motion. "   Skin:     Findings: No rash.   Neurological:      General: No focal deficit present.      Mental Status: She is alert.                      Procedures:  Procedures        Medical Decision Making:        Comorbidities that affect care:     Chronic Lung Disease, Chronic Kidney Disease     External Notes reviewed:     Reviewed admission from 1/31/2024        The following orders were placed and all results were independently analyzed by me:      Orders Placed This Encounter   Procedures    COVID-19, FLU A/B, RSV PCR 1 HR TAT - Swab, Nasopharynx    Blood Culture - Blood,    Blood Culture - Blood,    XR Chest 1 View    CT Chest Without Contrast Diagnostic    [place] Draw    Comprehensive Metabolic Panel    BNP    Single High Sensitivity Troponin T    CBC Auto Differential    Lactic Acid, Plasma    STAT Lactic Acid, Reflex    ABG with Co-Ox and Electrolytes    NPO Diet NPO Type: Strict NPO    Undress & Gown    Continuous Pulse Oximetry    Vital Signs    Inpatient Hospitalist Consult    Oxygen Therapy- Nasal Cannula; Titrate 1-6 LPM Per SpO2; 90 - 95%    ECG 12 Lead ED Triage Standing Order; SOA    Insert Peripheral IV    CBC & Differential    Green Top (Gel)    Lavender Top    Gold Top - SST    Light Blue Top         Medications Given in the Emergency Department:  Medications   sodium chloride 0.9 % flush 10 mL (has no administration in time range)   furosemide (LASIX) injection 40 mg (40 mg Intravenous Given 2/19/24 1100)   ampicillin-sulbactam 3 g/100 mL 0.9% NS IVPB (0 g Intravenous Stopped 2/19/24 1521)   AZITHROMYCIN 500 MG/250 ML 0.9% NS IVPB (vial-mate) (0 mg Intravenous Stopped 2/19/24 1435)         ED Course:         ED Course as of 02/19/24 1532   Mon Feb 19, 2024   1017 EKG interpreted by me neck time: 910  Heart rate 86  Sinus, borderline LVH, inferior Q waves, no acute ischemia [MA]   1525 Spoke with Dr. Franco who agrees to admit [MA]       ED Course User Index  [MA] Soham Perales MD         Labs:      Lab Results (last 24 hours)         Procedure Component Value Units Date/Time     CBC & Differential [[id number]]  (Abnormal) Collected: 02/19/24 0330     Specimen: Blood Updated: 02/19/24 0411     Narrative:       The following orders were created for panel order CBC & Differential.  Procedure                               Abnormality         Status                     ---------                               -----------         ------                     CBC Auto Differential[[id number]]        Abnormal            Final result                  Please view results for these tests on the individual orders.     BNP [[id number]]  (Abnormal) Collected: 02/19/24 0330     Specimen: Blood Updated: 02/19/24 0421       proBNP 1,983.0 pg/mL       Narrative:       This assay is used as an aid in the diagnosis of individuals suspected of having heart failure. It can be used as an aid in the diagnosis of acute decompensated heart failure (ADHF) in patients presenting with signs and symptoms of ADHF to the emergency department (ED). In addition, NT-proBNP of <300 pg/mL indicates ADHF is not likely.     Age Range        Result Interpretation  NT-proBNP Concentration (pg/mL:        <50             Positive            >450                        [place]                        300-450                          Negative                        <300     50-75           Positive            >900                  Gray                300-900                  Negative            <300        >75             Positive            >1800                  Gray                300-1800                  Negative            <300     CBC Auto Differential [[id number]]  (Abnormal) Collected: 02/19/24 0330     Specimen: Blood Updated: 02/19/24 0411       WBC 8.73 10*3/mm3         RBC 2.90 10*6/mm3         Hemoglobin 8.9 g/dL         Hematocrit 28.1 %         MCV 96.9 fL         MCH 30.7 pg         MCHC 31.7 g/dL         RDW 17.2 %         RDW-SD 60.2 fl          MPV 11.6 fL         Platelets 232 10*3/mm3         Neutrophil % 79.6 %         Lymphocyte % 10.2 %         Monocyte % 8.5 %         Eosinophil % 0.7 %         Basophil % 0.2 %         Immature Grans % 0.8 %         Neutrophils, Absolute 6.95 10*3/mm3         Lymphocytes, Absolute 0.89 10*3/mm3         Monocytes, Absolute 0.74 10*3/mm3         Eosinophils, Absolute 0.06 10*3/mm3         Basophils, Absolute 0.02 10*3/mm3         Immature Grans, Absolute 0.07 10*3/mm3         nRBC 0.0 /100 WBC       CBC & Differential [[id number]]  (Abnormal) Collected: 02/19/24 0922     Specimen: Blood Updated: 02/19/24 0942     Narrative:       The following orders were created for panel order CBC & Differential.  Procedure                               Abnormality         Status                     ---------                               -----------         ------                     CBC Auto Differential[[id number]]        Abnormal            Final result                  Please view results for these tests on the individual orders.     Comprehensive Metabolic Panel [[id number]]  (Abnormal) Collected: 02/19/24 0922     Specimen: Blood Updated: 02/19/24 1009       Glucose 193 mg/dL         BUN 22 mg/dL         Creatinine 1.29 mg/dL         Sodium 136 mmol/L         Potassium 3.7 mmol/L         Chloride 98 mmol/L         CO2 24.1 mmol/L         Calcium 8.6 mg/dL         Total Protein 6.4 g/dL         Albumin 2.4 g/dL         ALT (SGPT) 29 U/L         AST (SGOT) 39 U/L         Alkaline Phosphatase 161 U/L         Total Bilirubin 0.7 mg/dL         Globulin 4.0 gm/dL         A/G Ratio 0.6 g/dL         BUN/Creatinine Ratio 17.1       Anion Gap 13.9 mmol/L         eGFR 46.2 mL/min/1.73       Narrative:       GFR Normal >60  Chronic Kidney Disease <60  Kidney Failure <15        BNP [[id number]]  (Abnormal) Collected: 02/19/24 0922     Specimen: Blood Updated: 02/19/24 1003       proBNP 2,438.0 pg/mL       Narrative:       This assay is used as  an aid in the diagnosis of individuals suspected of having heart failure. It can be used as an aid in the diagnosis of acute decompensated heart failure (ADHF) in patients presenting with signs and symptoms of ADHF to the emergency department (ED). In addition, NT-proBNP of <300 pg/mL indicates ADHF is not likely.     Age Range        Result Interpretation  NT-proBNP Concentration (pg/mL:        <50             Positive            >450                        [place]                        300-450                          Negative                        <300     50-75           Positive            >900                  Gray                300-900                  Negative            <300        >75             Positive            >1800                  Gray                300-1800                  Negative            <300     Single High Sensitivity Troponin T [[id number]]  (Abnormal) Collected: 02/19/24 0922     Specimen: Blood Updated: 02/19/24 1005       HS Troponin T 18 ng/L       Narrative:       High Sensitive Troponin T Reference Range:  <14.0 ng/L- Negative Female for AMI  <22.0 ng/L- Negative Male for AMI  >=14 - Abnormal Female indicating possible myocardial injury.  >=22 - Abnormal Male indicating possible myocardial injury.   Clinicians would have to utilize clinical acumen, EKG, Troponin, and serial changes to determine if it is an Acute Myocardial Infarction or myocardial injury due to an underlying chronic condition.           CBC Auto Differential [[id number]]  (Abnormal) Collected: 02/19/24 0922     Specimen: Blood Updated: 02/19/24 0942       WBC 9.20 10*3/mm3         RBC 2.89 10*6/mm3         Hemoglobin 9.0 g/dL         Hematocrit 28.2 %         MCV 97.6 fL         MCH 31.1 pg         MCHC 31.9 g/dL         RDW 17.2 %         RDW-SD 61.4 fl         MPV 11.3 fL         Platelets 245 10*3/mm3         Neutrophil % 82.5 %         Lymphocyte % 7.6 %         Monocyte % 8.3 %         Eosinophil % 0.3 %          Basophil % 0.2 %         Immature Grans % 1.1 %         Neutrophils, Absolute 7.59 10*3/mm3         Lymphocytes, Absolute 0.70 10*3/mm3         Monocytes, Absolute 0.76 10*3/mm3         Eosinophils, Absolute 0.03 10*3/mm3         Basophils, Absolute 0.02 10*3/mm3         Immature Grans, Absolute 0.10 10*3/mm3         nRBC 0.0 /100 WBC       COVID-19, FLU A/B, RSV PCR 1 HR TAT - Swab, Nasopharynx [[id number]]  (Normal) Collected: 02/19/24 0930     Specimen: Swab from Nasopharynx Updated: 02/19/24 1020       COVID19 Not Detected       Influenza A PCR Not Detected       Influenza B PCR Not Detected       RSV, PCR Not Detected     Narrative:       Fact sheet for providers: https://www.fda.gov/media/032331/download    Fact sheet for patients: https://www.fda.gov/media/644884/download     Test performed by PCR.     Blood Culture - Blood, Arm, Left [[id number]] Collected: 02/19/24 1053     Specimen: Blood from Arm, Left Updated: 02/19/24 1103     Blood Culture - Blood, Arm, Left [[id number]] Collected: 02/19/24 1053     Specimen: Blood from Arm, Left Updated: 02/19/24 1104     Lactic Acid, Plasma [[id number]]  (Abnormal) Collected: 02/19/24 1053     Specimen: Blood Updated: 02/19/24 1144       Lactate 2.8 mmol/L       ABG with Co-Ox and Electrolytes [[id number]]  (Abnormal) Collected: 02/19/24 1247     Specimen: Arterial Blood Updated: 02/19/24 1254       pH, Arterial 7.590 pH units         pCO2, Arterial 28.5 mm Hg         pO2, Arterial 53.8 mm Hg         HCO3, Arterial 26.7 mmol/L         Base Excess, Arterial 5.3 mmol/L         O2 Saturation, Arterial 91.2 %         Hemoglobin, Blood Gas 10.2 g/dL         Carboxyhemoglobin 0.4 %         Methemoglobin 0.00 %         Oxyhemoglobin 90.8 %         FHHB 8.8 %         Demetrio's Test Positive       Note --       Site Left Radial       Modality Cannula - Nasal       FIO2 44 %         Flow Rate 6 lpm         Sodium, Arterial 138.3 mmol/L         Potassium, Arterial 2.91 mmol/L          Ionized Calcium, Arterial 1.09 mmol/L         Chloride, Arterial 101 mmol/L         Glucose, Arterial 125 mg/dL         Lactate, Arterial 2.37 mmol/L         PO2/FIO2 122     STAT Lactic Acid, Reflex [[id number]]  (Normal) Collected: 02/19/24 1436     Specimen: Blood Updated: 02/19/24 1509       Lactate 1.7 mmol/L                  Imaging:     CT Chest Without Contrast Diagnostic     Result Date: 2/19/2024  PROCEDURE:            CT CHEST WO CONTRAST DIAGNOSTIC  COMPARISON:          [facility], CT, CT CHEST WO CONTRAST DIAGNOSTIC, 1/31/2024, 13:50.  INDICATIONS:    Shortness of breath, worsening xray  TECHNIQUE:      CT images were created without the administration of contrast material.   PROTOCOL:   Standard imaging protocol performed        RADIATION:              DLP: 252.3 mGy*cm   Automated exposure control was utilized to minimize radiation dose.  FINDINGS:            There is some coronary artery calcification.  It looks like there is a stent in the right coronary artery.  There are ground-glass areas of density associated with bronchiectasis with more pronounced consolidation in the basilar areas.  The bronchiectasis has developed.  Additionally the pattern to the pulmonary infiltrates has changed.  This might reflect sequela to Covid 19 pneumonia.  There are no large pleural effusions.                    1. There has been a change in the pattern of the pulmonary densities involving both lungs that may reflect sequela of more chronic inflammatory infectious process.  This could be seen with Covid 19 pneumonia.  Correlation with patient's history would be recommended. 2. There is some coronary artery calcification.     LUKE CLINE MD       Electronically Signed and Approved By: LUKE CLINE MD on 2/19/2024 at 12:29              XR Chest 1 View     Result Date: 2/19/2024  PROCEDURE:            XR CHEST 1 VW  COMPARISON:    [facility], CR, XR CHEST 1 VW, 2/01/2024, 8:01.  [place]  [facility], CR, XR CHEST 1 VW, 2/11/2024, 13:55.  INDICATIONS:    SOA  FINDINGS:     Multifocal lung opacities are again demonstrated.  Opacity in the peripheral left upper lobe is slightly more dense on today's exam, as is the opacity in the lower right lung.  Heart size is stable                   Persistent multifocal infiltrates, slightly worsened in the left upper lung and right lower lung       JAYDON MULLIGAN MD       Electronically Signed and Approved By: JAYDON MULLIGAN MD on 2/19/2024 at 9:26                  Differential Diagnosis and Discussion:     Dyspnea: Differential diagnosis includes but is not limited to metabolic acidosis, neurological disorders, psychogenic, asthma, pneumothorax, upper airway obstruction, COPD, pneumonia, noncardiogenic pulmonary edema, interstitial lung disease, anemia, congestive heart failure, and pulmonary embolism     All labs were reviewed and interpreted by me.  All X-rays impressions were independently interpreted by me.  EKG was interpreted by me.  CT scan radiology impression was interpreted by me.     MDM     Amount and/or Complexity of Data Reviewed  Decide to obtain previous medical records or to obtain history from someone other than the patient: yes        Patient is a 64-year-old female who has a history of chronic lung disease who is on oxygen who presents with worsening shortness of breath.  Has had to have an increase of her oxygen to 6 L.  X-ray shows worsening infiltrates noted on x-ray.  CT confirms worsening infiltrates.  Has had worsening hypoxia at the facility and is now requiring 6 L.  Will cover with antibiotics due to worsening infiltrates noted on CT scan and also gave Lasix due to possible fluid.  Will need admission to the hospital for further workup management due to her worsening respiratory status.  She has had some improvement here in the emergency room.  Currently on 6 L at this time.  Will admit for further workup.              Patient  Care Considerations:              Consultants/Shared Management Plan:     Hospitalist: I have discussed the case with Dr. franco who agrees to accept the patient for admission.     Social Determinants of Health:              Disposition and Care Coordination:     Admit:   Through independent evaluation of the patient's history, physical, and imperical data, the patient meets criteria for inpatient admission to the hospital.           Final diagnoses:   Acute respiratory failure with hypoxia   Pneumonia due to infectious organism, unspecified laterality, unspecified part of lung         ED Disposition         ED Disposition   Decision to Admit    Condition   --    Comment   --                   This medical record created using voice recognition software.                 Soham Perales MD  [id number]                 Revision History     Eboni Perry, RN   Registered Nurse  Emergency Medicine     ED Notes     Signed     Date of Service: 24  Creation Time: 24     Signed         RT putting patient on 11L high flow holding at 90%, before was on NC 6L humidified at 86%                      Rk Franco MD   Physician  Hospitalist     H&P     Signed     Date of Service: 24  Creation Time: 24     Signed       [facility]   HOSPITALIST HISTORY AND PHYSICAL  Date: 2024   Patient Name: Falguni Minaya  : 1960  MRN: [id number]  Primary Care Physician:  Williams Sage MD  Date of admission: 2024        Subjective []Expand by Default  Subjective      Chief Complaint: Shortness of breath started today     HPI:     Falguni Minaya is a 64 y.o. female with past medical history of MS of medication since October of last year, recent COVID last month, hyperlipidemia, hypertension, coronary disease, diastolic CHF, CKD, dysphagia and GERD  Patient was recently discharged on  to rehab because of UTI and acute  respiratory failure requiring Airvo and pulmonary intervention suspected to be due to acute diastolic CHF and unspecified bacterial pathogen along with  recent COVID infection complication.    Patient today noticed increasing shortness of air.  Denies any chest pain, fever, chills, any new cough or phlegm.  No vomiting or diarrhea no choking or aspiration.  Workup in the ED showed O2 sat to be 85% on 3 L.  With 6 L she is up to 90%.  VBG showed pH of 7.6.  pCO2 28.  pO2 54 and bicarb of 26 with 91% saturation on 6 L.  Blood pressure is soft which is baseline.  Patient is tachypneic rate of 34.  BNP is elevated at 2438.  Creatinine is 1.2 better than her baseline.  Glucose is elevated.  Lactic acid of 2.8.  EKG showed sinus rhythm.  CT chest showed groundglass bilateral opacities with bronchiectasis likely sequela to her COVID infection.  There are consolidation in lung bases.  Not much pleural effusion.  Her repeat COVID, flu and RSV swab is negative ED.  Patient given IV steroids and Lasix along with antibiotics.  Hospitalist has been called to admit her for further evaluation.  Per patient she has been ambulatory at rehab and her MS has not bothered her much.     Personal History      Past Medical History:  Medical History        Past Medical History:   Diagnosis Date    Abdominal hernia      Acute ST elevation myocardial infarction (STEMI) due to occlusion of right coronary artery 02/24/2020    CHF (congestive heart failure)       DENIES CP GETS SOA WITH EXERTION. FOLLOWED BY DR ERIC/KASIA GREY. DECREASED ACTIVITY USES WALKER/ROLLATOR    CKD (chronic kidney disease)      Coronary artery disease      Dyspepsia      GERD (gastroesophageal reflux disease)      Hyperlipidemia      Hypertension      Ischemic cardiomyopathy      Multiple sclerosis              Past Surgical History:  Surgical History         Past Surgical History:   Procedure Laterality Date    APPENDECTOMY        CARDIAC CATHETERIZATION         CARDIAC CATHETERIZATION N/A 2/24/2020     Procedure: Left Heart Cath;  Surgeon: Marlon Zamudio MD;  Location: [facility] CATH INVASIVE LOCATION;  Service: Cardiovascular;  Laterality: N/A;    CARDIAC CATHETERIZATION N/A 2/24/2020     Procedure: Stent LATRCIIA coronary;  Surgeon: Marlon Zamudio MD;  Location: [facility] CATH INVASIVE LOCATION;  Service: Cardiovascular;  Laterality: N/A;    CARDIAC CATHETERIZATION   2/24/2020     Procedure: Percutaneous Manual Thrombectomy;  Surgeon: Marlon Zamudio MD;  [facility]AS[facility] LOC[facility];  Service: Cardiovascular;;    CARDIAC CATHETERIZATION N/A 2/24/2020     Procedure: Coronary angiography;  Surgeon: Marlon Zamudio MD;  Location: [facility] CATH INVASIVE LOCATION;  Service: Cardiovascular;  Laterality: N/A;    CARDIAC CATHETERIZATION N/A 2/24/2020     Procedure: Left ventriculography;  Surgeon: Marlon Zamudio MD;  Location: [facility] CATH INVASIVE LOCATION;  Service: Cardiovascular;  Laterality: N/A;    CHOLECYSTECTOMY        COLONOSCOPY N/A 2/29/2020     Procedure: COLONOSCOPY to  cecum:;  Surgeon: Krystal Sarabia MD;  Location: [facility] ENDOSCOPY;  Service: Gastroenterology;  Laterality: N/A;  pre:  anemia and abd pain  post:  hemorrhoids, tortuous colon    ENDOSCOPY N/A 2/29/2020     Procedure: ESOPHAGOGASTRODUODENOSCOPY  with biopsies;  Surgeon: Krystal Sarabia MD;  Location: [facility] ENDOSCOPY;  Service: Gastroenterology;  Laterality: N/A;  pre:  anemia and abd pain  post:  mild gastritis,     HYSTERECTOMY        PARATHYROIDECTOMY Bilateral 10/17/2023     Procedure: NECK EXPLORATION WITH PARATHYROID ADENOMA EXCISION AND FROZEN SECTION,, RECURRENT LARYNGEAL NERVE MONITORING, INTRAOPERATIVE INTACT PTH ASSAY;  Surgeon: Ezekiel Giles MD;  Location: [facility];  Service: ENT;  Laterality: Bilateral;    TONSILLECTOMY                Family History:   family history includes Coronary artery disease in her brother; Hypertension in her sister; Thyroid  disease in her mother.     Social History:    reports that she quit smoking about 14 years ago. Her smoking use included cigarettes. She started smoking about 34 years ago. She has a 10.00 pack-year smoking history. She has never used smokeless tobacco. She reports that she does not currently use alcohol. She reports that she does not use drugs.     Home Medications:  FLUoxetine, OLANZapine, albuterol sulfate HFA, arformoterol, aspirin, atorvastatin, budesonide, carvedilol, clopidogrel, gabapentin, heparin (porcine), hydrocortisone-bacitracin-zinc oxide-nystatin, nystatin, ondansetron, pantoprazole, pramipexole, sodium bicarbonate, and topiramate     Allergies:        Allergies   Allergen Reactions    Codeine Hives       Hyperactivity, nausea    Morphine Hives       Hyperactivity, nausea         Review of Systems              All systems were reviewed and negative except for: H&P           Objective   Objective      Vitals:   Temp:  [97.7 °F (36.5 °C)-99.3 °F (37.4 °C)] 97.9 °F (36.6 °C)  Heart Rate:  [78-94] 84  Resp:  [24-34] 25  BP: ()/(46-94) 81/63  Flow (L/min):  [2-6] 6     Physical Exam                         Constitutional: Awake, alert, able to converse in full sentences              Eyes: Pupils equal, sclerae anicteric, no conjunctival injection              HENT: NCAT, mucous membranes moist              Neck: Supple, no thyromegaly, no lymphadenopathy, trachea midline              Respiratory: Decreased to auscultation bilaterally, tachypneic              Cardiovascular: RRR, no murmurs, rubs, or gallops, palpable pedal pulses bilaterally              Gastrointestinal: Positive bowel sounds, soft, nontender, nondistended              Musculoskeletal: No bilateral ankle edema, no clubbing or cyanosis to extremities              Psychiatric: Appropriate affect, cooperative              Neurologic: Oriented x 3, strength symmetric in all extremities, Cranial Nerves grossly intact to confrontation,  speech clear              Skin: Erythematous rash on upper lip improving as per patient got admitted to Airvo nasal cannula           Result Review   Result Review:  I have personally reviewed the results from the time of this admission to 2/19/2024 17:15 EST and agree with these findings:  [x]  Laboratory  [x]  Microbiology  [x]  Radiology  [x]  EKG/Telemetry normal sinus rhythm 86, LVH.  QT interval 0.43  []  Cardiology/Vascular   []  Pathology  [x]  Old records  [x]  Other: Medications              Assessment & Plan  Assessment / Plan      Assessment:  Acute on chronic hypoxic respiratory failure patient now needing 11 L high flow nasal cannula.   acute exacerbation of bronchiectasis  Possible sequelae to recent COVID infection  Rule out healthcare associate pneumonia.  Acute on chronic diastolic CHF.  MS.  Off medication since October stable.  Hypertension.  Chronic kidney disease.  Lactic acidosis.  Not significant.  Improved.  Anemia.  Hypertensive heart disease with LVH.  Hyperglycemia.  Likely steroid-induced  Hypotension.  Per history chronic.     Plan:   Supplemental oxygen to keep sats more than 90%.  Use NIPPV as needed.  IV Vanco and cefepime.  IV steroids.  Pulmicort, brovana and DuoNeb.  Bronchodilator and bronchopulmonary team protocol.  MetaNeb and hypertonic saline neb.  Incentive spirometry and flutter valve.  Sputum culture.  Check procalcitonin.  MRSA PCR, Legionella and strep pneumonia urine antigen.  Check respiratory panel PCR.  Check Fungitell.  Discussed with pulmonary to evaluate patient.  IV Lasix.  Hold home beta-blockers.  Palliative care consulted for goals of care discussions.  PT OT.  Telemetry.  Discussed with ED physician                         DVT prophylaxis:  Medical DVT prophylaxis orders are present.           CODE STATUS:    Code Status (Patient has no pulse and is not breathing): CPR (Attempt to Resuscitate)  Medical Interventions (Patient has pulse or is breathing): Full  Support        Admission Status:  I believe this patient meets inpatient status.     Part of this note may be an electronic transcription/translation of spoken language to printed text using the Dragon Dictation System.      Electronically signed by Rk Farnco MD, 02/19/24, 5:15 PM EST.                                Routing History

## 2024-02-20 NOTE — PROGRESS NOTES
Respiratory Therapist Broncho-Pulmonary Hygiene Progress Note      Patient Name:  Falguni Minaya  YOB: 1960    Falguni Minaya meets the qualification for Level 3 of the Bronco-Pulmonary Hygiene Protocol. This was based on my daily patient assessment and includes review of chest x-ray results, cough ability and quality, oxygenation, secretions or risk for secretion development and patient mobility.     Broncho-Pulmonary Hygiene Assessment:    Level of Movement: Actively changing positions-requires assistance  Disoriented/Follows Commands    Breath Sounds: Clear to slightly diminished    Cough: Ineffective, weak, frequent    Chest X-Ray: Mild consolidation and/or atelectasis.    Sputum Productions: None or small amount of thin or watery secretions with effective cough    History and Physical: Chronic condition    SpO2 to Oxygen Need: greater than 90% on  greater than 6L, Vapotherm, oxygen mask greater than 40% or ventilator    Current SpO2 is: 94 on 10LHFNC    Based on this information, I have completed the following interventions: MetaNeb  and PAP with [employer]      Electronically signed by Sofia Taveras RRT, 02/20/24, 4:24 AM EST.

## 2024-02-20 NOTE — PLAN OF CARE
Goal Outcome Evaluation:   Pt wore bipap for a few hrs last night. Pt is now on 10LHFNC with SATS 91-92%

## 2024-02-20 NOTE — CONSULTS
Purpose of the visit was to evaluate for: goals of care/advanced care planning. Spoke with MD, patient, and family and discussed palliative care, goals of care, and resuscitation status.      Assessment: Patient is currently on 2west. Patient known to palliative care from previous admission. Patient sitting up in bed eating lunch at time of visit. Patient is alert and oriented to person, place, time and situation, patients spouse at bedside. Introduced self and explained role and purpose of visit. Patient and family recall previous palliative care visits during last admission. Patients spouse report he has yet to have follow up from referral to [facility]- palliative care will follow up. Discussed and educated on code status- full code versus DNR. Patient requests code status change to no cpr/no intubation- updated provider. Discussed goals of care. Patient reports she is hopeful to continue rehab/therapy before returning home at discharge. Patient reports she is feeling better, and does not complain of pain. Emotional support provided. Provided palliative care contact information and encouraged to call with further questions/concerns.      Recommendations/Plan:  [facility] referral was placed on 2/1/2024 .    Tasks Completed: Code Status clarification and Emotional Support.    Palliative care will continue to follow to support and assist as needed.    Katlin FANG, RN, CHPN  Palliative Care

## 2024-02-21 NOTE — PROGRESS NOTES
RT EQUIPMENT DEVICE RELATED - SKIN ASSESSMENT    RT Medical Equipment/Device:     NIV Mask:  Under-the-nose   size:  A    Skin Assessment:      Cheek:  Intact  Nares:  Intact  Nose:  Intact  Lips:  Intact    Device Skin Pressure Protection:  Skin-to-device areas padded:  None Required    Nurse Notification:  Lizette Navarro, RRT

## 2024-02-21 NOTE — PLAN OF CARE
Goal Outcome Evaluation:  Plan of Care Reviewed With: patient        Progress: declining  Outcome Evaluation: Patient had to have oxygen increased to 14L this morning from 11L to maintain sats above 90%.

## 2024-02-21 NOTE — PLAN OF CARE
Goal Outcome Evaluation:   Patient was unable to tolerate Bipap last night and did not want to go back on it tonight. She is on an 11L high flow nasal cannula and is no distress at this time.

## 2024-02-21 NOTE — PLAN OF CARE
Goal Outcome Evaluation:         Patient on Airvo. VSS.  No signs of distress noted at this time.

## 2024-02-21 NOTE — THERAPY TREATMENT NOTE
Patient Name: Falguni Minaya  : 1960    MRN: [id number]                              Today's Date: 2024       Admit Date: 2024    Visit Dx:     ICD-10-CM ICD-9-CM   1. Acute respiratory failure with hypoxia  J96.01 518.81   2. Pneumonia due to infectious organism, unspecified laterality, unspecified part of lung  J18.9 486   3. Difficulty in walking  R26.2 719.7   4. Decreased activities of daily living (ADL)  Z78.9 V49.89     Patient Active Problem List   Diagnosis    Multiple sclerosis    Left sided abdominal pain    Dyspepsia    Heme positive stool    Hypotension    Seizure disorder    Pain in lower back    Headache    Frequent falls    Spasm    Weakness of right leg    S/P drug eluting coronary stent placement    Coronary artery disease involving native coronary artery of native heart    Ischemic cardiomyopathy    Pleural effusion due to CHF (congestive heart failure)    Shortness of breath    Orthopnea    Hyperlipidemia LDL goal <70    Anemia due to stage 3 chronic kidney disease    Iron deficiency    Anemia in chronic kidney disease    Arthropathy of lumbar facet joint    Marie's esophagus    Benign colonic polyp    Congestive heart failure    Degeneration of lumbar intervertebral disc    Gastroesophageal reflux disease    Gastroparesis    Lumbar radiculopathy    Osteoporosis    Sacroiliac joint pain    Serum creatinine raised    Vitamin D deficiency    Acute hypoxemic respiratory failure    Acute on chronic hypoxic respiratory failure    Diastolic CHF, acute on chronic    Acute on chronic respiratory failure with hypoxia     Past Medical History:   Diagnosis Date    Abdominal hernia     Acute ST elevation myocardial infarction (STEMI) due to occlusion of right coronary artery 2020    CHF (congestive heart failure)     DENIES CP GETS SOA WITH EXERTION. FOLLOWED BY DR ERIC/KASIA GREY. DECREASED ACTIVITY USES WALKER/ROLLATOR    CKD (chronic kidney disease)     Coronary artery disease      Dyspepsia     GERD (gastroesophageal reflux disease)     Hyperlipidemia     Hypertension     Ischemic cardiomyopathy     Multiple sclerosis      Past Surgical History:   Procedure Laterality Date    APPENDECTOMY      CARDIAC CATHETERIZATION      CARDIAC CATHETERIZATION N/A 2/24/2020    Procedure: Left Heart Cath;  Surgeon: Marlon Zamudio MD;  Location:  [facility] CATH INVASIVE LOCATION;  Service: Cardiovascular;  Laterality: N/A;    CARDIAC CATHETERIZATION N/A 2/24/2020    Procedure: Stent LATRICIA coronary;  Surgeon: Marlon Zamudio MD;  [facility];  Service: Cardiovascular;  Laterality: N/A;    CARDIAC CATHETERIZATION  2/24/2020    Procedure: Percutaneous Manual Thrombectomy;  Surgeon: Marlon Zamudio MD;  Location:  [facility] CATH INVASIVE LOCATION;  Service: Cardiovascular;;    CARDIAC CATHETERIZATION N/A 2/24/2020    Procedure: Coronary angiography;  Surgeon: Marlon Zamudio MD;  [facility];  Service: Cardiovascular;  Laterality: N/A;    CARDIAC CATHETERIZATION N/A 2/24/2020    Procedure: Left ventriculography;  Surgeon: Marlon Zamudio MD;  [facility];  Service: Cardiovascular;  Laterality: N/A;    CHOLECYSTECTOMY      COLONOSCOPY N/A 2/29/2020    Procedure: COLONOSCOPY to  cecum:;  Surgeon: Krystal Sarabia MD;  Location: [facility] ENDOSCOPY;  Service: Gastroenterology;  Laterality: N/A;  pre:  anemia and abd pain  post:  hemorrhoids, tortuous colon    ENDOSCOPY N/A 2/29/2020    Procedure: ESOPHAGOGASTRODUODENOSCOPY  with biopsies;  Surgeon: Krystal Sarabia MD;  Location: [facility] ENDOSCOPY;  Service: Gastroenterology;  Laterality: N/A;  pre:  anemia and abd pain  post:  mild gastritis,     HYSTERECTOMY      PARATHYROIDECTOMY Bilateral 10/17/2023    Procedure: NECK EXPLORATION WITH PARATHYROID ADENOMA EXCISION AND FROZEN SECTION,, RECURRENT LARYNGEAL NERVE MONITORING, INTRAOPERATIVE INTACT PTH ASSAY;  Surgeon:  Ezekiel Giles MD;  Location: [facility] MAIN OR;  Service: ENT;  Laterality: Bilateral;    TONSILLECTOMY        General Information       Row Name 02/21/24 1355          OT Time and Intention    Document Type therapy note (daily note)  -     Mode of Treatment individual therapy;occupational therapy  -               User Key  (r) = Recorded By, (t) = Taken By, (c) = Cosigned By      Initials Name Provider Type     Rachael Jiménez OT Occupational Therapist                     Mobility/ADL's       Row Name 02/21/24 1356          Bed Mobility    Bed Mobility supine-sit  -     Supine-Sit [place] (Bed Mobility) minimum assist (75% patient effort)  -     Bed Mobility, Safety Issues decreased use of arms for pushing/pulling;decreased use of legs for bridging/pushing  -     Assistive Device (Bed Mobility) bed rails;head of bed elevated  -       Row Name 02/21/24 1356          Transfers    Transfers sit-stand transfer;bed-chair transfer;stand-sit transfer  -       Row Name 02/21/24 1356          Sit-Stand Transfer    Sit-Stand [place] (Transfers) contact guard  -     Assistive Device (Sit-Stand Transfers) walker, front-wheeled  -       Row Name 02/21/24 135          Stand-Sit Transfer    Stand-Sit [place] (Transfers) contact guard  -     Assistive Device (Stand-Sit Transfers) walker, front-wheeled  -       Row Name 02/21/24 135          Functional Mobility    Functional Mobility- Ind. Level contact guard assist  -     Functional Mobility- Device walker, front-wheeled  -       Row Name 02/21/24 135          Activities of Daily Living    BADL Assessment/Intervention upper body dressing  -       Row Name 02/21/24 135          Upper Body Dressing Assessment/Training    [place] Level (Upper Body Dressing) upper body dressing skills;don;pull-over garment;set up  hospital gown  -     Position (Upper Body Dressing) supine  -               User Key  (r) = Recorded By, (t) =  Taken By, (c) = Cosigned By      Initials Name Provider Type     Rachael Jiménez OT Occupational Therapist                   Obj/Interventions       Row Name 02/21/24 1357          Motor Skills    Motor Skills functional endurance  -     Functional Endurance Poor+, patient desat from 86 to 75% with functional transfers. Max cues for deep diaphramatic breathing and PLB techniques. Pt required to transfer to chair per nsg according to MD orders, after rebounding to 82% pt pivoted to recliner with assist and rebounded to 84%. Respiratory therapist then arrived to transitioned patient to AirVo.  -       Row Name 02/21/24 1357          Balance    Balance Assessment sitting dynamic balance;standing dynamic balance  -     Dynamic Sitting Balance supervision  -     Position, Sitting Balance unsupported;sitting edge of bed  -     Dynamic Standing Balance contact guard  -     Position/Device Used, Standing Balance supported;walker, front-wheeled  -     Balance Interventions sitting;standing;sit to stand;supported;dynamic;occupation based/functional task;weight shifting activity  -               User Key  (r) = Recorded By, (t) = Taken By, (c) = Cosigned By      Initials Name Provider Type     Rachael Jiménez OT Occupational Therapist                   Goals/Plan    No documentation.                  Clinical Impression       [facility] Name 02/21/24 1400          Pain Assessment    Additional Documentation Pain Scale: FACES Pre/Post-Treatment (Group)  -LF       Row Name 02/21/24 1400          Pain Scale: FACES Pre/Post-Treatment    Pain: FACES Scale, Pretreatment 0-->no hurt  -     Posttreatment Pain Rating 0-->no hurt  -LF       Row Name 02/21/24 1400          Plan of Care Review    Plan of Care Reviewed With patient  -     Progress improving  -     Outcome Evaluation Patient eager and anxious to transfer to recliner per MD orders this A.M. She de-sat significantly, requiring max cues throughout for  deep diaphramatic breathing, PLB techniques, and extended seated rest break on EOB, with respiratory therapist arriving to transition patient to AirVo after pivoting from EOB to recliner. Able to maintain O2 better while seated upright and supported in recliner, nursing notified, and call light within reach. She would benefit from continued OT services to maximize independence.  -LF       Row Name 02/21/24 1400          Vital Signs    O2 Delivery Pre Treatment hi-flow  -LF     O2 Delivery Intra Treatment hi-flow  -LF     O2 Delivery Post Treatment other (see comments)  AirVo  -LF       Row Name 02/21/24 1400          Positioning and Restraints    Pre-Treatment Position in bed  -LF     Post Treatment Position chair  -LF     In Chair reclined;call light within reach;encouraged to call for assist;exit alarm on;with other staff  -LF               User Key  (r) = Recorded By, (t) = Taken By, (c) = Cosigned By      Initials Name Provider Type    LF Rachael Jiménez, OT Occupational Therapist                   Outcome Measures       Row Name 02/21/24 1406          How much help from another is currently needed...    Putting on and taking off regular lower body clothing? 2  -LF     Bathing (including washing, rinsing, and drying) 2  -LF     Toileting (which includes using toilet bed pan or urinal) 1  -LF     Putting on and taking off regular upper body clothing 3  -LF     Taking care of personal grooming (such as brushing teeth) 4  -LF     Eating meals 4  -LF     AM-PAC 6 Clicks Score (OT) 16  -LF       Row Name 02/21/24 1406          Functional Assessment    Outcome Measure Options AM-PAC 6 Clicks Daily Activity (OT);Optimal Instrument  -LF       Row Name 02/21/24 1406          Optimal Instrument    Optimal Instrument Optimal - 3  -LF     Bending/Stooping 2  -LF     Standing 1  -LF     Reaching 1  -LF     From the list, choose the 3 activities you would most like to be able to do without any difficulty  Bending/stooping;Standing;Reaching  -LF     Total Score Optimal - 3 4  -LF               User Key  (r) = Recorded By, (t) = Taken By, (c) = Cosigned By      Initials Name Provider Type     Rachael Jiménez OT Occupational Therapist                    Occupational Therapy Education       Title: PT OT SLP Therapies (Done)       Topic: Occupational Therapy (Done)       Point: ADL training (Done)       Description:   Instruct learner(s) on proper safety adaptation and remediation techniques during self care or transfers.   Instruct in proper use of assistive devices.                  Learning Progress Summary             Patient Acceptance, E, VU by  at 2/20/2024 1433   Family Acceptance, E, VU by  at 2/20/2024 1433                         Point: Precautions (Done)       Description:   Instruct learner(s) on prescribed precautions during self-care and functional transfers.                  Learning Progress Summary             Patient Acceptance, E, VU by  at 2/20/2024 1433   Family Acceptance, E, VU by  at 2/20/2024 1433                         Point: Body mechanics (Done)       Description:   Instruct learner(s) on proper positioning and spine alignment during self-care, functional mobility activities and/or exercises.                  Learning Progress Summary             Patient Acceptance, E, VU by  at 2/20/2024 1433   Family Acceptance, E, VU by  at 2/20/2024 1433                                         User Key       Initials Effective Dates Name Provider Type Discipline     01/10/24 -  Navarro Montoya, OT Student OT Student OT                  OT Recommendation and Plan     Plan of Care Review  Plan of Care Reviewed With: patient  Progress: improving  Outcome Evaluation: Patient eager and anxious to transfer to recliner per MD orders this A.M. She de-sat significantly, requiring max cues throughout for deep diaphramatic breathing, PLB techniques, and extended seated rest break on EOB, with  respiratory therapist arriving to transition patient to AirVo after pivoting from EOB to recliner. Able to maintain O2 better while seated upright and supported in recliner, nursing notified, and call light within reach. She would benefit from continued OT services to maximize independence.     Time Calculation:         Time Calculation- OT       Row Name 02/21/24 1406             Time Calculation- OT    OT Received On 02/21/24  -LF      OT Goal Re-Cert Due Date 02/29/24  -LF         Timed Charges    18843 - OT Therapeutic Activity Minutes 10  -LF      05388 - OT Self Care/Mgmt Minutes 5  -LF         Total Minutes    Timed Charges Total Minutes 15  -LF       Total Minutes 15  -LF                User Key  (r) = Recorded By, (t) = Taken By, (c) = Cosigned By      Initials Name Provider Type    LF Rachael Jiménez OT Occupational Therapist                  Therapy Charges for Today       Code Description Service Date Service Provider Modifiers Qty    17339916460 HC OT THERAPEUTIC ACT EA 15 MIN 2/21/2024 Rachael Jiménez OT GO 1                 Rachael Jiménez OT  2/21/2024

## 2024-02-21 NOTE — PROGRESS NOTES
Pulmonary / Critical Care Progress Note      Patient Name: Falguni Minaya  : 1960  MRN: [id number]  Attending:  Rk Franco MD  Date of admission: 2024    Subjective   Subjective   Follow-up for acute on chronic hypoxic respiratory failure requiring high flow nasal cannula     No acute events overnight     This morning,  On 14 L high flow nasal cannula  SpO2 87 to 89%  Transitioned to Airvo   RRT called due to worsening dyspnea/hypoxia  Required transition to BiPAP 18/10 with FiO2 100%  Worsening dyspnea and lethargy  Continues to have congested cough with scant sputum  No fever or chills  No chest pain or chest tightness  Remains weak and fatigued    Objective   Objective     Vitals:   Temp:  [98 °F (36.7 °C)-98.5 °F (36.9 °C)] 98 °F (36.7 °C)  Heart Rate:  [69-91] 80  Resp:  [18-24] 18  BP: ()/(50-72) 108/72  Flow (L/min):  [10-11] 11    Physical Exam   Vital Signs Reviewed   General:  Chronically ill-appearing female, alert, mild distress, lying in bed  Chest: Decreased aeration in right lower lobe on auscultation, pursed lip breathing, increased work of breathing noted on BiPAP  CV: RRR, no MGR, pulses 2+, equal.  Abd:  Soft, NT, ND, + BS, no HSM, obese  EXT:  no clubbing, no cyanosis, no edema  Neuro:  A&Ox3, CN grossly intact, no focal deficits.  Skin: No rashes or lesions noted    Result Review    Result Review:  I have personally reviewed the results from the time of this admission to 2024 07:44 EST and agree with these findings:  [x]  Laboratory  [x]  Microbiology  [x]  Radiology  []  EKG/Telemetry   []  Cardiology/Vascular   []  Pathology  []  Old records  []  Other:  Most notable findings include:         Lab 24  0445 24  0407 24  1247 24  0922 24  0330 24  0300 02/15/24  0300   WBC  --  8.18  --  9.20 8.73  --  9.00   HEMOGLOBIN  --  8.1*  --  9.0* 8.9*  --  10.1*   HEMATOCRIT  --  25.5*  --  28.2* 28.1*  --  31.2*   PLATELETS  --  223  --   245 232  --  331   SODIUM 135* 136  --  136  --  131* 133*   SODIUM, ARTERIAL  --   --  138.3  --   --   --   --    POTASSIUM 4.2 3.2*  --  3.7  --  3.5 3.4*   CHLORIDE 101 99  --  98  --  97* 98   CO2 22.7 23.6  --  24.1  --  19.0* 17.7*   BUN 36* 24*  --  22  --  45* 35*   CREATININE 1.49* 1.27*  --  1.29*  --  1.95* 1.51*   GLUCOSE 153* 160*  --  193*  --  109* 125*   GLUCOSE, ARTERIAL  --   --  125*  --   --   --   --    CALCIUM 8.9 8.3*  --  8.6  --  8.8 9.0   PHOSPHORUS 3.7 4.0  --   --   --   --   --    TOTAL PROTEIN  --   --   --  6.4  --  7.0 7.2   ALBUMIN 2.4* 2.2*  --  2.4*  --  2.6* 2.8*   GLOBULIN  --   --   --  4.0  --  4.4 4.4     XR Chest 1 View    Result Date: 2/21/2024  PROCEDURE: XR CHEST 1 VW  COMPARISON: [facility], CR, XR CHEST 1 VW, 2/19/2024, 9:19.  [facility], CT, CT CHEST WO CONTRAST DIAGNOSTIC, 2/19/2024, 12:07.  INDICATIONS: hypoxia worse/RRT PT  FINDINGS:  Patchy infiltrates are noted in the lung fields bilaterally, similar to the previous exam.  The cardiac and mediastinal silhouettes appear normal.  No effusion is seen.      Impression:   1. Stable patchy infiltrates in the lung fields bilaterally.       Tucker Marcial M.D.       Electronically Signed and Approved By: Tucker Marcial M.D. on 2/21/2024 at 12:40             XR Chest 1 View    Result Date: 2/19/2024  PROCEDURE: XR CHEST 1 VW  COMPARISON: [facility], CR, XR CHEST 1 VW, 2/01/2024, 8:01.  [facility], CR, XR CHEST 1 VW, 2/11/2024, 13:55.  INDICATIONS: SOA  FINDINGS:  Multifocal lung opacities are again demonstrated.  Opacity in the peripheral left upper lobe is slightly more dense on today's exam, as is the opacity in the lower right lung.  Heart size is stable      Impression:  Persistent multifocal infiltrates, slightly worsened in the left upper lung and right lower lung       JAYDON MULLIGAN MD       Electronically Signed and Approved By: JAYDON MULLIGAN MD on 2/19/2024  at 9:26                Assessment & Plan   Assessment / Plan     Active Hospital Problems:  Active Hospital Problems    Diagnosis     **Acute on chronic hypoxic respiratory failure     Diastolic CHF, acute on chronic     Acute on chronic respiratory failure with hypoxia      Impression:  Acute hypoxic respiratory failure requiring Airvo and BiPAP  Acute decompensated congestive diastolic heart failure  NSTEMI likely type II  Acute cardiogenic pulmonary edema  Volume overload  T2DM with hyperglycemia  Continue acidosis, clinically significant  Hypokalemia  Hypocalcemia  History of multiple sclerosis  Immunocompromised status     Plan:  -Respiratory status now markedly tenuous  -Continue NIPPV 18/10 to maintain SpO2 greater than 90%  -Repeat ABG --> 7.43/31/1953  -Repeat CXR --> stable patchy infiltrates in lung fields bilaterally  -2/19 chest CT reviewed demonstrating inflammatory process likely related to post-COVID syndrome  -Inflammatory markers elevated.  ILD workup pending.  -Continue cefepime for pneumonia for now.  Micro negative so far  -Repeat procalcitonin --> 0.22  -Continue Brovana, Pulmicort, DuoNebs  -Continue Lasix 40 mg IV twice daily  -Check proBNP --> 9,704.  Spot dose metolazone 5 mg p.o. x 1  -Continue to monitor renal panel and electrolytes.  Replace electrolytes as needed.  -Echocardiogram pending  -Continue Solu-Medrol 40 mg IV every 8 hours  -Continue bronchopulmonary hygiene.  Encourage I-S and flutter  -Continue MetaNebs and chest physiotherapy  -Encourage activity as tolerated.  Out of bed to chair  -PT/OT on board.  Appreciate assistance  -Palliative care following, appreciate input  -Tenuous respiratory status, low threshold to transfer to ICU    DVT prophylaxis:  Medical DVT prophylaxis orders are present.    CODE STATUS:   Medical Intervention Limits: NO intubation (DNI)  Level Of Support Discussed With: Patient  Code Status (Patient has no pulse and is not breathing): No CPR (Do Not  Attempt to Resuscitate)  Medical Interventions (Patient has pulse or is breathing): Limited Support      Labs, imaging, microbiology, notes and medications personally reviewed  Discussed with primary.  32 minutes of critical care time spent managing this patient, excluding procedures.  At this time, I spent 22 minutes managing this patient in accordance with blood sugar billing.    I, WESTLEY Gonzalez, spent 10 minutes in accordance with split shared billing.    Electronically signed by WESTLEY Mccormack, 02/21/24, 2:47 PM EST.  Electronically signed by Derrell Tomlin MD, 02/21/24, 4:11 PM EST.

## 2024-02-21 NOTE — PROGRESS NOTES
[facility]   Hospitalist Progress Note  Date: 2024  Patient Name: Falguni Minaya  : 1960  MRN: [id number]  Date of admission: 2024      Subjective   Subjective     Chief Complaint: Shortness of breath started on the day of admission    Summary:   Falguni Minaya is a 64 y.o. female with past medical history of MS of medication since October of last year, recent COVID last month, hyperlipidemia, hypertension, coronary disease, diastolic CHF, CKD, dysphagia and GERD  Patient was recently discharged on  to rehab because of UTI and acute respiratory failure requiring Airvo and pulmonary intervention suspected to be due to acute diastolic CHF and unspecified bacterial pathogen along with  recent COVID infection complication.    Patient today noticed increasing shortness of air.  Denies any chest pain, fever, chills, any new cough or phlegm.  No vomiting or diarrhea no choking or aspiration.  Workup in the ED showed O2 sat to be 85% on 3 L.  With 6 L she is up to 90%.  VBG showed pH of 7.6.  pCO2 28.  pO2 54 and bicarb of 26 with 91% saturation on 6 L.  Blood pressure is soft which is baseline.  Patient is tachypneic rate of 34.  BNP is elevated at 2438.  Creatinine is 1.2 better than her baseline.  Glucose is elevated.  Lactic acid of 2.8.  EKG showed sinus rhythm.  CT chest showed groundglass bilateral opacities with bronchiectasis likely sequela to her COVID infection.  There are consolidation in lung bases.  Not much pleural effusion.  Her repeat COVID, flu and RSV swab is negative ED.  Patient given IV steroids and Lasix along with antibiotics.  Hospitalist has been called to admit her for further evaluation.  Per patient she has been ambulatory at rehab and her MS has not bothered her much.  Patient waited by pulmonary and IV steroid dose increased.    Interval Followup:   RRT called as patient became acutely hypoxic with saturation dropping to 80% on 11 L high flow nasal cannula.   Needing Airvo at 55 L/min with 100% FiO2 with 90% sats.  Eventually got put on BiPAP 18/10 with 100% FiO2 with 96% saturation.  Tolerating it well so far.  Stat chest x-ray and ABG noted.  BNP up.  Stat extra IV Lasix x 1 along with breathing treatment  Blood pressure stable on lower side.  Patient denies any worsening of shortness of air.  Denies any aspiration or choking  Denies any chest pain, cough or phlegm.  Complaining of mild trauma and hard to swallow.  No fever or chills.  Remains weak.  Creatinine trending up  -3.  Urine output of 800 mL 20 mL  Review of Systems   All systems were reviewed and negative except for: Summary and interval follow-up    Objective   Objective     Vitals:   Temp:  [97.3 °F (36.3 °C)-98.3 °F (36.8 °C)] 97.3 °F (36.3 °C)  Heart Rate:  [] 74  Resp:  [18-37] 37  BP: ()/(50-73) 119/65  Flow (L/min):  [11-55] 55  Physical Exam    Constitutional: Awake, alert, able to converse in full sentences              Eyes: Pupils equal, sclerae anicteric, no conjunctival injection              HENT: NCAT, mucous membranes moist              Neck: Supple, no thyromegaly, no lymphadenopathy, trachea midline              Respiratory: Decreased to auscultation bilaterally, tachypneic              Cardiovascular: RRR, no murmurs, rubs, or gallops, palpable pedal pulses bilaterally              Gastrointestinal: Positive bowel sounds, soft, nontender, nondistended              Musculoskeletal: No bilateral ankle edema, no clubbing or cyanosis to extremities              Psychiatric: Appropriate affect, cooperative              Neurologic: Oriented x 3, strength symmetric in all extremities, Cranial Nerves grossly intact to confrontation, speech clear              Skin: Erythematous rash on upper lip improving as per patient got it due to Airvo nasal cannula from previous admission       Result Review    Result Review:  I have personally reviewed the results for the past 24 hours and agree  with these findings:  [x]  Laboratory  [x]  Microbiology  [x]  Radiology  [x]  EKG/Telemetry   []  Cardiology/Vascular   []  Pathology  [x]  Old records  [x]  Other: Medications    Assessment & Plan   Assessment / Plan     Assessment:    Acute on chronic hypoxic respiratory failure patient now needing 11 L high flow nasal cannula.   acute exacerbation of bronchiectasis  Possible sequelae to recent COVID infection  Rule out healthcare associate pneumonia.  Acute on chronic diastolic CHF.  MS.  Off medication since October last year.  Stable.  Hypertension.  Chronic kidney disease.  Stable  Lactic acidosis.  Not significant.  Improved.  Anemia.  Stable  Hypertensive heart disease with LVH.  Prediabetes.  Likely steroid-induced.  Hemoglobin A1c of 6.4%  Hypotension.  Per history chronic.  Stable     Plan:   BNP has gone up significantly  Increase the dose of Lasix  Agree with metolazone  Check stat echo.  With a previous echo on January 13 technical limited study.  Magic mouthwash swish and swallow  Supplemental oxygen to keep sats more than 90%.  Use NIPPV as needed.  IV cefepime.  MRSA PCR negative.  Vanco DC'd  IV steroids.  Dose titrated up by pulmonary  Pulmicort, brovana and DuoNeb.  Bronchodilator and bronchopulmonary team protocol.  MetaNeb and hypertonic saline neb.  Incentive spirometry and flutter valve.  Sputum culture.  Pending  procalcitonin.  Noted.  Negative  MRSA PCR, Legionella and strep pneumonia urine antigen.  All negative  Respiratory panel PCR.  Negative  Check Fungitell.  Pending  Discussed with pulmonary appreciate input.  Monitoring inflammatory markers.  ESR 76 and CRP 22 on admission may need bronchoscopy  Monitor electrolytes and replace as needed  Monitor BMP  Hold home beta-blockers.  Started on home Prozac, Neurontin and Requip  Palliative care consulted for goals of care discussions..  Patient DNR/DNI  PT OT.  Continue telemetry.      Discussed plan with RN and .  Discussed  with  at bedside.  Return to rehab when  stable    DVT prophylaxis:  Medical DVT prophylaxis orders are present.        CODE STATUS:   Medical Intervention Limits: NO intubation (DNI)  Level Of Support Discussed With: Patient  Code Status (Patient has no pulse and is not breathing): No CPR (Do Not Attempt to Resuscitate)  Medical Interventions (Patient has pulse or is breathing): Limited Support      Part of this note may be an electronic transcription/translation of spoken language to printed text using the Dragon Dictation System.     Electronically signed by Rk Franco MD, 02/21/24, 3:35 PM EST.

## 2024-02-22 NOTE — THERAPY TREATMENT NOTE
Acute Care - Physical Therapy Treatment Note   [place]     Patient Name: Falguni Minaya  : 1960  MRN: [id number]  Today's Date: 2024      Visit Dx:     ICD-10-CM ICD-9-CM   1. Acute respiratory failure with hypoxia  J96.01 518.81   2. Pneumonia due to infectious organism, unspecified laterality, unspecified part of lung  J18.9 486   3. Difficulty in walking  R26.2 719.7   4. Decreased activities of daily living (ADL)  Z78.9 V49.89     Patient Active Problem List   Diagnosis    Multiple sclerosis    Left sided abdominal pain    Dyspepsia    Heme positive stool    Hypotension    Seizure disorder    Pain in lower back    Headache    Frequent falls    Spasm    Weakness of right leg    S/P drug eluting coronary stent placement    Coronary artery disease involving native coronary artery of native heart    Ischemic cardiomyopathy    Pleural effusion due to CHF (congestive heart failure)    Shortness of breath    Orthopnea    Hyperlipidemia LDL goal <70    Anemia due to stage 3 chronic kidney disease    Iron deficiency    Anemia in chronic kidney disease    Arthropathy of lumbar facet joint    Marie's esophagus    Benign colonic polyp    Congestive heart failure    Degeneration of lumbar intervertebral disc    Gastroesophageal reflux disease    Gastroparesis    Lumbar radiculopathy    Osteoporosis    Sacroiliac joint pain    Serum creatinine raised    Vitamin D deficiency    Acute hypoxemic respiratory failure    Acute on chronic hypoxic respiratory failure    Diastolic CHF, acute on chronic    Acute on chronic respiratory failure with hypoxia     Past Medical History:   Diagnosis Date    Abdominal hernia     Acute ST elevation myocardial infarction (STEMI) due to occlusion of right coronary artery 2020    CHF (congestive heart failure)     DENIES CP GETS SOA WITH EXERTION. FOLLOWED BY DR ERIC/KASIA GREY. DECREASED ACTIVITY USES WALKER/ROLLATOR    CKD (chronic kidney disease)     Coronary artery  disease     Dyspepsia     GERD (gastroesophageal reflux disease)     Hyperlipidemia     Hypertension     Ischemic cardiomyopathy     Multiple sclerosis      Past Surgical History:   Procedure Laterality Date    APPENDECTOMY      CARDIAC CATHETERIZATION      CARDIAC CATHETERIZATION N/A 2/24/2020    Procedure: Left Heart Cath;  Surgeon: Marlon Zamudio MD;  [facility];  Service: Cardiovascular;  Laterality: N/A;    CARDIAC CATHETERIZATION N/A 2/24/2020    Procedure: Stent LATRICIA coronary;  Surgeon: Marlon Zamudio MD;  [facility];  Service: Cardiovascular;  Laterality: N/A;    CARDIAC CATHETERIZATION  2/24/2020    Procedure: Percutaneous Manual Thrombectomy;  Surgeon: Marlon Zamudio MD;  [facility];  Service: Cardiovascular;;    CARDIAC CATHETERIZATION N/A 2/24/2020    Procedure: Coronary angiography;  Surgeon: Marlon Zamudio MD;  [facility];  Service: Cardiovascular;  Laterality: N/A;    CARDIAC CATHETERIZATION N/A 2/24/2020    Procedure: Left ventriculography;  Surgeon: Marlon Zamudio MD;  [facility];  Service: Cardiovascular;  Laterality: N/A;    CHOLECYSTECTOMY      COLONOSCOPY N/A 2/29/2020    Procedure: COLONOSCOPY to  cecum:;  Surgeon: Krystal Sarabia MD;  Location: [facility] ENDOSCOPY;  Service: Gastroenterology;  Laterality: N/A;  pre:  anemia and abd pain  post:  hemorrhoids, tortuous colon    ENDOSCOPY N/A 2/29/2020    Procedure: ESOPHAGOGASTRODUODENOSCOPY  with biopsies;  Surgeon: Krystal Sarabia MD;  Location: [facility] ENDOSCOPY;  Service: Gastroenterology;  Laterality: N/A;  pre:  anemia and abd pain  post:  mild gastritis,     HYSTERECTOMY      PARATHYROIDECTOMY Bilateral 10/17/2023    Procedure: NECK EXPLORATION WITH PARATHYROID ADENOMA EXCISION AND FROZEN SECTION,, RECURRENT LARYNGEAL NERVE MONITORING, INTRAOPERATIVE INTACT PTH ASSAY;   Surgeon: Ezekiel Giles MD;  Location: [facility] MAIN OR;  Service: ENT;  Laterality: Bilateral;    TONSILLECTOMY       PT Assessment (last 12 hours)       PT Evaluation and Treatment       Row Name 02/22/24 1453          Physical Therapy Time and Intention    Subjective Information dyspnea;complains of  -RH     Document Type therapy note (daily note)  -     Mode of Treatment physical therapy;individual therapy  -RH     Patient Effort adequate  -     Comment Pt performing therex only.  Pt oxygen saturations at 84% on airvo.  -       Row Name 02/22/24 1453          Pain Scale: FACES Pre/Post-Treatment    Pain: FACES Scale, Pretreatment 0-->no hurt  -RH     Posttreatment Pain Rating 0-->no hurt  -RH       Row Name 02/22/24 1453          Motor Skills    Therapeutic Exercise hip;knee;ankle  -       Row Name 02/22/24 1453          Hip (Therapeutic Exercise)    Hip (Therapeutic Exercise) AROM (active range of motion);isometric exercises  -     Hip AROM (Therapeutic Exercise) bilateral;aBduction;aDduction;supine;10 repetitions  2 sets  -     Hip Isometrics (Therapeutic Exercise) gluteal sets;bilateral;supine;10 repetitions;2 sets  -       Row Name 02/22/24 1453          Knee (Therapeutic Exercise)    Knee (Therapeutic Exercise) AROM (active range of motion);strengthening exercise  -     Knee AROM (Therapeutic Exercise) SAQ (short arc quad);bilateral;supine;10 repetitions;2 sets  -     Knee Strengthening (Therapeutic Exercise) bilateral;SLR (straight leg raise);supine;10 repetitions;2 sets  -       Row Name 02/22/24 1453          Ankle (Therapeutic Exercise)    Ankle (Therapeutic Exercise) AROM (active range of motion)  -     Ankle AROM (Therapeutic Exercise) bilateral;dorsiflexion;plantarflexion;supine;10 repetitions;2 sets  -       Row Name             Wound 02/10/24 0816 Bilateral upper thigh MASD (Moisture associated skin damage)    Wound - Properties Group Placement Date: 02/10/24  -KP Placement  Time: 0816  -KP Present on Original Admission: N  -KP Side: Bilateral  -KP Orientation: upper  -KP Location: thigh  -KP Primary Wound Type: MASD  -KP    Retired Wound - Properties Group Placement Date: 02/10/24  -KP Placement Time: 0816  -KP Present on Original Admission: N  -KP Side: Bilateral  -KP Orientation: upper  -KP Location: thigh  -KP Primary Wound Type: MASD  -KP    Retired Wound - Properties Group Date first assessed: 02/10/24  -KP Time first assessed: 0816  -KP Present on Original Admission: N  -KP Side: Bilateral  -KP Location: thigh  -KP Primary Wound Type: MASD  -KP      Row Name             Wound 02/20/24 1405 gluteal MASD (Moisture associated skin damage)    Wound - Properties Group Placement Date: 02/20/24  -MILE Placement Time: 1405  -MILE Present on Original Admission: Y  -MILE Location: gluteal  -MILE Primary Wound Type: MASD  -MILE    Retired Wound - Properties Group Placement Date: 02/20/24  -MILE Placement Time: 1405  -MILE Present on Original Admission: Y  -MILE Location: gluteal  -MILE Primary Wound Type: MASD  -MILE    Retired Wound - Properties Group Date first assessed: 02/20/24  -MILE Time first assessed: 1405  -MILE Present on Original Admission: Y  -MILE Location: gluteal  -MILE Primary Wound Type: MASD  -MILE      Row Name 02/22/24 1453          Vital Signs    Intra SpO2 (%) 84  -RH     O2 Delivery Intra Treatment --  Pt on airvo.  -       Row Name 02/22/24 1453          Positioning and Restraints    In Bed supine;call light within reach;encouraged to call for assist;with family/caregiver  -       Row Name 02/22/24 1453          Progress Summary (PT)    Progress Toward Functional Goals (PT) progress toward functional goals is gradual  -RH               User Key  (r) = Recorded By, (t) = Taken By, (c) = Cosigned By      Initials Name Provider Type    Ruby Nicole, RN Registered Nurse    Leia Zaragoza RN Registered Nurse    Hai Santamaria PTA Physical Therapist Assistant                       PT Recommendation and Plan     Progress Summary (PT)  Progress Toward Functional Goals (PT): progress toward functional goals is gradual   Outcome Measures       Row Name 02/22/24 1500 02/20/24 1100          How much help from another person do you currently need...    Turning from your back to your side while in flat bed without using bedrails? 4  -RH 4  -DP (r) DG (t) DP (c)     Moving from lying on back to sitting on the side of a flat bed without bedrails? 3  -RH 3  -DP (r) DG (t) DP (c)     Moving to and from a bed to a chair (including a wheelchair)? 3  -RH 3  -DP (r) DG (t) DP (c)     Standing up from a chair using your arms (e.g., wheelchair, bedside chair)? 3  -RH 3  -DP (r) DG (t) DP (c)     Climbing 3-5 steps with a railing? 2  -RH 2  -DP (r) DG (t) DP (c)     To walk in hospital room? 2  -RH 2  -DP (r) DG (t) DP (c)     AM-PAC 6 Clicks Score (PT) 17  -RH 17  -DP (r) DG (t)     Highest Level of Mobility Goal 5 --> Static standing  -RH 5 --> Static standing  -DP (r) DG (t)        Functional Assessment    Outcome Measure Options -- AM-PAC 6 Clicks Basic Mobility (PT)  -DP (r) DG (t) DP (c)               User Key  (r) = Recorded By, (t) = Taken By, (c) = Cosigned By      Initials Name Provider Type    Hai Santamaria PTA Physical Therapist Assistant    Micheline Gonzales, PT Physical Therapist    Mirian June, PT Student PT Student                     Time Calculation:    PT Charges       Row Name 02/22/24 1453             Time Calculation    PT Received On 02/22/24  -RH         Timed Charges    50241 - PT Therapeutic Exercise Minutes 12  -RH         Total Minutes    Timed Charges Total Minutes 12  -RH       Total Minutes 12  -RH                User Key  (r) = Recorded By, (t) = Taken By, (c) = Cosigned By      Initials Name Provider Type    Hai Santamaria PTA Physical Therapist Assistant                  Therapy Charges for Today       Code Description Service Date Service Provider Modifiers  Qty    [id number]  PT THER PROC EA 15 MIN 2/22/2024 Hai Sloan, PTA GP 1            PT G-Codes  Outcome Measure Options: AM-PAC 6 Clicks Daily Activity (OT), Optimal Instrument  AM-PAC 6 Clicks Score (PT): 17  AM-PAC 6 Clicks Score (OT): 16    Hai Sloan PTA  2/22/2024

## 2024-02-22 NOTE — PROGRESS NOTES
[facility]   Hospitalist Progress Note  Date: 2024  Patient Name: Falguni Minaya  : 1960  MRN: [id number]  Date of admission: 2024      Subjective   Subjective     Chief Complaint: Shortness of breath started on the day of admission    Summary:   Falguni Minaya is a 64 y.o. female with past medical history of MS of medication since October of last year, recent COVID last month, hyperlipidemia, hypertension, coronary disease, diastolic CHF, CKD, dysphagia and GERD  Patient was recently discharged on  to rehab because of UTI and acute respiratory failure requiring Airvo and pulmonary intervention suspected to be due to acute diastolic CHF and unspecified bacterial pathogen along with  recent COVID infection complication.    Patient today noticed increasing shortness of air.  Denies any chest pain, fever, chills, any new cough or phlegm.  No vomiting or diarrhea no choking or aspiration.  Workup in the ED showed O2 sat to be 85% on 3 L.  With 6 L she is up to 90%.  VBG showed pH of 7.6.  pCO2 28.  pO2 54 and bicarb of 26 with 91% saturation on 6 L.  Blood pressure is soft which is baseline.  Patient is tachypneic rate of 34.  BNP is elevated at 2438.  Creatinine is 1.2 better than her baseline.  Glucose is elevated.  Lactic acid of 2.8.  EKG showed sinus rhythm.  CT chest showed groundglass bilateral opacities with bronchiectasis likely sequela to her COVID infection.  There are consolidation in lung bases.  Not much pleural effusion.  Her repeat COVID, flu and RSV swab is negative ED.  Patient given IV steroids and Lasix along with antibiotics.  Hospitalist has been called to admit her for further evaluation.  Per patient she has been ambulatory at rehab and her MS has not bothered her much.  Patient waited by pulmonary and IV steroid dose increased.  On  morning RRT called as patient became acutely hypoxic with saturation dropping to 80% on 11 L high flow nasal cannula.   Needing Airvo at 55 L/min with 100% FiO2 with 90% sats.  Eventually got put on BiPAP 18/10 with 100% FiO2 with 96% saturation.   Stat chest x-ray and ABG noted.  No hypercapnia.  BNP up.   Interval Followup:    awake and alert oriented x 3  Patient does not like BiPAP, back on Airvo at 50 L/min with 70% FiO2.  Saturation 90%.  Creatinine trending up.  Blood pressure stable .  Patient denies any worsening of shortness of air.  Denies any aspiration or choking  Denies any chest pain, cough or phlegm.  Complaining of mild pain in throat and hard to swallow.  No fever or chills.  Remains weak.  Good urine output -800 mL.    Review of Systems   All systems were reviewed and negative except for: Summary and interval follow-up    Objective   Objective     Vitals:   Temp:  [97.3 °F (36.3 °C)-98.6 °F (37 °C)] 98.2 °F (36.8 °C)  Heart Rate:  [62-84] 78  Resp:  [18-42] 20  BP: (105-134)/(62-81) 134/62  Flow (L/min):  [50-55] 55  Physical Exam    Constitutional: Awake, alert, able to converse in full sentences              Eyes: Pupils equal, sclerae anicteric, no conjunctival injection              HENT: NCAT, mucous membranes moist              Neck: Supple, no thyromegaly, no lymphadenopathy, trachea midline              Respiratory: Decreased to auscultation bilaterally, tachypneic              Cardiovascular: RRR, no murmurs, rubs, or gallops, palpable pedal pulses bilaterally              Gastrointestinal: Positive bowel sounds, soft, nontender, nondistended              Musculoskeletal: No bilateral ankle edema, no clubbing or cyanosis to extremities              Psychiatric: Appropriate affect, cooperative              Neurologic: Oriented x 3, strength symmetric in all extremities, Cranial Nerves grossly intact to confrontation, speech clear              Skin: Erythematous rash on upper lip improving as per patient got it due to Airvo nasal cannula from previous admission       Result Review    Result Review:  I have  personally reviewed the results for the past 24 hours and agree with these findings:  [x]  Laboratory  [x]  Microbiology  [x]  Radiology  [x]  EKG/Telemetry   []  Cardiology/Vascular   []  Pathology  [x]  Old records  [x]  Other: Medications    Assessment & Plan   Assessment / Plan     Assessment:    Acute on chronic hypoxic respiratory failure.  Worsening   acute exacerbation of bronchiectasis  Possible sequelae to recent COVID infection  Rule out healthcare associate pneumonia.  Acute on chronic diastolic CHF.  MS.  Off medication since October last year.  Stable.  Hypertension.  Chronic kidney disease.  Worse  Lactic acidosis.  Not significant.  Improved.  Anemia.  Stable  Hypertensive heart disease with LVH.  Prediabetes.  Likely steroid-induced.  Hemoglobin A1c of 6.4%  Hypotension.  Per history chronic.  Stable  Leukocytosis.  Improving  Upper lip rash due to irritation from high flow nasal cannula    Plan:   BNP has gone up significantly  Creatinine trending up.  Lasix dose decreased   agree with metolazone  Pending stat echo.  With a previous echo on January 13 technical limited study.  Magic mouthwash swish and swallow  Supplemental oxygen to keep sats more than 90%.  Use NIPPV as needed.  IV cefepime.  MRSA PCR negative.  Vanco DC'd  IV steroids.  Dose titrated up by pulmonary  Pulmicort, brovana and DuoNeb.  Bronchodilator and bronchopulmonary team protocol.  MetaNeb and hypertonic saline neb.  Incentive spirometry and flutter valve.  Sputum culture.  Pending  procalcitonin.  Noted.  Negative  MRSA PCR, Legionella and strep pneumonia urine antigen.  All negative  Respiratory panel PCR.  Negative  Check Fungitell.  Pending  Discussed with pulmonary appreciate input.  Monitoring inflammatory markers.  ESR 76 and CRP 22 on admission may need bronchoscopy.  Monitor electrolytes and replace as needed  Monitor BMP  Hold home beta-blockers.  Started on home Prozac, Neurontin and Requip  Palliative care consulted  for goals of care discussions..  Patient DNR/DNI.  Not ready for comfort care.  PT OT.  Continue telemetry.  Respiratory status tenuous .  May need ICU care      Discussed plan with RN and .  Discussed with  at bedside.  Return to rehab when  stable    DVT prophylaxis:  Medical DVT prophylaxis orders are present.        CODE STATUS:   Medical Intervention Limits: NO intubation (DNI)  Level Of Support Discussed With: Patient  Code Status (Patient has no pulse and is not breathing): No CPR (Do Not Attempt to Resuscitate)  Medical Interventions (Patient has pulse or is breathing): Limited Support      Part of this note may be an electronic transcription/translation of spoken language to printed text using the Dragon Dictation System.       Electronically signed by Rk Franco MD, 02/22/24, 1:51 PM EST.

## 2024-02-22 NOTE — PLAN OF CARE
Goal Outcome Evaluation:  Plan of Care Reviewed With: patient        Progress: no change  Outcome Evaluation: Pt did not wear bipap last night. V60 on standby. Pt is currently on AIRVO 55L 85% resting at this time. Will continue to monitor and titrate as pt tolerates.

## 2024-02-22 NOTE — SIGNIFICANT NOTE
02/21/24 1330   Coping/Psychosocial   Observed Emotional State calm;quiet;other (see comments)  (asleep)   Verbalized Emotional State other (see comments)  (The Pt is asleep)   Family/Support Persons spouse   Involvement in Care no interaction with patient   Family/Support System Care support provided   Additional Documentation Spiritual Care (Group)   Spiritual Care   Use of Spiritual Resources non-Mandaeism use of spiritual care   Spiritual Care Source  initiative   Spiritual Care Follow-Up follow-up, none required as presently assessed   Response to Spiritual Care receptive of support   Spiritual Care Interventions supportive conversation provided   Spiritual Care Visit Type initial   Receptivity to Spiritual Care visit welcomed

## 2024-02-22 NOTE — PLAN OF CARE
Goal Outcome Evaluation:  Plan of Care Reviewed With: patient        Progress: no change       Pt reminded to keep airvo nasal cannula in nose. Pt desats quickly when removes oxygen. Pt speech is clear and able to voice needs. Generally weak. Total assist with needs. Pt turned and repositioned. Buttocks and inner thighs and back of thighs red and excoriated. Cream was applied. Pt wearing a purewick. Pt has continuous pulse oximetry.  Will continue to monitor pt.

## 2024-02-22 NOTE — PROGRESS NOTES
RT EQUIPMENT DEVICE RELATED - SKIN ASSESSMENT    RT Medical Equipment/Device:     NIV Mask:  Under-the-nose   size:  A    Skin Assessment:      CHEEKS, NOSE, MOUTH:  Intact    Device Skin Pressure Protection:  Positioning supports utilized    Nurse Notification:  Lizette Lambert, RRT

## 2024-02-22 NOTE — PROGRESS NOTES
Pulmonary / Critical Care Progress Note      Patient Name: Falguni Minaya  : 1960  MRN: [id number]  Attending:  Rk Franco MD  Date of admission: 2024    Subjective   Subjective   Follow-up for acute on chronic hypoxic respiratory failure requiring high flow nasal cannula     No acute events overnight     This morning,  Resting comfortably in bed on Airvo 60 L / 100% with SpO2 96%  Dyspnea slowly improving  Remains weak and fatigued  Occasional cough with scant sputum  Creatinine trending up  Diuresing well  1300 mL UOP/24 hours  No fever or chills  No chest pain or chest tightness    Objective   Objective     Vitals:   Temp:  [97.3 °F (36.3 °C)-98.6 °F (37 °C)] 98.6 °F (37 °C)  Heart Rate:  [] 73  Resp:  [18-42] 18  BP: (105-122)/(65-81) 119/65  Flow (L/min):  [50-55] 50    Physical Exam   Vital Signs Reviewed   General:  Chronically ill-appearing female, alert, mild distress, lying in bed  Chest: Decreased aeration in right lower lobe on auscultation, pursed lip breathing, increased work of breathing noted on BiPAP  CV: RRR, no MGR, pulses 2+, equal.  Abd:  Soft, NT, ND, + BS, no HSM, obese  EXT:  no clubbing, no cyanosis, no edema  Neuro:  A&Ox3, CN grossly intact, no focal deficits.  Skin: No rashes or lesions noted    Result Review    Result Review:  I have personally reviewed the results from the time of this admission to 2024 07:46 EST and agree with these findings:  [x]  Laboratory  [x]  Microbiology  [x]  Radiology  []  EKG/Telemetry   []  Cardiology/Vascular   []  Pathology  []  Old records  []  Other:  Most notable findings include:   proBNP 5704      Lab 24  0416 24  1117 24  0445 24  0407 24  1247 24  0922 24  0330 24  0300   WBC 13.99* 20.01*  --  8.18  --  9.20 8.73  --    HEMOGLOBIN 8.9* 9.5*  --  8.1*  --  9.0* 8.9*  --    HEMATOCRIT 28.5* 29.9*  --  25.5*  --  28.2* 28.1*  --    PLATELETS 298 356  --  223  --  245 232  --     SODIUM 136  --  135* 136  --  136  --  131*   SODIUM, ARTERIAL  --   --   --   --  138.3  --   --   --    POTASSIUM 4.8  --  4.2 3.2*  --  3.7  --  3.5   CHLORIDE 101  --  101 99  --  98  --  97*   CO2 22.1  --  22.7 23.6  --  24.1  --  19.0*   BUN 41*  --  36* 24*  --  22  --  45*   CREATININE 1.83*  --  1.49* 1.27*  --  1.29*  --  1.95*   GLUCOSE 122*  --  153* 160*  --  193*  --  109*   GLUCOSE, ARTERIAL  --   --   --   --  125*  --   --   --    CALCIUM 9.2  --  8.9 8.3*  --  8.6  --  8.8   PHOSPHORUS 3.6  --  3.7 4.0  --   --   --   --    TOTAL PROTEIN  --   --   --   --   --  6.4  --  7.0   ALBUMIN 2.7*  --  2.4* 2.2*  --  2.4*  --  2.6*   GLOBULIN  --   --   --   --   --  4.0  --  4.4     XR Chest 1 View    Result Date: 2/21/2024  PROCEDURE: XR CHEST 1 VW  COMPARISON: [facility], , XR CHEST 1 VW, 2/19/2024, 9:19.  [facility], CT, CT CHEST WO CONTRAST DIAGNOSTIC, 2/19/2024, 12:07.  INDICATIONS: hypoxia worse/RRT PT  FINDINGS:  Patchy infiltrates are noted in the lung fields bilaterally, similar to the previous exam.  The cardiac and mediastinal silhouettes appear normal.  No effusion is seen.      Impression:   1. Stable patchy infiltrates in the lung fields bilaterally.       Tucker Marcial M.D.       Electronically Signed and Approved By: Tucker Marcial M.D. on 2/21/2024 at 12:40             XR Chest 1 View    Result Date: 2/19/2024  PROCEDURE: XR CHEST 1 VW  COMPARISON: [facility], CR, XR CHEST 1 VW, 2/01/2024, 8:01.  [facility], CR, XR CHEST 1 VW, 2/11/2024, 13:55.  INDICATIONS: SOA  FINDINGS:  Multifocal lung opacities are again demonstrated.  Opacity in the peripheral left upper lobe is slightly more dense on today's exam, as is the opacity in the lower right lung.  Heart size is stable      Impression:  Persistent multifocal infiltrates, slightly worsened in the left upper lung and right lower lung       JAYDON MULLIGAN MD       Electronically  Signed and Approved By: JAYDON MULLIGAN MD on 2/19/2024 at 9:26                Assessment & Plan   Assessment / Plan     Active Hospital Problems:  Active Hospital Problems    Diagnosis     **Acute on chronic hypoxic respiratory failure     Diastolic CHF, acute on chronic     Acute on chronic respiratory failure with hypoxia      Impression:  Acute hypoxic respiratory failure requiring Airvo and BiPAP  Acute decompensated congestive diastolic heart failure  NSTEMI likely type II  Acute cardiogenic pulmonary edema  Volume overload  T2DM with hyperglycemia  Continue acidosis, clinically significant  Hypokalemia  Hypocalcemia  History of multiple sclerosis  Immunocompromised status     Plan:  -Respiratory status remains markedly tenuous  -Continue NIPPV 18/10 to maintain SpO2 greater than 90%  -2/21 CXR with stable patchy infiltrates in lung fields bilaterally  -2/19 chest CT reviewed demonstrating inflammatory process likely related to post-COVID syndrome  -Inflammatory markers elevated.  ILD workup pending.  -Antibiotics discontinued.  Micro negative.  Procalcitonin negative.  -Continue Brovana, Pulmicort, DuoNebs  -Lasix decreased to 20 mg IV twice daily per primary  -Repeat spot dose metolazone 5 mg p.o. x 1  -Continue to monitor renal panel and electrolytes.  Replace electrolytes as needed.  -Echocardiogram pending  -Continue Solu-Medrol 40 mg IV every 8 hours  -Continue bronchopulmonary hygiene.  Encourage I-S and flutter  -Continue MetaNebs and chest physiotherapy  -Encourage activity as tolerated.  Out of bed to chair  -PT/OT on board.  Appreciate assistance  -Palliative care following, appreciate input  -Tenuous respiratory status, low threshold to transfer to ICU       DVT prophylaxis:  Medical DVT prophylaxis orders are present.    CODE STATUS:   Medical Intervention Limits: NO intubation (DNI)  Level Of Support Discussed With: Patient  Code Status (Patient has no pulse and is not breathing): No CPR (Do Not  Attempt to Resuscitate)  Medical Interventions (Patient has pulse or is breathing): Limited Support      Labs, imaging, microbiology, notes and medications personally reviewed  Discussed with primary.  31 minutes of critical care time spent managing this patient, excluding procedures.  At this time, I spent 21 minutes managing this patient in accordance with blood sugar billing.    I, WESTLEY Gonzalez, spent 10 minutes in accordance with split shared billing.    Electronically signed by WESTLEY Mccormack, 02/22/24, 1:18 PM EST.  Electronically signed by Derrell Tomlin MD, 02/22/24, 1:54 PM EST.    .

## 2024-02-22 NOTE — NURSING NOTE
Patient was on 14HF pulmonology came by to see patient and she was sats of 87.  MD told RN to tell RT to put patient on Airvo.  PT came to work with patient got her to chair.  Patient called out to get to bedside commode where her O2 sat dropped to higher  70s.  Patient was not recovering so RN called RT to assist.  RT maxed out on Airvo still patient O2 SAT was not coming up.  Called RRT at [phone] for more more assistance, put patient on bipap and shortly she recovered.  Chest xray, ABG, and labs were taken.  MD and pulmonology made aware.  Patient stayed on floor.  Continuing to monitor at this time

## 2024-02-22 NOTE — NURSING NOTE
Palliative care visit for emotional support. Patient sitting up in bed during visit, alert and oriented to person, place, time and situation during visit. Patient reports she does not have any pain or difficulty with breathing. Patient reports she remains hopeful she will have improvement with an ultimate goal of returning home. Allowed space for patient to participate in life reflection- emotional support provided. Palliative care will continue to follow to support and assist.    Katlin FANG, RN, CHPN  Palliative Care

## 2024-02-22 NOTE — PLAN OF CARE
Goal Outcome Evaluation:   Patient currently on AIRVO @ 50L & 76%. Replaced her water bag and left new one in patients room. Patient does ezpap post tx with no issues. Patient needs to wear v60 qhs but has trouble tolerating unit. Patient said she may try it around bedtime but didn't want to put mask on at this time. Patient did wear v60 unit some this afternoon. No issues or changes at this time.

## 2024-02-23 NOTE — THERAPY EVALUATION
RT EQUIPMENT DEVICE RELATED - SKIN ASSESSMENT    RT Medical Equipment/Device:     High Flow Nasal Cannula:Airvo    Skin Assessment:      Cheek:  Redness and Intact  Ears:  Intact    Device Skin Pressure Protection:  Positioning supports utilized    Nurse Notification:  Lizette Beltran, RRT

## 2024-02-23 NOTE — PLAN OF CARE
Goal Outcome Evaluation:               Pt did not wear her V60 tonight. Pt continues to wear her AIRVO comfortably with a flow of 45L and an Fi02 of 76%. Pt states that those settings are more tolerable than a flow of 55L. Pt's sats continue to maintain at 95% or better.

## 2024-02-23 NOTE — PLAN OF CARE
Goal Outcome Evaluation:         Patient has progressively gone up on oxygen requirement throughout the day. Patient only complains of mild shortness of air. She is currently requiring 60lpm and 100% on the airvo as well as a non-rebreather, as patient has a hard time remembering to breathe through her nose.  RT/RN have been in and out of the patient's room to remind her to breathe through her nose, periodically throughout the day.  She has maintained sats between 85%-92% with the non rebreather on top of the airvo. Without the non-rebreather she has been dipping into the high 70s.  Explained to patient that she would benefit from wearing the bipap, however patient and  state that they wish to avoid that during the day, unless necessary.

## 2024-02-23 NOTE — PLAN OF CARE
Goal Outcome Evaluation:  Plan of Care Reviewed With: patient, spouse        Progress: no change  Outcome Evaluation: Patient continued to destat down to 74 throughout the shift. RT notified.family at bedside

## 2024-02-23 NOTE — PROGRESS NOTES
Pulmonary / Critical Care Progress Note      Patient Name: Falguni Minaya  : 1960  MRN: [id number]  Attending:  Mark Smith MD  Date of admission: 2024    Subjective   Subjective   Follow-up for acute on chronic hypoxic respiratory failure requiring high flow nasal cannula     No acute events overnight     This morning,  Resting comfortably in bed   Remains on Airvo 43 L / 100% FiO2 with SpO2 of 89%  Dyspnea slowly improving  Remains weak and fatigued  Occasional cough with scant sputum  Diuresing well  1200 mL UOP/24 hours  Remains edematous  No fever or chills  No chest pain or chest tightness    Objective   Objective     Vitals:   Temp:  [97.5 °F (36.4 °C)-98.3 °F (36.8 °C)] 97.8 °F (36.6 °C)  Heart Rate:  [] 72  Resp:  [18-22] 22  BP: (111-139)/(54-75) 139/75  Flow (L/min):  [45-55] 50    Physical Exam   Vital Signs Reviewed   General:  Chronically ill-appearing female, alert, mild distress, lying in bed  Chest: Decreased aeration in right lower lobe on auscultation, pursed lip breathing, increased work of breathing noted on Airvo  CV: RRR, no MGR, pulses 2+, equal.  Abd:  Soft, NT, ND, + BS, no HSM, obese  EXT:  no clubbing, no cyanosis, BLE edema  Neuro:  A&Ox3, CN grossly intact, no focal deficits.  Skin: No rashes or lesions noted    Result Review    Result Review:  I have personally reviewed the results from the time of this admission to 2024 08:30 EST and agree with these findings:  [x]  Laboratory  [x]  Microbiology  [x]  Radiology  []  EKG/Telemetry   []  Cardiology/Vascular   []  Pathology  []  Old records  []  Other:  Most notable findings include:   proBNP 5704      Lab 24  0406 24  0416 24  1117 24  0445 24  0407 24  1247 24  0922 24  0330   WBC 10.69 13.99* 20.01*  --  8.18  --  9.20 8.73   HEMOGLOBIN 8.8* 8.9* 9.5*  --  8.1*  --  9.0* 8.9*   HEMATOCRIT 27.9* 28.5* 29.9*  --  25.5*  --  28.2* 28.1*   PLATELETS 308 298 356  --   223  --  245 232   SODIUM 135* 136  --  135* 136  --  136  --    SODIUM, ARTERIAL  --   --   --   --   --  138.3  --   --    POTASSIUM 4.3 4.8  --  4.2 3.2*  --  3.7  --    CHLORIDE 97* 101  --  101 99  --  98  --    CO2 22.8 22.1  --  22.7 23.6  --  24.1  --    BUN 45* 41*  --  36* 24*  --  22  --    CREATININE 1.80* 1.83*  --  1.49* 1.27*  --  1.29*  --    GLUCOSE 149* 122*  --  153* 160*  --  193*  --    GLUCOSE, ARTERIAL  --   --   --   --   --  125*  --   --    CALCIUM 9.0 9.2  --  8.9 8.3*  --  8.6  --    PHOSPHORUS 4.2 3.6  --  3.7 4.0  --   --   --    TOTAL PROTEIN  --   --   --   --   --   --  6.4  --    ALBUMIN 2.9* 2.7*  --  2.4* 2.2*  --  2.4*  --    GLOBULIN  --   --   --   --   --   --  4.0  --      XR Chest 1 View    Result Date: 2/21/2024  PROCEDURE: XR CHEST 1 VW  COMPARISON: [facility], CR, XR CHEST 1 VW, 2/19/2024, 9:19.  [facility], CT, CT CHEST WO CONTRAST DIAGNOSTIC, 2/19/2024, 12:07.  INDICATIONS: hypoxia worse/RRT PT  FINDINGS:  Patchy infiltrates are noted in the lung fields bilaterally, similar to the previous exam.  The cardiac and mediastinal silhouettes appear normal.  No effusion is seen.      Impression:   1. Stable patchy infiltrates in the lung fields bilaterally.       Tucker Marcial M.D.       Electronically Signed and Approved By: Tucker Marcial M.D. on 2/21/2024 at 12:40                Assessment & Plan   Assessment / Plan     Active Hospital Problems:  Active Hospital Problems    Diagnosis     **Acute on chronic hypoxic respiratory failure     Diastolic CHF, acute on chronic     Acute on chronic respiratory failure with hypoxia      Impression:  Acute hypoxic respiratory failure requiring Airvo and BiPAP  Acute decompensated congestive diastolic heart failure  NSTEMI likely type II  Acute cardiogenic pulmonary edema  Volume overload  T2DM with hyperglycemia  Continue acidosis, clinically significant  Hypokalemia  Hypocalcemia  History of multiple  sclerosis  Immunocompromised status     Plan:  -Respiratory status remains markedly tenuous  -Currently on Airvo, continue to maintain SpO2 greater than 90%  -Repeat CXR in a.m.  -2/19 chest CT reviewed demonstrating inflammatory process likely related to post-COVID syndrome  -Inflammatory markers elevated.  ILD workup pending.  -Antibiotics discontinued.  Micro negative.  Procalcitonin negative.  -Continue Brovana, Pulmicort, DuoNebs  -Lasix increased to 40 mg IV twice daily. Repeat spot dose metolazone 5 mg p.o. x 1  -Continue to monitor renal panel and electrolytes.  Replace electrolytes as needed.  -Echocardiogram with EF 56 to 60%, trivial pericardial effusion and grade 1 diastolic dysfunction  -Continue Solu-Medrol 40 mg IV every 8 hours  -Continue bronchopulmonary hygiene.  Encourage I-S and flutter  -Continue MetaNebs and chest physiotherapy  -Encourage activity as tolerated.  Out of bed to chair  -PT/OT on board.  Appreciate assistance  -Palliative care following, appreciate input  -Tenuous respiratory status, low threshold to transfer to ICU     DVT prophylaxis:  Medical DVT prophylaxis orders are present.    CODE STATUS:   Medical Intervention Limits: NO intubation (DNI)  Level Of Support Discussed With: Patient  Code Status (Patient has no pulse and is not breathing): No CPR (Do Not Attempt to Resuscitate)  Medical Interventions (Patient has pulse or is breathing): Limited Support      Labs, imaging, microbiology, notes and medications personally reviewed  Discussed with primary.  30 minutes of critical care time spent managing this patient, excluding procedures.  At this time, I spent 20 minutes managing this patient in accordance with blood sugar billing.    I, WESTLEY Gonzalez, spent 10 minutes in accordance with split shared billing.    Electronically signed by WESTLEY Mccormack, 02/23/24, 11:36 AM EST.  Electronically signed by Derrell Tomlin MD, 02/23/24, 2:40 PM  EST.        .

## 2024-02-23 NOTE — PROGRESS NOTES
[facility]   Hospitalist Progress Note  Date: 2024  Patient Name: Falguni Minaya  : 1960  MRN: [id number]  Date of admission: 2024  Consultants:   -Pulmonology: Dr. Derrell Tomlin    Subjective   Subjective     Chief Complaint: Shortness of breath    Summary:   Falguni Minaya is a 64 y.o. female with chronic diastolic heart failure, CAD, dysphagia, essential hypertension, hyperlipidemia, history of MS and CKD stage IIIb presented with complaints of shortness of breath.  Empiric antibiotics, nebulizer breathing treatments, steroid therapy and IV diuresis initiated.  Pulmonology consulted.  Infectious workup was nonrevealing and antibiotics were discontinued.  ID workup initiated.    Interval Followup:   No acute events overnight.  Patient still with high supplemental O2 requirement.  On Airvo.  Still short of breath.  Remains weak and easily fatigued.  Nursing with no additional acute issues to report.    Objective   Objective     Vitals:   Temp:  [97.8 °F (36.6 °C)-98.3 °F (36.8 °C)] 98.2 °F (36.8 °C)  Heart Rate:  [55-88] 83  Resp:  [18-22] 22  BP: (106-139)/(59-75) 123/61  Flow (L/min):  [45-60] 60  Physical Exam   Gen: Chronically ill-appearing female, appears older than stated age, in mild distress, lying in bed  Resp: Poor aeration, increased work of breathing appreciated, conversational dyspnea noted  Card: RRR, No m/r/g  Abd: Soft, Nontender, Nondistended, + bowel sounds    Result Review    Result Review:  I have personally reviewed the results as below and agree with these findings:  []  Laboratory:   CMP          2024    04:45 2024    04:16 2024    04:06   CMP   Glucose 153  122  149    BUN 36  41  45    Creatinine 1.49  1.83  1.80    EGFR 38.8  30.3  30.9    Sodium 135  136  135    Potassium 4.2  4.8  4.3    Chloride 101  101  97    Calcium 8.9  9.2  9.0    Albumin 2.4  2.7  2.9    BUN/Creatinine Ratio 24.2  22.4  25.0    Anion Gap 11.3  12.9  15.2      CBC           2/21/2024    11:17 2/22/2024    04:16 2/23/2024    04:06   CBC   WBC 20.01  13.99  10.69    RBC 3.05  2.93  2.85    Hemoglobin 9.5  8.9  8.8    Hematocrit 29.9  28.5  27.9    MCV 98.0  97.3  97.9    MCH 31.1  30.4  30.9    MCHC 31.8  31.2  31.5    RDW 17.2  17.1  16.9    Platelets 356  298  308    CRP: 3.82.  [x]  Microbiology: Blood culture (02/19/2024): No growth to date.  []  Radiology:   [x]  EKG/Telemetry:    []  Cardiology/Vascular:    []  Pathology:  []  Old records:  []  Other:    Assessment & Plan   Assessment / Plan     Assessment:  Acute on chronic diastolic heart failure  Acute hypoxic respiratory failure secondary to above  Acute cardiogenic pulmonary edema  NSTEMI likely type II  Type 2 diabetes mellitus with hyperglycemia  Lactic acidosis, clinically significant  Hypocalcemia  Hypokalemia  Acute on chronic kidney disease stage IIIb (baseline creatinine 1.0-1.2)  Immunocompromise status  Essential hypertension    Plan:  -Pulmonology consulted and following, appreciate assistance and recommendations in the care of this patient.  -Continue Airvo.  Wean supplemental O2 as tolerated to maintain sats greater than 90%  -Continue Brovana, Pulmicort and DuoNebs  -Diuresis with Lasix 40 mg IV twice daily.  Metolazone 5 mg oral x 1 ordered as well.  Close monitoring of electrolytes and renal function necessary given IV diuresis  -Continue Solu-Medrol 40 mg IV every 8 hours  -Encourage use of incentive spirometer and flutter valve  -Continue chest physiotherapy and MetaNebs  -Management discussed with pulmonology.  Plan for repeat chest x-ray in the a.m. (02/24/2024)  -Palliative care consulted  -Patient's high risk status very tenuous  -Continue home Prozac, Neurontin and Requip  -Continue holding home beta-blocker  -Continue aspirin, atorvastatin, Plavix  -PT/OT consulted  -Will monitor electrolytes and renal function with BMP and magnesium level in the AM  -Will monitor WBC and Hgb with CBC in the  AM  -Clinical course will dictate further management     DVT Prophylaxis: Lovenox  GI Prophylaxis: Pantoprazole  Diet:   Diet Order   Procedures    Diet: Regular/House Diet; Texture: Regular Texture (IDDSI 7); Fluid Consistency: Thin (IDDSI 0)     Dispo: Regular  Code Status: DNR     Personally reviewed patients labs and imaging, discussed with patient and nurse at bedside. Discussed management with the following consultants: Pulmonology.     Part of this note may be an electronic transcription/translation of spoken language to printed text using the Dragon dictation system.    DVT prophylaxis:  Medical DVT prophylaxis orders are present.      CODE STATUS:   Medical Intervention Limits: NO intubation (DNI)  Level Of Support Discussed With: Patient  Code Status (Patient has no pulse and is not breathing): No CPR (Do Not Attempt to Resuscitate)  Medical Interventions (Patient has pulse or is breathing): Limited Support      Electronically signed by Mark Smith MD, 02/23/24, 3:25 PM EST.

## 2024-02-23 NOTE — PLAN OF CARE
Goal Outcome Evaluation:   Patient has been wearing the airvo consistanly since the February 21st. She is currently on 60lpm and 91%. Tolerating well. She has experienced the occasional drop in saturation with eating and getting up, but is tolerating well. No shortness of air noted at this time. Will continue to assess for need of NIV therapy.

## 2024-02-24 NOTE — PLAN OF CARE
Goal Outcome Evaluation:   Family at bedside. No complaints at this time. Call light in reach. Patient remains on airvo.

## 2024-02-24 NOTE — PROGRESS NOTES
[facility]   Hospitalist Progress Note  Date: 2024  Patient Name: Falguni Minaya  : 1960  MRN: [id number]  Date of admission: 2024  Consultants:   -Pulmonology: Dr. Derrell Tomlin    Subjective   Subjective     Chief Complaint: Shortness of breath    Summary:   Falguni Minaya is a 64 y.o. female with chronic diastolic heart failure, CAD, dysphagia, essential hypertension, hyperlipidemia, history of MS and CKD stage IIIb presented with complaints of shortness of breath.  Empiric antibiotics, nebulizer breathing treatments, steroid therapy and IV diuresis initiated.  Pulmonology consulted.  Infectious workup was nonrevealing and antibiotics were discontinued.  ILD workup initiated.    Interval Followup:   Patient continues to have high submental O2 requirement.  Patient had some hypoxia while on Airvo, was not placed on BiPAP.  Patient continues to have increased work of breathing.    Objective   Objective     Vitals:   Temp:  [97.3 °F (36.3 °C)-98.2 °F (36.8 °C)] 98 °F (36.7 °C)  Heart Rate:  [60-89] 77  Resp:  [18-38] 22  BP: (100-146)/(55-84) 125/79  Flow (L/min):  [40-60] 40  Physical Exam   Gen: Acutely ill-appearing chronically ill female, sitting up in bed, alert  Resp: Poor aeration, conversational dyspnea appreciated, increase in work of breathing noted  Card: RRR, No m/r/g  Abd: Soft, Nontender, Nondistended, + bowel sounds    Result Review    Result Review:  I have personally reviewed the results as below and agree with these findings:  []  Laboratory:   CMP          2024    04:16 2024    04:06 2024    02:40   CMP   Glucose 122  149  146    BUN 41  45  48    Creatinine 1.83  1.80  1.78    EGFR 30.3  30.9  31.4    Sodium 136  135  137    Potassium 4.8  4.3  4.4    Chloride 101  97  98    Calcium 9.2  9.0  9.0    Albumin 2.7  2.9     BUN/Creatinine Ratio 22.4  25.0  27.0    Anion Gap 12.9  15.2  15.2      CBC          2024    04:16 2024    04:06 2024    02:40    CBC   WBC 13.99  10.69  11.78    RBC 2.93  2.85  3.08    Hemoglobin 8.9  8.8  9.2    Hematocrit 28.5  27.9  29.8    MCV 97.3  97.9  96.8    MCH 30.4  30.9  29.9    MCHC 31.2  31.5  30.9    RDW 17.1  16.9  16.7    Platelets 298  308  356    Phosphorus and magnesium within normal limits  [x]  Microbiology: Blood culture (02/19/2024): No growth today.  []  Radiology:   [x]  EKG/Telemetry:    []  Cardiology/Vascular:    []  Pathology:  []  Old records:  []  Other:    Assessment & Plan   Assessment / Plan     Assessment:  Acute on chronic diastolic heart failure  Acute hypoxic respiratory failure secondary to above  Acute cardiogenic pulmonary edema  NSTEMI likely type II  Type 2 diabetes mellitus with hyperglycemia  Lactic acidosis, clinically significant  Hypocalcemia  Hypokalemia  Acute on chronic kidney disease stage IIIb (baseline creatinine 1.0-1.2)  Immunocompromise status  Essential hypertension    Plan:  -Pulmonology consulted and following, appreciate assistance and recommendations in the care of this patient.  -Continue BiPAP and Airvo.  Wean supplemental O2 as much as possible to maintain sats greater than 90%  -Continue Brovana, Pulmicort and DuoNebs  -Continue Lasix 40 mg IV twice daily.  Monitor electrolytes and renal function closely given IV diuresis  -Continue Solu-Medrol 40 mg IV every 8 hours  -Encourage use of incentive spirometer and flutter valve  -Continue chest physiotherapy and MetaNebs  -Management discussed with pulmonology.  Agree with continuing nebulizer breathing treatments.  Encouraged to use of incentive spirometer and flutter valve.  -Palliative care consulted  -Patient's respiratory status very tenuous.  High risk for requiring escalation level of care.  -Continue home Prozac, Neurontin and Requip  -Continue holding home beta-blocker  -Continue aspirin, atorvastatin, Plavix  -PT/OT consulted  -Monitor electrolytes and renal function with BMP and magnesium level in the AM  -Monitor  WBC and Hgb with CBC in the AM  -Clinical course will dictate further management     DVT Prophylaxis: Lovenox  GI Prophylaxis: Pantoprazole  Diet:   Diet Order   Procedures    Diet: Regular/House Diet; Texture: Regular Texture (IDDSI 7); Fluid Consistency: Thin (IDDSI 0)     Dispo: Regular  Code Status: DNR     Personally reviewed patients labs and imaging, discussed with patient and nurse at bedside. Discussed management with the following consultants: Pulmonology.     Part of this note may be an electronic transcription/translation of spoken language to printed text using the Dragon dictation system.    DVT prophylaxis:  Medical DVT prophylaxis orders are present.      CODE STATUS:   Medical Intervention Limits: NO intubation (DNI)  Level Of Support Discussed With: Patient  Code Status (Patient has no pulse and is not breathing): No CPR (Do Not Attempt to Resuscitate)  Medical Interventions (Patient has pulse or is breathing): Limited Support    Electronically signed by Mark Smith MD, 02/24/24, 1:13 PM EST.

## 2024-02-24 NOTE — PROGRESS NOTES
RT EQUIPMENT DEVICE RELATED - SKIN ASSESSMENT    RT Medical Equipment/Device:     High Flow Nasal Cannula:Airvo    Skin Assessment:      Cheek:  Intact  Ears:  Intact  Nares:  Intact  Mouth:  Intact    Device Skin Pressure Protection:  Positioning supports utilized    Nurse Notification:  Lizette Moser, RRT

## 2024-02-24 NOTE — PLAN OF CARE
Goal Outcome Evaluation:  Plan of Care Reviewed With: patient        Progress: no change  Outcome Evaluation: Patient is currently on Airvo 50 L and 100% FiO2. Patient has a non rebreather and bipap on stand by if needed.

## 2024-02-24 NOTE — PLAN OF CARE
Goal Outcome Evaluation:      Patient is currently on AIRVO 60 L/M, 100%. Bipap is on stand-by in the room if needed. She wore the bipap for a while this morning to help improve oxygen saturations. Has been on AIRVO since breakfast and is tolerating well. Will continue to monitor.

## 2024-02-24 NOTE — PROGRESS NOTES
RT EQUIPMENT DEVICE RELATED - SKIN ASSESSMENT    RT Medical Equipment/Device:     High Flow Nasal Cannula:Airvo    Skin Assessment:      Cheek:  Intact  Ears:  Intact  Nares:  Intact  Nose:  Intact    Device Skin Pressure Protection:  Pressure points protected    Nurse Notification:  Lizette Ferreira, CRT

## 2024-02-24 NOTE — PROGRESS NOTES
Pulmonary / Critical Care Progress Note      Patient Name: Falguni Minaya  : 1960  MRN: [id number]  Attending:  Mark Smith MD  Date of admission: 2024    Subjective   Subjective   Follow-up for acute on chronic hypoxic respiratory failure requiring high flow nasal cannula     No acute events overnight     This morning,  Resting comfortably in bed   Remains on Airvo 43 L / 100% FiO2 with SpO2 of 89%  Dyspnea slowly improving  Remains weak and fatigued  Occasional cough with scant sputum  Patient still on 60 L 100% FiO2    Objective   Objective     Vitals:   Temp:  [97.2 °F (36.2 °C)-98 °F (36.7 °C)] 97.2 °F (36.2 °C)  Heart Rate:  [60-89] 84  Resp:  [18-38] 24  BP: (100-146)/(63-84) 112/71  Flow (L/min):  [40-60] 60    Physical Exam   On continuous Airvo  Does have scattered crackles  Does have conversational dyspnea    Result Review    Result Review:  I have personally reviewed the results from the time of this admission to 2024 17:55 EST and agree with these findings:  [x]  Laboratory  [x]  Microbiology  [x]  Radiology  []  EKG/Telemetry   []  Cardiology/Vascular   []  Pathology  []  Old records  []  Other:  Most notable findings include:   proBNP 5704      Lab 24  0240 24  0406 24  0416 24  1117 24  0445 24  0407 24  1247 24  0922 24  0330   WBC 11.78* 10.69 13.99* 20.01*  --  8.18  --  9.20 8.73   HEMOGLOBIN 9.2* 8.8* 8.9* 9.5*  --  8.1*  --  9.0* 8.9*   HEMATOCRIT 29.8* 27.9* 28.5* 29.9*  --  25.5*  --  28.2* 28.1*   PLATELETS 356 308 298 356  --  223  --  245 232   SODIUM 137 135* 136  --  135* 136  --  136  --    SODIUM, ARTERIAL  --   --   --   --   --   --  138.3  --   --    POTASSIUM 4.4 4.3 4.8  --  4.2 3.2*  --  3.7  --    CHLORIDE 98 97* 101  --  101 99  --  98  --    CO2 23.8 22.8 22.1  --  22.7 23.6  --  24.1  --    BUN 48* 45* 41*  --  36* 24*  --  22  --    CREATININE 1.78* 1.80* 1.83*  --  1.49* 1.27*  --  1.29*  --     GLUCOSE 146* 149* 122*  --  153* 160*  --  193*  --    GLUCOSE, ARTERIAL  --   --   --   --   --   --  125*  --   --    CALCIUM 9.0 9.0 9.2  --  8.9 8.3*  --  8.6  --    PHOSPHORUS 3.8 4.2 3.6  --  3.7 4.0  --   --   --    TOTAL PROTEIN  --   --   --   --   --   --   --  6.4  --    ALBUMIN  --  2.9* 2.7*  --  2.4* 2.2*  --  2.4*  --    GLOBULIN  --   --   --   --   --   --   --  4.0  --      XR Chest 1 View    Result Date: 2/24/2024  PROCEDURE: XR CHEST 1 VW  COMPARISONS: 2/21/2024; 2/19/2024.  INDICATIONS: Hypoxia; dyspnea; imaging f/u.  FINDINGS: A single AP upright portable view of the chest is provided for review.  Bilateral infiltrates are seen.  The findings may represent infectious multifocal pneumonia.  There may be borderline cardiac enlargement.  No pneumothorax.  No pneumomediastinum.  No pleural effusion.  There is slight pulmonary hypoinflation.  External artifacts obscure detail.  The patient has undergone cholecystectomy.  The thoracic aorta is atherosclerotic.  There is slight chronic asymmetric elevation of the right diaphragm.  Chronic calcified granulomatous disease of the chest is suggested.      Impression:  Bilateral infiltrates persist.  The findings may represent age-indeterminate infectious multifocal pneumonia.  There are slightly lower lung volumes than on the 2/21/2024 study.  Please see above comments for further detail.       Please note that portions of this note were completed with a voice recognition program.  NAIMA RICHEY JR, MD       Electronically Signed and Approved By: NAIMA RICHEY JR, MD on 2/24/2024 at 6:44                 Assessment & Plan   Assessment / Plan     Active Hospital Problems:  Active Hospital Problems    Diagnosis     **Acute on chronic hypoxic respiratory failure     Diastolic CHF, acute on chronic     Acute on chronic respiratory failure with hypoxia      Impression:  Acute hypoxic respiratory failure requiring Airvo and BiPAP  Acute decompensated  congestive diastolic heart failure  NSTEMI likely type II  Acute cardiogenic pulmonary edema  Volume overload  T2DM with hyperglycemia  Continue acidosis, clinically significant  Hypokalemia  Hypocalcemia  History of multiple sclerosis  Immunocompromised status     Plan:  -Respiratory status remains markedly tenuous  -Currently on Airvo, continue to maintain SpO2 greater than 90%  -Repeat CXR shows persistent infiltrate  At this time we will check D-dimer  Will try to obtain lower extremity duplexes  Will discontinue Lasix at this time given uptrending creatinine  If the patient's creatinine is better tomorrow hypoxia still worse and the patient has elevated D-dimer we will consider doing CT angiogram  -2/19 chest CT reviewed demonstrating inflammatory process likely related to post-COVID syndrome  -Inflammatory markers elevated.  ILD workup pending.  -Antibiotics discontinued.  Micro negative.  Procalcitonin negative.  -Continue Brovana, Pulmicort, DuoNebs  -Continue to monitor renal panel and electrolytes.  Replace electrolytes as needed.  -Echocardiogram with EF 56 to 60%, trivial pericardial effusion and grade 1 diastolic dysfunction  -Continue Solu-Medrol 40 mg IV every 8 hours  -Continue bronchopulmonary hygiene.  Encourage I-S and flutter  -Continue MetaNebs and chest physiotherapy  -Encourage activity as tolerated.  Out of bed to chair  -PT/OT on board.  Appreciate assistance  -Palliative care following, appreciate input  -Tenuous respiratory status, low threshold to transfer to ICU     DVT prophylaxis:  Medical DVT prophylaxis orders are present.    CODE STATUS:   Medical Intervention Limits: NO intubation (DNI)  Level Of Support Discussed With: Patient  Code Status (Patient has no pulse and is not breathing): No CPR (Do Not Attempt to Resuscitate)  Medical Interventions (Patient has pulse or is breathing): Limited Support      Labs, imaging, microbiology, notes and medications personally  reviewed  Discussed with primary.  30 minutes of critical care time spent managing this patient, excluding procedures.  At this time, I spent 20 minutes managing this patient in accordance with blood sugar billing.  Electronically signed by Isreal Pedraza DO, 02/24/24, 5:56 PM EST.

## 2024-02-25 NOTE — PROGRESS NOTES
[facility]   Hospitalist Progress Note  Date: 2024  Patient Name: Falguni Minaya  : 1960  MRN: [id number]  Date of admission: 2024  Consultants:   -Pulmonology: Dr. Derrell Tomlin, Dr. Isreal Pedraza    Subjective   Subjective     Chief Complaint: Shortness of breath    Summary:   Falguni Minaya is a 64 y.o. female with chronic diastolic heart failure, CAD, dysphagia, essential hypertension, hyperlipidemia, history of MS and CKD stage IIIb presented with complaints of shortness of breath.  Empiric antibiotics, nebulizer breathing treatments, steroid therapy and IV diuresis initiated.  Pulmonology consulted.  Infectious workup was nonrevealing and antibiotics were discontinued.  ILD workup initiated.    Interval Followup:   Patient with increased work of breathing.  Patient requiring max Airvo.  She notes that there is not much improvement in her respiratory status.  D-dimer was collected and only slightly elevated.  Creatinine essentially stable.  proBNP found to be significantly elevated.    Objective   Objective     Vitals:   Temp:  [97.2 °F (36.2 °C)-98 °F (36.7 °C)] 97.9 °F (36.6 °C)  Heart Rate:  [] 99  Resp:  [18-24] 24  BP: (112-144)/() 136/74  Flow (L/min):  [40-60] 60  Physical Exam   Gen: Acutely ill-appearing chronically ill female, alert, pleasant, sitting up in bed  Resp: Poor aeration, tachypnea, conversational dyspnea noted, scattered crackles  Card: Regular rhythm, tachycardic, No m/r/g  Abd: Soft, Nontender, Nondistended, + bowel sounds    Result Review    Result Review:  I have personally reviewed the results as below and agree with these findings:  []  Laboratory:   CMP          2024    04:06 2024    02:40 2024    04:02   CMP   Glucose 149  146  143    BUN 45  48  50    Creatinine 1.80  1.78  1.72    EGFR 30.9  31.4  32.7    Sodium 135  137  137    Potassium 4.3  4.4  3.7    Chloride 97  98  97    Calcium 9.0  9.0  9.0    Albumin 2.9       BUN/Creatinine Ratio 25.0  27.0  29.1    Anion Gap 15.2  15.2  14.3      CBC          2/23/2024    04:06 2/24/2024    02:40 2/25/2024    04:02   CBC   WBC 10.69  11.78  11.17    RBC 2.85  3.08  3.12    Hemoglobin 8.8  9.2  9.3    Hematocrit 27.9  29.8  30.1    MCV 97.9  96.8  96.5    MCH 30.9  29.9  29.8    MCHC 31.5  30.9  30.9    RDW 16.9  16.7  16.2    Platelets 308  356  389    Magnesium and phosphorus within normal limits.  proBNP: 10,000  [x]  Microbiology: Blood culture (02/19/2024): No growth to date  []  Radiology:   [x]  EKG/Telemetry:    []  Cardiology/Vascular:    []  Pathology:  []  Old records:  []  Other:    Assessment & Plan   Assessment / Plan     Assessment:  Acute on chronic diastolic heart failure  Acute hypoxic respiratory failure secondary to above  Acute cardiogenic pulmonary edema  NSTEMI likely type II  Type 2 diabetes mellitus with hyperglycemia  Lactic acidosis, clinically significant  Hypocalcemia  Hypokalemia  Acute on chronic kidney disease stage IIIb (baseline creatinine 1.0-1.2)  Immunocompromise status  Essential hypertension    Plan:  -Pulmonology consulted and following, appreciate assistance and recommendations in the care of this patient.  -Continue BiPAP and Airvo.  Wean supplemental O2 as much as possible to maintain sats greater than 90%  -Continue Brovana, Pulmicort and DuoNebs  -Due to patient's creatinine IV Lasix was held yesterday.  Creatinine essentially stable.  proBNP significantly elevated.  Patient's respiratory status not improving so we will resume IV Lasix 40 mg twice daily.  Close monitoring of electrolytes and renal function necessary given IV diuresis.  -Continue Solu-Medrol 40 mg IV every 8 hours  -Encourage use of incentive spirometer and flutter valve  -Continue chest physiotherapy and MetaNebs  -Management discussed with pulmonology.  D-dimer only slightly elevated.  Given patient's renal function angiogram to evaluate for PE not recommended at this  time.  -Palliative care consulted  -Continue home Prozac, Neurontin and Requip  -Continue holding home beta-blocker  -Continue aspirin, atorvastatin, Plavix  -PT/OT consulted  -Will monitor electrolytes and renal function with BMP and magnesium level in the AM  -Will monitor WBC and Hgb with CBC in the AM  -Clinical course will dictate further management     DVT Prophylaxis: Lovenox  GI Prophylaxis: Pantoprazole  Diet:   Diet Order   Procedures    Diet: Regular/House Diet; Texture: Regular Texture (IDDSI 7); Fluid Consistency: Thin (IDDSI 0)     Dispo: Regular  Code Status: DNR     Personally reviewed patients labs and imaging, discussed with patient and nurse at bedside. Discussed management with the following consultants: Pulmonology.     Part of this note may be an electronic transcription/translation of spoken language to printed text using the Dragon dictation system.    DVT prophylaxis:  Medical DVT prophylaxis orders are present.      CODE STATUS:   Medical Intervention Limits: NO intubation (DNI)  Level Of Support Discussed With: Patient  Code Status (Patient has no pulse and is not breathing): No CPR (Do Not Attempt to Resuscitate)  Medical Interventions (Patient has pulse or is breathing): Limited Support    Electronically signed by Mark Smith MD, 02/25/24, 10:25 AM EST.

## 2024-02-25 NOTE — PLAN OF CARE
Goal Outcome Evaluation:      Patient is currently requiring AIRVO at 60 L/M, 100% to maintain oxygen saturation levels 88%-91%. Bipap is in the room at bedside but patient has refused to wear at night. Will continue to monitor and decrease FiO2 as tolerated.

## 2024-02-25 NOTE — PLAN OF CARE
Goal Outcome Evaluation:   Patient was adamant about not wearing bipap throughout the night. Sats maintained on Airvo 60 liters 100%.

## 2024-02-25 NOTE — PROGRESS NOTES
Pulmonary / Critical Care Progress Note      Patient Name: Falguni Minaya  : 1960  MRN: [id number]  Attending:  Mark Smith MD  Date of admission: 2024    Subjective   Subjective   Follow-up for acute on chronic hypoxic respiratory failure requiring high flow nasal cannula     No acute events overnight     This afternoon,  Resting comfortably in bed   Remains on Airvo 43 L / 100% FiO2 with SpO2 of 90%  Dyspnea slowly improving  Remains weak and fatigued  Occasional cough with scant sputum  proBNP increasing  Creatinine with minimal improvement    Objective   Objective     Vitals:   Temp:  [97.3 °F (36.3 °C)-98.6 °F (37 °C)] 98.1 °F (36.7 °C)  Heart Rate:  [] 103  Resp:  [18-24] 24  BP: (116-144)/() 129/86  Flow (L/min):  [60] 60    Physical Exam     Physical Exam             On continuous Airvo  Does have scattered crackles  Does have conversational dyspnea    Result Review    Result Review:  I have personally reviewed the results from the time of this admission to 2024 16:48 EST and agree with these findings:  [x]  Laboratory  [x]  Microbiology  [x]  Radiology  []  EKG/Telemetry   []  Cardiology/Vascular   []  Pathology  []  Old records  []  Other:  Most notable findings include:   proBNP 10,048      Lab 24  0402 24  0240 24  0406 24  0416 24  1117 24  0445 24  0407 24  1247 24  0922   WBC 11.17* 11.78* 10.69 13.99* 20.01*  --  8.18  --  9.20   HEMOGLOBIN 9.3* 9.2* 8.8* 8.9* 9.5*  --  8.1*  --  9.0*   HEMATOCRIT 30.1* 29.8* 27.9* 28.5* 29.9*  --  25.5*  --  28.2*   PLATELETS 389 356 308 298 356  --  223  --  245   SODIUM 137 137 135* 136  --  135* 136  --  136   SODIUM, ARTERIAL  --   --   --   --   --   --   --  138.3  --    POTASSIUM 3.7 4.4 4.3 4.8  --  4.2 3.2*  --  3.7   CHLORIDE 97* 98 97* 101  --  101 99  --  98   CO2 25.7 23.8 22.8 22.1  --  22.7 23.6  --  24.1   BUN 50* 48* 45* 41*  --  36* 24*  --  22   CREATININE 1.72*  1.78* 1.80* 1.83*  --  1.49* 1.27*  --  1.29*   GLUCOSE 143* 146* 149* 122*  --  153* 160*  --  193*   GLUCOSE, ARTERIAL  --   --   --   --   --   --   --  125*  --    CALCIUM 9.0 9.0 9.0 9.2  --  8.9 8.3*  --  8.6   PHOSPHORUS 3.0 3.8 4.2 3.6  --  3.7 4.0  --   --    TOTAL PROTEIN  --   --   --   --   --   --   --   --  6.4   ALBUMIN  --   --  2.9* 2.7*  --  2.4* 2.2*  --  2.4*   GLOBULIN  --   --   --   --   --   --   --   --  4.0     XR Chest 1 View    Result Date: 2/24/2024  PROCEDURE: XR CHEST 1 VW  COMPARISONS: 2/21/2024; 2/19/2024.  INDICATIONS: Hypoxia; dyspnea; imaging f/u.  FINDINGS: A single AP upright portable view of the chest is provided for review.  Bilateral infiltrates are seen.  The findings may represent infectious multifocal pneumonia.  There may be borderline cardiac enlargement.  No pneumothorax.  No pneumomediastinum.  No pleural effusion.  There is slight pulmonary hypoinflation.  External artifacts obscure detail.  The patient has undergone cholecystectomy.  The thoracic aorta is atherosclerotic.  There is slight chronic asymmetric elevation of the right diaphragm.  Chronic calcified granulomatous disease of the chest is suggested.      Impression:  Bilateral infiltrates persist.  The findings may represent age-indeterminate infectious multifocal pneumonia.  There are slightly lower lung volumes than on the 2/21/2024 study.  Please see above comments for further detail.       Please note that portions of this note were completed with a voice recognition program.  NAIMA RICHEY JR, MD       Electronically Signed and Approved By: NAIMA RICHEY JR, MD on 2/24/2024 at 6:44                 Assessment & Plan   Assessment / Plan     Active Hospital Problems:  Active Hospital Problems    Diagnosis     **Acute on chronic hypoxic respiratory failure     Diastolic CHF, acute on chronic     Acute on chronic respiratory failure with hypoxia    Impression:  Acute hypoxic respiratory failure requiring  Airvo and BiPAP  Acute decompensated congestive diastolic heart failure  NSTEMI likely type II  Acute cardiogenic pulmonary edema  Volume overload  T2DM with hyperglycemia  Continue acidosis, clinically significant  Hypokalemia  Hypocalcemia  History of multiple sclerosis  Immunocompromised status     Plan:  -Respiratory status remains markedly tenuous  -Currently on Airvo, continue to maintain SpO2 greater than 90%  -Repeat CXR shows persistent infiltrate  -D-dimer 0.93.  Lower extremity duplex is pending.  -proBNP 10,048, resume Lasix 40 mg IV twice daily  -Creatinine remains elevated.  Will hold off on CT angiogram at this time.  -Antibiotics discontinued.  Micro negative.  Procalcitonin negative.  -Continue Brovana, Pulmicort, DuoNebs  -Continue to monitor renal panel and electrolytes.  Replace electrolytes as needed.  -Echocardiogram with EF 56 to 60%, trivial pericardial effusion and grade 1 diastolic dysfunction  -Continue Solu-Medrol 40 mg IV every 8 hours  -Continue bronchopulmonary hygiene.  Encourage I-S and flutter  -Continue MetaNebs and chest physiotherapy  -Encourage activity as tolerated.  Out of bed to chair  -PT/OT on board.  Appreciate assistance  -Palliative care following, appreciate input  -Tenuous respiratory status, low threshold to transfer to ICU       DVT prophylaxis:  Medical DVT prophylaxis orders are present.    CODE STATUS:   Medical Intervention Limits: NO intubation (DNI)  Level Of Support Discussed With: Patient  Code Status (Patient has no pulse and is not breathing): No CPR (Do Not Attempt to Resuscitate)  Medical Interventions (Patient has pulse or is breathing): Limited Support      Labs, imaging, microbiology, notes and medications personally reviewed  Discussed with primary.  30 minutes of critical care time spent managing this patient, excluding procedures.  At this time, I spent 20 minutes managing this patient in accordance with blood sugar billing.    I, Nicole Walker,  WESTLEY, spent 10 minutes in accordance with split shared billing.  Electronically signed by WESTLEY Mccormack, 02/25/24, 4:49 PM EST.        Electronically signed by Isreal Pedraza DO, 02/25/24, 6:05 PM EST.

## 2024-02-25 NOTE — PROGRESS NOTES
RT EQUIPMENT DEVICE RELATED - SKIN ASSESSMENT    RT Medical Equipment/Device:     High Flow Nasal Cannula:Airvo    Skin Assessment:      Cheek:  Intact  Ears:  Intact  Nares:  Dried Blood  Neck:  Intact  Nose:  Intact    Device Skin Pressure Protection:  Positioning supports utilized    Nurse Notification:  Lizette Ferreira, CRT

## 2024-02-25 NOTE — PROGRESS NOTES
RT EQUIPMENT DEVICE RELATED - SKIN ASSESSMENT    RT Medical Equipment/Device:     High Flow Nasal Cannula:Airvo    Skin Assessment:      Cheek:  Intact  Ears:  Intact  Nares:  Dried Blood  Nose:  Intact    Device Skin Pressure Protection:  Positioning supports utilized    Nurse Notification:  Lizette Espinal, RRT

## 2024-02-25 NOTE — PLAN OF CARE
Goal Outcome Evaluation:      Patient had no acute changes during shift. Patient tolerating AIRVO at 40 L at 90 %. VSS.

## 2024-02-26 NOTE — PLAN OF CARE
Goal Outcome Evaluation:         Patient on Airvo.  VSS.  Patient at bedside. No acute changes this shift.  No signs of distress noted at this time.

## 2024-02-26 NOTE — PROGRESS NOTES
RT EQUIPMENT DEVICE RELATED - SKIN ASSESSMENT    RT Medical Equipment/Device:     High Flow Nasal Cannula:Airvo    Skin Assessment:      Cheek:  Intact  Nares:  Intact  Nose:  Intact  Lips:  Intact    Device Skin Pressure Protection:  Positioning supports utilized    Nurse Notification:  Lizette Taveras, RRT

## 2024-02-26 NOTE — THERAPY TREATMENT NOTE
Acute Care - Physical Therapy Treatment Note   [place]     Patient Name: Falguni Minaya  : 1960  MRN: [id number]  Today's Date: 2024      Visit Dx:     ICD-10-CM ICD-9-CM   1. Acute respiratory failure with hypoxia  J96.01 518.81   2. Pneumonia due to infectious organism, unspecified laterality, unspecified part of lung  J18.9 486   3. Difficulty in walking  R26.2 719.7   4. Decreased activities of daily living (ADL)  Z78.9 V49.89     Patient Active Problem List   Diagnosis    Multiple sclerosis    Left sided abdominal pain    Dyspepsia    Heme positive stool    Hypotension    Seizure disorder    Pain in lower back    Headache    Frequent falls    Spasm    Weakness of right leg    S/P drug eluting coronary stent placement    Coronary artery disease involving native coronary artery of native heart    Ischemic cardiomyopathy    Pleural effusion due to CHF (congestive heart failure)    Shortness of breath    Orthopnea    Hyperlipidemia LDL goal <70    Anemia due to stage 3 chronic kidney disease    Iron deficiency    Anemia in chronic kidney disease    Arthropathy of lumbar facet joint    Marie's esophagus    Benign colonic polyp    Congestive heart failure    Degeneration of lumbar intervertebral disc    Gastroesophageal reflux disease    Gastroparesis    Lumbar radiculopathy    Osteoporosis    Sacroiliac joint pain    Serum creatinine raised    Vitamin D deficiency    Acute hypoxemic respiratory failure    Acute on chronic hypoxic respiratory failure    Diastolic CHF, acute on chronic    Acute on chronic respiratory failure with hypoxia     Past Medical History:   Diagnosis Date    Abdominal hernia     Acute ST elevation myocardial infarction (STEMI) due to occlusion of right coronary artery 2020    CHF (congestive heart failure)     DENIES CP GETS SOA WITH EXERTION. FOLLOWED BY DR ERIC/KASIA GREY. DECREASED ACTIVITY USES WALKER/ROLLATOR    CKD (chronic kidney disease)     Coronary artery  disease     Dyspepsia     GERD (gastroesophageal reflux disease)     Hyperlipidemia     Hypertension     Ischemic cardiomyopathy     Multiple sclerosis      Past Surgical History:   Procedure Laterality Date    APPENDECTOMY      CARDIAC CATHETERIZATION      CARDIAC CATHETERIZATION N/A 2/24/2020    Procedure: Left Heart Cath;  Surgeon: Marlon Zamudio MD;  Location: [facility] CATH INVASIVE LOCATION;  Service: Cardiovascular;  Laterality: N/A;    CARDIAC CATHETERIZATION N/A 2/24/2020    Procedure: Stent LATRICIA coronary;  Surgeon: Marlon Zamudio MD;  Location: [facility] CATH [facility]VASIVE LOCATION;  Service: Cardiovascular;  Laterality: N/A;    CARDIAC CATHETERIZATION  2/24/2020    Procedure: Percutaneous Manual Thrombectomy;  Surgeon: Marlon Zamudio MD;  Location: [facility] CATH [facility]VASIVE LOCATION;  Service: Cardiovascular;;    CARDIAC CATHETERIZATION N/A 2/24/2020    Procedure: Coronary angiography;  Surgeon: Marlon Zamudio MD;  Location: [facility] CATH INVASIVE LOCATION;  Service: Cardiovascular;  Laterality: N/A;    CARDIAC CATHETERIZATION N/A 2/24/2020    Procedure: Left ventriculography;  Surgeon: Marlon Zamudio MD;  Location: [facility] CATH [facility]VASIVE LOCATION;  Service: Cardiovascular;  Laterality: N/A;    CHOLECYSTECTOMY      COLONOSCOPY N/A 2/29/2020    Procedure: COLONOSCOPY to  cecum:;  Surgeon: Krystal Sarabia MD;  Location: [facility] ENDOSCOPY;  Service: Gastroenterology;  Laterality: N/A;  pre:  anemia and abd pain  post:  hemorrhoids, tortuous colon    ENDOSCOPY N/A 2/29/2020    Procedure: ESOPHAGOGASTRODUODENOSCOPY  with biopsies;  Surgeon: Krystal Sarabia MD;  Location: [facility] ENDOSCOPY;  Service: Gastroenterology;  Laterality: N/A;  pre:  anemia and abd pain  post:  mild gastritis,     HYSTERECTOMY      PARATHYROIDECTOMY Bilateral 10/17/2023    Procedure: NECK EXPLORATION WITH PARATHYROID ADENOMA EXCISION AND FROZEN SECTION,, RECURRENT LARYNGEAL NERVE MONITORING, INTRAOPERATIVE INTACT PTH ASSAY;   Surgeon: Ezekiel Giles MD;  Location: [facility] MAIN OR;  Service: ENT;  Laterality: Bilateral;    TONSILLECTOMY       PT Assessment (last 12 hours)       PT Evaluation and Treatment       Row Name 02/26/24 1400          Physical Therapy Time and Intention    Subjective Information complains of;weakness (P)   -DG     Document Type therapy note (daily note) (P)   -DG     Mode of Treatment individual therapy;physical therapy (P)   -DG     Patient Effort good (P)   -DG     Symptoms Noted During/After Treatment shortness of breath (P)   -DG       Row Name 02/26/24 1400          Bed Mobility    Bed Mobility supine-sit-supine (P)   -DG     Supine-Sit [place] (Bed Mobility) minimum assist (75% patient effort) (P)   -DG     Sit-Supine [place] (Bed Mobility) minimum assist (75% patient effort) (P)   -DG     Supine-Sit-Supine [place] (Bed Mobility) minimum assist (75% patient effort) (P)   -DG       Row Name 02/26/24 1400          Transfers    Comment, (Transfers) Unable to attempt d/t low BP (P)   -DG       Row Name 02/26/24 1400          Gait/Stairs (Locomotion)    Patient was able to Ambulate no, other medical factors prevent ambulation (P)   -DG     Reason Patient was unable to Ambulate Hypoxia/Respiratory Distress (P)   -DG       Row Name 02/26/24 1400          Balance    Balance Assessment sitting static balance (P)   -DG     Static Sitting Balance minimal assist (P)   -DG     Comment, Balance Pt could only tolerate 1 minute of sitting on the EOB before inc. dyspnea/weakness - pt had to return to supine (P)   -DG     Additional Documentation Position, Sitting Balance (Row) (P)   -DG       Row Name             Wound 02/10/24 0816 Bilateral upper thigh MASD (Moisture associated skin damage)    Wound - Properties Group Placement Date: 02/10/24  -KP Placement Time: 0816  -KP Present on Original Admission: N  -KP Side: Bilateral  -KP Orientation: upper  -KP Location: thigh  -KP Primary Wound Type: MASD  -KP     Retired Wound - Properties Group Placement Date: 02/10/24  -KP Placement Time: 0816  -KP Present on Original Admission: N  -KP Side: Bilateral  -KP Orientation: upper  -KP Location: thigh  -KP Primary Wound Type: MASD  -KP    Retired Wound - Properties Group Date first assessed: 02/10/24  -KP Time first assessed: 0816  -KP Present on Original Admission: N  -KP Side: Bilateral  -KP Location: thigh  -KP Primary Wound Type: MASD  -KP      Row Name             Wound 02/20/24 1405 gluteal MASD (Moisture associated skin damage)    Wound - Properties Group Placement Date: 02/20/24  -MILE Placement Time: 1405  -MILE Present on Original Admission: Y  -MILE Location: gluteal  -MILE Primary Wound Type: MASD  -MILE    Retired Wound - Properties Group Placement Date: 02/20/24  -MILE Placement Time: 1405  -MILE Present on Original Admission: Y  -MILE Location: gluteal  -MILE Primary Wound Type: MASD  -MILE    Retired Wound - Properties Group Date first assessed: 02/20/24  -MILE Time first assessed: 1405  -MILE Present on Original Admission: Y  -MILE Location: gluteal  -MILE Primary Wound Type: MASD  -MILE      Row Name 02/26/24 1400          Progress Summary (PT)    Progress Toward Functional Goals (PT) progress toward functional goals is gradual (P)   -DG     Daily Progress Summary (PT) Patient continues to present with deficits in strength, balance, and activity tolerance/endurance during functional mobility. Pt main limitation continues to be hypoxia. Notified nsg of low O2 sat levels during tf's and returned pt back in a supine position. Skilled PT is required to address the ongoing deficits. (P)   -DG               User Key  (r) = Recorded By, (t) = Taken By, (c) = Cosigned By      Initials Name Provider Type    Ruby Nicole, RN Registered Nurse    Leia Zaragoza, RN Registered Nurse    Mirian June, PT Student PT Student                      PT Recommendation and Plan  Anticipated Discharge Disposition (PT): sub acute care  setting  Planned Therapy Interventions (PT): balance training, bed mobility training, gait training, patient/family education, strengthening, transfer training  Therapy Frequency (PT): daily  Progress Summary (PT)  Progress Toward Functional Goals (PT): (P) progress toward functional goals is gradual  Daily Progress Summary (PT): (P) Patient continues to present with deficits in strength, balance, and activity tolerance/endurance during functional mobility. Pt main limitation continues to be hypoxia. Notified nsg of low O2 sat levels during tf's and returned pt back in a supine position. Skilled PT is required to address the ongoing deficits.  Plan of Care Reviewed With: patient  Outcome Evaluation: Patient presents with deficits in strength, balance, activity tolerance/endurance, and functional mobility with dyspnea as the main limiting factor. Skilled PT services are required to address these areas and return to prior level of function.   Outcome Measures       Row Name 02/26/24 1400             How much help from another person do you currently need...    Turning from your back to your side while in flat bed without using bedrails? 4 (P)   -DG      Moving from lying on back to sitting on the side of a flat bed without bedrails? 3 (P)   -DG      Moving to and from a bed to a chair (including a wheelchair)? 3 (P)   -DG      Standing up from a chair using your arms (e.g., wheelchair, bedside chair)? 3 (P)   -DG      Climbing 3-5 steps with a railing? 2 (P)   -DG      To walk in hospital room? 2 (P)   -DG      AM-PAC 6 Clicks Score (PT) 17 (P)   -DG      Highest Level of Mobility Goal 5 --> Static standing (P)   -DG         Functional Assessment    Outcome Measure Options AM-PAC 6 Clicks Basic Mobility (PT) (P)   -DG                User Key  (r) = Recorded By, (t) = Taken By, (c) = Cosigned By      Initials Name Provider Type    Mirian June, PT Student PT Student                     Time Calculation:    PT  Charges       Row Name 02/26/24 1432             Time Calculation    PT Received On 02/26/24 (P)   -DG         Timed Charges    17848 - PT Therapeutic Activity Minutes 10 (P)   -DG         Total Minutes    Timed Charges Total Minutes 10 (P)   -DG       Total Minutes 10 (P)   -DG                User Key  (r) = Recorded By, (t) = Taken By, (c) = Cosigned By      Initials Name Provider Type    Mirian June, PT Student PT Student                  Therapy Charges for Today       Code Description Service Date Service Provider Modifiers Qty    [id number]  PT THERAPEUTIC ACT EA 15 MIN 2/26/2024 Mirian Larkin, PT Student GP 1            PT G-Codes  Outcome Measure Options: (P) AM-PAC 6 Clicks Basic Mobility (PT)  AM-PAC 6 Clicks Score (PT): (P) 17  AM-PAC 6 Clicks Score (OT): 16    Mirian Larkin PT Student  2/26/2024

## 2024-02-26 NOTE — PROGRESS NOTES
[facility]   Hospitalist Progress Note  Date: 2024  Patient Name: Falguni Minaya  : 1960  MRN: [id number]  Date of admission: 2024  Consultants:   -Pulmonology: Dr. Derrell Tomlin, Dr. Isreal Pedraza, Dr. Aduelia Duarte    Subjective   Subjective     Chief Complaint: Shortness of breath    Summary:   Falguni Minaya is a 64 y.o. female with chronic diastolic heart failure, CAD, dysphagia, essential hypertension, hyperlipidemia, history of MS and CKD stage IIIb presented with complaints of shortness of breath.  Empiric antibiotics, nebulizer breathing treatments, steroid therapy and IV diuresis initiated.  Pulmonology consulted.  Infectious workup was nonrevealing and antibiotics were discontinued.  ILD workup initiated.  Patient continued to have a submental O2 requirement, proBNP rechecked and noted to be significantly elevated, IV diuresis restarted.    Interval Followup:   No acute events overnight.  Patient states she is feeling a little bit better today than yesterday in terms of her breathing but that is still very labored.  Patient was given IV Lasix and 1.7 L of urinary output charted.  She denies any active chest pain.  Nursing with no additional acute issues to report.    Objective   Objective     Vitals:   Temp:  [97.8 °F (36.6 °C)-98.6 °F (37 °C)] 98.2 °F (36.8 °C)  Heart Rate:  [] 87  Resp:  [14-24] 22  BP: (108-129)/(63-86) 127/83  Flow (L/min):  [40-60] 40  Physical Exam   Gen: Acutely ill-appearing chronically ill female, alert, pleasant, sitting up in bed  Resp: Poor aeration, scattered crackles, conversational dyspnea appreciated  Card: RRR, No m/r/g  Abd: Soft, Nontender, Nondistended, + bowel sounds    Result Review    Result Review:  I have personally reviewed the results as below and agree with these findings:  []  Laboratory:   CMP          2024    02:40 2024    04:02 2024    04:24   CMP   Glucose 146  143  180    BUN 48  50  54    Creatinine 1.78  1.72   1.65    EGFR 31.4  32.7  34.4    Sodium 137  137  135    Potassium 4.4  3.7  3.6    Chloride 98  97  94    Calcium 9.0  9.0  9.3    BUN/Creatinine Ratio 27.0  29.1  32.7    Anion Gap 15.2  14.3  16.4      CBC          2/24/2024    02:40 2/25/2024    04:02 2/26/2024    04:24   CBC   WBC 11.78  11.17  12.46    RBC 3.08  3.12  3.26    Hemoglobin 9.2  9.3  9.9    Hematocrit 29.8  30.1  31.4    MCV 96.8  96.5  96.3    MCH 29.9  29.8  30.4    MCHC 30.9  30.9  31.5    RDW 16.7  16.2  15.9    Platelets 356  389  369    Phosphorus and magnesium within normal limits  []  Microbiology:   []  Radiology:   [x]  EKG/Telemetry:    []  Cardiology/Vascular:    []  Pathology:  []  Old records:  []  Other:    Assessment & Plan   Assessment / Plan     Assessment:  Acute on chronic diastolic heart failure  Acute hypoxic respiratory failure secondary to above  Acute cardiogenic pulmonary edema  NSTEMI likely type II  Type 2 diabetes mellitus with hyperglycemia  Lactic acidosis, clinically significant  Hypocalcemia  Hypokalemia  Acute on chronic kidney disease stage IIIb (baseline creatinine 1.0-1.2)  Immunocompromise status  Essential hypertension    Plan:  -Pulmonology consulted and following, appreciate assistance and recommendations in the care of this patient.  -Continue BiPAP and Airvo.  Wean supplemental O2 as much as possible to maintain sats greater than 90%  -Continue Brovana, Pulmicort and DuoNebs  -Continue diuresis with IV Lasix 40 mg twice daily.  Close monitoring of electrolytes and renal function necessary given IV diuresis  -Continue Solu-Medrol  -Encouraged use of incentive spirometer and flutter valve  -Continue chest physiotherapy  -Continue midodrine  -No change to insulin regimen  -Management discussed with pulmonology.  Continue with nebulizer breathing treatments and steroid therapy  -Out of care consulted  -Continue home Prozac, Neurontin and Requip  -Continue holding home beta-blocker  -Continue aspirin,  atorvastatin, Plavix  -PT/OT consulted  -Monitor electrolytes and renal function with BMP, phosphorus level and magnesium level in the AM  -Monitor WBC and Hgb with CBC in the AM  -Clinical course will dictate further management     DVT Prophylaxis: Lovenox  GI Prophylaxis: Pantoprazole  Diet:   Diet Order   Procedures    Diet: Regular/House Diet; Texture: Regular Texture (IDDSI 7); Fluid Consistency: Thin (IDDSI 0)     Dispo: Regular  Code Status: DNR     Personally reviewed patients labs and imaging, discussed with patient and nurse at bedside. Discussed management with the following consultants: Pulmonology.     Part of this note may be an electronic transcription/translation of spoken language to printed text using the Dragon dictation system.    DVT prophylaxis:  Medical DVT prophylaxis orders are present.      CODE STATUS:   Medical Intervention Limits: NO intubation (DNI)  Level Of Support Discussed With: Patient  Code Status (Patient has no pulse and is not breathing): No CPR (Do Not Attempt to Resuscitate)  Medical Interventions (Patient has pulse or is breathing): Limited Support    Electronically signed by Mark Smith MD, 02/26/24, 10:30 AM EST.

## 2024-02-26 NOTE — PROGRESS NOTES
Pulmonary / Critical Care Progress Note      Patient Name: Falguni Minaya  : 1960  MRN: [id number]  Attending:  Mark Smith MD  Date of admission: 2024    Subjective   Subjective   Follow-up for acute on chronic hypoxic respiratory failure requiring high flow nasal cannula     No acute events overnight     This morning,  Resting comfortably in bed   Family at bedside  Oxygen requirements slowly decreasing  Currently on Airvo 40 L / 75% FiO2  Dyspnea slowly improving  Remains weak and fatigued  Occasional cough with scant sputum  Diuresis resumed  1750 mL UOP/24 hours      Objective   Objective     Vitals:   Temp:  [97.8 °F (36.6 °C)-98.2 °F (36.8 °C)] 98.1 °F (36.7 °C)  Heart Rate:  [] 99  Resp:  [14-28] 28  BP: (108-129)/(64-86) 123/85  Flow (L/min):  [40] 40    Physical Exam   Vital Signs Reviewed   General: Acutely ill-appearing, elderly female, Alert, NAD, lying in bed.    Chest:  good aeration, crackles to auscultation bilaterally, improved work of breathing noted on Airvo  CV: regular rate and rhythm regular  EXT:  no clubbing, no cyanosis, BLE edema  Neuro:  A&Ox3, moving all 4 extremities spontaneously  Skin: No rashes or lesions noted    Result Review    Result Review:  I have personally reviewed the results from the time of this admission to 2024 15:09 EST and agree with these findings:  [x]  Laboratory  [x]  Microbiology  [x]  Radiology  []  EKG/Telemetry   []  Cardiology/Vascular   []  Pathology  []  Old records  []  Other:  Most notable findings include:   proBNP 10,048      Lab 24  0424 24  0402 24  0240 24  0406 24  0416 24  1117 24  0445 24  0407   WBC 12.46* 11.17* 11.78* 10.69 13.99* 20.01*  --  8.18   HEMOGLOBIN 9.9* 9.3* 9.2* 8.8* 8.9* 9.5*  --  8.1*   HEMATOCRIT 31.4* 30.1* 29.8* 27.9* 28.5* 29.9*  --  25.5*   PLATELETS 369 389 356 308 298 356  --  223   SODIUM 135* 137 137 135* 136  --  135* 136   POTASSIUM 3.6 3.7 4.4  4.3 4.8  --  4.2 3.2*   CHLORIDE 94* 97* 98 97* 101  --  101 99   CO2 24.6 25.7 23.8 22.8 22.1  --  22.7 23.6   BUN 54* 50* 48* 45* 41*  --  36* 24*   CREATININE 1.65* 1.72* 1.78* 1.80* 1.83*  --  1.49* 1.27*   GLUCOSE 180* 143* 146* 149* 122*  --  153* 160*   CALCIUM 9.3 9.0 9.0 9.0 9.2  --  8.9 8.3*   PHOSPHORUS 2.9 3.0 3.8 4.2 3.6  --  3.7 4.0   ALBUMIN  --   --   --  2.9* 2.7*  --  2.4* 2.2*     XR Chest 1 View    Result Date: 2/24/2024  PROCEDURE: XR CHEST 1 VW  COMPARISONS: 2/21/2024; 2/19/2024.  INDICATIONS: Hypoxia; dyspnea; imaging f/u.  FINDINGS: A single AP upright portable view of the chest is provided for review.  Bilateral infiltrates are seen.  The findings may represent infectious multifocal pneumonia.  There may be borderline cardiac enlargement.  No pneumothorax.  No pneumomediastinum.  No pleural effusion.  There is slight pulmonary hypoinflation.  External artifacts obscure detail.  The patient has undergone cholecystectomy.  The thoracic aorta is atherosclerotic.  There is slight chronic asymmetric elevation of the right diaphragm.  Chronic calcified granulomatous disease of the chest is suggested.      Impression:  Bilateral infiltrates persist.  The findings may represent age-indeterminate infectious multifocal pneumonia.  There are slightly lower lung volumes than on the 2/21/2024 study.  Please see above comments for further detail.       Please note that portions of this note were completed with a voice recognition program.  NAIMA RICHEY JR, MD       Electronically Signed and Approved By: NAIMA RICHEY JR, MD on 2/24/2024 at 6:44                 Assessment & Plan   Assessment / Plan     Active Hospital Problems:  Active Hospital Problems    Diagnosis     **Acute on chronic hypoxic respiratory failure     Diastolic CHF, acute on chronic     Acute on chronic respiratory failure with hypoxia    Impression:    Acute hypoxic respiratory failure requiring Airvo and BiPAP  Acute decompensated  congestive diastolic heart failure  NSTEMI likely type II  Acute cardiogenic pulmonary edema  Volume overload  T2DM with hyperglycemia  Continue acidosis, clinically significant  Hypokalemia  Hypocalcemia  History of multiple sclerosis  Immunocompromised status     Plan:  -Respiratory status remains tenuous but slowly improving  -Remains on Airvo, continue to maintain SpO2 greater than 90%  -2/24 CXR shows persistent infiltrate  -D-dimer 0.93.  Lower extremity duplex negative for DVT  -proBNP 10,048, continue Lasix 40 mg IV twice daily.    -Creatinine improved slightly. Continue to trend electrolytes and renal panel.  -Antibiotics discontinued.  Micro negative.  Procalcitonin negative.  -Continue Brovana, Pulmicort, DuoNebs  -Continue to monitor renal panel and electrolytes.  Replace electrolytes as needed.  -Echocardiogram with EF 56 to 60%, trivial pericardial effusion and grade 1 diastolic dysfunction  -Continue Solu-Medrol 40 mg IV every 8 hours  -Continue bronchopulmonary hygiene.  Encourage I-S and flutter  -Continue MetaNebs and chest physiotherapy  -Encourage activity as tolerated.  Out of bed to chair  -PT/OT on board.  Appreciate assistance  -Palliative care following, appreciate input    Electronically signed by Audelia Duarte MD, 02/26/24, 3:44 PM EST.           DVT prophylaxis:  Medical DVT prophylaxis orders are present.    CODE STATUS:   Medical Intervention Limits: NO intubation (DNI)  Level Of Support Discussed With: Patient  Code Status (Patient has no pulse and is not breathing): No CPR (Do Not Attempt to Resuscitate)  Medical Interventions (Patient has pulse or is breathing): Limited Support    Labs, imaging, microbiology, notes and medications personally reviewed  Discussed with primary.      Electronically signed by WESTLEY Mccormack, 02/26/24, 3:10 PM EST.

## 2024-02-26 NOTE — THERAPY EVALUATION
RT EQUIPMENT DEVICE RELATED - SKIN ASSESSMENT    RT Medical Equipment/Device:     High Flow Nasal Cannula:Airvo    Skin Assessment:      Cheek:  Intact  Ears:  Intact  Nares:  Intact    Device Skin Pressure Protection:  Pressure points protected    Nurse Notification:  Lizette Calzada, RRT

## 2024-02-26 NOTE — PLAN OF CARE
Goal Outcome Evaluation:  Plan of Care Reviewed With: patient           Outcome Evaluation: Patient did not wear bipap last night. She was comfortable on the Airvo and I was able to wean her airvo down to 40L & 78%. Patient tolerating well.

## 2024-02-26 NOTE — PLAN OF CARE
Goal Outcome Evaluation:   Patient did not wear Bipap tonight. She remained on the Airvo at 40L and 88%. Unable to titrate at this time but will continue to wean as tolerated.

## 2024-02-27 NOTE — PROGRESS NOTES
[facility]   Hospitalist Progress Note  Date: 2024  Patient Name: Falguni Minaya  : 1960  MRN: [id number]  Date of admission: 2024  Consultants:   -Pulmonology: Dr. Derrell Tomlin, Dr. Isreal Pedraza, Dr. Audelia Duarte    Subjective   Subjective     Chief Complaint: Shortness of breath    Summary:   Falguni Minaya is a 64 y.o. female with chronic diastolic heart failure, CAD, dysphagia, essential hypertension, hyperlipidemia, history of MS and CKD stage IIIb presented with complaints of shortness of breath.  Empiric antibiotics, nebulizer breathing treatments, steroid therapy and IV diuresis initiated.  Pulmonology consulted.  Infectious workup was nonrevealing and antibiotics were discontinued.  ILD workup initiated.  Patient continued to have a submental O2 requirement, proBNP rechecked and noted to be significantly elevated, IV diuresis restarted.    Interval Followup:   No acute events overnight.  No acute distress.  Patient is resting in the bedside chair today.  She has high flow nasal cannula in place and feels like she is doing a little bit better.  She reports that she was working with physical therapy while at rehab and would like to continue.  Her  is at bedside.    Objective   Objective     Vitals:   Temp:  [98.2 °F (36.8 °C)-98.6 °F (37 °C)] 98.2 °F (36.8 °C)  Heart Rate:  [] 83  Resp:  [20-38] 20  BP: (118-145)/(60-84) 131/79  Flow (L/min):  [40-50] 50  Physical Exam   Gen: Acutely ill-appearing chronically ill female, alert, pleasant,  Resp: Poor aeration, scattered crackles, conversational dyspnea appreciated  Card: RRR  Abd: Soft, Nontender, Nondistended, + bowel sounds    Result Review    Result Review:  I have personally reviewed the results as below and agree with these findings:  [x]  Laboratory:   CMP          2024    04:02 2024    04:24 2024    04:01   CMP   Glucose 143  180  184    BUN 50  54  60    Creatinine 1.72  1.65  1.64    EGFR 32.7  34.4   34.6    Sodium 137  135  136    Potassium 3.7  3.6  3.2    Chloride 97  94  93    Calcium 9.0  9.3  9.4    BUN/Creatinine Ratio 29.1  32.7  36.6    Anion Gap 14.3  16.4  18.5      CBC          2/25/2024    04:02 2/26/2024    04:24 2/27/2024    04:01   CBC   WBC 11.17  12.46  15.48    RBC 3.12  3.26  3.33    Hemoglobin 9.3  9.9  10.0    Hematocrit 30.1  31.4  31.4    MCV 96.5  96.3  94.3    MCH 29.8  30.4  30.0    MCHC 30.9  31.5  31.8    RDW 16.2  15.9  15.7    Platelets 389  369  435    Phosphorus and magnesium within normal limits  [x]  Microbiology:   [x]  Radiology:   [x]  EKG/Telemetry:    []  Cardiology/Vascular:    []  Pathology:  []  Old records:  []  Other:    Assessment & Plan   Assessment / Plan     Assessment:  Acute on chronic diastolic heart failure  Acute hypoxic respiratory failure secondary to above  Acute cardiogenic pulmonary edema  NSTEMI likely type II  Type 2 diabetes mellitus with hyperglycemia  Lactic acidosis, clinically significant  Hypocalcemia  Hypokalemia  Acute on chronic kidney disease stage IIIb (baseline creatinine 1.0-1.2)  Immunocompromise status  Essential hypertension    Plan:  -Pulmonology consulted and following, appreciate assistance and recommendations in the care of this patient.  -Continue BiPAP and Airvo.  Wean supplemental O2 as much as possible to maintain sats greater than 90%  -Continue Brovana, Pulmicort and DuoNebs  -Continue diuresis with IV Lasix 40 mg twice daily.  Close monitoring of electrolytes and renal function necessary given IV diuresis  -Continue Solu-Medrol  -Encouraged use of incentive spirometer and flutter valve  -Continue chest physiotherapy  -Continue midodrine  -Accu-Cheks and sliding scale insulin  -Out of care consulted  -Continue home Prozac, Neurontin and Requip  -Continue holding home beta-blocker  -Continue aspirin, atorvastatin, Plavix  -PT/OT consulted  -Monitor electrolytes and renal function with BMP, phosphorus level and magnesium level  in the AM  -Monitor WBC and Hgb with CBC in the AM  -Repeat chest x-ray tomorrow       DVT Prophylaxis: Lovenox  GI Prophylaxis: Pantoprazole  Dispo: Patient requesting to go back to rehab at discharge.  Code Status: DNR         DVT prophylaxis:  Medical DVT prophylaxis orders are present.      CODE STATUS:   Medical Intervention Limits: NO intubation (DNI)  Level Of Support Discussed With: Patient  Code Status (Patient has no pulse and is not breathing): No CPR (Do Not Attempt to Resuscitate)  Medical Interventions (Patient has pulse or is breathing): Limited Support        Electronically signed by Teresa Castro DO, 02/27/24, 3:39 PM EST.

## 2024-02-27 NOTE — PROGRESS NOTES
Pulmonary / Critical Care Progress Note      Patient Name: Falguni Minaya  : 1960  MRN: [id number]  Attending:  Teresa Castro*  Date of admission: 2024    Subjective   Subjective   Follow-up for acute on chronic hypoxic respiratory failure requiring high flow nasal cannula     No acute events overnight     This morning,  Sitting up in chair  Family at bedside  Oxygen requirements continue to improve  Currently on Airvo 50 L / 83% FiO2  Decreased to 50 L / 60% FiO2  Dyspnea slowly improving  Remains weak and fatigued  Occasional cough with scant sputum  Diuresing well  1450 mL UOP/24 hours  No fever or chills    Objective   Objective     Vitals:   Temp:  [98.1 °F (36.7 °C)-98.6 °F (37 °C)] 98.1 °F (36.7 °C)  Heart Rate:  [] 83  Resp:  [20-38] 20  BP: (118-145)/(60-87) 123/87  Flow (L/min):  [40-50] 50    Physical Exam   Vital Signs Reviewed   General: Acutely ill-appearing, elderly female, Alert, NAD, sitting up in chair  Chest:  good aeration, clear to auscultation bilaterally, improved work of breathing noted on Airvo  CV: regular rate and rhythm regular  EXT:  no clubbing, no cyanosis, BLE edema  Neuro:  A&Ox3, moving all 4 extremities spontaneously  Skin: No rashes or lesions noted    Result Review    Result Review:  I have personally reviewed the results from the time of this admission to 2024 15:38 EST and agree with these findings:  [x]  Laboratory  [x]  Microbiology  [x]  Radiology  []  EKG/Telemetry   []  Cardiology/Vascular   []  Pathology  []  Old records  []  Other:  Most notable findings include:   proBNP 10,048      Lab 24  0401 24  0424 24  0402 24  0240 24  0406 24  0416 24  1117 24  0445   WBC 15.48* 12.46* 11.17* 11.78* 10.69 13.99* 20.01*  --    HEMOGLOBIN 10.0* 9.9* 9.3* 9.2* 8.8* 8.9* 9.5*  --    HEMATOCRIT 31.4* 31.4* 30.1* 29.8* 27.9* 28.5* 29.9*  --    PLATELETS 435 369 389 356 308 298 356  --    SODIUM 136 135*  137 137 135* 136  --  135*   POTASSIUM 3.2* 3.6 3.7 4.4 4.3 4.8  --  4.2   CHLORIDE 93* 94* 97* 98 97* 101  --  101   CO2 24.5 24.6 25.7 23.8 22.8 22.1  --  22.7   BUN 60* 54* 50* 48* 45* 41*  --  36*   CREATININE 1.64* 1.65* 1.72* 1.78* 1.80* 1.83*  --  1.49*   GLUCOSE 184* 180* 143* 146* 149* 122*  --  153*   CALCIUM 9.4 9.3 9.0 9.0 9.0 9.2  --  8.9   PHOSPHORUS 3.2 2.9 3.0 3.8 4.2 3.6  --  3.7   ALBUMIN  --   --   --   --  2.9* 2.7*  --  2.4*       Assessment & Plan   Assessment / Plan     Active Hospital Problems:  Active Hospital Problems    Diagnosis     **Acute on chronic hypoxic respiratory failure     Diastolic CHF, acute on chronic     Acute on chronic respiratory failure with hypoxia    Impression:    Acute hypoxic respiratory failure requiring Airvo and BiPAP  Acute decompensated congestive diastolic heart failure  NSTEMI likely type II  Acute cardiogenic pulmonary edema  Volume overload  T2DM with hyperglycemia  Continue acidosis, clinically significant  Hypokalemia  Hypocalcemia  History of multiple sclerosis  Immunocompromised status     Plan:  -Respiratory status remains tenuous but slowly improving  -Remains on Airvo, continue to maintain SpO2 greater than 90%  -2/24 CXR shows persistent infiltrate  -Lower extremity duplex negative for DVT  -Continue Lasix 40 mg IV twice daily.    -Continue to trend electrolytes and renal panel.  Replace electrolytes as needed.  -Continue Brovana, Pulmicort, DuoNebs  -Continue to monitor renal panel and electrolytes.  Replace electrolytes as needed.  -Echocardiogram with EF 56 to 60%, trivial pericardial effusion and grade 1 diastolic dysfunction  -Solu-Medrol decreased to 40 mg IV twice daily  -Continue bronchopulmonary hygiene.  Encourage I-S and flutter  -Continue MetaNebs and chest physiotherapy  -Encourage activity as tolerated.  Out of bed to chair  -PT/OT on board.  Appreciate assistance  -Palliative care following, appreciate input    Electronically signed  by Audelia Duarte MD, 02/27/24, 3:53 PM EST.    DVT prophylaxis:  Medical DVT prophylaxis orders are present.    CODE STATUS:   Medical Intervention Limits: NO intubation (DNI)  Level Of Support Discussed With: Patient  Code Status (Patient has no pulse and is not breathing): No CPR (Do Not Attempt to Resuscitate)  Medical Interventions (Patient has pulse or is breathing): Limited Support    Labs, imaging, microbiology, notes and medications personally reviewed  Discussed with primary.      Electronically signed by WESTLEY Mccormack, 02/27/24, 3:38 PM EST.

## 2024-02-27 NOTE — THERAPY TREATMENT NOTE
Acute Care - Physical Therapy Treatment Note   [place]     Patient Name: Falguni Minaya  : 1960  MRN: [id number]  Today's Date: 2024      Visit Dx:     ICD-10-CM ICD-9-CM   1. Acute respiratory failure with hypoxia  J96.01 518.81   2. Pneumonia due to infectious organism, unspecified laterality, unspecified part of lung  J18.9 486   3. Difficulty in walking  R26.2 719.7   4. Decreased activities of daily living (ADL)  Z78.9 V49.89     Patient Active Problem List   Diagnosis    Multiple sclerosis    Left sided abdominal pain    Dyspepsia    Heme positive stool    Hypotension    Seizure disorder    Pain in lower back    Headache    Frequent falls    Spasm    Weakness of right leg    S/P drug eluting coronary stent placement    Coronary artery disease involving native coronary artery of native heart    Ischemic cardiomyopathy    Pleural effusion due to CHF (congestive heart failure)    Shortness of breath    Orthopnea    Hyperlipidemia LDL goal <70    Anemia due to stage 3 chronic kidney disease    Iron deficiency    Anemia in chronic kidney disease    Arthropathy of lumbar facet joint    Marie's esophagus    Benign colonic polyp    Congestive heart failure    Degeneration of lumbar intervertebral disc    Gastroesophageal reflux disease    Gastroparesis    Lumbar radiculopathy    Osteoporosis    Sacroiliac joint pain    Serum creatinine raised    Vitamin D deficiency    Acute hypoxemic respiratory failure    Acute on chronic hypoxic respiratory failure    Diastolic CHF, acute on chronic    Acute on chronic respiratory failure with hypoxia     Past Medical History:   Diagnosis Date    Abdominal hernia     Acute ST elevation myocardial infarction (STEMI) due to occlusion of right coronary artery 2020    CHF (congestive heart failure)     DENIES CP GETS SOA WITH EXERTION. FOLLOWED BY DR ERIC/KASIA GREY. DECREASED ACTIVITY USES WALKER/ROLLATOR    CKD (chronic kidney disease)     Coronary artery  disease     Dyspepsia     GERD (gastroesophageal reflux disease)     Hyperlipidemia     Hypertension     Ischemic cardiomyopathy     Multiple sclerosis      Past Surgical History:   Procedure Laterality Date    APPENDECTOMY      CARDIAC CATHETERIZATION      CARDIAC CATHETERIZATION N/A 2/24/2020    Procedure: Left Heart Cath;  Surgeon: Marlon Zamudio MD;  Location: [facility] CATH INVASIVE LOCATION;  Service: Cardiovascular;  Laterality: N/A;    CARDIAC CATHETERIZATION N/A 2/24/2020    Procedure: Stent LATRICIA coronary;  Surgeon: Marlon Zamudio MD;  Location: [facility] CATH INVASIVE LOCATION;  Service: Cardiovascular;  Laterality: N/A;    CARDIAC CATHETERIZATION  2/24/2020    Procedure: Percutaneous Manual Thrombectomy;  Surgeon: Marlon Zamudio MD;  Location: [facility] CATH INVASIVE LOCATION;  Service: Cardiovascular;;    CARDIAC CATHETERIZATION N/A 2/24/2020    Procedure: Coronary angiography;  Surgeon: Marlon Zamudio MD;  Location: [facility] CATH INVASIVE LOCATION;  Service: Cardiovascular;  Laterality: N/A;    CARDIAC CATHETERIZATION N/A 2/24/2020    Procedure: Left ventriculography;  Surgeon: Marlon Zamudio MD;  Location: [facility] CATH INVASIVE LOCATION;  Service: Cardiovascular;  Laterality: N/A;    CHOLECYSTECTOMY      COLONOSCOPY N/A 2/29/2020    Procedure: COLONOSCOPY to  cecum:;  Surgeon: Krystal Sarabia MD;  Location: [facility] ENDOSCOPY;  Service: Gastroenterology;  Laterality: N/A;  pre:  anemia and abd pain  post:  hemorrhoids, tortuous colon    ENDOSCOPY N/A 2/29/2020    Procedure: ESOPHAGOGASTRODUODENOSCOPY  with biopsies;  Surgeon: Krystal Sarabia MD;  Location: [facility] ENDOSCOPY;  Service: Gastroenterology;  Laterality: N/A;  pre:  anemia and abd pain  post:  mild gastritis,     HYSTERECTOMY      PARATHYROIDECTOMY Bilateral 10/17/2023    Procedure: NECK EXPLORATION WITH PARATHYROID ADENOMA EXCISION AND FROZEN SECTION,, RECURRENT LARYNGEAL NERVE MONITORING, INTRAOPERATIVE INTACT PTH ASSAY;   Surgeon: Ezekiel Giles MD;  Location: [facility] MAIN OR;  Service: ENT;  Laterality: Bilateral;    TONSILLECTOMY       PT Assessment (last 12 hours)       PT Evaluation and Treatment       Row Name 02/27/24 1442 02/27/24 1107       Physical Therapy Time and Intention    Subjective Information complains of;weakness;fatigue  -RH complains of;fatigue;weakness  -JORGE (r) NM (t) JORGE (c)    Document Type therapy note (daily note)  -RH re-evaluation  -JORGE (r) NM (t) JORGE (c)    Mode of Treatment physical therapy;individual therapy  -RH individual therapy;physical therapy  -JORGE (r) NM (t) JORGE (c)    Patient Effort good  -RH adequate  -JORGE (r) NM (t) JORGE (c)    Symptoms Noted During/After Treatment fatigue  -RH fatigue;shortness of breath  -JORGE (r) NM (t) JORGE (c)      Row Name 02/27/24 1107          General Information    Patient Profile Reviewed yes  -JORGE (r) NM (t) JORGE (c)     Patient Observations agree to therapy  -JORGE (r) NM (t) JORGE (c)     Existing Precautions/Restrictions fall  -JORGE (r) NM (t) JORGE (c)     Barriers to Rehab none identified  -JORGE (r) NM (t) JORGE (c)       Row Name 02/27/24 1107          Pain    Pretreatment Pain Rating 0/10 - no pain  -JORGE (r) NM (t) JORGE (c)     Posttreatment Pain Rating 0/10 - no pain  -JORGE (r) NM (t) JORGE (c)       Row Name 02/27/24 1442          Pain Scale: FACES Pre/Post-Treatment    Pain: FACES Scale, Pretreatment 0-->no hurt  -RH     Posttreatment Pain Rating 0-->no hurt  -RH       Row Name 02/27/24 1107          Range of Motion (ROM)    Range of Motion bilateral lower extremities;ROM is WFL  -JORGE (r) NM (t) JORGE (c)       Row Name 02/27/24 1107          Strength (Manual Muscle Testing)    Strength (Manual Muscle Testing) bilateral lower extremities  4/5  -JORGE (r) NM (t) JORGE (c)       Row Name 02/27/24 1442 02/27/24 1107       Bed Mobility    Bed Mobility scooting/bridging;sit-supine  -RH --    Scooting/Bridging [place] (Bed Mobility) moderate assist (50% patient effort);maximum assist (25% patient effort)   -RH --    Supine-Sit [place] (Bed Mobility) -- minimum assist (75% patient effort)  -JORGE (r) NM (t) JORGE (c)    Sit-Supine [place] (Bed Mobility) moderate assist (50% patient effort);maximum assist (25% patient effort)  -RH minimum assist (75% patient effort)  -JORGE (r) NM (t) JORGE (c)    Supine-Sit-Supine [place] (Bed Mobility) -- minimum assist (75% patient effort)  -JORGE (r) NM (t) JORGE (c)    Bed Mobility, Safety Issues decreased use of legs for bridging/pushing  -RH decreased use of arms for pushing/pulling;decreased use of legs for bridging/pushing  -JORGE (r) NM (t) JORGE (c)    Assistive Device (Bed Mobility) bed rails  - bed rails;head of bed elevated  -JORGE (r) NM (t) JORGE (c)    Comment, (Bed Mobility) Pt maintains sitting with CGA.  - --      Row Name 02/27/24 1442 02/27/24 1107       Transfers    Transfers stand pivot/stand step transfer  - sit-stand transfer;stand-sit transfer;stand pivot/stand step transfer;chair-bed transfer  -JORGE (r) NM (t) JORGE (c)      Row Name 02/27/24 1107          Chair-Bed Transfer    Chair-Bed [place] (Transfers) maximum assist (25% patient effort)  -JORGE (r) NM (t) JORGE (c)     Assistive Device (Chair-Bed Transfers) other (see comments)  holding on to PT  -JORGE (r) NM (t) JORGE (c)       Row Name 02/27/24 1107          Sit-Stand Transfer    Sit-Stand [place] (Transfers) moderate assist (50% patient effort)  -JORGE (r) NM (t) JORGE (c)     Assistive Device (Sit-Stand Transfers) --  holding on to PT  -JORGE (r) NM (t) JORGE (c)       Row Name 02/27/24 1107          Stand-Sit Transfer    Stand-Sit [place] (Transfers) moderate assist (50% patient effort)  -JORGE (r) NM (t) JORGE (c)     Assistive Device (Stand-Sit Transfers) --  holding on to PT  -JORGE (r) NM (t) JORGE (c)       Row Name 02/27/24 1442 02/27/24 1107       Stand Pivot/Stand Step Transfer    Stand Pivot/Stand Step [place] (Transfers) 2 person assist  -RH maximum assist (25% patient effort)  -JORGE (r) NM (t) JORGE (c)    Assistive  Device (Stand Pivot Stand Step Transfer) --  Pt transfers without AD.  -RH --  holding on to PT  -JORGE (r) NM (t) JORGE (c)    Comment, (Stand Pivot Transfer) Pt bears majority of her weigth and takes shuffling steps to complete pivot transfer.  -RH --      Row Name 02/27/24 1107          Gait/Stairs (Locomotion)    Patient was able to Ambulate no, other medical factors prevent ambulation  -JORGE (r) NM (t) JORGE (c)     Reason Patient was unable to Ambulate Excessive Weakness  -JORGE (r) NM (t) JORGE (c)       Row Name 02/27/24 1107          Safety Issues, Functional Mobility    Impairments Affecting Function (Mobility) endurance/activity tolerance;shortness of breath;strength;balance  -JORGE (r) NM (t) JORGE (c)       Row Name 02/27/24 1442 02/27/24 1107       Balance    Dynamic Standing Balance 2-person assist  - maximum assist  -JORGE (r) NM (t) JORGE (c)    Position/Device Used, Standing Balance supported  - other (see comments)  holding on to PT  -JORGE (r) NM (t) JORGE (c)      Row Name             Wound 02/10/24 0816 Bilateral upper thigh MASD (Moisture associated skin damage)    Wound - Properties Group Placement Date: 02/10/24  -KP Placement Time: 0816  -KP Present on Original Admission: N  -KP Side: Bilateral  -KP Orientation: upper  -KP Location: thigh  -KP Primary Wound Type: MASD  -KP    Retired Wound - Properties Group Placement Date: 02/10/24  -KP Placement Time: 0816  -KP Present on Original Admission: N  -KP Side: Bilateral  -KP Orientation: upper  -KP Location: thigh  -KP Primary Wound Type: MASD  -KP    Retired Wound - Properties Group Date first assessed: 02/10/24  -KP Time first assessed: 0816  -KP Present on Original Admission: N  -KP Side: Bilateral  -KP Location: thigh  -KP Primary Wound Type: MASD  -KP      Row Name             Wound 02/20/24 1405 gluteal MASD (Moisture associated skin damage)    Wound - Properties Group Placement Date: 02/20/24  -MILE Placement Time: 1405  -MILE Present on Original Admission: LINDA NINO  Location: gluteal  -MILE Primary Wound Type: MASD  -MILE    Retired Wound - Properties Group Placement Date: 02/20/24  -MILE Placement Time: 1405  -MILE Present on Original Admission: Y  -MILE Location: gluteal  -MILE Primary Wound Type: MASD  -MILE    Retired Wound - Properties Group Date first assessed: 02/20/24  -MILE Time first assessed: 1405  -MILE Present on Original Admission: Y  -MILE Location: gluteal  -MILE Primary Wound Type: MASD  -MILE      Row Name 02/27/24 1442          Vital Signs    O2 Delivery Intra Treatment --  Pt on air-vo.  -RH     Post SpO2 (%) 92  -RH       Row Name 02/27/24 1442 02/27/24 1107       Positioning and Restraints    Pre-Treatment Position -- in bed  -JORGE (r) NM (t) JORGE (c)    Post Treatment Position bed  -RH chair  -JORGE (r) NM (t) JORGE (c)    In Bed supine;call light within reach;encouraged to call for assist;exit alarm on;with family/caregiver  -RH --    In Chair -- reclined;call light within reach;encouraged to call for assist;exit alarm on  -JORGE (r) NM (t) JORGE (c)      Row Name 02/27/24 1107          Therapy Assessment/Plan (PT)    Rehab Potential (PT) good, to achieve stated therapy goals  -JORGE     Criteria for Skilled Interventions Met (PT) skilled treatment is necessary  -JORGE     Therapy Frequency (PT) daily  up to 2x daily  -JORGE     Predicted Duration of Therapy Intervention (PT) 10 days  -JORGE     Problem List (PT) problems related to;balance;mobility;strength  -JORGE     Activity Limitations Related to Problem List (PT) unable to transfer safely;unable to ambulate safely  -JORGE       Row Name 02/27/24 1442 02/27/24 1107       Progress Summary (PT)    Progress Toward Functional Goals (PT) progress toward functional goals is fair  -RH progress toward functional goals as expected  -JORGE (r) NM (t) JORGE (c)    Daily Progress Summary (PT) -- Pt presents to therapy with excessive weakness, endurance deficits, as well as difficulty with ambulation and transfers. Pt will benefit from skilled PT to address above deficits.   -JORGE (r) NM (t) JORGE (c)      Row Name 02/27/24 1107          Bed Mobility Goal 1 (PT)    Activity/Assistive Device (Bed Mobility Goal 1, PT) sit to supine/supine to sit  -JORGE (r) NM (t) JORGE (c)     [place] Level/Cues Needed (Bed Mobility Goal 1, PT) independent  -JORGE (r) NM (t) JORGE (c)     Time Frame (Bed Mobility Goal 1, PT) 10 days  -JORGE (r) NM (t) JORGE (c)     Progress/Outcomes (Bed Mobility Goal 1, PT) continuing progress toward goal  -JORGE (r) NM (t) JORGE (c)       Row Name 02/27/24 1107          Transfer Goal 1 (PT)    Activity/Assistive Device (Transfer Goal 1, PT) sit-to-stand/stand-to-sit;walker, rolling  -JORGE (r) NM (t) JORGE (c)     [place] Level/Cues Needed (Transfer Goal 1, PT) modified independence  -JORGE (r) NM (t) JORGE (c)     Time Frame (Transfer Goal 1, PT) 10 days  -JORGE (r) NM (t) JORGE (c)     Progress/Outcome (Transfer Goal 1, PT) continuing progress toward goal  -JORGE (r) NM (t) JORGE (c)       Row Name 02/27/24 1107          Gait Training Goal 1 (PT)    Activity/Assistive Device (Gait Training Goal 1, PT) increase endurance/gait distance;walker, rolling  -JORGE (r) NM (t) JORGE (c)     [place] Level (Gait Training Goal 1, PT) supervision required  -JORGE (r) NM (t) JORGE (c)     Distance (Gait Training Goal 1, PT) 200  -JORGE (r) NM (t) JORGE (c)     Progress/Outcome (Gait Training Goal 1, PT) continuing progress toward goal  -JORGE (r) NM (t) JORGE (c)               User Key  (r) = Recorded By, (t) = Taken By, (c) = Cosigned By      Initials Name Provider Type    Ruby Nicole RN Registered Nurse    Leia Zaragoza RN Registered Nurse    Hai Santamaria, LINDA Physical Therapist Assistant    Elmo Day, PT Physical Therapist    NM Jony Ruiz, PT Student PT Student                      PT Recommendation and Plan     Progress Summary (PT)  Progress Toward Functional Goals (PT): progress toward functional goals is fair   Outcome Measures       Row Name 02/27/24 1400 02/27/24 1100 02/26/24 1400       How  much help from another person do you currently need...    Turning from your back to your side while in flat bed without using bedrails? 3  -RH 3  -JORGE (r) NM (t) JORGE (c) 4  -DP (r) DG (t) DP (c)    Moving from lying on back to sitting on the side of a flat bed without bedrails? 2  -RH 2  -JORGE (r) NM (t) JORGE (c) 3  -DP (r) DG (t) DP (c)    Moving to and from a bed to a chair (including a wheelchair)? 2  -RH 2  -JORGE (r) NM (t) JORGE (c) 3  -DP (r) DG (t) DP (c)    Standing up from a chair using your arms (e.g., wheelchair, bedside chair)? 2  -RH 2  -JORGE (r) NM (t) JORGE (c) 3  -DP (r) DG (t) DP (c)    Climbing 3-5 steps with a railing? 1  -RH 1  -JORGE (r) NM (t) JORGE (c) 2  -DP (r) DG (t) DP (c)    To walk in hospital room? 2  -RH 2  -JORGE (r) NM (t) JORGE (c) 2  -DP (r) DG (t) DP (c)    AM-PAC 6 Clicks Score (PT) 12  -RH 12  -JORGE (r) NM (t) 17  -DP (r) DG (t)    Highest Level of Mobility Goal 4 --> Transfer to chair/commode  - 4 --> Transfer to chair/commode  -JORGE (r) NM (t) 5 --> Static standing  -DP (r) DG (t)       Functional Assessment    Outcome Measure Options -- -- AM-PAC 6 Clicks Basic Mobility (PT)  -DP (r) DG (t) DP (c)              User Key  (r) = Recorded By, (t) = Taken By, (c) = Cosigned By      Initials Name Provider Type    RH Hai Sloan, PTA Physical Therapist Assistant    Micheline Gonzales, PT Physical Therapist    JORGEElmo Lerma, PT Physical Therapist    Mirian June, PT Student PT Student    NM Jony Ruiz, PT Student PT Student                     Time Calculation:    PT Charges       Row Name 02/27/24 1441 02/27/24 1303 02/27/24 1103       Time Calculation    PT Received On 02/27/24  -RH -- 02/27/24  -JORGE (leanna) NM (piedad) JORGE (c)    PT Goal Re-Cert Due Date -- 03/07/24  -JORGE 03/07/24  -JORGE (leanna) NM (piedad) JORGE (c)       Timed Charges    66439 - PT Therapeutic Activity Minutes 9 -RH -- --       Untimed Charges    PT Eval/Re-eval Minutes -- -- 33  -JORGE (r) NM (piedad) JORGE (c)       Total Minutes    Timed Charges Total  Minutes 9  -RH -- --    Untimed Charges Total Minutes -- -- 33  -JORGE (r) NM (t)     Total Minutes 9  -RH -- 33  -JORGE (r) NM (t)              User Key  (r) = Recorded By, (t) = Taken By, (c) = Cosigned By      Initials Name Provider Type    Hai Santamaria PTA Physical Therapist Assistant    Elmo Day, PT Physical Therapist    NM Jony Ruiz, PT Student PT Student                  Therapy Charges for Today       Code Description Service Date Service Provider Modifiers Qty    [id number]  PT THERAPEUTIC ACT EA 15 MIN 2/27/2024 Hai Sloan PTA GP 1            PT G-Codes  Outcome Measure Options: AM-PAC 6 Clicks Basic Mobility (PT)  AM-PAC 6 Clicks Score (PT): 12  AM-PAC 6 Clicks Score (OT): 16    Hai Sloan PTA  2/27/2024

## 2024-02-27 NOTE — PLAN OF CARE
Goal Outcome Evaluation:  Plan of Care Reviewed With: patient        Progress: no change  Outcome Evaluation: Patient did not wear bipap last night. Patient stated this morning she does not like it. Unit is on standby in the room. Patient is currently on Airvo 40L 86%. Patient tolerated EZPAP well this morning.

## 2024-02-27 NOTE — PLAN OF CARE
Goal Outcome Evaluation:         VSS.  Patient up to chair today with PT. Family at bedside.  No acute changes this shift.  NO signs of distress noted at this time.

## 2024-02-27 NOTE — THERAPY RE-EVALUATION
Acute Care - Physical Therapy Re-Evaluation   [place]     Patient Name: Falguni Minaya  : 1960  MRN: [id number]  Today's Date: 2024      Visit Dx:     ICD-10-CM ICD-9-CM   1. Acute respiratory failure with hypoxia  J96.01 518.81   2. Pneumonia due to infectious organism, unspecified laterality, unspecified part of lung  J18.9 486   3. Difficulty in walking  R26.2 719.7   4. Decreased activities of daily living (ADL)  Z78.9 V49.89     Patient Active Problem List   Diagnosis    Multiple sclerosis    Left sided abdominal pain    Dyspepsia    Heme positive stool    Hypotension    Seizure disorder    Pain in lower back    Headache    Frequent falls    Spasm    Weakness of right leg    S/P drug eluting coronary stent placement    Coronary artery disease involving native coronary artery of native heart    Ischemic cardiomyopathy    Pleural effusion due to CHF (congestive heart failure)    Shortness of breath    Orthopnea    Hyperlipidemia LDL goal <70    Anemia due to stage 3 chronic kidney disease    Iron deficiency    Anemia in chronic kidney disease    Arthropathy of lumbar facet joint    Marie's esophagus    Benign colonic polyp    Congestive heart failure    Degeneration of lumbar intervertebral disc    Gastroesophageal reflux disease    Gastroparesis    Lumbar radiculopathy    Osteoporosis    Sacroiliac joint pain    Serum creatinine raised    Vitamin D deficiency    Acute hypoxemic respiratory failure    Acute on chronic hypoxic respiratory failure    Diastolic CHF, acute on chronic    Acute on chronic respiratory failure with hypoxia     Past Medical History:   Diagnosis Date    Abdominal hernia     Acute ST elevation myocardial infarction (STEMI) due to occlusion of right coronary artery 2020    CHF (congestive heart failure)     DENIES CP GETS SOA WITH EXERTION. FOLLOWED BY DR ERIC/KASIA GREY. DECREASED ACTIVITY USES WALKER/ROLLATOR    CKD (chronic kidney disease)     Coronary artery  disease     Dyspepsia     GERD (gastroesophageal reflux disease)     Hyperlipidemia     Hypertension     Ischemic cardiomyopathy     Multiple sclerosis      Past Surgical History:   Procedure Laterality Date    APPENDECTOMY      CARDIAC CATHETERIZATION      CARDIAC CATHETERIZATION N/A 2/24/2020    Procedure: Left Heart Cath;  Surgeon: Marlon Zamudio MD;  Location: [facility] CATH INVASIVE LOCATION;  Service: Cardiovascular;  Laterality: N/A;    CARDIAC CATHETERIZATION N/A 2/24/2020    Procedure: Stent LATRICIA coronary;  Surgeon: Marlon Zamudio MD;  Location: [facility] CATH INVASIVE LOCATION;  Service: Cardiovascular;  Laterality: N/A;    CARDIAC CATHETERIZATION  2/24/2020    Procedure: Percutaneous Manual Thrombectomy;  Surgeon: Marlon Zamudio MD;  Location: [facility] CATH INVASIVE LOCATION;  Service: Cardiovascular;;    CARDIAC CATHETERIZATION N/A 2/24/2020    Procedure: Coronary angiography;  Surgeon: Marlon Zamudio MD;  Location: [facility] CATH INVASIVE LOCATION;  Service: Cardiovascular;  Laterality: N/A;    CARDIAC CATHETERIZATION N/A 2/24/2020    Procedure: Left ventriculography;  Surgeon: Marlon Zamudio MD;  Location: [facility] CATH INVASIVE LOCATION;  Service: Cardiovascular;  Laterality: N/A;    CHOLECYSTECTOMY      COLONOSCOPY N/A 2/29/2020    Procedure: COLONOSCOPY to  cecum:;  Surgeon: Krystal Sarabia MD;  Location: [facility] ENDOSCOPY;  Service: Gastroenterology;  Laterality: N/A;  pre:  anemia and abd pain  post:  hemorrhoids, tortuous colon    ENDOSCOPY N/A 2/29/2020    Procedure: ESOPHAGOGASTRODUODENOSCOPY  with biopsies;  Surgeon: Krystal Sarabia MD;  Location: [facility] ENDOSCOPY;  Service: Gastroenterology;  Laterality: N/A;  pre:  anemia and abd pain  post:  mild gastritis,     HYSTERECTOMY      PARATHYROIDECTOMY Bilateral 10/17/2023    Procedure: NECK EXPLORATION WITH PARATHYROID ADENOMA EXCISION AND FROZEN SECTION,, RECURRENT LARYNGEAL NERVE MONITORING, INTRAOPERATIVE INTACT PTH ASSAY;   Surgeon: Ezekiel Giles MD;  Location: [facility] MAIN OR;  Service: ENT;  Laterality: Bilateral;    TONSILLECTOMY       PT Assessment (last 12 hours)       PT Evaluation and Treatment       Row Name 02/27/24 1107          Physical Therapy Time and Intention    Subjective Information complains of;fatigue;weakness (P)   -NM     Document Type re-evaluation (P)   -NM     Mode of Treatment individual therapy;physical therapy (P)   -NM     Patient Effort adequate (P)   -NM     Symptoms Noted During/After Treatment fatigue;shortness of breath (P)   -NM       Row Name 02/27/24 1107          General Information    Patient Profile Reviewed yes (P)   -NM     Patient Observations agree to therapy (P)   -NM     Existing Precautions/Restrictions fall (P)   -NM     Barriers to Rehab none identified (P)   -NM       Row Name 02/27/24 1107          Pain    Pretreatment Pain Rating 0/10 - no pain (P)   -NM     Posttreatment Pain Rating 0/10 - no pain (P)   -NM       Row Name 02/27/24 1107          Range of Motion (ROM)    Range of Motion bilateral lower extremities;ROM is WFL (P)   -NM       Row Name 02/27/24 1107          Strength (Manual Muscle Testing)    Strength (Manual Muscle Testing) bilateral lower extremities (P)   4/5  -NM       Row Name 02/27/24 1107          Bed Mobility    Supine-Sit [place] (Bed Mobility) minimum assist (75% patient effort) (P)   -NM     Sit-Supine [place] (Bed Mobility) minimum assist (75% patient effort) (P)   -NM     Supine-Sit-Supine [place] (Bed Mobility) minimum assist (75% patient effort) (P)   -NM     Bed Mobility, Safety Issues decreased use of arms for pushing/pulling;decreased use of legs for bridging/pushing (P)   -NM     Assistive Device (Bed Mobility) bed rails;head of bed elevated (P)   -NM       Row Name 02/27/24 1107          Transfers    Transfers sit-stand transfer;stand-sit transfer;stand pivot/stand step transfer;chair-bed transfer (P)   -NM       Row Name 02/27/24 1107           Chair-Bed Transfer    Chair-Bed [place] (Transfers) maximum assist (25% patient effort) (P)   -NM     Assistive Device (Chair-Bed Transfers) other (see comments) (P)   holding on to PT  -NM       Row Name 02/27/24 1107          Sit-Stand Transfer    Sit-Stand [place] (Transfers) moderate assist (50% patient effort) (P)   -NM     Assistive Device (Sit-Stand Transfers) -- (P)   holding on to PT  -NM       Row Name 02/27/24 1107          Stand-Sit Transfer    Stand-Sit [place] (Transfers) moderate assist (50% patient effort) (P)   -NM     Assistive Device (Stand-Sit Transfers) -- (P)   holding on to PT  -NM       Row Name 02/27/24 1107          Stand Pivot/Stand Step Transfer    Stand Pivot/Stand Step [place] (Transfers) maximum assist (25% patient effort) (P)   -NM     Assistive Device (Stand Pivot Stand Step Transfer) -- (P)   holding on to PT  -NM       Row Name 02/27/24 1107          Gait/Stairs (Locomotion)    Patient was able to Ambulate no, other medical factors prevent ambulation (P)   -NM     Reason Patient was unable to Ambulate Excessive Weakness (P)   -NM       Row Name 02/27/24 1107          Safety Issues, Functional Mobility    Impairments Affecting Function (Mobility) endurance/activity tolerance;shortness of breath;strength;balance (P)   -NM       Row Name 02/27/24 1107          Balance    Dynamic Standing Balance maximum assist (P)   -NM     Position/Device Used, Standing Balance other (see comments) (P)   holding on to PT  -NM       Row Name             Wound 02/10/24 0816 Bilateral upper thigh MASD (Moisture associated skin damage)    Wound - Properties Group Placement Date: 02/10/24  -KP Placement Time: 0816  -KP Present on Original Admission: N  -KP Side: Bilateral  -KP Orientation: upper  -KP Location: thigh  -KP Primary Wound Type: MASD  -KP    Retired Wound - Properties Group Placement Date: 02/10/24  -KP Placement Time: 0816  -KP Present on Original Admission: N   -KP Side: Bilateral  -KP Orientation: upper  -KP Location: thigh  -KP Primary Wound Type: MASD  -KP    Retired Wound - Properties Group Date first assessed: 02/10/24  -KP Time first assessed: 0816  -KP Present on Original Admission: N  -KP Side: Bilateral  -KP Location: thigh  -KP Primary Wound Type: MASD  -KP      Row Name             Wound 02/20/24 1405 gluteal MASD (Moisture associated skin damage)    Wound - Properties Group Placement Date: 02/20/24  -MILE Placement Time: 1405  -MILE Present on Original Admission: Y  -MLIE Location: gluteal  -MILE Primary Wound Type: MASD  -MILE    Retired Wound - Properties Group Placement Date: 02/20/24  -MILE Placement Time: 1405  -MILE Present on Original Admission: Y  -MILE Location: gluteal  -MILE Primary Wound Type: MASD  -MILE    Retired Wound - Properties Group Date first assessed: 02/20/24  -MILE Time first assessed: 1405  -MILE Present on Original Admission: Y  -MILE Location: gluteal  -MILE Primary Wound Type: MASD  -MILE      Row Name 02/27/24 1107          Positioning and Restraints    Pre-Treatment Position in bed (P)   -NM     Post Treatment Position chair (P)   -NM     In Chair reclined;call light within reach;encouraged to call for assist;exit alarm on (P)   -NM       Row Name 02/27/24 1104          Therapy Assessment/Plan (PT)    Rehab Potential (PT) good, to achieve stated therapy goals  -JORGE     Criteria for Skilled Interventions Met (PT) skilled treatment is necessary  -JORGE     Therapy Frequency (PT) daily  up to 2x daily  -JORGE     Predicted Duration of Therapy Intervention (PT) 10 days  -JORGE     Problem List (PT) problems related to;balance;mobility;strength  -JORGE     Activity Limitations Related to Problem List (PT) unable to transfer safely;unable to ambulate safely  -JORGE       Row Name 02/27/24 1107          Progress Summary (PT)    Progress Toward Functional Goals (PT) progress toward functional goals as expected (P)   -NM     Daily Progress Summary (PT) Pt presents to therapy with  excessive weakness, endurance deficits, as well as difficulty with ambulation and transfers. Pt will benefit from skilled PT to address above deficits. (P)   -NM       Row Name 02/27/24 1107          Bed Mobility Goal 1 (PT)    Activity/Assistive Device (Bed Mobility Goal 1, PT) sit to supine/supine to sit (P)   -NM     [place] Level/Cues Needed (Bed Mobility Goal 1, PT) independent (P)   -NM     Time Frame (Bed Mobility Goal 1, PT) 10 days (P)   -NM     Progress/Outcomes (Bed Mobility Goal 1, PT) continuing progress toward goal (P)   -NM       Row Name 02/27/24 1107          Transfer Goal 1 (PT)    Activity/Assistive Device (Transfer Goal 1, PT) sit-to-stand/stand-to-sit;walker, rolling (P)   -NM     [place] Level/Cues Needed (Transfer Goal 1, PT) modified independence (P)   -NM     Time Frame (Transfer Goal 1, PT) 10 days (P)   -NM     Progress/Outcome (Transfer Goal 1, PT) continuing progress toward goal (P)   -NM       Row Name 02/27/24 1107          Gait Training Goal 1 (PT)    Activity/Assistive Device (Gait Training Goal 1, PT) increase endurance/gait distance;walker, rolling (P)   -NM     [place] Level (Gait Training Goal 1, PT) supervision required (P)   -NM     Distance (Gait Training Goal 1, PT) 200 (P)   -NM     Progress/Outcome (Gait Training Goal 1, PT) continuing progress toward goal (P)   -NM               User Key  (r) = Recorded By, (t) = Taken By, (c) = Cosigned By      Initials Name Provider Type    Ruby Nicole, RN Registered Nurse    Leia Zaragoza RN Registered Nurse    Elmo Day, PT Physical Therapist    NM Jony Ruiz, PT Student PT Student                      PT Recommendation and Plan  Planned Therapy Interventions (PT): balance training, bed mobility training, gait training, home exercise program, stair training, strengthening, transfer training  Therapy Frequency (PT): daily (up to 2x daily)      Outcome Measures       Row Name 02/27/24  1100 02/26/24 1400          How much help from another person do you currently need...    Turning from your back to your side while in flat bed without using bedrails? 3 (P)   -NM 4  -DP (r) DG (t) DP (c)     Moving from lying on back to sitting on the side of a flat bed without bedrails? 2 (P)   -NM 3  -DP (r) DG (t) DP (c)     Moving to and from a bed to a chair (including a wheelchair)? 2 (P)   -NM 3  -DP (r) DG (t) DP (c)     Standing up from a chair using your arms (e.g., wheelchair, bedside chair)? 2 (P)   -NM 3  -DP (r) DG (t) DP (c)     Climbing 3-5 steps with a railing? 1 (P)   -NM 2  -DP (r) DG (t) DP (c)     To walk in hospital room? 2 (P)   -NM 2  -DP (r) DG (t) DP (c)     AM-PAC 6 Clicks Score (PT) 12 (P)   -NM 17  -DP (r) DG (t)     Highest Level of Mobility Goal 4 --> Transfer to chair/commode (P)   -NM 5 --> Static standing  -DP (r) DG (t)        Functional Assessment    Outcome Measure Options -- AM-PAC 6 Clicks Basic Mobility (PT)  -DP (r) DG (t) DP (c)               User Key  (r) = Recorded By, (t) = Taken By, (c) = Cosigned By      Initials Name Provider Type    DP Micheline Shaffer, PT Physical Therapist    DG Mirian Larkin, PT Student PT Student    NM Jony Ruiz, PT Student PT Student                     Time Calculation:    PT Charges       Row Name 02/27/24 1303 02/27/24 1103          Time Calculation    PT Received On -- 02/27/24 (P)   -NM     PT Goal Re-Cert Due Date 03/07/24  -JORGE 03/07/24 (P)   -NM        Untimed Charges    PT Eval/Re-eval Minutes -- 33 (P)   -NM        Total Minutes    Untimed Charges Total Minutes -- 33 (P)   -NM      Total Minutes -- 33 (P)   -NM               User Key  (r) = Recorded By, (t) = Taken By, (c) = Cosigned By      Initials Name Provider Type    Elmo Day, PT Physical Therapist    Jony Stauffer, PT Student PT Student                        PT G-Codes  Outcome Measure Options: AM-PAC 6 Clicks Basic Mobility (PT)  AM-PAC 6 Clicks Score  (PT): (P) 12  AM-PAC 6 Clicks Score (OT): 16    Elmo Pal, PT  2/27/2024

## 2024-02-28 NOTE — THERAPY TREATMENT NOTE
Patient Name: Falguni Minaya  : 1960    MRN: [id number]                              Today's Date: 2024       Admit Date: 2024    Visit Dx:     ICD-10-CM ICD-9-CM   1. Acute respiratory failure with hypoxia  J96.01 518.81   2. Pneumonia due to infectious organism, unspecified laterality, unspecified part of lung  J18.9 486   3. Difficulty in walking  R26.2 719.7   4. Decreased activities of daily living (ADL)  Z78.9 V49.89     Patient Active Problem List   Diagnosis    Multiple sclerosis    Left sided abdominal pain    Dyspepsia    Heme positive stool    Hypotension    Seizure disorder    Pain in lower back    Headache    Frequent falls    Spasm    Weakness of right leg    S/P drug eluting coronary stent placement    Coronary artery disease involving native coronary artery of native heart    Ischemic cardiomyopathy    Pleural effusion due to CHF (congestive heart failure)    Shortness of breath    Orthopnea    Hyperlipidemia LDL goal <70    Anemia due to stage 3 chronic kidney disease    Iron deficiency    Anemia in chronic kidney disease    Arthropathy of lumbar facet joint    Marie's esophagus    Benign colonic polyp    Congestive heart failure    Degeneration of lumbar intervertebral disc    Gastroesophageal reflux disease    Gastroparesis    Lumbar radiculopathy    Osteoporosis    Sacroiliac joint pain    Serum creatinine raised    Vitamin D deficiency    Acute hypoxemic respiratory failure    Acute on chronic hypoxic respiratory failure    Diastolic CHF, acute on chronic    Acute on chronic respiratory failure with hypoxia     Past Medical History:   Diagnosis Date    Abdominal hernia     Acute ST elevation myocardial infarction (STEMI) due to occlusion of right coronary artery 2020    CHF (congestive heart failure)     DENIES CP GETS SOA WITH EXERTION. FOLLOWED BY DR ERIC/KASIA GREY. DECREASED ACTIVITY USES WALKER/ROLLATOR    CKD (chronic kidney disease)     Coronary artery disease      Dyspepsia     GERD (gastroesophageal reflux disease)     Hyperlipidemia     Hypertension     Ischemic cardiomyopathy     Multiple sclerosis      Past Surgical History:   Procedure Laterality Date    APPENDECTOMY      CARDIAC CATHETERIZATION      CARDIAC CATHETERIZATION N/A 2/24/2020    Procedure: Left Heart Cath;  Surgeon: Marlon Zamudio MD;  Location:  [facility] CATH INVASIVE [facility]ATION;  Service: Cardiovascular;  Laterality: N/A;    CARDIAC CATHETERIZATION N/A 2/24/2020    Procedure: Stent LATRICIA coronary;  Surgeon: Marlon Zamudio MD;  Location: [facility] CATH [facility];  Service: Cardiovascular;  Laterality: N/A;    CARDIAC CATHETERIZATION  2/24/2020    Procedure: Percutaneous Manual Thrombectomy;  Surgeon: Marlon Zamudio MD;  Location:  [facility] CATH INVASIVE LOCATION;  Service: Cardiovascular;;    CARDIAC CATHETERIZATION N/A 2/24/2020    Procedure: Coronary angiography;  Surgeon: Marlon Zamudio MD;  [facility];  Service: Cardiovascular;  Laterality: N/A;    CARDIAC CATHETERIZATION N/A 2/24/2020    Procedure: Left ventriculography;  Surgeon: Marlon Zamudio MD;  [facility];  Service: Cardiovascular;  Laterality: N/A;    CHOLECYSTECTOMY      COLONOSCOPY N/A 2/29/2020    Procedure: COLONOSCOPY to  cecum:;  Surgeon: Krystal Sarabia MD;  Location: [facility] ENDOSCOPY;  Service: Gastroenterology;  Laterality: N/A;  pre:  anemia and abd pain  post:  hemorrhoids, tortuous colon    ENDOSCOPY N/A 2/29/2020    Procedure: ESOPHAGOGASTRODUODENOSCOPY  with biopsies;  Surgeon: Krystal Sarabia MD;  Location: [facility] ENDOSCOPY;  Service: Gastroenterology;  Laterality: N/A;  pre:  anemia and abd pain  post:  mild gastritis,     HYSTERECTOMY      PARATHYROIDECTOMY Bilateral 10/17/2023    Procedure: NECK EXPLORATION WITH PARATHYROID ADENOMA EXCISION AND FROZEN SECTION,, RECURRENT LARYNGEAL NERVE MONITORING, INTRAOPERATIVE INTACT PTH ASSAY;  Surgeon:  Ezekiel Giles MD;  Location: [facility] MAIN OR;  Service: ENT;  Laterality: Bilateral;    TONSILLECTOMY        General Information       Row Name 02/28/24 1219          OT Time and Intention    Document Type therapy note (daily note)  -EG     Mode of Treatment individual therapy;occupational therapy  -EG       Row Name 02/28/24 1219          General Information    Existing Precautions/Restrictions fall;oxygen therapy device and L/min  airvo high flow  -EG       Row Name 02/28/24 1219          Safety Issues, Functional Mobility    Impairments Affecting Function (Mobility) endurance/activity tolerance;shortness of breath;strength;balance  -EG               User Key  (r) = Recorded By, (t) = Taken By, (c) = Cosigned By      Initials Name Provider Type    EG Leonie Pratt OT Occupational Therapist                     Mobility/ADL's       Row Name 02/28/24 1219          Bed Mobility    Bed Mobility supine-sit-supine  -EG     Supine-Sit-Supine [place] (Bed Mobility) minimum assist (75% patient effort);moderate assist (50% patient effort);1 person assist;verbal cues;nonverbal cues (demo/gesture)  -EG     Bed Mobility, Safety Issues decreased use of arms for pushing/pulling;decreased use of legs for bridging/pushing  -EG     Assistive Device (Bed Mobility) bed rails;head of bed elevated  -EG     Comment, (Bed Mobility) extended time to perform and max encouragement for participation; Tolerates EOB sitting up to 5:00 before anxiety and needing to lay back down stating unable to breathe  -EG       Row Name 02/28/24 1219          Transfers    Comment, (Transfers) unable to attempt due to high anxiety and fear from patient as well as reports of increased SOB  -EG               User Key  (r) = Recorded By, (t) = Taken By, (c) = Cosigned By      Initials Name Provider Type    EG Leonie Pratt OT Occupational Therapist                   Obj/Interventions       Row Name 02/28/24 1221          Vision  Assessment/Intervention    Visual Impairment/Limitations WFL;corrective lenses full-time  -EG       Row Name 02/28/24 1221          Motor Skills    Motor Skills functional endurance  -EG     Functional Endurance Poor on high flow AirVo, sats ranging from 85-95% with performance and participation  -EG       [facility] Name 02/28/24 1221          Balance    Balance Interventions sitting  -EG     Comment, Balance tolerates EOB sitting up to 5:00 this date before reports of SOB and dyspnea cause need to return to supine  -EG               User Key  (r) = Recorded By, (t) = Taken By, (c) = Cosigned By      Initials Name Provider Type    Leonie Sorto OT Occupational Therapist                   Goals/Plan    No documentation.                  Clinical Impression       Row Name 02/28/24 1222          Plan of Care Review    Plan of Care Reviewed With patient  -EG     Progress improving  -EG     Outcome Evaluation Small functional gains noted for EOB sitting tolerance; O2 sats and overall endurance with high decondintioning level continue to be main limiting factor for ADL/Transfer performance; Patient required increased encouragement to attempt OOB activity and participation; OT services will continue with POC.  -EG       Row Name 02/28/24 1222          Therapy Assessment/Plan (OT)    Rehab Potential (OT) good, to achieve stated therapy goals  -EG     Criteria for Skilled Therapeutic Interventions Met (OT) yes;skilled treatment is necessary  -EG     Therapy Frequency (OT) 5 times/wk  -EG       Row Name 02/28/24 1222          Therapy Plan Review/Discharge Plan (OT)    Anticipated Discharge Disposition (OT) inpatient rehabilitation facility  -EG               User Key  (r) = Recorded By, (t) = Taken By, (c) = Cosigned By      Initials Name Provider Type    Leonie Sorto OT Occupational Therapist                   Outcome Measures       Row Name 02/28/24 1224          How much help from another is currently needed...     Putting on and taking off regular lower body clothing? 2  -EG     Bathing (including washing, rinsing, and drying) 2  -EG     Toileting (which includes using toilet bed pan or urinal) 1  -EG     Putting on and taking off regular upper body clothing 3  -EG     Taking care of personal grooming (such as brushing teeth) 4  -EG     Eating meals 4  -EG     AM-PAC 6 Clicks Score (OT) 16  -EG       Row Name 02/28/24 0720          How much help from another person do you currently need...    Turning from your back to your side while in flat bed without using bedrails? 3  -ST     Moving from lying on back to sitting on the side of a flat bed without bedrails? 2  -ST     Moving to and from a bed to a chair (including a wheelchair)? 2  -ST     Standing up from a chair using your arms (e.g., wheelchair, bedside chair)? 2  -ST     Climbing 3-5 steps with a railing? 1  -ST     To walk in hospital room? 2  -ST     AM-PAC 6 Clicks Score (PT) 12  -ST     Highest Level of Mobility Goal 4 --> Transfer to chair/commode  -ST       Row Name 02/28/24 1224          Functional Assessment    Outcome Measure Options AM-PAC 6 Clicks Daily Activity (OT);Optimal Instrument  -EG       Row Name 02/28/24 1224          Optimal Instrument    Optimal Instrument Optimal - 3  -EG     Bending/Stooping 2  -EG     Standing 1  -EG     Reaching 1  -EG               User Key  (r) = Recorded By, (t) = Taken By, (c) = Cosigned By      Initials Name Provider Type    Chrissy Villafuerte, RN Registered Nurse    Leonie Sorto OT Occupational Therapist                    Occupational Therapy Education       Title: PT OT SLP Therapies (Done)       Topic: Occupational Therapy (Done)       Point: ADL training (Done)       Description:   Instruct learner(s) on proper safety adaptation and remediation techniques during self care or transfers.   Instruct in proper use of assistive devices.                  Learning Progress Summary             Patient Acceptance, E,  VU by  at 2/20/2024 1433   Family Acceptance, E, VU by  at 2/20/2024 1433                         Point: Precautions (Done)       Description:   Instruct learner(s) on prescribed precautions during self-care and functional transfers.                  Learning Progress Summary             Patient Acceptance, E, VU by  at 2/20/2024 1433   Family Acceptance, E, VU by  at 2/20/2024 1433                         Point: Body mechanics (Done)       Description:   Instruct learner(s) on proper positioning and spine alignment during self-care, functional mobility activities and/or exercises.                  Learning Progress Summary             Patient Acceptance, E, VU by  at 2/20/2024 1433   Family Acceptance, E, VU by  at 2/20/2024 1433                                         User Key       Initials Effective Dates Name Provider Type [facility] 01/10/24 -  Navarro Montoya OT Student OT Student OT                  OT Recommendation and Plan  Therapy Frequency (OT): 5 times/wk  Plan of Care Review  Plan of Care Reviewed With: patient  Progress: improving  Outcome Evaluation: Small functional gains noted for EOB sitting tolerance; O2 sats and overall endurance with high decondintioning level continue to be main limiting factor for ADL/Transfer performance; Patient required increased encouragement to attempt OOB activity and participation; OT services will continue with POC.     Time Calculation:         Time Calculation- OT       Row Name 02/28/24 1224             Time Calculation- OT    OT Received On 02/28/24  -EG      OT Goal Re-Cert Due Date 02/29/24  -EG         Timed Charges    30784 - OT Therapeutic Activity Minutes 16  -EG         Total Minutes    Timed Charges Total Minutes 16  -EG       Total Minutes 16  -EG                User Key  (r) = Recorded By, (t) = Taken By, (c) = Cosigned By      Initials Name Provider Type    EG Leonie Pratt, OT Occupational Therapist                  Therapy  Charges for Today       Code Description Service Date Service Provider Modifiers Qty    [id number]  OT THERAPEUTIC ACT EA 15 MIN 2/28/2024 Leonie Pratt, LISSETT GO 1                 Leonie Pratt OT  2/28/2024

## 2024-02-28 NOTE — PROGRESS NOTES
Pulmonary / Critical Care Progress Note      Patient Name: Falguni Minaya  : 1960  MRN: [id number]  Attending:  Teresa Castro*  Date of admission: 2024    Subjective   Subjective   Follow-up for acute on chronic hypoxic respiratory failure requiring high flow nasal cannula     No acute events overnight     This morning,  On Airvo 50 L / 60%  Lying in bed  Feels about the same  Dyspnea continues to improve  Denies chest pain or chest tightness  Cough improving  Remains weak and fatigued  Diuresing well  -1.1 L urine output    Objective   Objective     Vitals:   Temp:  [97.9 °F (36.6 °C)-98.2 °F (36.8 °C)] 98.1 °F (36.7 °C)  Heart Rate:  [] 105  Resp:  [19-24] 22  BP: (123-142)/(71-94) 123/85  Flow (L/min):  [50-55] 50    Physical Exam   Vital Signs Reviewed   General:  Alert, NAD, chronically ill appearing female, lying in bed  Chest: Decreased aeration with coarse crackles on auscultation, moderate work of breathing noted on Airvo  CV: regular rate and rhythm regular  EXT:  no clubbing, no cyanosis, BLE edema  Neuro:  A&Ox3, moving all 4 extremities spontaneously  Skin: No rashes or lesions noted    Result Review    Result Review:  I have personally reviewed the results from the time of this admission to 2024 16:14 EST and agree with these findings:  [x]  Laboratory  [x]  Microbiology  [x]  Radiology  []  EKG/Telemetry   []  Cardiology/Vascular   []  Pathology  []  Old records  []  Other:  Most notable findings include:   proBNP 10,048      Lab 24  0438 24  0401 24  0424 24  0402 24  0240 24  0406 24  0416   WBC 17.52* 15.48* 12.46* 11.17* 11.78* 10.69 13.99*   HEMOGLOBIN 9.7* 10.0* 9.9* 9.3* 9.2* 8.8* 8.9*   HEMATOCRIT 30.6* 31.4* 31.4* 30.1* 29.8* 27.9* 28.5*   PLATELETS 461* 435 369 389 356 308 298   SODIUM 132* 136 135* 137 137 135* 136   POTASSIUM 3.7 3.2* 3.6 3.7 4.4 4.3 4.8   CHLORIDE 92* 93* 94* 97* 98 97* 101   CO2 22.4 24.5 24.6  25.7 23.8 22.8 22.1   BUN 74* 60* 54* 50* 48* 45* 41*   CREATININE 1.84* 1.64* 1.65* 1.72* 1.78* 1.80* 1.83*   GLUCOSE 190* 184* 180* 143* 146* 149* 122*   CALCIUM 9.7 9.4 9.3 9.0 9.0 9.0 9.2   PHOSPHORUS  --  3.2 2.9 3.0 3.8 4.2 3.6   ALBUMIN  --   --   --   --   --  2.9* 2.7*       Assessment & Plan   Assessment / Plan     Active Hospital Problems:  Active Hospital Problems    Diagnosis     **Acute on chronic hypoxic respiratory failure     Diastolic CHF, acute on chronic     Acute on chronic respiratory failure with hypoxia    Impression:    Acute hypoxic respiratory failure requiring Airvo and BiPAP  Acute decompensated congestive diastolic heart failure  NSTEMI likely type II  Acute cardiogenic pulmonary edema  Volume overload  T2DM with hyperglycemia  Continue acidosis, clinically significant  Hypokalemia  Hypocalcemia  History of multiple sclerosis  Immunocompromised status     Plan:  -Patient's supplemental O2 requirements continue to improve, but respiratory status remains tenuous at this time on Airvo 50L/67%  -2/28 CXR demonstrating multifocal pulmonary densities related to prior inflammatory infectious process.  -Will order chest CT  -Check ESR and CRP  -Continue Solu-Medrol 40 mg IV twice daily  -Continue Lasix 40 mg IV twice daily  -Continue to monitor renal panel and electrolytes.  Replace electrolytes as necessary  -Continue Brovana, Pulmicort, DuoNebs, and 3% saline nebulizers  -Continue bronchopulmonary hygiene.  Encourage I-S and flutter  -Encourage activity as tolerated.  Out of bed to chair  -PT/OT on board.  Appreciate assistance  -Continue Plavix  -Rest of care per primary    Electronically signed by Audelia Duarte MD, 03/05/24, 9:21 AM EST.      DVT prophylaxis:  Medical DVT prophylaxis orders are present.    CODE STATUS:   Medical Intervention Limits: NO intubation (DNI)  Level Of Support Discussed With: Patient  Code Status (Patient has no pulse and is not breathing): No CPR (Do Not Attempt  to Resuscitate)  Medical Interventions (Patient has pulse or is breathing): Limited Support    Labs, imaging, microbiology, notes and medications personally reviewed  Discussed with primary.      Electronically signed by WESTLEY Giron, 02/28/24, 4:14 PM EST.

## 2024-02-28 NOTE — PROGRESS NOTES
RT EQUIPMENT DEVICE RELATED - SKIN ASSESSMENT    RT Medical Equipment/Device:     High Flow Nasal Cannula:Airvo    Skin Assessment:      Cheek:  Intact  Nose intact  Nares intact    Device Skin Pressure Protection:  Positioning supports utilized and Pressure points protected    Nurse Notification:  Lizette Flores, RRT

## 2024-02-28 NOTE — PROGRESS NOTES
[facility]   Hospitalist Progress Note  Date: 2024  Patient Name: Falguni Minaya  : 1960  MRN: [id number]  Date of admission: 2024  Consultants:   -Pulmonology: Dr. Derrell Tomlin, Dr. Isreal Pedraza, Dr. Audelia Duarte    Subjective   Subjective     Chief Complaint: Shortness of breath    Summary:   Falguni Minaya is a 64 y.o. female with chronic diastolic heart failure, CAD, dysphagia, essential hypertension, hyperlipidemia, history of MS and CKD stage IIIb presented with complaints of shortness of breath.  Empiric antibiotics, nebulizer breathing treatments, steroid therapy and IV diuresis initiated.  Pulmonology consulted.  Infectious workup was nonrevealing and antibiotics were discontinued.  ILD workup initiated.  Patient continued to have a submental O2 requirement, proBNP rechecked and noted to be significantly elevated, IV diuresis restarted.    Interval Followup:   No acute events overnight.  Patient in moderate respiratory distress when I entered the room.  OT was in the room and reports that she was able to sit the bedside but immediately had some increased work of breathing and had to rest.  She reports overnight she did pretty well.  Denies chest pain or abdominal pain.    Objective   Objective     Vitals:   Temp:  [97.9 °F (36.6 °C)-98.2 °F (36.8 °C)] 98.1 °F (36.7 °C)  Heart Rate:  [] 105  Resp:  [19-24] 22  BP: (123-142)/(71-94) 123/85  Flow (L/min):  [50-55] 50  Physical Exam   Gen: Acutely ill-appearing chronically ill female, alert, pleasant  Resp: Poor aeration, scattered crackles, conversational dyspnea appreciated  Card: RRR  Abd: Soft, Nontender, Nondistended    Result Review    Result Review:  I have personally reviewed the results as below and agree with these findings:  [x]  Laboratory:   CMP          2024    04:24 2024    04:01 2024    04:38   CMP   Glucose 180  184  190    BUN 54  60  74    Creatinine 1.65  1.64  1.84    EGFR 34.4  34.6  30.1     Sodium 135  136  132    Potassium 3.6  3.2  3.7    Chloride 94  93  92    Calcium 9.3  9.4  9.7    BUN/Creatinine Ratio 32.7  36.6  40.2    Anion Gap 16.4  18.5  17.6      CBC          2/26/2024    04:24 2/27/2024    04:01 2/28/2024    04:38   CBC   WBC 12.46  15.48  17.52    RBC 3.26  3.33  3.24    Hemoglobin 9.9  10.0  9.7    Hematocrit 31.4  31.4  30.6    MCV 96.3  94.3  94.4    MCH 30.4  30.0  29.9    MCHC 31.5  31.8  31.7    RDW 15.9  15.7  15.4    Platelets 369  435  461    Phosphorus and magnesium within normal limits  [x]  Microbiology:   [x]  Radiology:   [x]  EKG/Telemetry:    []  Cardiology/Vascular:    []  Pathology:  []  Old records:  []  Other:    Assessment & Plan   Assessment / Plan     Assessment:  Acute on chronic diastolic heart failure  Acute hypoxic respiratory failure secondary to above  Acute cardiogenic pulmonary edema  NSTEMI likely type II  Type 2 diabetes mellitus with hyperglycemia  Lactic acidosis, clinically significant  Hypocalcemia  Hypokalemia  Acute on chronic kidney disease stage IIIb (baseline creatinine 1.0-1.2)  Immunocompromise status  Essential hypertension    Plan:  -Pulmonology consulted and following, appreciate assistance and recommendations in the care of this patient.  -Continue BiPAP and Airvo.  Requirements are declining.  Wean supplemental O2 as much as possible to maintain sats greater than 90%  -Continue Brovana, Pulmicort and DuoNebs  -Continue diuresis with IV Lasix 40 mg twice daily.  Close monitoring of electrolytes and renal function necessary given IV diuresis  -Continue Solu-Medrol per pulm  -Encouraged use of incentive spirometer and flutter valve  -Continue chest physiotherapy  -Continue midodrine  -Creatinine stable  -Accu-Cheks and sliding scale insulin  -Continue home Prozac, Neurontin and Requip  -Continue holding home beta-blocker  -Continue aspirin, atorvastatin, Plavix  -PT/OT consulted  -Monitor electrolytes and renal function with BMP, phosphorus  level and magnesium level in the AM  -Monitor WBC and Hgb with CBC in the AM  -Chest x-ray stable       DVT Prophylaxis: Lovenox  GI Prophylaxis: Pantoprazole  Dispo: Patient requesting to go back to rehab at discharge.  Code Status: DNR         DVT prophylaxis:  Medical DVT prophylaxis orders are present.      CODE STATUS:   Medical Intervention Limits: NO intubation (DNI)  Level Of Support Discussed With: Patient  Code Status (Patient has no pulse and is not breathing): No CPR (Do Not Attempt to Resuscitate)  Medical Interventions (Patient has pulse or is breathing): Limited Support        Electronically signed by Teresa Castro DO, 02/28/24, 3:38 PM EST.

## 2024-02-28 NOTE — PLAN OF CARE
Goal Outcome Evaluation:      Patient is currently requiring oxygen with AIRVO set at 50 l/m, 70%. Will continue to wean as tolerated. Patient has not been wearing Bipap at night, however it is still in room if needed.

## 2024-02-28 NOTE — SIGNIFICANT NOTE
Wound Eval / Progress Noted     [facility]     Patient Name: Falguni Minaya  : 1960  MRN: [id number]  Today's Date: 2024                 Admit Date: 2024    Visit Dx:    ICD-10-CM ICD-9-CM   1. Acute respiratory failure with hypoxia  J96.01 518.81   2. Pneumonia due to infectious organism, unspecified laterality, unspecified part of lung  J18.9 486   3. Difficulty in walking  R26.2 719.7   4. Decreased activities of daily living (ADL)  Z78.9 V49.89         Acute on chronic hypoxic respiratory failure    Diastolic CHF, acute on chronic    Acute on chronic respiratory failure with hypoxia        Past Medical History:   Diagnosis Date    Abdominal hernia     Acute ST elevation myocardial infarction (STEMI) due to occlusion of right coronary artery 2020    CHF (congestive heart failure)     DENIES CP GETS SOA WITH EXERTION. FOLLOWED BY DR ERIC/KASIA GREY. DECREASED ACTIVITY USES WALKER/ROLLATOR    CKD (chronic kidney disease)     Coronary artery disease     Dyspepsia     GERD (gastroesophageal reflux disease)     Hyperlipidemia     Hypertension     Ischemic cardiomyopathy     Multiple sclerosis         Past Surgical History:   Procedure Laterality Date    APPENDECTOMY      CARDIAC CATHETERIZATION      CARDIAC CATHETERIZATION N/A 2020    Procedure: Left Heart Cath;  Surgeon: Marlon Zamudio MD;  Location: [facility] INVASIVE LOCATION;  Service: Cardiovascular;  Laterality: N/A;    CARDIAC CATHETERIZATION N/A 2020    Procedure: Stent LATRICIA coronary;  Surgeon: Marlon Zamudio MD;  Location: [facility] INVASIVE LOCATION;  Service: Cardiovascular;  Laterality: N/A;    CARDIAC CATHETERIZATION  2020    Procedure: Percutaneous Manual Thrombectomy;  Surgeon: Marlon Zamudio MD;  Location: [facility] INVASIVE LOCATION;  Service: Cardiovascular;;    CARDIAC CATHETERIZATION N/A 2020    Procedure: Coronary angiography;  Surgeon: Marlon Zamudio MD;  Location: [facility]  CATH INVASIVE LOCATION;  Service: Cardiovascular;  Laterality: N/A;    CARDIAC CATHETERIZATION N/A 2/24/2020    Procedure: Left ventriculography;  Surgeon: Marlon Zamudio MD;  Location: [facility] CATH INVASIVE LOCATION;  Service: Cardiovascular;  Laterality: N/A;    CHOLECYSTECTOMY      COLONOSCOPY N/A 2/29/2020    Procedure: COLONOSCOPY to  cecum:;  Surgeon: Krystal Sarabia MD;  Location: [facility] ENDOSCOPY;  Service: Gastroenterology;  Laterality: N/A;  pre:  anemia and abd pain  post:  hemorrhoids, tortuous colon    ENDOSCOPY N/A 2/29/2020    Procedure: ESOPHAGOGASTRODUODENOSCOPY  with biopsies;  Surgeon: Krystal Sarabia MD;  Location: [facility] ENDOSCOPY;  Service: Gastroenterology;  Laterality: N/A;  pre:  anemia and abd pain  post:  mild gastritis,     HYSTERECTOMY      PARATHYROIDECTOMY Bilateral 10/17/2023    Procedure: NECK EXPLORATION WITH PARATHYROID ADENOMA EXCISION AND FROZEN SECTION,, RECURRENT LARYNGEAL NERVE MONITORING, INTRAOPERATIVE INTACT PTH ASSAY;  Surgeon: Ezekiel Giles MD;  Location: [facility];  Service: ENT;  Laterality: Bilateral;    TONSILLECTOMY           Physical Assessment:  Wound 02/20/24 1405 gluteal MASD (Moisture associated skin damage) (Active)   Wound Image   02/28/24 1018   Dressing Appearance open to air 02/28/24 1018   Closure None 02/28/24 1018   Base moist;red;other (see comments) 02/28/24 1018   Periwound excoriated;macerated;moist;redness 02/28/24 1018   Periwound Temperature warm 02/28/24 1018   Periwound Skin Turgor soft 02/28/24 1018   Edges open 02/28/24 1018   Drainage Amount none 02/28/24 1018   Care, Wound cleansed with;sterile normal saline 02/28/24 1018   Dressing Care open to air 02/28/24 1018   Periwound Care barrier ointment applied 02/28/24 1018        Wound Check / Follow-up:  Patient seen today for weekly wound care follow up. Patient was laying in her bed sleeping when wound RN arrived. No family at the bedside. Patient states she is feeling better and  is now able to breath better.     Wound RN assessed patient's bilateral groin areas. Patient's skin was much improved. Groin skin folds were moist, intact and pink. No noted breaks in skin. RN cleansed skin with NS, pat dry, recommending to transition patient from miconazole powder to a light dusting of corn starch powder BID to bilateral groin folds, under breast and under abdominal folds.     Patient's bilateral gluteal aspects have improved. Patient continues to have red, excoriated and rolled skin. Breakdown appears to be fungal in nature.  Patient's skin is moist, most likely due to perspiration. Patient does have a purwick in place and her blue pad remained dry. RN cleansed skin with NS, pat dry and applied ordered magic barrier cream. Recommending to continue current skin care orders with magic barrier at this time. Wound RN will reassess for need of continued magic barrier cream at next follow up visit.     Patient is able to turn and reposition in her bed but may need reminders to turn frequently for pressure reduction and to prevent skin breakdown.     Impression: Bilateral groin moisture, fungal presentation to bilateral gluteal aspects with rolled skin and superficial skin loss.     Short term goals:  regain skin integrity, skin protection, skin care, pressure reduction, moisture managment     Mara Harkins RN    2/28/2024    14:41 EST

## 2024-02-28 NOTE — SIGNIFICANT NOTE
02/28/24 1245   Coping/Psychosocial   Observed Emotional State calm;sad   Verbalized Emotional State anxiety   Trust Relationship/Rapport empathic listening provided   Family/Support Persons spouse   Involvement in Care interacting with patient   Additional Documentation Spiritual Care (Group)   Spiritual Care   Use of Spiritual Resources prayer;spirituality for coping, indicated strong use of   Spiritual Care Source  initiative   Spiritual Care Follow-Up follow-up, none required as presently assessed   Response to Spiritual Care receptive of support;engaged in conversation   Spiritual Care Interventions supportive conversation provided   Spiritual Care Visit Type initial   Receptivity to Spiritual Care visit welcomed

## 2024-02-28 NOTE — PROGRESS NOTES
RT EQUIPMENT DEVICE RELATED - SKIN ASSESSMENT    RT Medical Equipment/Device:     High Flow Nasal Cannula:Airvo    Skin Assessment:      Cheek:  Intact  Ears:  Intact  Nares:  Intact  Nose:  Intact    Device Skin Pressure Protection:  Positioning supports utilized    Nurse Notification:  Lizette Ferreira, CRT

## 2024-02-29 NOTE — PROGRESS NOTES
RT EQUIPMENT DEVICE RELATED - SKIN ASSESSMENT    RT Medical Equipment/Device:     High Flow Nasal Cannula:Airvo    Skin Assessment:      Cheek:  Intact  Nares:  Intact  Nose:  Intact    Device Skin Pressure Protection:  Pressure points protected    Nurse Notification:  Lizette Davis, RRT

## 2024-02-29 NOTE — PROGRESS NOTES
Pulmonary / Critical Care Progress Note      Patient Name: Falguni Minaya  : 1960  MRN: [id number]  Attending:  Teresa Castro*  Date of admission: 2024    Subjective   Subjective   Follow-up for acute on chronic hypoxic respiratory failure requiring high flow nasal cannula     No acute events overnight     This morning,  On Airvo 35 L / 65%  Lying in bed  Dyspnea somewhat improved  Denies any chest pain or chest tightness  No fever or chills  Remains weak and fatigued  Diuresing well  -1.1 L urine output    Objective   Objective     Vitals:   Temp:  [97.3 °F (36.3 °C)-98.6 °F (37 °C)] 98.1 °F (36.7 °C)  Heart Rate:  [] 93  Resp:  [18-20] 20  BP: (117-139)/(74-87) 123/87  Flow (L/min):  [30-45] 30    Physical Exam   Vital Signs Reviewed   General:  Alert, NAD, chronically ill appearing female, lying in bed  Chest: Decreased aeration with coarse crackles on auscultation, moderate work of breathing noted on Airvo  CV: regular rate and rhythm regular  EXT:  no clubbing, no cyanosis, BLE edema  Neuro:  A&Ox3, moving all 4 extremities spontaneously  Skin: No rashes or lesions noted    Result Review    Result Review:  I have personally reviewed the results from the time of this admission to 2024 16:55 EST and agree with these findings:  [x]  Laboratory  [x]  Microbiology  [x]  Radiology  []  EKG/Telemetry   []  Cardiology/Vascular   []  Pathology  []  Old records  []  Other:  Most notable findings include:   proBNP 10,048      Lab 24  0438 24  0438 24  0401 24  0424 24  0402 24  0240 24  0406   WBC 19.43* 17.52* 15.48* 12.46* 11.17* 11.78* 10.69   HEMOGLOBIN 9.5* 9.7* 10.0* 9.9* 9.3* 9.2* 8.8*   HEMATOCRIT 29.6* 30.6* 31.4* 31.4* 30.1* 29.8* 27.9*   PLATELETS 460* 461* 435 369 389 356 308   SODIUM 133* 132* 136 135* 137 137 135*   POTASSIUM 3.2* 3.7 3.2* 3.6 3.7 4.4 4.3   CHLORIDE 92* 92* 93* 94* 97* 98 97*   CO2 22.3 22.4 24.5 24.6 25.7 23.8 22.8    BUN 77* 74* 60* 54* 50* 48* 45*   CREATININE 1.96* 1.84* 1.64* 1.65* 1.72* 1.78* 1.80*   GLUCOSE 192* 190* 184* 180* 143* 146* 149*   CALCIUM 9.6 9.7 9.4 9.3 9.0 9.0 9.0   PHOSPHORUS 3.8  --  3.2 2.9 3.0 3.8 4.2   ALBUMIN  --   --   --   --   --   --  2.9*       Assessment & Plan   Assessment / Plan     Active Hospital Problems:  Active Hospital Problems    Diagnosis     **Acute on chronic hypoxic respiratory failure     Diastolic CHF, acute on chronic     Acute on chronic respiratory failure with hypoxia    Impression:  Acute hypoxic respiratory failure requiring Airvo and BiPAP  Acute decompensated congestive diastolic heart failure  NSTEMI likely type II  Acute cardiogenic pulmonary edema  Volume overload  T2DM with hyperglycemia  Continue acidosis, clinically significant  Hypokalemia  Hypocalcemia  History of multiple sclerosis  Immunocompromised status     Plan:  -Patient's supplemental O2 requirements continue to improve, but respiratory status remains tenuous at this time on Airvo 35 L / 55%  -2/28 chest CT reviewed demonstrating scarring and fibrosis  -Check ESR, 84 and CRP, 0.76.  ILD workup pending  -Decrease Solu-Medrol to 40 mg IV daily  -Will hold Lasix today given rising creatinine.  -Transition from Lovenox to heparin for DVT prophylaxis  -Continue to monitor renal panel and electrolytes.  Replace electrolytes as necessary  -Continue Brovana, Pulmicort, DuoNebs, and 3% saline nebulizers  -Continue bronchopulmonary hygiene.  Encourage I-S and flutter  -Encourage activity as tolerated.  Out of bed to chair  -PT/OT on board.  Appreciate assistance  -Continue Plavix  -Rest of care per primary  -[facility]    DVT prophylaxis:  Medical DVT prophylaxis orders are present.    CODE STATUS:   Medical Intervention Limits: NO intubation (DNI)  Level Of Support Discussed With: Patient  Code Status (Patient has no pulse and is not breathing): No CPR (Do Not Attempt to  Resuscitate)  Medical Interventions (Patient has pulse or is breathing): Limited Support    Labs, imaging, microbiology, notes and medications personally reviewed  Discussed with primary.      Electronically signed by WESTLEY Giron, 02/29/24, 4:55 PM EST.

## 2024-02-29 NOTE — PLAN OF CARE
Goal Outcome Evaluation:      VSS. NSR to ST with PVCs on monitor. PT alert and oriented x3, not oriented to time. PT continually taking off supplemental oxygen. Pt desat's quickly when pt takes off oxygen. Skin care done per order. Patient turned q2h. Pt rested well overnight.    Ria Curtis RN

## 2024-02-29 NOTE — PLAN OF CARE
Goal Outcome Evaluation:         Patient did not wear bipap last night. Currently on Airvo 35L/65%. Tolerating well.

## 2024-02-29 NOTE — NURSING NOTE
"Palliative care visit for emotional support. Patient is currently on 2west- airvo in place during visit. Patient laying in bed with eyes closed- eyes open to voice. Patient is alert and oriented to person, place, time and situation during visit. No family at bedside. Allowed space for patient to participate in life reflection, emotional support provided.  Patient reports she does not currently have any discomfort, shortness of breath, or pain. Patient states \"I know I wont ever make it out of her.\" On clarification, patient states she feels her body is too sick and she will not be able to \"live like this forever.\" Discussed goals of care- education provided on care option including transition to comfort measures only. At this time- patient continues to be hopeful that she will begin to see improvement in her condition and ultimately return to rehab for continued therapy with the ultimate goal of returning home. Patient reports if at any point she is uncomfortable and wishes to change her goals of care to focus on comfort she will let her wishes be known. Extensive emotional support provided. Palliative care will continue to follow to support and assist.    Katlin FANG, RN, PN  Palliative Care    "

## 2024-02-29 NOTE — THERAPY RE-EVALUATION
Patient Name: Falguni Minaya  : 1960    MRN: [id number]                              Today's Date: 2024       Admit Date: 2024    Visit Dx:     ICD-10-CM ICD-9-CM   1. Acute respiratory failure with hypoxia  J96.01 518.81   2. Pneumonia due to infectious organism, unspecified laterality, unspecified part of lung  J18.9 486   3. Difficulty in walking  R26.2 719.7   4. Decreased activities of daily living (ADL)  Z78.9 V49.89     Patient Active Problem List   Diagnosis    Multiple sclerosis    Left sided abdominal pain    Dyspepsia    Heme positive stool    Hypotension    Seizure disorder    Pain in lower back    Headache    Frequent falls    Spasm    Weakness of right leg    S/P drug eluting coronary stent placement    Coronary artery disease involving native coronary artery of native heart    Ischemic cardiomyopathy    Pleural effusion due to CHF (congestive heart failure)    Shortness of breath    Orthopnea    Hyperlipidemia LDL goal <70    Anemia due to stage 3 chronic kidney disease    Iron deficiency    Anemia in chronic kidney disease    Arthropathy of lumbar facet joint    Marie's esophagus    Benign colonic polyp    Congestive heart failure    Degeneration of lumbar intervertebral disc    Gastroesophageal reflux disease    Gastroparesis    Lumbar radiculopathy    Osteoporosis    Sacroiliac joint pain    Serum creatinine raised    Vitamin D deficiency    Acute hypoxemic respiratory failure    Acute on chronic hypoxic respiratory failure    Diastolic CHF, acute on chronic    Acute on chronic respiratory failure with hypoxia     Past Medical History:   Diagnosis Date    Abdominal hernia     Acute ST elevation myocardial infarction (STEMI) due to occlusion of right coronary artery 2020    CHF (congestive heart failure)     DENIES CP GETS SOA WITH EXERTION. FOLLOWED BY DR ERIC/KASIA GREY. DECREASED ACTIVITY USES WALKER/ROLLATOR    CKD (chronic kidney disease)     Coronary artery disease      Dyspepsia     GERD (gastroesophageal reflux disease)     Hyperlipidemia     Hypertension     Ischemic cardiomyopathy     Multiple sclerosis      Past Surgical History:   Procedure Laterality Date    APPENDECTOMY      CARDIAC CATHETERIZATION      CARDIAC CATHETERIZATION N/A 2/24/2020    Procedure: Left Heart Cath;  Surgeon: Marlon Zamudio MD;  Location:  [facility] CATH INVASIVE LOCATION;  Service: Cardiovascular;  Laterality: N/A;    CARDIAC CATHETERIZATION N/A 2/24/2020    Procedure: Stent LATRICIA coronary;  Surgeon: Marlon Zamudio MD;  [facility];  Service: Cardiovascular;  Laterality: N/A;    CARDIAC CATHETERIZATION  2/24/2020    Procedure: Percutaneous Manual Thrombectomy;  Surgeon: Marlon Zamudio MD;  Location:  [facility] CATH INVASIVE [facility]ATION;  Service: Cardiovascular;;    CARDIAC CATHETERIZATION N/A 2/24/2020    Procedure: Coronary angiography;  Surgeon: Marlon Zamudio MD;  [facility];  Service: Cardiovascular;  Laterality: N/A;    CARDIAC CATHETERIZATION N/A 2/24/2020    Procedure: Left ventriculography;  Surgeon: Marlon Zamudio MD;  [facility];  Service: Cardiovascular;  Laterality: N/A;    CHOLECYSTECTOMY      COLONOSCOPY N/A 2/29/2020    Procedure: COLONOSCOPY to  cecum:;  Surgeon: Krystal Sarabia MD;  Location: [facility] ENDOSCOPY;  Service: Gastroenterology;  Laterality: N/A;  pre:  anemia and abd pain  post:  hemorrhoids, tortuous colon    ENDOSCOPY N/A 2/29/2020    Procedure: ESOPHAGOGASTRODUODENOSCOPY  with biopsies;  Surgeon: Krystal Sarabia MD;  Location: [facility] ENDOSCOPY;  Service: Gastroenterology;  Laterality: N/A;  pre:  anemia and abd pain  post:  mild gastritis,     HYSTERECTOMY      PARATHYROIDECTOMY Bilateral 10/17/2023    Procedure: NECK EXPLORATION WITH PARATHYROID ADENOMA EXCISION AND FROZEN SECTION,, RECURRENT LARYNGEAL NERVE MONITORING, INTRAOPERATIVE INTACT PTH ASSAY;  Surgeon:  Ezekiel Giles MD;  Location: [facility] MAIN OR;  Service: ENT;  Laterality: Bilateral;    TONSILLECTOMY        General Information       Row Name 02/29/24 1223 02/29/24 1213       OT Time and Intention    Document Type therapy note (daily note)  -LF re-evaluation  -LF    Mode of Treatment individual therapy;occupational therapy  -LF individual therapy;occupational therapy  -LF      Row Name 02/29/24 1213          General Information    Patient Profile Reviewed yes  -LF       Row Name 02/29/24 1213          Cognition    Orientation Status (Cognition) oriented to;person  -LF       Row Name 02/29/24 1213          Safety Issues, Functional Mobility    Impairments Affecting Function (Mobility) endurance/activity tolerance;shortness of breath;strength;balance  -LF               User Key  (r) = Recorded By, (t) = Taken By, (c) = Cosigned By      Initials Name Provider Type    LF Rachael Jiménez OT Occupational Therapist                     Mobility/ADL's       Row Name 02/29/24 1223 02/29/24 1213       Bed Mobility    Bed Mobility supine-sit;sit-supine  -LF supine-sit;sit-supine  -LF    Supine-Sit [place] (Bed Mobility) minimum assist (75% patient effort)  -LF minimum assist (75% patient effort)  -LF    Sit-Supine [place] (Bed Mobility) moderate assist (50% patient effort)  -LF moderate assist (50% patient effort)  -LF    Bed Mobility, Safety Issues decreased use of arms for pushing/pulling;decreased use of legs for bridging/pushing  -LF decreased use of arms for pushing/pulling;decreased use of legs for bridging/pushing  -LF    Assistive Device (Bed Mobility) bed rails;head of bed elevated  -LF bed rails;head of bed elevated  -    Comment, (Bed Mobility) Patient tolerated sitting EOB ~10 minutes with OT and nursing's assist  -LF Patient tolerated sitting EOB ~10 minutes with OT and nursing's assist  -LF      Row Name 02/29/24 1223 02/29/24 1213       Transfers    Transfers sit-stand transfer;stand-sit  transfer  -LF sit-stand transfer;stand-sit transfer  -LF    Comment, (Transfers) Sit to stand completed x1 rep with patient declining further d/t fear and anxiety. Adamantly refused continued attempts even after encouragement from OT and nursing staff.  -LF Sit to stand completed x1 rep with patient declining further d/t fear and anxiety. Adamantly refused continued attempts even after encouragement from OT and nursing staff.  -      Row Name 02/29/24 1223 02/29/24 1213       Sit-Stand Transfer    Sit-Stand [place] (Transfers) moderate assist (50% patient effort)  -LF moderate assist (50% patient effort)  -LF    Assistive Device (Sit-Stand Transfers) walker, front-wheeled  -LF walker, front-wheeled  -LF      Row Name 02/29/24 1223 02/29/24 1213       Stand-Sit Transfer    Stand-Sit Branch (Transfers) moderate assist (50% patient effort)  -LF moderate assist (50% patient effort)  -LF    Assistive Device (Stand-Sit Transfers) walker, front-wheeled  -LF walker, front-wheeled  -LF      Row Name 02/29/24 1213          Activities of Daily Living    BADL Assessment/Intervention bathing;upper body dressing;lower body dressing;grooming;feeding;toileting  -       Row Name 02/29/24 1213          Bathing Assessment/Intervention    [place] Level (Bathing) bathing skills;upper body;lower body;moderate assist (50% patient effort);maximum assist (25% patient effort)  -       Row Name 02/29/24 1213          Upper Body Dressing Assessment/Training    [place] Level (Upper Body Dressing) upper body dressing skills;moderate assist (50% patient effort)  -       Row Name 02/29/24 1213          Lower Body Dressing Assessment/Training    [place] Level (Lower Body Dressing) lower body dressing skills;maximum assist (25% patient effort);dependent (less than 25% patient effort)  -       Row Name 02/29/24 1213          Grooming Assessment/Training    [place] Level (Grooming) grooming skills;minimum  assist (75% patient effort)  -       Row Name 02/29/24 1213          Self-Feeding Assessment/Training    [place] Level (Feeding) feeding skills;set up  -       Row Name 02/29/24 1213          Toileting Assessment/Training    [place] Level (Toileting) toileting skills;dependent (less than 25% patient effort)  -               User Key  (r) = Recorded By, (t) = Taken By, (c) = Cosigned By      Initials Name Provider Type     Rachael Jiménez OT Occupational Therapist                   Obj/Interventions       Row Name 02/29/24 1216          Sensory Assessment (Somatosensory)    Sensory Assessment (Somatosensory) UE sensation intact  -[facility] Name 02/29/24 1216          Vision Assessment/Intervention    Visual Impairment/Limitations WFL;corrective lenses full-time  -[facility] Name 02/29/24 1216          Range of Motion Comprehensive    General Range of Motion bilateral upper extremity ROM WFL  -[facility] Name 02/29/24 1216          Strength Comprehensive (MMT)    Comment, General Manual Muscle Testing (MMT) Assessment 3+/5 BUEs  -[facility] Name 02/29/24 1216          Motor Skills    Motor Skills coordination;functional endurance  -LF     Coordination bilateral;upper extremity;WFL  -LF     Functional Endurance Poor  -       Row Name 02/29/24 1223 02/29/24 1216       Balance    Balance Assessment sitting static balance;standing static balance  -LF sitting static balance;standing static balance  -LF    Static Sitting Balance moderate assist  -LF moderate assist  -LF    Position, Sitting Balance supported;sitting edge of bed  -LF supported;sitting edge of bed  -LF    Static Standing Balance moderate assist  -LF moderate assist  -LF    Position/Device Used, Standing Balance supported;walker, front-wheeled  -LF supported;walker, front-wheeled  -LF    Balance Interventions sitting;supported;static;occupation based/functional task  - --              User Key  (r) = Recorded By, (t) = Taken  By, (c) = Cosigned By      Initials Name Provider Type    LF Rachael Jiménez, OT Occupational Therapist                   Goals/Plan       Row Name 02/29/24 1220          Bed Mobility Goal 1 (OT)    Activity/Assistive Device (Bed Mobility Goal 1, OT) bed mobility activities, all  -LF     [place] Level/Cues Needed (Bed Mobility Goal 1, OT) standby assist  -LF     Time Frame (Bed Mobility Goal 1, OT) long term goal (LTG);10 days  -LF     Progress/Outcomes (Bed Mobility Goal 1, OT) goal revised this date  -LF       Row Name 02/29/24 1220          Transfer Goal 1 (OT)    Activity/Assistive Device (Transfer Goal 1, OT) transfers, all  -LF     [place] Level/Cues Needed (Transfer Goal 1, OT) standby assist  -LF     Time Frame (Transfer Goal 1, OT) long term goal (LTG);10 days  -LF     Progress/Outcome (Transfer Goal 1, OT) goal revised this date  -LF       Row Name 02/29/24 1220          Bathing Goal 1 (OT)    Activity/Device (Bathing Goal 1, OT) bathing skills, all  -LF     [place] Level/Cues Needed (Bathing Goal 1, OT) minimum assist (75% or more patient effort)  -LF     Time Frame (Bathing Goal 1, OT) long term goal (LTG);10 days  -LF     Progress/Outcomes (Bathing Goal 1, OT) goal revised this date  -LF       Row Name 02/29/24 1220          Dressing Goal 1 (OT)    Activity/Device (Dressing Goal 1, OT) dressing skills, all  -LF     [place]/Cues Needed (Dressing Goal 1, OT) minimum assist (75% or more patient effort)  -LF     Time Frame (Dressing Goal 1, OT) long term goal (LTG);10 days  -LF     Progress/Outcome (Dressing Goal 1, OT) goal revised this date  -LF       Row Name 02/29/24 1220          Toileting Goal 1 (OT)    Activity/Device (Toileting Goal 1, OT) toileting skills, all  -LF     [place] Level/Cues Needed (Toileting Goal 1, OT) minimum assist (75% or more patient effort)  -LF     Time Frame (Toileting Goal 1, OT) long term goal (LTG);10 days  -LF     Progress/Outcome (Toileting Goal  1, OT) goal revised this date  -       Row Name 02/29/24 1220          Grooming Goal 1 (OT)    Activity/Device (Grooming Goal 1, OT) grooming skills, all  -LF     [place] (Grooming Goal 1, OT) set-up required  -LF     Time Frame (Grooming Goal 1, OT) long term goal (LTG);10 days  -LF     Progress/Outcome (Grooming Goal 1, OT) new goal  -       Row Name 02/29/24 1220          Strength Goal 1 (OT)    Strength Goal 1 (OT) Patient will increase BUE strength by 1/2 grades to support independence in ADLs and functional transfers.  -LF     Time Frame (Strength Goal 1, OT) long term goal (LTG);10 days  -LF     Progress/Outcome (Strength Goal 1, OT) continuing progress toward goal;progress slower than expected;medical status inhibiting progress  -       Row Name 02/29/24 1220          Problem Specific Goal 1 (OT)    Problem Specific Goal 1 (OT) Patient will demonstrate fair+ endurance to support independence in ADLs and functional transfers.  -LF     Time Frame (Problem Specific Goal 1, OT) long term goal (LTG);10 days  -LF     Progress/Outcome (Problem Specific Goal 1, OT) continuing progress toward goal;progress slower than expected;medical status inhibiting progress  -       Row Name 02/29/24 1220          Therapy Assessment/Plan (OT)    Planned Therapy Interventions (OT) activity tolerance training;patient/caregiver education/training;BADL retraining;functional balance retraining;occupation/activity based interventions;strengthening exercise;transfer/mobility retraining  -               User Key  (r) = Recorded By, (t) = Taken By, (c) = Cosigned By      Initials Name Provider Type     Rachael Jiménez OT Occupational Therapist                   Clinical Impression       Row Name 02/29/24 1216          Pain Assessment    Additional Documentation Pain Scale: FACES Pre/Post-Treatment (Group)  -       Row Name 02/29/24 1216          Pain Scale: FACES Pre/Post-Treatment    Pain: FACES Scale, Pretreatment  0-->no hurt  -LF     Posttreatment Pain Rating 0-->no hurt  -LF       Row Name 02/29/24 1216          Plan of Care Review    Plan of Care Reviewed With patient  -LF     Progress declining  -LF     Outcome Evaluation Patient continues to present with limitations in self-care, functional transfers, balance, endurance, and BUE endurance. She would benefit from continued skilled occupational therapy services to maximize independence with ADLs/functional transfers. Goals have been downgraded to better meet patient's current level of function.  -       Row Name 02/29/24 1216          Therapy Assessment/Plan (OT)    Patient/Family Therapy Goal Statement (OT) To maximize independence.  -     Rehab Potential (OT) good, to achieve stated therapy goals  -     Criteria for Skilled Therapeutic Interventions Met (OT) yes;meets criteria;skilled treatment is necessary  -     Therapy Frequency (OT) 5 times/wk  -       Row Name 02/29/24 1216          Therapy Plan Review/Discharge Plan (OT)    Anticipated Discharge Disposition (OT) inpatient rehabilitation facility  -       Row Name 02/29/24 1216          Vital Signs    O2 Delivery Pre Treatment other (see comments)  AirVo  -LF     O2 Delivery Intra Treatment other (see comments)  AirVo  -LF     O2 Delivery Post Treatment other (see comments)  AirVo  -LF       Row Name 02/29/24 1216          Positioning and Restraints    Pre-Treatment Position in bed  -LF     Post Treatment Position bed  -LF     In Bed fowlers;call light within reach;encouraged to call for assist;with nsg  -LF               User Key  (r) = Recorded By, (t) = Taken By, (c) = Cosigned By      Initials Name Provider Type    LF Rachael Jiménez, OT Occupational Therapist                   Outcome Measures       Row Name 02/29/24 1221          How much help from another is currently needed...    Putting on and taking off regular lower body clothing? 2  -LF     Bathing (including washing, rinsing, and drying) 2   -LF     Toileting (which includes using toilet bed pan or urinal) 1  -LF     Putting on and taking off regular upper body clothing 3  -LF     Taking care of personal grooming (such as brushing teeth) 4  -LF     Eating meals 4  -LF     AM-PAC 6 Clicks Score (OT) 16  -LF       Row Name 02/29/24 0718          How much help from another person do you currently need...    Turning from your back to your side while in flat bed without using bedrails? 3  -EE     Moving from lying on back to sitting on the side of a flat bed without bedrails? 3  -EE     Moving to and from a bed to a chair (including a wheelchair)? 3  -EE     Standing up from a chair using your arms (e.g., wheelchair, bedside chair)? 2  -EE     Climbing 3-5 steps with a railing? 2  -EE     To walk in hospital room? 2  -EE     AM-PAC 6 Clicks Score (PT) 15  -EE     Highest Level of Mobility Goal 4 --> Transfer to chair/commode  -EE       Row Name 02/29/24 1221          Functional Assessment    Outcome Measure Options AM-PAC 6 Clicks Daily Activity (OT);Optimal Instrument  -LF       Row Name 02/29/24 1221          Optimal Instrument    Optimal Instrument Optimal - 3  -LF     Bending/Stooping 2  -LF     Standing 1  -LF     Reaching 1  -LF     From the list, choose the 3 activities you would most like to be able to do without any difficulty Bending/stooping;Standing;Reaching  -LF     Total Score Optimal - 3 4  -LF               User Key  (r) = Recorded By, (t) = Taken By, (c) = Cosigned By      Initials Name Provider Type    LF Rachael Jiménez OT Occupational Therapist    EE Lindsay Wild RN Registered Nurse                    Occupational Therapy Education       Title: PT OT SLP Therapies (In Progress)       Topic: Occupational Therapy (In Progress)       Point: ADL training (In Progress)       Description:   Instruct learner(s) on proper safety adaptation and remediation techniques during self care or transfers.   Instruct in proper use of assistive  devices.                  Learning Progress Summary             Patient Acceptance, E,TB, NR by  at 2/28/2024 2248    Acceptance, E, VU by  at 2/20/2024 1433   Family Acceptance, E, VU by  at 2/20/2024 1433                         Point: Precautions (In Progress)       Description:   Instruct learner(s) on prescribed precautions during self-care and functional transfers.                  Learning Progress Summary             Patient Acceptance, E,TB, NR by KT at 2/28/2024 2248    Acceptance, E, VU by  at 2/20/2024 1433   Family Acceptance, E, VU by  at 2/20/2024 1433                         Point: Body mechanics (In Progress)       Description:   Instruct learner(s) on proper positioning and spine alignment during self-care, functional mobility activities and/or exercises.                  Learning Progress Summary             Patient Acceptance, E,TB, NR by KT at 2/28/2024 2248    Acceptance, E, VU by  at 2/20/2024 1433   Family Acceptance, E, VU by  at 2/20/2024 1433                                         User Key       Initials Effective Dates Name Provider Type Discipline     10/09/23 -  Ria Curtis, RN Registered Nurse Nurse     01/10/24 -  Navarro Montoya OT Student OT Student OT                  OT Recommendation and Plan  Planned Therapy Interventions (OT): activity tolerance training, patient/caregiver education/training, BADL retraining, functional balance retraining, occupation/activity based interventions, strengthening exercise, transfer/mobility retraining  Therapy Frequency (OT): 5 times/wk  Plan of Care Review  Plan of Care Reviewed With: patient  Progress: declining  Outcome Evaluation: Patient continues to present with limitations in self-care, functional transfers, balance, endurance, and BUE endurance. She would benefit from continued skilled occupational therapy services to maximize independence with ADLs/functional transfers. Goals have been downgraded to better meet  patient's current level of function.     Time Calculation:         Time Calculation- OT       Row Name 02/29/24 1221             Time Calculation- OT    OT Received On 02/29/24  -LF      OT Goal Re-Cert Due Date 03/09/24  -LF         Timed Charges    97757 - OT Therapeutic Activity Minutes 15  -LF         Untimed Charges    OT Eval/Re-eval Minutes 25  -LF         Total Minutes    Timed Charges Total Minutes 15  -LF      Untimed Charges Total Minutes 25  -LF       Total Minutes 40  -LF                User Key  (r) = Recorded By, (t) = Taken By, (c) = Cosigned By      Initials Name Provider Type    LF Rachael Jiménez OT Occupational Therapist                  Therapy Charges for Today       Code Description Service Date Service Provider Modifiers Qty    24363231703 HC OT THERAPEUTIC ACT EA 15 MIN 2/29/2024 Rachael Jiménez OT GO 1    02855615141 HC OT RE-EVAL 2 2/29/2024 Rachael Jiménez OT GO 1                 Rachael Jiménez OT  2/29/2024

## 2024-02-29 NOTE — PROGRESS NOTES
[facility]   Hospitalist Progress Note  Date: 2024  Patient Name: Falguni Minaya  : 1960  MRN: [id number]  Date of admission: 2024  Consultants:   -Pulmonology: Dr. Derrell Tomlin, Dr. Isreal Pedraza, Dr. Audelia Duarte    Subjective   Subjective     Chief Complaint: Shortness of breath    Summary:   Falguni Minaya is a 64 y.o. female with chronic diastolic heart failure, CAD, dysphagia, essential hypertension, hyperlipidemia, history of MS and CKD stage IIIb presented with complaints of shortness of breath.  Empiric antibiotics, nebulizer breathing treatments, steroid therapy and IV diuresis initiated.  Pulmonology consulted.  Infectious workup was nonrevealing and antibiotics were discontinued.  ILD workup initiated.  Patient continued to have a submental O2 requirement, proBNP rechecked and noted to be significantly elevated, IV diuresis restarted.    Interval Followup:   No acute events overnight.  No acute distress.  Patient resting comfortably in bed today.  She looks much more comfortable than yesterday.  She reports she feels a little better.  Respiratory status is improving.  No family at bedside today.    Objective   Objective     Vitals:   Temp:  [97.3 °F (36.3 °C)-98.6 °F (37 °C)] 97.3 °F (36.3 °C)  Heart Rate:  [] 94  Resp:  [18-22] 20  BP: (117-139)/(74-85) 139/84  Flow (L/min):  [35-50] 35  Physical Exam   Gen: Acutely ill-appearing chronically ill female, alert, pleasant  Resp: Poor aeration, scattered crackles, conversational dyspnea appreciated  Card: RRR  Abd: Soft, Nontender, Nondistended    Result Review    Result Review:  I have personally reviewed the results as below and agree with these findings:  [x]  Laboratory:   CMP          2024    04:01 2024    04:38 2024    04:38   CMP   Glucose 184  190  192    BUN 60  74  77    Creatinine 1.64  1.84  1.96    EGFR 34.6  30.1  27.9    Sodium 136  132  133    Potassium 3.2  3.7  3.2    Chloride 93  92  92     Calcium 9.4  9.7  9.6    BUN/Creatinine Ratio 36.6  40.2  39.3    Anion Gap 18.5  17.6  18.7      CBC          2/27/2024    04:01 2/28/2024    04:38 2/29/2024    04:38   CBC   WBC 15.48  17.52  19.43    RBC 3.33  3.24  3.14    Hemoglobin 10.0  9.7  9.5    Hematocrit 31.4  30.6  29.6    MCV 94.3  94.4  94.3    MCH 30.0  29.9  30.3    MCHC 31.8  31.7  32.1    RDW 15.7  15.4  15.6    Platelets 435  461  460    Phosphorus and magnesium within normal limits  [x]  Microbiology:   [x]  Radiology:   [x]  EKG/Telemetry:    []  Cardiology/Vascular:    []  Pathology:  []  Old records:  []  Other:    Assessment & Plan   Assessment / Plan     Assessment:  Acute on chronic diastolic heart failure  Acute hypoxic respiratory failure secondary to above  Acute cardiogenic pulmonary edema  NSTEMI likely type II  Type 2 diabetes mellitus with hyperglycemia  Lactic acidosis, clinically significant  Hypocalcemia  Hypokalemia  Acute on chronic kidney disease stage IIIb (baseline creatinine 1.0-1.2)  Immunocompromise status  Essential hypertension    Plan:  -Pulmonology consulted and following, appreciate assistance and recommendations in the care of this patient.  -Continue BiPAP and Airvo.  Requirements are declining.  Wean supplemental O2 as much as possible to maintain sats greater than 90%  -Continue Brovana, Pulmicort and DuoNebs  -Diuretics held due to increased renal function.  Close monitoring of electrolytes and renal function necessary given IV diuresis  -Continue Solu-Medrol per pulm  -Encouraged use of incentive spirometer and flutter valve  -Continue chest physiotherapy  -Potassium 3.2 this morning, replaced  -Leukocytosis secondary to steroid use  -Continue midodrine  -Creatinine stable  -Accu-Cheks and sliding scale insulin  -Continue home Prozac, Neurontin and Requip  -Continue holding home beta-blocker  -Continue aspirin, atorvastatin, Plavix  -PT/OT consulted  -Monitor electrolytes and renal function with BMP,  phosphorus level and magnesium level in the AM  -Monitor WBC and Hgb with CBC in the AM  -Chest x-ray stable       DVT Prophylaxis: Lovenox  GI Prophylaxis: Pantoprazole  Dispo: Patient requesting to go back to rehab at discharge.  Code Status: DNR         DVT prophylaxis:  Medical DVT prophylaxis orders are present.      CODE STATUS:   Medical Intervention Limits: NO intubation (DNI)  Level Of Support Discussed With: Patient  Code Status (Patient has no pulse and is not breathing): No CPR (Do Not Attempt to Resuscitate)  Medical Interventions (Patient has pulse or is breathing): Limited Support      Electronically signed by Teresa Castro DO, 02/29/24, 1:16 PM EST.

## 2024-02-29 NOTE — THERAPY TREATMENT NOTE
Acute Care - Physical Therapy Treatment Note   [place]     Patient Name: Falguni Minaya  : 1960  MRN: [id number]  Today's Date: 2024      Visit Dx:     ICD-10-CM ICD-9-CM   1. Acute respiratory failure with hypoxia  J96.01 518.81   2. Pneumonia due to infectious organism, unspecified laterality, unspecified part of lung  J18.9 486   3. Difficulty in walking  R26.2 719.7   4. Decreased activities of daily living (ADL)  Z78.9 V49.89     Patient Active Problem List   Diagnosis    Multiple sclerosis    Left sided abdominal pain    Dyspepsia    Heme positive stool    Hypotension    Seizure disorder    Pain in lower back    Headache    Frequent falls    Spasm    Weakness of right leg    S/P drug eluting coronary stent placement    Coronary artery disease involving native coronary artery of native heart    Ischemic cardiomyopathy    Pleural effusion due to CHF (congestive heart failure)    Shortness of breath    Orthopnea    Hyperlipidemia LDL goal <70    Anemia due to stage 3 chronic kidney disease    Iron deficiency    Anemia in chronic kidney disease    Arthropathy of lumbar facet joint    Marie's esophagus    Benign colonic polyp    Congestive heart failure    Degeneration of lumbar intervertebral disc    Gastroesophageal reflux disease    Gastroparesis    Lumbar radiculopathy    Osteoporosis    Sacroiliac joint pain    Serum creatinine raised    Vitamin D deficiency    Acute hypoxemic respiratory failure    Acute on chronic hypoxic respiratory failure    Diastolic CHF, acute on chronic    Acute on chronic respiratory failure with hypoxia     Past Medical History:   Diagnosis Date    Abdominal hernia     Acute ST elevation myocardial infarction (STEMI) due to occlusion of right coronary artery 2020    CHF (congestive heart failure)     DENIES CP GETS SOA WITH EXERTION. FOLLOWED BY DR ERIC/KASIA GREY. DECREASED ACTIVITY USES WALKER/ROLLATOR    CKD (chronic kidney disease)     Coronary artery  disease     Dyspepsia     GERD (gastroesophageal reflux disease)     Hyperlipidemia     Hypertension     Ischemic cardiomyopathy     Multiple sclerosis      Past Surgical History:   Procedure Laterality Date    APPENDECTOMY      CARDIAC CATHETERIZATION      CARDIAC CATHETERIZATION N/A 2/24/2020    Procedure: Left Heart Cath;  Surgeon: Marlon Zamudio MD;  Location: [facility] CATH INVASIVE LOCATION;  Service: Cardiovascular;  Laterality: N/A;    CARDIAC CATHETERIZATION N/A 2/24/2020    Procedure: Stent LATRICIA coronary;  Surgeon: Marlon Zamudio MD;  Location: [facility] CATH INVASIVE LOCATION;  Service: Cardiovascular;  Laterality: N/A;    CARDIAC CATHETERIZATION  2/24/2020    Procedure: Percutaneous Manual Thrombectomy;  Surgeon: Marlon Zamudio MD;  Location: [facility] CATH INVASIVE LOCATION;  Service: Cardiovascular;;    CARDIAC CATHETERIZATION N/A 2/24/2020    Procedure: Coronary angiography;  Surgeon: Marlon Zamudio MD;  Location: [facility] CATH INVASIVE LOCATION;  Service: Cardiovascular;  Laterality: N/A;    CARDIAC CATHETERIZATION N/A 2/24/2020    Procedure: Left ventriculography;  Surgeon: Marlon Zamudio MD;  Location: [facility] CATH INVASIVE LOCATION;  Service: Cardiovascular;  Laterality: N/A;    CHOLECYSTECTOMY      COLONOSCOPY N/A 2/29/2020    Procedure: COLONOSCOPY to  cecum:;  Surgeon: Krystal Sarabia MD;  Location: [facility] ENDOSCOPY;  Service: Gastroenterology;  Laterality: N/A;  pre:  anemia and abd pain  post:  hemorrhoids, tortuous colon    ENDOSCOPY N/A 2/29/2020    Procedure: ESOPHAGOGASTRODUODENOSCOPY  with biopsies;  Surgeon: Krystal Sarabia MD;  Location: [facility] ENDOSCOPY;  Service: Gastroenterology;  Laterality: N/A;  pre:  anemia and abd pain  post:  mild gastritis,     HYSTERECTOMY      PARATHYROIDECTOMY Bilateral 10/17/2023    Procedure: NECK EXPLORATION WITH PARATHYROID ADENOMA EXCISION AND FROZEN SECTION,, RECURRENT LARYNGEAL NERVE MONITORING, INTRAOPERATIVE INTACT PTH ASSAY;   Surgeon: Ezekiel Giles MD;  Location: [facility] MAIN OR;  Service: ENT;  Laterality: Bilateral;    TONSILLECTOMY       PT Assessment (last 12 hours)       PT Evaluation and Treatment       Row Name 02/29/24 1502          Physical Therapy Time and Intention    Subjective Information complains of;weakness;fatigue;dyspnea  -     Document Type therapy note (daily note)  -     Mode of Treatment physical therapy;individual therapy  -     Patient Effort adequate  -     Comment Pt agreeable to therex but declines transfers.  -       Row Name 02/29/24 1502          Pain Scale: FACES Pre/Post-Treatment    Pain: FACES Scale, Pretreatment 0-->no hurt  -       Row Name 02/29/24 1502          Motor Skills    Therapeutic Exercise hip;knee;ankle  -       Row Name 02/29/24 1502          Hip (Therapeutic Exercise)    Hip (Therapeutic Exercise) isometric exercises  -     Hip Isometrics (Therapeutic Exercise) gluteal sets;bilateral;supine;10 repetitions;2 sets  -       Row Name 02/29/24 1502          Knee (Therapeutic Exercise)    Knee (Therapeutic Exercise) strengthening exercise;isometric exercises  -     Knee Isometrics (Therapeutic Exercise) bilateral;quad sets;supine;10 repetitions;2 sets  -     Knee Strengthening (Therapeutic Exercise) SAQ (short arc quad);bilateral;supine;10 repetitions;2 sets  -       Row Name 02/29/24 1502          Ankle (Therapeutic Exercise)    Ankle (Therapeutic Exercise) AROM (active range of motion)  -     Ankle AROM (Therapeutic Exercise) bilateral;dorsiflexion;plantarflexion;supine;10 repetitions;2 sets  -       Row Name             Wound 02/10/24 0816 Bilateral upper thigh MASD (Moisture associated skin damage)    Wound - Properties Group Placement Date: 02/10/24  -KP Placement Time: 0816  -KP Present on Original Admission: N  -KP Side: Bilateral  -KP Orientation: upper  - Location: thigh  -KP Primary Wound Type: MASD  -KP    Retired Wound - Properties Group Placement Date:  02/10/24  -KP Placement Time: 0816  -KP Present on Original Admission: N  -KP Side: Bilateral  -KP Orientation: upper  -KP Location: thigh  -KP Primary Wound Type: MASD  -KP    Retired Wound - Properties Group Date first assessed: 02/10/24  -KP Time first assessed: 0816  -KP Present on Original Admission: N  -KP Side: Bilateral  -KP Location: thigh  -KP Primary Wound Type: MASD  -KP      Row Name             Wound 02/20/24 1405 gluteal MASD (Moisture associated skin damage)    Wound - Properties Group Placement Date: 02/20/24  -MILE Placement Time: 1405  -MILE Present on Original Admission: Y  -MILE Location: gluteal  -MILE Primary Wound Type: MASD  -MILE    Retired Wound - Properties Group Placement Date: 02/20/24  -MILE Placement Time: 1405  -MILE Present on Original Admission: Y  -MILE Location: gluteal  -MILE Primary Wound Type: MASD  -MILE    Retired Wound - Properties Group Date first assessed: 02/20/24  -MILE Time first assessed: 1405  -MILE Present on Original Admission: Y  -MILE Location: gluteal  -MILE Primary Wound Type: MASD  -MILE      Row Name 02/29/24 1502          Vital Signs    O2 Delivery Intra Treatment --  Pt on air-vo.  -RH       Row Name 02/29/24 1502          Positioning and Restraints    Pre-Treatment Position in bed  -RH     In Bed supine;call light within reach;encouraged to call for assist;with family/caregiver  -               User Key  (r) = Recorded By, (t) = Taken By, (c) = Cosigned By      Initials Name Provider Type    Ruby Nicole RN Registered Nurse     Leia Graves RN Registered Nurse     Hai Sloan PTA Physical Therapist Assistant                      PT Recommendation and Plan     Progress Summary (PT)  Progress Toward Functional Goals (PT): progress toward functional goals is fair   Outcome Measures       Row Name 02/29/24 1500 02/27/24 1400 02/27/24 1100       How much help from another person do you currently need...    Turning from your back to your side while in flat bed without  using bedrails? 3  -RH 3  -RH 3  -JORGE (r) NM (t) JORGE (c)    Moving from lying on back to sitting on the side of a flat bed without bedrails? 3  -RH 2  -RH 2  -JORGE (r) NM (t) JORGE (c)    Moving to and from a bed to a chair (including a wheelchair)? 3  -RH 2  -RH 2  -JORGE (r) NM (t) JORGE (c)    Standing up from a chair using your arms (e.g., wheelchair, bedside chair)? 2  -RH 2  -RH 2  -JORGE (r) NM (t) JORGE (c)    Climbing 3-5 steps with a railing? 2  -RH 1  -RH 1  -JORGE (r) NM (t) JORGE (c)    To walk in hospital room? 2  -RH 2  -RH 2  -JORGE (r) NM (t) JORGE (c)    AM-PAC 6 Clicks Score (PT) 15  -RH 12  -RH 12  -JORGE (r) NM (t)    Highest Level of Mobility Goal 4 --> Transfer to chair/commode  -RH 4 --> Transfer to chair/commode  -RH 4 --> Transfer to chair/commode  -JORGE (r) NM (t)              User Key  (r) = Recorded By, (t) = Taken By, (c) = Cosigned By      Initials Name Provider Type     Hai Sloan PTA Physical Therapist Assistant    Elmo Day, PT Physical Therapist    NM Jony Ruiz, PT Student PT Student                     Time Calculation:    PT Charges       Row Name 02/29/24 1502             Time Calculation    PT Received On 02/29/24  -RH         Timed Charges    61099 - PT Therapeutic Exercise Minutes 12  -RH         Total Minutes    Timed Charges Total Minutes 12  -RH       Total Minutes 12  -RH                User Key  (r) = Recorded By, (t) = Taken By, (c) = Cosigned By      Initials Name Provider Type     Hai Sloan PTA Physical Therapist Assistant                  Therapy Charges for Today       Code Description Service Date Service Provider Modifiers Qty    [id number] HC PT THER PROC EA 15 MIN 2/29/2024 Hai Sloan PTA GP 1            PT G-Codes  Outcome Measure Options: AM-PAC 6 Clicks Daily Activity (OT), Optimal Instrument  AM-PAC 6 Clicks Score (PT): 15  AM-PAC 6 Clicks Score (OT): 16    Hai Sloan, LINDA  2/29/2024

## 2024-02-29 NOTE — PLAN OF CARE
Goal Outcome Evaluation:     Family at bedside.  No acute changes this shift.  No signs of distress noted at this time

## 2024-03-01 NOTE — PROGRESS NOTES
RT EQUIPMENT DEVICE RELATED - SKIN ASSESSMENT    RT Medical Equipment/Device:     High Flow Nasal Cannula:Airvo    Skin Assessment:      Cheek:  Intact  Nares:  Intact  Nose:  Intact    Device Skin Pressure Protection:  Pressure points protected    Nurse Notification:  Lizette Navarro, RRT

## 2024-03-01 NOTE — PROGRESS NOTES
Pulmonary / Critical Care Progress Note      Patient Name: Falguni Minaya  : 1960  MRN: [id number]  Attending:  Teresa Castro*  Date of admission: 2024    Subjective   Subjective   Follow-up for acute on chronic hypoxic respiratory failure requiring high flow nasal cannula     No acute events overnight     This morning,  On Airvo 35 L / 45% FiO2  Lying in bed  A bit anxious this morning per nursing, not wanting to move to chair today  Remains weak and fatigued  600 cc urine output over last 24 hours  Creatinine 1.85 this morning    Objective   Objective     Vitals:   Temp:  [97.3 °F (36.3 °C)-98.1 °F (36.7 °C)] 97.9 °F (36.6 °C)  Heart Rate:  [] 95  Resp:  [18-24] 18  BP: (118-128)/(74-99) 128/77  Flow (L/min):  [30-35] 35    Physical Exam   Vital Signs Reviewed   General:  Alert, NAD, chronically ill appearing female, lying in bed  Chest: Decreased aeration with coarse crackles on auscultation, moderate work of breathing noted on Airvo  CV: regular rate and rhythm regular  EXT:  no clubbing, no cyanosis, BLE edema  Neuro:  A&Ox3, moving all 4 extremities spontaneously  Skin: No rashes or lesions noted    Result Review    Result Review:  I have personally reviewed the results from the time of this admission to 3/1/2024 15:05 EST and agree with these findings:  [x]  Laboratory  [x]  Microbiology  [x]  Radiology  []  EKG/Telemetry   []  Cardiology/Vascular   []  Pathology  []  Old records  []  Other:  Most notable findings include:   proBNP 10,048      Lab 24  1316 24  0422 24  0438 24  0438 24  0401 24  0424 24  0402 24  0240   WBC  --  22.51* 19.43* 17.52* 15.48* 12.46* 11.17* 11.78*   HEMOGLOBIN  --  8.7* 9.5* 9.7* 10.0* 9.9* 9.3* 9.2*   HEMATOCRIT  --  27.1* 29.6* 30.6* 31.4* 31.4* 30.1* 29.8*   PLATELETS  --  457* 460* 461* 435 369 389 356   SODIUM 126* 129* 133* 132* 136 135* 137 137   POTASSIUM 3.7 3.1* 3.2* 3.7 3.2* 3.6 3.7 4.4    CHLORIDE 90* 91* 92* 92* 93* 94* 97* 98   CO2 17.1* 19.6* 22.3 22.4 24.5 24.6 25.7 23.8   BUN 73* 74* 77* 74* 60* 54* 50* 48*   CREATININE 1.80* 1.85* 1.96* 1.84* 1.64* 1.65* 1.72* 1.78*   GLUCOSE 211* 128* 192* 190* 184* 180* 143* 146*   CALCIUM 9.2 9.2 9.6 9.7 9.4 9.3 9.0 9.0   PHOSPHORUS  --   --  3.8  --  3.2 2.9 3.0 3.8       Assessment & Plan   Assessment / Plan     Active Hospital Problems:  Active Hospital Problems    Diagnosis     **Acute on chronic hypoxic respiratory failure     Diastolic CHF, acute on chronic     Acute on chronic respiratory failure with hypoxia    Impression:  Acute hypoxic respiratory failure requiring Airvo and BiPAP  Acute decompensated congestive diastolic heart failure  Hyponatremia  NSTEMI likely type II  Acute cardiogenic pulmonary edema  Volume overload  T2DM with hyperglycemia  Continue acidosis, clinically significant  Hypokalemia  Hypocalcemia  History of multiple sclerosis  Immunocompromised status     Plan:  -Currently on Airvo 35 L / 45% FiO2.  Respiratory status remains tenuous at this time  -2/28 chest CT reviewed demonstrating scarring and fibrosis  -Check ESR, 84 and CRP, 0.76.  ILD workup pending  -Continue Solu-Medrol to 40 mg IV daily  -Creatinine bumped yesterday.  Slightly improved this morning.  Will resume Lasix at a lower dose today with 40 mg IV daily.  -Continue to monitor renal function electrolytes, especially sodium, 126 this morning.  -Transitioned from Lovenox to heparin for DVT prophylaxis  -Continue Brovana, Pulmicort, DuoNebs, and 3% saline nebulizers  -Continue bronchopulmonary hygiene.  Encourage I-S and flutter  -Encourage activity as tolerated.  Out of bed to chair  -PT/OT on board.  Appreciate assistance  -Continue Plavix  -Rest of care per primary  -Plans to [facility] to [facility] at discharge    DVT prophylaxis:  Medical DVT prophylaxis orders are present.    CODE STATUS:   Medical Intervention Limits: NO intubation (DNI)  Level Of Support  Discussed With: Patient  Code Status (Patient has no pulse and is not breathing): No CPR (Do Not Attempt to Resuscitate)  Medical Interventions (Patient has pulse or is breathing): Limited Support    Labs, imaging, microbiology, notes and medications personally reviewed  Discussed with primary.    Electronically signed by Audelia Duarte MD, 03/01/24, 3:07 PM EST.

## 2024-03-01 NOTE — PROGRESS NOTES
RT EQUIPMENT DEVICE RELATED - SKIN ASSESSMENT    RT Medical Equipment/Device:     High Flow Nasal Cannula:Airvo    Skin Assessment:      Cheek:  Intact  Nares:  Intact  Nose:  Intact    Device Skin Pressure Protection:  Pressure points protected    Nurse Notification:  Lizette Interiano, RRT

## 2024-03-01 NOTE — PLAN OF CARE
Goal Outcome Evaluation:      Pt did not complain of pain during the shift. Pt's O2 drops if not on AIRVO. Pt is tolerating the purewick well. Pt did not ambulate. Pt plans to return to [facility] after discharge. Zay Johnson RN

## 2024-03-01 NOTE — PLAN OF CARE
Goal Outcome Evaluation:  Plan of Care Reviewed With: patient        Progress: improving  Outcome Evaluation: Patient was weaned down on airvo this morning to35L 45% tolerating well.

## 2024-03-01 NOTE — CONSULTS
Repeat palliative care consult for goals of care- see previous notes. Met with patient and patients  at bedside. Patient recalls previous palliative care visits. Emotional support provided. Patient reports being tired of being confined to these walls. Discussed limitations of movement due to patients respiratory status. Discussed possibility of getting into a wheelchair and having a change of scenery if/when patient is able to get off airvo. Patient reports that would be very nice. Offered games, crossword puzzles, etc for patient to keep her busy- patient declines at this time. Discussed what patient liked to do at home- allowed space for patient to participate in life reflection. Offered to wash/comb patients hair to help her feel a little better- patient reports that would be nice- notified pca. Emotional support provided. Patient aware of care options and continues to request aggressive treatment at this time. Palliative care will continue to follow up as needed.    Katlin FANG, RN, CHPN  Palliative Care

## 2024-03-01 NOTE — PLAN OF CARE
Goal Outcome Evaluation:  Plan of Care Reviewed With: patient, spouse        Progress: improving  Outcome Evaluation: patient continues to stat in the 90's with activity. Complaints of Nausea treated per MAR. Patient appears sad and states she is tired of not being able to catch her breath.

## 2024-03-01 NOTE — PROGRESS NOTES
[facility]   Hospitalist Progress Note  Date: 3/1/2024  Patient Name: Falguni Minaya  : 1960  MRN: [id number]  Date of admission: 2024  Consultants:   -Pulmonology: Dr. Derrell Tomlin, Dr. Isreal Pedraza, Dr. Audelia Duarte    Subjective   Subjective     Chief Complaint: Shortness of breath    Summary:   Falguni Minaya is a 64 y.o. female with chronic diastolic heart failure, CAD, dysphagia, essential hypertension, hyperlipidemia, history of MS and CKD stage IIIb presented with complaints of shortness of breath.  Empiric antibiotics, nebulizer breathing treatments, steroid therapy and IV diuresis initiated.  Pulmonology consulted.  Infectious workup was nonrevealing and antibiotics were discontinued.  ILD workup initiated.  Patient continued to have a submental O2 requirement, proBNP rechecked and noted to be significantly elevated, IV diuresis restarted.    Interval Followup:   No acute events overnight.  No acute distress.  Respiratory status is stable.  She did have significant desaturation when it was not placed properly when she was sleeping.  She is pretty down today and is not her normal self.  No family at bedside today.    Objective   Objective     Vitals:   Temp:  [97.3 °F (36.3 °C)-98.1 °F (36.7 °C)] 97.9 °F (36.6 °C)  Heart Rate:  [] 96  Resp:  [18-24] 18  BP: (118-128)/(74-99) 128/77  Flow (L/min):  [30-35] 35  Physical Exam   Gen: Acutely ill-appearing chronically ill female, alert, pleasant  Resp: Poor aeration, scattered crackles, conversational dyspnea appreciated  Card: RRR  Abd: Soft, Nontender, Nondistended    Result Review    Result Review:  I have personally reviewed the results as below and agree with these findings:  [x]  Laboratory:   CMP          2024    04:38 2024    04:38 3/1/2024    04:22   CMP   Glucose 190  192  128    BUN 74  77  74    Creatinine 1.84  1.96  1.85    EGFR 30.1  27.9  29.9    Sodium 132  133  129    Potassium 3.7  3.2  3.1    Chloride 92  92   91    Calcium 9.7  9.6  9.2    BUN/Creatinine Ratio 40.2  39.3  40.0    Anion Gap 17.6  18.7  18.4      CBC          2/28/2024    04:38 2/29/2024    04:38 3/1/2024    04:22   CBC   WBC 17.52  19.43  22.51    RBC 3.24  3.14  2.89    Hemoglobin 9.7  9.5  8.7    Hematocrit 30.6  29.6  27.1    MCV 94.4  94.3  93.8    MCH 29.9  30.3  30.1    MCHC 31.7  32.1  32.1    RDW 15.4  15.6  15.3    Platelets 461  460  457    Phosphorus and magnesium within normal limits  [x]  Microbiology:   [x]  Radiology:   [x]  EKG/Telemetry:    []  Cardiology/Vascular:    []  Pathology:  []  Old records:  []  Other:    Assessment & Plan   Assessment / Plan     Assessment:  Acute on chronic diastolic heart failure  Acute hypoxic respiratory failure secondary to above  Acute cardiogenic pulmonary edema  NSTEMI likely type II  Type 2 diabetes mellitus with hyperglycemia  Lactic acidosis, clinically significant  Hypocalcemia  Hypokalemia  Acute on chronic kidney disease stage IIIb (baseline creatinine 1.0-1.2)  Immunocompromise status  Essential hypertension  Hyponatremia, mild  Metabolic acidosis    Plan:  -Pulmonology consulted and following, appreciate assistance and recommendations in the care of this patient.  -Continue BiPAP and Airvo.  Requirements are declining.  Wean supplemental O2 as much as possible to maintain sats greater than 90%  -Continue Brovana, Pulmicort and DuoNebs  -250 cc LR for hyponatremia and hypotension, 25 g albumin given  -Lasix resumed  -Continue Solu-Medrol per pulm  -Encouraged use of incentive spirometer and flutter valve  -Continue chest physiotherapy  -Potassium 3.1, p.o. replacement ordered  -Leukocytosis secondary to steroid use  -Continue midodrine  -Creatinine stable  -Accu-Cheks and sliding scale insulin  -Continue home Prozac, Neurontin and Requip  -Continue holding home beta-blocker  -Continue aspirin, atorvastatin, Plavix  -PT/OT consulted  -Monitor electrolytes and renal function with BMP, phosphorus  level and magnesium level in the AM  -Monitor WBC and Hgb with CBC in the AM  -Chest x-ray stable    Patient appears down today.  At her previous admission we did discuss how long her recovery would be.  At that time she was not interested in palliative/hospice care.  However, I am going to get a palliative care consult given her tenuous respiratory status.  She reports to me today that she is not ready to stop treatment, wants to continue with aggressive care for now.     DVT Prophylaxis: Lovenox  GI Prophylaxis: Pantoprazole  Dispo: Patient requesting to go back to rehab at discharge.  Code Status: DNR         DVT prophylaxis:  Medical DVT prophylaxis orders are present.      CODE STATUS:   Medical Intervention Limits: NO intubation (DNI)  Level Of Support Discussed With: Patient  Code Status (Patient has no pulse and is not breathing): No CPR (Do Not Attempt to Resuscitate)  Medical Interventions (Patient has pulse or is breathing): Limited Support

## 2024-03-01 NOTE — THERAPY TREATMENT NOTE
Acute Care - Physical Therapy Treatment Note   [place]     Patient Name: Falguni Minaya  : 1960  MRN: [id number]  Today's Date: 3/1/2024      Visit Dx:     ICD-10-CM ICD-9-CM   1. Acute respiratory failure with hypoxia  J96.01 518.81   2. Pneumonia due to infectious organism, unspecified laterality, unspecified part of lung  J18.9 486   3. Difficulty in walking  R26.2 719.7   4. Decreased activities of daily living (ADL)  Z78.9 V49.89     Patient Active Problem List   Diagnosis    Multiple sclerosis    Left sided abdominal pain    Dyspepsia    Heme positive stool    Hypotension    Seizure disorder    Pain in lower back    Headache    Frequent falls    Spasm    Weakness of right leg    S/P drug eluting coronary stent placement    Coronary artery disease involving native coronary artery of native heart    Ischemic cardiomyopathy    Pleural effusion due to CHF (congestive heart failure)    Shortness of breath    Orthopnea    Hyperlipidemia LDL goal <70    Anemia due to stage 3 chronic kidney disease    Iron deficiency    Anemia in chronic kidney disease    Arthropathy of lumbar facet joint    Marie's esophagus    Benign colonic polyp    Congestive heart failure    Degeneration of lumbar intervertebral disc    Gastroesophageal reflux disease    Gastroparesis    Lumbar radiculopathy    Osteoporosis    Sacroiliac joint pain    Serum creatinine raised    Vitamin D deficiency    Acute hypoxemic respiratory failure    Acute on chronic hypoxic respiratory failure    Diastolic CHF, acute on chronic    Acute on chronic respiratory failure with hypoxia     Past Medical History:   Diagnosis Date    Abdominal hernia     Acute ST elevation myocardial infarction (STEMI) due to occlusion of right coronary artery 2020    CHF (congestive heart failure)     DENIES CP GETS SOA WITH EXERTION. FOLLOWED BY DR ERIC/KASIA GREY. DECREASED ACTIVITY USES WALKER/ROLLATOR    CKD (chronic kidney disease)     Coronary artery  disease     Dyspepsia     GERD (gastroesophageal reflux disease)     Hyperlipidemia     Hypertension     Ischemic cardiomyopathy     Multiple sclerosis      Past Surgical History:   Procedure Laterality Date    APPENDECTOMY      CARDIAC CATHETERIZATION      CARDIAC CATHETERIZATION N/A 2/24/2020    Procedure: Left Heart Cath;  Surgeon: Marlon Zamudio MD;  Location[facility] C[facility]AS[facility] LOC[facility];  Service: Cardiovascular;  Laterality: N/A;    CARDIAC CATHETERIZATION N/A 2/24/2020    Procedure: Stent LATRICIA coronary;  Surgeon: Marlon Zamudio MD;  Location: [facility] C[facility] LOC[facility];  Service: Cardiovascular;  Laterality: N/A;    CARDIAC CATHETERIZATION  2/24/2020    Procedure: Percutaneous Manual Thrombectomy;  Surgeon: Marlon Zamudio MD;  Location: [facility] C[facility];  Service: Cardiovascular;;    CARDIAC CATHETERIZATION N/A 2/24/2020    Procedure: Coronary angiography;  Surgeon: Marlon Zamudio MD;  Location: [facility] C[facility] LOC[facility];  Service: Cardiovascular;  Laterality: N/A;    CARDIAC CATHETERIZATION N/A 2/24/2020    Procedure: Left ventriculography;  Surgeon: Marlon Zamudio MD;  Location: [facility] C[facility];  Service: Cardiovascular;  Laterality: N/A;    CHOLECYSTECTOMY      COLONOSCOPY N/A 2/29/2020    Procedure: COLONOSCOPY to  cecum:;  Surgeon: Krystal Sarabia MD;  Location: [facility] ENDOSCOPY;  Service: Gastroenterology;  Laterality: N/A;  pre:  anemia and abd pain  post:  hemorrhoids, tortuous colon    ENDOSCOPY N/A 2/29/2020    Procedure: ESOPHAGOGASTRODUODENOSCOPY  with biopsies;  Surgeon: Krystal Sarabia MD;  Location: [facility] ENDOSCOPY;  Service: Gastroenterology;  Laterality: N/A;  pre:  anemia and abd pain  post:  mild gastritis,     HYSTERECTOMY      PARATHYROIDECTOMY Bilateral 10/17/2023    Procedure: NECK EXPLORATION WITH PARATHYROID ADENOMA EXCISION AND FROZEN SECTION,, RECURRENT LARYNGEAL NERVE MONITORING, INTRAOPERATIVE INTACT PTH ASSAY;   Surgeon: Ezekiel Giles MD;  Location: [facility] MAIN OR;  Service: ENT;  Laterality: Bilateral;    TONSILLECTOMY       PT Assessment (last 12 hours)       PT Evaluation and Treatment       Row Name 03/01/24 1351          Physical Therapy Time and Intention    Subjective Information complains of;dizziness;pain  -VK     Document Type therapy note (daily note)  -VK     Mode of Treatment physical therapy;individual therapy  -VK     Patient Effort adequate  -VK       Row Name 03/01/24 1351          General Information    Patient Profile Reviewed yes  -VK     Existing Precautions/Restrictions fall;oxygen therapy device and L/min  -VK       Row Name 03/01/24 1351          Pain    Pain Location - Side/Orientation Bilateral  -VK     Pain Location - other (see comments)  Pt c/o pain in BLE's.  -VK       Row Name 03/01/24 1351          Cognition    Affect/Mental Status (Cognition) WFL  -VK     Orientation Status (Cognition) oriented to;person;situation  -VK     Personal Safety Interventions fall prevention program maintained  -VK       Row Name 03/01/24 1351          Bed Mobility    Bed Mobility rolling left;rolling right  -VK     Rolling Left [place] (Bed Mobility) moderate assist (50% patient effort)  -VK     Rolling Right [place] (Bed Mobility) moderate assist (50% patient effort)  -VK     Scooting/Bridging [place] (Bed Mobility) moderate assist (50% patient effort);maximum assist (25% patient effort)  -VK     Supine-Sit [place] (Bed Mobility) minimum assist (75% patient effort)  -VK     Sit-Supine [place] (Bed Mobility) moderate assist (50% patient effort)  -VK     Bed Mobility, Safety Issues decreased use of arms for pushing/pulling;decreased use of legs for bridging/pushing  -VK     Assistive Device (Bed Mobility) bed rails;head of bed elevated  -VK       Row Name 03/01/24 1351          Gait/Stairs (Locomotion)    Reason Patient was unable to Ambulate Excessive Weakness  -VK       Row Name  03/01/24 1351          Safety Issues, Functional Mobility    Impairments Affecting Function (Mobility) endurance/activity tolerance;shortness of breath;strength;balance  -       Row Name 03/01/24 1351          Balance    Static Sitting Balance contact guard  -       Row Name 03/01/24 1351          Hip (Therapeutic Exercise)    Hip Isometrics (Therapeutic Exercise) gluteal sets;10 repetitions;2 sets  -       Row Name 03/01/24 1351          Knee (Therapeutic Exercise)    Knee Isometrics (Therapeutic Exercise) quad sets;10 repetitions;2 sets  -       Row Name 03/01/24 1351          Ankle (Therapeutic Exercise)    Ankle AROM (Therapeutic Exercise) bilateral;dorsiflexion;plantarflexion;20 repititions  -       Row Name             Wound 02/10/24 0816 Bilateral upper thigh MASD (Moisture associated skin damage)    Wound - Properties Group Placement Date: 02/10/24  -KP Placement Time: 0816  -KP Present on Original Admission: N  -KP Side: Bilateral  -KP Orientation: upper  -KP Location: thigh  -KP Primary Wound Type: MASD  -KP    Retired Wound - Properties Group Placement Date: 02/10/24  -KP Placement Time: 0816  -KP Present on Original Admission: N  -KP Side: Bilateral  -KP Orientation: upper  -KP Location: thigh  -KP Primary Wound Type: MASD  -KP    Retired Wound - Properties Group Date first assessed: 02/10/24  -KP Time first assessed: 0816  -KP Present on Original Admission: N  -KP Side: Bilateral  -KP Location: thigh  -KP Primary Wound Type: MASD  -KP      Row Name             Wound 02/20/24 1405 gluteal MASD (Moisture associated skin damage)    Wound - Properties Group Placement Date: 02/20/24  -MILE Placement Time: 1405  -MILE Present on Original Admission: Y  -MILE Location: gluteal  -MILE Primary Wound Type: MASD  -MILE    Retired Wound - Properties Group Placement Date: 02/20/24  -MILE Placement Time: 1405  -MILE Present on Original Admission: Y  -MILE Location: gluteal  -MILE Primary Wound Type: MASD  -MILE    Retired Wound  - Properties Group Date first assessed: 02/20/24  -MILE Time first assessed: 1405  -MILE Present on Original Admission: Y  -MILE Location: gluteal  -MILE Primary Wound Type: MASD  -MILE      Row Name 03/01/24 1351          Vital Signs    Intra SpO2 (%) 88  -VK     O2 Delivery Intra Treatment hi-flow  -VK     Post SpO2 (%) 91  -VK     O2 Delivery Post Treatment hi-flow  -VK       Row Name 03/01/24 1351          Positioning and Restraints    Pre-Treatment Position in bed  -VK     Post Treatment Position bed  -VK     In Bed supine;call light within reach;encouraged to call for assist;exit alarm on;with family/caregiver;with nsg  -VK       Row Name 03/01/24 1351          Progress Summary (PT)    Progress Toward Functional Goals (PT) progress toward functional goals is gradual  -VK     Daily Progress Summary (PT) Pt agreeable to treatment. Performed BLE therex in supine, and sat EOB for about a minute before pt reported dizziness and laid back down. Pt soiled bed upon arrival, assisted aide with clean up. Pt continues to benefit from skilled PT to address stated deficits.  -VK               User Key  (r) = Recorded By, (t) = Taken By, (c) = Cosigned By      Initials Name Provider Type    Ruby Nicole, RN Registered Nurse    Leia Zaragoza, RN Registered Nurse    Halle Anand PTA Physical Therapist Assistant                      PT Recommendation and Plan     Progress Summary (PT)  Progress Toward Functional Goals (PT): progress toward functional goals is gradual  Daily Progress Summary (PT): Pt agreeable to treatment. Performed BLE therex in supine, and sat EOB for about a minute before pt reported dizziness and laid back down. Pt soiled bed upon arrival, assisted aide with clean up. Pt continues to benefit from skilled PT to address stated deficits.   Outcome Measures       Row Name 03/01/24 1400 02/29/24 1500          How much help from another person do you currently need...    Turning from your back to  your side while in flat bed without using bedrails? 3  -VK 3  -RH     Moving from lying on back to sitting on the side of a flat bed without bedrails? 3  -VK 3  -RH     Moving to and from a bed to a chair (including a wheelchair)? 2  -VK 3  -RH     Standing up from a chair using your arms (e.g., wheelchair, bedside chair)? 2  -VK 2  -RH     Climbing 3-5 steps with a railing? 2  -VK 2  -RH     To walk in hospital room? 2  -VK 2  -RH     AM-PAC 6 Clicks Score (PT) 14  -VK 15  -RH     Highest Level of Mobility Goal 4 --> Transfer to chair/commode  -VK 4 --> Transfer to chair/commode  -RH               User Key  (r) = Recorded By, (t) = Taken By, (c) = Cosigned By      Initials Name Provider Type     Hai Sloan PTA Physical Therapist Assistant    Halle Anand PTA Physical Therapist Assistant                     Time Calculation:    PT Charges       Row Name 03/01/24 1447             Time Calculation    PT Received On 03/01/24  -VK         Timed Charges    75172 - PT Therapeutic Exercise Minutes 11  -VK      69709 - PT Therapeutic Activity Minutes 12  -VK         Total Minutes    Timed Charges Total Minutes 23  -VK       Total Minutes 23  -VK                User Key  (r) = Recorded By, (t) = Taken By, (c) = Cosigned By      Initials Name Provider Type    Halle Anand PTA Physical Therapist Assistant                  Therapy Charges for Today       Code Description Service Date Service Provider Modifiers Qty    68479332653 HC PT THER PROC EA 15 MIN 3/1/2024 Halle Lundberg PTA GP 1    94280390831 HC PT THERAPEUTIC ACT EA 15 MIN 3/1/2024 Halle Lundberg PTA GP 1            PT G-Codes  Outcome Measure Options: AM-PAC 6 Clicks Daily Activity (OT), Optimal Instrument  AM-PAC 6 Clicks Score (PT): 14  AM-PAC 6 Clicks Score (OT): 16    Halle Lundberg PTA  3/1/2024

## 2024-03-01 NOTE — PLAN OF CARE
Goal Outcome Evaluation:           Progress: no change  Outcome Evaluation: Patient remains on AIRVO 35L 55%. Patient is tolerating well at this time. Cannula slipped from pt nose resulting in desaturation. Patient quickly recovered to 90s after fixing cannula. Patient did not wear bipap throughout the night. Will continue to monitor patient nose and adjust cannula as needed.

## 2024-03-02 NOTE — PROGRESS NOTES
RT EQUIPMENT DEVICE RELATED - SKIN ASSESSMENT    RT Medical Equipment/Device:     High Flow Nasal Cannula:Airvo    Skin Assessment:      Cheek:  Intact  Nares:  Intact  Nose:  Intact    Device Skin Pressure Protection:  Positioning supports utilized    Nurse Notification:  Lizette Castro, RRT

## 2024-03-02 NOTE — PROGRESS NOTES
Pulmonary / Critical Care Progress Note      Patient Name: Falguni Minaya  : 1960  MRN: [id number]  Attending:  Teresa Castro*  Date of admission: 2024    Subjective   Subjective   Follow-up for acute on chronic hypoxic respiratory failure requiring high flow nasal cannula     No acute events overnight     On Airvo this morning and trying to transition to high flow nasal cannula  Down to 6 L high flow with SpO2 of 97  Lying in bed  Ports feeling well  Remains weak and fatigued  600 cc urine output over last 24 hours  Creatinine 1.85 this morning    Objective   Objective     Vitals:   Temp:  [97.7 °F (36.5 °C)-98.1 °F (36.7 °C)] 97.9 °F (36.6 °C)  Heart Rate:  [] 86  Resp:  [17-24] 22  BP: (121-135)/(76-91) 132/76  Flow (L/min):  [30-35] 30    Physical Exam   Vital Signs Reviewed   General:  Alert, NAD, chronically ill appearing female, lying in bed  Chest: Decreased aeration with coarse crackles on auscultation, moderate work of breathing noted on Airvo  CV: regular rate and rhythm regular  EXT:  no clubbing, no cyanosis, BLE edema  Neuro:  A&Ox3, moving all 4 extremities spontaneously  Skin: No rashes or lesions noted    Result Review    Result Review:  I have personally reviewed the results from the time of this admission to 3/2/2024 08:00 EST and agree with these findings:  [x]  Laboratory  [x]  Microbiology  [x]  Radiology  []  EKG/Telemetry   []  Cardiology/Vascular   []  Pathology  []  Old records  []  Other:  Most notable findings include:   proBNP 10,048      Lab 24  0550 24  1316 24  0422 24  0438 24  0438 24  0401 24  0424 24  0402   WBC 25.36*  --  22.51* 19.43* 17.52* 15.48* 12.46* 11.17*   HEMOGLOBIN 8.6*  --  8.7* 9.5* 9.7* 10.0* 9.9* 9.3*   HEMATOCRIT 26.7*  --  27.1* 29.6* 30.6* 31.4* 31.4* 30.1*   PLATELETS 428  --  457* 460* 461* 435 369 389   SODIUM 130* 126* 129* 133* 132* 136 135* 137   POTASSIUM 3.4* 3.7 3.1* 3.2* 3.7  3.2* 3.6 3.7   CHLORIDE 94* 90* 91* 92* 92* 93* 94* 97*   CO2 20.0* 17.1* 19.6* 22.3 22.4 24.5 24.6 25.7   BUN 69* 73* 74* 77* 74* 60* 54* 50*   CREATININE 1.87* 1.80* 1.85* 1.96* 1.84* 1.64* 1.65* 1.72*   GLUCOSE 110* 211* 128* 192* 190* 184* 180* 143*   CALCIUM 9.1 9.2 9.2 9.6 9.7 9.4 9.3 9.0   PHOSPHORUS  --   --   --  3.8  --  3.2 2.9 3.0       Assessment & Plan   Assessment / Plan     Active Hospital Problems:  Active Hospital Problems    Diagnosis     **Acute on chronic hypoxic respiratory failure     Diastolic CHF, acute on chronic     Acute on chronic respiratory failure with hypoxia    Impression:  Acute hypoxic respiratory failure requiring Airvo and BiPAP  Acute decompensated congestive diastolic heart failure  Hyponatremia  NSTEMI likely type II  Acute cardiogenic pulmonary edema  Volume overload  T2DM with hyperglycemia  Continue acidosis, clinically significant  Hypokalemia  Hypocalcemia  History of multiple sclerosis  Immunocompromised status     Plan:  -Weaned to 6 L high flow.  Continue to wean supplemental oxygen to maintain SpO2 greater than 90%.  -2/28 chest CT reviewed demonstrating scarring and fibrosis  -ILD workup normal thus far, myositis and hypersensitivity pneumonitis pending  -Continue Solu-Medrol to 40 mg IV daily  -Continue Lasix 40 mg IV daily  -Continue to monitor renal panel and electrolytes.  Replaced potassium orally  -Continue Brovana, Pulmicort, DuoNebs, and 3% saline nebulizers  -Continue bronchopulmonary hygiene.  Encourage I-S and flutter  -Encourage activity as tolerated.  Out of bed to chair  -PT/OT on board.  Appreciate assistance  -Continue Plavix  -Rest of care per primary  -[facility]       DVT prophylaxis:  Medical DVT prophylaxis orders are present.    CODE STATUS:   Medical Intervention Limits: NO intubation (DNI)  Level Of Support Discussed With: Patient  Code Status (Patient has no pulse and is not breathing): No CPR (Do Not  Attempt to Resuscitate)  Medical Interventions (Patient has pulse or is breathing): Limited Support    Labs, imaging, microbiology, notes and medications personally reviewed  Discussed with primary.      I, Dr. Derrell Tomlin, have spent more than 50% of the total time managing the patient in this encounter today.  This included personally reviewing all pertinent labs, imaging, microbiology and documentation. Also discussing the case with the patient and any available family, the admitting physician and any available ancillary staff.    Electronically signed by WESTLEY Giron, 03/02/24, 11:19 AM EST.  Electronically signed by Derrell Tomlin MD, 03/02/24, 1:15 PM EST.

## 2024-03-02 NOTE — PROGRESS NOTES
RT EQUIPMENT DEVICE RELATED - SKIN ASSESSMENT    RT Medical Equipment/Device:     High Flow Nasal Cannula:Airvo    Skin Assessment:      Cheek:  Intact  Ears:  Intact  Nares:  Intact    Device Skin Pressure Protection:  Positioning supports utilized and Pressure points protected    Nurse Notification:  Lizette Man, RRT

## 2024-03-02 NOTE — PROGRESS NOTES
[facility]   Hospitalist Progress Note  Date: 3/2/2024  Patient Name: Falguni Minaya  : 1960  MRN: [id number]  Date of admission: 2024  Consultants:   -Pulmonology: Dr. Derrell Tomlin, Dr. Isreal Pedraza, Dr. Audelia Duarte    Subjective   Subjective     Chief Complaint: Shortness of breath    Summary:   Falguni Minaya is a 64 y.o. female with chronic diastolic heart failure, CAD, dysphagia, essential hypertension, hyperlipidemia, history of MS and CKD stage IIIb presented with complaints of shortness of breath.  Empiric antibiotics, nebulizer breathing treatments, steroid therapy and IV diuresis initiated.  Pulmonology consulted.  Infectious workup was nonrevealing and antibiotics were discontinued.  ILD workup initiated.  Patient continued to have a submental O2 requirement, proBNP rechecked and noted to be significantly elevated, IV diuresis restarted.    Interval Followup:   No acute events overnight.  No acute distress.  Patient appears much better today and is in much better spirits.  She reports that she is feeling better.  She is actually talkative and smiling today.  No family at bedside.  She denies any chest pain or abdominal pain.    Objective   Objective     Vitals:   Temp:  [97.7 °F (36.5 °C)-98.1 °F (36.7 °C)] 97.9 °F (36.6 °C)  Heart Rate:  [] 86  Resp:  [17-24] 22  BP: (121-135)/(76-91) 132/76  Flow (L/min):  [30-35] 30  Physical Exam   Gen: Acutely ill-appearing chronically ill female, alert, pleasant  Resp: Poor aeration,conversational dyspnea appreciated  Card: RRR  Abd: Soft, Nontender, Nondistended    Result Review    Result Review:  I have personally reviewed the results as below and agree with these findings:  [x]  Laboratory:   CMP          2024    04:38 3/1/2024    04:22 3/1/2024    13:16 3/2/2024    05:50   CMP   Glucose 192  128  211  110    BUN 77  74  73  69    Creatinine 1.96  1.85  1.80  1.87    EGFR 27.9  29.9  30.9  29.6    Sodium 133  129  126  130     Potassium 3.2  3.1  3.7  3.4    Chloride 92  91  90  94    Calcium 9.6  9.2  9.2  9.1    BUN/Creatinine Ratio 39.3  40.0  40.6  36.9    Anion Gap 18.7  18.4  18.9  16.0      CBC          2/29/2024    04:38 3/1/2024    04:22 3/2/2024    05:50   CBC   WBC 19.43  22.51  25.36    RBC 3.14  2.89  2.82    Hemoglobin 9.5  8.7  8.6    Hematocrit 29.6  27.1  26.7    MCV 94.3  93.8  94.7    MCH 30.3  30.1  30.5    MCHC 32.1  32.1  32.2    RDW 15.6  15.3  15.5    Platelets 460  457  428    Phosphorus and magnesium within normal limits  [x]  Microbiology:   [x]  Radiology:   [x]  EKG/Telemetry:    []  Cardiology/Vascular:    []  Pathology:  []  Old records:  []  Other:    Assessment & Plan   Assessment / Plan     Assessment:  Acute on chronic diastolic heart failure  Acute hypoxic respiratory failure secondary to above  Acute cardiogenic pulmonary edema  NSTEMI likely type II  Type 2 diabetes mellitus with hyperglycemia  Lactic acidosis, clinically significant  Hypocalcemia  Hypokalemia  Acute on chronic kidney disease stage IIIb (baseline creatinine 1.0-1.2)  Immunocompromise status  Essential hypertension  Hyponatremia, mild  Metabolic acidosis    Plan:  -Pulmonology consulted   -Continue BiPAP at bedtime and as needed.  Off Airvo and on nasal cannula. Wean supplemental O2 as much as possible to maintain sats greater than 90%  -Continue Brovana, Pulmicort and DuoNebs  -Lasix resumed  -Continue Solu-Medrol per pulm  -Encouraged use of incentive spirometer and flutter valve  -Continue chest physiotherapy  -Potassium 3.4 today  -Leukocytosis secondary to steroid use, trending up  -Continue midodrine  -Creatinine remained stable after Lasix resumed  -Accu-Cheks and sliding scale insulin  -Continue home Prozac, Neurontin and Requip  -Continue holding home beta-blocker  -Continue aspirin, atorvastatin, Plavix  -PT/OT consulted  -Monitor electrolytes and renal function with BMP, phosphorus level and magnesium level in the  AM  -Monitor WBC and Hgb with CBC in the AM  -Chest x-ray stable       DVT Prophylaxis: Lovenox  GI Prophylaxis: Pantoprazole  Dispo: Patient requesting to go back to rehab at discharge.  Code Status: DNR         DVT prophylaxis:  Medical DVT prophylaxis orders are present.      CODE STATUS:   Medical Intervention Limits: NO intubation (DNI)  Level Of Support Discussed With: Patient  Code Status (Patient has no pulse and is not breathing): No CPR (Do Not Attempt to Resuscitate)  Medical Interventions (Patient has pulse or is breathing): Limited Support      Electronically signed by Teresa Castro DO, 03/02/24, 11:09 AM EST.

## 2024-03-03 NOTE — PROGRESS NOTES
Pulmonary / Critical Care Progress Note      Patient Name: Falguni Minaya  : 1960  MRN: [id number]  Attending:  Teresa Castro*  Date of admission: 2024    Subjective   Subjective   Follow-up for acute on chronic hypoxic respiratory failure requiring high flow nasal cannula     No acute events overnight    This morning,  Down to 4 L nasal cannula  Lying in bed  Reports feeling well today  Remains weak and fatigued  Denies cough  Denies chest pain or chest tightness  White count downtrending  Diuresing well  Creatinine slightly elevated today, appears baseline may be 1.6-1.8    Objective   Objective     Vitals:   Temp:  [97.2 °F (36.2 °C)-97.9 °F (36.6 °C)] 97.3 °F (36.3 °C)  Heart Rate:  [78-88] 85  Resp:  [18-22] 21  BP: (110-133)/(75-91) 133/89  Flow (L/min):  [5-6] 5    Physical Exam   Vital Signs Reviewed   General:  Alert, NAD, chronically ill appearing female, lying in bed  Chest: Decreased aeration with coarse crackles on auscultation, no work of breathing noted on 4 L nasal cannula  CV: regular rate and rhythm regular  EXT:  no clubbing, no cyanosis, BLE edema  Neuro:  A&Ox3, moving all 4 extremities spontaneously  Skin: No rashes or lesions noted    Result Review    Result Review:  I have personally reviewed the results from the time of this admission to 3/3/2024 06:21 EST and agree with these findings:  [x]  Laboratory  [x]  Microbiology  [x]  Radiology  []  EKG/Telemetry   []  Cardiology/Vascular   []  Pathology  []  Old records  []  Other:  Most notable findings include:   proBNP 10,048      Lab 24  0503 24  0550 24  1316 24  0422 24  0438 24  0438 24  0401 24  0424   WBC 22.30* 25.36*  --  22.51* 19.43* 17.52* 15.48* 12.46*   HEMOGLOBIN 8.6* 8.6*  --  8.7* 9.5* 9.7* 10.0* 9.9*   HEMATOCRIT 27.3* 26.7*  --  27.1* 29.6* 30.6* 31.4* 31.4*   PLATELETS 399 428  --  457* 460* 461* 435 369   SODIUM 132* 130* 126* 129* 133* 132* 136 135*    POTASSIUM 4.6 3.4* 3.7 3.1* 3.2* 3.7 3.2* 3.6   CHLORIDE 99 94* 90* 91* 92* 92* 93* 94*   CO2 19.9* 20.0* 17.1* 19.6* 22.3 22.4 24.5 24.6   BUN 66* 69* 73* 74* 77* 74* 60* 54*   CREATININE 1.95* 1.87* 1.80* 1.85* 1.96* 1.84* 1.64* 1.65*   GLUCOSE 119* 110* 211* 128* 192* 190* 184* 180*   CALCIUM 9.3 9.1 9.2 9.2 9.6 9.7 9.4 9.3   PHOSPHORUS  --   --   --   --  3.8  --  3.2 2.9       Assessment & Plan   Assessment / Plan     Active Hospital Problems:  Active Hospital Problems    Diagnosis     **Acute on chronic hypoxic respiratory failure     Diastolic CHF, acute on chronic     Acute on chronic respiratory failure with hypoxia    Impression:  Acute hypoxic respiratory failure requiring Airvo and BiPAP  Acute decompensated congestive diastolic heart failure  Hyponatremia, unclear and significant  NSTEMI likely type II  Acute cardiogenic pulmonary edema  Volume overload  T2DM with hyperglycemia  Continue acidosis, clinically significant  Hypokalemia  Hypocalcemia  History of multiple sclerosis  Immunocompromised status     Plan:  -Weaned to 4 L nasal cannula.  Continue to wean supplemental oxygen to maintain SpO2 greater than 90%.  Patient does not wear any home O2  -2/28 chest CT reviewed demonstrating scarring and fibrosis  -ILD workup normal thus far, myositis and hypersensitivity pneumonitis pending  DC steroids  -Continue Lasix 40 mg IV daily.  Will need oral diuretics at discharge  -Check BNP  -Continue to monitor renal panel and electrolytes.   -Continue Brovana, Pulmicort, DuoNebs, and 3% saline nebulizers  -Continue bronchopulmonary hygiene.  Encourage I-S and flutter  -Encourage activity as tolerated.  Out of bed to chair  -PT/OT on board.  Appreciate assistance  -Continue Plavix  -Rest of care per primary  -Plans to return to [facility] rehab at discharge    DVT prophylaxis:  Medical DVT prophylaxis orders are present.    CODE STATUS:   Medical Intervention Limits: NO intubation (DNI)  Level Of Support  Discussed With: Patient  Code Status (Patient has no pulse and is not breathing): No CPR (Do Not Attempt to Resuscitate)  Medical Interventions (Patient has pulse or is breathing): Limited Support      Labs, imaging, microbiology, notes and medications personally reviewed  Discussed with primary.      I, Dr. Derrell Tomlin, have spent more than 50% of the total time managing the patient in this encounter today.  This included personally reviewing all pertinent labs, imaging, microbiology and documentation. Also discussing the case with the patient and any available family, the admitting physician and any available ancillary staff.    Electronically signed by WESTLEY Giron, 03/03/24, 8:41 AM EST.  Electronically signed by Derrell Tomlin MD, 03/03/24, 10:22 AM EST.  '

## 2024-03-03 NOTE — PROGRESS NOTES
[facility]   Hospitalist Progress Note  Date: 3/3/2024  Patient Name: Falguni Minaya  : 1960  MRN: [id number]  Date of admission: 2024  Consultants:   -Pulmonology: Dr. Derrell Tomlin, Dr. Isreal Pedraza, Dr. Audelia Duarte    Subjective   Subjective     Chief Complaint: Shortness of breath    Summary:   Falguni Minaya is a 64 y.o. female with chronic diastolic heart failure, CAD, dysphagia, essential hypertension, hyperlipidemia, history of MS and CKD stage IIIb presented with complaints of shortness of breath.  Empiric antibiotics, nebulizer breathing treatments, steroid therapy and IV diuresis initiated.  Pulmonology consulted.  Infectious workup was nonrevealing and antibiotics were discontinued.  ILD workup initiated.  Patient continued to have a submental O2 requirement, proBNP rechecked and noted to be significantly elevated, IV diuresis restarted.    Interval Followup:   No acute events overnight.  No acute distress.  Patient was initially sleeping when I entered the room, she awoke easily and told me that she is feeling better today.  Her respiratory status is proved and she is not tachypneic today.  No family at bedside.      Objective   Objective     Vitals:   Temp:  [97.2 °F (36.2 °C)-98.2 °F (36.8 °C)] 97.7 °F (36.5 °C)  Heart Rate:  [78-93] 93  Resp:  [16-22] 20  BP: (110-137)/(75-91) 116/77  Flow (L/min):  [4-6] 4    Physical Exam   Gen: Acutely ill-appearing chronically ill female, alert, pleasant  Resp: Poor aeration, no tachypnea or secondary muscle use  Card: RRR  Abd: Soft, Nontender, Nondistended    Result Review    Result Review:  I have personally reviewed the results as below and agree with these findings:  [x]  Laboratory:   CMP          3/1/2024    04:22 3/1/2024    13:16 3/2/2024    05:50 3/3/2024    05:03   CMP   Glucose 128  211  110  119    BUN 74  73  69  66    Creatinine 1.85  1.80  1.87  1.95    EGFR 29.9  30.9  29.6  28.1    Sodium 129  126  130  132    Potassium 3.1   3.7  3.4  4.6    Chloride 91  90  94  99    Calcium 9.2  9.2  9.1  9.3    BUN/Creatinine Ratio 40.0  40.6  36.9  33.8    Anion Gap 18.4  18.9  16.0  13.1      CBC          3/1/2024    04:22 3/2/2024    05:50 3/3/2024    05:03   CBC   WBC 22.51  25.36  22.30    RBC 2.89  2.82  2.78    Hemoglobin 8.7  8.6  8.6    Hematocrit 27.1  26.7  27.3    MCV 93.8  94.7  98.2    MCH 30.1  30.5  30.9    MCHC 32.1  32.2  31.5    RDW 15.3  15.5  16.2    Platelets 457  428  399    Phosphorus and magnesium within normal limits  [x]  Microbiology:   [x]  Radiology:   [x]  EKG/Telemetry:    []  Cardiology/Vascular:    []  Pathology:  []  Old records:  []  Other:    Assessment & Plan   Assessment / Plan     Assessment:  Acute on chronic diastolic heart failure  Acute hypoxic respiratory failure secondary to above  Acute cardiogenic pulmonary edema  NSTEMI likely type II  Type 2 diabetes mellitus with hyperglycemia  Lactic acidosis, clinically significant  Hypocalcemia  Hypokalemia  Acute on chronic kidney disease stage IIIb (baseline creatinine 1.0-1.2)  Immunocompromise status  Essential hypertension  Hyponatremia, mild  Metabolic acidosis    Plan:  -Pulmonology following  -Continue BiPAP at bedtime and as needed.  Comfortable on nasal cannula, 4 L.  Wean supplemental O2 as much as possible to maintain sats greater than 90%  -Continue Brovana, Pulmicort and DuoNebs  -Continue IV Lasix  -Steroids discontinued  -Encouraged use of incentive spirometer and flutter valve  -Continue chest physiotherapy  -Potassium improved, 4.6  -Leukocytosis secondary to steroid use, now trending down  -Continue midodrine  -Creatinine stable  -Accu-Cheks and sliding scale insulin  -Continue home Prozac, Neurontin and Requip  -Continue holding home beta-blocker  -Continue aspirin, atorvastatin, Plavix  -PT/OT consulted  -Monitor electrolytes and renal function with BMP, phosphorus level and magnesium level in the AM  -Monitor WBC and Hgb with CBC in the  AM  -Chest x-ray stable       DVT Prophylaxis: Lovenox  GI Prophylaxis: Pantoprazole  Dispo: Patient requesting to go back to rehab at discharge.  Code Status: DNR         DVT prophylaxis:  Medical DVT prophylaxis orders are present.      CODE STATUS:   Medical Intervention Limits: NO intubation (DNI)  Level Of Support Discussed With: Patient  Code Status (Patient has no pulse and is not breathing): No CPR (Do Not Attempt to Resuscitate)  Medical Interventions (Patient has pulse or is breathing): Limited Support      Electronically signed by Teresa Castro DO, 03/03/24, 12:01 PM EST.

## 2024-03-04 NOTE — PROGRESS NOTES
Pulmonary / Critical Care Progress Note      Patient Name: Falguni Minaya  : 1960  MRN: [id number]  Attending:  Teresa Castro*  Date of admission: 2024    Subjective   Subjective   Follow-up for acute on chronic hypoxic respiratory failure requiring high flow nasal cannula     Over the past 24 hours: Remains on Brovana, Pulmicort, and DuoNebs.  Remains on Lasix 40 mg IV daily with spot metolazone.    No acute events overnight    This morning,  Remains on 4 L nasal cannula  Resting in bed  Feeling well today  Denies chest pain or chest tightness  Denies cough  No fever or chills  Remains weak and fatigued  Patient states that she is able to ambulate at home, but does not perform many ADLs  Diuresing well  Creatinine tolerating diuretics    Objective   Objective     Vitals:   Temp:  [97.2 °F (36.2 °C)-98.2 °F (36.8 °C)] 97.7 °F (36.5 °C)  Heart Rate:  [] 93  Resp:  [19-21] 20  BP: (116-154)/(74-88) 138/88  Flow (L/min):  [4-5] 5    Physical Exam     Vital Signs Reviewed   General:  Alert, NAD, chronically ill appearing female, lying in bed  Chest: Decreased aeration with coarse crackles on auscultation, no work of breathing noted on 4 L nasal cannula  CV: regular rate and rhythm regular  EXT:  no clubbing, no cyanosis, BLE edema  Neuro:  A&Ox3, moving all 4 extremities spontaneously  Skin: No rashes or lesions noted    Result Review    Result Review:  I have personally reviewed the results from the time of this admission to 3/4/2024 07:23 EST and agree with these findings:  [x]  Laboratory  [x]  Microbiology  [x]  Radiology  []  EKG/Telemetry   []  Cardiology/Vascular   []  Pathology  []  Old records  []  Other:  Most notable findings include:   proBNP 10,048     chest CT reviewed demonstrating scarring and fibrosis        Lab 24  0516 24  0503 24  0550 24  1316 24  0422 24  0438 24  0438 24  0401   WBC 18.41* 22.30* 25.36*  --  22.51*  19.43* 17.52* 15.48*   HEMOGLOBIN 8.7* 8.6* 8.6*  --  8.7* 9.5* 9.7* 10.0*   HEMATOCRIT 28.0* 27.3* 26.7*  --  27.1* 29.6* 30.6* 31.4*   PLATELETS 398 399 428  --  457* 460* 461* 435   SODIUM 134* 132* 130* 126* 129* 133* 132* 136   POTASSIUM 3.9 4.6 3.4* 3.7 3.1* 3.2* 3.7 3.2*   CHLORIDE 100 99 94* 90* 91* 92* 92* 93*   CO2 17.9* 19.9* 20.0* 17.1* 19.6* 22.3 22.4 24.5   BUN 54* 66* 69* 73* 74* 77* 74* 60*   CREATININE 1.57* 1.95* 1.87* 1.80* 1.85* 1.96* 1.84* 1.64*   GLUCOSE 98 119* 110* 211* 128* 192* 190* 184*   CALCIUM 9.0 9.3 9.1 9.2 9.2 9.6 9.7 9.4   PHOSPHORUS  --   --   --   --   --  3.8  --  3.2       Assessment & Plan   Assessment / Plan     Active Hospital Problems:  Active Hospital Problems    Diagnosis     **Acute on chronic hypoxic respiratory failure     Diastolic CHF, acute on chronic     Acute on chronic respiratory failure with hypoxia    Impression:    Acute hypoxic respiratory failure requiring Airvo and BiPAP  Acute decompensated congestive diastolic heart failure  Hyponatremia, unclear and significant  NSTEMI likely type II  Acute cardiogenic pulmonary edema  Volume overload  T2DM with hyperglycemia  Continue acidosis, clinically significant  Hypokalemia  Hypocalcemia  History of multiple sclerosis  Immunocompromised status     Plan:    -Weaned to 4 L nasal cannula.  Continue to wean supplemental oxygen to maintain SpO2 greater than 90%.  Patient does not wear any home O2  -Check CXR, pending  -Continue Lasix 40 mg IV daily.  Redose metolazone 5 mg x 1.  Will need oral diuretics at discharge  -3/3 BNP, 3124  -Continue to monitor renal panel and electrolytes.  Place electrolytes as necessary  -Continue Brovana, Pulmicort, DuoNebs, and 3% saline nebulizers  -Continue bronchopulmonary hygiene.  Encourage I-S and flutter  -Encourage activity as tolerated.  Out of bed to chair  -PT/OT on board.  Appreciate assistance  -Continue Plavix  -Rest of care per primary  -Plans to return to [facility] rehab  at discharge    DVT prophylaxis:  Medical DVT prophylaxis orders are present.    CODE STATUS:   Medical Intervention Limits: NO intubation (DNI)  Level Of Support Discussed With: Patient  Code Status (Patient has no pulse and is not breathing): No CPR (Do Not Attempt to Resuscitate)  Medical Interventions (Patient has pulse or is breathing): Limited Support    I have reviewed labs, imaging, pertinent clinical data and provider notes.   I have discussed with bedside nurse and primary service.     Electronically signed by WESTLEY Giron, 03/04/24, 8:36 AM EST.    Electronically signed by Oli Colon MD, 03/04/24, 7:23 AM EST.    This visit was performed by BOTH a physician and an APC. I personally evaluated and examined the patient. I performed all aspects of MDM as documented. , I have reviewed and confirmed the accuracy of the patient's history as documented in this note., and I have reexamined the patient and the results are consistent with the previously documented exam. I have updated the documentation as necessary.     Electronically signed by Oli Colon MD, 03/04/24, 4:27 PM EST.

## 2024-03-04 NOTE — THERAPY TREATMENT NOTE
Acute Care - Physical Therapy Treatment Note   [place]     Patient Name: Falguni Minaya  : 1960  MRN: [id number]  Today's Date: 3/4/2024      Visit Dx:     ICD-10-CM ICD-9-CM   1. Acute respiratory failure with hypoxia  J96.01 518.81   2. Pneumonia due to infectious organism, unspecified laterality, unspecified part of lung  J18.9 486   3. Difficulty in walking  R26.2 719.7   4. Decreased activities of daily living (ADL)  Z78.9 V49.89     Patient Active Problem List   Diagnosis    Multiple sclerosis    Left sided abdominal pain    Dyspepsia    Heme positive stool    Hypotension    Seizure disorder    Pain in lower back    Headache    Frequent falls    Spasm    Weakness of right leg    S/P drug eluting coronary stent placement    Coronary artery disease involving native coronary artery of native heart    Ischemic cardiomyopathy    Pleural effusion due to CHF (congestive heart failure)    Shortness of breath    Orthopnea    Hyperlipidemia LDL goal <70    Anemia due to stage 3 chronic kidney disease    Iron deficiency    Anemia in chronic kidney disease    Arthropathy of lumbar facet joint    Marie's esophagus    Benign colonic polyp    Congestive heart failure    Degeneration of lumbar intervertebral disc    Gastroesophageal reflux disease    Gastroparesis    Lumbar radiculopathy    Osteoporosis    Sacroiliac joint pain    Serum creatinine raised    Vitamin D deficiency    Acute hypoxemic respiratory failure    Acute on chronic hypoxic respiratory failure    Diastolic CHF, acute on chronic    Acute on chronic respiratory failure with hypoxia     Past Medical History:   Diagnosis Date    Abdominal hernia     Acute ST elevation myocardial infarction (STEMI) due to occlusion of right coronary artery 2020    CHF (congestive heart failure)     DENIES CP GETS SOA WITH EXERTION. FOLLOWED BY DR ERIC/KASIA GREY. DECREASED ACTIVITY USES WALKER/ROLLATOR    CKD (chronic kidney disease)     Coronary artery  disease     Dyspepsia     GERD (gastroesophageal reflux disease)     Hyperlipidemia     Hypertension     Ischemic cardiomyopathy     Multiple sclerosis      Past Surgical History:   Procedure Laterality Date    APPENDECTOMY      CARDIAC CATHETERIZATION      CARDIAC CATHETERIZATION N/A 2/24/2020    Procedure: Left Heart Cath;  Surgeon: Marlon Zamudio MD;  [facility];  Service: Cardiovascular;  Laterality: N/A;    CARDIAC CATHETERIZATION N/A 2/24/2020    Procedure: Stent LATRICIA coronary;  Surgeon: Marlon Zamudio MD;  [facility];  Service: Cardiovascular;  Laterality: N/A;    CARDIAC CATHETERIZATION  2/24/2020    Procedure: Percutaneous Manual Thrombectomy;  Surgeon: Marlon Zamudio MD;  [facility];  Service: Cardiovascular;;    CARDIAC CATHETERIZATION N/A 2/24/2020    Procedure: Coronary angiography;  Surgeon: Marlon Zamudio MD;  [facility];  Service: Cardiovascular;  Laterality: N/A;    CARDIAC CATHETERIZATION N/A 2/24/2020    Procedure: Left ventriculography;  Surgeon: Marlon Zamudio MD;  [facility];  Service: Cardiovascular;  Laterality: N/A;    CHOLECYSTECTOMY      COLONOSCOPY N/A 2/29/2020    Procedure: COLONOSCOPY to  cecum:;  Surgeon: Krystal Sarabia MD;  Location: [facility] ENDOSCOPY;  Service: Gastroenterology;  Laterality: N/A;  pre:  anemia and abd pain  post:  hemorrhoids, tortuous colon    ENDOSCOPY N/A 2/29/2020    Procedure: ESOPHAGOGASTRODUODENOSCOPY  with biopsies;  Surgeon: Krystal Sarabia MD;  Location: [facility] ENDOSCOPY;  Service: Gastroenterology;  Laterality: N/A;  pre:  anemia and abd pain  post:  mild gastritis,     HYSTERECTOMY      PARATHYROIDECTOMY Bilateral 10/17/2023    Procedure: NECK EXPLORATION WITH PARATHYROID ADENOMA EXCISION AND FROZEN SECTION,, RECURRENT LARYNGEAL NERVE MONITORING, INTRAOPERATIVE INTACT PTH ASSAY;   Surgeon: Ezekiel Giles MD;  Location: [facility] MAIN OR;  Service: ENT;  Laterality: Bilateral;    TONSILLECTOMY       PT Assessment (Last 12 Hours)       PT Evaluation and Treatment       Row Name 03/04/24 1522          Physical Therapy Time and Intention    Subjective Information complains of;weakness;fatigue (P)   -NM     Document Type therapy note (daily note) (P)   -NM     Mode of Treatment physical therapy;individual therapy (P)   -NM     Patient Effort good (P)   -NM     Symptoms Noted During/After Treatment fatigue;shortness of breath (P)   -NM       Row Name 03/04/24 1522          General Information    Patient Profile Reviewed yes (P)   -NM     Patient Observations cooperative;agree to therapy (P)   -NM     Barriers to Rehab none identified (P)   -NM       Row Name 03/04/24 1522          Pain    Pretreatment Pain Rating 0/10 - no pain (P)   -NM     Posttreatment Pain Rating 0/10 - no pain (P)   -NM       Row Name 03/04/24 1522          Bed Mobility    Supine-Sit [place] (Bed Mobility) moderate assist (50% patient effort) (P)   -NM     Sit-Supine [place] (Bed Mobility) moderate assist (50% patient effort) (P)   -NM       Row Name 03/04/24 1522          Gait/Stairs (Locomotion)    Patient was able to Ambulate no, other medical factors prevent ambulation (P)   -NM     Reason Patient was unable to Ambulate Excessive Weakness (P)   -NM       Row Name 03/04/24 1522          Safety Issues, Functional Mobility    Impairments Affecting Function (Mobility) endurance/activity tolerance;shortness of breath;strength;balance (P)   -NM       Row Name 03/04/24 1522          Balance    Dynamic Sitting Balance minimal assist (P)   -NM       Row Name 03/04/24 1522          Motor Skills    Therapeutic Exercise hip;knee;ankle (P)   quad sets, ankle pumps, SLR (AAROM), glute sets 2x10  -NM       Row Name             Wound 02/10/24 0816 Bilateral upper thigh MASD (Moisture associated skin damage)    Wound - Properties  Group Placement Date: 02/10/24  -KP Placement Time: 0816  -KP Present on Original Admission: N  -KP Side: Bilateral  -KP Orientation: upper  -KP Location: thigh  -KP Primary Wound Type: MASD  -KP    Retired Wound - Properties Group Placement Date: 02/10/24  -KP Placement Time: 0816  -KP Present on Original Admission: N  -KP Side: Bilateral  -KP Orientation: upper  -KP Location: thigh  -KP Primary Wound Type: MASD  -KP    Retired Wound - Properties Group Date first assessed: 02/10/24  -KP Time first assessed: 0816  -KP Present on Original Admission: N  -KP Side: Bilateral  -KP Location: thigh  -KP Primary Wound Type: MASD  -KP      Row Name             Wound 02/20/24 1405 gluteal MASD (Moisture associated skin damage)    Wound - Properties Group Placement Date: 02/20/24  -MILE Placement Time: 1405  -MILE Present on Original Admission: Y  -MILE Location: gluteal  -MILE Primary Wound Type: MASD  -MILE    Retired Wound - Properties Group Placement Date: 02/20/24  -MILE Placement Time: 1405  -MILE Present on Original Admission: Y  -MILE Location: gluteal  -MILE Primary Wound Type: MASD  -MILE    Retired Wound - Properties Group Date first assessed: 02/20/24  -MILE Time first assessed: 1405  -MILE Present on Original Admission: Y  -MILE Location: gluteal  -MILE Primary Wound Type: MASD  -MILE      Row Name 03/04/24 1522          Positioning and Restraints    Pre-Treatment Position in bed (P)   -NM     Post Treatment Position bed (P)   -NM     In Bed supine;call light within reach;encouraged to call for assist;exit alarm on;with family/caregiver (P)   -NM       Row Name 03/04/24 1522          Progress Summary (PT)    Progress Toward Functional Goals (PT) progress toward functional goals as expected (P)   -NM     Daily Progress Summary (PT) pt presents to therapy with weakness, fatigue, endurance deficits and significant difficulty with ambulation and transfers. Pt will continue to benefit from skilled PT to address above deficits. (P)   -NM                User Key  (r) = Recorded By, (t) = Taken By, (c) = Cosigned By      Initials Name Provider Type    Ruby Nicole, RN Registered Nurse    Leia Zaragoza RN Registered Nurse    Jony Stauffer, PT Student PT Student                      PT Recommendation and Plan     Progress Summary (PT)  Progress Toward Functional Goals (PT): (P) progress toward functional goals as expected  Daily Progress Summary (PT): (P) pt presents to therapy with weakness, fatigue, endurance deficits and significant difficulty with ambulation and transfers. Pt will continue to benefit from skilled PT to address above deficits.       Time Calculation:    PT Charges       Row Name 03/04/24 1522             Time Calculation    PT Received On 03/04/24 (P)   -NM         Timed Charges    25535 - PT Therapeutic Exercise Minutes 10 (P)   -NM      04586 - PT Therapeutic Activity Minutes 3 (P)   -NM         Total Minutes    Timed Charges Total Minutes 13 (P)   -NM       Total Minutes 13 (P)   -NM                User Key  (r) = Recorded By, (t) = Taken By, (c) = Cosigned By      Initials Name Provider Type    Jony Stauffer, PT Student PT Student                  Therapy Charges for Today       Code Description Service Date Service Provider Modifiers Qty    [id number] HC PT THER PROC EA 15 MIN 3/4/2024 Jony Ruiz, PT Student GP 1            PT G-Codes  Outcome Measure Options: AM-PAC 6 Clicks Daily Activity (OT), Optimal Instrument  AM-PAC 6 Clicks Score (PT): 13  AM-PAC 6 Clicks Score (OT): 16    Jony Ruiz PT Student  3/4/2024

## 2024-03-04 NOTE — PLAN OF CARE
Goal Outcome Evaluation:              Outcome Evaluation: Patient is AOx4. Patient is currently on 5L NC. NSR to sinus tachycardia on tele monitor. No complaints of pain or discomfort at this time. Continue with current plan of care.

## 2024-03-04 NOTE — CONSULTS
"Nutrition Services    Patient Name: Falguni Minaya  YOB: 1960  MRN: [id number]  Admission date: 2/19/2024      CLINICAL NUTRITION ASSESSMENT      Reason for Assessment  LOS   H&P:  Past Medical History:   Diagnosis Date    Abdominal hernia     Acute ST elevation myocardial infarction (STEMI) due to occlusion of right coronary artery 02/24/2020    CHF (congestive heart failure)     DENIES CP GETS SOA WITH EXERTION. FOLLOWED BY DR ERIC/KASIA GREY. DECREASED ACTIVITY USES WALKER/ROLLATOR    CKD (chronic kidney disease)     Coronary artery disease     Dyspepsia     GERD (gastroesophageal reflux disease)     Hyperlipidemia     Hypertension     Ischemic cardiomyopathy     Multiple sclerosis         Current Problems:   Active Hospital Problems    Diagnosis     **Acute on chronic hypoxic respiratory failure     Diastolic CHF, acute on chronic     Acute on chronic respiratory failure with hypoxia         Nutrition/Diet History         Narrative   RD visited patient for LOS x 14 days.  at bedside. Patient reports not eating well d/t not being able to taste foods.  reports she has lost weight. Patient unsure of UBW. NFPE performed finding mild losses in the acromion area. No other wasting noted. However, patient with -10.6% weight x 2 months.  reports patient with <50% intake x 1 month. Based on this poor intake and weight loss, patient meets malnutrition criteria. Patient dislikes traditional ONS. Amenable to Magic Cup, orange.      Anthropometrics        Current Height, Weight Height: 152.4 cm (60\")  Weight: 82.6 kg (182 lb 1.6 oz)   Current BMI Body mass index is 35.56 kg/m².   BMI Classification Obese Class II   % %   Adjusted Body Weight (ABW) 61 kg   Weight Hx  Wt Readings from Last 30 Encounters:   02/19/24 0904 82.6 kg (182 lb 1.6 oz)   02/14/24 0528 75.7 kg (166 lb 14.2 oz)   02/13/24 0600 76.6 kg (168 lb 14 oz)   02/12/24 0600 76.4 kg (168 lb 6.9 oz)   02/10/24 0600 81.8 kg " (180 lb 5.4 oz)   02/07/24 0500 80.5 kg (177 lb 7.5 oz)   02/05/24 0500 82.5 kg (181 lb 13 oz)   01/31/24 1011 85 kg (187 lb 6.3 oz)   01/22/24 1328 84.4 kg (186 lb)   12/24/23 1211 92.4 kg (203 lb 11.2 oz)   10/27/23 1453 96.3 kg (212 lb 6.4 oz)   10/17/23 0725 95.5 kg (210 lb 8.6 oz)   10/11/23 1015 95.6 kg (210 lb 12.2 oz)   10/02/23 1604 97.5 kg (215 lb)   09/21/23 1248 98.4 kg (217 lb)   04/10/23 1211 93.9 kg (207 lb)   01/19/23 1323 95.3 kg (210 lb 1.6 oz)   10/06/22 1224 91.3 kg (201 lb 3.2 oz)   08/14/22 1605 93 kg (205 lb)   07/06/22 1309 91.8 kg (202 lb 6.4 oz)   10/21/21 1112 85.7 kg (189 lb)   10/04/21 0826 82.2 kg (181 lb 3.5 oz)   03/23/21 0724 77.1 kg (170 lb)   03/23/21 1046 79.8 kg (176 lb)   01/11/21 1416 77.2 kg (170 lb 3.2 oz)   11/19/20 1335 73.9 kg (163 lb)   08/17/20 1300 72.6 kg (160 lb)   08/17/20 1407 72.2 kg (159 lb 3.2 oz)   07/29/20 1250 72.6 kg (160 lb)   04/16/20 1035 64 kg (141 lb)   03/07/20 0630 76.3 kg (168 lb 3.4 oz)   03/06/20 0500 79.1 kg (174 lb 6.1 oz)   03/04/20 0500 77.1 kg (169 lb 15.6 oz)   03/03/20 0500 78.3 kg (172 lb 9.9 oz)   03/02/20 0500 81.6 kg (180 lb)   03/01/20 0656 80.6 kg (177 lb 11.1 oz)   02/28/20 0500 80.2 kg (176 lb 12.9 oz)   02/27/20 0128 75.4 kg (166 lb 3.6 oz)   02/26/20 0128 72.5 kg (159 lb 13.3 oz)   02/25/20 1133 73 kg (161 lb)   02/25/20 0319 73.3 kg (161 lb 9.6 oz)   02/24/20 1600 74.2 kg (163 lb 9.3 oz)   02/24/20 1442 81.6 kg (180 lb)          Wt Change Observation -10.6% x 2 months     Estimated/Assessed Needs  Estimated Needs based on: Adjusted Body Weight 61 kg       Energy Requirements 30-35 kcal/kg    EST Needs (kcal/day) 9918-2030 kcal       Protein Requirements 1.0 g/kg   EST Daily Needs (g/day) 61 g       Fluid Requirements 25 ml/kg    Estimated Needs (mL/day) 1525 ml     Labs/Medications         Pertinent Labs Reviewed.   Results from last 7 days   Lab Units 03/04/24  0516 03/03/24  0503 03/02/24  0550   SODIUM mmol/L 134* 132* 130*    POTASSIUM mmol/L 3.9 4.6 3.4*   CHLORIDE mmol/L 100 99 94*   CO2 mmol/L 17.9* 19.9* 20.0*   BUN mg/dL 54* 66* 69*   CREATININE mg/dL 1.57* 1.95* 1.87*   CALCIUM mg/dL 9.0 9.3 9.1   GLUCOSE mg/dL 98 119* 110*     Results from last 7 days   Lab Units 03/04/24  0516 03/01/24 0422 02/29/24 0438 02/28/24 0438 02/27/24  0401   MAGNESIUM mg/dL  --   --  2.2  --  2.2   PHOSPHORUS mg/dL  --   --  3.8  --  3.2   HEMOGLOBIN g/dL 8.7*   < > 9.5*   < > 10.0*   HEMATOCRIT % 28.0*   < > 29.6*   < > 31.4*    < > = values in this interval not displayed.     COVID19   Date Value Ref Range Status   02/19/2024 Not Detected Not Detected - Ref. Range Final     Lab Results   Component Value Date    HGBA1C 6.40 (H) 02/19/2024         Pertinent Medications Reviewed.     Malnutrition Severity Assessment      Patient meets criteria for : Severe Malnutrition  Malnutrition Type (Last 8 Hours)       Malnutrition Severity Assessment       Row Name 03/04/24 1506       Malnutrition Severity Assessment    Malnutrition Type Acute Disease or Injury - Related Malnutrition      Row Name 03/04/24 1506       Insufficient Energy Intake     Insufficient Energy Intake Findings Severe    Insufficient Energy Intake  <50% of est. energy requirement for >or equal to 1 month      Row Name 03/04/24 1506       Unintentional Weight Loss     Unintentional Weight Loss Findings Severe    Unintentional Weight Loss  Weight loss greater than 7.5% in three months      Row Name 03/04/24 1506       Criteria Met (Must meet criteria for severity in at least 2 of these categories: M Wasting, Fat Loss, Fluid, Secondary Signs, Wt. Status, Intake)    Patient meets criteria for  Severe Malnutrition                     Nutrition Diagnosis         Nutrition Dx Problem 1 Severe malnutrition related to inadequate oral Intake as evidenced by unintended weight change., patient report., family report., and report of minimal PO intake.     Nutrition Intervention           Current  Nutrition Orders & Evaluation of Intake       Current PO Diet Diet: Regular/House Diet; Texture: Regular Texture (IDDSI 7); Fluid Consistency: Thin (IDDSI 0)   Supplement Orders Placed This Encounter      Dietary Nutrition Supplements Magic Cup; orange           Nutrition Intervention/Prescription        Magic Cup TID, orange  +870 kcal, 27 g pro/daily        Medical Nutrition Therapy/Nutrition Education          Learner     Readiness Patient and Significant Other  Acceptance     Method     Response Explanation  Verbalizes understanding     Monitor/Evaluation        Monitor Per protocol, PO intake, Supplement intake, Weight, POC/GOC     Nutrition Discharge Plan         To be determined     Electronically signed by:  Goldie Wesley RD  03/04/24 15:12 EST

## 2024-03-04 NOTE — PROGRESS NOTES
[facility]   Hospitalist Progress Note  Date: 3/4/2024  Patient Name: Falguni Minaya  : 1960  MRN: [id number]  Date of admission: 2024  Consultants:   -Pulmonology    Subjective   Subjective     Chief Complaint: Shortness of breath    Summary:   Falguni Minaya is a 64 y.o. female with chronic diastolic heart failure, CAD, dysphagia, essential hypertension, hyperlipidemia, history of MS and CKD stage IIIb presented with complaints of shortness of breath.      Empiric antibiotics, nebulizer breathing treatments, steroid therapy and IV diuresis initiated.  Pulmonology consulted.  Infectious workup was nonrevealing and antibiotics were discontinued.  ILD workup initiated.  Patient continued to have a submental O2 requirement, proBNP rechecked and noted to be significantly elevated, IV diuresis restarted.    Interval Followup:   No acute events overnight.  No acute distress.  Patient is in good spirits today and reports feeling better.  She wants to try and work with PT little more today to see if she tolerates more activity.  No family at bedside.    Objective   Objective     Vitals:   Temp:  [97.2 °F (36.2 °C)-98.2 °F (36.8 °C)] 97.2 °F (36.2 °C)  Heart Rate:  [] 107  Resp:  [19-21] 20  BP: (118-154)/(74-88) 124/81  Flow (L/min):  [5] 5    Physical Exam   Gen: Acutely ill-appearing chronically ill female, alert, pleasant  Resp: Poor aeration, no tachypnea or secondary muscle use  Card: RRR  Abd: Soft, Nontender, Nondistended    Result Review    Result Review:  I have personally reviewed the results as below and agree with these findings:  [x]  Laboratory:   CMP          3/2/2024    05:50 3/3/2024    05:03 3/4/2024    05:16   CMP   Glucose 110  119  98    BUN 69  66  54    Creatinine 1.87  1.95  1.57    EGFR 29.6  28.1  36.5    Sodium 130  132  134    Potassium 3.4  4.6  3.9    Chloride 94  99  100    Calcium 9.1  9.3  9.0    BUN/Creatinine Ratio 36.9  33.8  34.4    Anion Gap 16.0  13.1  16.1       CBC          3/2/2024    05:50 3/3/2024    05:03 3/4/2024    05:16   CBC   WBC 25.36  22.30  18.41    RBC 2.82  2.78  2.85    Hemoglobin 8.6  8.6  8.7    Hematocrit 26.7  27.3  28.0    MCV 94.7  98.2  98.2    MCH 30.5  30.9  30.5    MCHC 32.2  31.5  31.1    RDW 15.5  16.2  16.7    Platelets 428  399  398    Phosphorus and magnesium within normal limits  [x]  Microbiology:   [x]  Radiology:   [x]  EKG/Telemetry:    []  Cardiology/Vascular:    []  Pathology:  []  Old records:  []  Other:    Assessment & Plan   Assessment / Plan     Assessment:  Acute on chronic diastolic heart failure  Acute hypoxic respiratory failure secondary to above  Acute cardiogenic pulmonary edema  NSTEMI likely type II  Type 2 diabetes mellitus with hyperglycemia  Lactic acidosis, clinically significant  Hypocalcemia  Hypokalemia  Acute on chronic kidney disease stage IIIb (baseline creatinine 1.0-1.2)  Immunocompromise status  Essential hypertension  Hyponatremia, mild  Metabolic acidosis    Plan:  -Pulmonology following  -Continue BiPAP at bedtime and as needed.  Comfortable on nasal cannula, 4 L.  Wean supplemental O2 as much as possible to maintain sats greater than 90%  -Continue Brovana, Pulmicort and DuoNebs  -Continue IV Lasix  -Steroids discontinued  -Encouraged use of incentive spirometer and flutter valve  -Continue chest physiotherapy  -Potassium normal today  -Leukocytosis continues to improve now that she is off steroids  -Continue midodrine  -Creatinine improved to 1.57  -Accu-Cheks and sliding scale insulin  -Continue home Prozac, Neurontin and Requip  -Continue holding home beta-blocker  -Continue aspirin, atorvastatin, Plavix  -PT/OT consulted  -Monitor electrolytes and renal function with BMP, phosphorus level and magnesium level in the AM  -Monitor WBC and Hgb with CBC in the AM  -Chest x-ray stable       DVT Prophylaxis: Lovenox  GI Prophylaxis: Pantoprazole  Dispo: Patient requesting to go back to rehab at  discharge.  Code Status: DNR         DVT prophylaxis:  Medical DVT prophylaxis orders are present.      CODE STATUS:   Medical Intervention Limits: NO intubation (DNI)  Level Of Support Discussed With: Patient  Code Status (Patient has no pulse and is not breathing): No CPR (Do Not Attempt to Resuscitate)  Medical Interventions (Patient has pulse or is breathing): Limited Support      Electronically signed by Teresa Castro DO, 03/04/24, 12:49 PM EST.

## 2024-03-05 PROBLEM — E43 SEVERE MALNUTRITION: Status: ACTIVE | Noted: 2024-01-01

## 2024-03-05 NOTE — PROGRESS NOTES
Pulmonary / Critical Care Progress Note      Patient Name: Falguni Minaya  : 1960  MRN: [id number]  Attending:  Chris Franks DO  Date of admission: 2024    Subjective   Subjective   Follow-up for acute on chronic hypoxic respiratory failure requiring high flow nasal cannula     Over the past 24 hours: Remains on Brovana, Pulmicort, and DuoNebs.  Remains on Lasix 40 mg IV daily with spot metolazone.    No acute events overnight    This morning,  Down to 3 L nasal cannula  Lying in bed  Feels well today  Denies any chest pain or chest tightness  Denies cough or hemoptysis  No fever or chills  Remains weak and fatigued  Diuresing well  -1.1 L urine output    Objective   Objective     Vitals:   Temp:  [97.2 °F (36.2 °C)-98.1 °F (36.7 °C)] 97.9 °F (36.6 °C)  Heart Rate:  [] 105  Resp:  [18-20] 18  BP: (118-124)/(79-93) 123/81  Flow (L/min):  [3-5] 3    Physical Exam   Vital Signs Reviewed   General:  Alert, NAD, chronically ill appearing female, lying in bed  Chest: Decreased aeration with coarse crackles on auscultation, no work of breathing noted on 3 L nasal cannula  CV: regular rate and rhythm regular  EXT:  no clubbing, no cyanosis, BLE edema  Neuro:  A&Ox3, moving all 4 extremities spontaneously  Skin: No rashes or lesions noted    Result Review    Result Review:  I have personally reviewed the results from the time of this admission to 3/5/2024 07:09 EST and agree with these findings:  [x]  Laboratory  [x]  Microbiology  [x]  Radiology  []  EKG/Telemetry   []  Cardiology/Vascular   []  Pathology  []  Old records  []  Other:  Most notable findings include:   proBNP 10,048     chest CT reviewed demonstrating scarring and fibrosis        Lab 24  0612 24  0516 24  0503 24  0550 24  1316 24  0422 24  0438 24  0438   WBC  --  18.41* 22.30* 25.36*  --  22.51* 19.43* 17.52*   HEMOGLOBIN  --  8.7* 8.6* 8.6*  --  8.7* 9.5* 9.7*   HEMATOCRIT  --  28.0*  27.3* 26.7*  --  27.1* 29.6* 30.6*   PLATELETS  --  398 399 428  --  457* 460* 461*   SODIUM 131* 134* 132* 130* 126* 129* 133* 132*   POTASSIUM 3.4* 3.9 4.6 3.4* 3.7 3.1* 3.2* 3.7   CHLORIDE 95* 100 99 94* 90* 91* 92* 92*   CO2 17.6* 17.9* 19.9* 20.0* 17.1* 19.6* 22.3 22.4   BUN 49* 54* 66* 69* 73* 74* 77* 74*   CREATININE 1.68* 1.57* 1.95* 1.87* 1.80* 1.85* 1.96* 1.84*   GLUCOSE 136* 98 119* 110* 211* 128* 192* 190*   CALCIUM 9.2 9.0 9.3 9.1 9.2 9.2 9.6 9.7   PHOSPHORUS  --   --   --   --   --   --  3.8  --        Assessment & Plan   Assessment / Plan     Active Hospital Problems:  Active Hospital Problems    Diagnosis     **Acute on chronic hypoxic respiratory failure     Diastolic CHF, acute on chronic     Acute on chronic respiratory failure with hypoxia    Impression:      Acute hypoxic respiratory failure requiring Airvo and BiPAP  Acute decompensated congestive diastolic heart failure  Hyponatremia, unclear and significant  NSTEMI likely type II  Acute cardiogenic pulmonary edema  Volume overload  T2DM with hyperglycemia  Continue acidosis, clinically significant  Hypokalemia  Hypocalcemia  History of multiple sclerosis  Immunocompromised status          Plan:    -Weaned to 3 L nasal cannula.  Continue to wean supplemental oxygen to maintain SpO2 greater than 90%.  Patient does not wear any home O2  -3/4 CXR reviewed demonstrating chronic lung changes with no definite new focal airspace opacities  -Continue Lasix 40 mg IV daily.  Give metolazone 10 mg x 1.  Will need oral diuretics at discharge  -Continue to monitor renal panel and electrolytes.  Place electrolytes as necessary  -Continue Brovana, Pulmicort, DuoNebs, and 3% saline nebulizers  -Continue bronchopulmonary hygiene.  Encourage I-S and flutter  -Encourage activity as tolerated.  Out of bed to chair  -PT/OT on board.  Appreciate assistance  -Continue Plavix  -Rest of care per primary  -Patient would benefit from rehab.  Patient states her baseline  is that she does not perform very many activities at home.   -Plans to [facility] at discharge    DVT prophylaxis:  Medical DVT prophylaxis orders are present.    CODE STATUS:   Medical Intervention Limits: NO intubation (DNI)  Level Of Support Discussed With: Patient  Code Status (Patient has no pulse and is not breathing): No CPR (Do Not Attempt to Resuscitate)  Medical Interventions (Patient has pulse or is breathing): Limited Support    I have reviewed labs, imaging, pertinent clinical data and provider notes.   I have discussed with bedside nurse and primary service.     Electronically signed by WESTLEY Giron, 03/05/24, 10:03 AM EST.    Electronically signed by Oli Colon MD, 03/05/24, 7:09 AM EST.    This visit was performed by BOTH a physician and an APC. I personally evaluated and examined the patient. I performed all aspects of MDM as documented. , I have reviewed and confirmed the accuracy of the patient's history as documented in this note., and I have reexamined the patient and the results are consistent with the previously documented exam. I have updated the documentation as necessary.     Electronically signed by Oli Colon MD, 03/05/24, 3:20 PM EST.

## 2024-03-05 NOTE — PLAN OF CARE
Problem: Adult Inpatient Plan of Care  Goal: Plan of Care Review  Outcome: Ongoing, Progressing  Flowsheets (Taken 3/5/2024 0712)  Plan of Care Reviewed With: patient   Goal Outcome Evaluation:  Plan of Care Reviewed With: patient         A/O x4, VSS this shift, 5LNC, no complaints, Will continue plan of care.

## 2024-03-05 NOTE — THERAPY EVALUATION
Acute Care - Physical Therapy Initial Evaluation   [facility]     Patient Name: Falguni Minaya  : 1960  MRN: [id number]  Today's Date: 3/5/2024      Visit Dx:     ICD-10-CM ICD-9-CM   1. Acute respiratory failure with hypoxia  J96.01 518.81   2. Pneumonia due to infectious organism, unspecified laterality, unspecified part of lung  J18.9 486   3. Difficulty in walking  R26.2 719.7   4. Decreased activities of daily living (ADL)  Z78.9 V49.89     Patient Active Problem List   Diagnosis    Multiple sclerosis    Left sided abdominal pain    Dyspepsia    Heme positive stool    Hypotension    Seizure disorder    Pain in lower back    Headache    Frequent falls    Spasm    Weakness of right leg    S/P drug eluting coronary stent placement    Coronary artery disease involving native coronary artery of native heart    Ischemic cardiomyopathy    Pleural effusion due to CHF (congestive heart failure)    Shortness of breath    Orthopnea    Hyperlipidemia LDL goal <70    Anemia due to stage 3 chronic kidney disease    Iron deficiency    Anemia in chronic kidney disease    Arthropathy of lumbar facet joint    Marie's esophagus    Benign colonic polyp    Congestive heart failure    Degeneration of lumbar intervertebral disc    Gastroesophageal reflux disease    Gastroparesis    Lumbar radiculopathy    Osteoporosis    Sacroiliac joint pain    Serum creatinine raised    Vitamin D deficiency    Acute hypoxemic respiratory failure    Acute on chronic hypoxic respiratory failure    Diastolic CHF, acute on chronic    Acute on chronic respiratory failure with hypoxia     Past Medical History:   Diagnosis Date    Abdominal hernia     Acute ST elevation myocardial infarction (STEMI) due to occlusion of right coronary artery 2020    CHF (congestive heart failure)     DENIES CP GETS SOA WITH EXERTION. FOLLOWED BY DR ERIC/KASIA GREY. DECREASED ACTIVITY USES WALKER/ROLLATOR    CKD (chronic kidney disease)     Coronary artery  disease     Dyspepsia     GERD (gastroesophageal reflux disease)     Hyperlipidemia     Hypertension     Ischemic cardiomyopathy     Multiple sclerosis      Past Surgical History:   Procedure Laterality Date    APPENDECTOMY      CARDIAC CATHETERIZATION      CARDIAC CATHETERIZATION N/A 2/24/2020    Procedure: Left Heart Cath;  Surgeon: Marlon Zamudio MD;  Location: [facility] CATH [facility]VASIVE LOC[facility];  Service: Cardiovascular;  Laterality: N/A;    CARDIAC CATHETERIZATION N/A 2/24/2020    Procedure: Stent LATRICIA coronary;  Surgeon: Marlon Zamudio MD;  Location: [facility] LOC[facility];  Service: Cardiovascular;  Laterality: N/A;    CARDIAC CATHETERIZATION  2/24/2020    Procedure: Percutaneous Manual Thrombectomy;  Surgeon: Marlon Zamudio MD;  Location: [facility];  Service: Cardiovascular;;    CARDIAC CATHETERIZATION N/A 2/24/2020    Procedure: Coronary angiography;  Surgeon: Marlon Zamudio MD;  Location: [facility] CATH [facility]VASIVE LOCATION;  Service: Cardiovascular;  Laterality: N/A;    CARDIAC CATHETERIZATION N/A 2/24/2020    Procedure: Left ventriculography;  Surgeon: Marlon Zamudio MD;  Location: [facility] CATH [facility]ASIVE LOCATION;  Service: Cardiovascular;  Laterality: N/A;    CHOLECYSTECTOMY      COLONOSCOPY N/A 2/29/2020    Procedure: COLONOSCOPY to  cecum:;  Surgeon: Krystal Sarabia MD;  Location: [facility] ENDOSCOPY;  Service: Gastroenterology;  Laterality: N/A;  pre:  anemia and abd pain  post:  hemorrhoids, tortuous colon    ENDOSCOPY N/A 2/29/2020    Procedure: ESOPHAGOGASTRODUODENOSCOPY  with biopsies;  Surgeon: Krystal Sarabia MD;  Location: [facility] ENDOSCOPY;  Service: Gastroenterology;  Laterality: N/A;  pre:  anemia and abd pain  post:  mild gastritis,     HYSTERECTOMY      PARATHYROIDECTOMY Bilateral 10/17/2023    Procedure: NECK EXPLORATION WITH PARATHYROID ADENOMA EXCISION AND FROZEN SECTION,, RECURRENT LARYNGEAL NERVE MONITORING, INTRAOPERATIVE INTACT PTH ASSAY;   Surgeon: Ezekiel Giles MD;  Location: [facility] MAIN OR;  Service: ENT;  Laterality: Bilateral;    TONSILLECTOMY       PT Assessment (Last 12 Hours)       PT Evaluation and Treatment       Row Name 03/05/24 1200          Physical Therapy Time and Intention    Subjective Information no complaints  -DP     Document Type therapy note (daily note)  -DP     Mode of Treatment physical therapy;individual therapy  -DP       Row Name 03/05/24 1200          Bed Mobility    Supine-Sit [place] (Bed Mobility) moderate assist (50% patient effort)  -DP     Sit-Supine [place] (Bed Mobility) moderate assist (50% patient effort)  -DP       Row Name 03/05/24 1200          Transfers    Transfers sit-stand transfer;stand-sit transfer  -DP       Row Name 03/05/24 1200          Sit-Stand Transfer    Sit-Stand [place] (Transfers) moderate assist (50% patient effort)  -DP     Assistive Device (Sit-Stand Transfers) walker, front-wheeled  -DP       Row Name 03/05/24 1200          Stand-Sit Transfer    Stand-Sit [place] (Transfers) moderate assist (50% patient effort)  -DP     Assistive Device (Stand-Sit Transfers) walker, front-wheeled  -DP       Row Name 03/05/24 1200          Gait/Stairs (Locomotion)    Comment, (Gait/Stairs) declined to ambulate  -DP       Row Name             Wound 02/10/24 0816 Bilateral upper thigh MASD (Moisture associated skin damage)    Wound - Properties Group Placement Date: 02/10/24  -KP Placement Time: 0816  -KP Present on Original Admission: N  -KP Side: Bilateral  -KP Orientation: upper  -KP Location: thigh  -KP Primary Wound Type: MASD  -KP    Retired Wound - Properties Group Placement Date: 02/10/24  -KP Placement Time: 0816  -KP Present on Original Admission: N  -KP Side: Bilateral  -KP Orientation: upper  -KP Location: thigh  -KP Primary Wound Type: MASD  -KP    Retired Wound - Properties Group Date first assessed: 02/10/24  -KP Time first assessed: 0816  -KP Present on Original  Admission: N  -KP Side: Bilateral  -KP Location: thigh  -KP Primary Wound Type: MASD  -KP      Row Name             Wound 02/20/24 1405 gluteal MASD (Moisture associated skin damage)    Wound - Properties Group Placement Date: 02/20/24  -MILE Placement Time: 1405  -MILE Present on Original Admission: Y  -MILE Location: gluteal  -MILE Primary Wound Type: MASD  -MILE    Retired Wound - Properties Group Placement Date: 02/20/24  -MILE Placement Time: 1405  -MILE Present on Original Admission: Y  -MILE Location: gluteal  -MILE Primary Wound Type: MASD  -MILE    Retired Wound - Properties Group Date first assessed: 02/20/24  -MILE Time first assessed: 1405  -MILE Present on Original Admission: Y  -MILE Location: gluteal  -MILE Primary Wound Type: MASD  -MILE              User Key  (r) = Recorded By, (t) = Taken By, (c) = Cosigned By      Initials Name Provider Type    Ruby Nicole RN Registered Nurse    Leia Zaragoza RN Registered Nurse    Micheline Gonzales, PT Physical Therapist                      PT Recommendation and Plan         Outcome Measures       Row Name 03/05/24 1200             How much help from another person do you currently need...    Turning from your back to your side while in flat bed without using bedrails? 3  -DP      Moving from lying on back to sitting on the side of a flat bed without bedrails? 2  -DP      Moving to and from a bed to a chair (including a wheelchair)? 2  -DP      Standing up from a chair using your arms (e.g., wheelchair, bedside chair)? 2  -DP      Climbing 3-5 steps with a railing? 2  -DP      To walk in hospital room? 2  -DP      AM-PAC 6 Clicks Score (PT) 13  -DP      Highest Level of Mobility Goal 4 --> Transfer to chair/commode  -DP         Functional Assessment    Outcome Measure Options AM-PAC 6 Clicks Basic Mobility (PT)  -DP                User Key  (r) = Recorded By, (t) = Taken By, (c) = Cosigned By      Initials Name Provider Type    Micheline Gonzales, PT Physical Therapist                      Time Calculation:    PT Charges       Row Name 03/05/24 1241             Time Calculation    PT Received On 03/05/24  -DP         Timed Charges    09839 - PT Therapeutic Activity Minutes 16  -DP         Total Minutes    Timed Charges Total Minutes 16  -DP       Total Minutes 16  -DP                User Key  (r) = Recorded By, (t) = Taken By, (c) = Cosigned By      Initials Name Provider Type    Micheline Gonzales, PT Physical Therapist                      PT G-Codes  Outcome Measure Options: AM-PAC 6 Clicks Basic Mobility (PT)  AM-PAC 6 Clicks Score (PT): 13  AM-PAC 6 Clicks Score (OT): 16    Micheline Shaffer PT  3/5/2024

## 2024-03-05 NOTE — PLAN OF CARE
Goal Outcome Evaluation:           Problem: Adult Inpatient Plan of Care  Goal: Plan of Care Review  3/5/2024 1600 by Prema Moser RN  Outcome: Ongoing, Progressing          Pt doing okay throughout day; refused to work with PT today; needs encouragement; gets very SOA with activity; remains on 3L NC; continue to monitor; rehab at discharge.

## 2024-03-05 NOTE — THERAPY TREATMENT NOTE
Acute Care - Physical Therapy Treatment Note   [place]     Patient Name: Falguni Minaya  : 1960  MRN: [id number]  Today's Date: 3/5/2024      Visit Dx:     ICD-10-CM ICD-9-CM   1. Acute respiratory failure with hypoxia  J96.01 518.81   2. Pneumonia due to infectious organism, unspecified laterality, unspecified part of lung  J18.9 486   3. Difficulty in walking  R26.2 719.7   4. Decreased activities of daily living (ADL)  Z78.9 V49.89     Patient Active Problem List   Diagnosis    Multiple sclerosis    Left sided abdominal pain    Dyspepsia    Heme positive stool    Hypotension    Seizure disorder    Pain in lower back    Headache    Frequent falls    Spasm    Weakness of right leg    S/P drug eluting coronary stent placement    Coronary artery disease involving native coronary artery of native heart    Ischemic cardiomyopathy    Pleural effusion due to CHF (congestive heart failure)    Shortness of breath    Orthopnea    Hyperlipidemia LDL goal <70    Anemia due to stage 3 chronic kidney disease    Iron deficiency    Anemia in chronic kidney disease    Arthropathy of lumbar facet joint    Marie's esophagus    Benign colonic polyp    Congestive heart failure    Degeneration of lumbar intervertebral disc    Gastroesophageal reflux disease    Gastroparesis    Lumbar radiculopathy    Osteoporosis    Sacroiliac joint pain    Serum creatinine raised    Vitamin D deficiency    Acute hypoxemic respiratory failure    Acute on chronic hypoxic respiratory failure    Diastolic CHF, acute on chronic    Acute on chronic respiratory failure with hypoxia     Past Medical History:   Diagnosis Date    Abdominal hernia     Acute ST elevation myocardial infarction (STEMI) due to occlusion of right coronary artery 2020    CHF (congestive heart failure)     DENIES CP GETS SOA WITH EXERTION. FOLLOWED BY DR ERIC/KASIA GREY. DECREASED ACTIVITY USES WALKER/ROLLATOR    CKD (chronic kidney disease)     Coronary artery  disease     Dyspepsia     GERD (gastroesophageal reflux disease)     Hyperlipidemia     Hypertension     Ischemic cardiomyopathy     Multiple sclerosis      Past Surgical History:   Procedure Laterality Date    APPENDECTOMY      CARDIAC CATHETERIZATION      CARDIAC CATHETERIZATION N/A 2/24/2020    Procedure: Left Heart Cath;  Surgeon: Marlon Zamudio MD;  Location: [facility] C[facility] LOC[facility];  Service: Cardiovascular;  Laterality: N/A;    CARDIAC CATHETERIZATION N/A 2/24/2020    Procedure: Stent LATRICIA coronary;  Surgeon: Marlon Zamudio MD;  Location: [facility] C[facility] LOC[facility];  Service: Cardiovascular;  Laterality: N/A;    CARDIAC CATHETERIZATION  2/24/2020    Procedure: Percutaneous Manual Thrombectomy;  Surgeon: Marlon Zamudio MD;  Location: [facility] C[facility] LOC[facility];  Service: Cardiovascular;;    CARDIAC CATHETERIZATION N/A 2/24/2020    Procedure: Coronary angiography;  Surgeon: Marlon Zamudio MD;  Location: [facility] CATH [facility]VASIVE LOC[facility];  Service: Cardiovascular;  Laterality: N/A;    CARDIAC CATHETERIZATION N/A 2/24/2020    Procedure: Left ventriculography;  Surgeon: Marlon Zamudio MD;  Location: [facility] CATH [facility]ASIVE LOC[facility];  Service: Cardiovascular;  Laterality: N/A;    CHOLECYSTECTOMY      COLONOSCOPY N/A 2/29/2020    Procedure: COLONOSCOPY to  cecum:;  Surgeon: Krystal Sarabia MD;  Location: [facility] ENDOSCOPY;  Service: Gastroenterology;  Laterality: N/A;  pre:  anemia and abd pain  post:  hemorrhoids, tortuous colon    ENDOSCOPY N/A 2/29/2020    Procedure: ESOPHAGOGASTRODUODENOSCOPY  with biopsies;  Surgeon: Krystal Sarabia MD;  Location: [facility] ENDOSCOPY;  Service: Gastroenterology;  Laterality: N/A;  pre:  anemia and abd pain  post:  mild gastritis,     HYSTERECTOMY      PARATHYROIDECTOMY Bilateral 10/17/2023    Procedure: NECK EXPLORATION WITH PARATHYROID ADENOMA EXCISION AND FROZEN SECTION,, RECURRENT LARYNGEAL NERVE MONITORING, INTRAOPERATIVE INTACT PTH ASSAY;   Surgeon: Ezekiel Giles MD;  Location: [facility] MAIN OR;  Service: ENT;  Laterality: Bilateral;    TONSILLECTOMY       PT Assessment (Last 12 Hours)       PT Evaluation and Treatment       Row Name 03/05/24 1200          Physical Therapy Time and Intention    Subjective Information no complaints  -DP     Document Type therapy note (daily note)  -DP     Mode of Treatment physical therapy;individual therapy  -DP       Row Name 03/05/24 1200          Bed Mobility    Supine-Sit [place] (Bed Mobility) moderate assist (50% patient effort)  -DP     Sit-Supine [place] (Bed Mobility) moderate assist (50% patient effort)  -DP       Row Name 03/05/24 1200          Transfers    Transfers sit-stand transfer;stand-sit transfer  -DP       Row Name 03/05/24 1200          Sit-Stand Transfer    Sit-Stand [place] (Transfers) moderate assist (50% patient effort)  -DP     Assistive Device (Sit-Stand Transfers) walker, front-wheeled  -DP       Row Name 03/05/24 1200          Stand-Sit Transfer    Stand-Sit [place] (Transfers) moderate assist (50% patient effort)  -DP     Assistive Device (Stand-Sit Transfers) walker, front-wheeled  -DP       Row Name 03/05/24 1200          Gait/Stairs (Locomotion)    Comment, (Gait/Stairs) declined to ambulate  -DP       Row Name             Wound 02/10/24 0816 Bilateral upper thigh MASD (Moisture associated skin damage)    Wound - Properties Group Placement Date: 02/10/24  -KP Placement Time: 0816  -KP Present on Original Admission: N  -KP Side: Bilateral  -KP Orientation: upper  -KP Location: thigh  -KP Primary Wound Type: MASD  -KP    Retired Wound - Properties Group Placement Date: 02/10/24  -KP Placement Time: 0816  -KP Present on Original Admission: N  -KP Side: Bilateral  -KP Orientation: upper  -KP Location: thigh  -KP Primary Wound Type: MASD  -KP    Retired Wound - Properties Group Date first assessed: 02/10/24  -KP Time first assessed: 0816  -KP Present on Original  Admission: N  -KP Side: Bilateral  -KP Location: thigh  -KP Primary Wound Type: MASD  -KP      Row Name             Wound 02/20/24 1405 gluteal MASD (Moisture associated skin damage)    Wound - Properties Group Placement Date: 02/20/24  -MILE Placement Time: 1405  -MILE Present on Original Admission: Y  -MILE Location: gluteal  -MILE Primary Wound Type: MASD  -MILE    Retired Wound - Properties Group Placement Date: 02/20/24  -MILE Placement Time: 1405  -MILE Present on Original Admission: Y  -MILE Location: gluteal  -MILE Primary Wound Type: MASD  -MILE    Retired Wound - Properties Group Date first assessed: 02/20/24  -MILE Time first assessed: 1405  -MILE Present on Original Admission: Y  -MILE Location: gluteal  -MILE Primary Wound Type: MASD  -MILE              User Key  (r) = Recorded By, (t) = Taken By, (c) = Cosigned By      Initials Name Provider Type    Ruby Nicole RN Registered Nurse    Leia Zaragoza RN Registered Nurse    Micheline Gonzales, PT Physical Therapist                      PT Recommendation and Plan         Outcome Measures       Row Name 03/05/24 1200             How much help from another person do you currently need...    Turning from your back to your side while in flat bed without using bedrails? 3  -DP      Moving from lying on back to sitting on the side of a flat bed without bedrails? 2  -DP      Moving to and from a bed to a chair (including a wheelchair)? 2  -DP      Standing up from a chair using your arms (e.g., wheelchair, bedside chair)? 2  -DP      Climbing 3-5 steps with a railing? 2  -DP      To walk in hospital room? 2  -DP      AM-PAC 6 Clicks Score (PT) 13  -DP      Highest Level of Mobility Goal 4 --> Transfer to chair/commode  -DP         Functional Assessment    Outcome Measure Options AM-PAC 6 Clicks Basic Mobility (PT)  -DP                User Key  (r) = Recorded By, (t) = Taken By, (c) = Cosigned By      Initials Name Provider Type    Micheline Gonzales, PT Physical Therapist                      Time Calculation:    PT Charges       Row Name 03/05/24 1241             Time Calculation    PT Received On 03/05/24  -DP         Timed Charges    45276 - PT Therapeutic Activity Minutes 16  -DP         Total Minutes    Timed Charges Total Minutes 16  -DP       Total Minutes 16  -DP                User Key  (r) = Recorded By, (t) = Taken By, (c) = Cosigned By      Initials Name Provider Type    Micheline Gonzales, PT Physical Therapist                      PT G-Codes  Outcome Measure Options: AM-PAC 6 Clicks Basic Mobility (PT)  AM-PAC 6 Clicks Score (PT): 13  AM-PAC 6 Clicks Score (OT): 16    Micheline Shaffer PT  3/5/2024

## 2024-03-05 NOTE — PROGRESS NOTES
[facility]   Hospitalist Progress Note  Date: 3/5/2024  Patient Name: Falguni Minaya  : 1960  MRN: [id number]  Date of admission: 2024  Consultants:   -Pulmonology    Subjective   Subjective     Chief Complaint: Shortness of breath    Summary:   Falguni Minaya is a 64 y.o. female with chronic diastolic heart failure, CAD, dysphagia, essential hypertension, hyperlipidemia, history of MS and CKD stage IIIb presented with complaints of shortness of breath.      Empiric antibiotics, nebulizer breathing treatments, steroid therapy and IV diuresis initiated.  Pulmonology consulted.  Infectious workup was nonrevealing and antibiotics were discontinued.  ILD workup initiated.  Patient continued to have a submental O2 requirement, proBNP rechecked and noted to be significantly elevated, IV diuresis restarted.    Interval Followup:   No acute events overnight.  No acute distress.  Patient seems to say breathing is getting much closer to baseline  Vitals are stable    Review of systems  All systems reviewed and negative except for shortness of breath        Objective   Objective     Vitals:   Temp:  [97.9 °F (36.6 °C)-98.1 °F (36.7 °C)] 97.9 °F (36.6 °C)  Heart Rate:  [] 115  Resp:  [18-20] 20  BP: (117-124)/(76-93) 117/76  Flow (L/min):  [3-5] 3    Physical Exam   Gen: Acutely ill-appearing chronically ill female, alert, pleasant  Resp: Poor aeration, no tachypnea or secondary muscle use  Card: RRR  Abd: Soft, Nontender, Nondistended  Msk: full rom     Result Review    Result Review:  I have personally reviewed the results as below and agree with these findings:  [x]  Laboratory:   CMP          3/3/2024    05:03 3/4/2024    05:16 3/5/2024    06:12   CMP   Glucose 119  98  136    BUN 66  54  49    Creatinine 1.95  1.57  1.68    EGFR 28.1  36.5  33.6    Sodium 132  134  131    Potassium 4.6  3.9  3.4    Chloride 99  100  95    Calcium 9.3  9.0  9.2    BUN/Creatinine Ratio 33.8  34.4  29.2    Anion Gap 13.1   16.1  18.4      CBC          3/3/2024    05:03 3/4/2024    05:16 3/5/2024    06:12   CBC   WBC 22.30  18.41  13.49    RBC 2.78  2.85  2.86    Hemoglobin 8.6  8.7  8.7    Hematocrit 27.3  28.0  29.3    MCV 98.2  98.2  102.4    MCH 30.9  30.5  30.4    MCHC 31.5  31.1  29.7    RDW 16.2  16.7  16.4    Platelets 399  398  348    Phosphorus and magnesium within normal limits  [x]  Microbiology:   [x]  Radiology:   [x]  EKG/Telemetry:    []  Cardiology/Vascular:    []  Pathology:  []  Old records:  []  Other:    Assessment & Plan   Assessment / Plan     Assessment:  Acute on chronic diastolic heart failure  Acute hypoxic respiratory failure secondary to above  Acute cardiogenic pulmonary edema  NSTEMI likely type II  Type 2 diabetes mellitus with hyperglycemia  Lactic acidosis, clinically significant  Hypocalcemia  Hypokalemia  Acute on chronic kidney disease stage IIIb (baseline creatinine 1.0-1.2)  Immunocompromise status  Essential hypertension  Hyponatremia, mild  Metabolic acidosis    Plan:  -Pulmonology following  -Continue BiPAP at bedtime and as needed.  Comfortable on nasal cannula, 3 L.  Wean supplemental O2 as much as possible to maintain sats greater than 90%  -Continue Brovana, Pulmicort and DuoNebs  -Continue IV Lasix  -Steroids discontinued  -Continue chest physiotherapy  -Continue midodrine  -Creatinine remains stable   -Accu-Cheks and sliding scale insulin  -Continue home Prozac, Neurontin and Requip  -Continue holding home beta-blocker  -Continue aspirin, atorvastatin, Plavix  -PT/OT consulted  -Monitor electrolytes and renal function with BMP, phosphorus level and magnesium level in the AM  -Monitor WBC and Hgb with CBC in the AM       DVT Prophylaxis: Lovenox  GI Prophylaxis: Pantoprazole  Dispo: Patient requesting to go back to rehab at discharge.  Code Status: DNR         DVT prophylaxis:  Medical DVT prophylaxis orders are present.      CODE STATUS:   Medical Intervention Limits: NO intubation  (DNI)  Level Of Support Discussed With: Patient  Code Status (Patient has no pulse and is not breathing): No CPR (Do Not Attempt to Resuscitate)  Medical Interventions (Patient has pulse or is breathing): Limited Support      Electronically signed by Teresa Castro DO, 03/04/24, 12:49 PM EST.

## 2024-03-06 NOTE — DISCHARGE SUMMARY
[facility]         HOSPITALIST  DISCHARGE SUMMARY    Patient Name: Falguni Minaya  : 1960  MRN: [id number]    Date of Admission: 2024  Date of Discharge/Death 3/6/2024 at 08:02  Primary Care Physician: Williams Sage MD    Consults       Date and Time Order Name Status Description    2024  4:58 PM Inpatient Pulmonology Consult Completed     2024 12:58 PM Inpatient Pulmonology Consult Completed             Active and Resolved Hospital Problems:  Active Hospital Problems    Diagnosis POA    **Acute on chronic hypoxic respiratory failure [J96.21] Unknown    Severe malnutrition [E43] Yes    Diastolic CHF, acute on chronic [I50.33] Unknown    Acute on chronic respiratory failure with hypoxia [J96.21] Yes      Resolved Hospital Problems   No resolved problems to display.       Hospital Course     Hospital Course:  Falguni Minaya is a 64 y.o. female with chronic diastolic heart failure, CAD, dysphagia, essential hypertension, hyperlipidemia, history of MS and CKD stage IIIb presented with complaints of shortness of breath.  Empiric antibiotics, nebulizer breathing treatments, steroid therapy and IV diuresis initiated.  Pulmonology consulted.  Infectious workup was nonrevealing and antibiotics were discontinued.  ILD workup initiated.  Patient continued to have a O2 requirement, proBNP rechecked and noted to be significantly elevated, IV diuresis restarted.  Please see physician assistant note for further details but early this morning patient was found to be lethargic patient was unable to have a palpable pulse.  Patient's blood pressure was extremely low at 73/15.  Patient was transferred to the intensive care and started on multiple vasopressors.  Patient's  and daughter were notified.  Patient was already DNR/DNI.  Patient was evaluated by the critical care doctor.  Patient's ABG showed severe metabolic acidosis with a lactate of 17 and patient was requiring multiple  vasopressors.  Patient had a bedside cardiac ultrasound performed by critical care doctor which showed minimal contractions of her ventricles.  There was concern for cardiogenic shock.  At this time patient's family decided to make patient comfort measures and to discontinue all vasopressor support and patient passed away shortly after at 8:02 AM            Day of Discharge     Patient was not seen by me on the day of discharge/death as she passed away.     Discharge Details           Allergies   Allergen Reactions    Codeine Hives     Hyperactivity, nausea    Morphine Hives     Hyperactivity, nausea       Discharge Disposition: death      Diet:  Hospital:  Diet Order   Procedures    Diet: Regular/House Diet; Texture: Regular Texture (IDDSI 7); Fluid Consistency: Thin (IDDSI 0)           CODE STATUS:  Code Status and Medical Interventions:   Ordered at: 03/06/24 0739     Level Of Support Discussed With:    Next of Kin (If No Surrogate)     Code Status (Patient has no pulse and is not breathing):    No CPR (Do Not Attempt to Resuscitate)     Medical Interventions (Patient has pulse or is breathing):    Comfort Measures         Future Appointments   Date Time Provider Department Center   4/29/2024  2:30 PM Keith Riley MD [facility]           Pertinent  and/or Most Recent Results     PROCEDURES:   See chart     LAB RESULTS:      Lab 03/06/24  0714 03/06/24  0653 03/05/24  0612 03/04/24  0516 03/03/24  0503 03/02/24  0550 03/01/24  0422 02/29/24  0438 02/29/24  0438   WBC  --  20.73* 13.49* 18.41* 22.30* 25.36* 22.51*  --  19.43*   HEMOGLOBIN  --  8.5* 8.7* 8.7* 8.6* 8.6* 8.7*  --  9.5*   HEMATOCRIT  --  29.1* 29.3* 28.0* 27.3* 26.7* 27.1*  --  29.6*   PLATELETS  --  367 348 398 399 428 457*  --  460*   NEUTROS ABS  --  17.41* 11.16*  12.28* 15.67* 21.63* 20.94* 18.03*   < > 17.88*   IMMATURE GRANS (ABS)  --   --  0.69* 1.10*  --  1.84* 1.66*  --   --    LYMPHS ABS  --   --  0.78 0.57*  --  0.86 1.08  --    --    MONOS ABS  --   --  0.77 1.01*  --  1.56* 1.59*  --   --    EOS ABS  --   --  0.02 0.00  --  0.01 0.02  --   --    MCV  --  106.2* 102.4* 98.2* 98.2* 94.7 93.8  --  94.3   SED RATE  --   --   --   --   --   --   --   --  84*   CRP  --   --   --   --   --   --   --   --  0.76*   PROCALCITONIN  --   --   --   --   --  0.76*  --   --   --    LACTATE  --  12.7*  --   --   --   --   --   --   --    LACTATE, ARTERIAL 17.06*  --   --   --   --   --   --   --   --     < > = values in this interval not displayed.         Lab 03/06/24  0714 03/06/24  0653 03/05/24  0612 03/04/24  0516 03/03/24  0503 03/02/24  0550 03/01/24  0422 02/29/24  0438   SODIUM  --  129* 131* 134* 132* 130*   < > 133*   SODIUM, ARTERIAL 147.1*  --   --   --   --   --   --   --    POTASSIUM  --  5.6* 3.4* 3.9 4.6 3.4*   < > 3.2*   CHLORIDE  --  90* 95* 100 99 94*   < > 92*   CO2  --  7.2* 17.6* 17.9* 19.9* 20.0*   < > 22.3   ANION GAP  --  31.8* 18.4* 16.1* 13.1 16.0*   < > 18.7*   BUN  --  54* 49* 54* 66* 69*   < > 77*   CREATININE  --  2.60* 1.68* 1.57* 1.95* 1.87*   < > 1.96*   EGFR  --  19.9* 33.6* 36.5* 28.1* 29.6*   < > 27.9*   GLUCOSE  --  301* 136* 98 119* 110*   < > 192*   GLUCOSE, ARTERIAL 349*  --   --   --   --   --   --   --    CALCIUM  --  10.4 9.2 9.0 9.3 9.1   < > 9.6   IONIZED CALCIUM 1.35*  --   --   --   --   --   --   --    MAGNESIUM  --  2.8*  --   --   --   --   --  2.2   PHOSPHORUS  --   --   --   --   --   --   --  3.8    < > = values in this interval not displayed.         Lab 03/06/24  0653   TOTAL PROTEIN 6.8   ALBUMIN 3.0*   GLOBULIN 3.8   ALT (SGPT) 372*   AST (SGOT) 516*   BILIRUBIN 0.8   ALK PHOS 153*         Lab 03/06/24  0653 03/03/24  0503   PROBNP  --  3,124.0*   HSTROP T 56*  --                  Lab 03/06/24  0714   PH, ARTERIAL 7.010*   PCO2, ARTERIAL 40.2   PO2 ART 82.8   O2 SATURATION ART 87.1*   HCO3 ART 9.9*   BASE EXCESS ART -19.8*   CARBOXYHEMOGLOBIN 0.2     Brief Urine Lab Results  (Last result in the  past 365 days)        Color   Clarity   Blood   Leuk Est   Nitrite   Protein   CREAT   Urine HCG        02/20/24 0624 Yellow   Cloudy   Negative   Large (3+)   Positive   Trace                 Microbiology Results (last 10 days)       ** No results found for the last 240 hours. **            XR Chest 1 View    Result Date: 3/6/2024   Stable appearance of the chest with chronic changes in both lungs.       BIPIN BRADLEY MD       Electronically Signed and Approved By: BIPIN BRADLEY MD on 3/06/2024 at 6:06             XR Chest 1 View    Result Date: 3/4/2024   Chronic lung changes with no definite new focal airspace opacity.       CHARU CHATTERJEE MD       Electronically Signed and Approved By: CHARU CHATTERJEE MD on 3/04/2024 at 10:45             CT Chest Without Contrast Diagnostic    Result Date: 2/28/2024   There are airspace and interstitial densities with adjacent bronchiectasis which over time have evolved from extensive ground-glass opacities seen 1 month ago areas of scarring and fibrosis possibly sequela from chronic or atypical infection is suspected.  No acute infiltrates or suspicious pulmonary nodules.     OCTAVIO JACKSON DO       Electronically Signed and Approved By: OCTAVIO JACKSON DO on 2/28/2024 at 19:24             XR Chest 1 View    Result Date: 2/28/2024    1. Multi focal pulmonary densities which could reflect sequela to prior inflammatory infectious process and is suggested on more recent CT.       LUKE CLINE MD       Electronically Signed and Approved By: LUKE CLINE MD on 2/28/2024 at 10:22               Results for orders placed during the hospital encounter of 02/19/24    Duplex Venous Lower Extremity - Bilateral CAR    Interpretation Summary    Normal bilateral lower extremity venous duplex scan.      Results for orders placed during the hospital encounter of 02/19/24    Duplex Venous Lower Extremity - Bilateral CAR    Interpretation Summary    Normal bilateral lower extremity venous duplex  scan.      Results for orders placed during the hospital encounter of 02/19/24    Adult Transthoracic Echo Complete W/ Cont if Necessary Per Protocol    Interpretation Summary    Left ventricular ejection fraction appears to be 56 - 60%.    Left ventricular diastolic function is consistent with (grade I) impaired relaxation.    There is a trivial pericardial effusion.    There were no apparent intracardiac masses or thrombi.      Labs Pending at Discharge:  Pending Labs       Order Current Status    Hypersensitivity Pneumonitis Profile In process    Myositis Panel III Plus In process                  Electronically signed by Chris Franks DO, 03/06/24, 10:46 AM EST.

## 2024-03-06 NOTE — SIGNIFICANT NOTE
Received call from RN that patient was having difficulty breathing at 0554; at that time, respiratory therapy at the bedside with critical care RN.  Upon my arrival to bedside, patient heart rate in the 90s per monitor, however unable to palpate pulse.  Dr. Romero called to the bedside. Patient's extremities were warm and patient was lethargic.  Breathing noted, however unable to obtain O2 sat. stat chest x-ray was ordered per protocol at 0535, chest x-ray stable compared to previous.  Patient's blood pressure was significantly low, 73/15.  Orders placed for Levophed, dobutamine, epinephrine stat.  Order placed to transfer patient to the ICU.  Patient's CODE STATUS on file DNR/DNI.  Called to discuss with patient's  as well as daughter, unable to contact .  Patient's daughter notified of her condition, noted that she was significantly lethargic however did have a pulse at that time.  I discussed with the daughter and she elected to continue with the transfer to the ICU and continue pressors.  She did state they would like for her mother to remain a DNR/DNI.  Patient transferred to the ICU and orders placed for a rainbow draw.  EKG appeared stable compared to the previous, comparison 2/19/24.

## 2024-03-06 NOTE — CONSULTS
Pulmonary / Critical Care Progress Note      Patient Name: Falguni Minaya  : 1960  MRN: [id number]  Attending:  Chris Franks DO  Date of admission: 2024    Subjective   Subjective   Follow-up for acute on chronic hypoxic respiratory failure requiring high flow nasal cannula     Earlier this morning patient became hypotensive and less responsive  Per nursing they were unable to get a oxygen level or blood pressure  She was transferred to the ICU for higher level care  She was initiated on norepinephrine, epinephrine  On arrival patient is in mild distress, on nonrebreather  ABG shows severe metabolic acidosis with lactate 17  Family members at bedside deciding goals of care    Objective   Objective     Vitals:   Temp:  [97.2 °F (36.2 °C)-98.1 °F (36.7 °C)] 97.2 °F (36.2 °C)  Heart Rate:  [] 113  Resp:  [8-20] 18  BP: ()/() 91/67  Flow (L/min):  [3-15] 15    Physical Exam   Vital Signs Reviewed   General:  NAD, chronically ill appearing female, lying in bed  Chest: Decreased aeration with coarse crackles on auscultation, no work of breathing noted on nonrebreather  CV: regular rate and rhythm regular  EXT:  no clubbing, no cyanosis, BLE edema  Neuro:  A&Ox0,   Skin: No rashes or lesions noted    Result Review    Result Review:  I have personally reviewed the results from the time of this admission to 3/6/2024 07:49 EST and agree with these findings:  [x]  Laboratory  [x]  Microbiology  [x]  Radiology  []  EKG/Telemetry   []  Cardiology/Vascular   []  Pathology  []  Old records  []  Other:  Most notable findings include:   proBNP 10,048     chest CT reviewed demonstrating scarring and fibrosis        Lab 24  0714 24  0653 24  0612 24  0516 24  0503 24  0550 24  1316 24  0422 24  0438   WBC  --  20.73* 13.49* 18.41* 22.30* 25.36*  --  22.51* 19.43*   HEMOGLOBIN  --  8.5* 8.7* 8.7* 8.6* 8.6*  --  8.7* 9.5*   HEMATOCRIT  --  29.1*  29.3* 28.0* 27.3* 26.7*  --  27.1* 29.6*   PLATELETS  --  367 348 398 399 428  --  457* 460*   SODIUM  --  129* 131* 134* 132* 130* 126* 129* 133*   SODIUM, ARTERIAL 147.1*  --   --   --   --   --   --   --   --    POTASSIUM  --  5.6* 3.4* 3.9 4.6 3.4* 3.7 3.1* 3.2*   CHLORIDE  --  90* 95* 100 99 94* 90* 91* 92*   CO2  --  7.2* 17.6* 17.9* 19.9* 20.0* 17.1* 19.6* 22.3   BUN  --  54* 49* 54* 66* 69* 73* 74* 77*   CREATININE  --  2.60* 1.68* 1.57* 1.95* 1.87* 1.80* 1.85* 1.96*   GLUCOSE  --  301* 136* 98 119* 110* 211* 128* 192*   GLUCOSE, ARTERIAL 349*  --   --   --   --   --   --   --   --    CALCIUM  --  10.4 9.2 9.0 9.3 9.1 9.2 9.2 9.6   PHOSPHORUS  --   --   --   --   --   --   --   --  3.8   TOTAL PROTEIN  --  6.8  --   --   --   --   --   --   --    ALBUMIN  --  3.0*  --   --   --   --   --   --   --    GLOBULIN  --  3.8  --   --   --   --   --   --   --        Assessment & Plan   Assessment / Plan     Active Hospital Problems:  Active Hospital Problems    Diagnosis     **Acute on chronic hypoxic respiratory failure     Severe malnutrition     Diastolic CHF, acute on chronic     Acute on chronic respiratory failure with hypoxia    Impression:    Shock  Acute hypoxic respiratory failure  Metabolic acidosis  Lactic acidosis  Acute renal injury  Acute liver injury  Acute decompensated congestive diastolic heart failure  NSTEMI likely type II  Acute cardiogenic pulmonary edema  Volume overload  T2DM with hyperglycemia  History of multiple sclerosis  Immunocompromised status       Plan:  -Patient is likely in cardiogenic shock.  Currently on norepinephrine, epinephrine, vasopressin.  Bedside cardiac ultrasound show minimal contractions of her ventricles.  IVC dilated that do not change with respiration  -Continue vasopressors at this time  -Continue nonrebreather for now.  I do not think patient will tolerate BiPAP.  She is DNR/DNI at this time  -ABG 7.0/40/82 on 15 L nonrebreather; lactate 17  -Renal function  worsening likely secondary to shock.  She would not tolerate renal replacement therapy  -Will discuss with family on goals of care.  I advised them that we should no longer escalate care as she is unlikely to respond.  Recommended comfort measures at this time.    DVT prophylaxis:  Medical and mechanical DVT prophylaxis orders are present.    CODE STATUS:   Level Of Support Discussed With: Next of Kin (If No Surrogate)  Code Status (Patient has no pulse and is not breathing): No CPR (Do Not Attempt to Resuscitate)  Medical Interventions (Patient has pulse or is breathing): Comfort Measures    I have reviewed labs, imaging, pertinent clinical data and provider notes.   I have discussed with bedside nurse and primary service.     Patient is critically ill with shock requiring multiple vasopressors, lactic acidosis, respiratory failure. I, Dr. Audelia Duarte, spent 33 minutes of critical care time. This included personally reviewing all pertinent labs, imaging, microbiology and documentation. Also discussing the case with the patient and any available family, the admitting physician and any available ancillary staff.   Electronically signed by Audelia Duarte MD, 03/06/24, 7:54 AM EST.

## 2024-03-06 NOTE — NURSING NOTE
Pt stated she was not feeling well this morning at 0520 with AM medication pass. Patient was stating 80s on continuous pulse ox. Primary RN and other RN pulled pt up in bed. She began to become tachypenic, and tachycardic. Stat order was placed for ABG and stat chest xray per protocol. Respiratory was notified at 0525. Patient became extremely lethargic with a pulse. Physician assistant notified to come see pt ASAP due to unable to get a O2 reading and low blood pressure. Dr. Romero called to assist. Pt was started on levophed, dobutamine, epinephrine. Pt's code status on file is DNR/DNI. Patient transferred to ICU.

## 2024-03-07 NOTE — PROGRESS NOTES
"Enter Query Response Below      Query Response:   Type II NSTEMI ruled in     Electronically signed by Chris Franks DO, 24, 11:34 AM EST.               If applicable, please update the problem list.     Patient: Falguni Minaya        : 1960  Account: [id number]           Admit Date: 2024        How to Respond to this query:       a. Click New Note     b. Answer query within the yellow box.                c. Update the Problem List, if applicable.      If you have any questions about this query contact me at: [email or web address]     Dr. Franks:    Patient with history of CHF, respiratory failure, MS, DM, and CKD was admitted  with Acute on chronic diastolic CHF and Acute on chronic respiratory failure.   HS Troponin T was noted at 56, EKG-\"Sinus, borderline LVH, inferior Q waves, no acute ischemia\" per ED interpretation. \"NSTEMI likely type II\" is documented throughout the progress notes including 3/5. Treatment included IV diuresis and supplemental oxygen. 3/6 patient became hypotensive requiring pressors and decision was made to pursue comfort measures.   NSTEMI is not noted in the discharge summary.      Please clarify the following:    Type II NSTEMI ruled in  Type II NSTEMI ruled out  Other- specify______  Unable to determine      By submitting this query, we are merely seeking further clarification of documentation to accurately reflect all conditions that you are monitoring, evaluating, treating or that extend the hospitalization or utilize additional resources of care. Please utilize your independent clinical judgment when addressing the question(s) above.     This query and your response, once completed, will be entered into the legal medical record.    Sincerely,  Abby Joyner RN, [facility]  Clinical Documentation Integrity Program     "

## 2024-03-16 LAB
EJ AB SER QL: NEGATIVE
ENA JO1 AB SER IA-ACNC: <20 UNITS
ENA PM/SCL AB SER-ACNC: <20 UNITS
ENA SS-A 52KD IGG SER IA-ACNC: <20 UNITS
FIBRILLARIN AB SER QL: NEGATIVE
KU AB SER QL: NEGATIVE
MDA5 AB SER LINE BLOT-ACNC: <20 UNITS
MI2 AB SER QL: NEGATIVE
MJ AB SER LINE BLOT-ACNC: <20 UNITS
OJ AB SER QL: NEGATIVE
PL12 AB SER QL: NEGATIVE
PL7 AB SER QL: NEGATIVE
SAE1 IGG SER QL LINE BLOT: <20 UNITS
SRP AB SERPL QL: NEGATIVE
TIF1-GAMMA AB SER IA-ACNC: <20 UNITS
U1 SNRNP AB SER IA-ACNC: <20 UNITS
U2 SNRNP AB SER QL: NEGATIVE

## (undated) DEVICE — RETR STAY ELAS SLD/BLD 6.5X16MM PK/4

## (undated) DEVICE — HI-TORQUE BALANCE MIDDLEWEIGHT GUIDE WIRE .014 STRAIGHT TIP 3.0 CM X 190 CM: Brand: HI-TORQUE BALANCE MIDDLEWEIGHT

## (undated) DEVICE — RETR RNG GEN2 18.6X8.9CM

## (undated) DEVICE — HI-TORQUE EXTRA S'PORT GUIDE WIRE .014 STRAIGHT TIP 3.0 CM X 190 CM: Brand: HI-TORQUE EXTRA S'PORT

## (undated) DEVICE — SUT VIC 4/0 P3 18IN UD VCP494H

## (undated) DEVICE — MSK PROC CURAPLEX O2 2/ADAPT 7FT

## (undated) DEVICE — DRAIN JACKSON PRATT 10FR 1/8END: Brand: CARDINAL HEALTH

## (undated) DEVICE — TREK CORONARY DILATATION CATHETER 3.25 MM X 20 MM / RAPID-EXCHANGE: Brand: TREK

## (undated) DEVICE — TR BAND RADIAL ARTERY COMPRESSION DEVICE: Brand: TR BAND

## (undated) DEVICE — CANN O2 ETCO2 FITS ALL CONN CO2 SMPL A/ 7IN DISP LF

## (undated) DEVICE — ADAPT CLN BIOGUARD AIR/H2O DISP

## (undated) DEVICE — SUT PROLN 6/0 P1 18IN 8697G

## (undated) DEVICE — SLV SCD KN/LEN ADJ EXPRSS BLENDED MD 1P/U

## (undated) DEVICE — LN SMPL CO2 SHTRM SD STREAM W/M LUER

## (undated) DEVICE — GW EMR FIX EXCHG J STD .035 3MM 260CM

## (undated) DEVICE — PK CATH CARD 40

## (undated) DEVICE — CATH VENT MIV RADL PIG ST TIP 5F 110CM

## (undated) DEVICE — JACKSON-PRATT 100CC BULB RESERVOIR: Brand: CARDINAL HEALTH

## (undated) DEVICE — Device

## (undated) DEVICE — DRSNG SURESITE WNDW 4X4.5

## (undated) DEVICE — THE TORRENT IRRIGATION SCOPE CONNECTOR IS USED WITH THE TORRENT IRRIGATION TUBING TO PROVIDE IRRIGATION FLUIDS SUCH AS STERILE WATER DURING GASTROINTESTINAL ENDOSCOPIC PROCEDURES WHEN USED IN CONJUNCTION WITH AN IRRIGATION PUMP (OR ELECTROSURGICAL UNIT).: Brand: TORRENT

## (undated) DEVICE — CANN NASL CO2 TRULINK W/O2 A/

## (undated) DEVICE — 6F .070 JR 4 100CM: Brand: CORDIS

## (undated) DEVICE — SENSR O2 OXIMAX FNGR A/ 18IN NONSTR

## (undated) DEVICE — STANDARD HYPODERMIC NEEDLE,POLYPROPYLENE HUB: Brand: MONOJECT

## (undated) DEVICE — SPONGE,DISSECTOR,ROUND CHERRY,XR,ST,5/PK: Brand: MEDLINE

## (undated) DEVICE — DEV OPN LIGASURE DISSCT EXACT 40DEG 21.6MM BX/1

## (undated) DEVICE — SUT VIC COAT 5/0 P3 18IN

## (undated) DEVICE — CATH DIAG IMPULSE FL3.5 5F 100CM

## (undated) DEVICE — PROBE 8225825 3PK INCREMT STD PRASS ROHS

## (undated) DEVICE — DEV INDEFLATOR

## (undated) DEVICE — VAGINAL PREP TRAY: Brand: MEDLINE INDUSTRIES, INC.

## (undated) DEVICE — CATH ASPIR EXPORT AP .014IN 6F140CM

## (undated) DEVICE — SINGLE-USE BIOPSY FORCEPS: Brand: RADIAL JAW 4

## (undated) DEVICE — GLIDESHEATH BASIC HYDROPHILIC COATED INTRODUCER SHEATH: Brand: GLIDESHEATH

## (undated) DEVICE — CATH DIAG IMPULSE FR4 5F 100CM

## (undated) DEVICE — BITEBLOCK OMNI BLOC

## (undated) DEVICE — SUT NLY 2/0 664G

## (undated) DEVICE — PENCL E/S SMOKEEVAC W/TELESCP CANN

## (undated) DEVICE — TOWEL,OR,DSP,ST,BLUE,STD,4/PK,20PK/CS: Brand: MEDLINE

## (undated) DEVICE — GLV SURG BIOGEL LTX PF 7 1/2

## (undated) DEVICE — ENT-LF: Brand: MEDLINE INDUSTRIES, INC.

## (undated) DEVICE — KT ORCA ORCAPOD DISP STRL

## (undated) DEVICE — TUBING, SUCTION, 1/4" X 10', STRAIGHT: Brand: MEDLINE